# Patient Record
Sex: FEMALE | Race: WHITE | NOT HISPANIC OR LATINO | Employment: OTHER | URBAN - METROPOLITAN AREA
[De-identification: names, ages, dates, MRNs, and addresses within clinical notes are randomized per-mention and may not be internally consistent; named-entity substitution may affect disease eponyms.]

---

## 2017-02-17 ENCOUNTER — OFFICE VISIT - HEALTHEAST (OUTPATIENT)
Dept: INTERNAL MEDICINE | Facility: CLINIC | Age: 77
End: 2017-02-17

## 2017-02-17 DIAGNOSIS — Z09 HOSPITAL DISCHARGE FOLLOW-UP: ICD-10-CM
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
## 2017-02-17 DIAGNOSIS — R55 VASOVAGAL SYNCOPES: ICD-10-CM

## 2017-02-17 ASSESSMENT — MIFFLIN-ST. JEOR: SCORE: 1033.81

## 2017-04-26 ENCOUNTER — COMMUNICATION - HEALTHEAST (OUTPATIENT)
Dept: INTERNAL MEDICINE | Facility: CLINIC | Age: 77
End: 2017-04-26

## 2017-05-03 ENCOUNTER — OFFICE VISIT - HEALTHEAST (OUTPATIENT)
Dept: INTERNAL MEDICINE | Facility: CLINIC | Age: 77
End: 2017-05-03

## 2017-05-03 DIAGNOSIS — E78.5 HLD (HYPERLIPIDEMIA): ICD-10-CM

## 2017-05-03 DIAGNOSIS — R35.0 URINARY FREQUENCY: ICD-10-CM

## 2017-05-03 DIAGNOSIS — D64.9 ANEMIA: ICD-10-CM

## 2017-05-03 DIAGNOSIS — Z86.73 HISTORY OF TIA (TRANSIENT ISCHEMIC ATTACK): ICD-10-CM

## 2017-05-04 ENCOUNTER — COMMUNICATION - HEALTHEAST (OUTPATIENT)
Dept: INTERNAL MEDICINE | Facility: CLINIC | Age: 77
End: 2017-05-04

## 2017-08-03 ENCOUNTER — AMBULATORY - HEALTHEAST (OUTPATIENT)
Dept: LAB | Facility: CLINIC | Age: 77
End: 2017-08-03

## 2017-08-03 DIAGNOSIS — E78.5 HLD (HYPERLIPIDEMIA): ICD-10-CM

## 2017-08-03 LAB
CHOLEST SERPL-MCNC: 111 MG/DL
FASTING STATUS PATIENT QL REPORTED: YES
HDLC SERPL-MCNC: 43 MG/DL
LDLC SERPL CALC-MCNC: 49 MG/DL
TRIGL SERPL-MCNC: 96 MG/DL

## 2017-08-07 ENCOUNTER — COMMUNICATION - HEALTHEAST (OUTPATIENT)
Dept: INTERNAL MEDICINE | Facility: CLINIC | Age: 77
End: 2017-08-07

## 2017-08-21 ENCOUNTER — COMMUNICATION - HEALTHEAST (OUTPATIENT)
Dept: INTERNAL MEDICINE | Facility: CLINIC | Age: 77
End: 2017-08-21

## 2017-08-21 ENCOUNTER — OFFICE VISIT - HEALTHEAST (OUTPATIENT)
Dept: INTERNAL MEDICINE | Facility: CLINIC | Age: 77
End: 2017-08-21

## 2017-08-21 ENCOUNTER — AMBULATORY - HEALTHEAST (OUTPATIENT)
Dept: INTERNAL MEDICINE | Facility: CLINIC | Age: 77
End: 2017-08-21

## 2017-08-21 DIAGNOSIS — R53.81 MALAISE: ICD-10-CM

## 2017-08-21 DIAGNOSIS — R00.0 TACHYCARDIA: ICD-10-CM

## 2017-08-21 DIAGNOSIS — R53.83 FATIGUE: ICD-10-CM

## 2017-08-23 ENCOUNTER — COMMUNICATION - HEALTHEAST (OUTPATIENT)
Dept: INTERNAL MEDICINE | Facility: CLINIC | Age: 77
End: 2017-08-23

## 2017-08-24 ENCOUNTER — COMMUNICATION - HEALTHEAST (OUTPATIENT)
Dept: INTERNAL MEDICINE | Facility: CLINIC | Age: 77
End: 2017-08-24

## 2017-08-28 ENCOUNTER — OFFICE VISIT - HEALTHEAST (OUTPATIENT)
Dept: INTERNAL MEDICINE | Facility: CLINIC | Age: 77
End: 2017-08-28

## 2017-08-28 DIAGNOSIS — R55 NEAR SYNCOPE: ICD-10-CM

## 2017-08-28 DIAGNOSIS — E86.0 DEHYDRATION: ICD-10-CM

## 2017-08-28 DIAGNOSIS — N39.0 URINARY TRACT INFECTION WITHOUT HEMATURIA, SITE UNSPECIFIED: ICD-10-CM

## 2017-08-28 DIAGNOSIS — I47.10 PSVT (PAROXYSMAL SUPRAVENTRICULAR TACHYCARDIA) (H): ICD-10-CM

## 2017-09-04 ENCOUNTER — RECORDS - HEALTHEAST (OUTPATIENT)
Dept: GENERAL RADIOLOGY | Age: 77
End: 2017-09-04

## 2017-09-04 ENCOUNTER — OFFICE VISIT - HEALTHEAST (OUTPATIENT)
Dept: FAMILY MEDICINE | Facility: CLINIC | Age: 77
End: 2017-09-04
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
## 2017-09-04 DIAGNOSIS — M25.572 PAIN IN LEFT ANKLE AND JOINTS OF LEFT FOOT: ICD-10-CM

## 2017-09-04 DIAGNOSIS — M25.572 ACUTE LEFT ANKLE PAIN: ICD-10-CM

## 2017-09-04 DIAGNOSIS — S93.402A LEFT ANKLE SPRAIN: ICD-10-CM

## 2017-09-07 ENCOUNTER — OFFICE VISIT - HEALTHEAST (OUTPATIENT)
Dept: CARDIOLOGY | Facility: CLINIC | Age: 77
End: 2017-09-07

## 2017-09-07 DIAGNOSIS — I47.10 PAROXYSMAL SVT (SUPRAVENTRICULAR TACHYCARDIA) (H): ICD-10-CM

## 2017-09-07 DIAGNOSIS — I35.0 MODERATE AORTIC VALVE STENOSIS: ICD-10-CM

## 2017-09-07 DIAGNOSIS — I45.2 BIFASCICULAR BUNDLE BRANCH BLOCK: ICD-10-CM

## 2017-09-07 ASSESSMENT — MIFFLIN-ST. JEOR: SCORE: 1007.26

## 2017-09-11 ENCOUNTER — HOSPITAL ENCOUNTER (OUTPATIENT)
Dept: CARDIOLOGY | Facility: HOSPITAL | Age: 77
Discharge: HOME | End: 2017-09-11
Attending: INTERNAL MEDICINE

## 2017-09-11 DIAGNOSIS — I47.10 PAROXYSMAL SVT (SUPRAVENTRICULAR TACHYCARDIA) (H): ICD-10-CM

## 2018-01-03 ENCOUNTER — AMBULATORY - HEALTHEAST (OUTPATIENT)
Dept: INTERNAL MEDICINE | Facility: CLINIC | Age: 78
End: 2018-01-03

## 2018-01-03 DIAGNOSIS — I47.10 PAROXYSMAL SVT (SUPRAVENTRICULAR TACHYCARDIA) (H): ICD-10-CM

## 2018-07-03 ENCOUNTER — RECORDS - HEALTHEAST (OUTPATIENT)
Dept: ADMINISTRATIVE | Facility: OTHER | Age: 78
End: 2018-07-03

## 2018-07-23 ENCOUNTER — COMMUNICATION - HEALTHEAST (OUTPATIENT)
Dept: INTERNAL MEDICINE | Facility: CLINIC | Age: 78
End: 2018-07-23

## 2018-09-05 ENCOUNTER — AMBULATORY - HEALTHEAST (OUTPATIENT)
Dept: CARDIOLOGY | Facility: CLINIC | Age: 78
End: 2018-09-05

## 2018-09-05 DIAGNOSIS — I35.0 AORTIC STENOSIS: ICD-10-CM

## 2018-09-27 ENCOUNTER — COMMUNICATION - HEALTHEAST (OUTPATIENT)
Dept: SCHEDULING | Facility: CLINIC | Age: 78
End: 2018-09-27

## 2018-10-01 ENCOUNTER — COMMUNICATION - HEALTHEAST (OUTPATIENT)
Dept: CARE COORDINATION | Facility: CLINIC | Age: 78
End: 2018-10-01

## 2018-10-03 ENCOUNTER — OFFICE VISIT - HEALTHEAST (OUTPATIENT)
Dept: FAMILY MEDICINE | Facility: CLINIC | Age: 78
End: 2018-10-03

## 2018-10-03 DIAGNOSIS — R55 SYNCOPE: ICD-10-CM

## 2018-10-03 DIAGNOSIS — I47.10 PAROXYSMAL SVT (SUPRAVENTRICULAR TACHYCARDIA) (H): ICD-10-CM

## 2018-10-03 DIAGNOSIS — N39.0 URINARY TRACT INFECTION WITHOUT HEMATURIA, SITE UNSPECIFIED: ICD-10-CM

## 2018-10-03 ASSESSMENT — MIFFLIN-ST. JEOR: SCORE: 1032.66

## 2018-10-08 ENCOUNTER — COMMUNICATION - HEALTHEAST (OUTPATIENT)
Dept: INTERNAL MEDICINE | Facility: CLINIC | Age: 78
End: 2018-10-08

## 2018-10-08 DIAGNOSIS — I10 ESSENTIAL HYPERTENSION: ICD-10-CM

## 2018-10-09 ENCOUNTER — COMMUNICATION - HEALTHEAST (OUTPATIENT)
Dept: INTERNAL MEDICINE | Facility: CLINIC | Age: 78
End: 2018-10-09

## 2018-10-09 DIAGNOSIS — I10 ESSENTIAL HYPERTENSION: ICD-10-CM

## 2018-10-31 ENCOUNTER — OFFICE VISIT - HEALTHEAST (OUTPATIENT)
Dept: INTERNAL MEDICINE | Facility: CLINIC | Age: 78
End: 2018-10-31

## 2018-10-31 DIAGNOSIS — E78.5 HLD (HYPERLIPIDEMIA): ICD-10-CM

## 2018-10-31 DIAGNOSIS — I35.0 AORTIC VALVE STENOSIS, ETIOLOGY OF CARDIAC VALVE DISEASE UNSPECIFIED: ICD-10-CM

## 2018-10-31 DIAGNOSIS — I47.10 PAROXYSMAL SVT (SUPRAVENTRICULAR TACHYCARDIA) (H): ICD-10-CM

## 2018-10-31 DIAGNOSIS — R55 SYNCOPE: ICD-10-CM

## 2018-11-07 ENCOUNTER — COMMUNICATION - HEALTHEAST (OUTPATIENT)
Dept: INTERNAL MEDICINE | Facility: CLINIC | Age: 78
End: 2018-11-07

## 2018-11-09 ENCOUNTER — HOSPITAL ENCOUNTER (OUTPATIENT)
Dept: CARDIOLOGY | Facility: HOSPITAL | Age: 78
Discharge: HOME | End: 2018-11-09

## 2018-11-09 DIAGNOSIS — I35.0 AORTIC VALVE STENOSIS, ETIOLOGY OF CARDIAC VALVE DISEASE UNSPECIFIED: ICD-10-CM

## 2018-11-09 DIAGNOSIS — I47.10 PAROXYSMAL SVT (SUPRAVENTRICULAR TACHYCARDIA) (H): ICD-10-CM

## 2018-11-09 LAB
AORTIC ROOT: 2.8 CM
AORTIC VALVE MEAN VELOCITY: 176 CM/S
ASCENDING AORTA: 2.8 CM
AV DIMENSIONLESS INDEX VTI: 0.4
AV MEAN GRADIENT: 15 MMHG
AV PEAK GRADIENT: 25.6 MMHG
AV VALVE AREA: 1.1 CM2
BSA FOR ECHO PROCEDURE: 1.64 M2
CV BLOOD PRESSURE: NORMAL MMHG
CV ECHO HEIGHT: 63.5 IN
CV ECHO WEIGHT: 133 LBS
DOP CALC AO PEAK VEL: 253 CM/S
DOP CALC AO VTI: 51.4 CM
DOP CALC LVOT AREA: 3.14 CM2
DOP CALC LVOT DIAMETER: 2 CM
DOP CALC LVOT STROKE VOLUME: 57.1 CM3
DOP CALC MV VTI: 39.2 CM
DOP CALCLVOT PEAK VEL VTI: 18.2 CM
EJECTION FRACTION: 50 % (ref 55–75)
FRACTIONAL SHORTENING: 38.1 % (ref 28–44)
INTERVENTRICULAR SEPTUM IN END DIASTOLE: 1.1 CM (ref 0.6–0.9)
IVS/PW RATIO: 1
LA AREA 1: 21.4 CM2
LA AREA 2: 22.2 CM2
LEFT ATRIUM LENGTH: 6.3 CM
LEFT ATRIUM SIZE: 3.7 CM
LEFT ATRIUM VOLUME INDEX: 39.1 ML/M2
LEFT ATRIUM VOLUME: 64.1 ML
LEFT VENTRICLE CARDIAC INDEX: 2.3 L/MIN/M2
LEFT VENTRICLE CARDIAC OUTPUT: 3.8 L/MIN
LEFT VENTRICLE DIASTOLIC VOLUME INDEX: 32.9 CM3/M2 (ref 34–74)
LEFT VENTRICLE DIASTOLIC VOLUME: 54 CM3 (ref 46–106)
LEFT VENTRICLE HEART RATE: 67 BPM
LEFT VENTRICLE MASS INDEX: 95.8 G/M2
LEFT VENTRICLE SYSTOLIC VOLUME INDEX: 16.5 CM3/M2 (ref 11–31)
LEFT VENTRICLE SYSTOLIC VOLUME: 27 CM3 (ref 14–42)
LEFT VENTRICULAR INTERNAL DIMENSION IN DIASTOLE: 4.2 CM (ref 3.8–5.2)
LEFT VENTRICULAR INTERNAL DIMENSION IN SYSTOLE: 2.6 CM (ref 2.2–3.5)
LEFT VENTRICULAR MASS: 157.1 G
LEFT VENTRICULAR OUTFLOW TRACT MEAN GRADIENT: 2 MMHG
LEFT VENTRICULAR OUTFLOW TRACT MEAN VELOCITY: 61.8 CM/S
LEFT VENTRICULAR POSTERIOR WALL IN END DIASTOLE: 1.1 CM (ref 0.6–0.9)
LV STROKE VOLUME INDEX: 34.8 ML/M2
MITRAL VALVE E/A RATIO: 0.7
MITRAL VALVE MEAN INFLOW VELOCITY: 90.6 CM/S
MITRAL VALVE PEAK VELOCITY: 167 CM/S
MV AREA VTI: 1.46 CM2
MV AVERAGE E/E' RATIO: 22.3 CM/S
MV DECELERATION TIME: 394 MS
MV E'TISSUE VEL-LAT: 5.5 CM/S
MV E'TISSUE VEL-MED: 3.48 CM/S
MV LATERAL E/E' RATIO: 18.2
MV MEAN GRADIENT: 4 MMHG
MV MEDIAL E/E' RATIO: 28.7
MV PEAK A VELOCITY: 152 CM/S
MV PEAK E VELOCITY: 100 CM/S
MV PEAK GRADIENT: 11.2 MMHG
MV VALVE AREA BY CONTINUITY EQUATION: 1.5 CM2
NUC REST DIASTOLIC VOLUME INDEX: 2128 LBS
NUC REST SYSTOLIC VOLUME INDEX: 63.5 IN
TRICUSPID REGURGITATION PEAK PRESSURE GRADIENT: 9.9 MMHG
TRICUSPID VALVE ANULAR PLANE SYSTOLIC EXCURSION: 2.1 CM
TRICUSPID VALVE PEAK REGURGITANT VELOCITY: 157 CM/S

## 2018-11-09 ASSESSMENT — MIFFLIN-ST. JEOR: SCORE: 1055.34

## 2018-11-12 ENCOUNTER — COMMUNICATION - HEALTHEAST (OUTPATIENT)
Dept: INTERNAL MEDICINE | Facility: CLINIC | Age: 78
End: 2018-11-12

## 2018-11-14 ENCOUNTER — COMMUNICATION - HEALTHEAST (OUTPATIENT)
Dept: INTERNAL MEDICINE | Facility: CLINIC | Age: 78
End: 2018-11-14

## 2018-11-16 ENCOUNTER — COMMUNICATION - HEALTHEAST (OUTPATIENT)
Dept: INTERNAL MEDICINE | Facility: CLINIC | Age: 78
End: 2018-11-16

## 2018-11-20 ENCOUNTER — COMMUNICATION - HEALTHEAST (OUTPATIENT)
Dept: INTERNAL MEDICINE | Facility: CLINIC | Age: 78
End: 2018-11-20

## 2018-11-26 ENCOUNTER — OFFICE VISIT - HEALTHEAST (OUTPATIENT)
Dept: INTERNAL MEDICINE | Facility: CLINIC | Age: 78
End: 2018-11-26

## 2018-11-26 DIAGNOSIS — L30.9 DERMATITIS: ICD-10-CM

## 2018-12-03 ENCOUNTER — OFFICE VISIT - HEALTHEAST (OUTPATIENT)
Dept: INTERNAL MEDICINE | Facility: CLINIC | Age: 78
End: 2018-12-03

## 2018-12-03 DIAGNOSIS — I35.0 AORTIC VALVE STENOSIS, ETIOLOGY OF CARDIAC VALVE DISEASE UNSPECIFIED: ICD-10-CM

## 2018-12-03 DIAGNOSIS — I34.2 NON-RHEUMATIC MITRAL VALVE STENOSIS: ICD-10-CM

## 2018-12-03 DIAGNOSIS — I47.10 PAROXYSMAL SVT (SUPRAVENTRICULAR TACHYCARDIA) (H): ICD-10-CM

## 2018-12-03 DIAGNOSIS — I10 ESSENTIAL HYPERTENSION: ICD-10-CM

## 2018-12-06 ENCOUNTER — AMBULATORY - HEALTHEAST (OUTPATIENT)
Dept: NURSING | Facility: CLINIC | Age: 78
End: 2018-12-06

## 2018-12-07 ENCOUNTER — AMBULATORY - HEALTHEAST (OUTPATIENT)
Dept: INTERNAL MEDICINE | Facility: CLINIC | Age: 78
End: 2018-12-07

## 2018-12-20 ENCOUNTER — COMMUNICATION - HEALTHEAST (OUTPATIENT)
Dept: INTERNAL MEDICINE | Facility: CLINIC | Age: 78
End: 2018-12-20

## 2018-12-20 ENCOUNTER — AMBULATORY - HEALTHEAST (OUTPATIENT)
Dept: NURSING | Facility: CLINIC | Age: 78
End: 2018-12-20

## 2018-12-20 DIAGNOSIS — I10 ESSENTIAL HYPERTENSION: ICD-10-CM

## 2018-12-27 ENCOUNTER — COMMUNICATION - HEALTHEAST (OUTPATIENT)
Dept: INTERNAL MEDICINE | Facility: CLINIC | Age: 78
End: 2018-12-27

## 2019-01-28 ENCOUNTER — OFFICE VISIT - HEALTHEAST (OUTPATIENT)
Dept: INTERNAL MEDICINE | Facility: CLINIC | Age: 79
End: 2019-01-28

## 2019-01-28 DIAGNOSIS — N30.00 ACUTE CYSTITIS WITHOUT HEMATURIA: ICD-10-CM

## 2019-01-28 DIAGNOSIS — N17.9 AKI (ACUTE KIDNEY INJURY) (H): ICD-10-CM

## 2019-01-28 DIAGNOSIS — E86.0 DEHYDRATION: ICD-10-CM

## 2019-01-28 ASSESSMENT — MIFFLIN-ST. JEOR: SCORE: 1018.15

## 2019-02-06 ENCOUNTER — AMBULATORY - HEALTHEAST (OUTPATIENT)
Dept: LAB | Facility: CLINIC | Age: 79
End: 2019-02-06

## 2019-02-06 ENCOUNTER — AMBULATORY - HEALTHEAST (OUTPATIENT)
Dept: INTERNAL MEDICINE | Facility: CLINIC | Age: 79
End: 2019-02-06

## 2019-02-06 DIAGNOSIS — N17.9 AKI (ACUTE KIDNEY INJURY) (H): ICD-10-CM

## 2019-02-06 LAB
ALBUMIN UR-MCNC: NEGATIVE MG/DL
ANION GAP SERPL CALCULATED.3IONS-SCNC: 10 MMOL/L (ref 5–18)
APPEARANCE UR: ABNORMAL
BACTERIA #/AREA URNS HPF: ABNORMAL HPF
BILIRUB UR QL STRIP: NEGATIVE
BUN SERPL-MCNC: 26 MG/DL (ref 8–28)
CALCIUM SERPL-MCNC: 9 MG/DL (ref 8.5–10.5)
CHLORIDE BLD-SCNC: 106 MMOL/L (ref 98–107)
CO2 SERPL-SCNC: 24 MMOL/L (ref 22–31)
COLOR UR AUTO: YELLOW
CREAT SERPL-MCNC: 1.09 MG/DL (ref 0.6–1.1)
GFR SERPL CREATININE-BSD FRML MDRD: 49 ML/MIN/1.73M2
GLUCOSE BLD-MCNC: 107 MG/DL (ref 70–125)
GLUCOSE UR STRIP-MCNC: NEGATIVE MG/DL
HGB UR QL STRIP: ABNORMAL
KETONES UR STRIP-MCNC: NEGATIVE MG/DL
LEUKOCYTE ESTERASE UR QL STRIP: ABNORMAL
MUCOUS THREADS #/AREA URNS LPF: ABNORMAL LPF
NITRATE UR QL: POSITIVE
PH UR STRIP: 6 [PH] (ref 5–8)
POTASSIUM BLD-SCNC: 4.7 MMOL/L (ref 3.5–5)
RBC #/AREA URNS AUTO: ABNORMAL HPF
SODIUM SERPL-SCNC: 140 MMOL/L (ref 136–145)
SP GR UR STRIP: 1.02 (ref 1–1.03)
SQUAMOUS #/AREA URNS AUTO: ABNORMAL LPF
UROBILINOGEN UR STRIP-ACNC: ABNORMAL
WBC #/AREA URNS AUTO: ABNORMAL HPF
WBC CLUMPS #/AREA URNS HPF: PRESENT /[HPF]

## 2019-02-08 LAB — BACTERIA SPEC CULT: ABNORMAL

## 2019-02-12 ENCOUNTER — COMMUNICATION - HEALTHEAST (OUTPATIENT)
Dept: INTERNAL MEDICINE | Facility: CLINIC | Age: 79
End: 2019-02-12

## 2019-10-09 ENCOUNTER — COMMUNICATION - HEALTHEAST (OUTPATIENT)
Dept: INTERNAL MEDICINE | Facility: CLINIC | Age: 79
End: 2019-10-09

## 2019-10-09 ENCOUNTER — AMBULATORY - HEALTHEAST (OUTPATIENT)
Dept: INTERNAL MEDICINE | Facility: CLINIC | Age: 79
End: 2019-10-09

## 2019-11-18 ENCOUNTER — OFFICE VISIT - HEALTHEAST (OUTPATIENT)
Dept: INTERNAL MEDICINE | Facility: CLINIC | Age: 79
End: 2019-11-18

## 2019-11-18 DIAGNOSIS — L20.9 ATOPIC DERMATITIS, UNSPECIFIED TYPE: ICD-10-CM

## 2019-12-05 ENCOUNTER — COMMUNICATION - HEALTHEAST (OUTPATIENT)
Dept: INTERNAL MEDICINE | Facility: CLINIC | Age: 79
End: 2019-12-05

## 2019-12-05 DIAGNOSIS — E78.2 MIXED HYPERLIPIDEMIA: ICD-10-CM

## 2019-12-31 ENCOUNTER — OFFICE VISIT - HEALTHEAST (OUTPATIENT)
Dept: FAMILY MEDICINE | Facility: CLINIC | Age: 79
End: 2019-12-31

## 2019-12-31 DIAGNOSIS — S09.90XA INJURY OF HEAD, INITIAL ENCOUNTER: ICD-10-CM

## 2020-02-12 ENCOUNTER — COMMUNICATION - HEALTHEAST (OUTPATIENT)
Dept: INTERNAL MEDICINE | Facility: CLINIC | Age: 80
End: 2020-02-12

## 2020-02-12 DIAGNOSIS — E78.2 MIXED HYPERLIPIDEMIA: ICD-10-CM

## 2020-02-24 ENCOUNTER — AMBULATORY - HEALTHEAST (OUTPATIENT)
Dept: INTERNAL MEDICINE | Facility: CLINIC | Age: 80
End: 2020-02-24

## 2020-02-24 DIAGNOSIS — E78.2 MIXED HYPERLIPIDEMIA: ICD-10-CM

## 2020-02-27 ENCOUNTER — OFFICE VISIT - HEALTHEAST (OUTPATIENT)
Dept: INTERNAL MEDICINE | Facility: CLINIC | Age: 80
End: 2020-02-27

## 2020-02-27 DIAGNOSIS — R55 SYNCOPE, UNSPECIFIED SYNCOPE TYPE: ICD-10-CM

## 2020-02-27 DIAGNOSIS — E78.2 MIXED HYPERLIPIDEMIA: ICD-10-CM

## 2020-02-27 DIAGNOSIS — I51.89 DIASTOLIC DYSFUNCTION: ICD-10-CM

## 2020-02-27 DIAGNOSIS — I47.10 PAROXYSMAL SVT (SUPRAVENTRICULAR TACHYCARDIA) (H): ICD-10-CM

## 2020-02-27 DIAGNOSIS — L20.9 ATOPIC DERMATITIS, UNSPECIFIED TYPE: ICD-10-CM

## 2020-02-27 LAB
ALT SERPL W P-5'-P-CCNC: 29 U/L (ref 0–45)
ANION GAP SERPL CALCULATED.3IONS-SCNC: 11 MMOL/L (ref 5–18)
AST SERPL W P-5'-P-CCNC: 29 U/L (ref 0–40)
BUN SERPL-MCNC: 33 MG/DL (ref 8–28)
CALCIUM SERPL-MCNC: 9 MG/DL (ref 8.5–10.5)
CHLORIDE BLD-SCNC: 109 MMOL/L (ref 98–107)
CHOLEST SERPL-MCNC: 153 MG/DL
CO2 SERPL-SCNC: 22 MMOL/L (ref 22–31)
CREAT SERPL-MCNC: 1.18 MG/DL (ref 0.6–1.1)
FASTING STATUS PATIENT QL REPORTED: NO
GFR SERPL CREATININE-BSD FRML MDRD: 44 ML/MIN/1.73M2
GLUCOSE BLD-MCNC: 109 MG/DL (ref 70–125)
HDLC SERPL-MCNC: 55 MG/DL
LDLC SERPL CALC-MCNC: 76 MG/DL
POTASSIUM BLD-SCNC: 4.2 MMOL/L (ref 3.5–5)
SODIUM SERPL-SCNC: 142 MMOL/L (ref 136–145)
TRIGL SERPL-MCNC: 112 MG/DL

## 2020-02-27 ASSESSMENT — MIFFLIN-ST. JEOR: SCORE: 996.38

## 2020-03-02 ENCOUNTER — COMMUNICATION - HEALTHEAST (OUTPATIENT)
Dept: INTERNAL MEDICINE | Facility: CLINIC | Age: 80
End: 2020-03-02

## 2020-03-02 DIAGNOSIS — N18.30 CHRONIC KIDNEY DISEASE, STAGE III (MODERATE) (H): ICD-10-CM

## 2020-09-15 ENCOUNTER — SURGERY - HEALTHEAST (OUTPATIENT)
Dept: CARDIOLOGY | Facility: CLINIC | Age: 80
End: 2020-09-15

## 2020-09-15 ASSESSMENT — MIFFLIN-ST. JEOR
SCORE: 963.95
SCORE: 990.15
SCORE: 982.55

## 2020-09-16 ENCOUNTER — COMMUNICATION - HEALTHEAST (OUTPATIENT)
Dept: INTERNAL MEDICINE | Facility: CLINIC | Age: 80
End: 2020-09-16

## 2020-09-17 ENCOUNTER — COMMUNICATION - HEALTHEAST (OUTPATIENT)
Dept: NURSING | Facility: CLINIC | Age: 80
End: 2020-09-17

## 2020-09-17 ENCOUNTER — AMBULATORY - HEALTHEAST (OUTPATIENT)
Dept: CARE COORDINATION | Facility: CLINIC | Age: 80
End: 2020-09-17

## 2020-09-17 DIAGNOSIS — I35.0 AORTIC VALVE STENOSIS, ETIOLOGY OF CARDIAC VALVE DISEASE UNSPECIFIED: ICD-10-CM

## 2020-09-17 DIAGNOSIS — R29.6 FREQUENT FALLS: ICD-10-CM

## 2020-09-17 DIAGNOSIS — I47.10 SVT (SUPRAVENTRICULAR TACHYCARDIA) (H): ICD-10-CM

## 2020-09-18 ENCOUNTER — COMMUNICATION - HEALTHEAST (OUTPATIENT)
Dept: CARE COORDINATION | Facility: CLINIC | Age: 80
End: 2020-09-18

## 2020-09-24 ENCOUNTER — COMMUNICATION - HEALTHEAST (OUTPATIENT)
Dept: CARE COORDINATION | Facility: CLINIC | Age: 80
End: 2020-09-24

## 2020-09-26 ENCOUNTER — COMMUNICATION - HEALTHEAST (OUTPATIENT)
Dept: INTERNAL MEDICINE | Facility: CLINIC | Age: 80
End: 2020-09-26

## 2020-10-02 ASSESSMENT — ACTIVITIES OF DAILY LIVING (ADL): DEPENDENT_IADLS:: CLEANING;COOKING;LAUNDRY;SHOPPING;TRANSPORTATION

## 2020-10-05 ENCOUNTER — COMMUNICATION - HEALTHEAST (OUTPATIENT)
Dept: NURSING | Facility: CLINIC | Age: 80
End: 2020-10-05

## 2020-10-06 ENCOUNTER — OFFICE VISIT - HEALTHEAST (OUTPATIENT)
Dept: INTERNAL MEDICINE | Facility: CLINIC | Age: 80
End: 2020-10-06

## 2020-10-06 DIAGNOSIS — R55 NEAR SYNCOPE: ICD-10-CM

## 2020-10-06 DIAGNOSIS — Z09 HOSPITAL DISCHARGE FOLLOW-UP: ICD-10-CM

## 2020-10-06 DIAGNOSIS — I47.10 SVT (SUPRAVENTRICULAR TACHYCARDIA) (H): ICD-10-CM

## 2020-12-17 ENCOUNTER — COMMUNICATION - HEALTHEAST (OUTPATIENT)
Dept: SCHEDULING | Facility: CLINIC | Age: 80
End: 2020-12-17

## 2020-12-21 ENCOUNTER — RECORDS - HEALTHEAST (OUTPATIENT)
Dept: LAB | Facility: CLINIC | Age: 80
End: 2020-12-21

## 2020-12-22 ENCOUNTER — OFFICE VISIT - HEALTHEAST (OUTPATIENT)
Dept: GERIATRICS | Facility: CLINIC | Age: 80
End: 2020-12-22

## 2020-12-22 DIAGNOSIS — I35.0 AORTIC VALVE STENOSIS, ETIOLOGY OF CARDIAC VALVE DISEASE UNSPECIFIED: ICD-10-CM

## 2020-12-22 DIAGNOSIS — I50.30 DIASTOLIC CONGESTIVE HEART FAILURE, UNSPECIFIED HF CHRONICITY (H): ICD-10-CM

## 2020-12-22 DIAGNOSIS — I47.10 SVT (SUPRAVENTRICULAR TACHYCARDIA) (H): ICD-10-CM

## 2020-12-23 ENCOUNTER — RECORDS - HEALTHEAST (OUTPATIENT)
Dept: LAB | Facility: CLINIC | Age: 80
End: 2020-12-23

## 2020-12-23 ENCOUNTER — OFFICE VISIT - HEALTHEAST (OUTPATIENT)
Dept: GERIATRICS | Facility: CLINIC | Age: 80
End: 2020-12-23

## 2020-12-23 DIAGNOSIS — I35.0 AORTIC VALVE STENOSIS, ETIOLOGY OF CARDIAC VALVE DISEASE UNSPECIFIED: ICD-10-CM

## 2020-12-23 DIAGNOSIS — R46.89 COGNITIVE AND BEHAVIORAL CHANGES: ICD-10-CM

## 2020-12-23 DIAGNOSIS — N18.31 STAGE 3A CHRONIC KIDNEY DISEASE (H): ICD-10-CM

## 2020-12-23 DIAGNOSIS — R41.89 COGNITIVE AND BEHAVIORAL CHANGES: ICD-10-CM

## 2020-12-23 DIAGNOSIS — I50.31 ACUTE DIASTOLIC CONGESTIVE HEART FAILURE (H): ICD-10-CM

## 2020-12-23 ASSESSMENT — MIFFLIN-ST. JEOR: SCORE: 970.76

## 2020-12-24 ENCOUNTER — OFFICE VISIT - HEALTHEAST (OUTPATIENT)
Dept: GERIATRICS | Facility: CLINIC | Age: 80
End: 2020-12-24

## 2020-12-24 DIAGNOSIS — I47.10 SVT (SUPRAVENTRICULAR TACHYCARDIA) (H): ICD-10-CM

## 2020-12-24 DIAGNOSIS — I35.0 AORTIC VALVE STENOSIS, ETIOLOGY OF CARDIAC VALVE DISEASE UNSPECIFIED: ICD-10-CM

## 2020-12-24 DIAGNOSIS — G31.84 MCI (MILD COGNITIVE IMPAIRMENT) WITH MEMORY LOSS: ICD-10-CM

## 2020-12-24 LAB
ANION GAP SERPL CALCULATED.3IONS-SCNC: 6 MMOL/L (ref 5–18)
BUN SERPL-MCNC: 28 MG/DL (ref 8–28)
CALCIUM SERPL-MCNC: 8.4 MG/DL (ref 8.5–10.5)
CHLORIDE BLD-SCNC: 105 MMOL/L (ref 98–107)
CO2 SERPL-SCNC: 28 MMOL/L (ref 22–31)
CREAT SERPL-MCNC: 1.03 MG/DL (ref 0.6–1.1)
GAMMA INTERFERON BACKGROUND BLD IA-ACNC: 0.07 IU/ML
GFR SERPL CREATININE-BSD FRML MDRD: 52 ML/MIN/1.73M2
GLUCOSE BLD-MCNC: 87 MG/DL (ref 70–125)
M TB IFN-G BLD-IMP: NEGATIVE
MITOGEN IGNF BCKGRD COR BLD-ACNC: 0 IU/ML
MITOGEN IGNF BCKGRD COR BLD-ACNC: 0.02 IU/ML
POTASSIUM BLD-SCNC: 4.6 MMOL/L (ref 3.5–5)
QTF INTERPRETATION: NORMAL
QTF MITOGEN - NIL: >10 IU/ML
SODIUM SERPL-SCNC: 139 MMOL/L (ref 136–145)

## 2020-12-28 ENCOUNTER — OFFICE VISIT - HEALTHEAST (OUTPATIENT)
Dept: GERIATRICS | Facility: CLINIC | Age: 80
End: 2020-12-28

## 2020-12-28 DIAGNOSIS — R46.89 COGNITIVE AND BEHAVIORAL CHANGES: ICD-10-CM

## 2020-12-28 DIAGNOSIS — R41.89 COGNITIVE AND BEHAVIORAL CHANGES: ICD-10-CM

## 2020-12-28 DIAGNOSIS — N18.31 STAGE 3A CHRONIC KIDNEY DISEASE (H): ICD-10-CM

## 2020-12-28 DIAGNOSIS — I50.31 ACUTE DIASTOLIC CONGESTIVE HEART FAILURE (H): ICD-10-CM

## 2020-12-28 ASSESSMENT — MIFFLIN-ST. JEOR: SCORE: 975.29

## 2020-12-29 ENCOUNTER — OFFICE VISIT - HEALTHEAST (OUTPATIENT)
Dept: GERIATRICS | Facility: CLINIC | Age: 80
End: 2020-12-29

## 2020-12-29 DIAGNOSIS — I50.30 DIASTOLIC CONGESTIVE HEART FAILURE, UNSPECIFIED HF CHRONICITY (H): ICD-10-CM

## 2020-12-29 DIAGNOSIS — I35.0 AORTIC VALVE STENOSIS, ETIOLOGY OF CARDIAC VALVE DISEASE UNSPECIFIED: ICD-10-CM

## 2020-12-29 DIAGNOSIS — I47.10 SVT (SUPRAVENTRICULAR TACHYCARDIA) (H): ICD-10-CM

## 2020-12-30 ENCOUNTER — OFFICE VISIT - HEALTHEAST (OUTPATIENT)
Dept: GERIATRICS | Facility: CLINIC | Age: 80
End: 2020-12-30

## 2020-12-30 DIAGNOSIS — R46.89 COGNITIVE AND BEHAVIORAL CHANGES: ICD-10-CM

## 2020-12-30 DIAGNOSIS — I50.31 ACUTE DIASTOLIC CONGESTIVE HEART FAILURE (H): ICD-10-CM

## 2020-12-30 DIAGNOSIS — I47.10 SVT (SUPRAVENTRICULAR TACHYCARDIA) (H): ICD-10-CM

## 2020-12-30 DIAGNOSIS — R41.89 COGNITIVE AND BEHAVIORAL CHANGES: ICD-10-CM

## 2020-12-30 ASSESSMENT — MIFFLIN-ST. JEOR: SCORE: 1028.36

## 2020-12-31 ENCOUNTER — OFFICE VISIT - HEALTHEAST (OUTPATIENT)
Dept: GERIATRICS | Facility: CLINIC | Age: 80
End: 2020-12-31

## 2020-12-31 ENCOUNTER — HOME CARE/HOSPICE - HEALTHEAST (OUTPATIENT)
Dept: HOME HEALTH SERVICES | Facility: HOME HEALTH | Age: 80
End: 2020-12-31

## 2020-12-31 DIAGNOSIS — I35.0 AORTIC VALVE STENOSIS, ETIOLOGY OF CARDIAC VALVE DISEASE UNSPECIFIED: ICD-10-CM

## 2020-12-31 DIAGNOSIS — I47.10 SVT (SUPRAVENTRICULAR TACHYCARDIA) (H): ICD-10-CM

## 2020-12-31 DIAGNOSIS — I50.30 DIASTOLIC CONGESTIVE HEART FAILURE, UNSPECIFIED HF CHRONICITY (H): ICD-10-CM

## 2021-01-02 ENCOUNTER — COMMUNICATION - HEALTHEAST (OUTPATIENT)
Dept: HOME HEALTH SERVICES | Facility: HOME HEALTH | Age: 81
End: 2021-01-02

## 2021-01-04 ENCOUNTER — COMMUNICATION - HEALTHEAST (OUTPATIENT)
Dept: GERIATRICS | Facility: CLINIC | Age: 81
End: 2021-01-04

## 2021-01-04 ENCOUNTER — AMBULATORY - HEALTHEAST (OUTPATIENT)
Dept: GERIATRICS | Facility: CLINIC | Age: 81
End: 2021-01-04

## 2021-01-05 ENCOUNTER — OFFICE VISIT - HEALTHEAST (OUTPATIENT)
Dept: INTERNAL MEDICINE | Facility: CLINIC | Age: 81
End: 2021-01-05

## 2021-01-05 DIAGNOSIS — I35.0 AORTIC VALVE STENOSIS, ETIOLOGY OF CARDIAC VALVE DISEASE UNSPECIFIED: ICD-10-CM

## 2021-01-05 DIAGNOSIS — Z09 HOSPITAL DISCHARGE FOLLOW-UP: ICD-10-CM

## 2021-01-05 DIAGNOSIS — N18.31 STAGE 3A CHRONIC KIDNEY DISEASE (H): ICD-10-CM

## 2021-01-05 DIAGNOSIS — I47.10 SVT (SUPRAVENTRICULAR TACHYCARDIA) (H): ICD-10-CM

## 2021-01-05 DIAGNOSIS — I50.31 ACUTE DIASTOLIC CONGESTIVE HEART FAILURE (H): ICD-10-CM

## 2021-01-14 ENCOUNTER — AMBULATORY - HEALTHEAST (OUTPATIENT)
Dept: CARE COORDINATION | Facility: CLINIC | Age: 81
End: 2021-01-14

## 2021-01-14 DIAGNOSIS — I50.31 ACUTE DIASTOLIC CONGESTIVE HEART FAILURE (H): ICD-10-CM

## 2021-01-14 DIAGNOSIS — I47.10 SVT (SUPRAVENTRICULAR TACHYCARDIA) (H): ICD-10-CM

## 2021-01-15 ENCOUNTER — COMMUNICATION - HEALTHEAST (OUTPATIENT)
Dept: NURSING | Facility: CLINIC | Age: 81
End: 2021-01-15

## 2021-02-02 ENCOUNTER — COMMUNICATION - HEALTHEAST (OUTPATIENT)
Dept: CARDIOLOGY | Facility: CLINIC | Age: 81
End: 2021-02-02

## 2021-02-03 ENCOUNTER — OFFICE VISIT - HEALTHEAST (OUTPATIENT)
Dept: CARDIOLOGY | Facility: CLINIC | Age: 81
End: 2021-02-03

## 2021-02-03 DIAGNOSIS — I25.10 NONOBSTRUCTIVE ATHEROSCLEROSIS OF CORONARY ARTERY: ICD-10-CM

## 2021-02-03 DIAGNOSIS — I10 ESSENTIAL HYPERTENSION: ICD-10-CM

## 2021-02-03 DIAGNOSIS — I47.10 SVT (SUPRAVENTRICULAR TACHYCARDIA) (H): ICD-10-CM

## 2021-02-03 DIAGNOSIS — I35.0 MODERATE AORTIC VALVE STENOSIS: ICD-10-CM

## 2021-02-03 DIAGNOSIS — E78.5 DYSLIPIDEMIA, GOAL LDL BELOW 70: ICD-10-CM

## 2021-02-03 ASSESSMENT — MIFFLIN-ST. JEOR: SCORE: 979.37

## 2021-02-04 ENCOUNTER — COMMUNICATION - HEALTHEAST (OUTPATIENT)
Dept: CARDIOLOGY | Facility: CLINIC | Age: 81
End: 2021-02-04

## 2021-02-04 ENCOUNTER — AMBULATORY - HEALTHEAST (OUTPATIENT)
Dept: CARDIOLOGY | Facility: CLINIC | Age: 81
End: 2021-02-04

## 2021-02-04 ENCOUNTER — COMMUNICATION - HEALTHEAST (OUTPATIENT)
Dept: INTERNAL MEDICINE | Facility: CLINIC | Age: 81
End: 2021-02-04

## 2021-02-08 ENCOUNTER — HOSPITAL ENCOUNTER (OUTPATIENT)
Dept: CARDIOLOGY | Facility: HOSPITAL | Age: 81
Discharge: HOME | End: 2021-02-08
Attending: INTERNAL MEDICINE

## 2021-02-08 ENCOUNTER — COMMUNICATION - HEALTHEAST (OUTPATIENT)
Dept: ADMINISTRATIVE | Facility: CLINIC | Age: 81
End: 2021-02-08

## 2021-02-10 ENCOUNTER — OFFICE VISIT - HEALTHEAST (OUTPATIENT)
Dept: INTERNAL MEDICINE | Facility: CLINIC | Age: 81
End: 2021-02-10

## 2021-02-10 ENCOUNTER — COMMUNICATION - HEALTHEAST (OUTPATIENT)
Dept: NURSING | Facility: CLINIC | Age: 81
End: 2021-02-10

## 2021-02-10 DIAGNOSIS — E78.5 DYSLIPIDEMIA, GOAL LDL BELOW 70: ICD-10-CM

## 2021-02-10 DIAGNOSIS — L30.9 DERMATITIS: ICD-10-CM

## 2021-02-24 ENCOUNTER — COMMUNICATION - HEALTHEAST (OUTPATIENT)
Dept: NURSING | Facility: CLINIC | Age: 81
End: 2021-02-24

## 2021-03-08 ENCOUNTER — COMMUNICATION - HEALTHEAST (OUTPATIENT)
Dept: CARDIOLOGY | Facility: CLINIC | Age: 81
End: 2021-03-08

## 2021-03-09 ENCOUNTER — OFFICE VISIT - HEALTHEAST (OUTPATIENT)
Dept: CARDIOLOGY | Facility: CLINIC | Age: 81
End: 2021-03-09

## 2021-03-09 DIAGNOSIS — I47.10 SVT (SUPRAVENTRICULAR TACHYCARDIA) (H): ICD-10-CM

## 2021-03-09 DIAGNOSIS — I35.0 MODERATE AORTIC VALVE STENOSIS: ICD-10-CM

## 2021-03-09 DIAGNOSIS — I45.2 BIFASCICULAR BUNDLE BRANCH BLOCK: ICD-10-CM

## 2021-03-09 ASSESSMENT — MIFFLIN-ST. JEOR: SCORE: 983.91

## 2021-03-14 ENCOUNTER — HOSPITAL ENCOUNTER (OUTPATIENT)
Facility: CLINIC | Age: 81
Setting detail: OBSERVATION
Discharge: HOME | End: 2021-03-15
Attending: INTERNAL MEDICINE | Admitting: INTERNAL MEDICINE
Payer: COMMERCIAL

## 2021-03-14 ENCOUNTER — TELEPHONE (OUTPATIENT)
Facility: CLINIC | Age: 81
End: 2021-03-14

## 2021-03-14 ENCOUNTER — RECORDS - HEALTHEAST (OUTPATIENT)
Dept: ADMINISTRATIVE | Facility: OTHER | Age: 81
End: 2021-03-14

## 2021-03-14 DIAGNOSIS — I47.10 SVT (SUPRAVENTRICULAR TACHYCARDIA) (H): Primary | ICD-10-CM

## 2021-03-14 PROCEDURE — 96365 THER/PROPH/DIAG IV INF INIT: CPT

## 2021-03-14 ASSESSMENT — ACTIVITIES OF DAILY LIVING (ADL)
WEAR_GLASSES_OR_BLIND: YES
DOING_ERRANDS_INDEPENDENTLY_DIFFICULTY: YES
WALKING_OR_CLIMBING_STAIRS: AMBULATION DIFFICULTY, REQUIRES EQUIPMENT;STAIR CLIMBING DIFFICULTY, REQUIRES EQUIPMENT;TRANSFERRING DIFFICULTY, REQUIRES EQUIPMENT
CONCENTRATING,_REMEMBERING_OR_MAKING_DECISIONS_DIFFICULTY: YES
DIFFICULTY_COMMUNICATING: YES
EQUIPMENT_CURRENTLY_USED_AT_HOME: CANE, STRAIGHT
COMMUNICATION: DIFFICULTY UNDERSTANDING
HEARING_DIFFICULTY_OR_DEAF: NO
DIFFICULTY_EATING/SWALLOWING: NO
TOILETING_ISSUES: NO
FALL_HISTORY_WITHIN_LAST_SIX_MONTHS: YES
DRESSING/BATHING_DIFFICULTY: YES
WALKING_OR_CLIMBING_STAIRS_DIFFICULTY: YES
NUMBER_OF_TIMES_PATIENT_HAS_FALLEN_WITHIN_LAST_SIX_MONTHS: 1

## 2021-03-14 ASSESSMENT — MIFFLIN-ST. JEOR: SCORE: 984.82

## 2021-03-15 VITALS
OXYGEN SATURATION: 97 % | DIASTOLIC BLOOD PRESSURE: 62 MMHG | RESPIRATION RATE: 16 BRPM | SYSTOLIC BLOOD PRESSURE: 143 MMHG | HEART RATE: 54 BPM | TEMPERATURE: 97.8 F | HEIGHT: 63 IN | WEIGHT: 120.3 LBS | BODY MASS INDEX: 21.32 KG/M2

## 2021-03-15 PROBLEM — I47.10 SVT (SUPRAVENTRICULAR TACHYCARDIA) (H): Status: ACTIVE | Noted: 2021-03-15

## 2021-03-15 LAB
ANION GAP SERPL CALCULATED.3IONS-SCNC: 7 MMOL/L (ref 3–14)
BUN SERPL-MCNC: 29 MG/DL (ref 7–30)
CALCIUM SERPL-MCNC: 8.4 MG/DL (ref 8.5–10.1)
CHLORIDE SERPL-SCNC: 112 MMOL/L (ref 94–109)
CO2 SERPL-SCNC: 24 MMOL/L (ref 20–32)
CREAT SERPL-MCNC: 1.25 MG/DL (ref 0.52–1.04)
ERYTHROCYTE [DISTWIDTH] IN BLOOD BY AUTOMATED COUNT: 13.8 % (ref 10–15)
GFR SERPL CREATININE-BSD FRML MDRD: 41 ML/MIN/{1.73_M2}
GLUCOSE BLDC GLUCOMTR-MCNC: 133 MG/DL (ref 70–99)
GLUCOSE SERPL-MCNC: 154 MG/DL (ref 70–99)
HCT VFR BLD AUTO: 38.4 % (ref 35–47)
HGB BLD-MCNC: 12.5 G/DL (ref 11.7–15.7)
LACTATE BLD-SCNC: 1.1 MMOL/L (ref 0.7–2)
MAGNESIUM SERPL-MCNC: 2.1 MG/DL (ref 1.6–2.3)
MCH RBC QN AUTO: 33.5 PG (ref 26.5–33)
MCHC RBC AUTO-ENTMCNC: 32.6 G/DL (ref 31.5–36.5)
MCV RBC AUTO: 103 FL (ref 78–100)
PLATELET # BLD AUTO: 157 10E9/L (ref 150–450)
POTASSIUM SERPL-SCNC: 4.9 MMOL/L (ref 3.4–5.3)
RBC # BLD AUTO: 3.73 10E12/L (ref 3.8–5.2)
SODIUM SERPL-SCNC: 143 MMOL/L (ref 133–144)
WBC # BLD AUTO: 6 10E9/L (ref 4–11)

## 2021-03-15 PROCEDURE — 83605 ASSAY OF LACTIC ACID: CPT | Performed by: INTERNAL MEDICINE

## 2021-03-15 PROCEDURE — 36415 COLL VENOUS BLD VENIPUNCTURE: CPT | Performed by: INTERNAL MEDICINE

## 2021-03-15 PROCEDURE — 99207 PR APP CREDIT; MD BILLING SHARED VISIT: CPT | Performed by: INTERNAL MEDICINE

## 2021-03-15 PROCEDURE — 99207 PR CDG-CHARGE REQUIRED MANUAL ENTRY: CPT | Performed by: INTERNAL MEDICINE

## 2021-03-15 PROCEDURE — 999N001017 HC STATISTIC GLUCOSE BY METER IP

## 2021-03-15 PROCEDURE — G0378 HOSPITAL OBSERVATION PER HR: HCPCS

## 2021-03-15 PROCEDURE — 83735 ASSAY OF MAGNESIUM: CPT | Performed by: INTERNAL MEDICINE

## 2021-03-15 PROCEDURE — 93005 ELECTROCARDIOGRAM TRACING: CPT

## 2021-03-15 PROCEDURE — 99204 OFFICE O/P NEW MOD 45 MIN: CPT | Performed by: INTERNAL MEDICINE

## 2021-03-15 PROCEDURE — 80048 BASIC METABOLIC PNL TOTAL CA: CPT | Performed by: INTERNAL MEDICINE

## 2021-03-15 PROCEDURE — 85027 COMPLETE CBC AUTOMATED: CPT | Performed by: INTERNAL MEDICINE

## 2021-03-15 PROCEDURE — 999N000157 HC STATISTIC RCP TIME EA 10 MIN

## 2021-03-15 PROCEDURE — 93010 ELECTROCARDIOGRAM REPORT: CPT | Performed by: INTERNAL MEDICINE

## 2021-03-15 PROCEDURE — 99236 HOSP IP/OBS SAME DATE HI 85: CPT | Performed by: INTERNAL MEDICINE

## 2021-03-15 PROCEDURE — 250N000013 HC RX MED GY IP 250 OP 250 PS 637: Performed by: INTERNAL MEDICINE

## 2021-03-15 PROCEDURE — G0379 DIRECT REFER HOSPITAL OBSERV: HCPCS

## 2021-03-15 RX ORDER — LIDOCAINE 40 MG/G
CREAM TOPICAL
Status: DISCONTINUED | OUTPATIENT
Start: 2021-03-15 | End: 2021-03-15 | Stop reason: HOSPADM

## 2021-03-15 RX ORDER — METOPROLOL SUCCINATE 50 MG/1
25 TABLET, EXTENDED RELEASE ORAL EVERY MORNING
Status: ON HOLD | COMMUNITY
End: 2021-03-15

## 2021-03-15 RX ORDER — NITROGLYCERIN 0.4 MG/1
0.4 TABLET SUBLINGUAL EVERY 5 MIN PRN
Status: DISCONTINUED | OUTPATIENT
Start: 2021-03-15 | End: 2021-03-15 | Stop reason: HOSPADM

## 2021-03-15 RX ORDER — FUROSEMIDE 20 MG
10 TABLET ORAL EVERY MORNING
COMMUNITY
End: 2021-09-23

## 2021-03-15 RX ORDER — ATORVASTATIN CALCIUM 10 MG/1
10 TABLET, FILM COATED ORAL EVERY MORNING
COMMUNITY
End: 2021-09-23

## 2021-03-15 RX ORDER — DILTIAZEM HCL IN NACL,ISO-OSM 125 MG/125
5-15 PLASTIC BAG, INJECTION (ML) INTRAVENOUS CONTINUOUS
Status: DISCONTINUED | OUTPATIENT
Start: 2021-03-15 | End: 2021-03-15

## 2021-03-15 RX ORDER — METOPROLOL TARTRATE 25 MG/1
12.5 TABLET, FILM COATED ORAL 2 TIMES DAILY
Qty: 60 TABLET | Refills: 0 | Status: SHIPPED | OUTPATIENT
Start: 2021-03-15 | End: 2021-07-21

## 2021-03-15 RX ORDER — ACETAMINOPHEN 325 MG/1
650 TABLET ORAL EVERY 4 HOURS PRN
Status: DISCONTINUED | OUTPATIENT
Start: 2021-03-15 | End: 2021-03-15 | Stop reason: HOSPADM

## 2021-03-15 RX ADMIN — METOPROLOL TARTRATE 12.5 MG: 25 TABLET ORAL at 00:41

## 2021-03-15 RX ADMIN — Medication 1 MG: at 00:41

## 2021-03-15 ASSESSMENT — MIFFLIN-ST. JEOR: SCORE: 989.81

## 2021-03-15 NOTE — PLAN OF CARE
Patient's After Visit Summary was reviewed with patient. Copy of discharge instructions given to pt identified friend whom assists pt at home  Patient verbalized understanding of After Visit Summary, recommended follow up and was given an opportunity to ask questions.   Discharge medications sent home with patient/family: No - adjusted prescription sent to preferred pharmacy  Discharged with other: [employer] transport      RN - HR bradycardic for majority of day - AM metoprolol held. Tele SB/Junctional. Pt A&Ox2 - has long standing cognitive delay. Pt up with SBA to bathroom. No subsequent issues of SVT. Tolerating PO intake. Receiving discharge orders. Discharge ride expected at 1530,

## 2021-03-15 NOTE — CONSULTS
Care Management Initial Consult    General Information  Assessment completed with: Patient, Care Team Member, Family, Pat, brother Adam, friend Gabriella  Type of CM/SW Visit: Initial Assessment    Primary Care Provider verified and updated as needed: No   Readmission within the last 30 days: no previous admission in last 30 days      Reason for Consult: care coordination/care conference, discharge planning  Advance Care Planning:            Communication Assessment  Patient's communication style: spoken language (English or Bilingual)    Hearing Difficulty or Deaf: no   Wear Glasses or Blind: yes    Cognitive  Cognitive/Neuro/Behavioral: .WDL except  Level of Consciousness: confused, alert  Arousal Level: opens eyes spontaneously  Orientation: disoriented to, place, time  Mood/Behavior: calm, cooperative  Best Language: 0 - No aphasia  Speech: word-finding difficulty    Living Environment:   People in home: sibling(s)     Current living Arrangements: apartment      Able to return to prior arrangements: yes       Family/Social Support:  Care provided by: self, other (see comments), friend  Provides care for:    Marital Status: Single  Sibling(s), Other (specify)(friends)          Description of Support System: Supportive, Involved    Support Assessment: Adequate social supports    Current Resources:   Patient receiving home care services:  No     Equipment currently used at home: cane, straight      Care Management Discharge Note    Discharge Date: 03/15/21       Discharge Disposition: Home    Discharge Services: Transportation Services    Discharge DME: None    Discharge Transportation: agency, [facility] wheelchair    Private pay costs discussed: transportation costs discussed with patient and friend Gabriella      Persons Notified of Discharge Plans: pt's brother Adam, friend Gabriella Givens  Patient/Family in Agreement with the Plan:  yes    Handoff Referral Completed: Yes    Additional Information:  SW consulted for  discharge planning. Pt was admitted from [facility] yesterday with afib RVR. She now is controlled and has orders to discharge home today with home care RN. Met with pt at bedside to discuss plan of care and discharge recommendations. Pt is pleasant and alert and oriented with forgetfulness. She verifies that she lives at home in an apartment with her brother, Jeff. She states he is helpful to her and they have friends and cousins that also help look after them. She denies need for HC RN, states her brother and Mrs Givens help her set up her meds. She does not want HC services. She would like RN CC to call her brother and her friend Mrs Givens about the discharge plan. Pt also states she will need a ride arranged for transport her home. We discussed private pay costs with transportation via [employer] wheelchair.     Call placed to pt's brother Adam to inform him of pt discharge home. He was asking a time and would like RN CC to call Mrs Givens.     After chart review, call placed to Gabriella Givens [phone] and voicemail left for her to call back. Bedside RN has concerns with pt discharging home with her brother with her cognitive delay. Per chart review, pt's visit with her PCP Rose Mcelroy on 1/05 recognizes pt's cognitive delay and states she is helped by Gabriella Givens with her medical care, medications and follow up. She also has a couple of cousins that live in the area that provide meals and has assistance from her bank to help her with her bills.     Call received back from Gabriella regarding pt's discharge. We discussed pt's discharge orders, new and changed medications and recommendation for home care RN. Gabriella verifies that her and her  help patient with needs at home. Gabriella sets up pt's meds and her brother makes sure she takes them. She states pt has not taken them exactly as prescribed lately because she sleeps a lot and her brother does not want to wake her. We discussed having her  take her meds at meal times when she is awake. We also discussed HC RN recommendation. Gabriella is also not sure she needs an RN at home. She states pt has cousins in the area that bring them meals a couple of times a week, Gabriella and her  do meds, follow up appts and provide 1 meal/wk. Pt's brother also does some of the cooking. She verifies that pt's brother does also have a cognitive delay but does a good job caring for the patient but that it is getting harder. She states the patient thinks she is taking care of her brother but it is actually him taking care of her. Gabriella says she schedules all follow up appts and takes her to them. We discussed RN CC handoff to the clinic notifying pt's PCP of recommendation for HC and possible benefit from HC SW. She is agreeable to this. She will call and schedule follow up within a week and also mention these concerns at the follow up appt. Gabriella is unable to transport and would like transportation arranged for patient. Private pay costs discussed.     [facility] transport was arranged for 1530 today. Call placed to Gabriella to update her on transport time and that pt's new prescription was sent to the [facility] in [place]. Gabriella does not have concerns with pt's discharge and will  this prescription for pt. Call placed and update on discharge time was given to pt's brother Jeff.     Handoff will be sent to PCP on discharge.      Valerie Hoskins RN BSN CM  Inpatient Care Coordination  [facility]  [phone]          Valerie Hoskins RN

## 2021-03-15 NOTE — PLAN OF CARE
PRIMARY DIAGNOSIS: SVT  OUTPATIENT/OBSERVATION GOALS TO BE MET BEFORE DISCHARGE:  1. ADLs back to baseline: No    2. Activity and level of assistance: Up with standby assistance.    3. Pain status: Pain free.    4. Return to near baseline physical activity: Yes     Discharge Planner Nurse   Safe discharge environment identified: Yes  Barriers to discharge: Yes       Entered by: Joyce Honeycutt 03/15/2021 4:43 AM     Please review provider order for any additional goals.   Nurse to notify provider when observation goals have been met and patient is ready for discharge.    Pt converted to SR/SB during the night, see previous notes. Pt had another BM, gillian replaced. Lotion to rash for comfort. Cardiology consult in am. Found pt's brother's number from previous files. Will defer to day RN for family updates.

## 2021-03-15 NOTE — PLAN OF CARE
"PRIMARY DIAGNOSIS: SVT  OUTPATIENT/OBSERVATION GOALS TO BE MET BEFORE DISCHARGE:  1. ADLs back to baseline: No    2. Activity and level of assistance: Up with standby assistance.    3. Pain status: Pain free.    4. Return to near baseline physical activity: Yes     Discharge Planner Nurse   Safe discharge environment identified: Yes  Barriers to discharge: Yes       Entered by: Joyce Honeycutt 03/15/2021 1:24 AM     Please review provider order for any additional goals.   Nurse to notify provider when observation goals have been met and patient is ready for discharge.    Pt direct admit from [facility] via EMS w/diltiazem gtt running at 15ml/hr. Pt forgetful, bed alarm on. HR 160s, BP stable, tolerating RA. Pt denies pain, SOB, or dizziness. BM in [facility] ED PTA. Pt attempted to have another BM on BSC, unable, but did void, purewick placed. CMS intact. Rash to LUE for last month, MD aware. Plan for Cardiology consult in am. Will continue to monitor.     Pt states she lives with brother who cares for her. Pt also states she has another person who is her emergency contact, but \"can't remember her name\". Pt unsure if brother knows she is at [facility], will attempt to find brother's number.   "

## 2021-03-15 NOTE — PHARMACY-ADMISSION MEDICATION HISTORY
Admission medication history interview status for this patient is complete. See [facility] admission navigator for allergy information, prior to admission medications and immunization status.     Medication history interview done, indicate source(s): Patient  Medication history resources (including written lists, pill bottles, clinic record): Fill history and care everywhere assessed for recent fills. Called pharmacy (below).  Pharmacy: [facility], on [address]    Changes made to PTA medication list:  Added: Atorvastatin 10mg every day, furosemide 10mg every day, metoprolol succinate 25mg every day, Excedrin 867-288-10nd PRN (pt reports taking this every morning).   Deleted: None  Changed: None    Actions taken by pharmacist (provider contacted, etc):None     Additional medication history information: Patient is a vague historian. States she stopped taking all her meds on Wed (3/10). [facility] pharmacy verified strengths and directions she supplied to me. Last fills in Jan and Feb 2021.     Medication reconciliation/reorder completed by provider prior to medication history?  N     Prior to Admission medications    Medication Sig Last Dose Taking? Auth Provider   aspirin-acetaminophen-caffeine (EXCEDRIN MIGRAINE) 250-250-65 MG tablet Take 1 tablet by mouth daily 3/10/2021 at AM Yes Unknown, Entered By History   atorvastatin (LIPITOR) 10 MG tablet Take 10 mg by mouth every morning 3/10/2021 at AM Yes Unknown, Entered By History   furosemide (LASIX) 20 MG tablet Take 10 mg by mouth every morning 3/10/2021 at AM Yes Unknown, Entered By History   metoprolol succinate ER (TOPROL-XL) 50 MG 24 hr tablet Take 25 mg by mouth every morning 3/10/2021 at AM Yes Unknown, Entered By History   aspirin-acetaminophen-caffeine (EXCEDRIN MIGRAINE) 250-250-65 MG tablet Take 1 tablet by mouth as needed for headaches  Unknown at Unknown  Unknown, Entered By History

## 2021-03-15 NOTE — PROGRESS NOTES
Admission note dictated.  80-year-old female presented to the hospital as a direct admission from outside ER with concerns for SVT.    Patient has a previous history of COVID-19 infection in September 2020.  Also history of mild to moderate aortic stenosis.  Patient has a previous history of SVT.  Notes describe episode of SVT in September 2020 during a coronary angiogram procedure which showed mild disease, did not require any intervention.  She developed SVT during the procedure presumed due to irritation from a catheter.  This aborted with cough.  She then had an episode of SVT in December 2020 which aborted with adenosine.  She then had a 14-day cardiac monitor last month which showed mild bradycardia, but not felt to require pacemaker. 3 sec episode of atrial tachycardia. (Results in care everywhere), saw her cardiologist about a week ago.  Supposed to be on metoprolol 12.5 mg daily.  Per report had not been taking for the last few days.    Yesterday she had onset of symptoms around 3 PM.  With lightheadedness and palpitations.  Looking at notes, she was given a single dose of adenosine 12 mg in the outside ER which was not successful. (I do not see a 6 mg dose in the outside records). She was then give IV diltiazem push, then placed on diltiazem drip and directly admitted to [facility].  For now she is completely asymptomatic although still in SVT around 160.  Wants to eat food and hungry. Will continue diltiazem drip, start metoprolol 12.5 mg BID, cardiology consult.

## 2021-03-15 NOTE — H&P
Admitted:     03/14/2021      PRIMARY CARE PHYSICIAN:  Rose Mcelroy NP in the [facility].      CHIEF COMPLAINT:  Palpitations.      HISTORY OF PRESENT ILLNESS:  This is a pleasant 80-year-old female who presents to the hospital with concerns for dizziness and palpitations.  She was recently seen in the ER at [facility] and being directly admitted to this facility, given lack of Cardiology bed at the outside hospital.  A sign-out was received from my colleague physician assistant, who did speak with the ER provider at outside hospital.      It appears the patient had been in the usual state of health when this afternoon around 3:00 p.m., she had onset of palpitations and feeling lightheaded.  This is consistent with previous episodes of SVT that she has had in the past.  The patient does not currently have symptoms, they are resolved and she is no longer feeling lightheaded or dizzy or having any palpitations.  Denies any chest pain, shortness of breath, fevers, chills, nausea, vomiting, any fevers or cough.  Denies any recent illnesses.  She feels pretty normal now and feeling hungry and wants to eat.      In the outside ER, the patient was found to be in SVT.  She was given 1 dose of adenosine 12 mg and her heart rate decreased to 40 and then she went back in SVT.  She was then given a dose of IV diltiazem and placed on diltiazem drip and transferred over to our facility.  There was report in the outside ED the patient had not taken her medications including her oral metoprolol for the last several days, although the patient tells me that she has been taking her medications.  The patient says that she lives at home with her brother, who helps her with her medications.      PAST MEDICAL HISTORY:   1.  Previous history of COVID infection in September 2020.   2.  Lacunar strokes.   3.  Mild-to-moderate aortic valve stenosis.   4.  Nonobstructive coronary artery disease.   5.  Paroxysmal SVT.   6.   Previous UTI.      PAST SURGICAL HISTORY:   1.  Cataract surgery.   2.  Tonsillectomy.   3.  Partial gastrectomy for ulcers in the 1960s.   4.  Hysterectomy.      SOCIAL HISTORY:  The patient is a nonsmoker.  No history of heavy alcohol use.  Lives at home with her brother.      REVIEW OF SYSTEMS:  A comprehensive review of systems was obtained, pertinent in the HPI is negative.      CURRENT MEDICATIONS:  The patient is on.   1.  Tylenol.   2.  Metoprolol XL 12.5 mg daily.   3.  Lasix 10 mg daily.   4.  Lipitor 10 mg daily.   5.  Excedrin p.r.n.      ALLERGIES:  SULFA DRUGS.      LABORATORY  DATA AND IMAGING RESULTS:  Lab and images from the outside health system have been reviewed.  Her BMP showed creatinine 1.33, negative troponin.  Magnesium 1.9.  INR was 0.93.  CBC was unremarkable with normal hemoglobin of 14.3.  I am unable to review the EKG personally at this time, although according to the outside ER report, it showed SVT with a right bundle branch block, left anterior fascicular block, LVH.      FAMILY HISTORY:  Reviewed and noncontributory to the admission today.     EXAM:  Temp 96.6, , /80, RR 18, O2 97% on room air, 54.1 kg  GENERAL:  Awake and alert, No acute distress.  PSYCH: Pleasant, Cooperative, no hallucinations   EYES: EOMI, conjunctiva clear  HEENT:  Head is normocephalic, atraumatic, Neck is Supple, trachea is midline   CARDIOVASCULAR: Tachycardic, Regular rate and rhythm, Normal S1, S2, possible systolic murmur although difficult to appreciate with the tachycardia  PULMONARY:  Clear to auscultation bilaterally with good entry on both sides  CHEST: Good inspiratory effort bilaterally   GI: Abdomen is soft, non tender, non-distended, no masses palpated, normal bowel sounds. No rebound or guarding   SKIN:  Dry, area of itchy skin with dermatitis noted on left arm   EXTREMITIES:  Good capillary refill with signs of adequate peripheral perfusion. No peripheral edema   Neuro: Awake and  oriented to general situation, some cognitive impairment, No focal weakness or numbness is noted  MSK: Good range of motion in all major joints of upper and lower extremity         ASSESSMENT AND PLAN:  This is a very pleasant 80-year-old female with a past medical history significant for mild to moderate aortic stenosis, hypertension, cognitive impairment, supraventricular tachycardia, nonobstructive coronary artery disease, who presents to the hospital with episode of supraventricular tachycardia.      On review of outside records, it appears that the patient saw her cardiologist just last week on 03/09/2021.  It looks like she had a previous episode of supraventricular tachycardia in Sep 2020 during coronary angiogram which resolved with cough and then in December 2020 which terminated with adenosine.  She then had a cardiac monitor recently, which showed some episodes of bradycardia, although it appears that it was mild without any long pauses and her cardiologist did not feel like she needed a pacemaker.  There is some question if she has been missing dose of metoprolol, which might have been a trigger.  There was no evidence of any acute infections.  She is currently hemodynamically stable.     At this point, I will continue the patient on the IV diltiazem drip.  She still appears to be in supraventricular tachycardia with a heart rate of 160.  I will also give her a dose of oral metoprolol as there is a question if she has been missing her doses at home.  We will request a Cardiology consultation to further assist in her cares.  We did try bedside Valsalva maneuvers and carotid massage, which was not successful. No acute indication for cardioversion at this time.       Known history of mild to moderate aortic stenosis.  I will hold off on the patient's Lasix at this time.      Hyperlipidemia.  The patient is on atorvastatin at baseline.      History of nonobstructive coronary artery disease   It appears that  the patient had a coronary angiogram in 2020 which showed mild coronary artery disease in general and a moderate proximal circumflex lesion.  No intervention was required.  She did develop supraventricular tachycardia during the procedure as well with catheter in the ventricle.  This broke with a cough at that point.     Suspect some degree of cognitive impairment.      CODE STATUS:  Full code at this time.  Will need to discuss this further in the morning, when the family is available.  Currently, do not have any emergency contact numbers in the chart.      Plan of care was discussed with the patient in great detail.  All of her questions were answered.  I also reviewed with the bedside nurse.         LOURDES MONTILLA MD             D: 03/15/2021   T: 03/15/2021   MT: ANDREA      Name:     NEPTALI PRAKASH   MRN:      [id number]        Account:      [id number]   :      1940        Admitted:     2021                   Document: [id number]       cc: Rose Mcelroy NP

## 2021-03-15 NOTE — TELEPHONE ENCOUNTER
3-Hospitalist Huddle Documentation    Acuity/Preferred Bed Type: telemetry  Infection Concerns: none  Additional Specialist Needed Or Specialist Already Contacted:   None  Timely Treatments Needed: No  Important things to know/address during hospitalization: sitter not needed and none     I received a call from the [facility] ED to directly admit Laney Hahn for SVT.    Patient presented to the ED with lightheadedness and found to be in SVT with .  She had not taken her Metoprolol for the previous 6 days.  She was given Adenosine without success and remained in SVT.  She was started on a Diltiazem gtt and transfer requested.  /78.  Electrolytes and troponin were reported as normal.    Patient accepted to a cardiac telemetry bed.    Peyton Christianson MS, PA-C  Hospitalist Service  Pager [phone]

## 2021-03-15 NOTE — DISCHARGE SUMMARY
[facility]  Discharge Summary  Name: Laney Hahn    MRN: [id number]  YOB: 1940    Age: 80 year old  Date of Discharge:  3/15/2021  Date of Admission: 3/14/2021  Primary Care Provider: No primary care provider on file.  Discharge Physician:  Larisa Tolentino MD  Discharging Service:  Hospitalist      Discharge Diagnoses:  1.  SVT  2.  Mild to moderate aortic valve stenosis  3.  Nonobstructive coronary artery disease  4.  Prior lacunar strokes  5.  Recovered COVID-19 (infection in 9/2020)     Follow-ups Needed After Discharge   Follow-up with primary care within 1 week  Follow-up with primary cardiologist within the next month    Unresulted Labs Ordered in the Past 30 Days of this Admission     No orders found from 10/16/2018 to 12/16/2018.        Hospital Course:  Laney Hahn is a 80-year-old female with past medical history of paroxysmal SVT, nonobstructive coronary artery disease, mild to moderate aortic valve stenosis, prior Covid infection and mild cognitive impairment who was directly admitted from [facility] ER given lack of bed availability on 3/14/2021 with palpitations and was found to have SVT.    The patient was given adenosine at the outside ER which resulted in bradycardia for short period of time and then redeveloped SVT.  She was started on a diltiazem drip.  Prior to admission she was on metoprolol 25 mg daily.  This was changed to short acting formulation 12.5 mg twice daily.  She did spontaneously convert to normal sinus rhythm with bradycardia.  Cardiology was consulted who recommended continuation of twice daily metoprolol instead of once daily and follow-up with her outpatient cardiologist to consider Zio patch.    Patient lives with her brother who also has some cognitive impairment.  The patient has a neighbor who is very involved in her care and sets up her medications, brings her to appointments and bring several meals per week.  They also have  "a cousin in the area who helps with meals.  See detailed discussion from  regarding discharge planning.  I had ordered a home RN but patient declines this.  Recommend that she follow-up with primary care to consider home care in the future.     Discharge Disposition:  Discharged to home     Allergies:  Allergies   Allergen Reactions     Sulfa Drugs Anxiety        Condition on Discharge:  Discharge condition: Stable   Discharge vitals: Blood pressure (!) 143/62, pulse 54, temperature 97.8  F (36.6  C), temperature source Oral, resp. rate 16, height 1.6 m (5' 3\"), weight 54.6 kg (120 lb 4.8 oz), SpO2 97 %.   Code status on discharge: Full Code     History of Illness:  See detailed admission note for full details.    Physical Exam:  Blood pressure (!) 143/62, pulse 54, temperature 97.8  F (36.6  C), temperature source Oral, resp. rate 16, height 1.6 m (5' 3\"), weight 54.6 kg (120 lb 4.8 oz), SpO2 97 %.  Wt Readings from Last 1 Encounters:   03/15/21 54.6 kg (120 lb 4.8 oz)     General: Alert, awake, no acute distress.  HEENT: Normocephalic, atraumatic, eyes anicteric and without scleral injection, EOMI, MMM.  Cardiac: Bradycardic, regular rhythm, normal S1, S2.  No m/g/r. No LE edema.  Pulmonary: Normal chest rise, normal work of breathing.  Lungs CTAB without crackles or wheezing  Abdomen: soft, non-tender, non-distended.  Normoactive BS.  No guarding or rebound tenderness.  Extremities: no deformities.  Warm, well perfused.  Skin: no rashes or lesions noted.  Warm and Dry.  Neuro: No focal deficits noted.  Speech clear.  Coordination and strength grossly normal.  Psych: Appropriate affect. Alert to self, place, cannot tell me her medications    Procedures other than Imaging:  Telemetry  EKG  Phlebotomy     Imaging:  No results found for this or any previous visit.     Consultations:  Consultations This Hospital Stay   CARDIOLOGY IP CONSULT  SOCIAL WORK IP CONSULT     Recent Lab Results:  Recent Labs   Lab " 03/15/21  0729   WBC 6.0   HGB 12.5   HCT 38.4   *        Recent Labs   Lab 03/15/21  0729      POTASSIUM 4.9   CHLORIDE 112*   CO2 24   ANIONGAP 7   *   BUN 29   CR 1.25*   GFRESTIMATED 41*   GFRESTBLACK 47*   CURT 8.4*          Pending Results:    Unresulted Labs Ordered in the Past 30 Days of this Admission     No orders found for last 31 day(s).           Discharge Instructions and Follow-Up:   Discharge Orders      Home care nursing referral      Reason for your hospital stay    You were hospitalized for supraventricular tachycardia (SVT) causing a rapid heart rate.  This has now resolved.  We have changed your Metoprolol to a smaller dose and twice daily instead of once daily to help with controlling your heart rate.     Follow-up and recommended labs and tests     Follow up with primary care provider, No primary care provider on file., within 7 days to evaluate medication change and for hospital follow- up.  No follow up labs or test are needed.    Please follow up with your Cardiologist to discuss possible heart monitor (Zio Patch).     Activity    Your activity upon discharge: activity as tolerated     MD face to face encounter    Documentation of Face to Face and Certification for Home Health Services    I certify that patient: Laney Hahn is under my care and that I, or a nurse practitioner or physician's assistant working with me, had a face-to-face encounter that meets the physician face-to-face encounter requirements with this patient on: 3/15/2021.    This encounter with the patient was in whole, or in part, for the following medical condition, which is the primary reason for home health care: SVT, medication management.    I certify that, based on my findings, the following services are medically necessary home health services: Nursing.    My clinical findings support the need for the above services because: Nurse is needed: To provide assessment and oversight  required in the home to assure adherence to the medical plan due to: ensuring home safety, medication management..    Further, I certify that my clinical findings support that this patient is homebound (i.e. absences from home require considerable and taxing effort and are for medical reasons or Muslim services or infrequently or of short duration when for other reasons) because: Leaving home is medically contraindicated for the following reason(s): Infection risk / immunocompromised state where it is safer for them to receive services in the home...    Based on the above findings. I certify that this patient is confined to the home and needs intermittent skilled nursing care, physical therapy and/or speech therapy.  The patient is under my care, and I have initiated the establishment of the plan of care.  This patient will be followed by a physician who will periodically review the plan of care.  Physician/Provider to provide follow up care: No primary care provider on file.    Attending hospital physician (the Medicare certified PEC provider): Larisa Tolentino MD  Physician Signature: See electronic signature associated with these discharge orders.  Date: 3/15/2021     Full Code     Diet    Follow this diet upon discharge: Orders Placed This Encounter      Regular Diet Adult     Discharge Medications   Current Discharge Medication List      START taking these medications    Details   metoprolol tartrate (LOPRESSOR) 25 MG tablet Take 0.5 tablets (12.5 mg) by mouth 2 times daily  Qty: 60 tablet, Refills: 0    Associated Diagnoses: SVT (supraventricular tachycardia) (H)         CONTINUE these medications which have NOT CHANGED    Details   atorvastatin (LIPITOR) 10 MG tablet Take 10 mg by mouth every morning      furosemide (LASIX) 20 MG tablet Take 10 mg by mouth every morning      aspirin-acetaminophen-caffeine (EXCEDRIN MIGRAINE) 250-250-65 MG tablet Take 1 tablet by mouth as needed for headaches           STOP taking these medications       metoprolol succinate ER (TOPROL-XL) 50 MG 24 hr tablet Comments:   Reason for Stopping:               Time Spent on this Encounter   I, Larisa Tolentino MD, personally saw the patient today and spent greater than 30 minutes discharging this patient.    Larisa Tolentino MD

## 2021-03-15 NOTE — CONSULTS
"Cardiology Consultation      Laney Hahn MRN# [id number]   YOB: 1940 Age: 80 year old   Date of Admission: 3/14/2021     Reason for consult:  Paroxysmal SVT           Assessment and Plan:     1. Paroxysmal SVT, currently in sinus bradycardia and asymptomatic    Continue home medications.  Consider as outpatient changing her Toprol to twice daily so that even if she misses a dose she would be covered with some AV block.  Also could consider outpatient ZIO monitor.  We will leave this for her primary cardiologist.    Patient feels well, would like to go home today.  Okay to discharge from cardiology standpoint.      2. Moderate aortic stenosis    Followed by her primary cardiologist.             Chief Complaint:   No chief complaint on file.           History of Present Illness:   This patient is a 80 year old female usually seen in the [facility] system, direct admitted here due to bed availability for SVT.  Did not break with adenosine at this time.  I reviewed the tracings and there was rapid SVT.  There were PVCs and pauses with clean baseline that did not suggest atrial flutter, recurrence of SVT afterwards.    This morning, while sleeping she converted back to sinus rhythm and this was durable.  There was some discussion that maybe she was not taking her medications at home as directed.    She has moderate aortic stenosis which is relatively asymptomatic.         Physical Exam:   Vitals were reviewed  Blood pressure (!) 143/62, pulse 54, temperature 97.8  F (36.6  C), temperature source Oral, resp. rate 16, height 1.6 m (5' 3\"), weight 54.6 kg (120 lb 4.8 oz), SpO2 97 %.  Temperatures:  Current - Temp: 97.8  F (36.6  C); Max - Temp  Av.3  F (36.3  C)  Min: 96.6  F (35.9  C)  Max: 97.8  F (36.6  C)  Respiration range: Resp  Av.1  Min: 16  Max: 18  Pulse range: Pulse  Av.7  Min: 46  Max: 167  Blood pressure range: Systolic (24hrs), Av , Min:101 , Max:143   ; Diastolic " (24hrs), Av, Min:52, Max:80    Pulse oximetry range: SpO2  Av.5 %  Min: 95 %  Max: 100 %    Intake/Output Summary (Last 24 hours) at 3/15/2021 1051  Last data filed at 3/15/2021 0927  Gross per 24 hour   Intake 582.5 ml   Output 0 ml   Net 582.5 ml     Constitutional:   awake, alert, cooperative, no apparent distress, and appears stated age     Eyes:   Lids and lashes normal, pupils equal, round and reactive to light, extra ocular muscles intact, sclera clear, conjunctiva normal     Neck:   supple, symmetrical, trachea midline, no JVD     Back:   symmetric     Lungs:   Symmetric     Cardiovascular:   Regular, bradycardic.  2 out of 6 mid peaking systolic murmur     Abdomen:   non-tender     Musculoskeletal:   motor strength is 5 out of 5 all extremities bilaterally     Neurologic:   Grossly nonfocal     Skin:   normal skin color, texture, turgor     Additional findings:                  Past Medical History:   I have reviewed this patient's past medical history  Moderate aortic stenosis  SVT    PAST MEDICAL HISTORY:   1.  Previous history of COVID infection in 2020.   2.  Lacunar strokes.   3.  Mild-to-moderate aortic valve stenosis.   4.  Nonobstructive coronary artery disease.   5.  Paroxysmal SVT.   6.  Previous UTI.                Past Surgical History:   I have reviewed this patient's past surgical history  Noncontributory to current hospitalization    PAST SURGICAL HISTORY:   1.  Cataract surgery.   2.  Tonsillectomy.   3.  Partial gastrectomy for ulcers in the 1960s.   4.  Hysterectomy.             Social History:   I have reviewed this patient's social history  SOCIAL HISTORY:  The patient is a nonsmoker.  No history of heavy alcohol use.  Lives at home with her brother          Family History:   I have reviewed this patient's family history  Noncontributory to current hospitalization.          Allergies:     Allergies   Allergen Reactions     Sulfa Drugs Anxiety             Medications:    I have reviewed this patient's current medications  Medications Prior to Admission   Medication Sig Dispense Refill Last Dose     aspirin-acetaminophen-caffeine (EXCEDRIN MIGRAINE) 250-250-65 MG tablet Take 1 tablet by mouth daily   3/10/2021 at AM     atorvastatin (LIPITOR) 10 MG tablet Take 10 mg by mouth every morning   3/10/2021 at AM     furosemide (LASIX) 20 MG tablet Take 10 mg by mouth every morning   3/10/2021 at AM     metoprolol succinate ER (TOPROL-XL) 50 MG 24 hr tablet Take 25 mg by mouth every morning   3/10/2021 at AM     aspirin-acetaminophen-caffeine (EXCEDRIN MIGRAINE) 250-250-65 MG tablet Take 1 tablet by mouth as needed for headaches    Unknown at Unknown     Current Facility-Administered Medications Ordered in Epic   Medication Dose Route Frequency Last Rate Last Admin     acetaminophen (TYLENOL) tablet 650 mg  650 mg Oral Q4H PRN         lidocaine (LMX4) cream   Topical Q1H PRN         lidocaine (LMX4) cream   Topical Q1H PRN         lidocaine 1 % 0.1-1 mL  0.1-1 mL Other Q1H PRN         lidocaine 1 % 0.1-1 mL  0.1-1 mL Other Q1H PRN         melatonin tablet 1 mg  1 mg Oral At Bedtime PRN   1 mg at 03/15/21 0041     metoprolol tartrate (LOPRESSOR) half-tab 12.5 mg  12.5 mg Oral BID   12.5 mg at 03/15/21 0041     nitroGLYcerin (NITROSTAT) sublingual tablet 0.4 mg  0.4 mg Sublingual Q5 Min PRN         sodium chloride (PF) 0.9% PF flush 3 mL  3 mL Intracatheter Q8H PRN         sodium chloride (PF) 0.9% PF flush 3 mL  3 mL Intracatheter q1 min prn         sodium chloride (PF) 0.9% PF flush 3 mL  3 mL Intracatheter q1 min prn         sodium chloride (PF) 0.9% PF flush 3 mL  3 mL Intracatheter Q8H   3 mL at 03/15/21 0804     No current [facility]-ordered outpatient medications on file.             Review of Systems:   The 10 point Review of Systems is negative other than noted in the HPI            Data:   All laboratory data reviewed  Results for orders placed or performed during the hospital encounter  of 03/14/21 (from the past 24 hour(s))   Lactic acid level STAT   Result Value Ref Range    Lactate for Sepsis Protocol 1.1 0.7 - 2.0 mmol/L   Glucose by meter   Result Value Ref Range    Glucose 133 (H) 70 - 99 mg/dL   EKG 12-lead, tracing only   Result Value Ref Range    Interpretation ECG Click View Image link to view waveform and result    Basic metabolic panel   Result Value Ref Range    Sodium 143 133 - 144 mmol/L    Potassium 4.9 3.4 - 5.3 mmol/L    Chloride 112 (H) 94 - 109 mmol/L    Carbon Dioxide 24 20 - 32 mmol/L    Anion Gap 7 3 - 14 mmol/L    Glucose 154 (H) 70 - 99 mg/dL    Urea Nitrogen 29 7 - 30 mg/dL    Creatinine 1.25 (H) 0.52 - 1.04 mg/dL    GFR Estimate 41 (L) >60 mL/min/[1.73_m2]    GFR Estimate If Black 47 (L) >60 mL/min/[1.73_m2]    Calcium 8.4 (L) 8.5 - 10.1 mg/dL   CBC with platelets   Result Value Ref Range    WBC 6.0 4.0 - 11.0 10e9/L    RBC Count 3.73 (L) 3.8 - 5.2 10e12/L    Hemoglobin 12.5 11.7 - 15.7 g/dL    Hematocrit 38.4 35.0 - 47.0 %     (H) 78 - 100 fl    MCH 33.5 (H) 26.5 - 33.0 pg    MCHC 32.6 31.5 - 36.5 g/dL    RDW 13.8 10.0 - 15.0 %    Platelet Count 157 150 - 450 10e9/L   Magnesium   Result Value Ref Range    Magnesium 2.1 1.6 - 2.3 mg/dL       No results found for: CHOL  No results found for: HDL  No results found for: LDL  No results found for: TRIG  No results found for: CHOLHDLRATIO  No results found for: TSH      EKG results:    Echocardiology:    Cardiac stress testing:    Cardiac angiography:

## 2021-03-15 NOTE — PROVIDER NOTIFICATION
0100: MD paged concerning pt HR and rhythm change from  to junctional/SB 50s. BP stable. EKG ordered & obtained. Dilt gtt stopped. EKG showed SR/SB. MD aware. MD called back and told this RN to stop dilt gtt and not hold off on PO metoprolol. This RN told MD it was already given. MD ok with PO metoprolol, just keep dilt off. Will continue to monitor.     0135: Pt noted to be back in SVT 170s. This RN found pt on BSC after having a BM. Pt felt dizzy while on commode. Pt back into bed. BP stable.     0150: Dr. Alford called back. Order another EKG & page with results.     0230: Dr. Alford called back. Pt now in SB. Dr. Alford says to monitor pt's HR. No need for any further interventions.

## 2021-03-17 LAB — INTERPRETATION ECG - MUSE: NORMAL

## 2021-03-18 ENCOUNTER — COMMUNICATION - HEALTHEAST (OUTPATIENT)
Dept: INTERNAL MEDICINE | Facility: CLINIC | Age: 81
End: 2021-03-18

## 2021-03-24 ENCOUNTER — OFFICE VISIT - HEALTHEAST (OUTPATIENT)
Dept: INTERNAL MEDICINE | Facility: CLINIC | Age: 81
End: 2021-03-24

## 2021-03-24 DIAGNOSIS — I47.10 SVT (SUPRAVENTRICULAR TACHYCARDIA) (H): ICD-10-CM

## 2021-03-24 DIAGNOSIS — Z09 HOSPITAL DISCHARGE FOLLOW-UP: ICD-10-CM

## 2021-04-10 ASSESSMENT — MIFFLIN-ST. JEOR
SCORE: 989.35
SCORE: 1005.45

## 2021-04-11 ENCOUNTER — COMMUNICATION - HEALTHEAST (OUTPATIENT)
Dept: SCHEDULING | Facility: CLINIC | Age: 81
End: 2021-04-11

## 2021-04-11 ASSESSMENT — MIFFLIN-ST. JEOR: SCORE: 994.97

## 2021-04-12 ASSESSMENT — MIFFLIN-ST. JEOR: SCORE: 996.61

## 2021-04-13 ENCOUNTER — SURGERY - HEALTHEAST (OUTPATIENT)
Dept: CARDIOLOGY | Facility: HOSPITAL | Age: 81
End: 2021-04-13

## 2021-04-13 ASSESSMENT — MIFFLIN-ST. JEOR: SCORE: 985.27

## 2021-04-14 ASSESSMENT — MIFFLIN-ST. JEOR: SCORE: 978.01

## 2021-04-15 ENCOUNTER — COMMUNICATION - HEALTHEAST (OUTPATIENT)
Dept: NURSING | Facility: CLINIC | Age: 81
End: 2021-04-15

## 2021-04-15 ENCOUNTER — AMBULATORY - HEALTHEAST (OUTPATIENT)
Dept: CARE COORDINATION | Facility: CLINIC | Age: 81
End: 2021-04-15

## 2021-04-15 DIAGNOSIS — R00.1 JUNCTIONAL BRADYCARDIA: ICD-10-CM

## 2021-04-21 ENCOUNTER — OFFICE VISIT - HEALTHEAST (OUTPATIENT)
Dept: INTERNAL MEDICINE | Facility: CLINIC | Age: 81
End: 2021-04-21

## 2021-04-21 DIAGNOSIS — Z95.0 PRESENCE OF PERMANENT CARDIAC PACEMAKER: ICD-10-CM

## 2021-04-21 DIAGNOSIS — N18.30 STAGE 3 CHRONIC KIDNEY DISEASE, UNSPECIFIED WHETHER STAGE 3A OR 3B CKD (H): ICD-10-CM

## 2021-04-21 DIAGNOSIS — E78.5 DYSLIPIDEMIA, GOAL LDL BELOW 70: ICD-10-CM

## 2021-04-21 DIAGNOSIS — K21.9 GASTROESOPHAGEAL REFLUX DISEASE WITHOUT ESOPHAGITIS: ICD-10-CM

## 2021-04-21 DIAGNOSIS — Z09 HOSPITAL DISCHARGE FOLLOW-UP: ICD-10-CM

## 2021-04-21 DIAGNOSIS — I50.32 CHRONIC HEART FAILURE WITH PRESERVED EJECTION FRACTION (H): ICD-10-CM

## 2021-04-21 DIAGNOSIS — I47.10 SVT (SUPRAVENTRICULAR TACHYCARDIA) (H): ICD-10-CM

## 2021-04-21 LAB
ANION GAP SERPL CALCULATED.3IONS-SCNC: 8 MMOL/L (ref 5–18)
BUN SERPL-MCNC: 19 MG/DL (ref 8–28)
CALCIUM SERPL-MCNC: 8.4 MG/DL (ref 8.5–10.5)
CHLORIDE BLD-SCNC: 111 MMOL/L (ref 98–107)
CO2 SERPL-SCNC: 25 MMOL/L (ref 22–31)
CREAT SERPL-MCNC: 1.04 MG/DL (ref 0.6–1.1)
ERYTHROCYTE [DISTWIDTH] IN BLOOD BY AUTOMATED COUNT: 13 % (ref 11–14.5)
GFR SERPL CREATININE-BSD FRML MDRD: 51 ML/MIN/1.73M2
GLUCOSE BLD-MCNC: 97 MG/DL (ref 70–125)
HCT VFR BLD AUTO: 34.5 % (ref 35–47)
HGB BLD-MCNC: 11.7 G/DL (ref 12–16)
LDLC SERPL CALC-MCNC: 58 MG/DL
MCH RBC QN AUTO: 33.1 PG (ref 27–34)
MCHC RBC AUTO-ENTMCNC: 33.9 G/DL (ref 32–36)
MCV RBC AUTO: 98 FL (ref 80–100)
PLATELET # BLD AUTO: 184 THOU/UL (ref 140–440)
PMV BLD AUTO: 10.2 FL (ref 7–10)
POTASSIUM BLD-SCNC: 4.3 MMOL/L (ref 3.5–5)
RBC # BLD AUTO: 3.54 MILL/UL (ref 3.8–5.4)
SODIUM SERPL-SCNC: 144 MMOL/L (ref 136–145)
WBC: 4.4 THOU/UL (ref 4–11)

## 2021-04-22 ENCOUNTER — COMMUNICATION - HEALTHEAST (OUTPATIENT)
Dept: NURSING | Facility: CLINIC | Age: 81
End: 2021-04-22

## 2021-04-26 ENCOUNTER — COMMUNICATION - HEALTHEAST (OUTPATIENT)
Dept: INTERNAL MEDICINE | Facility: CLINIC | Age: 81
End: 2021-04-26

## 2021-04-27 ENCOUNTER — AMBULATORY - HEALTHEAST (OUTPATIENT)
Dept: CARDIOLOGY | Facility: CLINIC | Age: 81
End: 2021-04-27

## 2021-04-27 ENCOUNTER — COMMUNICATION - HEALTHEAST (OUTPATIENT)
Dept: NURSING | Facility: CLINIC | Age: 81
End: 2021-04-27

## 2021-04-27 DIAGNOSIS — Z95.0 CARDIAC PACEMAKER IN SITU: ICD-10-CM

## 2021-04-27 DIAGNOSIS — R00.1 JUNCTIONAL BRADYCARDIA: ICD-10-CM

## 2021-04-27 DIAGNOSIS — I47.10 SVT (SUPRAVENTRICULAR TACHYCARDIA) (H): ICD-10-CM

## 2021-04-27 DIAGNOSIS — I45.89 AV NODE DYSFUNCTION: ICD-10-CM

## 2021-04-27 ASSESSMENT — MIFFLIN-ST. JEOR: SCORE: 997.52

## 2021-04-28 LAB
HCC DEVICE COMMENTS: NORMAL
HCC DEVICE IMPLANTING PROVIDER: NORMAL
HCC DEVICE MANUFACTURE: NORMAL
HCC DEVICE MODEL: NORMAL
HCC DEVICE SERIAL NUMBER: NORMAL
HCC DEVICE TYPE: NORMAL

## 2021-05-14 ENCOUNTER — AMBULATORY - HEALTHEAST (OUTPATIENT)
Dept: CARDIOLOGY | Facility: CLINIC | Age: 81
End: 2021-05-14

## 2021-05-14 DIAGNOSIS — I47.10 SVT (SUPRAVENTRICULAR TACHYCARDIA) (H): ICD-10-CM

## 2021-05-14 DIAGNOSIS — Z95.0 CARDIAC PACEMAKER IN SITU: ICD-10-CM

## 2021-05-27 ENCOUNTER — COMMUNICATION - HEALTHEAST (OUTPATIENT)
Dept: NURSING | Facility: CLINIC | Age: 81
End: 2021-05-27

## 2021-05-30 VITALS — WEIGHT: 126 LBS | BODY MASS INDEX: 22.32 KG/M2

## 2021-05-30 VITALS — WEIGHT: 130 LBS | HEIGHT: 63 IN | BODY MASS INDEX: 23.04 KG/M2

## 2021-05-31 VITALS — WEIGHT: 122.4 LBS | HEIGHT: 64 IN | BODY MASS INDEX: 20.9 KG/M2

## 2021-05-31 VITALS — WEIGHT: 122.13 LBS | BODY MASS INDEX: 21.29 KG/M2

## 2021-05-31 VITALS — BODY MASS INDEX: 21.86 KG/M2 | WEIGHT: 123.4 LBS

## 2021-05-31 VITALS — WEIGHT: 125 LBS | BODY MASS INDEX: 21.8 KG/M2

## 2021-06-01 ENCOUNTER — RECORDS - HEALTHEAST (OUTPATIENT)
Dept: ADMINISTRATIVE | Facility: CLINIC | Age: 81
End: 2021-06-01

## 2021-06-02 VITALS — WEIGHT: 128 LBS | BODY MASS INDEX: 22.32 KG/M2

## 2021-06-02 VITALS — WEIGHT: 124.8 LBS | BODY MASS INDEX: 21.31 KG/M2 | HEIGHT: 64 IN

## 2021-06-02 VITALS — WEIGHT: 133 LBS | BODY MASS INDEX: 23.19 KG/M2

## 2021-06-02 VITALS — BODY MASS INDEX: 21.85 KG/M2 | WEIGHT: 128 LBS | HEIGHT: 64 IN

## 2021-06-02 VITALS — WEIGHT: 131 LBS | BODY MASS INDEX: 22.84 KG/M2

## 2021-06-02 VITALS — WEIGHT: 133 LBS | BODY MASS INDEX: 22.71 KG/M2 | HEIGHT: 64 IN

## 2021-06-02 NOTE — TELEPHONE ENCOUNTER
----- Message from YOLANDA Canada sent at 10/9/2019 12:38 PM CDT -----  Call patient's friend, Gabriella, who frequently helps her with medications and appointments ([phone])-- consent was signed within the past 1 year on 12/2018. Let Gabriella know we have been unable to reach Laney. Laney was treated for a UTI in the ER but her urine culture shows that the bacteria could be resistant to the antibiotic she was started on. If she is still having UTI symptoms (and for ER follow up) it would really be best to see her back in the clinic to repeat a urine test and kidney function to see which antibiotic will work best for this. A letter was also sent to Laney to try to reach her.

## 2021-06-02 NOTE — TELEPHONE ENCOUNTER
Left generic message for Gabriella to call us back regarding Laney. Please relay Tatum information below when she calls back.

## 2021-06-03 NOTE — PROGRESS NOTES
ASSESSMENT and PLAN:    1) rash on left leg, back, and left arm which has the appearance of atopic dermatitis even though there is no trigger identified.  She has been using triamcinolone cream on it which has been helping although she did run out of this.  I am going to refill this but advised that if the rash were to spread or if she were to get any systemic symptoms such as fevers she should be evaluated ASAP which she understands.  Otherwise follow-up as needed.    Problem List Items Addressed This Visit     None      Visit Diagnoses     Atopic dermatitis, unspecified type    -  Primary    Relevant Medications    triamcinolone (KENALOG) 0.1 % cream          There are no Patient Instructions on file for this visit.    Medications Discontinued During This Encounter   Medication Reason     triamcinolone (KENALOG) 0.1 % cream        No follow-ups on file.    CHIEF COMPLAINT:  Chief Complaint   Patient presents with     Rash     itching, body       HISTORY OF PRESENT ILLNESS:  Laney Hahn is a 79 y.o. female  presenting to the clinic today for evaluation of a rash.  She states that this is been present for about the last 2 weeks.  It is quite pruritic but it is not painful.  She localizes it to the left leg, left forearm, and the bilateral aspects of the low back.  She denies any fevers and is otherwise feeling well.  No new laundry soaps or detergents.  No new medications or other over-the-counter supplements.    REVIEW OF SYSTEMS:   Pertinent positives noted in HPI, remainder of ROS is negative.    MEDICATIONS:  Current Outpatient Medications   Medication Sig Dispense Refill     aspirin 81 mg chewable tablet Chew 243 mg every evening.       atorvastatin (LIPITOR) 40 MG tablet Take 1 tablet by mouth every day 90 tablet 3     calcium carbonate (OS-CURT) 500 mg calcium (1,250 mg) chewable tablet Chew 1 tablet every evening.       triamcinolone (KENALOG) 0.1 % cream Use twice per day on rash until resolution.  45 g 2     No current facility-administered medications for this visit.        TOBACCO USE:  Social History     Tobacco Use   Smoking Status Never Smoker   Smokeless Tobacco Never Used       VITALS:  Vitals:    11/18/19 0925   BP: 130/68   Pulse: 88   Weight: 118 lb 12.8 oz (53.9 kg)     Wt Readings from Last 3 Encounters:   11/18/19 118 lb 12.8 oz (53.9 kg)   10/06/19 120 lb (54.4 kg)   01/28/19 124 lb 12.8 oz (56.6 kg)         PHYSICAL EXAM:  Constitutional:  Reveals an alert, pleasant female.   HEET: Normocephalic, without obvious abnormality, atraumatic.   Neurologic: Normal gait and station  Psychologic: Normal affect  Skin: There is a flat, macular, erythematous rash left leg, left arm, and bilateral low back which is consistent with atopic dermatitis.

## 2021-06-04 VITALS
TEMPERATURE: 97.4 F | WEIGHT: 118 LBS | SYSTOLIC BLOOD PRESSURE: 134 MMHG | RESPIRATION RATE: 16 BRPM | HEART RATE: 85 BPM | OXYGEN SATURATION: 95 % | DIASTOLIC BLOOD PRESSURE: 77 MMHG | BODY MASS INDEX: 20.57 KG/M2

## 2021-06-04 VITALS
WEIGHT: 118.8 LBS | SYSTOLIC BLOOD PRESSURE: 130 MMHG | DIASTOLIC BLOOD PRESSURE: 68 MMHG | HEART RATE: 88 BPM | BODY MASS INDEX: 20.71 KG/M2

## 2021-06-04 VITALS
HEIGHT: 64 IN | DIASTOLIC BLOOD PRESSURE: 64 MMHG | BODY MASS INDEX: 20.49 KG/M2 | WEIGHT: 120 LBS | SYSTOLIC BLOOD PRESSURE: 122 MMHG
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
## 2021-06-05 VITALS
HEART RATE: 84 BPM | BODY MASS INDEX: 21.43 KG/M2 | WEIGHT: 121 LBS | OXYGEN SATURATION: 98 % | SYSTOLIC BLOOD PRESSURE: 126 MMHG | DIASTOLIC BLOOD PRESSURE: 66 MMHG

## 2021-06-05 VITALS
WEIGHT: 118 LBS | OXYGEN SATURATION: 98 % | DIASTOLIC BLOOD PRESSURE: 60 MMHG | BODY MASS INDEX: 20.91 KG/M2 | RESPIRATION RATE: 16 BRPM | SYSTOLIC BLOOD PRESSURE: 120 MMHG | HEIGHT: 63 IN | HEART RATE: 74 BPM

## 2021-06-05 VITALS — WEIGHT: 118.7 LBS | BODY MASS INDEX: 21.03 KG/M2 | HEIGHT: 63 IN

## 2021-06-05 VITALS
WEIGHT: 117.1 LBS | TEMPERATURE: 97.1 F | HEIGHT: 63 IN | BODY MASS INDEX: 20.75 KG/M2 | HEART RATE: 61 BPM | DIASTOLIC BLOOD PRESSURE: 74 MMHG | SYSTOLIC BLOOD PRESSURE: 131 MMHG | OXYGEN SATURATION: 98 % | RESPIRATION RATE: 18 BRPM

## 2021-06-05 VITALS — BODY MASS INDEX: 20.86 KG/M2 | WEIGHT: 117.7 LBS | HEIGHT: 63 IN

## 2021-06-05 VITALS
BODY MASS INDEX: 20.57 KG/M2 | HEART RATE: 89 BPM | OXYGEN SATURATION: 98 % | TEMPERATURE: 98.9 F | DIASTOLIC BLOOD PRESSURE: 70 MMHG | RESPIRATION RATE: 18 BRPM | WEIGHT: 116.1 LBS | HEIGHT: 63 IN | SYSTOLIC BLOOD PRESSURE: 130 MMHG

## 2021-06-05 VITALS
SYSTOLIC BLOOD PRESSURE: 126 MMHG | BODY MASS INDEX: 21.62 KG/M2 | RESPIRATION RATE: 16 BRPM | DIASTOLIC BLOOD PRESSURE: 62 MMHG | HEIGHT: 63 IN | TEMPERATURE: 97.9 F | WEIGHT: 122 LBS | HEART RATE: 68 BPM

## 2021-06-05 VITALS — SYSTOLIC BLOOD PRESSURE: 104 MMHG | BODY MASS INDEX: 20.73 KG/M2 | DIASTOLIC BLOOD PRESSURE: 70 MMHG | WEIGHT: 117 LBS

## 2021-06-05 VITALS
WEIGHT: 128.8 LBS | HEART RATE: 72 BPM | BODY MASS INDEX: 22.82 KG/M2 | HEIGHT: 63 IN | RESPIRATION RATE: 18 BRPM | TEMPERATURE: 97.9 F | DIASTOLIC BLOOD PRESSURE: 62 MMHG | SYSTOLIC BLOOD PRESSURE: 123 MMHG | OXYGEN SATURATION: 100 %

## 2021-06-05 VITALS
DIASTOLIC BLOOD PRESSURE: 72 MMHG | WEIGHT: 119 LBS | BODY MASS INDEX: 21.09 KG/M2 | HEART RATE: 79 BPM | RESPIRATION RATE: 16 BRPM | SYSTOLIC BLOOD PRESSURE: 128 MMHG | HEIGHT: 63 IN

## 2021-06-05 VITALS
BODY MASS INDEX: 21.26 KG/M2 | HEART RATE: 55 BPM | DIASTOLIC BLOOD PRESSURE: 58 MMHG | SYSTOLIC BLOOD PRESSURE: 124 MMHG | WEIGHT: 120 LBS | OXYGEN SATURATION: 99 %

## 2021-06-05 VITALS
SYSTOLIC BLOOD PRESSURE: 132 MMHG | DIASTOLIC BLOOD PRESSURE: 78 MMHG | HEART RATE: 80 BPM | WEIGHT: 120 LBS | BODY MASS INDEX: 21.26 KG/M2

## 2021-06-05 VITALS
TEMPERATURE: 97.6 F | WEIGHT: 121.5 LBS | BODY MASS INDEX: 21.52 KG/M2 | SYSTOLIC BLOOD PRESSURE: 138 MMHG | HEART RATE: 72 BPM | DIASTOLIC BLOOD PRESSURE: 70 MMHG

## 2021-06-06 NOTE — PROGRESS NOTES
Internal Medicine Office Visit  [facility]   Patient Name: Laney Hahn  Patient Age: 79 y.o.  YOB: 1940  MRN: [id number]    Date of Visit: 2020  Reason for Office Visit:   Chief Complaint   Patient presents with     Hyperlipidemia           Assessment / Plan / Medical Decision Makin. Mixed hyperlipidemia  - continue statin   - Lipid Profile  - Basic Metabolic Panel  - AST (SGOT)  - ALT (SGPT)  - calcium carbonate (OS-CURT) 500 mg calcium (1,250 mg) chewable tablet; Chew 1 tablet (1,250 mg total) every evening.  Dispense: 90 tablet; Refill: 3  - atorvastatin (LIPITOR) 40 MG tablet; Take 1 tablet (40 mg total) by mouth daily.  Dispense: 90 tablet; Refill: 3    2. Diastolic dysfunction, ECHO 2018  3. Paroxysmal SVT (supraventricular tachycardia) (H)  4. Syncope, unspecified syncope type  - She has a prior history of SVT and several syncopal episodes. A prior holter did not reveal arrhythmias. I asked that she follow up with cardiology for further evaluation    5. Atopic dermatitis, unspecified type  - triamcinolone (KENALOG) 0.1 % cream; Use twice per day on rash until resolution.  Dispense: 45 g; Refill: 0        Health Maintenance Review  Health Maintenance   Topic Date Due     TD 18+ HE  1959     ADVANCE CARE PLANNING  1959     ZOSTER VACCINES (1 of 2) 1991     DXA SCAN  2006     MEDICARE ANNUAL WELLNESS VISIT  2017     PNEUMOCOCCAL IMMUNIZATION 65+ LOW/MEDIUM RISK (2 of 2 - PPSV23) 2017     FALL RISK ASSESSMENT  2020     INFLUENZA VACCINE RULE BASED (1) 2021 (Originally 2019)     LIPID  2022         I have changed Laney Hahn's calcium carbonate and atorvastatin. I am also having her maintain her aspirin and triamcinolone.      HPI:  Laney Hahn is a 79 y.o. year old who presents to the office today with her brother and a friend from her Religious for follow up and medication  refills. She hates to come in for appointments and is only here because she has to. She has no concerns.     We reviewed an ER visit for syncope. She states that she was in line for a [employer] when she could tell she was faint. She lowered herself to the ground and when she came to recalls that she asked to be assisted to her feet. She does not recall palpitations or shortness of breath prior to this episode. She has had similar episodes before and is always reluctant to pursue evaluation. She was taken off of hypertensive medications due to hypotension in the past because of these episodes.     She continues atorvastatin for HLD.     There are spots on her abdomen that are occasionally red and itchy. The topical cream she was prescribed in November helps.     Review of Systems- pertinent positive in bold:  Negative for chest pain, dyspnea       Current Scheduled Meds:  Outpatient Encounter Medications as of 2/27/2020   Medication Sig Dispense Refill     aspirin 81 mg chewable tablet Chew 243 mg every evening.       atorvastatin (LIPITOR) 40 MG tablet Take 1 tablet (40 mg total) by mouth daily. 90 tablet 3     calcium carbonate (OS-CURT) 500 mg calcium (1,250 mg) chewable tablet Chew 1 tablet (1,250 mg total) every evening. 90 tablet 3     triamcinolone (KENALOG) 0.1 % cream Use twice per day on rash until resolution. 45 g 0     [DISCONTINUED] atorvastatin (LIPITOR) 40 MG tablet Take 1 tablet (40 mg total) by mouth daily. Due for follow up, keep scheduled appointment 30 tablet 0     [DISCONTINUED] calcium carbonate (OS-CURT) 500 mg calcium (1,250 mg) chewable tablet Chew 1 tablet every evening.       [DISCONTINUED] triamcinolone (KENALOG) 0.1 % cream Use twice per day on rash until resolution. 45 g 2     No facility-administered encounter medications on file as of 2/27/2020.        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 Nini Rasmussen     Moderate aortic valve stenosis 8/22/2017     Paroxysmal SVT (supraventricular tachycardia) (H) 9/7/2017    said to have short episodes while hospitalized for UTI per Dr. Rhys Mensah     UTI (urinary tract infection) 08/21/2017    hospitalized with weakness     Past Surgical History:   Procedure Laterality Date     CATARACT EXTRACTION, BILATERAL       PARTIAL GASTRECTOMY  1969    for ulcers     TONSILLECTOMY       Social History     Tobacco Use     Smoking status: Never Smoker     Smokeless tobacco: Never Used   Substance Use Topics     Alcohol use: No     Drug use: No       Objective / Physical Examination:  Vitals:    02/27/20 1408   BP: 122/64   Weight: 120 lb (54.4 kg)   Height: 5' 3.5\" (1.613 m)     Wt Readings from Last 3 Encounters:   02/27/20 120 lb (54.4 kg)   12/31/19 118 lb (53.5 kg)   11/18/19 118 lb 12.8 oz (53.9 kg)     Body mass index is 20.92 kg/m .     Constitutional: In no apparent distress  Respiratory: Clear to auscultation bilaterally. Normal inspiratory and expiratory effort  Cardiovascular: Regular rate and rhythm. No murmurs, rubs, or gallops. No edema. No carotid bruits.   Gastrointestinal: Bowel sounds active all four quadrants. Soft, non-tender.   Psych: Alert and oriented x3.     Orders Placed This Encounter   Procedures     Lipid Profile     Basic Metabolic Panel     AST (SGOT)     ALT (SGPT)     Ambulatory referral to Cardiology   Followup: Return in about 6 months (around 8/27/2020) for Next scheduled follow up. earlier if needed.          Rose Mcelroy CNP    "

## 2021-06-06 NOTE — TELEPHONE ENCOUNTER
Call pt to let her know 1 refill of the medication was sent to her pharmacy to get her through until scheduled appointment   
Call pt- her medication can not be refilled as she is overdue for an office visit with lab work. Can bridge to an appointment if scheduled within the next 1 month.   
Called to pt and pt hung up on caller.  No answer once called back.  Called and LVM for Gabriella - please schedule appt as pt is over due to be seen.      If pt or Gabriella calls back please assist in scheduling appt.     Mailed letter as well    
Order sent   
Pt has appt on 2/27  
RN cannot approve Refill Request    RN can NOT refill this medication Protocol failed and NO refill given. Last office visit: 1/28/2019 Rose Mcelroy FNP Last Physical: Visit date not found Last MTM visit: Visit date not found Last visit same specialty: 11/18/2019 Bang Romero MD.  Next visit within 3 mo: Visit date not found  Next physical within 3 mo: Visit date not found      Alexa Antonio, [facility] Triage/Med Refill 2/15/2020    Requested Prescriptions   Pending Prescriptions Disp Refills     atorvastatin (LIPITOR) 40 MG tablet 60 tablet 0     Sig: Take 1 tablet (40 mg total) by mouth daily. Due for follow up within 60 days.       Statins Refill Protocol (Hmg CoA Reductase Inhibitors) Failed - 2/12/2020  3:12 PM        Failed - PCP or prescribing provider visit in past 12 months      Last office visit with prescriber/PCP: 1/28/2019 Rose Mcelroy FNP OR same dept: Visit date not found OR same specialty: 11/18/2019 Bang Romero MD  Last physical: Visit date not found Last MTM visit: Visit date not found   Next visit within 3 mo: Visit date not found  Next physical within 3 mo: Visit date not found  Prescriber OR PCP: YOLANDA Rainey  Last diagnosis associated with med order: 1. Mixed hyperlipidemia  - atorvastatin (LIPITOR) 40 MG tablet; Take 1 tablet (40 mg total) by mouth daily. Due for follow up within 60 days.  Dispense: 60 tablet; Refill: 0    If protocol passes may refill for 12 months if within 3 months of last provider visit (or a total of 15 months).                
Refill Request  Did you contact pharmacy: No  Medication name:   Requested Prescriptions     Refused Prescriptions Disp Refills     atorvastatin (LIPITOR) 40 MG tablet 60 tablet 0     Sig: Take 1 tablet (40 mg total) by mouth daily. Due for follow up within 60 days.     Refused By: ROSE MCELROY     Reason for Refusal: Patient needs an appointment     Who prescribed the medication: Rose Mcelroy FNP   [facility]ed [facility]acy[facility]  Is patient out of medication: Yes  Patient notified refills processed in 3 business days:  yes  Okay to leave a detailed message: yes Gabriella, the patient's friend is asking if a 7 day supply can be sent to the pharmacy.Gabriella states she has scheduled the patient an appointment on 2/27/20 with Rose Mcelroy FNP. Please bridge the medication until the patient's appointment.   Please call the patient when the prescription has been sent.      
76

## 2021-06-06 NOTE — PATIENT INSTRUCTIONS - HE
- Today we talked about the several episodes that you have passed out. I would like to have you see a cardiologist because I am concerned about your heart rhythm causing you to pass out. I placed a referral to the cardiologist and you will get a call to set up this appointment

## 2021-06-08 ENCOUNTER — HOSPITAL ENCOUNTER (EMERGENCY)
Dept: EMERGENCY MEDICINE | Facility: HOSPITAL | Age: 81
Discharge: HOME | End: 2021-06-09
Attending: STUDENT IN AN ORGANIZED HEALTH CARE EDUCATION/TRAINING PROGRAM
Payer: COMMERCIAL

## 2021-06-08 DIAGNOSIS — N17.9 ACUTE KIDNEY INJURY (H): ICD-10-CM

## 2021-06-08 DIAGNOSIS — N30.00 ACUTE CYSTITIS WITHOUT HEMATURIA: ICD-10-CM

## 2021-06-08 DIAGNOSIS — M62.81 GENERALIZED MUSCLE WEAKNESS: ICD-10-CM

## 2021-06-08 DIAGNOSIS — E87.5 HYPERKALEMIA: ICD-10-CM

## 2021-06-08 LAB
ALBUMIN UR-MCNC: ABNORMAL G/DL
ANION GAP SERPL CALCULATED.3IONS-SCNC: 10 MMOL/L (ref 5–18)
ANION GAP SERPL CALCULATED.3IONS-SCNC: 12 MMOL/L (ref 5–18)
APPEARANCE UR: CLEAR
BACTERIA #/AREA URNS HPF: ABNORMAL /[HPF]
BILIRUB UR QL STRIP: NEGATIVE
BUN SERPL-MCNC: 40 MG/DL (ref 8–28)
BUN SERPL-MCNC: 40 MG/DL (ref 8–28)
CALCIUM SERPL-MCNC: 8.5 MG/DL (ref 8.5–10.5)
CALCIUM SERPL-MCNC: 9.1 MG/DL (ref 8.5–10.5)
CHLORIDE BLD-SCNC: 104 MMOL/L (ref 98–107)
CHLORIDE BLD-SCNC: 107 MMOL/L (ref 98–107)
CO2 SERPL-SCNC: 23 MMOL/L (ref 22–31)
CO2 SERPL-SCNC: 23 MMOL/L (ref 22–31)
COLOR UR AUTO: YELLOW
CREAT SERPL-MCNC: 1.51 MG/DL (ref 0.6–1.1)
CREAT SERPL-MCNC: 1.7 MG/DL (ref 0.6–1.1)
ERYTHROCYTE [DISTWIDTH] IN BLOOD BY AUTOMATED COUNT: 13.5 % (ref 11–14.5)
GFR SERPL CREATININE-BSD FRML MDRD: 29 ML/MIN/1.73M2
GFR SERPL CREATININE-BSD FRML MDRD: 33 ML/MIN/1.73M2
GLUCOSE BLD-MCNC: 171 MG/DL (ref 70–125)
GLUCOSE BLD-MCNC: 192 MG/DL (ref 70–125)
GLUCOSE UR STRIP-MCNC: NEGATIVE MG/DL
HCT VFR BLD AUTO: 40.9 % (ref 35–47)
HGB BLD-MCNC: 13.8 G/DL (ref 12–16)
HGB UR QL STRIP: ABNORMAL
HYALINE CASTS #/AREA URNS LPF: ABNORMAL LPF
KETONES UR STRIP-MCNC: NEGATIVE MG/DL
LACTATE SERPL-SCNC: 2.2 MMOL/L (ref 0.7–2)
LEUKOCYTE ESTERASE UR QL STRIP: ABNORMAL
MCH RBC QN AUTO: 32.9 PG (ref 27–34)
MCHC RBC AUTO-ENTMCNC: 33.7 G/DL (ref 32–36)
MCV RBC AUTO: 98 FL (ref 80–100)
MUCOUS THREADS #/AREA URNS LPF: PRESENT LPF
NITRATE UR QL: NEGATIVE
PH UR STRIP: 6 [PH] (ref 5–8)
PLATELET # BLD AUTO: 174 THOU/UL (ref 140–440)
PMV BLD AUTO: 11.8 FL (ref 8.5–12.5)
POTASSIUM BLD-SCNC: 5 MMOL/L (ref 3.5–5)
POTASSIUM BLD-SCNC: 5.7 MMOL/L (ref 3.5–5)
RBC # BLD AUTO: 4.19 MILL/UL (ref 3.8–5.4)
RBC URINE: 0 HPF
SARS-COV-2 PCR RESULT-HE - HISTORICAL: NEGATIVE
SODIUM SERPL-SCNC: 139 MMOL/L (ref 136–145)
SODIUM SERPL-SCNC: 140 MMOL/L (ref 136–145)
SP GR UR STRIP: 1.02 (ref 1–1.03)
SQUAMOUS EPITHELIAL: 3 /HPF
TROPONIN I SERPL-MCNC: 0.03 NG/ML (ref 0–0.29)
UROBILINOGEN UR STRIP-ACNC: ABNORMAL
WBC URINE: 26 HPF
WBC: 9.7 THOU/UL (ref 4–11)

## 2021-06-08 ASSESSMENT — MIFFLIN-ST. JEOR: SCORE: 1037.88

## 2021-06-09 ENCOUNTER — COMMUNICATION - HEALTHEAST (OUTPATIENT)
Dept: SCHEDULING | Facility: CLINIC | Age: 81
End: 2021-06-09

## 2021-06-09 ENCOUNTER — COMMUNICATION - HEALTHEAST (OUTPATIENT)
Dept: CARE COORDINATION | Facility: CLINIC | Age: 81
End: 2021-06-09

## 2021-06-09 LAB
ATRIAL RATE - MUSE: 81 BPM
DIASTOLIC BLOOD PRESSURE - MUSE: NORMAL
INTERPRETATION ECG - MUSE: NORMAL
P AXIS - MUSE: 68 DEGREES
PR INTERVAL - MUSE: 182 MS
QRS DURATION - MUSE: 128 MS
QT - MUSE: 424 MS
QTC - MUSE: 492 MS
R AXIS - MUSE: -63 DEGREES
SYSTOLIC BLOOD PRESSURE - MUSE: NORMAL
T AXIS - MUSE: 43 DEGREES
VENTRICULAR RATE- MUSE: 81 BPM

## 2021-06-10 ENCOUNTER — OFFICE VISIT - HEALTHEAST (OUTPATIENT)
Dept: FAMILY MEDICINE | Facility: CLINIC | Age: 81
End: 2021-06-10

## 2021-06-10 ENCOUNTER — AMBULATORY - HEALTHEAST (OUTPATIENT)
Dept: CARDIOLOGY | Facility: CLINIC | Age: 81
End: 2021-06-10

## 2021-06-10 ENCOUNTER — COMMUNICATION - HEALTHEAST (OUTPATIENT)
Dept: CARDIOLOGY | Facility: CLINIC | Age: 81
End: 2021-06-10

## 2021-06-10 DIAGNOSIS — I47.10 SVT (SUPRAVENTRICULAR TACHYCARDIA) (H): ICD-10-CM

## 2021-06-10 DIAGNOSIS — Z95.0 CARDIAC PACEMAKER IN SITU: ICD-10-CM

## 2021-06-10 DIAGNOSIS — I95.9 HYPOTENSION, UNSPECIFIED HYPOTENSION TYPE: ICD-10-CM

## 2021-06-10 DIAGNOSIS — E87.5 HYPERKALEMIA: ICD-10-CM

## 2021-06-10 DIAGNOSIS — R41.0 CONFUSION: ICD-10-CM

## 2021-06-10 DIAGNOSIS — N39.0 URINARY TRACT INFECTION WITHOUT HEMATURIA, SITE UNSPECIFIED: ICD-10-CM

## 2021-06-10 DIAGNOSIS — I48.91 ATRIAL FIBRILLATION WITH RVR (H): ICD-10-CM

## 2021-06-10 LAB
BACTERIA SPEC CULT: NO GROWTH
HCC DEVICE COMMENTS: NORMAL
HCC DEVICE IMPLANTING PROVIDER: NORMAL
HCC DEVICE MANUFACTURE: NORMAL
HCC DEVICE MODEL: NORMAL
HCC DEVICE SERIAL NUMBER: NORMAL
HCC DEVICE TYPE: NORMAL

## 2021-06-10 NOTE — PROGRESS NOTES
Internal Medicine Office Visit  Patient Name: Laney Hahn  Patient Age: 76 y.o.  YOB: 1940  MRN: [id number]  ?  Date of Visit: 5/3/2017  Reason for Office Visit:   Chief Complaint   Patient presents with     Hospital Visit Follow Up     bacteremia pt feels better now       Assessment / Plan / Medical Decision Makin. History of TIA (transient ischemic attack)  2. Urinary frequency  3. Anemia  4. HLD (hyperlipidemia)  - Check labs today  - Start atorvastatin 40 mg daily. Recheck lipids in 3 months with fasting lab appointment  - Encouraged patient to increase fluid intake to prevent UTI in the future        Health Maintenance Review  Health Maintenance   Topic Date Due     TD 18+ HE  1959     COLONOSCOPY  1959     ADVANCE DIRECTIVES DISCUSSED WITH PATIENT  1959     ZOSTER VACCINE  2001     DXA SCAN  2006     PNEUMOCOCCAL POLYSACCHARIDE VACCINE AGE 65 AND OVER  2006     FALL RISK ASSESSMENT  2017     INFLUENZA VACCINE RULE BASED  Completed     PNEUMOCOCCAL CONJUGATE VACCINE FOR ADULTS (PCV13 OR PREVNAR)  Completed         I am having Ms. Hahn start on atorvastatin. I am also having her maintain her ASPIRIN/ACETAMINOPHEN/CAFFEINE (EXCEDRIN EXTRA STRENGTH ORAL), acetaminophen, aspirin, and cephalexin.     HPI:   Encounter Diagnoses   Name Primary?     Urinary frequency Yes     History of TIA (transient ischemic attack)      Anemia      HLD (hyperlipidemia)       Laney Hahn is a 76-year-old female who presents to the office today for follow-up of a recent hospitalization for bacteremia secondary to a urinary tract infection.  She was treated with IV antibiotics and then discharged to home with oral cephalexin.  She has been taking this regularly.  She admits that she does not drink a lot of fluids during the day.  She does endorse recent urinary frequency but denies dysuria, hematuria.  Denies flank pain, chills.     Review of Systems:  As noted in HPI    Current Scheduled Meds:  Outpatient Encounter Prescriptions as of 5/3/2017   Medication Sig Dispense Refill     acetaminophen (TYLENOL) 500 MG tablet Take 1 tablet (500 mg total) by mouth every 4 (four) hours as needed. 30 tablet 0     aspirin 81 MG EC tablet Take 1 tablet (81 mg total) by mouth daily with breakfast.  0     ASPIRIN/ACETAMINOPHEN/CAFFEINE (EXCEDRIN EXTRA STRENGTH ORAL) Take 2 tablets by mouth every 6 (six) hours as needed.        cephalexin (KEFLEX) 500 MG capsule Take 1 capsule (500 mg total) by mouth 3 (three) times a day for 12 days. 36 capsule 0     atorvastatin (LIPITOR) 40 MG tablet Take 1 tablet (40 mg total) by mouth daily. 90 tablet 3     No facility-administered encounter medications on file as of 5/3/2017.      Past Medical History:   Diagnosis Date     TIA (transient ischemic attack)     x3     Past Surgical History:   Procedure Laterality Date     CATARACT EXTRACTION, BILATERAL       TONSILLECTOMY       ulcer surgery  1969     Social History   Substance Use Topics     Smoking status: Never Smoker     Smokeless tobacco: None     Alcohol use No       Objective / Physical Examination:  Vitals:    05/03/17 1129   BP: 120/74   Patient Site: Right Arm   Patient Position: Sitting   Cuff Size: Adult Regular   Pulse: 76   Temp: 97.6  F (36.4  C)   TempSrc: Oral   Weight: 126 lb (57.2 kg)     Wt Readings from Last 3 Encounters:   05/03/17 126 lb (57.2 kg)   04/23/17 125 lb 6.4 oz (56.9 kg)   04/22/17 140 lb (63.5 kg)     Body mass index is 22.32 kg/(m^2).     General Appearance: Alert and oriented, cooperative, affect appropriate, speech clear, in no apparent distress  Back:  No CVA tenderness  Lungs: Clear to auscultation bilaterally. Normal inspiratory and expiratory effort  Cardiovascular: Regular rate, normal S1, S2  Abdomen: Bowel sounds active all four quadrants. Soft, non-tender. No suprapubic tenderness       Orders Placed This Encounter   Procedures     Culture, Urine      Urinalysis-UC if Indicated     HM1 (CBC with Diff)     Comprehensive Metabolic Panel     Lipid Profile   Followup: Return in about 6 months (around 11/3/2017) for Next scheduled follow up. earlier if needed.      Rose Mcelroy, CNP  [facility]

## 2021-06-11 ENCOUNTER — COMMUNICATION - HEALTHEAST (OUTPATIENT)
Dept: CARE COORDINATION | Facility: CLINIC | Age: 81
End: 2021-06-11

## 2021-06-11 NOTE — PROGRESS NOTES
Clinic Care Coordination Contact  Due Date: Within 2 weeks from today's date, 9/18/2020  Delegation: No Show for RN appointment. Please follow unsuccessful outreach, no show standard work.  Unable to reach patient via phone today x2.   Appt rescheduled on 9/24/2020

## 2021-06-11 NOTE — PROGRESS NOTES
Clinic Care Coordination Contact  No show with CCC RN x2  Due Date: Within 2 weeks from today's date, 9/24/2020  Delegation: No Show for RN appointment. Please follow unsuccessful outreach, no show standard work.  Unable to reach patient via phone x3 today.   Appt rescheduled on 10/5/2020 - last attempt

## 2021-06-11 NOTE — TELEPHONE ENCOUNTER
LVM for pt to call back for scheduling appt    If unable to schedule please gather times when pt can be scheduled

## 2021-06-11 NOTE — TELEPHONE ENCOUNTER
New Appointment Needed  What is the reason for the visit:    Inpatient/ED Follow Up Appt Request  At what hospital or facility were you seen?: [facility]  What is the reason you were seen?: Syncopy  What date were you admitted?: date: 9/15/2020  What date were you discharged?: date: 9/16/2020  What was the recommended timeframe for your follow up appointment?: 3-5 days  Provider Preference: Pcp or another provider  How soon do you need to be seen?: by 9/21, patient needs telephone visit, as she is Covid positive  Waitlist offered?: No  Okay to leave a detailed message:  Yes, please call patient back

## 2021-06-11 NOTE — PROGRESS NOTES
Clinic Care Coordination Contact  Community Health Worker Initial Outreach    CHW Initial Information Gathering:  Referral Source: Other, specify(CCC RN)  Preferred Hospital: [facility]  [phone]  Preferred Urgent Care: [facility], [phone]  Current living arrangement:: I live in assisted living  Type of residence:: Assisted living  Community Resources: Home Care, Other (see comment), Housekeeping/Chore Agency([employer])  Equipment Currently Used at Home: cane, straight  Informal Support system:: Family  No PCP office visit in Past Year: No  Transportation means:: Family  CHW Additional Questions  If ED/Hospital discharge, follow-up appointment scheduled as recommended?: No  Patient agreeable to assistance with scheduling?: N/A(She will call the schedulers to schedule)  MyChart active?: No  Patient agreeable to assistance with activating MyChart?: No    Patient accepts CC: Yes. Patient scheduled for assessment with CCC RN on Friday 9/18 at 2pm. Patient noted desire to discuss COVID+ results.

## 2021-06-12 ENCOUNTER — AMBULATORY - HEALTHEAST (OUTPATIENT)
Dept: CARDIOLOGY | Facility: CLINIC | Age: 81
End: 2021-06-12

## 2021-06-12 NOTE — PROGRESS NOTES
Internal Medicine Office Visit  Patient Name: Laney Hahn  Patient Age: 77 y.o.  YOB: 1940  MRN: [id number]  ?  Date of Visit: 2017  Reason for Office Visit:   Chief Complaint   Patient presents with     Fatigue     whole body weakness. started last Saturday       Assessment / Plan / Medical Decision Makin. Tachycardia  2. Fatigue  3. Malaise  - Patient appears to be dehydrated and has dry mucus membranes on exam. She is afebrile and appears clinically stable. Will rule out UA in light of confusion and risk factor of poor fluid intake. Additional lab tests today for reported gradual memory changes over the past 6 months according to her friend. She is advised to flavor water so that she is encouraged to drink more fluids that are not soda. We discussed 6-8 glasses in a day. She is a poor historian and has had falls in the past r/t dehydration. I would like to see her back in 1 week to assure she is feeling better  - Consider OT evaluation for memory after next visit. Patient does not drive       Health Maintenance Review  Health Maintenance   Topic Date Due     TD 18+ HE  1958     ADVANCE DIRECTIVES DISCUSSED WITH PATIENT  1958     ZOSTER VACCINE  2001     DXA SCAN  2006     PNEUMOCOCCAL POLYSACCHARIDE VACCINE AGE 65 AND OVER  2006     INFLUENZA VACCINE RULE BASED (1) 2017     FALL RISK ASSESSMENT  2017     PNEUMOCOCCAL CONJUGATE VACCINE FOR ADULTS (PCV13 OR PREVNAR)  Completed       I am having Ms. Hahn maintain her ASPIRIN/ACETAMINOPHEN/CAFFEINE (EXCEDRIN EXTRA STRENGTH ORAL), aspirin, and atorvastatin.     HPI:   Encounter Diagnoses   Name Primary?     Tachycardia Yes     Fatigue      Malaise       Laney Hahn is a 76 y/o female who presents to the office today with her brother and a family friend, Bipin.     Saturday- started to feel tired. She is worried that she is dehydrated, she is drinking more orange juice to keep  "hydrated because she doesn't like water. She drinks 3 glasses of orange juice per day but has a lot of mostly caffeine free soda. No stomach pain, nausea, vomiting. Sunday night her friends from [employer] checked on her because she didn't go to [employer] that day and she seemed \"out of it\".  She seemed more forgetful and \"like she had just woken up\". She has not fallen recently. Patient does not feel dizzy, lightheaded. She denies chest pain or shortness of breath. She has not been more physically active than usual. Bipin notes that in general she seems more forgetful and this seems to be progressing slowly over the past 6 months.       Review of Systems: If positive, the following ROS is bolded:  Constitutional: Fever, chills, night sweats, fainting, unexplained weight change, fatigue   Eyes: Visual changes  Respiratory: Wheeze, shortness of breath, cough, and exercise intolerance   Cardiovascular: Chest pain, dyspnea, tachycardia, palpitations, syncope, dizziness or lightheadedness  Gastrointestinal: Nausea, vomiting, diarrhea  Genitourinary: Dysuria, frequency  Neurological: Changes in balance, mental status, paresthesias, weakness, headache      Current Scheduled Meds:  No facility-administered encounter medications on file as of 8/21/2017.      Outpatient Encounter Prescriptions as of 8/21/2017   Medication Sig Dispense Refill     aspirin 81 MG EC tablet Take 1 tablet (81 mg total) by mouth daily with breakfast.  0     ASPIRIN/ACETAMINOPHEN/CAFFEINE (EXCEDRIN EXTRA STRENGTH ORAL) Take 2 tablets by mouth every 6 (six) hours as needed.        atorvastatin (LIPITOR) 40 MG tablet Take 1 tablet (40 mg total) by mouth daily. 90 tablet 3     Past Medical History:   Diagnosis Date     TIA (transient ischemic attack)     x3     UTI (urinary tract infection) 8/21/2017     Past Surgical History:   Procedure Laterality Date     CATARACT EXTRACTION, BILATERAL       TONSILLECTOMY       ulcer surgery  1969     Social History "   Substance Use Topics     Smoking status: Never Smoker     Smokeless tobacco: Never Used     Alcohol use No       Objective / Physical Examination:  Vitals:    08/21/17 0941   BP: 126/66   Pulse: (!) 110   Temp: 97.4  F (36.3  C)   SpO2: 97%   Weight: 123 lb 6.4 oz (56 kg)     Wt Readings from Last 3 Encounters:   08/23/17 123 lb 6.4 oz (56 kg)   08/21/17 123 lb 6.4 oz (56 kg)   05/03/17 126 lb (57.2 kg)     Body mass index is 21.86 kg/(m^2).     General Appearance: Alert and oriented, cooperative, affect appropriate, speech clear, in no apparent distress  Head: Normocephalic, atraumatic  Throat: Lips and mucosa very dry. Lips cracking.  Neck: Supple, trachea midline. No cervical adenopathy  Back: No CVA tenderness   Lungs: Clear to auscultation bilaterally. Normal inspiratory and expiratory effort  Cardiovascular: Regular rate, normal S1, S2. No murmurs, rubs, or gallops  Abdomen: Bowel sounds active all four quadrants. Soft, non-tender. No hepatomegaly or splenomegaly  Neuro: abnormal clock draw test. 2/3 word recall      Orders Placed This Encounter   Procedures     Culture, Urine     Urinalysis-UC if Indicated     Comprehensive Metabolic Panel     Vitamin B12     Thyroid Stimulating Hormone (TSH)     HM1 (CBC with Diff)   Followup: Return in about 1 week (around 8/28/2017) for Recheck. earlier if needed.      Rose Mcelroy, CNP  [facility]

## 2021-06-12 NOTE — PROGRESS NOTES
Internal Medicine Office Visit  [facility]   Patient Name: Laney Hahn  Patient Age: 80 y.o.  YOB: 1940  MRN: [id number]    Date of Visit: 10/6/2020  Reason for Office Visit:   Chief Complaint   Patient presents with     Hospital Visit Follow Up         Assessment / Plan / Medical Decision Makin. Hospital discharge follow-up  - Patient reports being stable at home. No new medications started at this time.   - Encourage healthy diet and good fluid intake.     2. Near syncope  3. SVT (supraventricular tachycardia) (H)  - Discussed the importance of staying hydrated to decrease episodes of syncope.  - Patient and brother educated on decreasing caffeine intake to 1 cup a day.   - Discussed with patient different techniques to try at home if she experiences her heart racing.  - Talked about stimulating cough, bearing down as you would when having a bowel movement.     4. COVID Restrictions  - Patient informed she is cleared to leave her home while wearing a mask.   - Brother has been asymptomatic. Instructed to quarantine for 2 more days.       Health Maintenance Review  Health Maintenance   Topic Date Due     TD 18+ HE  1958     ADVANCE CARE PLANNING  1958     ZOSTER VACCINES (1 of 2) 1991     DXA SCAN  2006     MEDICARE ANNUAL WELLNESS VISIT  2017     Pneumococcal Vaccine: 65+ Years (2 of 2 - PPSV23) 2017     FALL RISK ASSESSMENT  2020     INFLUENZA VACCINE RULE BASED (1) 2021 (Originally 2020)     LIPID  2025     Pneumococcal Vaccine: Pediatrics (0 to 5 Years) and At-Risk Patients (6 to 64 Years)  Aged Out     HEPATITIS B VACCINES  Aged Out         I am having Laney Hahn maintain her aspirin.      HPI:  Laney Hahn is a 80 y.o. year old who presents to the office today for follow up of recent hospitalization. She was seen in the ED on  for a near syncope event with a history of  several syncope events in the past. She has a history of PSVT in the past and grade II diastolic dysfunction. Due to EKG changes she was transferred to [facility] for angiogram. The angiogram showed mild CAD and she had a run of SVT during the procedure. She was COVID positive on 9/14 and has been asymptomatic since.     Review of Systems- pertinent positive in bold:  Constitutional: Denies fever, chills, night sweats, fainting, weight change, fatigue, or dizziness.   Eyes: Denies any acute change in vision, blurred or double vision, redness/eye pain  Ears, nose, mouth, throat: Denies change in hearing, ear pain, hoarseness, difficulty swallowing, sores in the mouth or throat  Respiratory: Denies shortness of breath, cough, bloody sputum, wheezing  Cardiovascular: Denies chest tightness, chest pain, palpitations or edema.   Gastrointestinal: Denies nausea/vomiting, change in appetite, change in bowel habits, constipation or diarrhea, lost of taste or smell.   Musculoskeletal: Denies weakness, myalgias or arthralgias.  Neurologic/emotional: Denies numbness or tingling, no reported acute stressors or anxiety      Current Scheduled Meds:  Outpatient Encounter Medications as of 10/6/2020   Medication Sig Dispense Refill     aspirin 81 MG EC tablet Take 1 tablet (81 mg total) by mouth daily.  0     No facility-administered encounter medications on file as of 10/6/2020.      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 Nini Rasmussen     Moderate aortic valve stenosis 8/22/2017     Paroxysmal SVT (supraventricular tachycardia) (H) 9/7/2017    said to have short episodes while hospitalized for UTI per Dr. Rhys Mensah     UTI (urinary tract  infection) 08/21/2017    hospitalized with weakness     Past Surgical History:   Procedure Laterality Date     CATARACT EXTRACTION, BILATERAL       CV CORONARY ANGIOGRAM N/A 9/15/2020    Procedure: Coronary Angiogram;  Surgeon: Radhika Santa MD;  Location: [facility];  Service: Cardiology     CV LEFT HEART CATHETERIZATION WO LEFT VETRICULOGRAM Left 9/15/2020    Procedure: Left Heart Catheterization Without Left Ventriculogram;  Surgeon: Radhika Santa MD;  Location: [facility];  Service: Cardiology     HYSTERECTOMY       PARTIAL GASTRECTOMY  1969    for ulcers     TONSILLECTOMY       Social History     Tobacco Use     Smoking status: Never Smoker     Smokeless tobacco: Never Used   Substance Use Topics     Alcohol use: No     Drug use: No       Objective / Physical Examination:  Vitals:    10/06/20 1450   BP: 104/70   Weight: 117 lb (53.1 kg)     Wt Readings from Last 3 Encounters:   10/06/20 117 lb (53.1 kg)   09/15/20 118 lb 11.2 oz (53.8 kg)   09/15/20 119 lb 11.2 oz (54.3 kg)     Body mass index is 20.73 kg/m .     Constitutional: In no apparent distress  Respiratory: Clear to auscultation bilaterally. Normal inspiratory and expiratory effort  Cardiovascular: Regular rate and rhythm. No murmurs, rubs, or gallops. No edema.  Skin: Warm and dry and intatct  Neuro: Normal gait  Psych: Alert and oriented x3.     No orders of the defined types were placed in this encounter.  Followup: Return in about 6 months (around 4/6/2021). earlier if needed.          Rose Mcelroy, CNP

## 2021-06-12 NOTE — PROGRESS NOTES
"JEREMI Hahn is a 77 y.o. female here for left ankle pain. She is here with her brother and her friend Gabriella. Laney tripped in the night and \"I just sunk to the floor.\" She remembers falling and states she did not hit her head. Yesterday, she sat around a lot but was able to put a little weight on the ankle. She has been using Excedrin for pain. Today, she asked Gabriella to bring her to [facility] to make sure the ankle was not broken.    Past Medical History:   Diagnosis Date     TIA (transient ischemic attack)     x3     UTI (urinary tract infection) 8/21/2017   Reviewed.  Current Outpatient Prescriptions on File Prior to Visit   Medication Sig Dispense Refill     aspirin 81 MG EC tablet Take 1 tablet (81 mg total) by mouth daily with breakfast.  0     ASPIRIN/ACETAMINOPHEN/CAFFEINE (EXCEDRIN EXTRA STRENGTH ORAL) Take 2 tablets by mouth every 6 (six) hours as needed.        atorvastatin (LIPITOR) 40 MG tablet Take 1 tablet (40 mg total) by mouth daily. 90 tablet 3     metoprolol tartrate (LOPRESSOR) 50 MG tablet Take 1 tablet (50 mg total) by mouth 2 (two) times a day. 60 tablet 1     MULTIVIT,CALC,MINS/IRON/FOLIC (ONE-A-DAY WOMENS FORMULA ORAL) Take 1 tablet by mouth daily. Has not started taking yet.       No current facility-administered medications on file prior to visit.      Social History: Lives with brother Jeff. No stairs in the home.   ?  ROS:   General: No fevers, chills, weight loss  Head: No headaches.   Eyes: No acute change in vision or other eye disease  CV: No chest pain or palpitations.  Resp: No shortness of breath or cough. No hemoptysis.  GI: No nausea, vomiting, constipation, diarrhea, melena or abdominal pain  MS: No arthralgias or myalgias  Psych: No significant change in mood, anxiety level   Skin: No new rashes or lesions  Neuro: No alterations in thinking, paresthesias, or weakness   ?  O  /68 (Patient Site: Right Arm, Patient Position: Sitting, Cuff Size: Adult Regular) "  Pulse 65  Wt 122 lb 2 oz (55.4 kg)  SpO2 96%  BMI 21.29 kg/m2   Vitals reviewed. Nursing note reviewed.  General Appearance: Pleasant and alert, in no acute distress  HEENT: mucous membranes moist  CV: RRR, no murmur, rubs, gallops  Resp: No respiratory distress. Clear to auscultation bilaterally. No wheezes, rales, rhonchi  Ext: Left ankle is slightly swollen, ecchymosis surrounding ankle. Tenderness with inversion, tender at lateral malleolus. No pain along spine or any other joints.   Skin: No bruising noted except at left ankle.     Imaging:   X-ray left foot and ankle: FINDINGS: No fracture or dislocation at the ankle or foot. No ankle joint effusion. No significant soft tissue swelling.  A/P  Laney was seen today for ankle injury.    Diagnoses and all orders for this visit:    Left ankle sprain: x-rays showed no fracture. Gave cloth ankle brace. Offered crutches but she prefers to use her cane, stating she is able to walk on the foot. Advised icing and elevation, tylenol and ibuprofen for pain. If swelling and pain worsen, would come back in for re-evaluation.     Acute left ankle pain  -     XR Ankle Left 3 or More VWS; Future  -     XR Foot Left 3 or More VWS; Future    Fall, initial encounter: Fall sounds mechanical. She did have a recent hospitalization when she was found to have a UTI, but she reports no dizziness associated with this fall and afterwards. No  Pain or bruising to suggest injury other than left ankle. Her friend Gabriella plans to check in on Pat again later this afternoon. Encouraged that if she develops any new dizziness or pain, she should be re-evaluated right away in the ER.      Options for treatment and follow-up care were reviewed with the patient and/or guardian. Laney HERNANDEZ Jovani and/or guardian engaged in the decision making process and verbalized understanding of the options discussed and agreed with the final plan.    Vivienne Díaz MD

## 2021-06-12 NOTE — PROGRESS NOTES
Internal Medicine Office Visit  Patient Name: Laney Hahn  Patient Age: 77 y.o.  YOB: 1940  MRN: [id number]  ?  Date of Visit: 2017  Reason for Office Visit:   Chief Complaint   Patient presents with     Hospital Visit Follow Up       Assessment / Plan / Medical Decision Makin. PSVT (paroxysmal supraventricular tachycardia)  2. Urinary tract infection without hematuria, site unspecified  3. Near syncope  4. Dehydration  - Continue metoprolol. She has not had any lightheadedness or dizziness associated with the medication. She will schedule a f/u visit with cardiology prior to leaving the clinic today  - Dehydration has been a recurrent issue for her. We discussed monitoring her fluid intake. She has stopped drinking soda now.   - She declines a tetanus booster today  - Complete antibiotic for UTI  - Follow up in 6 months or as needed       Health Maintenance Review  Health Maintenance   Topic Date Due     TD 18+ HE  1958     ADVANCE DIRECTIVES DISCUSSED WITH PATIENT  1958     ZOSTER VACCINE  2001     DXA SCAN  2006     PNEUMOCOCCAL POLYSACCHARIDE VACCINE AGE 65 AND OVER  2006     INFLUENZA VACCINE RULE BASED (1) 2017     FALL RISK ASSESSMENT  2017     PNEUMOCOCCAL CONJUGATE VACCINE FOR ADULTS (PCV13 OR PREVNAR)  Completed         I am having Ms. Hahn maintain her ASPIRIN/ACETAMINOPHEN/CAFFEINE (EXCEDRIN EXTRA STRENGTH ORAL), aspirin, atorvastatin, (MULTIVIT,CALC,MINS/IRON/FOLIC (ONE-A-DAY WOMENS FORMULA ORAL)), and metoprolol tartrate.     HPI:   Encounter Diagnoses   Name Primary?     PSVT (paroxysmal supraventricular tachycardia) Yes     Urinary tract infection without hematuria, site unspecified      Near syncope      Dehydration       Laney Hahn is a 76 y/o female with a PMH of CVA x 3 without residual deficits who presents to the office today with her brother and a friend from her [employer] for follow up of a recent  hospitalization for dizziness, UTI, and acute kidney disease with a baseline of CKD stage 3. During hospitalization she had narrow complex tachycardia thought to be SVT and metoprolol was started. ECHO showed aortic stenosis with a recommendation to recheck in 2 years. She has not yet scheduled a follow up cardiology visit. She adds that in the past two months she has had spells where she feels lightheaded and has had to lower herself to the floor-- she has never mentioned this to anyone in the past. She has not fallen by her report. No episodes of lightheadedness, dizziness, fainting or falling since returning home from the hospital.     She had a UTI in the hospital, is not experiencing any hematuria, dysuria. Denies flank pain. She will complete the rest of her antibiotic prescribed in the hospital. She feels that her energy level is better now although not entirely back to baseline. She has been drinking Gatorade now instead of soda. I requested that she get a low calorie gatorade and dilute it with water so she doesn't get as much sugar.       Review of Systems: Pertinent findings as in HPI. No chills, flank pain, dizziness    Current Scheduled Meds:  Outpatient Encounter Prescriptions as of 2017   Medication Sig Dispense Refill     aspirin 81 MG EC tablet Take 1 tablet (81 mg total) by mouth daily with breakfast.  0     ASPIRIN/ACETAMINOPHEN/CAFFEINE (EXCEDRIN EXTRA STRENGTH ORAL) Take 2 tablets by mouth every 6 (six) hours as needed.        atorvastatin (LIPITOR) 40 MG tablet Take 1 tablet (40 mg total) by mouth daily. 90 tablet 3     [] cephalexin (KEFLEX) 500 MG capsule Take 1 capsule (500 mg total) by mouth 3 (three) times a day for 5 days. 15 capsule 0     metoprolol tartrate (LOPRESSOR) 50 MG tablet Take 1 tablet (50 mg total) by mouth 2 (two) times a day. 60 tablet 1     MULTIVIT,CALC,MINS/IRON/FOLIC (ONE-A-DAY WOMENS FORMULA ORAL) Take 1 tablet by mouth daily. Has not started taking yet.        [DISCONTINUED] metoprolol tartrate (LOPRESSOR) 50 MG tablet Take 1 tablet (50 mg total) by mouth 2 (two) times a day. 60 tablet 0     No facility-administered encounter medications on file as of 8/28/2017.      Past Medical History:   Diagnosis Date     TIA (transient ischemic attack)     x3     UTI (urinary tract infection) 8/21/2017     Past Surgical History:   Procedure Laterality Date     CATARACT EXTRACTION, BILATERAL       TONSILLECTOMY       ulcer surgery  1969     Social History   Substance Use Topics     Smoking status: Never Smoker     Smokeless tobacco: Never Used     Alcohol use No       Objective / Physical Examination:  Vitals:    08/28/17 0829   BP: 122/68   Pulse: 74   Temp: 97.4  F (36.3  C)   TempSrc: Oral   Weight: 125 lb (56.7 kg)     Wt Readings from Last 3 Encounters:   08/28/17 125 lb (56.7 kg)   08/23/17 123 lb 6.4 oz (56 kg)   08/21/17 123 lb 6.4 oz (56 kg)     Body mass index is 21.8 kg/(m^2).     General Appearance: Alert, cooperative, in no apparent distress  Throat: Lips and mucosa dry but improved compared to last visit  Neck: Supple, trachea midline. No cervical adenopathy  Back: No flank pain   Lungs: Clear to auscultation bilaterally. Normal inspiratory and expiratory effort  Cardiovascular: Regular rate, normal S1, S2. No murmurs, rubs, or gallops  Abdomen: Bowel sounds active all four quadrants. Soft, non-tender    Orders Placed This Encounter   Procedures     Ambulatory referral to Cardiology   Followup: Return in about 6 months (around 2/28/2018) for Next scheduled follow up. earlier if needed.         Rose Mcelroy, CNP  [facility]

## 2021-06-14 ENCOUNTER — COMMUNICATION - HEALTHEAST (OUTPATIENT)
Dept: CARE COORDINATION | Facility: CLINIC | Age: 81
End: 2021-06-14

## 2021-06-14 NOTE — PROGRESS NOTES
[facility]    Facility:   [facility] [[id number]]   Code Status: POLST AVAILABLE    Admission evaluation to TCU of 80-year-old female.  History is taken from hospital notes, patient is able to provide a modest history.  Known PSVT, moderate aortic stenosis, presented to hospital with weakness, diagnosed with SVT with heart rate in 190s, received adenosine with return to normal sinus rhythm, evaluated by cardiology, initiated on low-dose metoprolol with Lasix 20 mg, orthostasis required decrease in Lasix to 10 mg, transferred to TCU for rehabilitation and observation of clinical status, anticipated outpatient EP study in 1 week.  Slums score 7/30 in hospital, concerns regarding cognition raised in hospital.    Past medical history, current medical problem list, drug allergies, current medication list, social history, CODE STATUS reviewed in epic.  Patient is adopted, family history unknown.    Review of systems: Believes she should return to home setting.  States she feels well.  Recalls conversations from several years ago when her brother was a TCU patient, questions status of patient that he is roomed with.  Denies memory deficit.  No fever sweats or chills.  No chest pain or palpitations.  Energy level has returned to baseline.  Remainder of 12 point review of systems obtained negative.    Exam: Pleasant female lying in bed, oriented x3, in no apparent distress.  Blood pressure 130/70, heart rate 89, temperature 98.9, O2 98%.  No evidence of respiratory distress, no HJR.  Mucous membranes moist.  No hand drift.  Respiratory javed satisfactory.  Periphery without edema.  Exam is visual in view of current viral pandemic.    Impression and plan:   Recurrent PSVT, post adenosine in hospital, metoprolol 12.5 mg daily, heart rate controlled, blood pressure stable.    Congestive heart failure with preserved ejection fraction on Lasix 10 mg daily, no decompensation.    Slums 7/30 in  hospital, patient is oriented on this occasion, recalls conversations from several years ago, continue to monitor cognition.    Moderate aortic stenosis, followed by cardiology.    Anticipated EP study over next 1 week as outpatient.    Hospital records reviewed, review of medications, clinical evaluation of patient and discussion with nursing staff.      Electronically signed by: Melania Alva MD

## 2021-06-14 NOTE — PROGRESS NOTES
Clinic Care Coordination Contact  [facility]/Voicemail       Clinical Data: Care Coordinator Outreach  Outreach attempted x 2.  Left message on patient's voicemail with call back information and requested return call.  Plan:  Care Coordinator will try to reach patient again in 3-5 business days.

## 2021-06-14 NOTE — PROGRESS NOTES
Clinic Care Coordination Contact  [facility]/Voicemail       Clinical Data: Care Coordinator Outreach  Outreach attempted x 3.  Left message on patient's voicemail with call back information and requested return call.  Plan: Care Coordinator will try to reach patient again in 3-5 business days.

## 2021-06-14 NOTE — PROGRESS NOTES
Internal Medicine Office Visit  [facility]   Patient Name: Laney Hahn  Patient Age: 80 y.o.  YOB: 1940  MRN: [id number]    Date of Visit: 1/5/2021  Reason for Office Visit:   Chief Complaint   Patient presents with     Follow-up           Assessment / Plan / Medical Decision Making:      Hospital discharge follow-up  SVT (supraventricular tachycardia) (H), likely AVNRT  Acute diastolic congestive heart failure (H)  Aortic valve stenosis, etiology of cardiac valve disease unspecified  Stage 3a chronic kidney disease  - Reviewed inpatient records  - history obtained from both patient and friend that assists with her medical care given cognitive changes   - she is in agreement to see EP for follow up, Antonella will help with scheduling  - Continue current medications  - Follow up in 6 months     Time spent on encounter: 34 minutes including pre-charting, face-to-face discussion/counseling, lab and inpatient notes review. Use of an independent historian due to cognitive deficits also contributed to complexity of this hospital discharge follow up      Health Maintenance Review  Health Maintenance   Topic Date Due     HEPATITIS C SCREENING  1940     HEART FAILURE ACTION PLAN  1940     DEXA SCAN  1940     TD 18+ HE  07/01/1959     ADVANCE CARE PLANNING  07/01/1959     ZOSTER VACCINES (1 of 2) 07/01/1991     MEDICARE ANNUAL WELLNESS VISIT  11/16/2017     Pneumococcal Vaccine: 65+ Years (2 of 2 - PPSV23) 11/16/2017     FALL RISK ASSESSMENT  01/28/2020     INFLUENZA VACCINE RULE BASED (1) 06/30/2021 (Originally 8/1/2020)     ALT  02/27/2021     LIPID  02/27/2021     BMP  06/24/2021     CBC  12/16/2021     TSH  Completed     Pneumococcal Vaccine: Pediatrics (0 to 5 Years) and At-Risk Patients (6 to 64 Years)  Aged Out     HEPATITIS B VACCINES  Aged Out         I am having Laney Hahn maintain her acetaminophen, metoprolol succinate, and furosemide.         Orders Placed This Encounter   Procedures     Ambulatory referral to Cardiology   Followup: Return in about 6 months (around 7/5/2021) for Next scheduled follow up. earlier if needed.      Rose Mcelroy CNP        HPI:  Laney Hahn is a 80 y.o. year old who presents to the office today her friend, Gabriella Givens who helps with her medical care, for follow up of a recent hospitalization and TCU stay.  She was seen in the emergency room with weakness and was in SVT.  She was discharged from the hospital on metoprolol and low-dose Lasix.  Was then in the TCU until her discharge on 1/4/2021 when she was discharged with home health for PT and safety evaluation. Outpatient EP consultation was recommended by cardiology for ablation.  She states that she has been consistent in taking her medication with help from her brother, Adam.  He arranges her medications for her and then reminds her to take these each day.  Gabriella confirms that this is true.      She has cognitive deficits and this was evaluated in the hospital. She has financial assistance from her bank that helps with her bills, 2 cousins that live in the area provide meals twice a week, the remainder of the notes are provided by friends from their [employer].  She and her  manage their own household and self-care.       Current Scheduled Meds:  Outpatient Encounter Medications as of 1/5/2021   Medication Sig Dispense Refill     acetaminophen (TYLENOL) 325 MG tablet Take 2 tablets (650 mg total) by mouth every 4 (four) hours as needed for pain. 10 tablet 0     furosemide (LASIX) 20 MG tablet Take 0.5 tablets (10 mg total) by mouth daily. Aortic stenosis 45 tablet 1     metoprolol succinate (TOPROL-XL) 25 MG Take 0.5 tablets (12.5 mg total) by mouth daily. For svt prevent 45 tablet 1     [DISCONTINUED] furosemide (LASIX) 20 MG tablet Take 0.5 tablets (10 mg total) by mouth daily. Aortic stenosis 30 tablet 0     [DISCONTINUED] metoprolol succinate  (TOPROL-XL) 25 MG Take 0.5 tablets (12.5 mg total) by mouth daily. For svt prevent 30 tablet 0     No facility-administered encounter medications on file as of 1/5/2021.      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 Nini Rasmussen     Moderate aortic valve stenosis 8/22/2017     Paroxysmal SVT (supraventricular tachycardia) (H) 9/7/2017    said to have short episodes while hospitalized for UTI per Dr. Rhys Mensah     UTI (urinary tract infection) 08/21/2017    hospitalized with weakness     Past Surgical History:   Procedure Laterality Date     CATARACT EXTRACTION, BILATERAL       CV CORONARY ANGIOGRAM N/A 9/15/2020    Procedure: Coronary Angiogram;  Surgeon: Radhika Santa MD;  Location: [facility] Cath [facility];  Service: Cardiology     CV LEFT HEART CATHETERIZATION WO LEFT VETRICULOGRAM Left 9/15/2020    Procedure: Left Heart Catheterization Without Left Ventriculogram;  Surgeon: Radhika Santa MD;  Location: [facility] Cath Lab;  Service: Cardiology     HYSTERECTOMY       PARTIAL GASTRECTOMY  1969    for ulcers     TONSILLECTOMY       Social History     Tobacco Use     Smoking status: Never Smoker     Smokeless tobacco: Never Used   Substance Use Topics     Alcohol use: No     Drug use: No       Objective / Physical Examination:    ECHO 9/14/20    Left ventricle ejection fraction is mildly decreased. The calculated left ventricular ejection fraction is 51% with moderate hypokinesis of the basal inferior wall.    Left ventricular diastolic dysfunction    Normal right ventricular size and systolic function.    Mild to moderate aortic valve stenosis    Mild mitral and tricuspid regurgitation. No evidence of pulmonary  hypertension.    When compared to the previous study dated 11/9/2018, degree of aortic valve stenosis has increased mildly.      Vitals:    01/05/21 1439   BP: 132/78   Pulse: 80   Weight: 120 lb (54.4 kg)     Wt Readings from Last 3 Encounters:   01/05/21 120 lb (54.4 kg)   12/30/20 128 lb 12.8 oz (58.4 kg)   12/28/20 117 lb 1.6 oz (53.1 kg)     Body mass index is 21.26 kg/m .     Constitutional: In no apparent distress  Eyes: PERRL, Conjunctivae clear. Non-icteric.   Respiratory: Clear to auscultation bilaterally. Normal inspiratory and expiratory effort  Cardiovascular: Regular rate and rhythm. +murmur, heard best LSB. No edema

## 2021-06-14 NOTE — PROGRESS NOTES
[facility]    Facility:   [facility] [[id number]]   Code Status: DNR/DNI  PCP: Rose Mcelroy FNP   Phone: [phone]   Fax: [phone]      CHIEF COMPLAINT/REASON FOR VISIT:  Chief Complaint   Patient presents with     Discharge Summary       HISTORY COURSE:  Laney is a 80 y.o. female with history of CVA, aortic stenosis, who arrived in the ER with concerns for weakness and happened to be in SVT.  In the ER her heart rate is in the 190s and she received adenosine which brought her back into normal sinus rhythm.  She is now on metoprolol 12.5 daily and Lasix low-dose.  So far in the TCU her blood pressures and heart rate has been satisfactory and weights have been stable on the Lasix.  She is a BMP due for tomorrow.  In the hospital she was having orthostatic pressures and her Lasix was reduced to 10 mg.   She had an echo complete in September and ejection fraction around 50%, with mild left ventricle enlargement.  Her BNP this admission was 1600.  She should be seen by cardiology outpatient in the next few weeks.     She had a mild JESUS that has improved, BMP due tomorrow.     She reports that she lives with her brother and that he does all the cooking and cleaning for her.  Hospital was concerned for cognitive impairment and slums was 7 out of 30.  We will follow-up here in the facility see if there is any improvement after hospitalization.  She is a DNR/DNI.      TCU coarse: Remained on low dose metoprolol for SVT and Lasix 10mg for acute CHF; in the TCU her weights have remained stable at 117 pounds.  Blood pressures and pulse all satisfactory without concerns. BPs 130s/60s. She is denying any lightheadedness or dizziness.  Denies chest pain or palpitations.  She is pleasant and alert with obvious cognitive impairment.      She is alert and able to recall the month, date, year, president and situation.   Denying any pain today, lungs are clear with no cough,  shortness of breath or chest pain, abdomen soft and nontender.  Lower extremities without edema.  She does have a loud systolic murmur.    We discussed her new medications: she will be discharged on metoprolol 12.5 and lasix 10 both of which she responded to well. She also has prn tylenol. .     PHYSICAL EXAM:   Constitutional:       Appearance: Normal appearance.   HENT:      Head: Atraumatic.   Eyes:      General: No scleral icterus.  Cardiovascular:      Rate and Rhythm: Normal rate. Rhythm irregular.      Heart sounds: Murmur present.   Pulmonary:      Effort: Pulmonary effort is normal.      Breath sounds: Normal breath sounds. No wheezing.   Abdominal:      General: Bowel sounds are normal. There is no distension.      Palpations: Abdomen is soft.   Musculoskeletal: Normal range of motion.      Right lower leg: No edema.      Left lower leg: No edema.   Skin:     General: Skin is warm and dry.      Findings: No rash.   Neurological:      General: No focal deficit present.      Mental Status: She is alert and oriented to person, place, and time.   Psychiatric:         Mood and Affect: Mood normal.    MEDICATION LIST:  Current Outpatient Medications   Medication Sig     acetaminophen (TYLENOL) 325 MG tablet Take 2 tablets (650 mg total) by mouth every 4 (four) hours as needed for pain.     furosemide (LASIX) 20 MG tablet Take 0.5 tablets (10 mg total) by mouth daily. Aortic stenosis     metoprolol succinate (TOPROL-XL) 25 MG Take 0.5 tablets (12.5 mg total) by mouth daily. For svt prevent       DISCHARGE DIAGNOSIS:    ICD-10-CM    1. Cognitive and behavioral changes  R41.89     R46.89    2. Acute diastolic congestive heart failure (H)  I50.31    3. SVT (supraventricular tachycardia) (H), likely AVNRT  I47.1        MEDICAL EQUIPMENT NEEDS:  None.     DISCHARGE PLAN/FACE TO FACE:  I certify that services are/were furnished while this patient was under the care of a physician and that a physician or an allowed  non-physician practitioner (NPP), had a face-to-face encounter that meets the physician face-to-face encounter requirements. The encounter was in whole, or in part, related to the primary reason for home health. The patient is confined to his/her home and needs intermittent skilled nursing, physical therapy, speech-language pathology, or the continued need for occupational therapy. A plan of care has been established by a physician and is periodically reviewed by a physician.  Date of Face-to-Face Encounter: 12-    I certify that, based on my findings, the following services are medically necessary home health services: *  RN, PT    My clinical findings support the need for the above skilled services because: RN for medication teaching and management.  PT for ongoing endurance and strengthening with home safety evaluation.    This patient is homebound because:Homebound Status: SOB on ambulation and weakness, cognitive impairment.     The patient is, or has been, under my care and I have initiated the establishment of the plan of care. This patient will be followed by a physician who will periodically review the plan of care.    Schedule follow up visit with primary care provider within 5-7 days to reestablish care.    Discussed new medications including metoprolol. Lasix. Recommend check weight at home twice per week and update primary with changes >3lbs.    Weight in facility remained stable 117lbs from admit to discharge,     Electronically signed by: Tyrell Hunt CNP

## 2021-06-14 NOTE — PROGRESS NOTES
[facility] Patient Outreach:     Discharge Date::  12/31/20      Reason for TCU stay (discharge diagnosis)::  SVT, CHF, aortic stenosis      Are you feeling better, the same or worse since your discharge?:  Patient is feeling better          As part of your discharge plan, did they discuss home care with you?: No            Did you receive any new medications, or was there a change to your medications?: No (same meds as TCU.  )            Do you have any follow up visits scheduled with your PCP or Specialist?:  Yes, with PCP      (RN) Is it scheduled soon enough (3-5 days)?: Yes

## 2021-06-14 NOTE — TELEPHONE ENCOUNTER

## 2021-06-14 NOTE — PROGRESS NOTES
[facility]    Facility:   [facility] [[id number]]   Code Status: POLST AVAILABLE    Patient has been discharged to home on this date.  Patient is not seen for evaluation however discharge medications reviewed and vital signs reviewed.  History of decreased slums score 7/30, diastolic congestive heart failure compensated, recent hospitalization with fatigue and SVT converted with adenosine, now on metoprolol 12.5 mg daily.  Nursing report  patient stable at discharge, blood pressure 132/54, heart rate 50, temperature 97.0, O2 92%.  Patient will require home services, will be homebound on discharge, follow-up will be with cardiology.        Electronically signed by: Melania Alva MD

## 2021-06-14 NOTE — TELEPHONE ENCOUNTER
The patient refused service today. I informed her to contact your office if she changes her mind.     Isacc at [facility]

## 2021-06-14 NOTE — PROGRESS NOTES
[facility]    Facility:   [facility] [[id number]]   Code Status: DNR/DNI      CHIEF COMPLAINT/REASON FOR VISIT:  Chief Complaint   Patient presents with     Follow-up       HISTORY:      HPI: Laney is a 80 y.o. female with history of CVA, aortic stenosis, who arrived in the ER with concerns for weakness and happened to be in SVT.  In the ER her heart rate is in the 190s and she received adenosine which brought her back into normal sinus rhythm.  She is now on metoprolol 12.5 daily and Lasix low-dose.  So far in the TCU her blood pressures and heart rate has been satisfactory and weights have been stable on the Lasix.  She is a BMP due for tomorrow.  In the hospital she was having orthostatic pressures and her Lasix was reduced to 10 mg.   She had an echo complete in September and ejection fraction around 50%, with mild left ventricle enlargement.  Her BNP this admission was 1600.  She should be seen by cardiology outpatient in the next few weeks.    She had a mild JESUS that has improved, BMP due tomorrow.    She reports that she lives with her brother and that he does all the cooking and cleaning for her.  Hospital was concerned for cognitive impairment and slums was 7 out of 30.  We will follow-up here in the facility see if there is any improvement after hospitalization.  She is a DNR/DNI.   She is alert and able to recall the month, date, year, president and situation.  She is pleasant and appropriate.  Denying any pain today, lungs are clear with no cough, shortness of breath or chest pain, abdomen soft and nontender.  Lower extremities without edema.  She does have a loud systolic murmur.    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 Nini Rasmussen     Moderate aortic valve stenosis 8/22/2017     Paroxysmal SVT  (supraventricular tachycardia) (H) 9/7/2017    said to have short episodes while hospitalized for UTI per Dr. Rhys Mensah     UTI (urinary tract infection) 08/21/2017    hospitalized with weakness             Family History   Adopted: Yes   Family history unknown: Yes     Social History     Socioeconomic History     Marital status: Single     Spouse name: Not on file     Number of children: 0     Years of education: Not on file     Highest education level: Not on file   Occupational History     Occupation:  at [employer]     Employer: RETIRED   Social Needs     Financial resource strain: Not on file     Food insecurity     Worry: Not on file     Inability: Not on file     Transportation needs     Medical: Not on file     Non-medical: Not on file   Tobacco Use     Smoking status: Never Smoker     Smokeless tobacco: Never Used   Substance and Sexual Activity     Alcohol use: No     Drug use: No     Sexual activity: Not on file   Lifestyle     Physical activity     Days per week: Not on file     Minutes per session: Not on file     Stress: Not on file   Relationships     Social connections     Talks on phone: Not on file     Gets together: Not on file     Attends Hinduism service: Not on file     Active member of club or organization: Not on file     Attends meetings of clubs or organizations: Not on file     Relationship status: Not on file     Intimate partner violence     Fear of current or ex partner: Not on file     Emotionally abused: Not on file     Physically abused: Not on file     Forced sexual activity: Not on file   Other Topics Concern     Not on file   Social History Narrative     Not on file         Review of Systems   Constitutional: Negative.    HENT: Negative.    Respiratory: Negative.    Cardiovascular: Negative.    Gastrointestinal: Negative.    Genitourinary: Negative.    Musculoskeletal: Negative.    Skin: Negative.    Neurological: Positive for weakness.  "  Psychiatric/Behavioral: Negative.    All other systems reviewed and are negative.      Vitals:    12/23/20 1103   BP: 130/70   Pulse: 89   Resp: 18   Temp: 98.9  F (37.2  C)   SpO2: 98%   Weight: 116 lb 1.6 oz (52.7 kg)   Height: 5' 3\" (1.6 m)       Physical Exam  Constitutional:       Appearance: Normal appearance.   HENT:      Head: Atraumatic.   Eyes:      General: No scleral icterus.  Cardiovascular:      Rate and Rhythm: Normal rate. Rhythm irregular.      Heart sounds: Murmur present.   Pulmonary:      Effort: Pulmonary effort is normal.      Breath sounds: Normal breath sounds. No wheezing.   Abdominal:      General: Bowel sounds are normal. There is no distension.      Palpations: Abdomen is soft.   Musculoskeletal: Normal range of motion.      Right lower leg: No edema.      Left lower leg: No edema.   Skin:     General: Skin is warm and dry.      Findings: No rash.   Neurological:      General: No focal deficit present.      Mental Status: She is alert and oriented to person, place, and time.   Psychiatric:         Mood and Affect: Mood normal.           LABS:   BMP tomorrow. Hgb 12 at discharge.     ASSESSMENT:        ICD-10-CM    1. Cognitive and behavioral changes  R41.89     R46.89    2. Stage 3a chronic kidney disease  N18.31    3. Acute diastolic congestive heart failure (H)  I50.31    4. Aortic valve stenosis, etiology of cardiac valve disease unspecified  I35.0        PLAN:      Aortic stenosis  SVT  CHF with acute exacerbation  -Metoprolol 12.5 daily.  Monitor heart rates and blood pressures.  -Lasix 10 mg daily, monitor weights frequently.  -2 g sodium diet.    CKD stage III: Had acute JESUS in hospital, improving.  -BMP tomorrow.    Cognitive and behavioral changes, slums 7 out of 30: Discharge planning to involve family, social work, therapies.  She would like to return back to her brother's home however will need to assess if this is safe for discharge.    Patient is DNR/DNI.    Electronically " signed by: Tyrell Hunt, CNP

## 2021-06-14 NOTE — PROGRESS NOTES
[facility]    Facility:   [facility] [[id number]]   Code Status: DNR/DNI      CHIEF COMPLAINT/REASON FOR VISIT:  Chief Complaint   Patient presents with     Problem Visit     CHF, SVT, cognitive impairment       HISTORY:      HPI: Laney is a 80 y.o. female with history of CVA, aortic stenosis, who arrived in the ER with concerns for weakness and happened to be in SVT.  In the ER her heart rate is in the 190s and she received adenosine which brought her back into normal sinus rhythm.  She is now on metoprolol 12.5 daily and Lasix low-dose.  So far in the TCU her blood pressures and heart rate has been satisfactory and weights have been stable on the Lasix.  She is a BMP due for tomorrow.  In the hospital she was having orthostatic pressures and her Lasix was reduced to 10 mg.   She had an echo complete in September and ejection fraction around 50%, with mild left ventricle enlargement.  Her BNP this admission was 1600.  She should be seen by cardiology outpatient in the next few weeks.    She had a mild JESUS that has improved, BMP due tomorrow.    She reports that she lives with her brother and that he does all the cooking and cleaning for her.  Hospital was concerned for cognitive impairment and slums was 7 out of 30.  We will follow-up here in the facility see if there is any improvement after hospitalization.  She is a DNR/DNI.     Today: Seen for review of multiple conditions.  Recently started on metoprolol for SVT and Lasix for acute CHF; in the TCU her weights have remained stable at 117 pounds.  Blood pressures and pulse all satisfactory without concerns.  She is denying any lightheadedness or dizziness.  Denies chest pain or palpitations.  She is pleasant and alert and is asking me about her discharge.  She should be having a care conference coming up this week to discuss discharge planning as she previously lived with her brother who is caring for her.      She is  alert and able to recall the month, date, year, president and situation.    She is pleasant and appropriate.  Denying any pain today, lungs are clear with no cough, shortness of breath or chest pain, abdomen soft and nontender.  Lower extremities without edema.  She does have a loud systolic murmur.    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 Nini Rasmussen     Moderate aortic valve stenosis 8/22/2017     Paroxysmal SVT (supraventricular tachycardia) (H) 9/7/2017    said to have short episodes while hospitalized for UTI per Dr. Rhys Mensah     UTI (urinary tract infection) 08/21/2017    hospitalized with weakness             Family History   Adopted: Yes   Family history unknown: Yes     Social History     Socioeconomic History     Marital status: Single     Spouse name: Not on file     Number of children: 0     Years of education: Not on file     Highest education level: Not on file   Occupational History     Occupation:  at [employer]     Employer: RETIRED   Social Needs     Financial resource strain: Not on file     Food insecurity     Worry: Not on file     Inability: Not on file     Transportation needs     Medical: Not on file     Non-medical: Not on file   Tobacco Use     Smoking status: Never Smoker     Smokeless tobacco: Never Used   Substance and Sexual Activity     Alcohol use: No     Drug use: No     Sexual activity: Not on file   Lifestyle     Physical activity     Days per week: Not on file     Minutes per session: Not on file     Stress: Not on file   Relationships     Social connections     Talks on phone: Not on file     Gets together: Not on file     Attends [employer] service: Not on file     Active member of club or organization: Not on file     Attends meetings of clubs or organizations: Not on file      "Relationship status: Not on file     Intimate partner violence     Fear of current or ex partner: Not on file     Emotionally abused: Not on file     Physically abused: Not on file     Forced sexual activity: Not on file   Other Topics Concern     Not on file   Social History Narrative     Not on file         Review of Systems   Constitutional: Negative.    HENT: Negative.    Respiratory: Negative.    Cardiovascular: Negative.    Gastrointestinal: Negative.    Genitourinary: Negative.    Musculoskeletal: Negative.    Skin: Negative.    Neurological: Positive for weakness.   Psychiatric/Behavioral: Negative.    All other systems reviewed and are negative.      Vitals:    12/28/20 1406   BP: 131/74   Pulse: 61   Resp: 18   Temp: 97.1  F (36.2  C)   SpO2: 98%   Weight: 117 lb 1.6 oz (53.1 kg)   Height: 5' 3\" (1.6 m)       Physical Exam  Constitutional:       Appearance: Normal appearance.   HENT:      Head: Atraumatic.   Eyes:      General: No scleral icterus.  Cardiovascular:      Rate and Rhythm: Normal rate. Rhythm irregular.      Heart sounds: Murmur present.   Pulmonary:      Effort: Pulmonary effort is normal.      Breath sounds: Normal breath sounds. No wheezing.   Abdominal:      General: Bowel sounds are normal. There is no distension.      Palpations: Abdomen is soft.   Musculoskeletal: Normal range of motion.      Right lower leg: No edema.      Left lower leg: No edema.   Skin:     General: Skin is warm and dry.      Findings: No rash.   Neurological:      General: No focal deficit present.      Mental Status: She is alert and oriented to person, place, and time.   Psychiatric:         Mood and Affect: Mood normal.           LABS:   BMP tomorrow. Hgb 12 at discharge.     ASSESSMENT:        ICD-10-CM    1. Cognitive and behavioral changes  R41.89     R46.89    2. Stage 3a chronic kidney disease  N18.31    3. Acute diastolic congestive heart failure (H)  I50.31        PLAN:      Aortic stenosis  SVT  CHF with " acute exacerbation: Weight stable at 117 pounds since admission.  Blood pressures and pulse both satisfactory with no hypotension, complaints of dizziness or lightheadedness.  -Metoprolol 12.5 daily.  Monitor heart rates and blood pressures.  -Lasix 10 mg daily, monitor weights frequently.  -2 g sodium diet.    CKD stage III: Had acute JESUS in hospital, improving.  Recent BMP within normal limits.    Cognitive and behavioral changes, slums 7 out of 30: Discharge planning to involve family, social work, therapies.  She would like to return back to her brother's home however will need to assess if this is safe for discharge.    Patient is DNR/DNI.    Electronically signed by: Tyrell Hunt CNP

## 2021-06-14 NOTE — PROGRESS NOTES
Clinic Care Coordination Contact  [facility]/Voicemail       Clinical Data: Care Coordinator Outreach  Outreach attempted x 4.  Left message on patient's voicemail with call back information and requested return call.  Plan: Care Coordinator will try to reach patient again in 3-5 business days.

## 2021-06-14 NOTE — PROGRESS NOTES
Clinic Care Coordination Contact  [facility]/Voicemail       Clinical Data: Care Coordinator Outreach  Outreach attempted x 1.  Left message on patient's voicemail with call back information and requested return call.  Plan: Care Coordinator will try to reach patient again in 3-5 business days.

## 2021-06-14 NOTE — PROGRESS NOTES
[facility]    Facility:   [facility] [[id number]]   Code Status: POLST AVAILABLE    Reassessment of 80-year-old female with PSVT, hospitalized 12/16-12/21 with SVT requiring adenosine with conversion to normal sinus rhythm, followed by cardiology, initiated on low-dose metoprolol and Lasix, transferred to TCU for rehabilitation and management of medical problems which include deconditioning, decreased slums score in hospital 7/30.    Review of systems: Denies chest pain or palpitations.  Energy level at baseline.  Desires to return to home setting.  Denies memory deficit.  Remainder of 12 point review of systems obtained negative.    Exam: Remains hemodynamically stable, pulse well controlled, oxygenation greater than 92%.  Pleasant pleasant female lying supine in bed in no apparent distress, conversant.  Mucous membranes moist.  No HJR.  S1 and S2 regular with 2/6 systolic murmur.  Pulmonary exam without rales rhonchi or wheezes.  Abdomen without masses tenderness organomegaly.  Periphery without edema.  Muscle tone and strength diminished and symmetrical.    Impression and plan:   PSVT, recent SVT reversed with adenosine, rate control adequate on metoprolol, continue to observe.    Congestive heart failure with preserved ejection fraction on Lasix 10 mg daily, no decompensation.    Aortic stenosis followed by cardiology, noncontributory to current clinical status.    Significant decrease slums in hospital 7/30, oriented and conversant, repeat cognitive testing while in TCU.    Deconditioning with need for rehabilitation.      Electronically signed by: Melania Alva MD

## 2021-06-14 NOTE — PROGRESS NOTES
[facility]    Facility:   [facility] [[id number]]   Code Status: POLST AVAILABLE    Reassessment of 80-year-old female with chronic renal insufficiency stage III, aortic stenosis, presented to hospital with weakness, found to be in SVT with heart rate 190, received adenosine with conversion to normal sinus rhythm, stabilized and transferred to TCU for rehabilitation and management of medical problems.  Continues Lasix 10 mg daily for diastolic congestive heart failure, poorly tolerated 20 mg in hospital, metoprolol 12.5 mg daily for heart rate control, decreased slums score 7/30 in hospital.    Review of systems: Feels well.  Denies chest pain palpitations orthopnea or PND.  No peripheral edema.  Desires to go home.  Denies weakness.  Denies memory impairment.  Remainder of 12 point review of systems obtained negative.    Exam: Pleasant female sitting on edge of bed, coloring with colored pencils, states she does not enjoy being tasked with this activity.  Blood pressure 123/62, heart rate 72 and regular, temperature 97.9, O2 sat 100%.  Pleasant, minimally irritable, no facial asymmetry, no pharyngeal erythema.  No HJR.  S1 and S2 regular with 2/6 systolic murmur.  Pulmonary exam without rales rhonchi or wheezes.  Periphery without edema.  No hand drift.    Impression and plan:   Paroxysmal SVT, recent SVT resolved post adenosine, on metoprolol 12.5 mg for rate control, heart rate remains acceptable, no orthostatic symptoms.    Diastolic congestive heart failure with preserved ejection fraction, compensated on Lasix 10 mg daily.    Aortic stenosis, no syncopal episodes, followed by cardiology.    Remote history of bilateral lacunar CVAs, no focal neurologic deficits.    Chronic kidney disease stage III, recent creatinine 1.03 with GFR 52 stable.    Decreased slums score of 7/30 in hospital, no behavioral abnormalities, pleasant and oriented on exam.  Continue rehabilitation,  medications reviewed.      Electronically signed by: Melania Alva MD

## 2021-06-14 NOTE — PATIENT INSTRUCTIONS - HE
Laney,    It was a pleasure to see you today at [facility].    Please take 81 mg of aspirin by mouth daily.    Please start 10 mg of atorvastatin by mouth daily.    My nurse or I will call you with the patient monitor results.    We will schedule a consult with our electrophysiologist regarding your rapid heart beats.    You will see Dr. Carroll in 6 months.     Please call us if you have any questions or concerns about your heart.      Tavares Carroll M.D.  [facility]

## 2021-06-15 ENCOUNTER — COMMUNICATION - HEALTHEAST (OUTPATIENT)
Dept: INTERNAL MEDICINE | Facility: CLINIC | Age: 81
End: 2021-06-15

## 2021-06-15 NOTE — TELEPHONE ENCOUNTER
Gabriella is calling with patient on the phone seeking an RX for clotrimazole 1% cream due to redness and itchness on legs and arms    Pt has rcvd this in the past and would like to get again.     [address] in [place]

## 2021-06-15 NOTE — TELEPHONE ENCOUNTER
----- Message from Aimee Sánchez sent at 2/4/2021  2:15 PM CST -----  Regarding: Glen- Rx questions  Gabriella calling in regards to patient. Would like to talk to someone in regards to why a cholesterol medication was prescribed at yesterdays visit.  [phone]- Can leave detailed message

## 2021-06-15 NOTE — PATIENT INSTRUCTIONS - HE
Laney Hahn    Thank you for coming to the [facility] today for a cardiac evaluation  It was my pleasure to see you today  A good contact for any questions would be Sonya Bardales  RN @ [phone]    Multi day monitor did show some slowing of the heart rate but not enough to warrant a pacemaker at this time  He did have an episode where the heart was racing but seems well controlled while taking low-dose metoprolol, 12.5 mg daily  You do have a heart murmur in the aortic valve does have some narrowing-aortic stenosis  Obtain echocardiogram in 6 months  Follow-up with Dr. Carroll in 6 months  Watch for signs of fainting or excessive lightheadedness; if you have these events, then You might need a pacemaker

## 2021-06-15 NOTE — TELEPHONE ENCOUNTER
Gabriella calling for update on the ointment RX.    Oferred appt - scheduled F2F with PCP tomorrow - 2/10/2021

## 2021-06-15 NOTE — PROGRESS NOTES
Internal Medicine Office Visit  [facility]   Patient Name: Laney Hahn  Patient Age: 80 y.o.  YOB: 1940  MRN: [id number]    Date of Visit: 2/10/2021  Reason for Office Visit:   Chief Complaint   Patient presents with     Rash           Assessment / Plan / Medical Decision Making:      Dermatitis  - triamcinolone (KENALOG) 0.1 % cream  Dispense: 30 g; Refill: 1  - Use of a daily emollient recommended     Dyslipidemia, goal LDL below 70  - labs scheduled, her friend Gabriella will assist her in getting to this appointment   - Hepatic Profile  - Lipid [place] FASTING       She is not interested in getting a covid vaccine at this time. The scheduling information was provided to her if she changes her mind    I am having Laney Hahn start on triamcinolone. I am also having her maintain her acetaminophen, metoprolol succinate, furosemide, atorvastatin, and aspirin-acetaminophen-caffeine.          Orders Placed This Encounter   Procedures     Hepatic Profile     Lipid [place] FASTING   Followup: Return in about 6 months (around 8/10/2021) for Next scheduled follow up. earlier if needed.        Rose Mcelroy CNP        HPI:  Laney Hahn is a 80 y.o. year old who presents to the office today with her brother and a friend from her [employer], Gabriella, that assists with her medical care. She has a c/o itchy skin on the chest and sometimes the left upper arm. She has tried Calmoseptine and OTC hydrocortisone without relief. She had a prescription for a stronger topical steroid which worked well for her in the past. No visible rash. No new exposures.     She recently started on a statin for CAD and is tolerating the medication well. Her brother assists her in remembering to take this daily. She denies chest pain, heart palpitations, or lightheadedness or dizziness.          Health Maintenance Review  Health Maintenance   Topic Date Due     HEPATITIS C SCREENING   1940     HEART FAILURE ACTION PLAN  1940     DEXA SCAN  1940     COVID-19 Vaccine (1 of 2) 07/01/1957     TD 18+ HE  07/01/1959     ADVANCE CARE PLANNING  07/01/1959     ZOSTER VACCINES (1 of 2) 07/01/1991     MEDICARE ANNUAL WELLNESS VISIT  11/16/2017     Pneumococcal Vaccine: 65+ Years (2 of 2 - PPSV23) 11/16/2017     FALL RISK ASSESSMENT  01/28/2020     ALT  02/27/2021     LIPID  02/27/2021     INFLUENZA VACCINE RULE BASED (1) 06/30/2021 (Originally 8/1/2020)     BMP  06/24/2021     CBC  12/16/2021     TSH  Completed     Pneumococcal Vaccine: Pediatrics (0 to 5 Years) and At-Risk Patients (6 to 64 Years)  Aged Out     HEPATITIS B VACCINES  Aged Out       Current Scheduled Meds:  Outpatient Encounter Medications as of 2/10/2021   Medication Sig Dispense Refill     acetaminophen (TYLENOL) 325 MG tablet Take 2 tablets (650 mg total) by mouth every 4 (four) hours as needed for pain. 10 tablet 0     aspirin-acetaminophen-caffeine (EXCEDRIN MIGRAINE) 250-250-65 mg per tablet Take 1 tablet by mouth daily.       atorvastatin (LIPITOR) 10 MG tablet Take 1 tablet (10 mg total) by mouth daily. 90 tablet 3     furosemide (LASIX) 20 MG tablet Take 0.5 tablets (10 mg total) by mouth daily. Aortic stenosis 45 tablet 1     metoprolol succinate (TOPROL-XL) 25 MG Take 0.5 tablets (12.5 mg total) by mouth daily. For svt prevent 45 tablet 1     triamcinolone (KENALOG) 0.1 % cream Apply topically 2 (two) times a day. 30 g 1     No facility-administered encounter medications on file as of 2/10/2021.            Objective / Physical Examination:  Vitals:    02/10/21 1527 02/10/21 1549   BP: 140/70 138/70   Pulse: 72    Temp: 97.6  F (36.4  C)    TempSrc: Oral    Weight: 121 lb 8 oz (55.1 kg)      Wt Readings from Last 3 Encounters:   02/10/21 121 lb 8 oz (55.1 kg)   02/03/21 118 lb (53.5 kg)   01/05/21 120 lb (54.4 kg)     Body mass index is 21.52 kg/m .     Constitutional: In no apparent distress  Respiratory: Clear  to auscultation bilaterally. Normal inspiratory and expiratory effort  Cardiovascular: Regular rate and rhythm. No murmurs, rubs, or gallops. No edema.   Skin: Warm and dry. No rash on the chest or left upper arm.

## 2021-06-15 NOTE — PROGRESS NOTES
[facility] Heart Care Note    Assessment:  Episode SVT, 190 BPM;  terminated with adenosine December 2020-ECG not available   Covid infection 9-  Moderate aortic stenosis; echocardiogram September 14, 2020 peak gradient 30 mm, mean gradient 18 mm  Bifascicular block; CA prolongation with RBBB LAFB  Sinus bradycardia  Coronary angiography September 15, 2020 showed minimal coronary artery disease   OBS    Plan:    Multi day monitor did show some slowing of the heart rate but not enough to warrant a pacemaker at this time  He did have an episode where the heart was racing but seems well controlled while taking low-dose metoprolol, 12.5 mg daily  You do have a heart murmur in the aortic valve does have some narrowing-aortic stenosis  Obtain echocardiogram in 6 months  Follow-up with Dr. Carroll in 6 months  Watch for signs of fainting or excessive lightheadedness; if you have these events, then You might need a pacemaker      Subjective:    I had the opportunity to see.Laney Hahn , who is a 80 y.o. female with a known history of SVT  Patient is a poor historian and does not remember any of the events  Apparently during core angiography with catheter manipulation she had an episode of SVT  She also had a documented episode SVT December 2000  bpm was terminated with IV adenosine  She had Covid infection in September-currently was very sick with infection  Her brother helps with many of her affairs and distributing the medications  From I can tell she takes aspirin daily, atorvastatin 10 mg daily, furosemide 10 mg daily  And metoprolol 12.5 mg daily    She is unsteady uses a cane but not a walker  Denies any syncopal or near syncopal-like spells or sensation of asystole or heart stopping  Does not recall ever having a tachycardia  No chest pain or anginal-like symptoms  Denies excessive breathlessness no edema    During hospitalization was noted to have some nocturnal bradycardia  Symptom triggered  monitor was done February 2021  Abnormal conduction pattern with MS prolongation bundle branch block  Overall there was moderate sinus bradycardia no long pauses no AV block  Longest episode of atrial arrhythmia was 3 seconds; no SVT        Problem List:  Patient Active Problem List   Diagnosis     Frequent falls     Dyslipidemia, goal LDL below 70     SVT (supraventricular tachycardia) (H), likely AVNRT     Bifascicular bundle branch block     Essential hypertension     Chronic kidney disease, stage III (moderate)     Cognitive and behavioral changes     Adjustment disorder with anxious mood     DNAR (do not attempt resuscitation)     Nonobstructive atherosclerosis of coronary artery     Mild to moderate aortic valve stenosis     Medical History:  Past Medical History:   Diagnosis Date     COVID-19 09/14/2020    positive test at [facility]     DNAR (do not attempt resuscitation)      Dyslipidemia, goal LDL below 70 09/15/2020     Lacunar strokes, 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 Nini Rasmussen     Metabolic encephalopathy      Mild to moderate aortic valve stenosis 08/22/2017    stable on 2020 echo     Nonobstructive atherosclerosis of coronary artery 09/15/2020    with normal LVEDP     Paroxysmal SVT (supraventricular tachycardia) (H) 09/07/2017    said to have short episodes while hospitalized for UTI per Dr. Rhys Mensah     Syncope 08/22/2017     UTI (urinary tract infection) 08/21/2017    hospitalized with weakness     Surgical History:  Past Surgical History:   Procedure Laterality Date     CATARACT EXTRACTION, BILATERAL       CV CORONARY ANGIOGRAM N/A 9/15/2020    Procedure: Coronary Angiogram;  Surgeon: Radhika Santa MD;  Location: [facility];  Service: Cardiology     CV LEFT HEART CATHETERIZATION WO LEFT VETRICULOGRAM Left 9/15/2020    Procedure: Left Heart Catheterization  Without Left Ventriculogram;  Surgeon: Radhika Santa MD;  Location: [facility];  Service: Cardiology     HYSTERECTOMY       PARTIAL GASTRECTOMY  1969    for ulcers     TONSILLECTOMY       Social History:  Social History     Socioeconomic History     Marital status: Single     Spouse name: Not on file     Number of children: 0     Years of education: Not on file     Highest education level: Not on file   Occupational History     Occupation:  at [employer]     Employer: RETIRED   Social Needs     Financial resource strain: Not on file     Food insecurity     Worry: Not on file     Inability: Not on file     Transportation needs     Medical: Not on file     Non-medical: Not on file   Tobacco Use     Smoking status: Never Smoker     Smokeless tobacco: Never Used   Substance and Sexual Activity     Alcohol use: No     Drug use: No     Sexual activity: Not on file   Lifestyle     Physical activity     Days per week: Not on file     Minutes per session: Not on file     Stress: Not on file   Relationships     Social connections     Talks on phone: Not on file     Gets together: Not on file     Attends Christian service: Not on file     Active member of club or organization: Not on file     Attends meetings of clubs or organizations: Not on file     Relationship status: Not on file     Intimate partner violence     Fear of current or ex partner: Not on file     Emotionally abused: Not on file     Physically abused: Not on file     Forced sexual activity: Not on file   Other Topics Concern     Not on file   Social History Narrative    Her adopted brother, Jeff, lives with her. He is five years younger than her.       Review of Systems:      General: WNL  Eyes: WNL  Ears/Nose/Throat: WNL  Lungs: WNL  Heart: WNL  Stomach: WNL  Bladder: WNL  Muscle/Joints: WNL  Skin: WNL  Nervous System: WNL  Mental Health: WNL     Blood: WNL        Family History:  Family History   Adopted: Yes   Family  "history unknown: Yes         Allergies:  Allergies   Allergen Reactions     Sulfa (Sulfonamide Antibiotics) Unknown       Medications:  Current Outpatient Medications   Medication Sig Dispense Refill     acetaminophen (TYLENOL) 325 MG tablet Take 2 tablets (650 mg total) by mouth every 4 (four) hours as needed for pain. 10 tablet 0     aspirin-acetaminophen-caffeine (EXCEDRIN MIGRAINE) 250-250-65 mg per tablet Take 1 tablet by mouth daily.       atorvastatin (LIPITOR) 10 MG tablet Take 1 tablet (10 mg total) by mouth daily. 90 tablet 3     furosemide (LASIX) 20 MG tablet Take 0.5 tablets (10 mg total) by mouth daily. Aortic stenosis 45 tablet 1     metoprolol succinate (TOPROL-XL) 25 MG Take 0.5 tablets (12.5 mg total) by mouth daily. For svt prevent 45 tablet 1     No current facility-administered medications for this visit.        Objective:   Vital signs:  /72 (Patient Site: Left Arm, Patient Position: Sitting, Cuff Size: Adult Regular)   Pulse 79   Resp 16   Ht 5' 3\" (1.6 m)   Wt 119 lb (54 kg)   BMI 21.08 kg/m        Physical Exam:        GENERAL APPEARANCE: Alert, cooperative and in no acute distress.  HEENT: No scleral icterus. No Xanthelasma. Oral mucous membranes pink and moist.  NECK: JVP normal cm. No Hepatojugular reflux. Thyroid not  Palpable  CHEST: clear to auscultation and percussion  CARDIOVASCULAR: S1, S2 ; there is a prominent systolic ejection murmur at the sternum consistent with aortic stenosis; no diastolic murmur without murmur    Brachial, radial  pulses are intact and symmetric.   No carotid bruits noted.  ABDOMEN: Non tender. BS+. No bruits.  EXTREMITIES: No cyanosis, clubbing or edema.    Lab Results:  LIPIDS:  Lab Results   Component Value Date    CHOL 153 02/27/2020    CHOL 111 08/03/2017    CHOL 188 12/06/2016     Lab Results   Component Value Date    HDL 55 02/27/2020    HDL 43 (L) 08/03/2017    HDL 49 (L) 12/06/2016     Lab Results   Component Value Date    LDLCALC 76 " 02/27/2020    LDLCALC 49 08/03/2017    LDLCALC 103 12/06/2016     Lab Results   Component Value Date    TRIG 112 02/27/2020    TRIG 96 08/03/2017    TRIG 180 (H) 12/06/2016     No components found for: CHOLHDL    BMP:  Lab Results   Component Value Date    CREATININE 1.03 12/24/2020    BUN 28 12/24/2020     12/24/2020    K 4.6 12/24/2020     12/24/2020    CO2 28 12/24/2020         This note has been dictated using voice recognition software. Any grammatical or context distortions are unintentional and inherent to the software.  Frederic Burgos MD  [facility]  [phone]

## 2021-06-15 NOTE — TELEPHONE ENCOUNTER
"Per Dr. Carroll's 2-3-21 visit note: \"Start aspirin 81 mg p.o. daily for her atherosclerosis. Start atorvastatin 10 mg p.o. daily to treat her atherosclerosis.  She should have a repeat lipid profile in 3 to 4 months with her primary care team.\"    Return call to patient's friend Gabriella who explained that she was not at appt yesterday and helps manage patient's care - Gabriella stated patient is very resistant to taking medications and wanted to better explain to her reason for recommendations.    Reviewed Dr. Carroll's visit note and provided explanation of recommendations - encouraged her to also consult patient's PCP - Gabriella verbalized understanding and reported that patient already takes Excedrin daily which has ASA in it and questioned if patient should still take additional 81mg - reassured Gabriella that question would be forwarded to Dr. Carroll to address.    Please address question of whether patient should take ASA 81mg daily in addition to daily Excedrin.  mg   "

## 2021-06-15 NOTE — TELEPHONE ENCOUNTER
Left detailed msg for Gabriella informing her of Dr. Carroll's response/recommendations - requested call back with any further questions/concerns.  mg

## 2021-06-15 NOTE — TELEPHONE ENCOUNTER
Pt was put on this medication by the heart doctor. Last lipid panel was done on 2/27/20 and was WNL. After speaking with Gabriella I said it would probably be best to speak with the heart clinic on why they started her on this medication.

## 2021-06-15 NOTE — TELEPHONE ENCOUNTER
Who is calling:  Patient's friend Gabriella Givens  Reason for Call:  She cares for Laney and has questions about the patient being on cholesterol medication.  Gabriella is on the patient's consent to communicate form.  Date of last appointment with primary care: 01/05/21  Okay to leave a detailed message: Yes

## 2021-06-15 NOTE — PROGRESS NOTES
Clinic Care Coordination Contact  [facility]/Voicemail       Clinical Data: Care Coordinator Outreach  Outreach attempted x 8.  Left message on patient's voicemail with call back information and requested return call.  Plan:. Care Coordinator will try to reach patient again in 10 business days.

## 2021-06-15 NOTE — PROGRESS NOTES
Clinic Care Coordination Contact  [facility]/Voicemail       Clinical Data: Care Coordinator Outreach  Outreach attempted x 7.  Left message on patient's voicemail with call back information and requested return call.  Plan: Care Coordinator will try to reach patient again in 10 business days.

## 2021-06-15 NOTE — PROGRESS NOTES
Clinic Care Coordination Contact  [facility]/Voicemail       Clinical Data: Care Coordinator Outreach  Outreach attempted x 6.  Left message on patient's voicemail with call back information and requested return call.  Plan:Care Coordinator will try to reach patient again in 10 business days.

## 2021-06-16 ENCOUNTER — HOSPITAL ENCOUNTER (EMERGENCY)
Dept: EMERGENCY MEDICINE | Facility: HOSPITAL | Age: 81
Discharge: ANOTHER HOSPITAL | End: 2021-06-17
Attending: EMERGENCY MEDICINE
Payer: COMMERCIAL

## 2021-06-16 ENCOUNTER — OFFICE VISIT - HEALTHEAST (OUTPATIENT)
Dept: INTERNAL MEDICINE | Facility: CLINIC | Age: 81
End: 2021-06-16

## 2021-06-16 ENCOUNTER — RECORDS - HEALTHEAST (OUTPATIENT)
Dept: ADMINISTRATIVE | Facility: OTHER | Age: 81
End: 2021-06-16

## 2021-06-16 DIAGNOSIS — E78.5 DYSLIPIDEMIA, GOAL LDL BELOW 70: ICD-10-CM

## 2021-06-16 DIAGNOSIS — R55 SYNCOPE, UNSPECIFIED SYNCOPE TYPE: ICD-10-CM

## 2021-06-16 DIAGNOSIS — I50.20 HEART FAILURE WITH REDUCED EJECTION FRACTION, NYHA CLASS I (H): ICD-10-CM

## 2021-06-16 DIAGNOSIS — I47.10 PAROXYSMAL SUPRAVENTRICULAR TACHYCARDIA (H): ICD-10-CM

## 2021-06-16 DIAGNOSIS — I48.0 PAROXYSMAL ATRIAL FIBRILLATION (H): ICD-10-CM

## 2021-06-16 DIAGNOSIS — I48.91 ATRIAL FIBRILLATION WITH RAPID VENTRICULAR RESPONSE (H): ICD-10-CM

## 2021-06-16 DIAGNOSIS — Z09 HOSPITAL DISCHARGE FOLLOW-UP: ICD-10-CM

## 2021-06-16 LAB
ANION GAP SERPL CALCULATED.3IONS-SCNC: 9 MMOL/L (ref 5–18)
BASOPHILS # BLD AUTO: 0 THOU/UL (ref 0–0.2)
BASOPHILS NFR BLD AUTO: 0 % (ref 0–2)
BUN SERPL-MCNC: 23 MG/DL (ref 8–28)
CALCIUM SERPL-MCNC: 8.2 MG/DL (ref 8.5–10.5)
CHLORIDE BLD-SCNC: 110 MMOL/L (ref 98–107)
CO2 SERPL-SCNC: 23 MMOL/L (ref 22–31)
CREAT SERPL-MCNC: 1.28 MG/DL (ref 0.6–1.1)
EOSINOPHIL # BLD AUTO: 0.2 THOU/UL (ref 0–0.4)
EOSINOPHIL NFR BLD AUTO: 3 % (ref 0–6)
ERYTHROCYTE [DISTWIDTH] IN BLOOD BY AUTOMATED COUNT: 13.3 % (ref 11–14.5)
GFR SERPL CREATININE-BSD FRML MDRD: 40 ML/MIN/1.73M2
GLUCOSE BLD-MCNC: 187 MG/DL (ref 70–125)
HCT VFR BLD AUTO: 32.2 % (ref 35–47)
HGB BLD-MCNC: 10.3 G/DL (ref 12–16)
IMM GRANULOCYTES # BLD: 0 THOU/UL
IMM GRANULOCYTES NFR BLD: 0 %
LYMPHOCYTES # BLD AUTO: 0.9 THOU/UL (ref 0.8–4.4)
LYMPHOCYTES NFR BLD AUTO: 19 % (ref 20–40)
MAGNESIUM SERPL-MCNC: 2 MG/DL (ref 1.8–2.6)
MCH RBC QN AUTO: 32.2 PG (ref 27–34)
MCHC RBC AUTO-ENTMCNC: 32 G/DL (ref 32–36)
MCV RBC AUTO: 101 FL (ref 80–100)
MONOCYTES # BLD AUTO: 0.4 THOU/UL (ref 0–0.9)
MONOCYTES NFR BLD AUTO: 8 % (ref 2–10)
NEUTROPHILS # BLD AUTO: 3.3 THOU/UL (ref 2–7.7)
NEUTROPHILS NFR BLD AUTO: 70 % (ref 50–70)
PLATELET # BLD AUTO: 245 THOU/UL (ref 140–440)
PMV BLD AUTO: 10.6 FL (ref 8.5–12.5)
POTASSIUM BLD-SCNC: 4.2 MMOL/L (ref 3.5–5)
RBC # BLD AUTO: 3.2 MILL/UL (ref 3.8–5.4)
SARS-COV-2 PCR RESULT-HE - HISTORICAL: NEGATIVE
SODIUM SERPL-SCNC: 142 MMOL/L (ref 136–145)
TROPONIN I SERPL-MCNC: 0.02 NG/ML (ref 0–0.29)
TROPONIN I SERPL-MCNC: 0.02 NG/ML (ref 0–0.29)
WBC: 4.7 THOU/UL (ref 4–11)

## 2021-06-16 ASSESSMENT — MIFFLIN-ST. JEOR: SCORE: 978.91

## 2021-06-16 NOTE — PROGRESS NOTES
Clinic Care Coordination Contact  [facility]/Voicemail       Clinical Data: Care Coordinator Outreach  Outreach attempted x 2.  Left message on patient's voicemail with call back information and requested return call.  Care Coordinator will do no further outreaches at this time.

## 2021-06-16 NOTE — PROGRESS NOTES
Internal Medicine Office Visit  [facility]   Patient Name: Laney Hahn  Patient Age: 80 y.o.  YOB: 1940  MRN: [id number]    Date of Visit: 4/21/2021  Reason for Office Visit:   Chief Complaint   Patient presents with     Hospital Visit Follow Up           Assessment / Plan / Medical Decision Making:    Problem List Items Addressed This Visit     (HFpEF) heart failure with preserved ejection fraction (H)    Relevant Medications    metoprolol succinate (TOPROL-XL) 50 MG 24 hr tablet    furosemide (LASIX) 20 MG tablet    atorvastatin (LIPITOR) 10 MG tablet    Chronic kidney disease, stage III (moderate) (Chronic)    Relevant Medications    furosemide (LASIX) 20 MG tablet    Other Relevant Orders    Basic Metabolic Panel (Completed)    HM2(CBC w/o Differential) (Completed)    Ambulatory referral to Care Management (Primary Care)    Dyslipidemia, goal LDL below 70 (Chronic)    Relevant Medications    atorvastatin (LIPITOR) 10 MG tablet    Other Relevant Orders    LDL Cholesterol, Direct (Completed)    Ambulatory referral to Care Management (Primary Care)    Presence of permanent cardiac pacemaker    Relevant Orders    Ambulatory referral to Care Management (Primary Care)    SVT (supraventricular tachycardia) (H), likely AVNRT (Chronic)    Relevant Medications    metoprolol succinate (TOPROL-XL) 50 MG 24 hr tablet    furosemide (LASIX) 20 MG tablet    atorvastatin (LIPITOR) 10 MG tablet    Other Relevant Orders    Ambulatory referral to Care Management (Primary Care)      Other Visit Diagnoses     Hospital discharge follow-up    -  Primary    Relevant Orders    Ambulatory referral to Care Management (Primary Care)    Gastroesophageal reflux disease without esophagitis        Relevant Medications    omeprazole (PRILOSEC) 20 MG capsule    Other Relevant Orders    Ambulatory referral to Care Management (Primary Care)         - Inpatient records were reviewed as related to this  visit  - Her brother will continue to administer her medications and Gabriella Givens will arrange her pill box weekly  -We had a discussion today regarding long-term arrangements for Laney.  She requires 24/7 supervision which her brother provides at this time.  They would benefit from a living arrangement which has progressive care if this was needed in the future.  At the present time her needs are being met by family and community support.  I will place a referral to care management to perhaps put her in touch with a .  Laney and her brother are considering moving down the road, however they are looking at a place that does not provide any medical services and home which has multiple levels.      I have changed Janette Hahn's furosemide and atorvastatin. I am also having her maintain her acetaminophen, aspirin-acetaminophen-caffeine, omeprazole, and metoprolol succinate.          Orders Placed This Encounter   Procedures     LDL Cholesterol, Direct     Basic Metabolic Panel     HM2(CBC w/o Differential)     Ambulatory referral to Care Management (Primary Care)   Followup: Return in about 4 months (around 8/21/2021) for Next scheduled follow up. earlier if needed.      oRse Mcelroy, CNP        HPI:  Laney Hahn is a 80 y.o. year old female with a PMH of nonobstructive CAD, SVT, aortic stenosis, HFpEF, cognitive impairment, who presents to the office today for follow up of a recent hospitalization. She is accompanied today by her brother Jeff and a friend from her [employer]. She was seen in the ED on 4/10/21 with a c/o dizziness, weakness, lightheadedness. She had an episode of vomiting, no diarrhea. She was bradycardic with an underlying bifascicular block and PPM was recommended in addition to rate control medication for her history of symptomatic supraventricular tachycardia. This was placed on 4/13. She has been walking, no dizziness and no heart palpitations.    She was found to  have new-onset anemia, hgb 10.6 due to hemodilution as no source of bleeding was noted and she received a lot of fluids during the hospitalization.     She reported intermittent vomiting mostly in the morning. Esophageal dysmotility was noted on esophagram.  Started PPI therapy in the hospital.  Patient will need outpatient follow up     HFpEF: No evidence of exacerbation. Back on beta-blocker and diuretic therapy.    Health Maintenance Review  Health Maintenance   Topic Date Due     HEPATITIS C SCREENING  Never done     HEART FAILURE ACTION PLAN  Never done     DEXA SCAN  Never done     TD 18+ HE  Never done     ADVANCE CARE PLANNING  Never done     ZOSTER VACCINES (1 of 2) Never done     MEDICARE ANNUAL WELLNESS VISIT  11/16/2017     Pneumococcal Vaccine: 65+ Years (2 of 2 - PPSV23) 11/16/2017     FALL RISK ASSESSMENT  01/28/2020     ALT  02/27/2021     LIPID  02/27/2021     INFLUENZA VACCINE RULE BASED (1) 06/30/2021 (Originally 8/1/2020)     BMP  10/21/2021     CBC  04/21/2022     TSH  Completed     COVID-19 Vaccine  Completed     Pneumococcal Vaccine: Pediatrics (0 to 5 Years) and At-Risk Patients (6 to 64 Years)  Aged Out     HEPATITIS B VACCINES  Aged Out       Current Scheduled Meds:  Outpatient Encounter Medications as of 4/21/2021   Medication Sig Dispense Refill     acetaminophen (TYLENOL) 325 MG tablet Take 2 tablets (650 mg total) by mouth every 4 (four) hours as needed for pain. 10 tablet 0     aspirin-acetaminophen-caffeine (EXCEDRIN MIGRAINE) 250-250-65 mg per tablet Take 1 tablet by mouth daily.       atorvastatin (LIPITOR) 10 MG tablet Take 1 tablet (10 mg total) by mouth at bedtime. 90 tablet 3     furosemide (LASIX) 20 MG tablet Take 0.5 tablets (10 mg total) by mouth every evening. 45 tablet 1     metoprolol succinate (TOPROL-XL) 50 MG 24 hr tablet Take 1 tablet (50 mg total) by mouth daily. 90 tablet 1     omeprazole (PRILOSEC) 20 MG capsule Take 1 capsule (20 mg total) by mouth daily before  breakfast. 90 capsule 1     [DISCONTINUED] atorvastatin (LIPITOR) 10 MG tablet Take 10 mg by mouth at bedtime.       [DISCONTINUED] furosemide (LASIX) 20 MG tablet Take 10 mg by mouth every evening.       [DISCONTINUED] metoprolol succinate (TOPROL-XL) 50 MG 24 hr tablet Take 1 tablet (50 mg total) by mouth daily. 30 tablet 0     [DISCONTINUED] omeprazole (PRILOSEC) 20 MG capsule Take 1 capsule (20 mg total) by mouth daily before breakfast. 30 capsule 0     No facility-administered encounter medications on file as of 4/21/2021.      Post Discharge Medication Reconciliation Status: discharge medications reconciled, continue medications without change      Objective / Physical Examination:  Vitals:    04/21/21 1018   BP: 126/66   Pulse: 84   SpO2: 98%   Weight: 121 lb (54.9 kg)     Wt Readings from Last 3 Encounters:   04/21/21 121 lb (54.9 kg)   04/14/21 117 lb 11.2 oz (53.4 kg)   03/24/21 120 lb (54.4 kg)     Body mass index is 21.43 kg/m .     Constitutional: In no apparent distress  Respiratory: Clear to auscultation bilaterally. Normal inspiratory and expiratory effort  Cardiovascular: Regular rate and rhythm. No murmurs, rubs, or gallops. No edema.  Psych: Alert and oriented x3.

## 2021-06-16 NOTE — PROGRESS NOTES
Clinic Care Coordination Contact  Community Health Worker Initial Outreach    CHW Initial Information Gathering:  Referral Source: PCP  Preferred Hospital: [facility]  [phone]  Preferred Urgent Care: [facility], [phone]  Current living arrangement:: I live in a private home  Type of residence:: Apartment - handicap accessible  Community Resources: None  Informal Support system:: Family, Friends  Transportation means:: None  CHW Additional Questions  If ED/Hospital discharge, follow-up appointment scheduled as recommended?: N/A  Patient agreeable to assistance with scheduling?: N/A  MyChart active?: No  Patient agreeable to assistance with activating MyChart?: No    Patient accepts CC: Yes. Patient scheduled for assessment with CC SW on 4/27 at 11am. Patient noted desire to discuss Community resources as brother Adam is looking forf more assistance for his sister.                       Clinic Care Coordination Contact  [facility]/Voicemail       Clinical Data: Care Coordinator Outreach  Outreach attempted x 1.  Left message on pts brother Adam voicemail with call back information and requested return call.  Plan: Care Coordinator discuss referral. Care Coordinator will try to reach patient again in 1-2 business days.  4/22/2021

## 2021-06-16 NOTE — PROGRESS NOTES
Clinic Care Coordination Contact  [facility]/Voicemail       Clinical Data: Care Coordinator Outreach  Outreach attempted x 1.  Left message on patient's voicemail with call back information and requested return call.  Plan: Care Coordinator patient was recenlty on BPCAIprogream   Care Coordinator will reach out in 1-2 days  4/19

## 2021-06-16 NOTE — PATIENT INSTRUCTIONS - HE
1. We are going to try a plan of keeping the medication in Adam's room and put up a sign with a reminder of the times to take the medication so he can remind you. You agreed that he could wake you up to take the medicine. There are also pill dispenser that can beep or give you reminder  2. If you forget to take the medication still, we could have a nurse visit you to help with a system to remember your medicine.   3. Another thing to consider is moving to a place that has a nurse that can give you the medicine every day  4. One option to consider is a heart monitor that will track your heart rate when you are at home. They have a small patch option which is called Zio which is worn on the skin or one that is just under the skin.     Remember, without this medicine your heart could go into a dangerous rhythm that could cause you to pass out so it's really important to find a way to help you take this medicine every single day.

## 2021-06-16 NOTE — PROGRESS NOTES
Clinic Care Coordination Contact  [facility]/Voicemail       Clinical Data: Care Coordinator Outreach  Outreach attempted x 9.  Left message on patient's voicemail with call back information and requested return call.  Plan:Care Coordinator will do no further outreaches at this time.  Patient has completed the 90 days with the [facility] program

## 2021-06-16 NOTE — PATIENT INSTRUCTIONS - HE
- We talked today about moving to a place that is a single level and that has a nursing home available. If Jeff was ever sick and in the hospital, Janette cannot live at home safely by herself. We talked about also moving to a senior living place that has nursing care available just in case. I am going to put in a referral to a  to help you get some resources for other places to consider moving in the next 1 year that have these things.

## 2021-06-16 NOTE — PROGRESS NOTES
Internal Medicine Office Visit  [facility]   Patient Name: Laney Hahn  Patient Age: 80 y.o.  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 Reviewed strategies to help with this. Her brother is going to administer the medication and will wake her to do so  - Alternative options include hiring outside help (would switch to XL metoprolol if this was the case), an audible pill dispenser that would remind her  - We discussed placement in an assisted living, she mentions that this is something they are looking into but offers no additional information regarding this. Her friend, Gabriella, does not have information on this either   - She outright declines a loop recorder and is not willing to wear a heart monitor patch         Relevant Medications    metoprolol tartrate (LOPRESSOR) 25 MG tablet      Other Visit Diagnoses     Hospital discharge follow-up    -  Primary             Pt instructions:  1. We are going to try a plan of keeping the medication in Adam's room and put up a sign with a reminder of the times to take the medication so he can remind you. You agreed that he could wake you up to take the medicine. There are also pill dispenser that can beep or give you reminder  2. If you forget to take the medication still, we could have a nurse visit you to help with a system to remember your medicine.   3. Another thing to consider is moving to a place that has a nurse that can give you the medicine every day  4. One option to consider is a heart monitor that will track your heart rate when you are at home. They have a small patch option  which is called Zio which is worn on the skin or one that is just under the skin.     Remember, without this medicine your heart could go into a dangerous rhythm that could cause you to pass out so it's really important to find a way to help you take this medicine every single day.     I have discontinued Laney Hahn's metoprolol succinate. I am also having her start on metoprolol tartrate. Additionally, I am having her maintain her acetaminophen, furosemide, atorvastatin, and aspirin-acetaminophen-caffeine.                No orders of the defined types were placed in this encounter.  Followup: Return in about 3 months (around 6/24/2021) for Next scheduled follow up. earlier if needed.        Rose Mcelroy, ALIDA        HPI:  Laney Hahn is a 80 y.o. year old who presents to the office today with a friend from her Jewish that helps with her medical care, Gabriella,  for follow-up of her recent hospitalization.  She was seen in the emergency department on 3/14/2021 with palpitations and found to be in SVT.  She was transferred to [facility] due to lack of cardiac bed availability.  She was given adenosine and then started on a diltiazem drip after she redeveloped SVT.  She was previously on metoprolol 25 mg daily which was changed to a short acting formulation of 12.5 mg twice daily with the thought that if she missed one dose she would be covered with some AV block.  She spontaneously converted to normal sinus rhythm with bradycardia.    She has a history of a cognitive delay and lives with her brother, Jeff, who also has a cognitive delay.  He helps her to take her medications.  They also receives help from their Jewish, in particular her friend named Gabriella that helps the patient with needs at home.  Jeff is able to cook and helps with cleaning but they also have some cousins in the area that help deliver meals a couple of times per week, Gabriella and her  provide  1 meal per week and helps with medications.    Health Maintenance Review  Health Maintenance   Topic Date Due     HEPATITIS C SCREENING  Never done     HEART FAILURE ACTION PLAN  Never done     DEXA SCAN  Never done     COVID-19 Vaccine (1) Never done     TD 18+ HE  Never done     ADVANCE CARE PLANNING  Never done     ZOSTER VACCINES (1 of 2) Never done     MEDICARE ANNUAL WELLNESS VISIT  11/16/2017     Pneumococcal Vaccine: 65+ Years (2 of 2 - PPSV23) 11/16/2017     FALL RISK ASSESSMENT  01/28/2020     ALT  02/27/2021     LIPID  02/27/2021     INFLUENZA VACCINE RULE BASED (1) 06/30/2021 (Originally 8/1/2020)     BMP  09/14/2021     CBC  03/14/2022     TSH  Completed     Pneumococcal Vaccine: Pediatrics (0 to 5 Years) and At-Risk Patients (6 to 64 Years)  Aged Out     HEPATITIS B VACCINES  Aged Out       Current Scheduled Meds:  Outpatient Encounter Medications as of 3/24/2021   Medication Sig Dispense Refill     acetaminophen (TYLENOL) 325 MG tablet Take 2 tablets (650 mg total) by mouth every 4 (four) hours as needed for pain. 10 tablet 0     aspirin-acetaminophen-caffeine (EXCEDRIN MIGRAINE) 250-250-65 mg per tablet Take 1 tablet by mouth daily.       atorvastatin (LIPITOR) 10 MG tablet Take 1 tablet (10 mg total) by mouth daily. 90 tablet 3     furosemide (LASIX) 20 MG tablet Take 0.5 tablets (10 mg total) by mouth daily. Aortic stenosis 45 tablet 1     [DISCONTINUED] metoprolol succinate (TOPROL-XL) 25 MG Take 0.5 tablets (12.5 mg total) by mouth daily. For svt prevent 45 tablet 1     metoprolol tartrate (LOPRESSOR) 25 MG tablet Take 0.5 tablets (12.5 mg total) by mouth 2 (two) times a day. 30 tablet 5     No facility-administered encounter medications on file as of 3/24/2021.      Post Discharge Medication Reconciliation Status: discharge medications reconciled and changed, per note/orders      Objective / Physical Examination:  Vitals:    03/24/21 1431   BP: 124/58   Pulse: (!) 55   SpO2: 99%   Weight: 120 lb  (54.4 kg)     Wt Readings from Last 3 Encounters:   03/24/21 120 lb (54.4 kg)   03/14/21 119 lb (54 kg)   03/09/21 119 lb (54 kg)     Body mass index is 21.26 kg/m .     Constitutional: In no apparent distress  Eyes: Non-icteric.   Respiratory: Clear to auscultation bilaterally. Normal inspiratory and expiratory effort  Cardiovascular: Regular rate and rhythm. No murmurs, rubs, or gallops. No edema.

## 2021-06-17 ENCOUNTER — COMMUNICATION - HEALTHEAST (OUTPATIENT)
Dept: SCHEDULING | Facility: CLINIC | Age: 81
End: 2021-06-17

## 2021-06-17 LAB
ATRIAL RATE - MUSE: 79 BPM
DIASTOLIC BLOOD PRESSURE - MUSE: 63 MMHG
INTERPRETATION ECG - MUSE: NORMAL
P AXIS - MUSE: 70 DEGREES
PR INTERVAL - MUSE: 200 MS
QRS DURATION - MUSE: 126 MS
QT - MUSE: 422 MS
QTC - MUSE: 483 MS
R AXIS - MUSE: -63 DEGREES
SYSTOLIC BLOOD PRESSURE - MUSE: 133 MMHG
T AXIS - MUSE: 56 DEGREES
VENTRICULAR RATE- MUSE: 79 BPM

## 2021-06-17 NOTE — TELEPHONE ENCOUNTER
Telephone Encounter by Ya Luna RN at 2/4/2021  3:24 PM     Author: aY Luna RN Service: -- Author Type: Registered Nurse    Filed: 2/4/2021  3:28 PM Encounter Date: 2/4/2021 Status: Signed    : Ya Luna RN (Registered Nurse)       Return msg rec'd 2-4-21 @1519:  Flavio Carroll MD Gorshe, Maureen, RN          If she takes Excedrin every day, no need to take 81 mg aspirin. In that case, remove the aspirin and add Excedrin to her medication list.

## 2021-06-17 NOTE — PATIENT INSTRUCTIONS - HE
Pacemaker Post-operative Checklist      The Device Registered Nurse (RN) reviewed the pacemaker function.      The Device RN did a wound assessment and wound care teaching.    Please call the Device Nurses with any signs of infection or questions regarding wound healing. Device Nurse Line: [phone], Option #3      The Device RN demonstrated and displayed the specific remote monitoring system for your pacemaker.      The Device RN reviewed the Partnership Agreement Form.    Patient Instructions    Do not lift your left arm above the shoulder height, perform any vigorous arm movements such as swimming, golfing, washing windows, shoveling snow, vacuuming or lifting greater than 10-15lbs with the affected arm for 4 weeks from the date of implant, until 5/11/21.       To reduce the risk of infection, try to avoid any dental procedures for the first 6 weeks after your pacemaker implant. If you have an emergent or urgent dental need during this time, contact the device clinic for a prescription for an antibiotic.      You will receive a device identification (ID) card in the mail from the device  within 6 weeks to replace the temporary ID card you were given in the hospital.      You may travel by any mode of transportation; just show your pacemaker ID card. You may be subject to a hand search or use of a handheld wand, but official should not keep the wand over the implant site for greater than 5-10 seconds.      For any surgery, let your doctor know you have a pacemaker.       Your pacemaker is MRI safe after 5/25/21.       Most household appliances, including a microwave, will not interfere with your pacemaker function. If you suspect interference, simply move away from the source. Cell phones do not cause a problem. Please refer to the device booklet from the  or their website under the section on electromagnetic interference (STEPHANIE) for further guidelines on things that may interfere with  your pacemaker.       Device Clinic Contact Information  Device Nurses: [phone], Option #3    Device Remote Specialists: [phone], Option #2. For questions about your Remote Transmission or Transmission Schedule  Device Schedulers: [phone], Option #1

## 2021-06-17 NOTE — PROGRESS NOTES
DEVICE CLINIC RN POST-OP NOTE    Reason for visit: Post op pacemaker check    Device: Medtronic [id number] [place] XT DR MRI  Procedure date: 4/13/21  Implant Diagnosis: Sinus node dysfunction  Implanting Physician: Dr. Frederic Burgos      Assessment  Subjective: Patient states she is feeling well since implant. She has noticed an improvement in energy levels.     Vitals:   Vitals:    04/27/21 0827   BP: 126/62   Pulse: 68   Resp: 16   Temp: 97.9  F (36.6  C)     Heart Sounds: normal, systolic murmur  Lung Sounds: clear to auscultation bilaterally  Incision/device pocket: Steri-strips and adhesive residue removed, site cleansed. Incision is well-approximated, clean, dry and intact without signs of infection or hematoma.      Device Findings  Interrogation of device reveals normal sensing and capture thresholds  See the Paceart Report for a full summary of the device information.          Patient Education  Laney Hahn was accompanied today by a family member.      [facility]'s Partnership Agreement for Device Patients and Post-operative Checklist were presented and reviewed at today's appointment.      Signs and symptoms of infection, poor wound healing, and device function were reviewed. Range of motion and weight restrictions for the left side are for 4 weeks. She was issued a [employer] remote monitor and instructed on its set-up and use for remote monitoring by the [employer] representative prior to hospital discharge. These instructions were reviewed with the patient at today s visit.       Plan  Remote check 5/14/21  3 month post op 7/21/21    GEENA Can, RN, CV-BC  Device Nurse  [facility]

## 2021-06-18 NOTE — PATIENT INSTRUCTIONS - HE
Patient Instructions by Rose Mcelroy FNP at 10/6/2020  2:55 PM     Author: Rose Mcelroy FNP Service: -- Author Type: Nurse Practitioner    Filed: 10/6/2020  3:22 PM Encounter Date: 10/6/2020 Status: Addendum    : Rose Mcelroy FNP (Nurse Practitioner)    Related Notes: Original Note by Rose Mcelroy FNP (Nurse Practitioner) filed at 10/6/2020  2:50 PM       Patient Education     - Cut back on pop. No more than 1 cup per day   Treatment for Supraventricular Tachycardia  Supraventricular tachycardia (SVT) is a class of abnormally fast heart rhythms that originate from the top chambers of the heart called the atria. The normal heart rhythm is generated by your sinus node and the electricity is conducted through the heart over specialized nerves.  When the electricity meets the muscle cells this results in regular and coordinated heart beats. During SVT, the heart rhythm may be generated by an abnormal area of excited heart muscle in the atria or by an abnormal electric circuit that has formed in the heart leading to rapid electrical activity. This can results in symptoms of palpitations, shortness of breath, chest pain, dizziness, or fainting.  Types of treatment  You may not need treatment for SVT if you have only rare episodes. If you do need treatment, there are several kinds. They include:    Valsalva maneuver. This is a way to increase pressure in the abdomen and chest. It can correct your heart rhythm right away. To do it, you bear down with your stomach muscles, as though you are trying to have a bowel movement.    Carotid massage. Your healthcare provider may rub the carotid artery in your neck. This produces a slowing heart rate reflex in the heart and can sometimes stop the arrhythmia.    Your healthcare provider might suggest other ways to help prevent SVT, such as the following:    Have less alcohol and caffeine    Don't smoke    Lower your stress    Eat foods that are  healthy for your heart    Don't take recreational drugs, especially stimulants that can over-excite the heart muscle. Some herbs and supplements can have this same effect. Always check with your healthcare team before you take any non-prescribed medicines.    Stay well hydrated and get enough sleep  Call 911  Call 911 right away if you have:    Sudden shortness of breath  When to call your healthcare provider  Call your healthcare provider if you have any of the following:    Severe palpitations    Severe dizziness or fainting    Severe chest pain    Symptoms that are happening more often  Date Last Reviewed: 4/1/2018 2000-2019 [employer]. [address] [place] [address]. All rights reserved. This information is not intended as a substitute for professional medical care. Always follow your healthcare professional's instructions.

## 2021-06-20 NOTE — PROGRESS NOTES
TCM DISCHARGE FOLLOW UP CALL    Discharge Date:  9/27/2018  Reason for hospital stay (discharge diagnosis)::  Syncope  Are you feeling better, the same or worse since your discharge?:  Patient is feeling better (spoke with care taker)  Do you feel like you have a plan in the event of a health emergency?: Yes (has home phone to call 911)    As part of your discharge plan, were  home care services ordered for you?: No    Did you receive any new medications, or was there a change to your medications?: Yes    Are you taking those medications, or do you have any established regiment?:  Went over d/c meds with care taker Gabriella and states Pt is taking medications per order and Pt is doing well.  Do you have any follow up visits scheduled with your PCP or Specialist?:  Yes, with PCP (10/3 10:15)  (RN) Is PCP appt scheduled soon enough (within 14 days of discharge date)?: Yes

## 2021-06-20 NOTE — LETTER
Letter by Rose Mcelroy FNP at      Author: Rose Mcelroy FNP Service: -- Author Type: --    Filed:  Encounter Date: 3/2/2020 Status: (Other)         Laney Hahn  [address]  [facility] [address]             March 2, 2020         Dear Ms. Hahn,    Below are the results from your recent visit:    Resulted Orders   Lipid Profile   Result Value Ref Range    Triglycerides 112 <=149 mg/dL    Cholesterol 153 <=199 mg/dL    LDL Calculated 76 <=129 mg/dL    HDL Cholesterol 55 >=50 mg/dL    Patient Fasting > 8hrs? No    Basic Metabolic Panel   Result Value Ref Range    Sodium 142 136 - 145 mmol/L    Potassium 4.2 3.5 - 5.0 mmol/L    Chloride 109 (H) 98 - 107 mmol/L    CO2 22 22 - 31 mmol/L    Anion Gap, Calculation 11 5 - 18 mmol/L    Glucose 109 70 - 125 mg/dL    Calcium 9.0 8.5 - 10.5 mg/dL    BUN 33 (H) 8 - 28 mg/dL    Creatinine 1.18 (H) 0.60 - 1.10 mg/dL    GFR MDRD Af Amer 54 (L) >60 mL/min/1.73m2    GFR MDRD Non Af Amer 44 (L) >60 mL/min/1.73m2    Narrative    Fasting Glucose reference range is 70-99 mg/dL per  American Diabetes Association (ADA) guidelines.   AST (SGOT)   Result Value Ref Range    AST 29 0 - 40 U/L   ALT (SGPT)   Result Value Ref Range    ALT 29 0 - 45 U/L     Liver function labs are normal.     Your kidney function is low. You have chronic kidney disease which is likely due to aging and a history of high blood pressure. We will monitor your kidney function labs at least once per year but possibly twice per year.     Cholesterol levels look good overall and tells me that the cholesterol medication you take is working well.     Please call with questions or contact us using [employer].    Sincerely,        Electronically signed by YOLANDA Rainey

## 2021-06-20 NOTE — LETTER
Letter by Rose Mcelroy FNP at      Author: Rose Mcelroy FNP Service: -- Author Type: --    Filed:  Encounter Date: 2/12/2020 Status: (Other)       Laney Hahn  [address]  [facility] [address]      02/18/20      Dear Laney,      In reviewing your records, we have determined a medication check is needed before your next refill, please call the [facility] to schedule an appointment.      Medication check/review as soon as possible      We have made attempts to call you for an appointment, please verify your contact information is correct when calling back for an appointment, or if you have transferred your care to another clinic, please contact us so we can update our records.     Please call [phone] to schedule an appointment.    We believe that a strong preventative care program, including regular physicals and follow-up care is an important part of a healthy lifestyle and we are committed to helping you maintain your health.    Thank you for choosing us as your health care provider.    Sincerely,    Jazmin Hunt CMA - STEVE/CA  [facility]  [address] [address] [phone]

## 2021-06-20 NOTE — PROGRESS NOTES
Family Medicine Office Visit  [facility]  Patient Name: Laney Hahn  Patient Age: 78 y.o.  YOB: 1940  MRN: [id number]    Date of Visit: 10/3/2018  Reason for Office Visit:   Chief Complaint   Patient presents with     Hospital Visit Follow Up     INF, JNS, Syncope/hypertension            Assessment / Plan / Medical Decision Makin. Paroxysmal SVT (supraventricular tachycardia) (H)  Continue to monitor.  If any further cp or syncope episodes immediately return to the ER    2. Syncope  Pt declined further workup today    3. Urinary tract infection without hematuria, site unspecified  Finish out the antibiotics      Health Maintenance Review  Health Maintenance   Topic Date Due     TD 18+ HE  1958     ADVANCE DIRECTIVES DISCUSSED WITH PATIENT  1959     ZOSTER VACCINE  2001     DXA SCAN  2006     PNEUMOCOCCAL POLYSACCHARIDE VACCINE AGE 65 AND OVER  2006     FALL RISK ASSESSMENT  2017     INFLUENZA VACCINE RULE BASED (1) 2018     PNEUMOCOCCAL CONJUGATE VACCINE FOR ADULTS (PCV13 OR PREVNAR)  Completed         I am having Ms. Hahn maintain her aspirin, calcium carbonate, atorvastatin, and lisinopril.      HPI:  Laney Hahn is a 78 y.o. year old who presents to the office today for hospital follow up from [facility] from -.  States was at home and watching brother on the computer, leaning against a wall when started to feel funny and felt like she was going to pass out.  Told her brother to catch her and slid down the wall to her bottom.  Woke up 1 min later.  Then when tried to stand and start moving again another episode of blacking out for a few seconds.  States she had no cp or shortness of breath.  Felt totally fine and aware once woke up again.  monitored over night at the hospital and was encouraged to get Echo but pt declined.  Started on antibiotic for UTI and feeling much improved.  No  further issues.  Feels like bp been good on the lisinopril that was started once the metoprolol was stopped.  Does not wish further testing today        Review of Systems- pertinent positive in bold:  Constitutional: Fever, chills, night sweats, fainting, weight change, fatigue, seizures, dizziness, sleeping difficulties, loud snoring/pauses in breathing  Eyes: change in vision, blurred or double vision, redness/eye pain  Ears, nose, mouth, throat: change in hearing, ear pain, hoarseness, difficulty swallowing, sores in the mouth or throat  Respiratory: shortness of breath, cough, bloody sputum, wheezing  Cardiovascular: chest pain, palpitations   Gastrointestinal: abdominal pain, heartburn/indigestion, nausea/vomiting, change in appetite, change in bowel habits, constipation or diarrhea, rectal bleeding/dark stools, difficulty swallowing  Urinary: painful urination, frequent urination, urinary urgency/incontinence, blood in urine/dark urine, nocturia  Musculoskeletal: backache/back pain (new or increasing), weakness, joint pain/stiffness (new or increasing), muscle cramps, swelling of hands, feet, ankles, leg pain/redness  Skin: change in moles/freckles, rash, nodules  Hematologic/lymphatic: swollen lymph glands, abnormal bruising/bleeding  Endocrine: excessive thirst/urination, cold or heat intolerance  Neurologic/emotional: worrisome memory change, numbness/tingling, anxiety, mood swings      Current Scheduled Meds:  Outpatient Encounter Prescriptions as of 10/3/2018   Medication Sig Dispense Refill     aspirin 81 mg chewable tablet Chew 243 mg every evening.       atorvastatin (LIPITOR) 40 MG tablet Take 40 mg by mouth at bedtime.       calcium carbonate (OS-CURT) 500 mg calcium (1,250 mg) chewable tablet Chew 1 tablet every evening.       lisinopril (PRINIVIL) 10 MG tablet Take 1 tablet (10 mg total) by mouth daily. 30 tablet 0     [] amoxicillin-clavulanate (AUGMENTIN) 500-125 mg per tablet Take 1 tablet  "(500 mg total) by mouth 2 (two) times a day for 5 days. 10 tablet 0     No facility-administered encounter medications on file as of 10/3/2018.      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 Nini Rasmussen     Moderate aortic valve stenosis 8/22/2017     Paroxysmal SVT (supraventricular tachycardia) (H) 9/7/2017    said to have short episodes while hospitalized for UTI per Dr. Rhys Mensah     UTI (urinary tract infection) 08/21/2017    hospitalized with weakness     Past Surgical History:   Procedure Laterality Date     CATARACT EXTRACTION, BILATERAL       PARTIAL GASTRECTOMY  1969    for ulcers     TONSILLECTOMY       Social History   Substance Use Topics     Smoking status: Never Smoker     Smokeless tobacco: Never Used     Alcohol use No       Objective / Physical Examination:  Vitals:    10/03/18 1010   BP: 124/64   Pulse: 86   SpO2: 97%   Weight: 128 lb (58.1 kg)   Height: 5' 3.5\" (1.613 m)     Wt Readings from Last 3 Encounters:   10/03/18 128 lb (58.1 kg)   09/27/18 127 lb (57.6 kg)   09/07/17 122 lb 6.4 oz (55.5 kg)     BP Readings from Last 3 Encounters:   10/03/18 124/64   09/27/18 110/77   09/07/17 92/46     Body mass index is 22.32 kg/(m^2).     General Appearance: Alert and oriented, cooperative, affect appropriate, speech clear, in no apparent distress  Head: Normocephalic, atraumatic  Ears: Tympanic membrane clear with landmarks well visualized bilaterally  Eyes: PERRL, fundi appear clear bilaterally. EOMI. Conjunctivae clear and sclerae non-icteric  Nose: Septum midline, nares patent, no visible polyps, mucosa moist and without drainage  Throat: Lips and mucosa moist. Teeth in good repair, pharynx without erythema or exudate  Neck: Supple, trachea midline. No cervical adenopathy  Back: Symmetrical and nontender  Lungs: Clear to " auscultation bilaterally. Normal inspiratory and expiratory effort  Cardiovascular: Regular rate, normal S1, S2. No murmurs, rubs, or gallops  Abdomen: Bowel sounds active all four quadrants. Soft, non-tender. No hepatomegaly or splenomegaly. No bruits detected.   Extremities: Pulses 2+ and equal throughout. No edema. Strength equal throughout.  Integumentary: Warm and dry. Without suspicious looking lesions  Neuro: Alert and oriented, follows commands appropriately.     No orders of the defined types were placed in this encounter.  Followup: No Follow-up on file. earlier if needed.        Sonya Weston MD

## 2021-06-20 NOTE — LETTER
Letter by Rose Mcelroy FNP at      Author: Rose Mcelroy FNP Service: -- Author Type: --    Filed:  Encounter Date: 10/6/2020 Status: (Other)         October 6, 2020     Patient: Laney Hahn   YOB: 1940   Date of Visit: 10/6/2020       To Whom it May Concern:    Laney Hahn was seen in my clinic on 10/6/2020. She no longer needs to be in quarantine at this time. Adam Aguirre is advised to quarantine until 10/8/20.     If you have any questions or concerns, please don't hesitate to call.    Sincerely,         Electronically signed by YOLANDA Rainey

## 2021-06-21 NOTE — LETTER
Letter by Melania Alva MD at      Author: Melania Alva MD Service: -- Author Type: --    Filed:  Encounter Date: 12/22/2020 Status: (Other)         Patient: Laney Hahn   MR Number: [id number]   YOB: 1940   Date of Visit: 12/22/2020     [facility]    Facility:   [facility] [[id number]]   Code Status: POLST AVAILABLE    Admission evaluation to TCU of 80-year-old female.  History is taken from hospital notes, patient is able to provide a modest history.  Known PSVT, moderate aortic stenosis, presented to hospital with weakness, diagnosed with SVT with heart rate in 190s, received adenosine with return to normal sinus rhythm, evaluated by cardiology, initiated on low-dose metoprolol with Lasix 20 mg, orthostasis required decrease in Lasix to 10 mg, transferred to TCU for rehabilitation and observation of clinical status, anticipated outpatient EP study in 1 week.  Slums score 7/30 in hospital, concerns regarding cognition raised in hospital.    Past medical history, current medical problem list, drug allergies, current medication list, social history, CODE STATUS reviewed in epic.  Patient is adopted, family history unknown.    Review of systems: Believes she should return to home setting.  States she feels well.  Recalls conversations from several years ago when her brother was a TCU patient, questions status of patient that he is roomed with.  Denies memory deficit.  No fever sweats or chills.  No chest pain or palpitations.  Energy level has returned to baseline.  Remainder of 12 point review of systems obtained negative.    Exam: Pleasant female lying in bed, oriented x3, in no apparent distress.  Blood pressure 130/70, heart rate 89, temperature 98.9, O2 98%.  No evidence of respiratory distress, no HJR.  Mucous membranes moist.  No hand drift.  Respiratory javed satisfactory.  Periphery without edema.  Exam is visual in view of current viral  pandemic.    Impression and plan:   Recurrent PSVT, post adenosine in hospital, metoprolol 12.5 mg daily, heart rate controlled, blood pressure stable.    Congestive heart failure with preserved ejection fraction on Lasix 10 mg daily, no decompensation.    Slums 7/30 in hospital, patient is oriented on this occasion, recalls conversations from several years ago, continue to monitor cognition.    Moderate aortic stenosis, followed by cardiology.    Anticipated EP study over next 1 week as outpatient.    Hospital records reviewed, review of medications, clinical evaluation of patient and discussion with nursing staff.      Electronically signed by: Melania Alva MD

## 2021-06-21 NOTE — PROGRESS NOTES
"[facility]  Internal Medicine - Office Visit    Patient: Laney Hahn   Date of Service: 11/26/18   Patient Care Team: Patient Care Team:  YOLANDA Canada as PCP - General (Nurse Practitioner)    ASSESSMENT/PLAN     1. Dermatitis, eczematous, of upper chest and bilateral forearms  - Advised pt to moisturize twice daily   - triamcinolone (KENALOG) 0.1 % cream; Apply twice a day for 7 days, then as needed.  Dispense: 30 g; Refill: 0  - RTC if no improvement    2. Bradycardia  Likely due to beta blockade as she is on metoprolol. She is asymptomatic today, however given hx of syncopal episode and her BP being just 108/70 today, did discuss with her today regarding my concerns of recurrent syncope/falls with this degree of bradycardia and BP. Since she just recently stopped lisinopril in the last month, has hx of paroxysmal SVT, and is due to see her PCP Rose Mcelroy next week to discuss BP/HR and other imaging workup done previously, will defer that conversation to next week.   - Discuss BB dosage and f/u BP and HR with PCP in 1 week  - If any symptoms of lightheadedness/orthostatic in the interim, will need to address this sooner    Lisbeth Walker MD  Internal Medicine and Pediatrics  [facility]  Pager [phone]    SUBJECTIVE     Laney Hahn is a 79 y/o woman with hx of aortic valve stenosis, paroxysmal SVT, HTN, HLD, and hx of syncope with fall who presents with following concerns:    1. Rash: Patient developed a rash a few days ago on her upper chest, similar to a rash she'd had back in December 2017. The rash is also on her forearms, but more mild there. It is itchy, but not painful. She has been using an OTC \"eczema lotion\" which she can't recall the name of and also has been using an OTC hydrocortisone a few times per day without any significant improvement. Brother is here with her today and denies any new detergents or soaps. No other new " exposures they can think of.     2. Bradycardia: Patient did not bring this up, however this provider noticed today that her heart rate in clinic today is 47 today, whereas she typically runs in the mid 60's on prior visits. She is taking metoprolol 25 mg twice a day (which per patient was a dose decrease after she'd had a syncopal episode). With HR in 40's now, she is not having any lightheadedness or dizziness at home, and no symptoms with positional changes. She is off her lisinopril now.        Review of Systems  Pertinent items are noted in HPI     The following portions of the patient's history were reviewed and updated as appropriate: past family history, past medical history, past surgical history and problem list.     Allergies   Allergen Reactions     Sulfa (Sulfonamide Antibiotics) Unknown     Family History   Adopted: Yes   Family history unknown: Yes      Social History     Socioeconomic History     Marital status: Single     Spouse name: Not on file     Number of children: 0     Years of education: Not on file     Highest education level: Not on file   Social Needs     Financial resource strain: Not on file     Food insecurity - worry: Not on file     Food insecurity - inability: Not on file     Transportation needs - medical: Not on file     Transportation needs - non-medical: Not on file   Occupational History     Occupation:  at [employer]     Employer: RETIRED   Tobacco Use     Smoking status: Never Smoker     Smokeless tobacco: Never Used   Substance and Sexual Activity     Alcohol use: No     Drug use: No     Sexual activity: Not on file   Other Topics Concern     Not on file   Social History Narrative     Not on file       Current Outpatient Medications   Medication Sig Dispense Refill     aspirin 81 mg chewable tablet Chew 243 mg every evening.       atorvastatin (LIPITOR) 40 MG tablet Take 1 tablet by mouth every day 90 tablet 3     calcium carbonate (OS-CURT) 500 mg calcium  (1,250 mg) chewable tablet Chew 1 tablet every evening.       metoprolol tartrate (LOPRESSOR) 50 MG tablet Take 0.5 tablets (25 mg total) by mouth 2 (two) times a day. 90 tablet 0     atorvastatin (LIPITOR) 40 MG tablet Take 40 mg by mouth at bedtime.       No current facility-administered medications for this visit.         OBJECTIVE      /70 (Patient Site: Right Arm, Patient Position: Sitting, Cuff Size: Adult Regular)   Pulse (!) 46   Wt 128 lb (58.1 kg)   SpO2 97%   BMI 22.32 kg/m    General:   alert, appears stated age and cooperative   Heart:   bradycardic, regular rhythm, S1, S2 normal, no murmur, click, rub or gallop   Skin:  light pink, pinpoint papules on upper chest and distal forearms with no secondary excoriations   Neurological:   Alert and oriented x3. CN II-XII grossly intact   Psychiatric:   normal mood, behavior, speech, dress, and thought processes

## 2021-06-21 NOTE — LETTER
Letter by Rose Mcelroy FNP at      Author: Rose Mcelroy FNP Service: -- Author Type: --    Filed:  Encounter Date: 4/26/2021 Status: (Other)         Laney Hahn  [address]  [facility] [address]             April 26, 2021         Dear Ms. Hahn,    Below are the results from your recent visit:    Resulted Orders   LDL Cholesterol, Direct   Result Value Ref Range    Direct LDL 58 <=129 mg/dl   Basic Metabolic Panel   Result Value Ref Range    Sodium 144 136 - 145 mmol/L    Potassium 4.3 3.5 - 5.0 mmol/L    Chloride 111 (H) 98 - 107 mmol/L    CO2 25 22 - 31 mmol/L    Anion Gap, Calculation 8 5 - 18 mmol/L    Glucose 97 70 - 125 mg/dL    Calcium 8.4 (L) 8.5 - 10.5 mg/dL    BUN 19 8 - 28 mg/dL    Creatinine 1.04 0.60 - 1.10 mg/dL    GFR MDRD Af Amer >60 >60 mL/min/1.73m2    GFR MDRD Non Af Amer 51 (L) >60 mL/min/1.73m2    Narrative    Fasting Glucose reference range is 70-99 mg/dL per  American Diabetes Association (ADA) guidelines.   HM2(CBC w/o Differential)   Result Value Ref Range    WBC 4.4 4.0 - 11.0 thou/uL    RBC 3.54 (L) 3.80 - 5.40 mill/uL    Hemoglobin 11.7 (L) 12.0 - 16.0 g/dL    Hematocrit 34.5 (L) 35.0 - 47.0 %    MCV 98 80 - 100 fL    MCH 33.1 27.0 - 34.0 pg    MCHC 33.9 32.0 - 36.0 g/dL    RDW 13.0 11.0 - 14.5 %    Platelets 184 140 - 440 thou/uL    MPV 10.2 (H) 7.0 - 10.0 fL     Your blood counts are improving. You are still mildly anemic.     The LDL cholesterol level is now at a goal of less than 70. Keep taking the cholesterol medication.     Please call with questions or contact us using [employer].    Sincerely,        Electronically signed by YOLANDA Rainey

## 2021-06-21 NOTE — LETTER
Letter by Tyrell Hunt CNP at      Author: Tyrell Hunt CNP Service: -- Author Type: --    Filed:  Encounter Date: 12/28/2020 Status: (Other)         [facility]  [address] [facility] [address]                                  December 30, 2020    Patient: Laney Hahn   MR Number: [id number]   YOB: 1940   Date of Visit: 12/28/2020     Dear Dr. Cain:    Thank you for referring Laney Hahn to me for evaluation. Below are the relevant portions of my assessment and plan of care.    If you have questions, please do not hesitate to call me. I look forward to following Laney along with you.    Sincerely,        Tyrell Hunt CNP          CC  No Recipients  Tyrell Hunt CNP  12/30/2020 12:06 AM  Sign when Signing Visit  [facility]    Facility:   [facility] [[id number]]   Code Status: DNR/DNI      CHIEF COMPLAINT/REASON FOR VISIT:  Chief Complaint   Patient presents with   ? Problem Visit     CHF, SVT, cognitive impairment       HISTORY:      HPI: Laney is a 80 y.o. female with history of CVA, aortic stenosis, who arrived in the ER with concerns for weakness and happened to be in SVT.  In the ER her heart rate is in the 190s and she received adenosine which brought her back into normal sinus rhythm.  She is now on metoprolol 12.5 daily and Lasix low-dose.  So far in the TCU her blood pressures and heart rate has been satisfactory and weights have been stable on the Lasix.  She is a BMP due for tomorrow.  In the hospital she was having orthostatic pressures and her Lasix was reduced to 10 mg.   She had an echo complete in September and ejection fraction around 50%, with mild left ventricle enlargement.  Her BNP this admission was 1600.  She should be seen by cardiology outpatient in the next few weeks.    She had a mild JESUS that has improved, BMP due tomorrow.    She reports that she lives with  her brother and that he does all the cooking and cleaning for her.  Hospital was concerned for cognitive impairment and slums was 7 out of 30.  We will follow-up here in the facility see if there is any improvement after hospitalization.  She is a DNR/DNI.     Today: Seen for review of multiple conditions.  Recently started on metoprolol for SVT and Lasix for acute CHF; in the TCU her weights have remained stable at 117 pounds.  Blood pressures and pulse all satisfactory without concerns.  She is denying any lightheadedness or dizziness.  Denies chest pain or palpitations.  She is pleasant and alert and is asking me about her discharge.  She should be having a care conference coming up this week to discuss discharge planning as she previously lived with her brother who is caring for her.      She is alert and able to recall the month, date, year, president and situation.    She is pleasant and appropriate.  Denying any pain today, lungs are clear with no cough, shortness of breath or chest pain, abdomen soft and nontender.  Lower extremities without edema.  She does have a loud systolic murmur.    Past Medical History:   Diagnosis Date   ? Lacunar strokes, 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 Nini Rasmussen   ? Moderate aortic valve stenosis 8/22/2017   ? Paroxysmal SVT (supraventricular tachycardia) (H) 9/7/2017    said to have short episodes while hospitalized for UTI per Dr. Rhys Mensah   ? UTI (urinary tract infection) 08/21/2017    hospitalized with weakness             Family History   Adopted: Yes   Family history unknown: Yes     Social History     Socioeconomic History   ? Marital status: Single     Spouse name: Not on file   ? Number of children: 0   ? Years of education: Not on file   ? Highest education level: Not on file   Occupational History   ? Occupation:  at [employer]  "    Employer: RETIRED   Social Needs   ? Financial resource strain: Not on file   ? Food insecurity     Worry: Not on file     Inability: Not on file   ? Transportation needs     Medical: Not on file     Non-medical: Not on file   Tobacco Use   ? Smoking status: Never Smoker   ? Smokeless tobacco: Never Used   Substance and Sexual Activity   ? Alcohol use: No   ? Drug use: No   ? Sexual activity: Not on file   Lifestyle   ? Physical activity     Days per week: Not on file     Minutes per session: Not on file   ? Stress: Not on file   Relationships   ? Social connections     Talks on phone: Not on file     Gets together: Not on file     Attends [employer] service: Not on file     Active member of club or organization: Not on file     Attends meetings of clubs or organizations: Not on file     Relationship status: Not on file   ? Intimate partner violence     Fear of current or ex partner: Not on file     Emotionally abused: Not on file     Physically abused: Not on file     Forced sexual activity: Not on file   Other Topics Concern   ? Not on file   Social History Narrative   ? Not on file         Review of Systems   Constitutional: Negative.    HENT: Negative.    Respiratory: Negative.    Cardiovascular: Negative.    Gastrointestinal: Negative.    Genitourinary: Negative.    Musculoskeletal: Negative.    Skin: Negative.    Neurological: Positive for weakness.   Psychiatric/Behavioral: Negative.    All other systems reviewed and are negative.      Vitals:    12/28/20 1406   BP: 131/74   Pulse: 61   Resp: 18   Temp: 97.1  F (36.2  C)   SpO2: 98%   Weight: 117 lb 1.6 oz (53.1 kg)   Height: 5' 3\" (1.6 m)       Physical Exam  Constitutional:       Appearance: Normal appearance.   HENT:      Head: Atraumatic.   Eyes:      General: No scleral icterus.  Cardiovascular:      Rate and Rhythm: Normal rate. Rhythm irregular.      Heart sounds: Murmur present.   Pulmonary:      Effort: Pulmonary effort is normal.      Breath " sounds: Normal breath sounds. No wheezing.   Abdominal:      General: Bowel sounds are normal. There is no distension.      Palpations: Abdomen is soft.   Musculoskeletal: Normal range of motion.      Right lower leg: No edema.      Left lower leg: No edema.   Skin:     General: Skin is warm and dry.      Findings: No rash.   Neurological:      General: No focal deficit present.      Mental Status: She is alert and oriented to person, place, and time.   Psychiatric:         Mood and Affect: Mood normal.           LABS:   BMP tomorrow. Hgb 12 at discharge.     ASSESSMENT:        ICD-10-CM    1. Cognitive and behavioral changes  R41.89     R46.89    2. Stage 3a chronic kidney disease  N18.31    3. Acute diastolic congestive heart failure (H)  I50.31        PLAN:      Aortic stenosis  SVT  CHF with acute exacerbation: Weight stable at 117 pounds since admission.  Blood pressures and pulse both satisfactory with no hypotension, complaints of dizziness or lightheadedness.  -Metoprolol 12.5 daily.  Monitor heart rates and blood pressures.  -Lasix 10 mg daily, monitor weights frequently.  -2 g sodium diet.    CKD stage III: Had acute JESUS in hospital, improving.  Recent BMP within normal limits.    Cognitive and behavioral changes, slums 7 out of 30: Discharge planning to involve family, social work, therapies.  She would like to return back to her brother's home however will need to assess if this is safe for discharge.    Patient is DNR/DNI.    Electronically signed by: Tyrell Hunt CNP

## 2021-06-21 NOTE — LETTER
Letter by Melania Alva MD at      Author: Melania Alva MD Service: -- Author Type: --    Filed:  Encounter Date: 12/24/2020 Status: (Other)         Patient: Laney Hahn   MR Number: [id number]   YOB: 1940   Date of Visit: 12/24/2020     [facility]    Facility:   [facility] [[id number]]   Code Status: POLST AVAILABLE    Reassessment of 80-year-old female with PSVT, hospitalized 12/16-12/21 with SVT requiring adenosine with conversion to normal sinus rhythm, followed by cardiology, initiated on low-dose metoprolol and Lasix, transferred to TCU for rehabilitation and management of medical problems which include deconditioning, decreased slums score in hospital 7/30.    Review of systems: Denies chest pain or palpitations.  Energy level at baseline.  Desires to return to home setting.  Denies memory deficit.  Remainder of 12 point review of systems obtained negative.    Exam: Remains hemodynamically stable, pulse well controlled, oxygenation greater than 92%.  Pleasant pleasant female lying supine in bed in no apparent distress, conversant.  Mucous membranes moist.  No HJR.  S1 and S2 regular with 2/6 systolic murmur.  Pulmonary exam without rales rhonchi or wheezes.  Abdomen without masses tenderness organomegaly.  Periphery without edema.  Muscle tone and strength diminished and symmetrical.    Impression and plan:   PSVT, recent SVT reversed with adenosine, rate control adequate on metoprolol, continue to observe.    Congestive heart failure with preserved ejection fraction on Lasix 10 mg daily, no decompensation.    Aortic stenosis followed by cardiology, noncontributory to current clinical status.    Significant decrease slums in hospital 7/30, oriented and conversant, repeat cognitive testing while in TCU.    Deconditioning with need for rehabilitation.      Electronically signed by: Melania Alva MD

## 2021-06-21 NOTE — LETTER
Letter by Alexa Lance CHW at      Author: Alexa Lance CHW Service: -- Author Type: --    Filed:  Encounter Date: 4/15/2021 Status: (Other)       CARE COORDINATION  [facility]  [address] [place] [address]    April 19, 2021    Laney Hahn  [address]  [facility] [address]      Dear Laney,    I am a clinic community health worker who works with YOLANDA Rainey at [facility]. I have been trying to reach you recently to introduce Clinic Care Coordination and to see if there was anything I could assist you with.  Below is a description of clinic care coordination and how I can further assist you.      The clinic care coordination team is made up of a registered nurse,  and community health worker who understand the health care system. The goal of clinic care coordination is to help you manage your health and improve access to the health care system in the most efficient manner. The team can assist you in meeting your health care goals by providing education, coordinating services, strengthening the communication among your providers and supporting you with any resource needs.    Please feel free to contact me at [phone] with any questions or concerns. We are focused on providing you with the highest-quality healthcare experience possible and that all starts with you.     Sincerely,     Dayanara PRIDE  Community Health Worker  [phone]

## 2021-06-21 NOTE — LETTER
Letter by Milagro Spring SW at      Author: Milagro Spring SW Service: -- Author Type: --    Filed:  Encounter Date: 4/27/2021 Status: (Other)       Care Plan  About Me:    Patient Name:  Laney Hahn    YOB: 1940  Age:         80 y.o.   [facility] MRN:    [id number] Telephone Information:  Home Phone [phone]   Mobile Not on file.       Address:  [address] [place], [place] [address] [employer] [place] [id number] Email address:  No e-mail address on record      Emergency Contact(s)  Extended Emergency Contact Information      Name: Adam Aguirre    Home Phone Number: [phone]  Relation: Sibling      Name: Gabriella Givens    Relation: Friend      Name: Ashley Rodriguez    Home Phone Number: [phone]  Relation: Friend      Name: Bipin Givens    Home Phone Number: [phone]  Relation: Friend      Name: ELIZABETH VALVERDE    Home Phone Number: [phone]  Relation: Friend          Primary language:  English     needed? [facility] Language Services:  [phone] op. 1  Other communication barriers: Physical impairment, Lack of coping, Caregiver, Cognitive impairment  Preferred Method of Communication:     Current living arrangement: I live in a private home with family  Mobility Status/ Medical Equipment: Independent w/Device    Health Maintenance  Health Maintenance Reviewed: due    Health Maintenance   Topic Date Due   ? HEPATITIS C SCREENING  Never done   ? HEART FAILURE ACTION PLAN  Never done   ? DEXA SCAN  Never done   ? TD 18+ HE  Never done   ? ADVANCE CARE PLANNING  Never done   ? ZOSTER VACCINES (1 of 2) Never done   ? Pneumococcal Vaccine: Pediatrics (0 to 5 Years) and At-Risk Patients (6 to 64 Years) (1 of 2 - PPSV23) 01/11/2017   ? Pneumococcal Vaccine: 65+ Years (1 of 2 - PPSV23) 01/11/2017   ? MEDICARE ANNUAL WELLNESS VISIT  11/16/2017   ? FALL RISK ASSESSMENT  01/28/2020   ? ALT  02/27/2021   ? LIPID  02/27/2021   ? INFLUENZA VACCINE RULE BASED (1) 06/30/2021  (Originally 8/1/2020)   ? BMP  10/21/2021   ? CBC  04/21/2022   ? TSH  Completed   ? COVID-19 Vaccine  Completed   ? HEPATITIS B VACCINES  Aged Out         My Access Plan  Medical Emergency 911   Primary Clinic Line YOLANDA Rainey - [phone]   24 Hour Appointment Line [phone] or  [phone]) (toll-free)   24 Hour Nurse Line [phone] (toll-free)   Preferred Urgent Care [facility], [phone]   [facility]  [phone]   Preferred Pharmacy [facility] [address] [place] [address] [[address]     Behavioral Health Crisis Line The National Suicide Prevention Lifeline at 1-701.836.1705 or 911             My Care Team Members  Patient Care Team       Relationship Specialty Notifications Start End    Rose Mcelroy FNP PCP - General Nurse Practitioner  11/16/16     Phone: [phone] Fax: [phone]         [address] [place] [address]    Rose Mcelroy FNP Assigned PCP   3/2/20     Phone: [phone] Fax: [phone]         [address] [place] [address]    Frederic Burgos MD Assigned Heart and Vascular Provider   3/14/21     Phone: [phone] Fax: [phone]         [address] [place] [address]    Milagro Spring SW Lead Care Coordinator Primary Care - CC  4/27/21      Alexa Jang CHW Community Health Worker Primary Care - CC  4/27/21     Phone: [phone] Fax: [phone]                My Care Plans  Self Management and Treatment Plan  Goals and (Comments)  Goals        General    Psychosocial (pt-stated)     Notes - Note edited  4/27/2021  4:13 PM by Milagro Spring SW    Goal Statement: I will have consent to communicate form completed so my brother can get information and speak on my behalf within 3 months.  Date Goal set: 4-  Barriers: difficult to get things done.   Strengths: Has close friends from [employer], brother  assists.  Date to Achieve By: 7-  Patient expressed understanding of goal: brother set up goal.   Action steps to achieve this goal:  1. I will complete consent to communicate naming my brother.   2. I will bring it to the clinic when completed.  3. I will report progress towards this goal at scheduled outreach telephone calls from the CCC team.                    Advance Care Plans/Directives Type:   Type Advanced Care Plans/Directives: DNR/DNI    My Medical and Care Information  Problem List   Patient Active Problem List   Diagnosis   ? Frequent falls   ? Dyslipidemia, goal LDL below 70   ? SVT (supraventricular tachycardia) (H), likely AVNRT   ? Bifascicular bundle branch block   ? Essential hypertension   ? Chronic kidney disease, stage III (moderate)   ? Cognitive and behavioral changes   ? Adjustment disorder with anxious mood   ? DNAR (do not attempt resuscitation)   ? Nonobstructive atherosclerosis of coronary artery   ? Nonrheumatic aortic valve stenosis   ? Junctional bradycardia   ? Acute kidney injury (H)   ? Anemia   ? Bradycardia   ? AV node dysfunction   ? Cardiac pacemaker in situ   ? (HFpEF) heart failure with preserved ejection fraction (H)      Current Medications and Allergies:    Current Outpatient Medications:   ?  acetaminophen (TYLENOL) 325 MG tablet, Take 2 tablets (650 mg total) by mouth every 4 (four) hours as needed for pain., Disp: 10 tablet, Rfl: 0  ?  atorvastatin (LIPITOR) 10 MG tablet, Take 1 tablet (10 mg total) by mouth at bedtime., Disp: 90 tablet, Rfl: 3  ?  metoprolol succinate (TOPROL-XL) 50 MG 24 hr tablet, Take 1 tablet (50 mg total) by mouth daily., Disp: 90 tablet, Rfl: 1    Care Coordination Start Date: 4/27/2021   Frequency of Care Coordination: monthly   Form Last Updated: 04/27/2021

## 2021-06-21 NOTE — LETTER
Letter by Tyrell Hunt CNP at      Author: Tyrell Hunt CNP Service: -- Author Type: --    Filed:  Encounter Date: 12/23/2020 Status: (Other)         [facility]CU  [address] [facility] [address]                                  December 29, 2020    Patient: Laney Hahn   MR Number: [id number]   YOB: 1940   Date of Visit: 12/23/2020     Dear Dr. Cain:    Thank you for referring Laney Hahn to me for evaluation. Below are the relevant portions of my assessment and plan of care.    If you have questions, please do not hesitate to call me. I look forward to following Laney along with you.    Sincerely,        Tyerll Hunt CNP          CC  No Recipients  Tyrell Hunt CNP  12/29/2020 11:21 PM  Sign when Signing Visit  [facility]    Facility:   [facility] [[id number]]   Code Status: DNR/DNI      CHIEF COMPLAINT/REASON FOR VISIT:  Chief Complaint   Patient presents with   ? Follow-up       HISTORY:      HPI: Laney is a 80 y.o. female with history of CVA, aortic stenosis, who arrived in the ER with concerns for weakness and happened to be in SVT.  In the ER her heart rate is in the 190s and she received adenosine which brought her back into normal sinus rhythm.  She is now on metoprolol 12.5 daily and Lasix low-dose.  So far in the TCU her blood pressures and heart rate has been satisfactory and weights have been stable on the Lasix.  She is a BMP due for tomorrow.  In the hospital she was having orthostatic pressures and her Lasix was reduced to 10 mg.   She had an echo complete in September and ejection fraction around 50%, with mild left ventricle enlargement.  Her BNP this admission was 1600.  She should be seen by cardiology outpatient in the next few weeks.    She had a mild JESUS that has improved, BMP due tomorrow.    She reports that she lives with her brother and that he does all the  cooking and cleaning for her.  Hospital was concerned for cognitive impairment and slums was 7 out of 30.  We will follow-up here in the facility see if there is any improvement after hospitalization.  She is a DNR/DNI.   She is alert and able to recall the month, date, year, president and situation.  She is pleasant and appropriate.  Denying any pain today, lungs are clear with no cough, shortness of breath or chest pain, abdomen soft and nontender.  Lower extremities without edema.  She does have a loud systolic murmur.    Past Medical History:   Diagnosis Date   ? Lacunar strokes, 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 Nini Rasmussen   ? Moderate aortic valve stenosis 8/22/2017   ? Paroxysmal SVT (supraventricular tachycardia) (H) 9/7/2017    said to have short episodes while hospitalized for UTI per Dr. Rhys Mensah   ? UTI (urinary tract infection) 08/21/2017    hospitalized with weakness             Family History   Adopted: Yes   Family history unknown: Yes     Social History     Socioeconomic History   ? Marital status: Single     Spouse name: Not on file   ? Number of children: 0   ? Years of education: Not on file   ? Highest education level: Not on file   Occupational History   ? Occupation:  at [employer]     Employer: RETIRED   Social Needs   ? Financial resource strain: Not on file   ? Food insecurity     Worry: Not on file     Inability: Not on file   ? Transportation needs     Medical: Not on file     Non-medical: Not on file   Tobacco Use   ? Smoking status: Never Smoker   ? Smokeless tobacco: Never Used   Substance and Sexual Activity   ? Alcohol use: No   ? Drug use: No   ? Sexual activity: Not on file   Lifestyle   ? Physical activity     Days per week: Not on file     Minutes per session: Not on file   ? Stress: Not on file   Relationships   ? Social connections      "Talks on phone: Not on file     Gets together: Not on file     Attends Mu-ism service: Not on file     Active member of club or organization: Not on file     Attends meetings of clubs or organizations: Not on file     Relationship status: Not on file   ? Intimate partner violence     Fear of current or ex partner: Not on file     Emotionally abused: Not on file     Physically abused: Not on file     Forced sexual activity: Not on file   Other Topics Concern   ? Not on file   Social History Narrative   ? Not on file         Review of Systems   Constitutional: Negative.    HENT: Negative.    Respiratory: Negative.    Cardiovascular: Negative.    Gastrointestinal: Negative.    Genitourinary: Negative.    Musculoskeletal: Negative.    Skin: Negative.    Neurological: Positive for weakness.   Psychiatric/Behavioral: Negative.    All other systems reviewed and are negative.      Vitals:    12/23/20 1103   BP: 130/70   Pulse: 89   Resp: 18   Temp: 98.9  F (37.2  C)   SpO2: 98%   Weight: 116 lb 1.6 oz (52.7 kg)   Height: 5' 3\" (1.6 m)       Physical Exam  Constitutional:       Appearance: Normal appearance.   HENT:      Head: Atraumatic.   Eyes:      General: No scleral icterus.  Cardiovascular:      Rate and Rhythm: Normal rate. Rhythm irregular.      Heart sounds: Murmur present.   Pulmonary:      Effort: Pulmonary effort is normal.      Breath sounds: Normal breath sounds. No wheezing.   Abdominal:      General: Bowel sounds are normal. There is no distension.      Palpations: Abdomen is soft.   Musculoskeletal: Normal range of motion.      Right lower leg: No edema.      Left lower leg: No edema.   Skin:     General: Skin is warm and dry.      Findings: No rash.   Neurological:      General: No focal deficit present.      Mental Status: She is alert and oriented to person, place, and time.   Psychiatric:         Mood and Affect: Mood normal.           LABS:   BMP tomorrow. Hgb 12 at discharge.     ASSESSMENT:        " ICD-10-CM    1. Cognitive and behavioral changes  R41.89     R46.89    2. Stage 3a chronic kidney disease  N18.31    3. Acute diastolic congestive heart failure (H)  I50.31    4. Aortic valve stenosis, etiology of cardiac valve disease unspecified  I35.0        PLAN:      Aortic stenosis  SVT  CHF with acute exacerbation  -Metoprolol 12.5 daily.  Monitor heart rates and blood pressures.  -Lasix 10 mg daily, monitor weights frequently.  -2 g sodium diet.    CKD stage III: Had acute JESUS in hospital, improving.  -BMP tomorrow.    Cognitive and behavioral changes, slums 7 out of 30: Discharge planning to involve family, social work, therapies.  She would like to return back to her brother's home however will need to assess if this is safe for discharge.    Patient is DNR/DNI.    Electronically signed by: Tyrell Hunt CNP

## 2021-06-21 NOTE — LETTER
Letter by Peyton Rodriguez at      Author: Peyton Rodriguez Service: -- Author Type: --    Filed:  Encounter Date: 5/14/2021 Status: (Other)         Laney Hhan  [address]  [facility] [address]      May 14, 2021      Dear Ms. Jovani,    RE: Remote Results    We are writing to you regarding your recent Remote Pacemaker check from home. Your transmission was received successfully. Battery status is satisfactory at this time.     Your results are showing no significant changes.    Your next device appointment will be a clinic visit on July 21, 2021 at 9:20am at our  [place] Location, [address] [facility].    To schedule or reschedule, please call [phone] and press 1.    NOTE: If you would like to do an extra transmission, please call [phone] and press 3 to speak to a nurse BEFORE transmitting. This ensures that the Device Clinic staff is aware of the reason you are sending a transmission, and can follow-up with you after it has been reviewed.    We will be checking your implanted device from home (remotely) every three months unless otherwise instructed. We will need to see you in the clinic at least once a year. You may need to be seen in the clinic sooner depending on the results of your check.    Please be aware:    The follow-up schedule is like a Physician prescription.    Your remote monitor is paired to your specific implanted device.      Sincerely,    [facility]

## 2021-06-21 NOTE — LETTER
Letter by Melania Alva MD at      Author: Melania Alva MD Service: -- Author Type: --    Filed:  Encounter Date: 12/29/2020 Status: (Other)         Patient: Laney Hahn   MR Number: [id number]   YOB: 1940   Date of Visit: 12/29/2020     [facility]    Facility:   [facility] [[id number]]   Code Status: POLST AVAILABLE    Reassessment of 80-year-old female with chronic renal insufficiency stage III, aortic stenosis, presented to hospital with weakness, found to be in SVT with heart rate 190, received adenosine with conversion to normal sinus rhythm, stabilized and transferred to TCU for rehabilitation and management of medical problems.  Continues Lasix 10 mg daily for diastolic congestive heart failure, poorly tolerated 20 mg in hospital, metoprolol 12.5 mg daily for heart rate control, decreased slums score 7/30 in hospital.    Review of systems: Feels well.  Denies chest pain palpitations orthopnea or PND.  No peripheral edema.  Desires to go home.  Denies weakness.  Denies memory impairment.  Remainder of 12 point review of systems obtained negative.    Exam: Pleasant female sitting on edge of bed, coloring with colored pencils, states she does not enjoy being tasked with this activity.  Blood pressure 123/62, heart rate 72 and regular, temperature 97.9, O2 sat 100%.  Pleasant, minimally irritable, no facial asymmetry, no pharyngeal erythema.  No HJR.  S1 and S2 regular with 2/6 systolic murmur.  Pulmonary exam without rales rhonchi or wheezes.  Periphery without edema.  No hand drift.    Impression and plan:   Paroxysmal SVT, recent SVT resolved post adenosine, on metoprolol 12.5 mg for rate control, heart rate remains acceptable, no orthostatic symptoms.    Diastolic congestive heart failure with preserved ejection fraction, compensated on Lasix 10 mg daily.    Aortic stenosis, no syncopal episodes, followed by cardiology.    Remote history of  bilateral lacunar CVAs, no focal neurologic deficits.    Chronic kidney disease stage III, recent creatinine 1.03 with GFR 52 stable.    Decreased slums score of 7/30 in hospital, no behavioral abnormalities, pleasant and oriented on exam.  Continue rehabilitation, medications reviewed.      Electronically signed by: Melania Alva MD

## 2021-06-21 NOTE — LETTER
Letter by Tyrell Hunt CNP at      Author: Tyrell Hunt CNP Service: -- Author Type: --    Filed:  Encounter Date: 12/30/2020 Status: (Other)         [facility]  [address] [facility] [address]                                  January 5, 2021    Patient: Laney Hahn   MR Number: [id number]   YOB: 1940   Date of Visit: 12/30/2020     Dear Dr. Cain:    Thank you for referring Laney Hahn to me for evaluation. Below are the relevant portions of my assessment and plan of care.    If you have questions, please do not hesitate to call me. I look forward to following Laney along with you.    Sincerely,        Tyrell Hunt CNP          CC  No Recipients  Tyrell Hunt CNP  1/5/2021  6:26 AM  Sign when Signing Visit  [facility]    Facility:   [facility] [[id number]]   Code Status: DNR/DNI  PCP: Rose Mcelroy FNP   Phone: [phone]   Fax: [phone]      CHIEF COMPLAINT/REASON FOR VISIT:  Chief Complaint   Patient presents with   ? Discharge Summary       HISTORY COURSE:  Laney is a 80 y.o. female with history of CVA, aortic stenosis, who arrived in the ER with concerns for weakness and happened to be in SVT.  In the ER her heart rate is in the 190s and she received adenosine which brought her back into normal sinus rhythm.  She is now on metoprolol 12.5 daily and Lasix low-dose.  So far in the TCU her blood pressures and heart rate has been satisfactory and weights have been stable on the Lasix.  She is a BMP due for tomorrow.  In the hospital she was having orthostatic pressures and her Lasix was reduced to 10 mg.   She had an echo complete in September and ejection fraction around 50%, with mild left ventricle enlargement.  Her BNP this admission was 1600.  She should be seen by cardiology outpatient in the next few weeks.     She had a mild JESUS that has improved, BMP due  tomorrow.     She reports that she lives with her brother and that he does all the cooking and cleaning for her.  Hospital was concerned for cognitive impairment and slums was 7 out of 30.  We will follow-up here in the facility see if there is any improvement after hospitalization.  She is a DNR/DNI.      TCU coarse: Remained on low dose metoprolol for SVT and Lasix 10mg for acute CHF; in the TCU her weights have remained stable at 117 pounds.  Blood pressures and pulse all satisfactory without concerns. BPs 130s/60s. She is denying any lightheadedness or dizziness.  Denies chest pain or palpitations.  She is pleasant and alert with obvious cognitive impairment.      She is alert and able to recall the month, date, year, president and situation.   Denying any pain today, lungs are clear with no cough, shortness of breath or chest pain, abdomen soft and nontender.  Lower extremities without edema.  She does have a loud systolic murmur.    We discussed her new medications: she will be discharged on metoprolol 12.5 and lasix 10 both of which she responded to well. She also has prn tylenol. .     PHYSICAL EXAM:   Constitutional:       Appearance: Normal appearance.   HENT:      Head: Atraumatic.   Eyes:      General: No scleral icterus.  Cardiovascular:      Rate and Rhythm: Normal rate. Rhythm irregular.      Heart sounds: Murmur present.   Pulmonary:      Effort: Pulmonary effort is normal.      Breath sounds: Normal breath sounds. No wheezing.   Abdominal:      General: Bowel sounds are normal. There is no distension.      Palpations: Abdomen is soft.   Musculoskeletal: Normal range of motion.      Right lower leg: No edema.      Left lower leg: No edema.   Skin:     General: Skin is warm and dry.      Findings: No rash.   Neurological:      General: No focal deficit present.      Mental Status: She is alert and oriented to person, place, and time.   Psychiatric:         Mood and Affect: Mood normal.    MEDICATION  LIST:  Current Outpatient Medications   Medication Sig   ? acetaminophen (TYLENOL) 325 MG tablet Take 2 tablets (650 mg total) by mouth every 4 (four) hours as needed for pain.   ? furosemide (LASIX) 20 MG tablet Take 0.5 tablets (10 mg total) by mouth daily. Aortic stenosis   ? metoprolol succinate (TOPROL-XL) 25 MG Take 0.5 tablets (12.5 mg total) by mouth daily. For svt prevent       DISCHARGE DIAGNOSIS:    ICD-10-CM    1. Cognitive and behavioral changes  R41.89     R46.89    2. Acute diastolic congestive heart failure (H)  I50.31    3. SVT (supraventricular tachycardia) (H), likely AVNRT  I47.1        MEDICAL EQUIPMENT NEEDS:  None.     DISCHARGE PLAN/FACE TO FACE:  I certify that services are/were furnished while this patient was under the care of a physician and that a physician or an allowed non-physician practitioner (NPP), had a face-to-face encounter that meets the physician face-to-face encounter requirements. The encounter was in whole, or in part, related to the primary reason for home health. The patient is confined to his/her home and needs intermittent skilled nursing, physical therapy, speech-language pathology, or the continued need for occupational therapy. A plan of care has been established by a physician and is periodically reviewed by a physician.  Date of Face-to-Face Encounter: 12-    I certify that, based on my findings, the following services are medically necessary home health services: *  RN, PT    My clinical findings support the need for the above skilled services because: RN for medication teaching and management.  PT for ongoing endurance and strengthening with home safety evaluation.    This patient is homebound because:Homebound Status: SOB on ambulation and weakness, cognitive impairment.     The patient is, or has been, under my care and I have initiated the establishment of the plan of care. This patient will be followed by a physician who will periodically review the  plan of care.    Schedule follow up visit with primary care provider within 5-7 days to reestablish care.    Discussed new medications including metoprolol. Lasix. Recommend check weight at home twice per week and update primary with changes >3lbs.    Weight in facility remained stable 117lbs from admit to discharge,     Electronically signed by: Tyrell Hunt CNP

## 2021-06-21 NOTE — PROGRESS NOTES
Internal Medicine Office Visit  [facility]  Patient Name: Laney Hahn  Patient Age: 78 y.o.  YOB: 1940  MRN: [id number]    Date of Visit: 10/31/2018  Reason for Office Visit:   Chief Complaint   Patient presents with     Follow-up           Assessment / Plan / Medical Decision Makin. Aortic valve stenosis, etiology of cardiac valve disease unspecified  2. Paroxysmal SVT (supraventricular tachycardia) (H)  3. HLD (hyperlipidemia)  4. Syncope  -Discontinue lisinopril at this time and continue metoprolol 25 mg twice daily.  She does have a history of SVT in the past.   - Echo Complete; Future  - US Carotid Bilateral; Future  - Follow up in the clinic in 1 month for BP/HR/review of carotid u/s and ECHO        Health Maintenance Review  Health Maintenance   Topic Date Due     TD 18+ HE  1959     ADVANCE DIRECTIVES DISCUSSED WITH PATIENT  1959     ZOSTER VACCINE  2001     DXA SCAN  2006     PNEUMOCOCCAL POLYSACCHARIDE VACCINE AGE 65 AND OVER  2006     FALL RISK ASSESSMENT  2017     INFLUENZA VACCINE RULE BASED (1) 2018     PNEUMOCOCCAL CONJUGATE VACCINE FOR ADULTS (PCV13 OR PREVNAR)  Completed         I have discontinued Ms. Hahn's lisinopril. I am also having her maintain her aspirin, calcium carbonate, atorvastatin, and metoprolol tartrate.      HPI:  Laney Hahn is a 78 y.o. year old who presents to the office today with her brother and a friend from her [employer], Gabriella, who helps with her medical care for follow-up of her recent hospitalization.  She was seen after a syncopal episode at her home.  She had overnight telemetry with no events.  I recommended echocardiogram and carotid ultrasound was refused as she wished to return home.  She does have a history of moderate aortic stenosis.  Due to the orthostasis her beta-blocker was discontinued and she was started on a low-dose of lisinopril.   "Unfortunately, she misunderstood these instructions and took both the metoprolol and lisinopril.  She states that 1-2 days after her hospitalization she had a fall where she \"slid into a display at [employer]\" due to feeling lightheaded.  She did not lose consciousness.  She is suspected that her blood pressure was too low and stopped taking the lisinopril but is still taking metoprolol.     Review of Systems- pertinent positive in bold:  Negative for chest pain, shortness of breath.  Denies heart palpitations, dizziness or lightheadedness except for the event described above.      Current Scheduled Meds:  Outpatient Encounter Prescriptions as of 10/31/2018   Medication Sig Dispense Refill     aspirin 81 mg chewable tablet Chew 243 mg every evening.       atorvastatin (LIPITOR) 40 MG tablet Take 40 mg by mouth at bedtime.       calcium carbonate (OS-CURT) 500 mg calcium (1,250 mg) chewable tablet Chew 1 tablet every evening.       metoprolol tartrate (LOPRESSOR) 50 MG tablet 25 mg 2 (two) times a day.  2     [DISCONTINUED] lisinopril (PRINIVIL) 10 MG tablet Take 1 tablet (10 mg total) by mouth daily. Due for office follow up 90 tablet 0     No facility-administered encounter medications on file as of 10/31/2018.      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 Nini Rasmussen     Moderate aortic valve stenosis 8/22/2017     Paroxysmal SVT (supraventricular tachycardia) (H) 9/7/2017    said to have short episodes while hospitalized for UTI per Dr. Rhys Mensah     UTI (urinary tract infection) 08/21/2017    hospitalized with weakness     Past Surgical History:   Procedure Laterality Date     CATARACT EXTRACTION, BILATERAL       PARTIAL GASTRECTOMY  1969    for ulcers     TONSILLECTOMY       Social History   Substance Use Topics     Smoking status: Never " Smoker     Smokeless tobacco: Never Used     Alcohol use No       Objective / Physical Examination:  Vitals:    10/31/18 0714   BP: 134/72   Pulse: 62   Weight: 133 lb (60.3 kg)     Wt Readings from Last 3 Encounters:   10/31/18 133 lb (60.3 kg)   10/03/18 128 lb (58.1 kg)   09/27/18 127 lb (57.6 kg)     Body mass index is 23.19 kg/(m^2).     General Appearance: Alert and oriented, cooperative, affect appropriate, speech clear, in no apparent distress  Neck: Supple, trachea midline. No carotid bruits   Lungs: Clear to auscultation bilaterally. Normal inspiratory and expiratory effort  Cardiovascular: Regular rate, normal S1, S2. Murmur heard best RSB. No rubs or gallops.   Extremities: No edema    Orders Placed This Encounter   Procedures     US Carotid Bilateral     Echo Complete   Followup: Return in about 4 weeks (around 11/28/2018) for Recheck. earlier if needed.      Rose Mcelroy, CNP

## 2021-06-21 NOTE — LETTER
Letter by Milagro Spring SW at      Author: Milagro Spring SW Service: -- Author Type: --    Filed:  Encounter Date: 4/27/2021 Status: (Other)       CARE COORDINATION  [place]   April 27, 2021    Laney Hahn  [address]  [facility] [address]      Dear Laney,    I am a clinic care coordinator who works with YOLANDA Rainey at [facility]. I wanted to introduce myself and provide you with my contact information for you to be able to call me with any questions or concerns. Below is a description of clinic care coordination and how I can further assist you.      The clinic care coordination team is made up of a registered nurse,  and community health worker who understand the health care system. The goal of clinic care coordination is to help you manage your health and improve access to the health care system in the most efficient manner. The team can assist you in meeting your health care goals by providing education, coordinating services, strengthening the communication among your providers and supporting you with any resource needs.    Please feel free to contact me at [phone] with any questions or concerns. We are focused on providing you with the highest-quality healthcare experience possible and that all starts with you.     Sincerely,     Milagro Spring [facility]  Clinic Care Coordinator  [phone]      Enclosed: I have enclosed a copy of the Care Plan. This has helpful information and goals that we have talked about. Please keep this in an easy to access place to use as needed.

## 2021-06-21 NOTE — LETTER
Letter by Melania Alva MD at      Author: Melania Alva MD Service: -- Author Type: --    Filed:  Encounter Date: 12/31/2020 Status: (Other)         Patient: Laney Hahn   MR Number: [id number]   YOB: 1940   Date of Visit: 12/31/2020     [facility]    Facility:   [facility] [[id number]]   Code Status: POLST AVAILABLE    Patient has been discharged to home on this date.  Patient is not seen for evaluation however discharge medications reviewed and vital signs reviewed.  History of decreased slums score 7/30, diastolic congestive heart failure compensated, recent hospitalization with fatigue and SVT converted with adenosine, now on metoprolol 12.5 mg daily.  Nursing report  patient stable at discharge, blood pressure 132/54, heart rate 50, temperature 97.0, O2 92%.  Patient will require home services, will be homebound on discharge, follow-up will be with cardiology.        Electronically signed by: Melania Alva MD

## 2021-06-22 NOTE — PROGRESS NOTES
Patient came in for a BP/HR check with her brother on 12/6/18. HR and BP remain low so patient is instructed to discontinue metoprolol and return in 2 weeks for repeat BP/HR check. She admits to some lightheadedness this morning with positional changes .

## 2021-06-22 NOTE — PROGRESS NOTES
Internal Medicine Office Visit  [facility]  Patient Name: Laney Hahn  Patient Age: 78 y.o.  YOB: 1940  MRN: [id number]    Date of Visit: 12/3/2018  Reason for Office Visit:   Chief Complaint   Patient presents with     Follow-up           Assessment / Plan / Medical Decision Makin. Essential hypertension  2. Non-rheumatic mitral valve stenosis  3. Aortic valve stenosis, etiology of cardiac valve disease unspecified  4. Paroxysmal SVT (supraventricular tachycardia) (H)  - Reduce dose of metoprolol to 12.5 mg two times a day. Return in 4 days when she is here for her brother's appointment for BP and HR check. Consider discontinuation of metoprolol        Health Maintenance Review  Health Maintenance   Topic Date Due     TD 18+ HE  1958     ADVANCE DIRECTIVES DISCUSSED WITH PATIENT  1959     ZOSTER VACCINES (1 of 2) 1991     DXA SCAN  2006     PNEUMOCOCCAL POLYSACCHARIDE VACCINE AGE 65 AND OVER  2006     FALL RISK ASSESSMENT  2017     INFLUENZA VACCINE RULE BASED (1) 2018     PNEUMOCOCCAL CONJUGATE VACCINE FOR ADULTS (PCV13 OR PREVNAR)  Completed         I have discontinued Laney Hahn's metoprolol tartrate. I am also having her start on metoprolol tartrate. Additionally, I am having her maintain her aspirin, calcium carbonate, atorvastatin, and triamcinolone.      HPI:  Laney Hahn is a 78 y.o. year old who presents to the office today with her brother and a friend from their [employer] who assists in medical care for follow.     She was recently seen for a rash. This has now resolved.     Her recent ECHO and carotid u/s tests were reviewed today. BP and heart rate are still both low today. She denies any recent lightheadedness/dizziness.     Review of Systems- pertinent positive in bold:  Denies chest pain, dyspnea         Current Scheduled Meds:  Outpatient Encounter Medications as of  12/3/2018   Medication Sig Dispense Refill     aspirin 81 mg chewable tablet Chew 243 mg every evening.       atorvastatin (LIPITOR) 40 MG tablet Take 1 tablet by mouth every day 90 tablet 3     calcium carbonate (OS-CURT) 500 mg calcium (1,250 mg) chewable tablet Chew 1 tablet every evening.       triamcinolone (KENALOG) 0.1 % cream Apply twice a day for 7 days, then as needed.. 30 g 0     [DISCONTINUED] metoprolol tartrate (LOPRESSOR) 50 MG tablet Take 0.5 tablets (25 mg total) by mouth 2 (two) times a day. 90 tablet 0     metoprolol tartrate (LOPRESSOR) 25 MG tablet Take 0.5 tablets (12.5 mg total) by mouth 2 (two) times a day. 90 tablet 1     No facility-administered encounter medications on file as of 12/3/2018.      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 Nini Rasmussen     Moderate aortic valve stenosis 8/22/2017     Paroxysmal SVT (supraventricular tachycardia) (H) 9/7/2017    said to have short episodes while hospitalized for UTI per Dr. Rhys Mensah     UTI (urinary tract infection) 08/21/2017    hospitalized with weakness     Past Surgical History:   Procedure Laterality Date     CATARACT EXTRACTION, BILATERAL       PARTIAL GASTRECTOMY  1969    for ulcers     TONSILLECTOMY       Social History     Tobacco Use     Smoking status: Never Smoker     Smokeless tobacco: Never Used   Substance Use Topics     Alcohol use: No     Drug use: No       Objective / Physical Examination:  Vitals:    12/03/18 0841 12/03/18 0900   BP: 126/70    Pulse: 76 60   Weight: 131 lb (59.4 kg)      Wt Readings from Last 3 Encounters:   12/03/18 131 lb (59.4 kg)   11/26/18 128 lb (58.1 kg)   11/09/18 133 lb (60.3 kg)     Body mass index is 22.84 kg/m .     General Appearance: Alert, cooperative, affect appropriate, speech clear, in no apparent distress  Lungs: Clear  to auscultation bilaterally. Normal inspiratory and expiratory effort  Cardiovascular: Regularly irregular rate, normal S1, S2  Extremities:  No edema    No orders of the defined types were placed in this encounter.  Followup: Return in about 4 days (around 12/7/2018) for Recheck. earlier if needed.      Rose Mcelroy, CNP

## 2021-06-22 NOTE — PROGRESS NOTES
I met with Laney Hahn at the request of Rose Mcelroy NP to recheck her blood pressure.  Blood pressure medications on the MAR were reviewed with patient.    Patient has taken all medications as per usual regimen: Yes  Patient reports tolerating them without any issues or concerns: Yes    Vitals:    12/20/18 1014   BP: 118/70   Patient Site: Right Arm   Patient Position: Sitting   Cuff Size: Adult Regular   Pulse: 75       Blood pressure was taken, previous encounter was reviewed, recorded blood pressure below 140/90.  Patient was discharged and the note will be sent to the provider for final review.

## 2021-06-22 NOTE — PROGRESS NOTES
Call pt- her heart rate and blood pressure are much better. Not as low as they were before which is good.    She asked about taking aspirin and Excedrin. Because there is aspirin in Excedrin I do not recommend that she take this. If she needs something for a headache or pain I recommend Tylenol

## 2021-06-23 NOTE — PROGRESS NOTES
Internal Medicine Office Visit  [facility]  Patient Name: Laney Hahn  Patient Age: 78 y.o.  YOB: 1940  MRN: [id number]    Date of Visit: 2019  Reason for Office Visit:   Chief Complaint   Patient presents with     Follow-up     UTI- [facility]           Assessment / Plan / Medical Decision Makin. Acute cystitis without hematuria  2. JESUS (acute kidney injury) (H)  3. Dehydration  - Basic Metabolic Panel; Future, repeat in 2 weeks  -She is strongly encouraged to increase fluid intake as both a preventative measure for urinary tract infections as well as to help prevent kidney injury and dehydration  - Follow up in 6 months, sooner if needed.       Health Maintenance Review  Health Maintenance   Topic Date Due     TD 18+ HE  1959     ADVANCE DIRECTIVES DISCUSSED WITH PATIENT  1959     ZOSTER VACCINES (1 of 2) 1991     DXA SCAN  2006     PNEUMOCOCCAL POLYSACCHARIDE VACCINE AGE 65 AND OVER  2006     INFLUENZA VACCINE RULE BASED (1) 2018     FALL RISK ASSESSMENT  2020     PNEUMOCOCCAL CONJUGATE VACCINE FOR ADULTS (PCV13 OR PREVNAR)  Completed         I have discontinued Laney Hahn's metoprolol tartrate. I am also having her maintain her aspirin, calcium carbonate, atorvastatin, triamcinolone, and cephalexin.      HPI:  Laney Hahn is a 78 y.o. year old who presents to the office today with her brother and the Protestant nurse/family friend for follow up of a recent ED visit on 19.  Reported a fever of 102 at home and in the emergency department was found to have a temperature of 100 degrees.  She was also slightly tachycardic. UA indicated signs of UTI, she was given Rocephin and discharged on keflex. Her urine culture was positive for e. Coli. She also appeared to be dehydrated with JESUS, creatinine 1.54 and was given a fluid bolus.  Today she states that she had one episode of diarrhea  yesterday but normal bowel movement today.  She was previously having urinary urgency and this has improved.  She denies any flank pain, fever, chills, hematuria.    She has had recent hypotension and bradycardia, her dose of metoprolol was reduced to 12.5 mg two times a day and then discontinued.  Her blood pressure and heart rate remained in a normal range with today's visit.  She did have some tachycardia with her recent ER visit, however this may have been due to fever and dehydration.    Review of Systems- pertinent positive in bold:  A 10-point ROS is negative except as stated in HPI         Current Scheduled Meds:  Outpatient Encounter Medications as of 1/28/2019   Medication Sig Dispense Refill     aspirin 81 mg chewable tablet Chew 243 mg every evening.       atorvastatin (LIPITOR) 40 MG tablet Take 1 tablet by mouth every day 90 tablet 3     calcium carbonate (OS-CURT) 500 mg calcium (1,250 mg) chewable tablet Chew 1 tablet every evening.       cephalexin (KEFLEX) 250 MG capsule Take 1 capsule (250 mg total) by mouth 4 (four) times a day for 7 days. 28 capsule 0     triamcinolone (KENALOG) 0.1 % cream Apply twice a day for 7 days, then as needed.. 30 g 0     [DISCONTINUED] metoprolol tartrate (LOPRESSOR) 25 MG tablet Take 0.5 tablets (12.5 mg total) by mouth 2 (two) times a day. 90 tablet 1     No facility-administered encounter medications on file as of 1/28/2019.      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 Nini Rasmussen     Moderate aortic valve stenosis 8/22/2017     Paroxysmal SVT (supraventricular tachycardia) (H) 9/7/2017    said to have short episodes while hospitalized for UTI per Dr. Rhys Mensah     UTI (urinary tract infection) 08/21/2017    hospitalized with weakness     Past Surgical History:   Procedure Laterality Date      "CATARACT EXTRACTION, BILATERAL       PARTIAL GASTRECTOMY  1969    for ulcers     TONSILLECTOMY       Social History     Tobacco Use     Smoking status: Never Smoker     Smokeless tobacco: Never Used   Substance Use Topics     Alcohol use: No     Drug use: No       Objective / Physical Examination:  Vitals:    01/28/19 1118   BP: 110/60   Patient Site: Right Arm   Patient Position: Sitting   Pulse: 84   Temp: 97.9  F (36.6  C)   Weight: 124 lb 12.8 oz (56.6 kg)   Height: 5' 3.5\" (1.613 m)     Wt Readings from Last 3 Encounters:   01/28/19 124 lb 12.8 oz (56.6 kg)   01/23/19 123 lb (55.8 kg)   12/03/18 131 lb (59.4 kg)     Body mass index is 21.76 kg/m .     General Appearance: Alert and oriented, cooperative, affect appropriate, speech clear, in no apparent distress  ENT: mucus membranes moist   Back: No CVA tenderness   Lungs: Clear to auscultation bilaterally. Normal inspiratory and expiratory effort  Cardiovascular: Regular rate, normal S1, S2. No murmurs, rubs, or gallops  Abdomen: Bowel sounds active all four quadrants. Soft, non-tender. No rebound tenderness or guarding    Extremities:  No edema    Orders Placed This Encounter   Procedures     Basic Metabolic Panel   Followup: Return in about 6 months (around 7/28/2019) for Next scheduled follow up. earlier if needed.        Rose Mcelroy, CNP    "

## 2021-06-24 NOTE — TELEPHONE ENCOUNTER
Call patient: if she does not have any more urinary symptoms (going to the bathroom often, burning when she urinates) then she does not need to repeat the urine test.

## 2021-06-24 NOTE — TELEPHONE ENCOUNTER
Question following Office Visit  When did you see your provider: patient had a f/u lab on 2/6/19  What is your question: Wondering if the patient needs to come back in for a f/u on her urinary track infection? Patient just finished her 5 days of antibiotics and they are not sure if she needs to come back or not. Please advise.   Okay to leave a detailed message: Yes, caller asked that a detailed message please be left for her.

## 2021-06-25 NOTE — PROGRESS NOTES
Clinic Care Coordination Contact  [facility]/Voicemail       Clinical Data: Care Coordinator Outreach  Outreach attempted x 1.  CHW called patient and was hung on today- CHW also tried to call daughter Gabriella who's voicemail was full unable to leave a vm  .  Plan: Care Coordinator update goal progression with patient and/or dtr Gabriella Care Coordinator will try to reach patient again in 10 business days.  6/9/2021

## 2021-06-25 NOTE — ED NOTES
Patient has been having repeated bouts of episodic SVT; baseline NSR with occasional PAC's. Provider is aware. Patient has remained symptom-free; denies pain, shortness of breath, or dizziness.

## 2021-06-25 NOTE — TELEPHONE ENCOUNTER
Scheduling,    Would someone please call patient to set up an appointment in AF clinic by Tuesday per Dr. Carroll? Please let me know if you need anything more from the device team. Pt will need a device check prior to visit.     Thank you,    Adore Mcgarry, RN  Device Nurse

## 2021-06-25 NOTE — PROGRESS NOTES
Progress Notes by Gabriel Orr at 2/17/2017  8:00 AM     Author: Gabriel Orr Service: -- Author Type: Nurse Practitioner    Filed: 3/7/2017 10:49 AM Encounter Date: 2/17/2017 Status: Signed    : Gabriel Orr [facility]    Date of Service: 2/17/2017  Primary Provider: YOLANDA Rainey    Patient Care Team:  YOLANDA Canada as PCP - General (Nurse Practitioner)     ______________________________________________________________________     Patient's Pharmacy:    [facility] [id number] [facility] [[facility]][address] [address]  Phone: [phone] Fax: [phone]     Patient's Insurance:    Payor: MEDICARE / Plan: MEDICARE A AND B / Product Type: Medicare /      ______________________________________________________________________     Laney Hahn is 76 y.o. female who comes in today for:    Chief Complaint   Patient presents with   ? Hospital Visit Follow Up     Fall and hit head, 2/14/17, Dizziness. Had fallen and hit the head on the left side. Does not remember falling, and was dizzy before hand.        Patient Active Problem List   Diagnosis   ? Frequent falls   ? Toe deformity, right   ? Angular cheilitis     Current Outpatient Prescriptions   Medication Sig   ? aspirin 81 MG EC tablet Take 81 mg by mouth daily. 3 tablets at night   ? ASPIRIN/ACETAMINOPHEN/CAFFEINE (EXCEDRIN EXTRA STRENGTH ORAL) Take by mouth.   ? clotrimazole (LOTRIMIN) 1 % cream Apply to affected corners of the mouth twice daily for 2-3 weeks until redness and irritation resolves   ? terbinafine HCl (LAMISIL) 250 mg tablet Take 1 tablet (250 mg total) by mouth daily.   ? triamcinolone (KENALOG) 0.1 % cream Apply a small amount to affected area twice daily     Social History     Social History Narrative     ______________________________________________________________________     History of present  "illness:  Laney Hahn is a pleasant 76 year-old female who presents in clinic today for follow-up hospital visit at [facility] emergency room on 2/14/17 where she was seen and evaluated for a fall.  Her brother, Adam, and her good friend Gabriella are with her today in clinic and providing helpful information for the events leading up to her recent fall.  She states that she was walking home from her neighbors house when she suddenly got dizzy, sat down in a chair until her symptoms cleared, then decided to go home.  She walked with her brother on their way home when her brother noticed that she had become \"wobbly\" and just fell to her left side hitting her head and glasses into her left side of her face.  She does not recall these events after they began walking home, nor does she recall falling and hitting her face on the ground.  From a fall, she sustained a large bruise over her left eye and also bruises on her left upper extremity and thigh.  She currently has no complaints of pain or visual changes.  She uses a cane as needed if she feels she needs it for stability with ambulating and also has a walker available.  Is also noted that she has a history of TIAs ×3 in November 2015.      On review of systems, the patient denies any headache, dizziness, visual changes, nasal or sinus congestion/drainage, epistaxis, cough, chest pain or shortness of breath, abdominal pain, urinary difficulties, change in bowel movements, edema, or numbness and tingling in her lower extremities.  Positive for mild left sided chest wall pain after fall and symptoms as noted in HPI.    ______________________________________________________________________    Wt Readings from Last 3 Encounters:   03/01/17 128 lb (58.1 kg)   02/17/17 130 lb (59 kg)   12/29/16 130 lb 6.4 oz (59.1 kg)     BP Readings from Last 3 Encounters:   03/01/17 122/57   02/17/17 120/64   02/14/17 164/77     Visit Vitals   ? /64   ? Pulse 78   ? " "Ht 5' 3\" (1.6 m)   ? Wt 130 lb (59 kg)   ? BMI 23.03 kg/m2        Physical Exam:  General Appearance: Alert, cooperative, no distress, appears stated age  Head: Normocephalic, moderate bruising around left eye/cheek  Eyes: PERRL, conjunctiva/corneas clear, EOM's intact; wearing glasses  Ears: Normal TM's and external ear canals, both ears  Nose: Nares normal, septum midline,mucosa normal, no drainage  Throat: Lips, mucosa, and tongue normal; teeth and gums normal  Neck: Supple, symmetrical, trachea midline, no adenopathy;  thyroid: not enlarged, symmetric, no tenderness/mass/nodules  Back: Symmetric, no curvature, ROM normal, no CVA tenderness  Lungs: Clear to auscultation bilaterally, respirations unlabored  Heart: Regular rate and rhythm, S1 and S2 normal, ? faint systolic murmur, no rub, or gallop  Abdomen: Soft, non-tender, bowel sounds active all four quadrants,  no masses, no organomegaly  Musculoskeletal: Normal range of motion. No joint swelling or deformity.   Extremities: Extremities normal, LUE bruises and LLE thigh bruise, no cyanosis or edema  Skin: Skin color, texture, turgor normal, no rashes or lesions  Lymph nodes: Cervical, supraclavicular nodes normal  Neurologic: She is alert. Gait is deliberate, but within normal limits.  Psychiatric: She has a normal mood and affect.   ______________________________________________________________________    Assessment:    1. Hospital discharge follow-up    2. Fall    3. Vasovagal syncopes       ______________________________________________________________________      PHQ-2 Total Score: 0 (11/16/2016  7:00 AM)  No Data Recorded     Plan:  Patient Instructions   1. Discussed options for evaluating the etiology of syncopal episodes -     Cardiology    Neurology    2.   Will check Echocardiogram to evaluate cardiac status.    Scheduled 3/15/2017 at [facility]    3.   Follow-up after your test to review the results and any further " recommendations.    Gabriel Orr, [facility]  Internal Medicine  [facility]     Return if symptoms worsen or fail to improve.

## 2021-06-25 NOTE — ED NOTES
Spoke with Gabriella Givens per patient request regarding transfer to [facility]. Provided patient's room number and unit phone number.

## 2021-06-25 NOTE — TELEPHONE ENCOUNTER
"Alert received for AT/AF >6 hours. Call made to patient to assess symptoms. Patient denies symptoms, she states \"I don't feel any different then I normally do\". Patient informed of device findings and informed we would be forwarding results to Dr. Carroll for further evaluation. Patient verbalized understanding and agrees with plan.     Dr. Carroll,     We received an alert for AT/AF >6 hours starting on 6/9/21. V-rates >/=120: 90-95%. Patient denies symptoms during episode. She is currently taking Metoprolol 50mg daily. There is not an anticoagulation medication prescribed. Please review remote and give any further recommendations.     Thank you,   Adore Mcgarry, RN  Device Nurse    Type: alert pacemaker remote transmission for AT/AF >6hrs.   Presenting rhythm: atrial fibrillation with ventricular sensing 135 bpm.  Battery/lead status: stable.  Arrhythmias: since 6/9/21; 20 AF episodes, per trend current episode in progress since 6/9/21 ~9hrs, v-rates >/=120bpm ~90-95%, AF burden 96.6%. 23 fast A&V episodes, EGMs appear to be RVR during AF.   Anticoagulant: None.  Comments: normal pacemaker function. Routed to device RN for review. JACQUI ADD: Reviewed remote, agree with tech findings. Call made to patient to assess symptoms. Routed to Dr. Carroll for further review. See epic note. AJM  "

## 2021-06-25 NOTE — TELEPHONE ENCOUNTER
----- Message -----  From: Flavio Carroll MD  Sent: 6/10/2021  12:45 PM CDT  To: Adore Mcgarry RN    I recommend that she come to the Atrial Fibrillation Clinic or the rapid access clinic no later than next Tuesday.  I am out of the office for the next 2 weeks.  My notes say that she has cognitive problems so I would not recommend prescribing anticoagulation over the phone.    TEJAS

## 2021-06-25 NOTE — PROGRESS NOTES
Progress Notes by Flavio Carroll MD at 9/7/2017  9:10 AM     Author: Flavio Carroll MD Service: -- Author Type: Physician    Filed: 9/7/2017 10:05 AM Encounter Date: 9/7/2017 Status: Signed    : Flavio Carroll MD (Physician)           Click to link to [facility] Note      Assessment:    1. Moderate aortic valve stenosis  Echo Complete   2. Paroxysmal SVT (supraventricular tachycardia)  metoprolol tartrate (LOPRESSOR) 50 MG tablet    Holter Monitor   3. Bifascicular bundle branch block         Plan:    1.  Reduce metoprolol tartrate to 25 mg orally twice a day.  2.  Holter monitor; we will call Laney with the results.  3.  Return to see Dr. Carroll in 1 year after new echocardiogram to monitor her aortic valve stenosis.    An After Visit Summary was printed and given to the patient.    Subjective:    Laney Hahn came for a follow up visit. Her friend, Jeff, and her [employer] friend, Azalea, accompanied her to the visit.  She saw Dr. Rhys Mensah in consultation at [facility] but wished to transfer her care to our [place] office.    This is Laney's first visit with Dr. Carroll.  More than 50% of a 40 minute visit was spent in counseling and care coordination.    Laney states she would not like to take any medications.  She noted that she does not take a multivitamin because it upsets her stomach.  She does not take the 81 mg dose of aspirin regularly, but does take to extra strength Excedrin every day which contain aspirin.    She walks with a cane and states she is prone prone to falling.  These episodes are not associated with lightheadedness or loss of consciousness.    She was recently hospitalized with profound weakness during an episode of urinary tract infection.  She had persistent sinus tachycardia but Dr. Rhys Mensah also felt there were very brief episodes of supraventricular tachycardia that was self limited.  A  heart rate and duration of the episodes was not described in the notes that I was able to review this morning.  Metoprolol was prescribed.    Laney is adopted and has no knowledge of her blood relatives medical history.  She lives with a friend named Jeff.    Past Medical History:    Patient Active Problem List   Diagnosis   ? Frequent falls   ? HLD (hyperlipidemia), 10-year ASCVD risk 14.2%   ? Moderate aortic valve stenosis   ? Bifascicular bundle branch block   ? Paroxysmal SVT (supraventricular tachycardia)       Past Medical History:   Diagnosis Date   ? Lacunar strokes, 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 Nini Rasmussen   ? Moderate aortic valve stenosis 8/22/2017   ? Paroxysmal SVT (supraventricular tachycardia) 9/7/2017    said to have short episodes while hospitalized for UTI per Dr. Rhys Mensah   ? UTI (urinary tract infection) 08/21/2017    hospitalized with weakness       Past Surgical History:   Procedure Laterality Date   ? CATARACT EXTRACTION, BILATERAL     ? PARTIAL GASTRECTOMY  1969    for ulcers   ? TONSILLECTOMY          reports that she has never smoked. She has never used smokeless tobacco. She reports that she does not drink alcohol or use illicit drugs.    Family History   Problem Relation Age of Onset   ? Adopted: Yes   ? Family history unknown: Yes       Outpatient Encounter Prescriptions as of 9/7/2017   Medication Sig Dispense Refill   ? ASPIRIN/ACETAMINOPHEN/CAFFEINE (EXCEDRIN EXTRA STRENGTH ORAL) Take 2 tablets by mouth daily.      ? atorvastatin (LIPITOR) 40 MG tablet Take 1 tablet (40 mg total) by mouth daily. 90 tablet 3   ? [DISCONTINUED] aspirin 81 MG EC tablet Take 1 tablet (81 mg total) by mouth daily with breakfast.  0   ? metoprolol tartrate (LOPRESSOR) 50 MG tablet Take 0.5 tablets (25 mg total) by mouth 2 (two) times a day. 60 tablet 2   ? [DISCONTINUED]  "metoprolol tartrate (LOPRESSOR) 50 MG tablet Take 1 tablet (50 mg total) by mouth 2 (two) times a day. 60 tablet 1   ? [DISCONTINUED] MULTIVIT,CALC,MINS/IRON/FOLIC (ONE-A-DAY WOMENS FORMULA ORAL) Take 1 tablet by mouth daily. Has not started taking yet.       No facility-administered encounter medications on file as of 9/7/2017.        Review of Systems:     General: Weight Loss  Eyes: WNL  Ears/Nose/Throat: WNL  Lungs: WNL  Heart: Fainting (palpitations)  Stomach: WNL  Bladder: WNL  Muscle/Joints: WNL  Skin: WNL  Nervous System: Falls, Daytime Sleepiness, Loss of Balance  Mental Health: WNL     Blood: Easy Bruising    Objective:     BP 92/46 (Patient Site: Left Arm, Patient Position: Sitting, Cuff Size: Adult Regular)  Pulse (!) 52  Resp 12  Ht 5' 3.5\" (1.613 m)  Wt 122 lb 6.4 oz (55.5 kg)  BMI 21.34 kg/m2  Wt Readings from Last 5 Encounters:   09/07/17 122 lb 6.4 oz (55.5 kg)   09/04/17 122 lb 2 oz (55.4 kg)   08/28/17 125 lb (56.7 kg)   08/23/17 123 lb 6.4 oz (56 kg)   08/21/17 123 lb 6.4 oz (56 kg)        Physical Exam:    GENERAL APPEARANCE: alert, no apparent distress  EYES: no scleral icterus  NECK: no carotid bruits or adenopathy, jugular venous pressure is less than 5 cm  CHEST: symmetric, the lungs are clear to auscultation  CARDIOVASCULAR: regular rhythm with a grade 1/6 systolic ejection murmur  EXTREMITIES: no cyanosis, clubbing or edema  SKIN: no xanthelasma  NEUROLOGIC: normal coordination; she uses a cane for ambulation  PSYCHIATRIC: mood and affect are normal    Cardiac Testing:    EKG: Sinus rhythm with bifascicular block per my personal review.    Echocardiogram:      Left ventricle ejection fraction is mildly decreased. The calculated left ventricular ejection fraction is 53%.    Aortic Valve: There is moderate global calcification with reduced excursion of the aortic valve present. Mild to moderate stenosis.    IVC: Normal central venous pressure. Estimated central venous pressure equal to " 3 mmHg.       Imaging:    Xr Chest Pa And Lateral    Result Date: 8/21/2017  XR CHEST PA AND LATERAL 8/21/2017 2:01 PM INDICATION: Weakness. COMPARISON: 4/22/2017. FINDINGS: Lungs are clear. No pleural effusion. Heart size normal. Surgical clips near the GE junction. No significant change.      Lab Results:    Lab Results   Component Value Date    CREATININE 1.04 08/23/2017    BUN 28 08/21/2017     08/21/2017    K 4.4 08/23/2017     08/21/2017    CO2 25 08/21/2017     Lab Results   Component Value Date    CHOL 111 08/03/2017    TRIG 96 08/03/2017    HDL 43 (L) 08/03/2017     Creatinine (mg/dL)   Date Value   08/23/2017 1.04   08/22/2017 1.35 (H)   08/21/2017 1.20 (H)   08/21/2017 1.36 (H)     Magnesium (mg/dL)   Date Value   08/23/2017 1.9   08/22/2017 2.1   08/21/2017 1.9     LDL Calculated (mg/dL)   Date Value   08/03/2017 49   12/06/2016 103   12/15/2015 102     Lab Results   Component Value Date    WBC 7.4 08/22/2017    HGB 13.7 08/21/2017    HCT 40.6 08/21/2017    MCV 97 08/21/2017     08/23/2017           Much or all of the text in this note was generated through the use of the Dragon Dictate voice-to-text software. Errors in spelling or words which seem out of context are unintentional. Sound alike errors, in particular, may have escaped editing.

## 2021-06-25 NOTE — ED TRIAGE NOTES
Brought in by [facility] for a near syncope event at home. When EMS arrived patient was diaphoretic, lightheaded, hypotensive and tachycardic. EMS gave Adenosine 12 mg's and patient converted to NSR in the 80's. She had a similar episode last week.     Patient had a friend that witnessed the incident. Per her friend Gabriella pimentel did sit down on the couch before she lost consciousness. She was not responding for 5 minutes.

## 2021-06-25 NOTE — TELEPHONE ENCOUNTER
----- Message from Peyton Rodriguez sent at 6/10/2021  8:13 AM CDT -----  Regarding: device RN review  Type: alert pacemaker remote transmission for AT/AF >6hrs.   Presenting rhythm: atrial fibrillation with ventricular sensing 135 bpm.  Battery/lead status: stable.  Arrhythmias: since 6/9/21; 20 AF episodes, per trend current episode in progress since 6/9/21 ~9hrs, v-rates >/=120bpm ~90-95%, AF burden 96.6%. 23 fast A&V episodes, EGMs appear to be RVR during AF.   Anticoagulant: None.  Comments: normal pacemaker function. Routed to device RN for review. JACQUI

## 2021-06-25 NOTE — ED NOTES
Pt having more runs of SVT treated with adenosine.  Pt converts to NSR for brief period of time then bigeminal beats and then back into SVT.

## 2021-06-25 NOTE — TELEPHONE ENCOUNTER
Received MTM referral from Hospital Discharge Transitions of Care     Patient was not reachable after several attempts, will route to MTM Pharmacist/Provider as an FYI. Left MTM scheduling information on patients voicemail.    Thank you for the referral,    Rick Baca, MTM coordinator

## 2021-06-26 NOTE — H&P
ADMISSION HISTORY & PHYSICAL      Rose Mcelroy, [facility], [phone]  ASSESSMENT AND PLAN:  80 y.o. female with history of nonobstructive coronary artery disease, SVT, atrial fibrillation, tacky-bradycardia syndrome status post pacemaker 2021, moderate aortic stenosis, heart failure with preserved ejection fraction, Covid 19 infection in 2020 and cognitive impairment who was just discharged from [facility]  with atrial fibrillation with RVR.  She presented today with a syncopal episode and found to be in SVT.  She had an appointment at her primary care clinic this mornin.  SVT/atrial fibrillation with RVR: Initial rhythm was SVT converted with Adenocard then in the ED she went in and out of atrial fibrillation/SVT.  IV beta-blocker x2 given with continued recurrence of SVT.  Cardiology consulted-now starting diltiazem drip.  Is on flecainide, metoprolol at home, will hold for now.  Continue home Eliquis.  TSH was normal 6 days ago.    2.  Heart failure with reduced ejection fraction: No evidence of current exacerbation.  Most recent echo shows EF of 48%.  Chest x-ray appears unchanged.  Blood pressures currently soft, will hold home Lasix for tonight, no clinical evidence of CHF exacerbation.    3.  Tachybradycardia syndrome: Status post pacemaker 2021    4.  Small to medium sized pericardial effusion seen on echocardiogram 2021.  We will repeat limited echo tomorrow.    5.  Chronic kidney disease stage III: Creatinine today is 1.28 , slightly up from discharge but improved from recent baseline during last hospitalization.  Continue home diuretics, trend    6.  Dementia: Last slums test 13 out of 30 and she refused home care and went home where she lives with her brother.    7.  Nonobstructive coronary artery disease: We will trend troponins    8.  Hyperglycemia: Blood glucose in the 180s.  Will initiate diabetic order set and check A1c-last A1c was 5.2.  Likely  "stress related.    Barriers to discharge: Persistent recurrent RVR      CHIEF COMPLAINT:  Syncope    HISTORY OF PRESENTING ILLNESS:  Laney Hahn is a 80 y.o. female who had a syncopal episode at home and her friend called EMS who saw that she was in SVT and this was converted with Adenocard.  She was in normal sinus rhythm when she arrived here but since that time she has gone into SVT and atrial fibrillation with only transient resolution with Identicard and beta-blocker.  She is now back in SVT, cardiology consulted.  She actually states that she \"feels pretty good \".  She denies any lightheadedness or dizziness or weakness or perception that her heart is racing.  No chest pain or shortness of breath or nausea or vomiting.    PMH/PSH:  Patient Active Problem List   Diagnosis     Frequent falls     Dyslipidemia, goal LDL below 70     SVT (supraventricular tachycardia) (H), likely AVNRT     Bifascicular bundle branch block     Essential hypertension     Hyperkalemia     Chronic kidney disease, stage III (moderate)     Cognitive and behavioral changes     Adjustment disorder with anxious mood     DNAR (do not attempt resuscitation)     Nonobstructive atherosclerosis of coronary artery     Nonrheumatic aortic valve stenosis     Junctional bradycardia     Acute kidney injury (H)     Anemia     Bradycardia     AV node dysfunction     Cardiac pacemaker in situ     Heart failure with reduced ejection fraction, NYHA class I (H)     Paroxysmal atrial fibrillation (H)     Vascular dementia without behavioral disturbance (H)     Syncope and collapse       ALLERGIES:  Allergies   Allergen Reactions     Sulfa (Sulfonamide Antibiotics) Unknown       MEDICATIONS:  Reviewed.  No current facility-administered medications on file prior to encounter.      Current Outpatient Medications on File Prior to Encounter   Medication Sig Dispense Refill     acetaminophen-caffeine (EXCEDRIN) 500-65 mg Tab per tablet Take 1 tablet by " mouth daily before breakfast.       apixaban ANTICOAGULANT (ELIQUIS) 2.5 mg Tab tablet Take 1 tablet (2.5 mg total) by mouth 2 (two) times a day. 180 tablet 3     atorvastatin (LIPITOR) 10 MG tablet Take 1 tablet (10 mg total) by mouth daily. 90 tablet 3     flecainide (TAMBOCOR) 50 MG tablet Take 1 tablet (50 mg total) by mouth every 12 (twelve) hours. 180 tablet 1     furosemide (LASIX) 20 MG tablet Take 20 mg by mouth daily after supper.       metoprolol succinate (TOPROL-XL) 100 MG 24 hr tablet Take 1 tablet (100 mg total) by mouth daily. 90 tablet 1     [DISCONTINUED] furosemide (LASIX) 20 MG tablet Take 1 tablet (20 mg total) by mouth daily. At 7pm (Patient taking differently: Take 20 mg by mouth daily after supper. At 7pm ) 90 tablet 1     [DISCONTINUED] apixaban ANTICOAGULANT (ELIQUIS) 5 mg Tab tablet Take 1 tablet (5 mg total) by mouth 2 (two) times a day. 60 tablet 1     [DISCONTINUED] atorvastatin (LIPITOR) 10 MG tablet Take 10 mg by mouth daily.       [DISCONTINUED] flecainide (TAMBOCOR) 50 MG tablet Take 1 tablet (50 mg total) by mouth every 12 (twelve) hours. 60 tablet 1     [DISCONTINUED] furosemide (LASIX) 20 MG tablet Take 1 tablet (20 mg total) by mouth daily. At 7pm 30 tablet 0     [DISCONTINUED] metoprolol succinate (TOPROL-XL) 100 MG 24 hr tablet Take 1 tablet (100 mg total) by mouth daily. 30 tablet 0       SOCIAL HISTORY:  Social History     Socioeconomic History     Marital status: Single     Spouse name: Not on file     Number of children: 0     Years of education: Not on file     Highest education level: Not on file   Occupational History     Occupation:  at [employer]     Employer: RETIRED   Social Needs     Financial resource strain: Not on file     Food insecurity     Worry: Not on file     Inability: Not on file     Transportation needs     Medical: Not on file     Non-medical: Not on file   Tobacco Use     Smoking status: Never Smoker     Smokeless tobacco: Never  "Used   Substance and Sexual Activity     Alcohol use: No     Drug use: No     Sexual activity: Not on file   Lifestyle     Physical activity     Days per week: Not on file     Minutes per session: Not on file     Stress: Not on file   Relationships     Social connections     Talks on phone: Not on file     Gets together: Not on file     Attends Christian service: Not on file     Active member of club or organization: Not on file     Attends meetings of clubs or organizations: Not on file     Relationship status: Not on file     Intimate partner violence     Fear of current or ex partner: Not on file     Emotionally abused: Not on file     Physically abused: Not on file     Forced sexual activity: Not on file   Other Topics Concern     Not on file   Social History Narrative    Her adopted brother, Jeff, lives with her. He is five years younger than her.       FAMILY HISTORY:  Family History   Adopted: Yes   Family history unknown: Yes       ROS:  12 point ROS was performed and found to be negative except for the pertinent positives mentioned in the HPI.      PHYSICAL EXAM:  /67   Pulse 60   Temp 98.3  F (36.8  C) (Oral)   Resp 16   Ht 5' 3\" (1.6 m)   Wt 119 lb (54 kg)   SpO2 96%   BMI 21.08 kg/m    No intake/output data recorded.  No intake/output data recorded.  CONSTITUTIONAL: No apparent distress, nontoxic appearing  HEENT:       Sclera- anicteric      Mucous membrane- moist and pink  LUNGS: Clear to auscultation bilaterally  CARDIOVASCULAR: S1S2 regular, rate in the 160s, no murmurs, rubs or gallops,no pedal edema  GI: Soft. Non-tender, non-distended. No organomegaly. No guarding or rigidity. Bowel sounds- active  NEURO: Cranial nerves II-XII grossly intact. No focal neurological deficit. No involuntary movements  INTGM: No skin rash, no cyanosis or clubbing  LYMPH: no adenopathy  MSK: no joint swelling or tenderness  PSYCH: alert and oriented , no agitation      DIAGNOSTIC DATA:  Recent Results " (from the past 24 hour(s))   Basic Metabolic Panel    Collection Time: 06/16/21  5:49 PM   Result Value Ref Range    Sodium 142 136 - 145 mmol/L    Potassium 4.2 3.5 - 5.0 mmol/L    Chloride 110 (H) 98 - 107 mmol/L    CO2 23 22 - 31 mmol/L    Anion Gap, Calculation 9 5 - 18 mmol/L    Glucose 187 (H) 70 - 125 mg/dL    Calcium 8.2 (L) 8.5 - 10.5 mg/dL    BUN 23 8 - 28 mg/dL    Creatinine 1.28 (H) 0.60 - 1.10 mg/dL    GFR MDRD Af Amer 49 (L) >60 mL/min/1.73m2    GFR MDRD Non Af Amer 40 (L) >60 mL/min/1.73m2   Magnesium    Collection Time: 06/16/21  5:49 PM   Result Value Ref Range    Magnesium 2.0 1.8 - 2.6 mg/dL   HM1 (CBC with Diff)    Collection Time: 06/16/21  5:49 PM   Result Value Ref Range    WBC 4.7 4.0 - 11.0 thou/uL    RBC 3.20 (L) 3.80 - 5.40 mill/uL    Hemoglobin 10.3 (L) 12.0 - 16.0 g/dL    Hematocrit 32.2 (L) 35.0 - 47.0 %     (H) 80 - 100 fL    MCH 32.2 27.0 - 34.0 pg    MCHC 32.0 32.0 - 36.0 g/dL    RDW 13.3 11.0 - 14.5 %    Platelets 245 140 - 440 thou/uL    MPV 10.6 8.5 - 12.5 fL    Neutrophils % 70 50 - 70 %    Lymphocytes % 19 (L) 20 - 40 %    Monocytes % 8 2 - 10 %    Eosinophils % 3 0 - 6 %    Basophils % 0 0 - 2 %    Immature Granulocyte % 0 <=0 %    Neutrophils Absolute 3.3 2.0 - 7.7 thou/uL    Lymphocytes Absolute 0.9 0.8 - 4.4 thou/uL    Monocytes Absolute 0.4 0.0 - 0.9 thou/uL    Eosinophils Absolute 0.2 0.0 - 0.4 thou/uL    Basophils Absolute 0.0 0.0 - 0.2 thou/uL    Immature Granulocyte Absolute 0.0 <=0.0 thou/uL   Troponin I    Collection Time: 06/16/21  5:49 PM   Result Value Ref Range    Troponin I 0.02 0.00 - 0.29 ng/mL     All lab studies reviewed personally    Echo Complete    Result Date: 6/11/2021    Severe left atrial and mild right atrial enlargement   Left ventricle ejection fraction is mildly decreased. The calculated left ventricular ejection fraction is 48% with global hypokinesis and dense hypokinesis of the inferobasilar wall.   Moderate aortic valve stenosis with  mean gradient of 22 mmHg.   Mild mitral and tricuspid regurgitation. No evidence of pulmonary hypertension.   Small to medium sized circumferential pericardial effusion without hemodynamic compromise.   When compared to the previous study dated 9/14/2020, pericardial effusion is new. Mean gradient across aortic valve has increased.      Xr Chest 1 View Portable    Result Date: 6/16/2021  EXAM: XR CHEST 1 VIEW PORTABLE LOCATION: [facility] DATE/TIME: 6/16/2021 7:34 PM INDICATION: syncope COMPARISON: 06/10/2021     Pacemaker with leads over the RA and RV. Stable mild cardiac enlargement. Subtle interstitial prominence remains stable and likely represents the patient's baseline. No acute new findings.    Xr Chest 2 Views    Result Date: 6/10/2021  EXAM: XR CHEST 2 VIEWS LOCATION: [facility] DATE/TIME: 6/10/2021 7:42 PM INDICATION: fatigue COMPARISON: 04/14/2021     Minimal bibasilar atelectasis/scar. No pleural effusion. Stable mildly enlarged cardiac silhouette. Cardiac pacemaker. The pulmonary vasculature is normal.    Remote Device Check    Result Date: 6/10/2021  Type: alert pacemaker remote transmission for AT/AF >6hrs. Presenting rhythm: atrial fibrillation with ventricular sensing 135 bpm. Battery/lead status: stable. Arrhythmias: since 6/9/21; 20 AF episodes, per trend current episode in progress since 6/9/21 ~9hrs, v-rates >/=120bpm ~90-95%, AF burden 96.6%. 23 fast A&V episodes, EGMs appear to be RVR during AF. Anticoagulant: None. Comments: normal pacemaker function. Routed to device RN for review. JACQUI ADD: Reviewed remote, agree with tech findings. Call made to patient to assess symptoms. Routed to Dr. Carroll for further review. See epic note. AJ     Radiology report reviewed.        Juventino Zavala MD  [facility] Medicine Service

## 2021-06-26 NOTE — PROGRESS NOTES
Clinic Care Coordination Contact  Ambulatory Care Coordination to Inpatient Care Management   Hand-In Communication    Date:  June 11, 2021  Name: Laney Hahn is enrolled in Ambulatory Care Coordination program and I am the Lead Care Coordinator.  CC Contact Information: Epic InWallStripsket + phone  Payor Source: Payor: [facility] SELECT CARE/LABORCARE / Plan: [facility] MEDICARE ADVANTAGE / Product Type: MEDICARE ADVANTAGE /   Current services in place:     Please see the CC Snaphot and Care Management Flowsheets for specific  details of this Laney Hahn care plan.   Additional details/specific concerns r/t this admission:    Psychosocial helping with MA application, more support at home    I will follow this admission in Epic. Please feel free to contact me with questions or for further collaboration in discharge planning.

## 2021-06-26 NOTE — PROGRESS NOTES
Pharmacy Note - Admission Medication History    Pertinent Provider Information: per family  - pt has fluid around the heart so she was supposed to wait to start the Eliquis until Thurs next week 6/24/21     ______________________________________________________________________    Prior To Admission (PTA) med list completed and updated in EMR.       PTA Med List   Medication Sig Last Dose     acetaminophen-caffeine (EXCEDRIN) 500-65 mg Tab per tablet Take 1 tablet by mouth daily before breakfast. 6/16/2021 at Unknown time     apixaban ANTICOAGULANT (ELIQUIS) 2.5 mg Tab tablet Take 1 tablet (2.5 mg total) by mouth 2 (two) times a day. do not start until 6/24/21     atorvastatin (LIPITOR) 10 MG tablet Take 1 tablet (10 mg total) by mouth daily. 6/16/2021 at Unknown time     flecainide (TAMBOCOR) 50 MG tablet Take 1 tablet (50 mg total) by mouth every 12 (twelve) hours. 6/16/2021 at am     furosemide (LASIX) 20 MG tablet Take 20 mg by mouth daily after supper. 6/15/2021 at Unknown time     metoprolol succinate (TOPROL-XL) 100 MG 24 hr tablet Take 1 tablet (100 mg total) by mouth daily. 6/16/2021 at Unknown time     [DISCONTINUED] furosemide (LASIX) 20 MG tablet Take 1 tablet (20 mg total) by mouth daily. At 7pm (Patient taking differently: Take 20 mg by mouth daily after supper. At 7pm ) 6/15/2021 at Unknown time       Information source(s): Patient, Family member and CareEverywhere/[facility]  Method of interview communication: in-person    Summary of Changes to PTA Med List  New: none  Discontinued: omeprazole  Changed: see start date on Eliquis     Patient was asked about OTC/herbal products specifically.  PTA med list reflects this.    In the past week, patient estimated taking medication this percent of the time:  greater than 90%.    Allergies were reviewed, assessed, and updated with the patient.      Patient does not use any multi-dose medications prior to admission.    The information provided in this note is  only as accurate as the sources available at the time of the update(s).    Thank you for the opportunity to participate in the care of this patient.    Shira López, [place]  6/16/2021 7:23 PM

## 2021-06-26 NOTE — ED PROVIDER NOTES
EMERGENCY DEPARTMENT ENCOUNTER      NAME: Laney Hahn  AGE: 80 y.o. female  YOB: 1940  MRN: [id number]  EVALUATION DATE & TIME: 6/8/2021  8:31 PM    PCP: Rose Mcelroy FNP    ED PROVIDER: Rosana Blanchard M.D.      Chief Complaint   Patient presents with     generalized weakness/fatigue         FINAL IMPRESSION:  1. Generalized muscle weakness          ED COURSE & MEDICAL DECISION MAKING:    Pertinent Labs & Imaging studies reviewed. (See chart for details)    80 y.o. female presents to the Emergency Department for evaluation of weakness. Vitals on arrival are reassuring. Reports generalized weakness, no chest pain, shortness of breath or infectious symptoms. Do not suspect sepsis. Dx does include UTI, metabolic abnormality, atypical ACS, arrhythmia, anemia, among others. No focal neurologic defictis do not suspect stroke or acute intracranial pathology.    Her EKG is sinus, do not clearly see pacer spikes, has some nonspecific T wave changes, troponin negative and no chest pain, do not suspect ACS. Lactic acid only slightly elevated at 2.2, workup less consistent with sepsis or infectious source, suspect more that she is volume down. CBC is reassuring. BMP is notable for K of 5.7 and creat now 1.70 (prior 1.04). Will start some fluids at 500 ml  NS (going slow has does have h/o of HFpEF. Patient will be signed out to my ED colleague Dr. Palafox pending pacemaker interrogation and UA. With her lab abnormalities and comorbidities it would be reasonable to admit her observation, however there are no beds here, therefore if rest of workup is reassuring could consider repeat BMP after fluids and discharge with close follow up depending on reassessment.    At the conclusion of the encounter I discussed the results of all of the tests and the disposition. The questions were answered. The patient or family acknowledged understanding and was agreeable with the care plan.          8:53 PM Performed my  initial history and physical exam, discussed ED course and treatment. PPE: Provider wore gloves, surgical mask, eye protection   10:35 PM Signed out to Dr. Palafox.      MEDICATIONS GIVEN IN THE EMERGENCY:  Medications   sodium chloride 0.9% 500 mL (500 mL Intravenous New Bag 6/8/21 8987)       NEW PRESCRIPTIONS STARTED AT TODAY'S ER VISIT  Current Discharge Medication List      CONTINUE these medications which have NOT CHANGED    Details   acetaminophen-caffeine (EXCEDRIN) 500-65 mg Tab per tablet Take 1 tablet by mouth daily before breakfast.      atorvastatin (LIPITOR) 10 MG tablet Take 10 mg by mouth daily.      furosemide (LASIX) 20 MG tablet Take 10 mg by mouth every evening. At 7pm      metoprolol succinate (TOPROL-XL) 50 MG 24 hr tablet Take 1 tablet (50 mg total) by mouth daily.  Qty: 90 tablet, Refills: 1    Associated Diagnoses: SVT (supraventricular tachycardia) (H)             =================================================================    HPI    Patient information was obtained from: Patient and patient's brother    Use of : N/A         Laney Hahn is a 80 y.o. female with a pertinent history of hypertension, dyslipidemia, paroxysmal SVT, aortic stenosis, CKD, and UTI who presents to this ED for evaluation of generalized weakness.     Per chart review, patient was admitted to [facility] on 4/10/2021 after presenting to the ED for weakness and lightheadedness found to have bradycardia with intermittent high degree AV block. Cardiology recommended pacemaker placement and patient underwent successful implantation of Medtronic dual-chamber pacemaker. Metoprolol was resumed for SVT control. Patient reported longstanding history of intermittent vomiting. Esophagram was suggestive of esophageal dysmotility. Patient was started on PPI and was discharged home on 4/14.         Per patient's brother, the patient developed generalized weakness and fatigue around 0930 today after  taking her pills at 0900. She was not outside in the heat today. She denies fever, chest pain, cough, abdominal pain, vomiting, diarrhea, blood in stools, dysuria, or other complaints at this time. Brother states that patient's mentation is at baseline.         REVIEW OF SYSTEMS   Review of Systems   Constitutional: Positive for fatigue. Negative for chills and fever.        + Generalized weakness   Eyes: Negative for visual disturbance.   Respiratory: Negative for cough and shortness of breath.    Cardiovascular: Negative for chest pain and leg swelling.   Gastrointestinal: Negative for abdominal pain, blood in stool, diarrhea and vomiting.   Genitourinary: Negative for difficulty urinating, dysuria and frequency.   Neurological: Negative for numbness.   All other systems reviewed and are negative.       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 Nini Rasmussen     Metabolic encephalopathy      Mild to moderate aortic valve stenosis 08/22/2017    stable on 2020 echo     Nonobstructive atherosclerosis of coronary artery 09/15/2020    with normal LVEDP     Paroxysmal SVT (supraventricular tachycardia) (H) 09/07/2017    said to have short episodes while hospitalized for UTI per Dr. Rhys Mensah     Syncope 08/22/2017     UTI (urinary tract infection) 08/21/2017    hospitalized with weakness       PAST SURGICAL HISTORY:  Past Surgical History:   Procedure Laterality Date     CATARACT EXTRACTION, BILATERAL       CV CORONARY ANGIOGRAM N/A 9/15/2020    Procedure: Coronary Angiogram;  Surgeon: Radhika Santa MD;  Location: [facility] Cath Lab;  Service: Cardiology     CV LEFT HEART  CATHETERIZATION WO LEFT VETRICULOGRAM Left 9/15/2020    Procedure: Left Heart Catheterization Without Left Ventriculogram;  Surgeon: Radhika Santa MD;  Location: [facility] Cath Lab;  Service: Cardiology     EP PACEMAKER INSERT N/A 4/13/2021    Procedure: EP Pacemaker Insertion;  Surgeon: Frederic Burgos MD;  Location: [facility] Cardiac Cath Lab;  Service: Cardiology     HYSTERECTOMY       PARTIAL GASTRECTOMY  1969    for ulcers     TONSILLECTOMY         CURRENT MEDICATIONS:    No current facility-administered medications on file prior to encounter.      Current Outpatient Medications on File Prior to Encounter   Medication Sig     acetaminophen-caffeine (EXCEDRIN) 500-65 mg Tab per tablet Take 1 tablet by mouth daily before breakfast.     atorvastatin (LIPITOR) 10 MG tablet Take 10 mg by mouth daily.     furosemide (LASIX) 20 MG tablet Take 10 mg by mouth every evening. At 7pm     metoprolol succinate (TOPROL-XL) 50 MG 24 hr tablet Take 1 tablet (50 mg total) by mouth daily.     [DISCONTINUED] acetaminophen (TYLENOL) 325 MG tablet Take 2 tablets (650 mg total) by mouth every 4 (four) hours as needed for pain.     [DISCONTINUED] atorvastatin (LIPITOR) 10 MG tablet Take 1 tablet (10 mg total) by mouth at bedtime.       ALLERGIES:  Allergies   Allergen Reactions     Sulfa (Sulfonamide Antibiotics) Unknown       FAMILY HISTORY:  Family History   Adopted: Yes   Family history unknown: Yes       SOCIAL HISTORY:   Social History     Socioeconomic History     Marital status: Single     Spouse name: None     Number of children: 0     Years of education: None     Highest education level: None   Occupational History     Occupation:  at [employer]     Employer: RETIRED   Social Needs     Financial resource strain: None     Food insecurity     Worry: None     Inability: None     Transportation needs     Medical: None     Non-medical: None   Tobacco Use     Smoking status: Never Smoker     Smokeless  "tobacco: Never Used   Substance and Sexual Activity     Alcohol use: No     Drug use: No     Sexual activity: None   Lifestyle     Physical activity     Days per week: None     Minutes per session: None     Stress: None   Relationships     Social connections     Talks on phone: None     Gets together: None     Attends [employer] service: None     Active member of club or organization: None     Attends meetings of clubs or organizations: None     Relationship status: None     Intimate partner violence     Fear of current or ex partner: None     Emotionally abused: None     Physically abused: None     Forced sexual activity: None   Other Topics Concern     None   Social History Narrative    Her adopted brother, Jeff, lives with her. He is five years younger than her.       VITALS:  Patient Vitals for the past 24 hrs:   BP Temp Temp src Pulse Resp SpO2 Height Weight   06/08/21 2130 118/55 -- -- 79 (!) 40 97 % -- --   06/08/21 2100 118/61 -- -- 79 (!) 34 97 % -- --   06/08/21 2045 119/56 -- -- 81 21 97 % -- --   06/08/21 1818 118/52 97.9  F (36.6  C) Temporal 69 18 95 % 5' 3\" (1.6 m) 132 lb (59.9 kg)       PHYSICAL EXAM    Constitutional: Well developed, well nourished. No acute distress  HENT: Normocephalic, atraumatic, mucous membranes moist, nose normal. Neck- gross ROM intact.   Eyes: Pupils mid-range, sclera white, no discharge  Respiratory: Clear to auscultation bilaterally, no respiratory distress, no wheezing, speaks full sentences easily.  Cardiovascular: Normal heart rate, regular rhythm. Pacemaker site without erythema or swelling. No lower extremity edema.   GI: Soft, no tenderness to deep palpation in all quadrants, no masses.  Musculoskeletal: Moving all 4 extremities intentionally and without pain. No obvious deformity.  Skin: Warm, dry, no rash.  Neurologic: Alert & oriented x 3, speech clear, face symmetric, moving all extremities spontaneously. Strength and sensation intact to extremities. "   Psychiatric: Affect normal, cooperative.    LAB:  All pertinent labs reviewed and interpreted.  Results for orders placed or performed during the hospital encounter of 06/08/21   HM2 (CBC W/O DIFF)   Result Value Ref Range    WBC 9.7 4.0 - 11.0 thou/uL    RBC 4.19 3.80 - 5.40 mill/uL    Hemoglobin 13.8 12.0 - 16.0 g/dL    Hematocrit 40.9 35.0 - 47.0 %    MCV 98 80 - 100 fL    MCH 32.9 27.0 - 34.0 pg    MCHC 33.7 32.0 - 36.0 g/dL    RDW 13.5 11.0 - 14.5 %    Platelets 174 140 - 440 thou/uL    MPV 11.8 8.5 - 12.5 fL   Basic Metabolic Panel   Result Value Ref Range    Sodium 139 136 - 145 mmol/L    Potassium 5.7 (H) 3.5 - 5.0 mmol/L    Chloride 104 98 - 107 mmol/L    CO2 23 22 - 31 mmol/L    Anion Gap, Calculation 12 5 - 18 mmol/L    Glucose 192 (H) 70 - 125 mg/dL    Calcium 9.1 8.5 - 10.5 mg/dL    BUN 40 (H) 8 - 28 mg/dL    Creatinine 1.70 (H) 0.60 - 1.10 mg/dL    GFR MDRD Af Amer 35 (L) >60 mL/min/1.73m2    GFR MDRD Non Af Amer 29 (L) >60 mL/min/1.73m2   Troponin I   Result Value Ref Range    Troponin I 0.03 0.00 - 0.29 ng/mL   Lactic Acid   Result Value Ref Range    Lactic Acid 2.2 (H) 0.7 - 2.0 mmol/L   Asymptomatic SARS-CoV-2 (COVID-19)-PCR    Specimen: Respiratory   Result Value Ref Range    SARS-CoV-2 PCR Result Negative Negative, Invalid       RADIOLOGY:  Reviewed all pertinent imaging. Please see official radiology report.  No results found.      Procedures  None    EKG:      Rate: 81  Rhythm: sinus, do not clearly see pacer spikes  Axis: left  Intervals: , , /QTc 492  Ischemic changes: many nonspecific ST-T wave changes  Comparison: 4/10/21    I have independently reviewed and interpreted the EKG(s) documented above.      I, Daniela Arrieta, am serving as a scribe to document services personally performed by Dr. Rosana Blanchard based on my observation and the provider's statements to me. I, Rosana Blanchard MD attest that Daniela Arrieta is acting in a scribe capacity, has observed my performance  of the services and has documented them in accordance with my direction.    Rosana Blanchard M.D.  Emergency Medicine  [facility] EMERGENCY DEPARTMENT  [address] [facility] [address]  Dept: [phone]  Loc: [phone]       Rosana Blanchard MD  06/08/21 6008

## 2021-06-26 NOTE — PROGRESS NOTES
Clinic Care Coordination Contact    Follow Up Progress Note      Assessment: Call from Gabriella. She is completing applications for MA for both patient and her brother with the intention to have them move to the assisted living part of their current housing [facility].  Gabriella thinks they both qualify for MA. They will need help with downsizing and and they are in agreement about the need to move. Discussed that the waiver could provide moving and downsizing assistance.      She is worried about patient. She took her to a meeting today and she did not seem or look well. She was in ED recently for dehydration. Reviewed note in chart from cardiology and need to have follow up. Gave phone number for heart clinic to Gabriella to call.  Gabriella is going to be out of town the next few days and has a scheduled colonoscopy on Tuesday.      Goals addressed this encounter:   Goals Addressed                 This Visit's Progress       Patient Stated      Psychosocial (pt-stated)   30%     Goal Statement: I will see if I qualify for a waiver within 5 months.  Date Goal set: 5-3-2021  Barriers: poor memory  Strengths: has friends who assists  Date to Achieve By: 10-3-2021  Patient expressed understanding of goal: friend Gabriella set up.  Action steps to achieve this goal:  1. Gabriella will call [facility] to request a [place] choices assessment.  2. Gabriella will assist with any paper work and with the assessment.  3. Gabriella will report progress towards this goal at scheduled outreach telephone calls from the [facility] team.   6-10 discussed                   Intervention/Education provided during outreach: Discussed waiver services.    Outreach Frequency: monthly    Plan:   Will work with Gabriella to get applications completed and to help with goal attainment.   Care Coordinator will follow up in 2 weeks and as needed.  BECKY Bergeron  Clinic Care Coordinator  [phone]          Clinic Care Coordination Contact  [facility]/[place]        Clinical Data: Care Coordinator Outreach  Outreach attempted x 1.  Left message on Gabriella's voicemail with call back information and requested return call.  Called patient and the phone was answered, loud TV noise, and then hung up.    Patient was in ED yesterday and has UTI, was prescribed antibiotics and is supposed to follow up with PCP.     Plan:  Care Coordinator will try to reach Gabriella in 1-2 days if no call back.  [facility]  Clinic Care Coordinator  [phone]

## 2021-06-26 NOTE — ED PROVIDER NOTES
EMERGENCY DEPARTMENT ENCOUNTER      NAME: Laney Hahn  AGE: 80 y.o. female  YOB: 1940  MRN: [id number]  EVALUATION DATE & TIME: 2021  5:18 PM    PCP: Rose Mcelroy FNP    ED PROVIDER: Jeff Garces DO.      Chief Complaint   Patient presents with     Syncope         FINAL IMPRESSION:  1. Syncope, unspecified syncope type    2. Paroxysmal supraventricular tachycardia (H)    3. Paroxysmal atrial fibrillation (H)          ED COURSE & MEDICAL DECISION MAKIN-year-old female with a history of SVT, A. fib, hyperlipidemia, and pacemaker placement was brought into the ED by EMS for evaluation of syncope.  EMS noted that the patient was in SVT when they arrived.  The patient was given 12 mg of IV adenosine.  The patient converted back to normal sinus rhythm after receiving the IV adenosine.  Upon arrival to the ED the patient was awake and she had no complaints of any symptoms.  She denied any new medication changes.  Patient was hemodynamically stable upon arrival.  The patient did not appear to be in any obvious distress or discomfort at the time of her initial evaluation.  Other than a systolic murmur the patient's physical exam was unremarkable.  After the patient arrived to the ED her friends arrived to provide additional history.  They stated that there was no trauma associated with the syncopal episode.  They also stated the patient had no complaints of any symptoms other than feeling tired prior to the syncopal episode.  The patient actually went on a walk earlier today without any difficulty.    EKG revealed normal sinus rhythm without any new or concerning ST or T wave changes.    Labs including CBC, BMP, magnesium, and troponin were all reassuring.    While in the ED the patient frequently returned back to SVT with a heart rate in the 150-160 range.  Patient was evaluated during the episodes.  She denied any symptoms with the SVT.  Each episode of VT typically lasted for  a few seconds or a few minutes at the most before resolving back to normal sinus rhythm.  Each time adenosine was going to be given the patient already converted back to normal sinus rhythm.  The patient was given 5 mg of IV metoprolol.  The patient also appeared to experience multiple episodes of atrial fibrillation with rapid ventricular response with a heart rate in the 130-150 range.  These episodes have lasted for 30 seconds to a couple of minutes before resolving spontaneously as well.    The patient's case was discussed with the on-call cardiologist who recommended giving the patient additional 5 mg doses of IV metoprolol x2.  The patient was then given a second dose of IV metoprolol.  The patient was then admitted to the hospitalist for further evaluation treatment of the various arrhythmias.    The patient continued to alternate between normal sinus rhythm, atrial fibrillation, and SVT.  At one point the patient went into SVT with a heart rate in the 150s.  She then remained in this rhythm for more than 10 minutes.  The patient's blood pressure was also started to drop slightly with the persistent SVT.  The patient was then given doses of 12 mg IV adenosine to treat the SVT.  Each time she was given the adenosine her rhythm would return back to normal sinus rhythm for approximately 1 to 2 minutes before returning back to SVT.    The patient's case was again discussed with the on-call cardiologist who then recommended starting the patient on a diltiazem drip.  The patient was given a 5 mg IV diltiazem bolus and then started on the drip.  After being started on the diltiazem drip the patients heart rate dropped down slightly but she remained in SVT.  Over the next hour the patient appeared to fluctuate between SVT and has normal sinus rhythm.        Because there were no cardiac telemetry beds available at this hospital the patient was transferred to [facility] after discussing the case with the  accepting physician Dr. Vidales.  A repeat troponin was obtained prior to transfer.  The repeat troponin was unchanged.  The patient's care was turned over to Dr. Hinton while awaiting transfer.     Pertinent Labs & Imaging studies reviewed. (See chart for details)    At the conclusion of the encounter I discussed the results of all of the tests and the disposition.  Questions were answered. The patient or family acknowledged understanding and was agreeable with the care plan.     5:18 PM I performed my initial history and physical exam as well as discussed ED course and plan.     6:26 PM I Rechecked the patient.  6:34 PM Patient flipped back into SVT.  7:00 PM Patient appears to be in Afib on the monitor.    7:05 PM Patient appears to have spontaneously converted to normal sinus rhythm.    7:21 PM I spoke with Dr. Romero of cardiology in regards to the patient's case, recommends diltiazem drip.  10:08 PM I spoke with Dr. Jimenez the hospitalist who agrees to admit the patient      MEDICATIONS GIVEN IN THE EMERGENCY:  Medications   adenosine (ADENOCARD) 3 mg/mL injection (  Canceled Entry 6/16/21 1840)   diltiazem 1 mg/mL in sodium chloride 0.9% 125 mL (10 mg/hr Intravenous Rate/Dose Change 6/16/21 2300)   dextrose 50 % (D50W) syringe 20-50 mL (has no administration in time range)   glucagon (human recombinant) injection 1 mg (has no administration in time range)   insulin aspart U-100 injection pen (NovoLOG) (has no administration in time range)   sodium chloride 0.9% 500 mL (0 mL Intravenous Stopped 6/17/21 0123)   metoprolol tartrate injection 5 mg (LOPRESSOR) (5 mg Intravenous Given 6/16/21 1828)   metoprolol tartrate injection 5 mg (LOPRESSOR) (5 mg Intravenous Given 6/16/21 1930)   adenosine injection 12 mg (ADENOCARD) (12 mg Intravenous Given 6/16/21 2024)   adenosine injection 12 mg (ADENOCARD) (12 mg Intravenous Given 6/16/21 2019)   diltiazem injection 5 mg (CARDIZEM) (5 mg Intravenous Given 6/16/21 2146)        NEW PRESCRIPTIONS STARTED AT TODAY'S ER VISIT  Current Discharge Medication List      CONTINUE these medications which have NOT CHANGED    Details   acetaminophen-caffeine (EXCEDRIN) 500-65 mg Tab per tablet Take 1 tablet by mouth daily before breakfast.      apixaban ANTICOAGULANT (ELIQUIS) 2.5 mg Tab tablet Take 1 tablet (2.5 mg total) by mouth 2 (two) times a day.  Qty: 180 tablet, Refills: 3    Associated Diagnoses: Paroxysmal atrial fibrillation (H)      atorvastatin (LIPITOR) 10 MG tablet Take 1 tablet (10 mg total) by mouth daily.  Qty: 90 tablet, Refills: 3    Associated Diagnoses: Dyslipidemia, goal LDL below 70      flecainide (TAMBOCOR) 50 MG tablet Take 1 tablet (50 mg total) by mouth every 12 (twelve) hours.  Qty: 180 tablet, Refills: 1    Associated Diagnoses: Atrial fibrillation with rapid ventricular response (H)      furosemide (LASIX) 20 MG tablet Take 20 mg by mouth daily after supper.      metoprolol succinate (TOPROL-XL) 100 MG 24 hr tablet Take 1 tablet (100 mg total) by mouth daily.  Qty: 90 tablet, Refills: 1    Associated Diagnoses: Atrial fibrillation with rapid ventricular response (H)                =================================================================    HPI  Laney Hahn is a 80 y.o. female with a pertinent history of dyslipidemia, SVT, Bifascicular bundle branch block, essential hypertension, stage III chronic kidney disease,hyperkalemia, Nonrheumatic aortic valve stenosis, bradycardia, AV node dysfunction, and cardiac pace maker who presents for evaluation of syncope.    Patient information was obtained from: EMS,Patient's friend, and Patient              Use of Intrepreter: N/A     Per chart Review:  Patient arrived  With atrial fibulation with RVR on 6/10/21.She had been in atria; fibrillation for about 21 hours.  Patient was given IV diltiazem in ER 2 times and within 45 minutes both times converted back to normal  sinus rhythm.    Patient was reportedly  here last week diagnosed with a urinary tract infection and Atrial fibrillation with rapid ventricular response, cardioversion was considered.    Per EMS: Patient was in her house when suddenly started experiencing SVT 30 minutes prior to EMS arrival. EMS states that she was pale and diaphoretic when they arrived. Patient was given a 12 mg dose of adenosine in ambulance when heart rate was 160 bpm which lowered her heart rate to 80bpm. She had a pacemaker placed on 4/13/2021.    Per Patient: Patient reports sudden heart palpitations and states that this has happened before. She comments that there were people present in her house to call an ambulance at the time palpitations. She has since endorsed that she is feeling better and is not taking any new medications. Patient denies loss of consciousness but her friends say that she did.    Per Patient's Friend: Patient reportedly ent for walk this morning and then to a doctors appointment and everything looked fine. Friends then came over to fill out some paperwork when she stated she wasn't feeling good and loss consciousness, slumping down on the couch. At this point 911 was called.       Patient denies chest pain, chest pressure, shortness of breath, dizziness, coughs, fever,diarhhea, abdominal pain or any other symptoms at this time.      REVIEW OF SYSTEMS   Review of Systems   Constitutional: Positive for diaphoresis. Negative for fever.   Respiratory: Negative for cough, chest tightness and shortness of breath.    Cardiovascular: Positive for palpitations. Negative for chest pain.   Gastrointestinal: Negative for abdominal pain and diarrhea.   Skin: Positive for pallor.   Neurological: Positive for syncope. Negative for dizziness.   All other systems reviewed and are negative.     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 Nini Rasmussen     Metabolic encephalopathy      Mild to moderate aortic valve stenosis 08/22/2017    stable on 2020 echo     Nonobstructive atherosclerosis of coronary artery 09/15/2020    with normal LVEDP     Paroxysmal SVT (supraventricular tachycardia) (H) 09/07/2017    said to have short episodes while hospitalized for UTI per Dr. Rhys Mensah     Syncope 08/22/2017     UTI (urinary tract infection) 08/21/2017    hospitalized with weakness       PAST SURGICAL HISTORY:  Past Surgical History:   Procedure Laterality Date     CATARACT EXTRACTION, BILATERAL       CV CORONARY ANGIOGRAM N/A 9/15/2020    Procedure: Coronary Angiogram;  Surgeon: Radhika Santa MD;  Location: [facility] Cath Lab;  Service: Cardiology     CV LEFT HEART CATHETERIZATION WO LEFT VETRICULOGRAM Left 9/15/2020    Procedure: Left Heart Catheterization Without Left Ventriculogram;  Surgeon: Rdahika Santa MD;  Location: [facility] Cath Lab;  Service: Cardiology     EP PACEMAKER INSERT N/A 4/13/2021    Procedure: EP Pacemaker Insertion;  Surgeon: Frederic Burgos MD;  Location: [facility] Cardiac Cath Lab;  Service: Cardiology     HYSTERECTOMY       PARTIAL GASTRECTOMY  1969    for ulcers     TONSILLECTOMY           CURRENT MEDICATIONS:    No current facility-administered medications on file prior to encounter.      Current Outpatient Medications on File Prior to Encounter   Medication Sig     acetaminophen-caffeine (EXCEDRIN) 500-65 mg Tab per tablet Take 1 tablet by mouth daily before breakfast.     apixaban ANTICOAGULANT (ELIQUIS) 2.5 mg Tab tablet Take 1 tablet (2.5 mg total) by mouth 2 (two) times a day.     atorvastatin (LIPITOR) 10 MG tablet Take 1 tablet (10 mg total) by mouth daily.      flecainide (TAMBOCOR) 50 MG tablet Take 1 tablet (50 mg total) by mouth every 12 (twelve) hours.     furosemide (LASIX) 20 MG tablet Take 20 mg by mouth daily after supper.     metoprolol succinate (TOPROL-XL) 100 MG 24 hr tablet Take 1 tablet (100 mg total) by mouth daily.       ALLERGIES:  Allergies   Allergen Reactions     Sulfa (Sulfonamide Antibiotics) Unknown       FAMILY HISTORY:  Family History   Adopted: Yes   Family history unknown: Yes       SOCIAL HISTORY:   Social History     Socioeconomic History     Marital status: Single     Spouse name: Not on file     Number of children: 0     Years of education: Not on file     Highest education level: Not on file   Occupational History     Occupation:  at [employer]     Employer: RETIRED   Social Needs     Financial resource strain: Not on file     Food insecurity     Worry: Not on file     Inability: Not on file     Transportation needs     Medical: Not on file     Non-medical: Not on file   Tobacco Use     Smoking status: Never Smoker     Smokeless tobacco: Never Used   Substance and Sexual Activity     Alcohol use: No     Drug use: No     Sexual activity: Not on file   Lifestyle     Physical activity     Days per week: Not on file     Minutes per session: Not on file     Stress: Not on file   Relationships     Social connections     Talks on phone: Not on file     Gets together: Not on file     Attends Confucianist service: Not on file     Active member of club or organization: Not on file     Attends meetings of clubs or organizations: Not on file     Relationship status: Not on file     Intimate partner violence     Fear of current or ex partner: Not on file     Emotionally abused: Not on file     Physically abused: Not on file     Forced sexual activity: Not on file   Other Topics Concern     Not on file   Social History Narrative    Her adopted brother, Jeff, lives with her. He is five years younger than her.       VITALS:  Patient  "Vitals for the past 24 hrs:   BP Temp Temp src Pulse Resp SpO2 Height Weight   06/17/21 0100 99/73 -- -- (!) 143 -- 96 % -- --   06/17/21 0030 105/60 -- -- 60 19 95 % -- --   06/17/21 0011 124/89 -- -- (!) 131 14 95 % -- --   06/17/21 0000 96/70 -- -- (!) 149 17 97 % -- --   06/16/21 2354 -- -- -- 60 -- -- -- --   06/16/21 2350 112/73 -- -- (!) 125 -- 96 % -- --   06/16/21 2340 103/70 -- -- (!) 129 16 97 % -- --   06/16/21 2330 103/67 -- -- (!) 143 -- 96 % -- --   06/16/21 2320 101/66 -- -- (!) 141 -- 96 % -- --   06/16/21 2300 94/56 -- -- (!) 148 -- 97 % -- --   06/16/21 2251 91/57 -- -- (!) 149 23 97 % -- --   06/16/21 2230 97/71 -- -- (!) 146 18 95 % -- --   06/16/21 2210 91/66 -- -- (!) 146 24 97 % -- --   06/16/21 2200 (!) 89/59 -- -- (!) 146 (!) 30 97 % -- --   06/16/21 2130 105/57 -- -- (!) 162 20 98 % -- --   06/16/21 2120 103/60 -- -- (!) 162 20 98 % -- --   06/16/21 2116 98/65 -- -- (!) 162 21 99 % -- --   06/16/21 2110 102/60 -- -- (!) 162 19 98 % -- --   06/16/21 2107 -- -- -- (!) 162 19 98 % -- --   06/16/21 2105 104/57 -- -- (!) 162 -- 98 % -- --   06/16/21 2104 -- -- -- (!) 162 22 98 % -- --   06/16/21 2100 97/59 -- -- (!) 162 -- 94 % -- --   06/16/21 2055 114/63 -- -- (!) 162 13 97 % -- --   06/16/21 2045 112/63 -- -- (!) 162 18 98 % -- --   06/16/21 2035 98/57 -- -- (!) 162 18 99 % -- --   06/16/21 2033 -- -- -- (!) 162 -- -- -- --   06/16/21 2031 94/57 -- -- (!) 163 27 90 % -- --   06/16/21 2026 144/76 -- -- 91 24 90 % -- --   06/16/21 2021 99/70 -- -- (!) 158 (!) 30 91 % -- --   06/16/21 2015 93/70 -- -- (!) 156 24 99 % -- --   06/16/21 2000 141/67 -- -- 60 16 96 % -- --   06/16/21 1915 124/65 -- -- 74 27 98 % -- --   06/16/21 1900 132/80 -- -- (!) 143 (!) 34 92 % -- --   06/16/21 1845 134/69 -- -- 72 17 98 % -- --   06/16/21 1830 134/61 -- -- 70 25 95 % -- --   06/16/21 1721 133/63 98.3  F (36.8  C) Oral 78 16 93 % 5' 3\" (1.6 m) 119 lb (54 kg)       PHYSICAL EXAM    VITAL SIGNS: BP 99/73   Pulse " "(!) 143   Temp 98.3  F (36.8  C) (Oral)   Resp 19   Ht 5' 3\" (1.6 m)   Wt 119 lb (54 kg)   SpO2 96%   BMI 21.08 kg/m     General presentation: Alert, Vital signs reviewed. No acute distress. Not ill appearing.   HENT: ENT inspection is normal. Oropharynx is moist and clear.   Eye: Pupils are equal and reactive to light. EOMFI  Neck: The neck is supple, with full ROM, with no evidence of meningismus. No lymphadenopathy.  Pulmonary: Currently in no acute respiratory distress. Normal, non labored respirations, the lung sounds are normal with good equal air movement. Clear to auscultation bilaterally.    Circulatory: Regular rate and rhythm. systolic murmur, rubs, or gallops. Peripheral pulses are strong and equal in bilateral upper and lower extremities.   Abdominal: The abdomen is soft. Nontender. No rigidity, guarding, or rebound. Bowel sounds normal.   Neurologic: Alert, oriented to person, place, and time. No motor deficit. No sensory deficit. Cranial nerves II through XII are intact.  Musculoskeletal: No extremity tenderness. Full range of motion in all extremities. No extremity edema.   Skin: Skin color is normal. No rash. Warm. Dry to touch.        LAB:  All pertinent labs reviewed and interpreted.  Results for orders placed or performed during the hospital encounter of 06/16/21   Basic Metabolic Panel   Result Value Ref Range    Sodium 142 136 - 145 mmol/L    Potassium 4.2 3.5 - 5.0 mmol/L    Chloride 110 (H) 98 - 107 mmol/L    CO2 23 22 - 31 mmol/L    Anion Gap, Calculation 9 5 - 18 mmol/L    Glucose 187 (H) 70 - 125 mg/dL    Calcium 8.2 (L) 8.5 - 10.5 mg/dL    BUN 23 8 - 28 mg/dL    Creatinine 1.28 (H) 0.60 - 1.10 mg/dL    GFR MDRD Af Amer 49 (L) >60 mL/min/1.73m2    GFR MDRD Non Af Amer 40 (L) >60 mL/min/1.73m2   Magnesium   Result Value Ref Range    Magnesium 2.0 1.8 - 2.6 mg/dL   HM1 (CBC with Diff)   Result Value Ref Range    WBC 4.7 4.0 - 11.0 thou/uL    RBC 3.20 (L) 3.80 - 5.40 mill/uL    " Hemoglobin 10.3 (L) 12.0 - 16.0 g/dL    Hematocrit 32.2 (L) 35.0 - 47.0 %     (H) 80 - 100 fL    MCH 32.2 27.0 - 34.0 pg    MCHC 32.0 32.0 - 36.0 g/dL    RDW 13.3 11.0 - 14.5 %    Platelets 245 140 - 440 thou/uL    MPV 10.6 8.5 - 12.5 fL    Neutrophils % 70 50 - 70 %    Lymphocytes % 19 (L) 20 - 40 %    Monocytes % 8 2 - 10 %    Eosinophils % 3 0 - 6 %    Basophils % 0 0 - 2 %    Immature Granulocyte % 0 <=0 %    Neutrophils Absolute 3.3 2.0 - 7.7 thou/uL    Lymphocytes Absolute 0.9 0.8 - 4.4 thou/uL    Monocytes Absolute 0.4 0.0 - 0.9 thou/uL    Eosinophils Absolute 0.2 0.0 - 0.4 thou/uL    Basophils Absolute 0.0 0.0 - 0.2 thou/uL    Immature Granulocyte Absolute 0.0 <=0.0 thou/uL   Troponin I   Result Value Ref Range    Troponin I 0.02 0.00 - 0.29 ng/mL   Asymptomatic SARS-CoV-2 (COVID-19)-PCR    Specimen: Respiratory   Result Value Ref Range    SARS-CoV-2 PCR Result Negative Negative, Invalid   Troponin I   Result Value Ref Range    Troponin I 0.02 0.00 - 0.29 ng/mL       RADIOLOGY:  Reviewed all pertinent imaging. Please see official radiology report.  Xr Chest 1 View Portable    Result Date: 6/16/2021  EXAM: XR CHEST 1 VIEW PORTABLE LOCATION: [facility] DATE/TIME: 6/16/2021 7:34 PM INDICATION: syncope COMPARISON: 06/10/2021     Pacemaker with leads over the RA and RV. Stable mild cardiac enlargement. Subtle interstitial prominence remains stable and likely represents the patient's baseline. No acute new findings.      EKG:    Normal sinus rhythm.  Rate of 79.  Right bundle branch block.  Normal QT.  No ST or T wave changes noted.  Compared to the EKG on 6/11/2021 normal sinus rhythm has replaced an AV paced rhythm    I have independently reviewed and interpreted the EKG(s) documented above.      Critical Care  Performed by: Jeff Garces DO  Authorized by: Jeff Garces DO   Total critical care time: 40 minutes  Critical care was necessary to treat or prevent imminent or  life-threatening deterioration of the following conditions: cardiac failure.  Critical care was time spent personally by me on the following activities: discussions with consultants, evaluation of patient's response to treatment, obtaining history from patient or surrogate, ordering and review of laboratory studies, re-evaluation of patient's condition, ordering and review of radiographic studies, ordering and performing treatments and interventions, examination of patient, interpretation of cardiac output measurements and development of treatment plan with patient or surrogate.            I, Sergio Quintana, am serving as a scribe to document services personally performed by Dr. Jeff Garces based on my observation and the provider's statements to me. I, Jeff Garces, DO attest that Sergio Quintana is acting in a scribe capacity, has observed my performance of the services and has documented them in accordance with my direction.    Jeff Garces D.O.  Emergency Medicine  [facility] EMERGENCY DEPARTMENT  [address]  [facility] [address]  Dept: [phone]  Loc: [phone]           Jeff Garces DO  06/17/21 0139

## 2021-06-26 NOTE — PROGRESS NOTES
Internal Medicine Office Visit  [facility]   Patient Name: Laney Hahn  Patient Age: 80 y.o.  YOB: 1940  MRN: [id number]    Date of Visit: 6/16/2021  Reason for Office Visit:   Chief Complaint   Patient presents with     Hospital Visit Follow Up           Assessment / Plan / Medical Decision Making:    Problem List Items Addressed This Visit     Dyslipidemia, goal LDL below 70 (Chronic)    Relevant Medications    atorvastatin (LIPITOR) 10 MG tablet    Heart failure with reduced ejection fraction, NYHA class I (H)     Euvolemic          Relevant Medications    metoprolol succinate (TOPROL-XL) 100 MG 24 hr tablet    flecainide (TAMBOCOR) 50 MG tablet    atorvastatin (LIPITOR) 10 MG tablet    Paroxysmal atrial fibrillation (H)     Continue flecainide, apixaban, metoprolol  Resume apixaban on 6/24/21  Lower the dose of apixaban to 2.5 mg two times a day with next refill due to upcoming birthday turning 81          Relevant Medications    apixaban ANTICOAGULANT (ELIQUIS) 2.5 mg Tab tablet    metoprolol succinate (TOPROL-XL) 100 MG 24 hr tablet    flecainide (TAMBOCOR) 50 MG tablet    atorvastatin (LIPITOR) 10 MG tablet      Other Visit Diagnoses     Hospital discharge follow-up    -  Primary    Atrial fibrillation with rapid ventricular response (H)        Relevant Medications    metoprolol succinate (TOPROL-XL) 100 MG 24 hr tablet    flecainide (TAMBOCOR) 50 MG tablet    atorvastatin (LIPITOR) 10 MG tablet           I have discontinued Janette Hahn's apixaban ANTICOAGULANT. I have also changed her atorvastatin. Additionally, I am having her start on apixaban ANTICOAGULANT. Lastly, I am having her maintain her acetaminophen-caffeine, metoprolol succinate, and flecainide.                No orders of the defined types were placed in this encounter.  Followup: Return in about 3 months (around 9/16/2021) for Next scheduled follow up. earlier if needed.        Rose Mcelroy,  CNP        HPI:  Laney Hahn is a 80 y.o. year old who presents to the office today for follow-up of her recent hospitalization.  She was seen on 6/10/2021 in the emergency department after her friend was concerned that she looked like she could pass out and seemed slightly confused.  She was noted to be in atrial fibrillation with rapid ventricular response.  Her pacemaker device was interrogated and she was found that she had been in atrial fib or flutter for approximately 21 hours.  She was given IV diltiazem and converted back to normal sinus rhythm.  She was started on apixaban 5 mg twice daily.  She was also evaluated for heart failure with preserved ejection fraction with mild to moderate aortic valve stenosis.  An echo was done in the hospital which showed an EF of 48% with severe left atrial and mild right atrial enlargement and moderate aortic valve stenosis.  She is on furosemide 20 mg daily. Her appetite has been good. Her brother notes that she sleeps frequently throughout the day. No chest pain.     She was also found to have a pericardial effusion which was noted on the echo.  Is thought to be iatrogenic from recent pacemaker placement.  No intervention needed as this is expected to resolve on its own.    She was noted to have a drop in her hemoglobin while in the hospital.  This may be due to hemodilution.  She denies any source of bleeding.    She discharged from the hospital to home with her brother on 6/12/21.  They will be moving to an assisted living facility where medications can be administered by the nursing staff.  They expect to move at the end of the summer or beginning of fall.      Health Maintenance Review  Health Maintenance   Topic Date Due     HEPATITIS C SCREENING  Never done     HEART FAILURE ACTION PLAN  Never done     DEXA SCAN  Never done     TD 18+ HE  Never done     ADVANCE CARE PLANNING  Never done     ZOSTER VACCINES (1 of 2) Never done     Pneumococcal Vaccine:  Pediatrics (0 to 5 Years) and At-Risk Patients (6 to 64 Years) (1 of 2 - PPSV23) 01/11/2017     Pneumococcal Vaccine: 65+ Years (1 of 2 - PPSV23) 01/11/2017     MEDICARE ANNUAL WELLNESS VISIT  11/16/2017     FALL RISK ASSESSMENT  01/28/2020     ALT  02/27/2021     LIPID  02/27/2021     INFLUENZA VACCINE RULE BASED (Season Ended) 08/01/2021     BMP  12/16/2021     CBC  06/16/2022     TSH  Completed     COVID-19 Vaccine  Completed     HEPATITIS B VACCINES  Aged Out       Current Scheduled Meds:  Outpatient Encounter Medications as of 6/16/2021   Medication Sig Dispense Refill     acetaminophen-caffeine (EXCEDRIN) 500-65 mg Tab per tablet Take 1 tablet by mouth daily before breakfast.       atorvastatin (LIPITOR) 10 MG tablet Take 1 tablet (10 mg total) by mouth daily. 90 tablet 3     flecainide (TAMBOCOR) 50 MG tablet Take 1 tablet (50 mg total) by mouth every 12 (twelve) hours. 180 tablet 1     metoprolol succinate (TOPROL-XL) 100 MG 24 hr tablet Take 1 tablet (100 mg total) by mouth daily. 90 tablet 1     [DISCONTINUED] apixaban ANTICOAGULANT (ELIQUIS) 5 mg Tab tablet Take 1 tablet (5 mg total) by mouth 2 (two) times a day. 60 tablet 1     [DISCONTINUED] atorvastatin (LIPITOR) 10 MG tablet Take 10 mg by mouth daily.       [DISCONTINUED] flecainide (TAMBOCOR) 50 MG tablet Take 1 tablet (50 mg total) by mouth every 12 (twelve) hours. 60 tablet 1     [DISCONTINUED] furosemide (LASIX) 20 MG tablet Take 1 tablet (20 mg total) by mouth daily. At 7pm 30 tablet 0     [DISCONTINUED] furosemide (LASIX) 20 MG tablet Take 1 tablet (20 mg total) by mouth daily. At 7pm (Patient taking differently: Take 20 mg by mouth daily after supper. At 7pm ) 90 tablet 1     [DISCONTINUED] metoprolol succinate (TOPROL-XL) 100 MG 24 hr tablet Take 1 tablet (100 mg total) by mouth daily. 30 tablet 0     apixaban ANTICOAGULANT (ELIQUIS) 2.5 mg Tab tablet Take 1 tablet (2.5 mg total) by mouth 2 (two) times a day. 180 tablet 3     No  facility-administered encounter medications on file as of 6/16/2021.      Post Discharge Medication Reconciliation Status: discharge medications reconciled and changed, per note/orders      Objective / Physical Examination:  Vitals:    06/16/21 1108   BP: 110/64   Pulse: 82   SpO2: 99%   Weight: 119 lb (54 kg)     Wt Readings from Last 3 Encounters:   06/16/21 119 lb (54 kg)   06/16/21 119 lb (54 kg)   06/12/21 119 lb 1.6 oz (54 kg)     Body mass index is 21.08 kg/m .     Constitutional: In no apparent distress  Respiratory: Clear to auscultation bilaterally. Normal inspiratory and expiratory effort  Cardiovascular: Regular rate and rhythm. Systolic murmur heart best LSB. No edema.

## 2021-06-26 NOTE — ED PROVIDER NOTES
"Transfer of care note ed signout      HPI    Patient seen by Dr. Rosana Blanchard for generalized weakness and fatigue and signed out  care at 10:44 PM.     Pending studies include UA, repeat BMP s/p IVF, pacemaker interrogation.       Brief HPI:  Laney Hahn is a 80 y.o. female who presents to this ED for evaluation of generalized weakness. Per patient's brother, the patient developed generalized weakness and fatigue around 0930 today after taking her pills at 0900. She was not outside in the heat today. The patient denies any other symptoms or complaints at this time.        Per chart review, patient was admitted to [facility] on 4/10/2021 after presenting to the ED for weakness and lightheadedness found to have bradycardia with intermittent high degree AV block. Cardiology recommended pacemaker placement and patient underwent successful implantation of Medtronic dual-chamber pacemaker. Metoprolol was resumed for SVT control. Patient was started on PPI and was discharged home on 4/14.         PHYSICAL EXAM      VITAL SIGNS: /60   Pulse 81   Temp 97.9  F (36.6  C) (Temporal)   Resp 22   Ht 5' 3\" (1.6 m)   Wt 132 lb (59.9 kg)   SpO2 94%   BMI 23.38 kg/m    Repeat exam reveals pt lying comfortably in no distress      LABS  Pertinent lab results reviewed in chart.  Results for orders placed or performed during the hospital encounter of 06/08/21   Urinalysis-UC if Indicated   Result Value Ref Range    Color, UA Yellow Light Yellow, Yellow    Clarity, UA Clear Clear    Glucose, UA Negative Negative    Protein, UA 50 mg/dL (!) Negative    Bilirubin, UA Negative Negative    Urobilinogen, UA 2 mg/dL <2.0 mg/dL    pH, UA 6.0 5.0 - 8.0    Blood, UA 0.03 mg/dL (!) Negative    Ketones, UA Negative Negative    Nitrite, UA Negative Negative    Leukocytes,  Patria/uL (!) Negative    Specific Gravity, UA 1.024 1.001 - 1.030    RBC, UA 0 <=2 hpf    WBC UA 26 (H) <=5 hpf    Bacteria, UA Few (!) None " Seen    Squamous Epithel, UA 3 <=5 /HPF    Mucus, UA Present (!) None Seen lpf    Hyaline Casts, UA 0-5 0-5, None Seen lpf   HM2 (CBC W/O DIFF)   Result Value Ref Range    WBC 9.7 4.0 - 11.0 thou/uL    RBC 4.19 3.80 - 5.40 mill/uL    Hemoglobin 13.8 12.0 - 16.0 g/dL    Hematocrit 40.9 35.0 - 47.0 %    MCV 98 80 - 100 fL    MCH 32.9 27.0 - 34.0 pg    MCHC 33.7 32.0 - 36.0 g/dL    RDW 13.5 11.0 - 14.5 %    Platelets 174 140 - 440 thou/uL    MPV 11.8 8.5 - 12.5 fL   Basic Metabolic Panel   Result Value Ref Range    Sodium 139 136 - 145 mmol/L    Potassium 5.7 (H) 3.5 - 5.0 mmol/L    Chloride 104 98 - 107 mmol/L    CO2 23 22 - 31 mmol/L    Anion Gap, Calculation 12 5 - 18 mmol/L    Glucose 192 (H) 70 - 125 mg/dL    Calcium 9.1 8.5 - 10.5 mg/dL    BUN 40 (H) 8 - 28 mg/dL    Creatinine 1.70 (H) 0.60 - 1.10 mg/dL    GFR MDRD Af Amer 35 (L) >60 mL/min/1.73m2    GFR MDRD Non Af Amer 29 (L) >60 mL/min/1.73m2   Troponin I   Result Value Ref Range    Troponin I 0.03 0.00 - 0.29 ng/mL   Lactic Acid   Result Value Ref Range    Lactic Acid 2.2 (H) 0.7 - 2.0 mmol/L   Asymptomatic SARS-CoV-2 (COVID-19)-PCR    Specimen: Respiratory   Result Value Ref Range    SARS-CoV-2 PCR Result Negative Negative, Invalid   Basic Metabolic Panel   Result Value Ref Range    Sodium 140 136 - 145 mmol/L    Potassium 5.0 3.5 - 5.0 mmol/L    Chloride 107 98 - 107 mmol/L    CO2 23 22 - 31 mmol/L    Anion Gap, Calculation 10 5 - 18 mmol/L    Glucose 171 (H) 70 - 125 mg/dL    Calcium 8.5 8.5 - 10.5 mg/dL    BUN 40 (H) 8 - 28 mg/dL    Creatinine 1.51 (H) 0.60 - 1.10 mg/dL    GFR MDRD Af Amer 40 (L) >60 mL/min/1.73m2    GFR MDRD Non Af Amer 33 (L) >60 mL/min/1.73m2         RADIOLOGY  No results found.      ED COURSE & MEDICAL DECISION MAKING    10:45 PM The patient signed out to me by Dr. Blanchadr.  10:59 PM I talked to [facility] about the patient's report. Interrogation showed nothing concerning.  11:39 PM URINALYSIS DOES APPEAR CONSISTENT WITH  INFECTION.  REPEAT BMP SHOWS DOWNTRENDING CREATININE WITH NORMALIZATION OF POTASSIUM LEVEL.  I rechecked and updated the patient.  I offered her observation stay, but her strong preference to go home.  She lives with her brother and states that it is easy to get in with her primary care doctor for follow-up.  She will call her doctor tomorrow to make a follow-up appointment and understands the need for likely repeat blood work given that elevated potassium levels can result in dangerous heart rhythms.  She understands return precautions and will stay hydrated at home.  Will administer dose of Rocephin here in the ED and she can  oral Rocephin to start tomorrow evening. Suspect moderate elevation of lactate 2/2 dehydration as opposed to sepsis.     At the conclusion of the encounter I discussed  the results of all of the tests and the disposition.   All questions were answered.  The patient acknowledged understanding and was agreeable with the care plan.    PPE: Provider wore gloves, N95 mask, eye protection    FINAL DIAGNOSIS:   1. Generalized muscle weakness    2. Acute cystitis without hematuria    3. Acute kidney injury (H)    4. Hyperkalemia        I, Bharathi Cullen, am serving as a scribe to document services personally performed by Dr.Elise Palafox , based on my observation and the provider's statements to me. I, Dr. Palafox attest that Bharathi Cullen is acting in a scribe capacity, has observed my performance of the services and has documented them in accordance with my direction.         Maria Elena Palafox MD  06/08/21 7640

## 2021-06-26 NOTE — PROGRESS NOTES
Clinic Care Coordination Contact    Follow Up Progress Note      Assessment: Call from Gabriella. She visited patient today and they had already gone out for a walk.  She is doing well since discharge from hospital. Has PCP appt. On 6-16 and cardiology follow ups scheduled.  No other concerns. Gabriella is completing applications for [facility] [employer] and had a question.     Goals addressed this encounter:   Goals Addressed                 This Visit's Progress       Patient Stated      Psychosocial (pt-stated)        Goal Statement: I will see if I qualify for a waiver within 5 months.  Date Goal set: 5-3-2021  Barriers: poor memory  Strengths: has friends who assists  Date to Achieve By: 10-3-2021  Patient expressed understanding of goal: friend Gabriella set up.  Action steps to achieve this goal:  1. Gabriella will call [facility] to request a [place] choices assessment.  2. Gabriella will assist with any paper work and with the assessment.  3. Gabriella will report progress towards this goal at scheduled outreach telephone calls from the [facility] team.   6-10, 6-14 discussed                   Intervention/Education provided during outreach: How to fill out application. Deleted goal to have brother be on her consent to communicate as Gabriella is the primary caregiver who can answer questions about patient.      Outreach Frequency: monthly    Plan:   Delegating to [facility] to call Gabriella in less than 30 days.  Care Coordinator will follow up in 6 weeks and as needed.  Milagro Spring, [facility]  Clinic Care Coordinator  [phone]

## 2021-06-26 NOTE — PATIENT INSTRUCTIONS - HE
- Restart the Eliquis next week on Thursday 6/24/21  - Lower the dose of Eliquis with the next prescription refill to 2.5 mg two times a day   - Okay to take flecainide AM and PM (rather than at noon)

## 2021-06-26 NOTE — PROGRESS NOTES
Cardiogram shows small fixed pericardial effusion  Chest x-ray showed satisfactory lead position not suspicious for perforation  Takes Eliquis 5 mg twice daily  Persistent atrial fibrillation  Not much ventricular pacing  Early converted after IV diltiazem and flecainide to sinus rhythm    Would hold Eliquis for 2 weeks to resolve the pericardial process  I was unable to contact her and unable to leave message

## 2021-06-26 NOTE — PROGRESS NOTES
Chief Complaint   Patient presents with     Altered Mental Status     DIZZY, CONFUSION, WAS IN HOSP A FEW DAYS AGO FOR WEAKNESS.        HPI:  Laney Hahn is a 80 y.o. female who presents today complaining of weakness and confusion per family  Recently dcd from hospital 4/14/21 and seen 6/8/21 yann ER at [facility] with urinary tract infection hyperkalemia with  plan to follow-up with primary care provider the next day.  Patient was unable to follow-up with provider on 6/9 .  Of note on her chart there was also a report of a device alert today  that her heart rate was elevated- patient has a pacemaker Patient was in atrial fibrillation RVR and staff was unable to contact the patient to inform her of this information. On  arrival to the clinic patient was pale, confused and noted to be hypotensive with a blood pressure of 88/62 and a pulse of 170.     History obtained from sibling and friend.    Problem List:  2021-04: (HFpEF) heart failure with preserved ejection fraction (H)  2021-04: Cardiac pacemaker in situ  2021-04: Anemia  2021-04: Bradycardia  2021-04: Junctional bradycardia  2021-04: Acute kidney injury (H)  2020-09: Dyslipidemia, goal LDL below 70  2020-09: Syncope and collapse  2020-09: Nonobstructive atherosclerosis of coronary artery  2020-09: Near syncope  2020-09: Elevated troponin  2020-03: Chronic kidney disease, stage III (moderate)  2017-08: SVT (supraventricular tachycardia) (H), likely AVNRT  2017-08: Aortic valve stenosis, etiology of cardiac valve disease   unspecified  2017-08: Bifascicular bundle branch block  2017-08: Syncope  2017-08: Nonrheumatic aortic valve stenosis  2017-08: Dizziness  2017-08: Dehydration  2017-05: History of TIA (transient ischemic attack)  2017-04: Bacteremia  2016-11: Angular cheilitis  2016-10: Frequent falls  2016-10: Toe deformity, right  Elevated LFTs  Sepsis (H)  UTI (urinary tract infection)  Hypomagnesemia  Essential  hypertension  Hyperkalemia  Acute diastolic congestive heart failure (H)  Metabolic encephalopathy  Cognitive and behavioral changes  Adjustment disorder with anxious mood  DNAR (do not attempt resuscitation)  AV node dysfunction      Past Medical History:   Diagnosis Date     COVID-19 09/14/2020    positive test at [facility]     DNAR (do not attempt resuscitation)      Dyslipidemia, goal LDL below 70 09/15/2020     Lacunar strokes, 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 Nini Rasmussen     Metabolic encephalopathy      Mild to moderate aortic valve stenosis 08/22/2017    stable on 2020 echo     Nonobstructive atherosclerosis of coronary artery 09/15/2020    with normal LVEDP     Paroxysmal SVT (supraventricular tachycardia) (H) 09/07/2017    said to have short episodes while hospitalized for UTI per Dr. Rhys Mensah     Syncope 08/22/2017     UTI (urinary tract infection) 08/21/2017    hospitalized with weakness       Social History     Tobacco Use     Smoking status: Never Smoker     Smokeless tobacco: Never Used   Substance Use Topics     Alcohol use: No       Review of Systems   Pertinent items are noted in HPI.      Vitals:    06/10/21 1732   BP: (!) 88/62   Patient Site: Right Arm   Patient Position: Sitting   Cuff Size: Adult Regular   Pulse: (!) 170   Resp: 16   Temp: 98.7  F (37.1  C)   SpO2: 95%       Physical Exam   BP (!) 88/62 (Patient Site: Right Arm, Patient Position: Sitting, Cuff Size: Adult Regular)   Pulse (!) 170   Temp 98.7  F (37.1  C)   Resp 16   SpO2 95%   General appearance: alert, appears stated age, fatigued, mild distress, pale and slowed mentation  Neurologic: Mental status: alertness: lethargic  Gait: slow deliberate, unsteady     No notes on file    Labs:  None     Radiology:-history noted below from 6/10/21 remote device check-cardiology     Remote Device Check    Result  Date: 6/10/2021  Type: alert pacemaker remote transmission for AT/AF >6hrs. Presenting rhythm: atrial fibrillation with ventricular sensing 135 bpm. Battery/lead status: stable. Arrhythmias: since 6/9/21; 20 AF episodes, per trend current episode in progress since 6/9/21 ~9hrs, v-rates >/=120bpm ~90-95%, AF burden 96.6%. 23 fast A&V episodes, EGMs appear to be RVR during AF. Anticoagulant: None. Comments: normal pacemaker function. Routed to device RN for review. JACQUI ADD: Reviewed remote, agree with tech findings. Call made to patient to assess symptoms. Routed to Dr. Carroll for further review. See epic note. AJM     Remote Device Check    Result Date: 5/14/2021  Type: routine one month PO pacemaker remote transmission. Presenting rhythm: sinus 75 bpm. Battery/lead status: stable. Arrhythmias: since 4/27/21; two ventricular high rate episodes, both appear to be SVT. 5 fast A&V episodes, these all appear to be the same episode on 5/2/21 beginning at 9:09pm 1:1  bpm lasting 3hrs. Comments: normal pacemaker function. JACQUI       This note has been dictated using dragon voice recognition software. Any grammatical or context distortions are unintentional and inherent to the system.     1. Hypotension, unspecified hypotension type     2. Atrial fibrillation with RVR (H)     3. Confusion     4. Urinary tract infection without hematuria, site unspecified  dxd in ER on 6/8/21 and given a dose of rocephin and keflex sent to pharmacy, to start 6/9/21, patient did not start medication    5. Hyperkalemia  dxd in ER on 6/8/21 with plan to follow-up with pcp on 6/9/21 and unable to  complete follow-up             Patient Instructions     To [facility] ER, Dr. Valerie Deleon aware     . Azalea Person MD         Patient transferred via wheelchair to car, her brother and close friend will drive her to [facility]

## 2021-06-26 NOTE — PROGRESS NOTES
Pharmacy Note - Admission Medication History    Pertinent Provider Information: none     ______________________________________________________________________    Prior To Admission (PTA) med list completed and updated in EMR.       PTA Med List   Medication Sig Last Dose     acetaminophen-caffeine (EXCEDRIN) 500-65 mg Tab per tablet Take 1 tablet by mouth daily before breakfast. 6/8/2021 at Unknown time     atorvastatin (LIPITOR) 10 MG tablet Take 10 mg by mouth daily. 6/8/2021 at am     furosemide (LASIX) 20 MG tablet Take 10 mg by mouth every evening. At 7pm 6/7/2021 at Unknown time     metoprolol succinate (TOPROL-XL) 50 MG 24 hr tablet Take 1 tablet (50 mg total) by mouth daily. 6/8/2021 at Unknown time       Information source(s): Family member and CareEverywhere/SureScripts  Method of interview communication: in-person    Summary of Changes to PTA Med List  New: furosemide, Excedrin  Discontinued: Tylenol  Changed: none    Patient was asked about OTC/herbal products specifically.  PTA med list reflects this.    In the past week, patient estimated taking medication this percent of the time:  greater than 90%.    Allergies were reviewed, assessed, and updated with the patient.      Patient does not use any multi-dose medications prior to admission.    The information provided in this note is only as accurate as the sources available at the time of the update(s).    Thank you for the opportunity to participate in the care of this patient.    Saumya Cason, PharmSILVIA  6/8/2021 10:12 PM

## 2021-06-29 NOTE — PROGRESS NOTES
"Progress Notes by Isabell Martinez RN at 10/5/2020  1:00 PM     Author: Isabell Martinez RN Service: -- Author Type: Registered Nurse    Filed: 10/8/2020 10:19 PM Encounter Date: 10/5/2020 Status: Signed    : Isabell Martinez RN (Registered Nurse)       Clinic Care Coordination Contact    Clinic Care Coordination Contact  OUTREACH    Referral Information:  Referral Source: Other, specify(CCC RN)    Primary Diagnosis: Other (include Comment box)(frequent falls)    Chief Complaint   Patient presents with   ? Clinic Care Coordination - Follow-up     Clinic Utilization  Difficulty keeping appointments:: Yes  Compliance Concerns: No  No-Show Concerns: No  No PCP office visit in Past Year: No  Utilization    Last refreshed: 10/8/2020  5:20 PM: Hospital Admissions 2           Last refreshed: 10/8/2020  5:20 PM: ED Visits 1           Last refreshed: 10/8/2020  5:20 PM: No Show Count (past year) 2              Current as of: 10/8/2020  5:20 PM              Clinical Concerns:  RN assessment completed today with patient and brother who lives with her.     - Pertinent medical dx of HLD, Aortic valve stenosis, frequent falls, SVT, syncope, HTN, and CKD stage III.     - Lives in 3 bedroom cottage with younger brother.  No falls since discharged from the hospital  Memory - ' I don't remember everything\". Oriented x3    - denies abuse or neglect.     - no children -never .   - worked at a bank for 30 years.   - declines home care service     - Cardiologist f/u - would like to hold off on this one. States she will talk to her PCP tomorrow if she still needs f/u or not.     - Patient declines community resources and states she has no concerns for herself but she's more concern about her brother who needs assist to cut his long toenails.     - Patient states her brother helped with house chores or anything she needs.   - Patient states she's forgetful that's why she has to rely on her brother.   - States her friends from [employer] comes " almost daily to check on them, bring foods and assist with grocery shopping.   - Patient states she can call her friends from [employer] anytime if she needs anything.     - declines further assist from [facility] for herself but would like assist for her brother. Spoke with patient's brother who hasn't been seen by PCP for > 2 yrs. Writer also did assessment with brother today.       Pain  Pain (GOAL):: No  Health Maintenance Reviewed:    Clinical Pathway: None     Medication Management:  States not taking any meds.   Has ASA on med list. States I think I should take it but I will talk to my doctor tomorrow when I come in.     Functional Status:  Dependent ADLs:: Ambulation-cane  Dependent IADLs:: Cleaning, Cooking, Laundry, Shopping, Transportation  Bed or wheelchair confined:: No  Mobility Status: Independent w/Device  Fallen 2 or more times in the past year?: Yes  Any fall with injury in the past year?: Yes    Living Situation:  Current living arrangement:: I live in assisted living  Type of residence:: Assisted living    Lifestyle & Psychosocial Needs:        Diet:: Regular  Inadequate nutrition (GOAL):: No  Tube Feeding: No  Inadequate activity/exercise (GOAL):: No  Significant changes in sleep pattern (GOAL): No  Transportation means:: Family, Friend     Amish or spiritual beliefs that impact treatment:: No  Mental health DX:: No  Mental health management concern (GOAL):: No  Informal Support system:: Family, Friends(friendds from [employer])   Socioeconomic History   ? Marital status: Single     Spouse name: Not on file   ? Number of children: 0   ? Years of education: Not on file   ? Highest education level: Not on file   Occupational History   ? Occupation:  at [employer]     Employer: RETIRED     Tobacco Use   ? Smoking status: Never Smoker   ? Smokeless tobacco: Never Used   Substance and Sexual Activity   ? Alcohol use: No   ? Drug use: No         Resources and Interventions:  Current Resources:       Community Resources: Home Care, Other (see comment), Housekeeping/Chore Agency([facility])     Equipment Currently Used at Home: cane, straight         Referrals Placed: None     Goals:   n/a         Plan:   1) Not a CCC candidate.

## 2021-06-30 NOTE — PROGRESS NOTES
Progress Notes by Milagro Spring SW at 4/27/2021 11:00 AM     Author: Milagro Spring SW Service: -- Author Type:     Filed: 5/3/2021  1:52 PM Encounter Date: 4/27/2021 Status: Addendum    : Milagro Spring SW ()    Related Notes: Original Note by Milagro Spring SW () filed at 4/28/2021  4:32 PM       Clinic Care Coordination Contact    Follow Up Progress Note      Assessment: Call from Gabriella. She and her  help patient and her brother.  Gabriella sets up her medications and brother reminds her to take it.  Gabriella thinks that moving is not the best thing for them to do. She wants to be closer to shopping, but they are unable to arrange movers and have a lot of things.  The last two times they moved, Gabriella and her  helped and they are not going to do that again.  She is encouraging them to stay where they are.  Patient and her brother rely on each other and would likely not be able to live on their own.  Gabriella asked about getting help with paying for services. She thinks that patient gets about $1600 per month and does not have any savings.  She could benefit from having meals on wheels.  Gabriella is open to taking calls regarding patient as she doesn't think patient or her brother remember well enough to act on what is said on a phone call. Gabriella is a teacher and works until 1 PM and asks for calls after 1.      Goals addressed this encounter:   Goals Addressed                 This Visit's Progress       Patient Stated    ? Psychosocial (pt-stated)        Goal Statement: I will have consent to communicate form completed so my brother can get information and speak on my behalf within 3 months.  Date Goal set: 4-  Barriers: difficult to get things done.   Strengths: Has close friends from [employer], brother assists.  Date to Achieve By: 7-  Patient expressed understanding of goal: brother set up goal.   Action steps to achieve this  goal:  1. I will complete consent to communicate naming my brother.   2. I will bring it to the clinic when completed.  3. I will report progress towards this goal at scheduled outreach telephone calls from the [facility] team.           ? Psychosocial (pt-stated)        Goal Statement: I will see if I qualify for a waiver within 5 months.  Date Goal set: 5-3-2021  Barriers: poor memory  Strengths: has friends who assists  Date to Achieve By: 10-3-2021  Patient expressed understanding of goal: friend Gabriella set up.  Action steps to achieve this goal:  1. Gabriella will call [facility] to request a [place] assessment.  2. Gabriella will assist with any paper work and with the assessment.  3. Gabriella will report progress towards this goal at scheduled outreach telephone calls from the CCC team.                    Intervention/Education provided during outreach: Reviewed [place] and waiver services.       Outreach Frequency: monthly    Plan:   Delegating to CHW to contact [place] after 1 PM within 30 days.   Care Coordinator will follow up in 45 days and as needed.    Clinic Care Coordination Contact  Care Team Conversations   Left a message for either Bipin or Gabriella Givens, on consent to communicate to further assess patient needs.     Plan- talk with Bipin or Gabriella or call again in one week.  Milagro Spring [facility]  Clinic Care Coordinator  [phone]    Clinic Care Coordination Contact    Clinic Care Coordination Contact  OUTREACH    Referral Information:  Referral Source: PCP    Primary Diagnosis: Psychosocial(frequent falls)    Chief Complaint   Patient presents with   ? Clinic Care Coordination - Initial        Universal Utilization:   Clinic Utilization  Difficulty keeping appointments:: Yes  Compliance Concerns: No  No-Show Concerns: No  No PCP office visit in Past Year: No  Utilization    Last refreshed: 4/27/2021  3:20 PM: Hospital Admissions 4           Last refreshed: 4/27/2021  3:20 PM: ED Visits 4            Last refreshed: 4/27/2021  3:20 PM: No Show Count (past year) 2              Current as of: 4/27/2021  3:20 PM              Clinical Concerns:  Current Medical Concerns:  Was sleeping when called.  Brother answered phone and put patient on the phone and she agreed to have MERYL NOVA talk with her brother.  Patient did not participate in assessment.  No consent to communicate on file with brother. She relies on him for assistance.    Had pace maker installed recently after being hospitalized in March with cardiac concerns.  Has some cognitive loss.  Current Behavioral Concerns:   None noted.   Education Provided to patient: Reviewed role of care coordination.    Pain  Pain (GOAL):: No  Health Maintenance Reviewed: Due  Clinical Pathway: None     Medication Management:  Brother assists with oversight.      Functional Status:  Dependent ADLs:: Ambulation-cane  Dependent IADLs:: Medication Management, Meal Preparation, Cleaning, Cooking, Laundry, Shopping  Bed or wheelchair confined:: No  Mobility Status: Independent w/Device  Fallen 2 or more times in the past year?: No  Any fall with injury in the past year?: No    Living Situation:  Current living arrangement:: I live in a private home with family  Type of residence:: Apartment - handicap accessible    He is considering moving to a town home with multiple levels. Patient would live on the mail level and would not need to go up stairs.  Also considering moving to a new apartment in town which would be more convenient.  They pay $1200 for rent.  Has support from [employer] friends. Consent to communicate on file.   Lifestyle & Psychosocial Needs:        Diet:: Regular  Inadequate nutrition (GOAL):: No  Tube Feeding: No  Inadequate activity/exercise (GOAL):: No  Significant changes in sleep pattern (GOAL): No  Transportation means:: Public transportation, Regular car, Friend     Spiritism or spiritual beliefs that impact treatment:: No  Mental health DX:: No  Mental  health management concern (GOAL):: No  Chemical Dependency Status: Not Applicable  Informal Support system:: Family, Friends   Socioeconomic History   ? Marital status: Single     Spouse name: Not on file   ? Number of children: 0   ? Years of education: Not on file   ? Highest education level: Not on file   Occupational History   ? Occupation:  at [employer]     Employer: RETIRED     Tobacco Use   ? Smoking status: Never Smoker   ? Smokeless tobacco: Never Used   Substance and Sexual Activity   ? Alcohol use: No   ? Drug use: No          They are able to be independent.  He assists her with cleaning, shopping, medications, transportation on the bus, and making calls for her.       Resources and Interventions:  Current Resources:      Community Resources: None  Supplies Currently Used at Home: None  Equipment Currently Used at Home: cane, straight  Type of Employment: Retired       Advance Care Plan/Directive  Advanced Care Plans/Directives on file:: Yes  Type Advanced Care Plans/Directives: DNR/DNI  Advanced Care Plan/Directive Status: Declined Further Information    Referrals Placed: None     Goals:   Goals        General    Psychosocial (pt-stated)     Notes - Note edited  4/27/2021  4:13 PM by Milagro Spring SW    Goal Statement: I will have consent to communicate form completed so my brother can get information and speak on my behalf within 3 months.  Date Goal set: 4-  Barriers: difficult to get things done.   Strengths: Has close friends from [employer], brother assists.  Date to Achieve By: 7-  Patient expressed understanding of goal: brother set up goal.   Action steps to achieve this goal:  1. I will complete consent to communicate naming my brother.   2. I will bring it to the clinic when completed.  3. I will report progress towards this goal at scheduled outreach telephone calls from the CCC team.                 Patient/Caregiver understanding: Will send care plan and  consent to communicate for patient to complete.      Outreach Frequency: monthly  Future Appointments              In 2 weeks [facility] REMOTE DEVICE CHECK FROM HOME [facility]    In 2 months [facility] DEVICE NURSE 1 [facility]    In 3 months Rose Mcelroy, YOLANDA [facility]          Plan: Delegating to CHW to contact patient in less than 30 days.   CC available as needed and in 45 days.    Milagro Spring SUKHDEEP  Clinic Care Coordinator  [phone]

## 2021-06-30 NOTE — PROGRESS NOTES
Progress Notes by Flavio Carroll MD at 2/3/2021  9:10 AM     Author: Flavio Carroll MD Service: -- Author Type: Physician    Filed: 2/3/2021 10:27 AM Encounter Date: 2/3/2021 Status: Signed    : Flavio Carroll MD (Physician)           Thank you, Ms. Mcelroy, YOLANDA, for advising Laney Hahn to meet with me in consultation today at the [facility] to evaluate and treat her cardiovascular disorders.     Assessment:    1. Nonobstructive atherosclerosis of coronary artery  aspirin 81 MG EC tablet   2. Dyslipidemia, goal LDL below 70  atorvastatin (LIPITOR) 10 MG tablet   3. SVT (supraventricular tachycardia) (H), likely AVNRT  RAIN Monitor Hook-Up   4. Mild to moderate aortic valve stenosis     5. Essential hypertension         Plan:    1. Start aspirin 81 mg p.o. daily for her atherosclerosis.  2. Start atorvastatin 10 mg p.o. daily to treat her atherosclerosis.  She should have a repeat lipid profile in 3 to 4 months with her primary care team.  3. We will arrange an electrophysiology consultation as was recommended during her recent hospitalizations.  4. I also ordered the patient activated monitor that was recommended by Dr. Bang Burgos of our electrophysiology team.  5. Return to see Dr. Carroll in 6 months.  I asked her to bring her medication list of her medication bottles to all visits.    An After Visit Summary was printed and given to the patient.    Current History:    She was accompanied to the visit today by her friend, Tami Toledo, who drives her to medical appointments.    Laney saw me once before in consultation for her aortic stenosis in September 2017.  She failed to follow-up a year later as I recommended.  Since that time she has been hospitalized several times and seen in consultation by several of my colleagues.  In September of last year she was hospitalized at [facility] in [place] to have a COVID-19 infection and then was transferred to [facility] where she had a coronary angiogram showed nonobstructive coronary atherosclerosis.  During her angiogram she had a catheter induced supraventricular tachycardia.  Her care was discussed with Dr. Bang Burgos by the interventional cardiologist, Dr. Radhika Santa.  In December she was brought to [facility] by the paramedics after 4 days of feeling listless and fatigued.  Paramedics stated she was in a supraventricular tachycardia which they converted with intravenous adenosine.  During that hospitalization it was advised that she have an electrophysiology consultation.  Rather than that consultation, she was sent to our general cardiology clinic today to see me.    Tami is said to have impairment of her cognitive function.  She does not recall the details of any of her recent medical care.  She does not recall her medication list but tells me that she is taking the medications prescribed by Dr. Polina Dubon at her last discharge.    Tami does not recall having chest discomfort, shortness of breath, palpitations, lightheadedness or lower extremity edema.    Patient Active Problem List   Diagnosis   ? Frequent falls   ? Dyslipidemia, goal LDL below 70   ? SVT (supraventricular tachycardia) (H), likely AVNRT   ? Bifascicular bundle branch block   ? Essential hypertension   ? Chronic kidney disease, stage III (moderate)   ? Cognitive and behavioral changes   ? Adjustment disorder with anxious mood   ? DNAR (do not attempt resuscitation)   ? Nonobstructive atherosclerosis of coronary artery   ? Mild to moderate aortic valve stenosis       Past Medical History:  Past Medical History:   Diagnosis Date   ? COVID-19 09/14/2020    positive test at [facility]   ? DNAR (do not attempt resuscitation)    ? Dyslipidemia, goal LDL below 70 09/15/2020   ? Lacunar strokes, bilateral 11/12/2015    seen on CT scan and confirmed at second scan; symptoms included gait disturbance, facial droop and difficulty writing  per Dr. Nini Rasmussen   ? Metabolic encephalopathy    ? Mild to moderate aortic valve stenosis 08/22/2017    stable on 2020 echo   ? Nonobstructive atherosclerosis of coronary artery 09/15/2020    with normal LVEDP   ? Paroxysmal SVT (supraventricular tachycardia) (H) 09/07/2017    said to have short episodes while hospitalized for UTI per Dr. Rhys Mensah   ? Syncope 08/22/2017   ? UTI (urinary tract infection) 08/21/2017    hospitalized with weakness       Past Surgical History:  Past Surgical History:   Procedure Laterality Date   ? CATARACT EXTRACTION, BILATERAL     ? CV CORONARY ANGIOGRAM N/A 9/15/2020    Procedure: Coronary Angiogram;  Surgeon: Radhika Santa MD;  Location: [facility];  Service: Cardiology   ? CV LEFT HEART CATHETERIZATION WO LEFT VETRICULOGRAM Left 9/15/2020    Procedure: Left Heart Catheterization Without Left Ventriculogram;  Surgeon: Radhika Santa MD;  Location: [facility];  Service: Cardiology   ? HYSTERECTOMY     ? PARTIAL GASTRECTOMY  1969    for ulcers   ? TONSILLECTOMY         Family History:  Family History   Adopted: Yes   Family history unknown: Yes       Social History:   reports that she has never smoked. She has never used smokeless tobacco. She reports that she does not drink alcohol or use drugs.  Social History     Socioeconomic History   ? Marital status: Single     Spouse name: Not on file   ? Number of children: 0   ? Years of education: Not on file   ? Highest education level: Not on file   Occupational History   ? Occupation:  at [employer]     Employer: RETIRED   Social Needs   ? Financial resource strain: Not on file   ? Food insecurity     Worry: Not on file     Inability: Not on file   ? Transportation needs     Medical: Not on file     Non-medical: Not on file   Tobacco Use   ? Smoking status: Never Smoker   ? Smokeless tobacco: Never Used   Substance and Sexual Activity   ? Alcohol use: No   ? Drug use: No   ? Sexual  activity: Not on file   Lifestyle   ? Physical activity     Days per week: Not on file     Minutes per session: Not on file   ? Stress: Not on file   Relationships   ? Social connections     Talks on phone: Not on file     Gets together: Not on file     Attends Jainism service: Not on file     Active member of club or organization: Not on file     Attends meetings of clubs or organizations: Not on file     Relationship status: Not on file   ? Intimate partner violence     Fear of current or ex partner: Not on file     Emotionally abused: Not on file     Physically abused: Not on file     Forced sexual activity: Not on file   Other Topics Concern   ? Not on file   Social History Narrative    Her adopted brother, Jeff, lives with her. He is five years younger than her.       Medications:  Outpatient Encounter Medications as of 2/3/2021   Medication Sig Dispense Refill   ? acetaminophen (TYLENOL) 325 MG tablet Take 2 tablets (650 mg total) by mouth every 4 (four) hours as needed for pain. 10 tablet 0   ? furosemide (LASIX) 20 MG tablet Take 0.5 tablets (10 mg total) by mouth daily. Aortic stenosis 45 tablet 1   ? metoprolol succinate (TOPROL-XL) 25 MG Take 0.5 tablets (12.5 mg total) by mouth daily. For svt prevent 45 tablet 1   ? aspirin 81 MG EC tablet Take 1 tablet (81 mg total) by mouth daily. 100 tablet 3   ? atorvastatin (LIPITOR) 10 MG tablet Take 1 tablet (10 mg total) by mouth daily. 90 tablet 3     No facility-administered encounter medications on file as of 2/3/2021.        Allergies:  Sulfa (sulfonamide antibiotics)    Review of Systems:     General: Weight Loss  Eyes: WNL  Ears/Nose/Throat: WNL  Lungs: WNL  Heart: Irregular Heartbeat  Stomach: Diarrhea  Bladder: WNL  Muscle/Joints: WNL  Skin: Rash  Nervous System: Loss of Balance  Mental Health: WNL     Blood: WNL    Objective:     Physical Exam:  Wt Readings from Last 5 Encounters:   02/03/21 118 lb (53.5 kg)   01/05/21 120 lb (54.4 kg)   12/30/20  "128 lb 12.8 oz (58.4 kg)   12/28/20 117 lb 1.6 oz (53.1 kg)   12/23/20 116 lb 1.6 oz (52.7 kg)      5' 3\" (1.6 m)  Body mass index is 20.9 kg/m .  /60 (Patient Site: Right Arm, Patient Position: Sitting, Cuff Size: Adult Regular)   Pulse 74   Resp 16   Ht 5' 3\" (1.6 m)   Wt 118 lb (53.5 kg)   SpO2 98%   BMI 20.90 kg/m      General Appearance: Alert and not in distress   HEENT: No scleral icterus; the tongue was not examined as she is wearing a mask due to Covid restrictions   Neck: No cervical bruits, adenopathy, or thyromegaly; jugular venous pressure is less than 5 cm   Chest: The spine is straight and the chest is symmetric   Lungs: Respirations are unlabored; the lungs are clear to auscultation   Cardiovasular: Auscultation reveals normal first and second heart sounds with a grade 2/6 early peaking systolic ejection murmur; the carotid, radial, femoral, and posterior tibial pulses are intact   Abdomen: No organomegaly, masses, bruits, or tenderness; bowel sounds are present   Extremities: No cyanosis, clubbing or edema   Skin: No xanthelasma   Neurologic: Mood and affect are appropriate; she uses a cane for ambulation but was able to get on and off the examination table without assistance       Cardiac testing:    Echocardiogram:   Results for orders placed during the hospital encounter of 09/14/20   Echo Complete [ECH10] 09/14/2020    Narrative   Left ventricle ejection fraction is mildly decreased. The calculated   left ventricular ejection fraction is 51% with moderate hypokinesis of the   basal inferior wall.    Left ventricular diastolic dysfunction    Normal right ventricular size and systolic function.    Mild to moderate aortic valve stenosis    Mild mitral and tricuspid regurgitation. No evidence of pulmonary   hypertension.    When compared to the previous study dated 11/9/2018, degree of aortic   valve stenosis has increased mildly.          Cardiac Catheterization:   Results for orders " placed during the hospital encounter of 09/15/20   Cardiac Catheterization [CATH01] 09/15/2020    Narrative   Near syncopal episodes in patient with HFpEF, mild-moderate aortic valve   stenosis and renal insufficiency. Troponin 0.4 and echo with stable EF 51%   and inferior wall hypokinesis, which was also previously noted.    Mild coronary disease outside of a moderate proximal circumflex lesion.   FFR of circumflex not flow limiting.    LV EDP normal.    She did develop an SVT triggered by ectopy from the pigtail catheter in   the ventricle. Rate of SVT was in the 170s and broke with a cough. Her BP   did drop with the SVT but she was asymptomatic.    Estimated blood loss was <20 ml.           Results for orders placed in visit on 02/26/09   NM Pharmacologic Stress Test [WBE349] 02/26/2009    Narrative See Historical Hospital Medical Record for documentation       Imaging:    No results found.    Lab Review:    Lab Results   Component Value Date     12/24/2020    K 4.6 12/24/2020     12/24/2020    CO2 28 12/24/2020    BUN 28 12/24/2020    CREATININE 1.03 12/24/2020    CALCIUM 8.4 (L) 12/24/2020     Lab Results   Component Value Date    WBC 6.8 12/16/2020    HGB 12.7 12/16/2020    HCT 38.3 12/16/2020     12/16/2020     12/16/2020     Lab Results   Component Value Date    CHOL 153 02/27/2020    TRIG 112 02/27/2020    HDL 55 02/27/2020     BNP (pg/mL)   Date Value   12/16/2020 1,646 (H)   09/14/2020 356 (H)     Creatinine (mg/dL)   Date Value   12/24/2020 1.03   12/21/2020 1.19 (H)   12/21/2020 1.27 (H)   12/20/2020 1.12 (H)     LDL Calculated (mg/dL)   Date Value   02/27/2020 76   08/03/2017 49   12/06/2016 103           Much or all of the text in this note was generated through the use of the Dragon Dictate voice-to-text software. Errors in spelling or words which seem out of context are unintentional. Sound alike errors, in particular, may have escaped editing.

## 2021-07-03 NOTE — ADDENDUM NOTE
Addendum Note by Rose Mcelroy FNP at 10/9/2018  9:37 AM     Author: Rose Mcelroy FNP Service: -- Author Type: Nurse Practitioner    Filed: 10/9/2018  9:37 AM Encounter Date: 10/9/2018 Status: Signed    : Rose Mcelroy FNP (Nurse Practitioner)    Addended by: ROSE MCERLOY on: 10/9/2018 09:37 AM        Modules accepted: Orders

## 2021-07-04 NOTE — PROGRESS NOTES
Progress Notes by Pamella Romero RN at 6/12/2021  2:56 PM     Author: Pamella Romero RN Service: -- Author Type: Registered Nurse    Filed: 6/14/2021  9:15 AM Encounter Date: 6/12/2021 Status: Addendum    : Pamella Romero RN (Registered Nurse)    Related Notes: Original Note by Pamella Romero RN (Registered Nurse) filed at 6/14/2021  9:15 AM       Phone call to patient's friend, Gabriella (reviewed consent to communicate), patient with significant dementia.    Reviewed recommendations from Dr. Burgos, she states she does help set up the patients medications.  She will stop Eliquis for 2 weeks and then resume, reviewed rationale.      She also notes that Eliquis was very expensive (400$) when they picked it up from the pharmacy, is there any way to get this medication for a lower cost.  Explained the patient will need to call her insurance company to discuss cost or cheaper options, she states understanding.    Discussed need for f/u in device clinic and will ask our  to contact her for apt this week in device clinic.  She states understanding.                Frederic Burgos MD  P [facility] Ep Rn Support Pool             Developed pericardial effusion after pacemaker suspect microperforation   Also some atrial arrhythmias   Tried calling patient but no answer   Would hold Eliquis for 2 weeks to prevent worsening of pericardial effusion which is likely hemopericardium   Should come into the device clinic next week and have full device interrogation

## 2021-07-06 VITALS — BODY MASS INDEX: 23.39 KG/M2 | WEIGHT: 132 LBS | HEIGHT: 63 IN

## 2021-07-06 VITALS
TEMPERATURE: 98.7 F | HEART RATE: 170 BPM | OXYGEN SATURATION: 95 % | DIASTOLIC BLOOD PRESSURE: 62 MMHG | RESPIRATION RATE: 16 BRPM | SYSTOLIC BLOOD PRESSURE: 88 MMHG

## 2021-07-06 VITALS
OXYGEN SATURATION: 99 % | HEART RATE: 82 BPM | BODY MASS INDEX: 21.08 KG/M2 | WEIGHT: 119 LBS | DIASTOLIC BLOOD PRESSURE: 64 MMHG | SYSTOLIC BLOOD PRESSURE: 110 MMHG

## 2021-07-06 VITALS — HEIGHT: 63 IN | WEIGHT: 119 LBS | BODY MASS INDEX: 21.09 KG/M2

## 2021-07-14 PROBLEM — Z86.73 HISTORY OF TIA (TRANSIENT ISCHEMIC ATTACK): Status: RESOLVED | Noted: 2017-05-03 | Resolved: 2017-09-07

## 2021-07-15 ENCOUNTER — PATIENT OUTREACH (OUTPATIENT)
Dept: CARE COORDINATION | Facility: CLINIC | Age: 81
End: 2021-07-15

## 2021-07-15 NOTE — PROGRESS NOTES
Clinic Care Coordination Contact  Care Team Conversations  Call from Tae who is working from patient's home. She talked to [facility] today and they did not receive anything yet. FRW faxed it in on Monday.  W states it may take up to 10 business days to get the paperwork scanned into system so Gabriella needs to wait before calling them to check.    Plan Work with Gabriella to help patient get waiver services.  Check with Gabriella in 2 weeks.    Social Arpan Bergeron  [facility]  [phone]      Clinic Care Coordination Contact    Follow Up Progress Note      Assessment: Call from Gabriella, friend who is helping with MA application. She should have heard from [facility] by now to do interview and doesn't know who to call to inquire.  She is ok if FRW call her back to assist.      Goals addressed this encounter:   Goals Addressed                    This Visit's Progress       Patient Stated       Financial Wellbeing (pt-stated)   60%      Goal Statement: I will see if I qualify for a waiver within 5 months.   Date Goal set: 5-3-2021   Barriers: poor memory   Strengths: has friends who assists   Date to Achieve By: 10-3-2021   Patient expressed understanding of goal: friend Gabriella set up.   Action steps to achieve this goal:   1. Gabriella will call [facility] to [place].   2. Gabriella will assist with any paper work and with the assessment.   3. Gabriella will report progress towards this goal at scheduled outreach telephone calls from the [facility] team.   6-10, 6-14, 7-15 discussed             Intervention/Education provided during outreach: NA     Outreach Frequency: monthly    Plan:   Delegating to CHW to contact in less than 30 days.   Care Coordinator will follow up in 6 weeks and as needed.  Social Arpan Bergeron  [facility]  [phone]

## 2021-07-15 NOTE — PROGRESS NOTES
Patient spoke with [facility] SW on 7/15/2021 and discussed goals      CHW delegations: Delegating to CHW to contact in less than 30 days.   Care Coordinator will follow up in 6 weeks and as needed.     Next outreach due: 8/13/2021

## 2021-07-21 ENCOUNTER — DOCUMENTATION ONLY (OUTPATIENT)
Dept: CARDIOLOGY | Facility: CLINIC | Age: 81
End: 2021-07-21

## 2021-07-21 ENCOUNTER — ANCILLARY PROCEDURE (OUTPATIENT)
Dept: CARDIOLOGY | Facility: CLINIC | Age: 81
End: 2021-07-21
Attending: INTERNAL MEDICINE
Payer: COMMERCIAL

## 2021-07-21 VITALS
HEART RATE: 70 BPM | DIASTOLIC BLOOD PRESSURE: 60 MMHG | BODY MASS INDEX: 20.9 KG/M2 | SYSTOLIC BLOOD PRESSURE: 118 MMHG | WEIGHT: 118 LBS

## 2021-07-21 DIAGNOSIS — Z95.0 CARDIAC PACEMAKER IN SITU: ICD-10-CM

## 2021-07-21 PROCEDURE — 93280 PM DEVICE PROGR EVAL DUAL: CPT | Performed by: INTERNAL MEDICINE

## 2021-07-21 NOTE — PROGRESS NOTES
"Type: In-clinic three month post-op wound and device (IPG) check.   Presenting Rhythm: Ap- at 80 bpm, occasional PVCs bserved.  Lead/Battery status: Stable lead and battery measurements.  Arrhythmias: Since 06/18/21 - 5 ventricular high rate detections on 07/11/21, 2 Fast A&V episodes, EGMs show apparent NSVT. On 07/11/21, Episode #658 (Fast A&V) EGM suggests a dual tach - an atrial tachy arrhythmia and NSVT lasting 10+ seconds, ventricular rates >220pm. Patient's brother states, \"She kept passing out, I remember because we were getting her pills together.\" 185 Mode Switches, EGM confirms atrial fibrillation, longest episode was 14 minutes on 06/24/21. Patient does not recall \"passing out\" and denies palpitations, chest discomfort, shortness of breath, dizziness, lightheadedness.  Anticoagulant: apixaban  Comments: Normal device function, programmed on atrial ATP per CHAD and Device Clinic protocol, VT Detection reprogrammed from 150 to 140 bpm. Device incision is well-healed, no erythema, no edema present. Patient was hospitalized at [facility] and underwent an AVNA on 06/18/21. Routed to Dr. Burgos for review. Kiana Pedraza RN  "

## 2021-07-21 NOTE — PROGRESS NOTES
"lType: In-clinic three month post-op wound and device (IPG) check.   Presenting Rhythm: Ap- at 80 bpm, occasional PVCs bserved.  Lead/Battery status: Stable lead and battery measurements.  Arrhythmias: Since 06/18/21 - 5 ventricular high rate detections on 07/11/21, 2 Fast A&V episodes, EGMs show apparent NSVT. On 07/11/21, Episode #658 (Fast A&V) EGM suggests a dual tach - an atrial tachy arrhythmia and NSVT lasting 10+ seconds, ventricular rates >220pm. Patient's brother states, \"She kept passing out, I remember because we were getting her pills together.\" 185 Mode Switches, EGM confirms atrial fibrillation, longest episode was 14 minutes on 06/24/21. Patient does not recall \"passing out\" and denies palpitations, chest discomfort, shortness of breath, dizziness, lightheadedness.  Anticoagulant: apixaban  Comments: Normal device function, programmed on atrial ATP per CHAD and Device Clinic protocol, VT Detection reprogrammed from 150 to 140 bpm. Device incision is well-healed, no erythema, no edema present. Patient was hospitalized at [facility] and underwent an AVNA on 06/18/21. Routed to Dr. Burgos for review. Kiana Pedraza RN    EGMs are attached to [employer] report  "

## 2021-07-21 NOTE — DISCHARGE INSTRUCTIONS
PLAN:  - Send today's pacemaker check results to Dr. Burgos for review to see if he would like to follow-up with you in the Device Clinic.  We will be in touch with you after hearing back from Dr. Burgos.  (Device Clinic Telephone Number: [phone])  - Your next automatic pacemaker transmission (from home) will take place on 10/22/2021.

## 2021-07-22 ENCOUNTER — RESULT FOLLOW UP (OUTPATIENT)
Facility: CLINIC | Age: 81
End: 2021-07-22

## 2021-07-22 NOTE — CONFIDENTIAL NOTE
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

## 2021-07-23 DIAGNOSIS — I47.10 SVT (SUPRAVENTRICULAR TACHYCARDIA) (H): Primary | ICD-10-CM

## 2021-07-23 LAB
MDC_IDC_EPISODE_DTM: NORMAL
MDC_IDC_EPISODE_DURATION: 0 S
MDC_IDC_EPISODE_DURATION: 0 S
MDC_IDC_EPISODE_DURATION: 1 S
MDC_IDC_EPISODE_DURATION: 10 S
MDC_IDC_EPISODE_DURATION: 100 S
MDC_IDC_EPISODE_DURATION: 113 S
MDC_IDC_EPISODE_DURATION: 121 S
MDC_IDC_EPISODE_DURATION: 132 S
MDC_IDC_EPISODE_DURATION: 15 S
MDC_IDC_EPISODE_DURATION: 15 S
MDC_IDC_EPISODE_DURATION: 16 S
MDC_IDC_EPISODE_DURATION: 17 S
MDC_IDC_EPISODE_DURATION: 171 S
MDC_IDC_EPISODE_DURATION: 175 S
MDC_IDC_EPISODE_DURATION: 182 S
MDC_IDC_EPISODE_DURATION: 2 S
MDC_IDC_EPISODE_DURATION: 20 S
MDC_IDC_EPISODE_DURATION: 206 S
MDC_IDC_EPISODE_DURATION: 22 S
MDC_IDC_EPISODE_DURATION: 22 S
MDC_IDC_EPISODE_DURATION: 230 S
MDC_IDC_EPISODE_DURATION: 232 S
MDC_IDC_EPISODE_DURATION: 26 S
MDC_IDC_EPISODE_DURATION: 26 S
MDC_IDC_EPISODE_DURATION: 261 S
MDC_IDC_EPISODE_DURATION: 28 S
MDC_IDC_EPISODE_DURATION: 297 S
MDC_IDC_EPISODE_DURATION: 31 S
MDC_IDC_EPISODE_DURATION: 313 S
MDC_IDC_EPISODE_DURATION: 329 S
MDC_IDC_EPISODE_DURATION: 34 S
MDC_IDC_EPISODE_DURATION: 368 S
MDC_IDC_EPISODE_DURATION: 387 S
MDC_IDC_EPISODE_DURATION: 388 S
MDC_IDC_EPISODE_DURATION: 39 S
MDC_IDC_EPISODE_DURATION: 399 S
MDC_IDC_EPISODE_DURATION: 4 S
MDC_IDC_EPISODE_DURATION: 434 S
MDC_IDC_EPISODE_DURATION: 45 S
MDC_IDC_EPISODE_DURATION: 46 S
MDC_IDC_EPISODE_DURATION: 50 S
MDC_IDC_EPISODE_DURATION: 517 S
MDC_IDC_EPISODE_DURATION: 54 S
MDC_IDC_EPISODE_DURATION: 569 S
MDC_IDC_EPISODE_DURATION: 60 S
MDC_IDC_EPISODE_DURATION: 607 S
MDC_IDC_EPISODE_DURATION: 68 S
MDC_IDC_EPISODE_DURATION: 68 S
MDC_IDC_EPISODE_DURATION: 717 S
MDC_IDC_EPISODE_DURATION: 779 S
MDC_IDC_EPISODE_DURATION: 82 S
MDC_IDC_EPISODE_DURATION: 839 S
MDC_IDC_EPISODE_DURATION: 84 S
MDC_IDC_EPISODE_DURATION: 87 S
MDC_IDC_EPISODE_DURATION: 88 S
MDC_IDC_EPISODE_DURATION: 9 S
MDC_IDC_EPISODE_DURATION: 93 S
MDC_IDC_EPISODE_ID: 468
MDC_IDC_EPISODE_ID: 604
MDC_IDC_EPISODE_ID: 605
MDC_IDC_EPISODE_ID: 606
MDC_IDC_EPISODE_ID: 607
MDC_IDC_EPISODE_ID: 608
MDC_IDC_EPISODE_ID: 609
MDC_IDC_EPISODE_ID: 610
MDC_IDC_EPISODE_ID: 611
MDC_IDC_EPISODE_ID: 612
MDC_IDC_EPISODE_ID: 613
MDC_IDC_EPISODE_ID: 614
MDC_IDC_EPISODE_ID: 615
MDC_IDC_EPISODE_ID: 616
MDC_IDC_EPISODE_ID: 617
MDC_IDC_EPISODE_ID: 618
MDC_IDC_EPISODE_ID: 619
MDC_IDC_EPISODE_ID: 620
MDC_IDC_EPISODE_ID: 621
MDC_IDC_EPISODE_ID: 622
MDC_IDC_EPISODE_ID: 623
MDC_IDC_EPISODE_ID: 624
MDC_IDC_EPISODE_ID: 625
MDC_IDC_EPISODE_ID: 626
MDC_IDC_EPISODE_ID: 627
MDC_IDC_EPISODE_ID: 628
MDC_IDC_EPISODE_ID: 629
MDC_IDC_EPISODE_ID: 630
MDC_IDC_EPISODE_ID: 631
MDC_IDC_EPISODE_ID: 632
MDC_IDC_EPISODE_ID: 633
MDC_IDC_EPISODE_ID: 634
MDC_IDC_EPISODE_ID: 635
MDC_IDC_EPISODE_ID: 636
MDC_IDC_EPISODE_ID: 637
MDC_IDC_EPISODE_ID: 638
MDC_IDC_EPISODE_ID: 639
MDC_IDC_EPISODE_ID: 640
MDC_IDC_EPISODE_ID: 641
MDC_IDC_EPISODE_ID: 642
MDC_IDC_EPISODE_ID: 643
MDC_IDC_EPISODE_ID: 644
MDC_IDC_EPISODE_ID: 645
MDC_IDC_EPISODE_ID: 646
MDC_IDC_EPISODE_ID: 647
MDC_IDC_EPISODE_ID: 648
MDC_IDC_EPISODE_ID: 649
MDC_IDC_EPISODE_ID: 650
MDC_IDC_EPISODE_ID: 651
MDC_IDC_EPISODE_ID: 652
MDC_IDC_EPISODE_ID: 653
MDC_IDC_EPISODE_ID: 654
MDC_IDC_EPISODE_ID: 655
MDC_IDC_EPISODE_ID: 656
MDC_IDC_EPISODE_ID: 657
MDC_IDC_EPISODE_ID: 658
MDC_IDC_EPISODE_ID: 659
MDC_IDC_EPISODE_TYPE: NORMAL
MDC_IDC_LEAD_IMPLANT_DT: NORMAL
MDC_IDC_LEAD_IMPLANT_DT: NORMAL
MDC_IDC_LEAD_LOCATION: NORMAL
MDC_IDC_LEAD_LOCATION: NORMAL
MDC_IDC_LEAD_LOCATION_DETAIL_1: NORMAL
MDC_IDC_LEAD_LOCATION_DETAIL_1: NORMAL
MDC_IDC_LEAD_MFG: NORMAL
MDC_IDC_LEAD_MFG: NORMAL
MDC_IDC_LEAD_MODEL: NORMAL
MDC_IDC_LEAD_MODEL: NORMAL
MDC_IDC_LEAD_POLARITY_TYPE: NORMAL
MDC_IDC_LEAD_POLARITY_TYPE: NORMAL
MDC_IDC_LEAD_SERIAL: NORMAL
MDC_IDC_LEAD_SERIAL: NORMAL
MDC_IDC_LEAD_SPECIAL_FUNCTION: NORMAL
MDC_IDC_LEAD_SPECIAL_FUNCTION: NORMAL
MDC_IDC_MSMT_BATTERY_DTM: NORMAL
MDC_IDC_MSMT_BATTERY_REMAINING_LONGEVITY: 157 MO
MDC_IDC_MSMT_BATTERY_RRT_TRIGGER: 2.62
MDC_IDC_MSMT_BATTERY_STATUS: NORMAL
MDC_IDC_MSMT_BATTERY_VOLTAGE: 3.2 V
MDC_IDC_MSMT_LEADCHNL_RA_IMPEDANCE_VALUE: 285 OHM
MDC_IDC_MSMT_LEADCHNL_RA_IMPEDANCE_VALUE: 437 OHM
MDC_IDC_MSMT_LEADCHNL_RA_PACING_THRESHOLD_AMPLITUDE: 0.75 V
MDC_IDC_MSMT_LEADCHNL_RA_PACING_THRESHOLD_AMPLITUDE: 0.75 V
MDC_IDC_MSMT_LEADCHNL_RA_PACING_THRESHOLD_PULSEWIDTH: 0.4 MS
MDC_IDC_MSMT_LEADCHNL_RA_PACING_THRESHOLD_PULSEWIDTH: 0.4 MS
MDC_IDC_MSMT_LEADCHNL_RA_SENSING_INTR_AMPL: 1.88 MV
MDC_IDC_MSMT_LEADCHNL_RA_SENSING_INTR_AMPL: 1.9 MV
MDC_IDC_MSMT_LEADCHNL_RV_IMPEDANCE_VALUE: 437 OHM
MDC_IDC_MSMT_LEADCHNL_RV_IMPEDANCE_VALUE: 570 OHM
MDC_IDC_MSMT_LEADCHNL_RV_PACING_THRESHOLD_AMPLITUDE: 0.62 V
MDC_IDC_MSMT_LEADCHNL_RV_PACING_THRESHOLD_AMPLITUDE: 0.75 V
MDC_IDC_MSMT_LEADCHNL_RV_PACING_THRESHOLD_PULSEWIDTH: 0.4 MS
MDC_IDC_MSMT_LEADCHNL_RV_PACING_THRESHOLD_PULSEWIDTH: 0.4 MS
MDC_IDC_PG_IMPLANT_DTM: NORMAL
MDC_IDC_PG_MFG: NORMAL
MDC_IDC_PG_MODEL: NORMAL
MDC_IDC_PG_SERIAL: NORMAL
MDC_IDC_PG_TYPE: NORMAL
MDC_IDC_SESS_CLINIC_NAME: NORMAL
MDC_IDC_SESS_DTM: NORMAL
MDC_IDC_SESS_TYPE: NORMAL
MDC_IDC_SET_BRADY_AT_MODE_SWITCH_RATE: 171 {BEATS}/MIN
MDC_IDC_SET_BRADY_HYSTRATE: NORMAL
MDC_IDC_SET_BRADY_LOWRATE: 80 {BEATS}/MIN
MDC_IDC_SET_BRADY_MAX_SENSOR_RATE: 120 {BEATS}/MIN
MDC_IDC_SET_BRADY_MAX_TRACKING_RATE: 120 {BEATS}/MIN
MDC_IDC_SET_BRADY_MODE: NORMAL
MDC_IDC_SET_BRADY_PAV_DELAY_LOW: 180 MS
MDC_IDC_SET_BRADY_SAV_DELAY_LOW: 150 MS
MDC_IDC_SET_LEADCHNL_RA_PACING_AMPLITUDE: NORMAL
MDC_IDC_SET_LEADCHNL_RA_PACING_ANODE_ELECTRODE_1: NORMAL
MDC_IDC_SET_LEADCHNL_RA_PACING_ANODE_LOCATION_1: NORMAL
MDC_IDC_SET_LEADCHNL_RA_PACING_CAPTURE_MODE: NORMAL
MDC_IDC_SET_LEADCHNL_RA_PACING_CATHODE_ELECTRODE_1: NORMAL
MDC_IDC_SET_LEADCHNL_RA_PACING_CATHODE_LOCATION_1: NORMAL
MDC_IDC_SET_LEADCHNL_RA_PACING_POLARITY: NORMAL
MDC_IDC_SET_LEADCHNL_RA_PACING_PULSEWIDTH: 0.4 MS
MDC_IDC_SET_LEADCHNL_RA_SENSING_ANODE_ELECTRODE_1: NORMAL
MDC_IDC_SET_LEADCHNL_RA_SENSING_ANODE_LOCATION_1: NORMAL
MDC_IDC_SET_LEADCHNL_RA_SENSING_CATHODE_ELECTRODE_1: NORMAL
MDC_IDC_SET_LEADCHNL_RA_SENSING_CATHODE_LOCATION_1: NORMAL
MDC_IDC_SET_LEADCHNL_RA_SENSING_POLARITY: NORMAL
MDC_IDC_SET_LEADCHNL_RA_SENSING_SENSITIVITY: 0.3 MV
MDC_IDC_SET_LEADCHNL_RV_PACING_AMPLITUDE: NORMAL
MDC_IDC_SET_LEADCHNL_RV_PACING_ANODE_ELECTRODE_1: NORMAL
MDC_IDC_SET_LEADCHNL_RV_PACING_ANODE_LOCATION_1: NORMAL
MDC_IDC_SET_LEADCHNL_RV_PACING_CAPTURE_MODE: NORMAL
MDC_IDC_SET_LEADCHNL_RV_PACING_CATHODE_ELECTRODE_1: NORMAL
MDC_IDC_SET_LEADCHNL_RV_PACING_CATHODE_LOCATION_1: NORMAL
MDC_IDC_SET_LEADCHNL_RV_PACING_POLARITY: NORMAL
MDC_IDC_SET_LEADCHNL_RV_PACING_PULSEWIDTH: 0.4 MS
MDC_IDC_SET_LEADCHNL_RV_SENSING_ANODE_ELECTRODE_1: NORMAL
MDC_IDC_SET_LEADCHNL_RV_SENSING_ANODE_LOCATION_1: NORMAL
MDC_IDC_SET_LEADCHNL_RV_SENSING_CATHODE_ELECTRODE_1: NORMAL
MDC_IDC_SET_LEADCHNL_RV_SENSING_CATHODE_LOCATION_1: NORMAL
MDC_IDC_SET_LEADCHNL_RV_SENSING_POLARITY: NORMAL
MDC_IDC_SET_LEADCHNL_RV_SENSING_SENSITIVITY: 0.9 MV
MDC_IDC_SET_ZONE_DETECTION_INTERVAL: 200 MS
MDC_IDC_SET_ZONE_DETECTION_INTERVAL: 350 MS
MDC_IDC_SET_ZONE_DETECTION_INTERVAL: 430 MS
MDC_IDC_SET_ZONE_TYPE: NORMAL
MDC_IDC_STAT_AT_BURDEN_PERCENT: 2.2 %
MDC_IDC_STAT_AT_DTM_END: NORMAL
MDC_IDC_STAT_AT_DTM_START: NORMAL
MDC_IDC_STAT_AT_MODE_SW_COUNT: 185
MDC_IDC_STAT_BRADY_AP_VP_PERCENT: 52.88 %
MDC_IDC_STAT_BRADY_AP_VS_PERCENT: 0.01 %
MDC_IDC_STAT_BRADY_AS_VP_PERCENT: 46.8 %
MDC_IDC_STAT_BRADY_AS_VS_PERCENT: 0.31 %
MDC_IDC_STAT_BRADY_DTM_END: NORMAL
MDC_IDC_STAT_BRADY_DTM_START: NORMAL
MDC_IDC_STAT_BRADY_RA_PERCENT_PACED: 52.9 %
MDC_IDC_STAT_BRADY_RV_PERCENT_PACED: 99.7 %
MDC_IDC_STAT_EPISODE_RECENT_COUNT: 185
MDC_IDC_STAT_EPISODE_RECENT_COUNT: 5
MDC_IDC_STAT_EPISODE_RECENT_COUNT: NORMAL
MDC_IDC_STAT_EPISODE_RECENT_COUNT_DTM_END: NORMAL
MDC_IDC_STAT_EPISODE_RECENT_COUNT_DTM_START: NORMAL
MDC_IDC_STAT_EPISODE_TOTAL_COUNT: 0
MDC_IDC_STAT_EPISODE_TOTAL_COUNT: 13
MDC_IDC_STAT_EPISODE_TOTAL_COUNT: 466
MDC_IDC_STAT_EPISODE_TOTAL_COUNT_DTM_END: NORMAL
MDC_IDC_STAT_EPISODE_TOTAL_COUNT_DTM_START: NORMAL
MDC_IDC_STAT_EPISODE_TYPE: NORMAL

## 2021-07-23 RX ORDER — METOPROLOL SUCCINATE 50 MG/1
50 TABLET, EXTENDED RELEASE ORAL DAILY
Qty: 30 TABLET | Refills: 6 | Status: SHIPPED | OUTPATIENT
Start: 2021-07-23 | End: 2021-09-16

## 2021-07-23 NOTE — PROGRESS NOTES
Noted.  Return call from pateints friend, Gabriella.  Reviewed consent to communicate.  Reviewed remote results and recommendations from Dr. Burgos.  She states understanding, rx sent to pharmacy of choice and will ask our scheduling team to contact her for an apt to see GAG in Device.

## 2021-08-04 ENCOUNTER — PATIENT OUTREACH (OUTPATIENT)
Dept: NURSING | Facility: CLINIC | Age: 81
End: 2021-08-04

## 2021-08-04 ENCOUNTER — PATIENT OUTREACH (OUTPATIENT)
Dept: CARE COORDINATION | Facility: CLINIC | Age: 81
End: 2021-08-04

## 2021-08-04 NOTE — PROGRESS NOTES
Clinic Care Coordination Contact    Follow Up Progress Note      Assessment: Talked to Gabriella and she is getting some documents to [facility] for her application for MA which is in process.  They need to see her pension information.  After her MA is in place, she [place] having [place].  She has bills from previous hospitalizations.  Encouraged Gabriella to contact [facility] billing after MA is active and they can submit the bills to MA if within 3 months.  She is unsure what the balance is, but thinks it is about $2,000.     Goals addressed this encounter:   Goals Addressed                    This Visit's Progress       Patient Stated       Financial Wellbeing (pt-stated)   80%      Goal Statement: I will see if I qualify for a waiver within 5 months.   Date Goal set: 5-3-2021   Barriers: poor memory   Strengths: has friends who assists   Date to Achieve By: 10-3-2021   Patient expressed understanding of goal: friend Gabriella set up.   Action steps to achieve this goal:   1. Gabriella will call [facility] to request [place].   2. Gabriella will assist with any paper work and with the assessment.   3. Gabriella will report progress towards this goal at scheduled outreach telephone calls from the [facility] team.   6-10, 6-14, 7-15, 8-4  discussed             Intervention/Education provided during outreach: That MA can pay for past bills within three months.      Outreach Frequency: monthly    Plan:   Delegating to CHW to contact patient in less than 30 days.    Care Coordinator will follow up in 6 weeks and as needed.  Milagro Spring,   [facility]  [phone]

## 2021-08-19 ENCOUNTER — PATIENT OUTREACH (OUTPATIENT)
Dept: CARE COORDINATION | Facility: CLINIC | Age: 81
End: 2021-08-19

## 2021-08-19 NOTE — PROGRESS NOTES
Patient spoke with [facility] SW on 8/4/2021 and discussed goals      CHW delegations: Outreach Frequency: monthly     Plan:   Delegating to CHW to contact patient in less than 30 days.    Care Coordinator will follow up in 6 weeks and as needed.     Next outreach due: 9/1/2021

## 2021-08-25 ENCOUNTER — PATIENT OUTREACH (OUTPATIENT)
Dept: CARE COORDINATION | Facility: CLINIC | Age: 81
End: 2021-08-25

## 2021-08-25 NOTE — PROGRESS NOTES
Clinic Care Coordination Contact    Follow Up Progress Note      Assessment: Email from Gabriella. [place] and waiver and will start on 9-1. They had assessment for assisted living at the [facility] and will be moving when brother's waiver is approved.  Gabriella and her  are working on downsizing them as they cannot move with all the things in their current home. Completed current goal.   Care Gaps:    Health Maintenance Due   Topic Date Due     DEXA  Never done     HF ACTION PLAN  Never done     ADVANCE CARE PLANNING  Never done     DTAP/TDAP/TD IMMUNIZATION (1 - Tdap) Never done     ZOSTER IMMUNIZATION (1 of 2) Never done     Pneumococcal Vaccine: 65+ Years (1 of 2 - PPSV23) 01/11/2017     MEDICARE ANNUAL WELLNESS VISIT  11/16/2017     PHQ-2  01/01/2021     ALT  02/27/2021     LIPID  02/27/2021     INFLUENZA VACCINE (1) 09/01/2021       Postponed to moving to assisted living     Goals addressed this encounter:   Goals Addressed                    This Visit's Progress       Patient Stated       COMPLETED: Financial Wellbeing (pt-stated)         Goal Statement: I will see if I qualify for a waiver within 5 months.   Date Goal set: 5-3-2021   Barriers: poor memory   Strengths: has friends who assists   Date to Achieve By: 10-3-2021   Patient expressed understanding of goal: friend Gabriella set up.   Action steps to achieve this goal:   1. Gabriella will call [facility] to request [place].   2. Gabriella will assist with any paper work and with the assessment.   3. Gabriella will report progress towards this goal at scheduled outreach telephone calls from the [facility] team.   6-10, 6-14, 7-15, 8-4, 8-25  discussed            Intervention/Education provided during outreach: NA     Outreach Frequency: 2 months    Plan:   Delegating to CHW to contact Gabriella in 2 months for potential graduation.   Care Coordinator will follow up as needed and in 2 months.      Milagro Spring,   [facility]  [phone]

## 2021-09-02 ENCOUNTER — MEDICAL CORRESPONDENCE (OUTPATIENT)
Dept: HEALTH INFORMATION MANAGEMENT | Facility: CLINIC | Age: 81
End: 2021-09-02

## 2021-09-08 ENCOUNTER — PATIENT OUTREACH (OUTPATIENT)
Dept: CARE COORDINATION | Facility: CLINIC | Age: 81
End: 2021-09-08

## 2021-09-08 NOTE — PROGRESS NOTES
Patient spoke with [facility] SW on 8/25/2021 and discussed/completed goals. Patient was transitioned to Maintenance at this outreach.      CHW delegations:   Delegating to CHW to contact Gabriella in 2 months for potential graduation.   Care Coordinator will follow up as needed and in 2 months.         Next outreach due: 10/22/2021

## 2021-09-16 ENCOUNTER — OFFICE VISIT (OUTPATIENT)
Dept: INTERNAL MEDICINE | Facility: CLINIC | Age: 81
End: 2021-09-16
Payer: COMMERCIAL

## 2021-09-16 VITALS
DIASTOLIC BLOOD PRESSURE: 66 MMHG | SYSTOLIC BLOOD PRESSURE: 118 MMHG | BODY MASS INDEX: 21.43 KG/M2 | WEIGHT: 121 LBS | OXYGEN SATURATION: 96 % | HEART RATE: 86 BPM

## 2021-09-16 DIAGNOSIS — I47.10 SVT (SUPRAVENTRICULAR TACHYCARDIA) (H): ICD-10-CM

## 2021-09-16 DIAGNOSIS — N18.32 STAGE 3B CHRONIC KIDNEY DISEASE (H): Primary | ICD-10-CM

## 2021-09-16 PROBLEM — N18.30 CHRONIC KIDNEY DISEASE, STAGE 3 (H): Status: ACTIVE | Noted: 2021-09-16

## 2021-09-16 PROCEDURE — 99214 OFFICE O/P EST MOD 30 MIN: CPT | Performed by: NURSE PRACTITIONER

## 2021-09-16 RX ORDER — FLECAINIDE ACETATE 50 MG/1
TABLET ORAL
COMMUNITY
Start: 2021-09-15 | End: 2021-09-16

## 2021-09-16 RX ORDER — METOPROLOL SUCCINATE 50 MG/1
50 TABLET, EXTENDED RELEASE ORAL DAILY
Qty: 30 TABLET | Refills: 6 | Status: SHIPPED | OUTPATIENT
Start: 2021-09-16 | End: 2021-09-23

## 2021-09-16 NOTE — PATIENT INSTRUCTIONS
I am ordering a follow up ultrasound of the heart called an ECHO. I want to be sure that the small amount of fluid that was around your heart in June has either gone away or is the same amount.

## 2021-09-16 NOTE — PROGRESS NOTES
Internal Medicine Office Visit  [facility]   Patient Name: Laney Hahn  Patient Age: 81 year old  YOB: 1940  MRN: [id number]    Date of Visit: 9/16/2021  Patient presents with:  Follow Up: 4 month      Assessment / Plan / Medical Decision Making:    Problem List Items Addressed This Visit        Circulatory    SVT (supraventricular tachycardia) (H)     - Inpatient records reviewed through Care Everywhere   - Continue metoprolol. Due to history of pericardial effusion and rub heard on exam, will plan to repeat ECHO. Her friend and POA, Gabriella Dudley, is listed as the  for scheduling this test due to patient's underlying dementia and baseline lower intellectual functioning that requires assistance with this task          Relevant Medications    metoprolol succinate ER (TOPROL XL) 50 MG 24 hr tablet    Other Relevant Orders    Echocardiogram Complete       Urinary    Chronic kidney disease, stage 3 - Primary     Will repeat BMP at next visit                 I am having Laney Hahn maintain her atorvastatin, furosemide, aspirin-acetaminophen-caffeine, apixaban ANTICOAGULANT, and metoprolol succinate ER.          Orders Placed This Encounter   Procedures     Echocardiogram Complete   Followup: Return in about 6 months (around 3/16/2022) for Follow up. earlier if needed.      Rose Mcelroy, TANYA, CNP        HPI:  Laney Hahn is a 81 year old year old female with a PMH of HFrEF, paroxysmal afib, hypertension, hyperlipidemia, nonobstructive coronary artery disease, SVT, tachybradycardia syndrome status post permanent pacemaker, vascular dementia who presents to the office today with her brother and a friend from her [employer] for follow up of a recent hospitalization.     She was hospitalized in June for syncope.  When EMS arrived she was noted to be in SVT and treated with adenosine, she arrived in the emergency department in normal sinus rhythm.  She  then converted back to SVT and despite 2 doses of metoprolol and diltiazem drip continued to be in SVT intermittently throughout her hospital stay.  She then underwent AV node ablation on 6/18/2021 for definitive rate control.  Upon discharge, flecainide and metoprolol were discontinued. When she had a pacemaker check and SVT was noted, she was started on metoprolol 50 mg daily again. Today she feels well and denies any concerns or complaints. Her brother notes that sometimes she says that she feels dizzy although she wont admit to this today. They now live in an assisted living facility and her medications are dispensed to her, she does not know the names or frequency of the medications she is taking. Denies any issues with bruising or bleeding.     Health Maintenance Review  Health Maintenance   Topic Date Due     DEXA  Never done     HF ACTION PLAN  Never done     ANNUAL REVIEW OF HM ORDERS  Never done     ADVANCE CARE PLANNING  Never done     DTAP/TDAP/TD IMMUNIZATION (1 - Tdap) Never done     ZOSTER IMMUNIZATION (1 of 2) Never done     Pneumococcal Vaccine: 65+ Years (1 of 2 - PPSV23) 01/11/2017     MEDICARE ANNUAL WELLNESS VISIT  11/16/2017     ALT  02/27/2021     LIPID  02/27/2021     INFLUENZA VACCINE (1) 09/01/2021     BMP  12/16/2021     CBC  06/11/2022     FALL RISK ASSESSMENT  08/25/2022     TSH W/FREE T4 REFLEX  Completed     PHQ-2  Completed     COVID-19 Vaccine  Completed     IPV IMMUNIZATION  Aged Out     MENINGITIS IMMUNIZATION  Aged Out     HEPATITIS B IMMUNIZATION  Aged Out       Current Scheduled Meds:  Outpatient Encounter Medications as of 9/16/2021   Medication Sig Dispense Refill     apixaban ANTICOAGULANT (ELIQUIS) 2.5 MG tablet Take 2.5 mg by mouth 2 times daily       aspirin-acetaminophen-caffeine (EXCEDRIN MIGRAINE) 250-250-65 MG tablet Take 1 tablet by mouth as needed for headaches        atorvastatin (LIPITOR) 10 MG tablet Take 10 mg by mouth every morning       furosemide (LASIX) 20 MG  tablet Take 10 mg by mouth every morning       metoprolol succinate ER (TOPROL XL) 50 MG 24 hr tablet Take 1 tablet (50 mg) by mouth daily 30 tablet 6     [DISCONTINUED] flecainide (TAMBOCOR) 50 MG tablet        [DISCONTINUED] metoprolol succinate ER (TOPROL XL) 50 MG 24 hr tablet Take 1 tablet (50 mg) by mouth daily 30 tablet 6     No facility-administered encounter medications on file as of 9/16/2021.     Post Discharge Medication Reconciliation Status: discharge medications reconciled, continue medications without change.       Objective / Physical Examination:  Vitals:    09/16/21 1029   BP: 118/66   Pulse: 86   SpO2: 96%   Weight: 54.9 kg (121 lb)     Wt Readings from Last 3 Encounters:   09/16/21 54.9 kg (121 lb)   07/21/21 53.5 kg (118 lb)   06/16/21 54 kg (119 lb)     Body mass index is 21.43 kg/m .     Constitutional: In no apparent distress  Respiratory: Clear to auscultation bilaterally. Normal inspiratory and expiratory effort  Cardiovascular: Regular rate and rhythm. Slight rub. No murmurs. No edema. No carotid bruits.   Gastrointestinal: Bowel sounds active all four quadrants. Soft, non-tender.   Neuro: Normal gait  Psych: Alert and oriented to person and place.

## 2021-09-17 NOTE — ASSESSMENT & PLAN NOTE
- Inpatient records reviewed through Care Everywhere   - Continue metoprolol. Due to history of pericardial effusion and rub heard on exam, will plan to repeat ECHO. Her friend and POA, Gabriella Dudley, is listed as the  for scheduling this test due to patient's underlying dementia and baseline lower intellectual functioning that requires assistance with this task

## 2021-09-23 ENCOUNTER — TELEPHONE (OUTPATIENT)
Dept: INTERNAL MEDICINE | Facility: CLINIC | Age: 81
End: 2021-09-23

## 2021-09-23 DIAGNOSIS — I48.0 PAROXYSMAL ATRIAL FIBRILLATION (H): Primary | ICD-10-CM

## 2021-09-23 DIAGNOSIS — E78.5 DYSLIPIDEMIA, GOAL LDL BELOW 70: ICD-10-CM

## 2021-09-23 DIAGNOSIS — I50.31 ACUTE DIASTOLIC CONGESTIVE HEART FAILURE (H): ICD-10-CM

## 2021-09-23 DIAGNOSIS — I47.10 SVT (SUPRAVENTRICULAR TACHYCARDIA) (H): ICD-10-CM

## 2021-09-23 DIAGNOSIS — G44.209 TENSION HEADACHE: ICD-10-CM

## 2021-09-23 RX ORDER — FUROSEMIDE 20 MG
20 TABLET ORAL EVERY MORNING
Qty: 30 TABLET | Refills: 5 | Status: SHIPPED | OUTPATIENT
Start: 2021-09-23 | End: 2022-03-24

## 2021-09-23 RX ORDER — ATORVASTATIN CALCIUM 10 MG/1
10 TABLET, FILM COATED ORAL EVERY MORNING
Qty: 30 TABLET | Refills: 11 | Status: SHIPPED | OUTPATIENT
Start: 2021-09-23 | End: 2022-03-24

## 2021-09-23 RX ORDER — METOPROLOL SUCCINATE 50 MG/1
50 TABLET, EXTENDED RELEASE ORAL DAILY
Qty: 30 TABLET | Refills: 6 | Status: SHIPPED | OUTPATIENT
Start: 2021-09-23 | End: 2022-03-24

## 2021-09-23 NOTE — TELEPHONE ENCOUNTER
metoprolol succinate ER (TOPROL XL) 50 MG 24 hr tablet 30 tablet 6 9/16/2021  No   Sig - Route: Take 1 tablet (50 mg) by mouth daily - Oral   Sent to pharmacy as: Metoprolol Succinate ER 50 MG Oral Tablet Extended Release 24 Hour (Toprol XL)   Class: E-Prescribe   Order: [id number]   E-Prescribing Status: Receipt confirmed by pharmacy (9/16/2021 10:57 AM CDT)     Rx was sent to [facility]. Needs to go to [facility]

## 2021-09-23 NOTE — TELEPHONE ENCOUNTER
Ginny is calling from patient's living facility.    Pt pharmacy is [facility] in [place].    Requesting to verify the RX for Metoptolol has changed to 50 mg    RX needs to be sent to [facility] in [place].

## 2021-10-08 ENCOUNTER — PATIENT OUTREACH (OUTPATIENT)
Dept: CARE COORDINATION | Facility: CLINIC | Age: 81
End: 2021-10-08

## 2021-10-08 NOTE — PROGRESS NOTES
Clinic Care Coordination Contact    Assessment: Care Coordinator contacted caregiver for 2 month follow up.  Patient has continued to follow the plan of care and assessment is negative for any new needs or concerns. She now lives in assisted living     Enrollment status: Graduated.      Plan: No further outreaches at this time.  Patient will continue to follow the plan of care.  If new needs arise a new Care Coordination referral may be placed.  FYI to PCP    [place],   [facility]  [phone]

## 2021-11-15 ENCOUNTER — ANCILLARY PROCEDURE (OUTPATIENT)
Dept: CARDIOLOGY | Facility: CLINIC | Age: 81
End: 2021-11-15
Attending: INTERNAL MEDICINE
Payer: COMMERCIAL

## 2021-11-15 DIAGNOSIS — I49.5 SINUS NODE DYSFUNCTION (H): ICD-10-CM

## 2021-11-15 DIAGNOSIS — Z95.0 PACEMAKER: ICD-10-CM

## 2021-11-15 PROCEDURE — 93296 REM INTERROG EVL PM/IDS: CPT | Performed by: INTERNAL MEDICINE

## 2021-11-15 PROCEDURE — 93294 REM INTERROG EVL PM/LDLS PM: CPT | Performed by: INTERNAL MEDICINE

## 2021-12-08 LAB
MDC_IDC_EPISODE_DTM: NORMAL
MDC_IDC_EPISODE_DURATION: 0 S
MDC_IDC_EPISODE_DURATION: 1 S
MDC_IDC_EPISODE_DURATION: 2 S
MDC_IDC_EPISODE_DURATION: 2 S
MDC_IDC_EPISODE_ID: 660
MDC_IDC_EPISODE_ID: 661
MDC_IDC_EPISODE_ID: 662
MDC_IDC_EPISODE_ID: 663
MDC_IDC_EPISODE_ID: 664
MDC_IDC_EPISODE_ID: 665
MDC_IDC_EPISODE_TYPE: NORMAL
MDC_IDC_LEAD_IMPLANT_DT: NORMAL
MDC_IDC_LEAD_IMPLANT_DT: NORMAL
MDC_IDC_LEAD_LOCATION: NORMAL
MDC_IDC_LEAD_LOCATION: NORMAL
MDC_IDC_LEAD_LOCATION_DETAIL_1: NORMAL
MDC_IDC_LEAD_LOCATION_DETAIL_1: NORMAL
MDC_IDC_LEAD_MFG: NORMAL
MDC_IDC_LEAD_MFG: NORMAL
MDC_IDC_LEAD_MODEL: NORMAL
MDC_IDC_LEAD_MODEL: NORMAL
MDC_IDC_LEAD_POLARITY_TYPE: NORMAL
MDC_IDC_LEAD_POLARITY_TYPE: NORMAL
MDC_IDC_LEAD_SERIAL: NORMAL
MDC_IDC_LEAD_SERIAL: NORMAL
MDC_IDC_LEAD_SPECIAL_FUNCTION: NORMAL
MDC_IDC_LEAD_SPECIAL_FUNCTION: NORMAL
MDC_IDC_MSMT_BATTERY_DTM: NORMAL
MDC_IDC_MSMT_BATTERY_REMAINING_LONGEVITY: 139 MO
MDC_IDC_MSMT_BATTERY_RRT_TRIGGER: 2.62
MDC_IDC_MSMT_BATTERY_STATUS: NORMAL
MDC_IDC_MSMT_BATTERY_VOLTAGE: 3.15 V
MDC_IDC_MSMT_LEADCHNL_RA_IMPEDANCE_VALUE: 323 OHM
MDC_IDC_MSMT_LEADCHNL_RA_IMPEDANCE_VALUE: 437 OHM
MDC_IDC_MSMT_LEADCHNL_RA_PACING_THRESHOLD_AMPLITUDE: 0.75 V
MDC_IDC_MSMT_LEADCHNL_RA_PACING_THRESHOLD_PULSEWIDTH: 0.4 MS
MDC_IDC_MSMT_LEADCHNL_RA_SENSING_INTR_AMPL: 1.62 MV
MDC_IDC_MSMT_LEADCHNL_RA_SENSING_INTR_AMPL: 1.62 MV
MDC_IDC_MSMT_LEADCHNL_RV_IMPEDANCE_VALUE: 437 OHM
MDC_IDC_MSMT_LEADCHNL_RV_IMPEDANCE_VALUE: 570 OHM
MDC_IDC_MSMT_LEADCHNL_RV_PACING_THRESHOLD_AMPLITUDE: 0.75 V
MDC_IDC_MSMT_LEADCHNL_RV_PACING_THRESHOLD_PULSEWIDTH: 0.4 MS
MDC_IDC_MSMT_LEADCHNL_RV_SENSING_INTR_AMPL: 16.25 MV
MDC_IDC_MSMT_LEADCHNL_RV_SENSING_INTR_AMPL: 16.25 MV
MDC_IDC_PG_IMPLANT_DTM: NORMAL
MDC_IDC_PG_MFG: NORMAL
MDC_IDC_PG_MODEL: NORMAL
MDC_IDC_PG_SERIAL: NORMAL
MDC_IDC_PG_TYPE: NORMAL
MDC_IDC_SESS_CLINIC_NAME: NORMAL
MDC_IDC_SESS_DTM: NORMAL
MDC_IDC_SESS_TYPE: NORMAL
MDC_IDC_SET_BRADY_AT_MODE_SWITCH_RATE: 171 {BEATS}/MIN
MDC_IDC_SET_BRADY_HYSTRATE: NORMAL
MDC_IDC_SET_BRADY_LOWRATE: 80 {BEATS}/MIN
MDC_IDC_SET_BRADY_MAX_SENSOR_RATE: 120 {BEATS}/MIN
MDC_IDC_SET_BRADY_MAX_TRACKING_RATE: 120 {BEATS}/MIN
MDC_IDC_SET_BRADY_MODE: NORMAL
MDC_IDC_SET_BRADY_PAV_DELAY_LOW: 180 MS
MDC_IDC_SET_BRADY_SAV_DELAY_LOW: 150 MS
MDC_IDC_SET_LEADCHNL_RA_PACING_AMPLITUDE: 1.5 V
MDC_IDC_SET_LEADCHNL_RA_PACING_ANODE_ELECTRODE_1: NORMAL
MDC_IDC_SET_LEADCHNL_RA_PACING_ANODE_LOCATION_1: NORMAL
MDC_IDC_SET_LEADCHNL_RA_PACING_CAPTURE_MODE: NORMAL
MDC_IDC_SET_LEADCHNL_RA_PACING_CATHODE_ELECTRODE_1: NORMAL
MDC_IDC_SET_LEADCHNL_RA_PACING_CATHODE_LOCATION_1: NORMAL
MDC_IDC_SET_LEADCHNL_RA_PACING_POLARITY: NORMAL
MDC_IDC_SET_LEADCHNL_RA_PACING_PULSEWIDTH: 0.4 MS
MDC_IDC_SET_LEADCHNL_RA_SENSING_ANODE_ELECTRODE_1: NORMAL
MDC_IDC_SET_LEADCHNL_RA_SENSING_ANODE_LOCATION_1: NORMAL
MDC_IDC_SET_LEADCHNL_RA_SENSING_CATHODE_ELECTRODE_1: NORMAL
MDC_IDC_SET_LEADCHNL_RA_SENSING_CATHODE_LOCATION_1: NORMAL
MDC_IDC_SET_LEADCHNL_RA_SENSING_POLARITY: NORMAL
MDC_IDC_SET_LEADCHNL_RA_SENSING_SENSITIVITY: 0.3 MV
MDC_IDC_SET_LEADCHNL_RV_PACING_AMPLITUDE: 2 V
MDC_IDC_SET_LEADCHNL_RV_PACING_ANODE_ELECTRODE_1: NORMAL
MDC_IDC_SET_LEADCHNL_RV_PACING_ANODE_LOCATION_1: NORMAL
MDC_IDC_SET_LEADCHNL_RV_PACING_CAPTURE_MODE: NORMAL
MDC_IDC_SET_LEADCHNL_RV_PACING_CATHODE_ELECTRODE_1: NORMAL
MDC_IDC_SET_LEADCHNL_RV_PACING_CATHODE_LOCATION_1: NORMAL
MDC_IDC_SET_LEADCHNL_RV_PACING_POLARITY: NORMAL
MDC_IDC_SET_LEADCHNL_RV_PACING_PULSEWIDTH: 0.4 MS
MDC_IDC_SET_LEADCHNL_RV_SENSING_ANODE_ELECTRODE_1: NORMAL
MDC_IDC_SET_LEADCHNL_RV_SENSING_ANODE_LOCATION_1: NORMAL
MDC_IDC_SET_LEADCHNL_RV_SENSING_CATHODE_ELECTRODE_1: NORMAL
MDC_IDC_SET_LEADCHNL_RV_SENSING_CATHODE_LOCATION_1: NORMAL
MDC_IDC_SET_LEADCHNL_RV_SENSING_POLARITY: NORMAL
MDC_IDC_SET_LEADCHNL_RV_SENSING_SENSITIVITY: 0.9 MV
MDC_IDC_SET_ZONE_DETECTION_INTERVAL: 200 MS
MDC_IDC_SET_ZONE_DETECTION_INTERVAL: 350 MS
MDC_IDC_SET_ZONE_DETECTION_INTERVAL: 430 MS
MDC_IDC_SET_ZONE_TYPE: NORMAL
MDC_IDC_STAT_AT_BURDEN_PERCENT: 0 %
MDC_IDC_STAT_AT_DTM_END: NORMAL
MDC_IDC_STAT_AT_DTM_START: NORMAL
MDC_IDC_STAT_BRADY_AP_VP_PERCENT: 95.78 %
MDC_IDC_STAT_BRADY_AP_VS_PERCENT: 0.05 %
MDC_IDC_STAT_BRADY_AS_VP_PERCENT: 3.88 %
MDC_IDC_STAT_BRADY_AS_VS_PERCENT: 0.29 %
MDC_IDC_STAT_BRADY_DTM_END: NORMAL
MDC_IDC_STAT_BRADY_DTM_START: NORMAL
MDC_IDC_STAT_BRADY_RA_PERCENT_PACED: 96.07 %
MDC_IDC_STAT_BRADY_RV_PERCENT_PACED: 99.66 %
MDC_IDC_STAT_EPISODE_RECENT_COUNT: 0
MDC_IDC_STAT_EPISODE_RECENT_COUNT: 6
MDC_IDC_STAT_EPISODE_RECENT_COUNT_DTM_END: NORMAL
MDC_IDC_STAT_EPISODE_RECENT_COUNT_DTM_START: NORMAL
MDC_IDC_STAT_EPISODE_TOTAL_COUNT: 0
MDC_IDC_STAT_EPISODE_TOTAL_COUNT: 0
MDC_IDC_STAT_EPISODE_TOTAL_COUNT: 100
MDC_IDC_STAT_EPISODE_TOTAL_COUNT: 19
MDC_IDC_STAT_EPISODE_TOTAL_COUNT: 466
MDC_IDC_STAT_EPISODE_TOTAL_COUNT_DTM_END: NORMAL
MDC_IDC_STAT_EPISODE_TOTAL_COUNT_DTM_START: NORMAL
MDC_IDC_STAT_EPISODE_TYPE: NORMAL

## 2021-12-23 ENCOUNTER — TELEPHONE (OUTPATIENT)
Dept: INTERNAL MEDICINE | Facility: CLINIC | Age: 81
End: 2021-12-23
Payer: COMMERCIAL

## 2021-12-23 NOTE — TELEPHONE ENCOUNTER
Called patient to reschedule with Dr. Mcelroy  on 2/17/22. Left voicemail to call back, please advise. Thank you

## 2022-03-04 ENCOUNTER — ANCILLARY PROCEDURE (OUTPATIENT)
Dept: CARDIOLOGY | Facility: CLINIC | Age: 82
End: 2022-03-04
Attending: INTERNAL MEDICINE
Payer: COMMERCIAL

## 2022-03-04 DIAGNOSIS — I49.5 SINUS NODE DYSFUNCTION (H): ICD-10-CM

## 2022-03-04 DIAGNOSIS — Z95.0 PACEMAKER: ICD-10-CM

## 2022-03-04 PROCEDURE — 93296 REM INTERROG EVL PM/IDS: CPT | Performed by: INTERNAL MEDICINE

## 2022-03-04 PROCEDURE — 93294 REM INTERROG EVL PM/LDLS PM: CPT | Performed by: INTERNAL MEDICINE

## 2022-03-07 ENCOUNTER — TELEPHONE (OUTPATIENT)
Dept: INTERNAL MEDICINE | Facility: CLINIC | Age: 82
End: 2022-03-07
Payer: COMMERCIAL

## 2022-03-09 NOTE — TELEPHONE ENCOUNTER
Unable to reach pt to reschedule.  Spoke with emergency contact Tami who will relay the message to the pt that she needs to reschedule.  Tami states the pt has moved to assisted living and has a new phone number but was unable to find the number.  She will call back or have pt call back with new phone #.  Please update chart.

## 2022-03-18 LAB
MDC_IDC_EPISODE_DTM: NORMAL
MDC_IDC_EPISODE_DTM: NORMAL
MDC_IDC_EPISODE_DURATION: 3 S
MDC_IDC_EPISODE_DURATION: 3 S
MDC_IDC_EPISODE_ID: 666
MDC_IDC_EPISODE_ID: 667
MDC_IDC_EPISODE_TYPE: NORMAL
MDC_IDC_EPISODE_TYPE: NORMAL
MDC_IDC_LEAD_IMPLANT_DT: NORMAL
MDC_IDC_LEAD_IMPLANT_DT: NORMAL
MDC_IDC_LEAD_LOCATION: NORMAL
MDC_IDC_LEAD_LOCATION: NORMAL
MDC_IDC_LEAD_LOCATION_DETAIL_1: NORMAL
MDC_IDC_LEAD_LOCATION_DETAIL_1: NORMAL
MDC_IDC_LEAD_MFG: NORMAL
MDC_IDC_LEAD_MFG: NORMAL
MDC_IDC_LEAD_MODEL: NORMAL
MDC_IDC_LEAD_MODEL: NORMAL
MDC_IDC_LEAD_POLARITY_TYPE: NORMAL
MDC_IDC_LEAD_POLARITY_TYPE: NORMAL
MDC_IDC_LEAD_SERIAL: NORMAL
MDC_IDC_LEAD_SERIAL: NORMAL
MDC_IDC_LEAD_SPECIAL_FUNCTION: NORMAL
MDC_IDC_LEAD_SPECIAL_FUNCTION: NORMAL
MDC_IDC_MSMT_BATTERY_DTM: NORMAL
MDC_IDC_MSMT_BATTERY_REMAINING_LONGEVITY: 130 MO
MDC_IDC_MSMT_BATTERY_RRT_TRIGGER: 2.62
MDC_IDC_MSMT_BATTERY_STATUS: NORMAL
MDC_IDC_MSMT_BATTERY_VOLTAGE: 3.07 V
MDC_IDC_MSMT_LEADCHNL_RA_IMPEDANCE_VALUE: 285 OHM
MDC_IDC_MSMT_LEADCHNL_RA_IMPEDANCE_VALUE: 418 OHM
MDC_IDC_MSMT_LEADCHNL_RA_PACING_THRESHOLD_AMPLITUDE: 0.88 V
MDC_IDC_MSMT_LEADCHNL_RA_PACING_THRESHOLD_PULSEWIDTH: 0.4 MS
MDC_IDC_MSMT_LEADCHNL_RA_SENSING_INTR_AMPL: 1.75 MV
MDC_IDC_MSMT_LEADCHNL_RA_SENSING_INTR_AMPL: 1.75 MV
MDC_IDC_MSMT_LEADCHNL_RV_IMPEDANCE_VALUE: 399 OHM
MDC_IDC_MSMT_LEADCHNL_RV_IMPEDANCE_VALUE: 532 OHM
MDC_IDC_MSMT_LEADCHNL_RV_PACING_THRESHOLD_AMPLITUDE: 0.75 V
MDC_IDC_MSMT_LEADCHNL_RV_PACING_THRESHOLD_PULSEWIDTH: 0.4 MS
MDC_IDC_MSMT_LEADCHNL_RV_SENSING_INTR_AMPL: 16.25 MV
MDC_IDC_MSMT_LEADCHNL_RV_SENSING_INTR_AMPL: 16.25 MV
MDC_IDC_PG_IMPLANT_DTM: NORMAL
MDC_IDC_PG_MFG: NORMAL
MDC_IDC_PG_MODEL: NORMAL
MDC_IDC_PG_SERIAL: NORMAL
MDC_IDC_PG_TYPE: NORMAL
MDC_IDC_SESS_CLINIC_NAME: NORMAL
MDC_IDC_SESS_DTM: NORMAL
MDC_IDC_SESS_TYPE: NORMAL
MDC_IDC_SET_BRADY_AT_MODE_SWITCH_RATE: 171 {BEATS}/MIN
MDC_IDC_SET_BRADY_HYSTRATE: NORMAL
MDC_IDC_SET_BRADY_LOWRATE: 80 {BEATS}/MIN
MDC_IDC_SET_BRADY_MAX_SENSOR_RATE: 120 {BEATS}/MIN
MDC_IDC_SET_BRADY_MAX_TRACKING_RATE: 120 {BEATS}/MIN
MDC_IDC_SET_BRADY_MODE: NORMAL
MDC_IDC_SET_BRADY_PAV_DELAY_LOW: 180 MS
MDC_IDC_SET_BRADY_SAV_DELAY_LOW: 150 MS
MDC_IDC_SET_LEADCHNL_RA_PACING_AMPLITUDE: 1.5 V
MDC_IDC_SET_LEADCHNL_RA_PACING_ANODE_ELECTRODE_1: NORMAL
MDC_IDC_SET_LEADCHNL_RA_PACING_ANODE_LOCATION_1: NORMAL
MDC_IDC_SET_LEADCHNL_RA_PACING_CAPTURE_MODE: NORMAL
MDC_IDC_SET_LEADCHNL_RA_PACING_CATHODE_ELECTRODE_1: NORMAL
MDC_IDC_SET_LEADCHNL_RA_PACING_CATHODE_LOCATION_1: NORMAL
MDC_IDC_SET_LEADCHNL_RA_PACING_POLARITY: NORMAL
MDC_IDC_SET_LEADCHNL_RA_PACING_PULSEWIDTH: 0.4 MS
MDC_IDC_SET_LEADCHNL_RA_SENSING_ANODE_ELECTRODE_1: NORMAL
MDC_IDC_SET_LEADCHNL_RA_SENSING_ANODE_LOCATION_1: NORMAL
MDC_IDC_SET_LEADCHNL_RA_SENSING_CATHODE_ELECTRODE_1: NORMAL
MDC_IDC_SET_LEADCHNL_RA_SENSING_CATHODE_LOCATION_1: NORMAL
MDC_IDC_SET_LEADCHNL_RA_SENSING_POLARITY: NORMAL
MDC_IDC_SET_LEADCHNL_RA_SENSING_SENSITIVITY: 0.3 MV
MDC_IDC_SET_LEADCHNL_RV_PACING_AMPLITUDE: 2 V
MDC_IDC_SET_LEADCHNL_RV_PACING_ANODE_ELECTRODE_1: NORMAL
MDC_IDC_SET_LEADCHNL_RV_PACING_ANODE_LOCATION_1: NORMAL
MDC_IDC_SET_LEADCHNL_RV_PACING_CAPTURE_MODE: NORMAL
MDC_IDC_SET_LEADCHNL_RV_PACING_CATHODE_ELECTRODE_1: NORMAL
MDC_IDC_SET_LEADCHNL_RV_PACING_CATHODE_LOCATION_1: NORMAL
MDC_IDC_SET_LEADCHNL_RV_PACING_POLARITY: NORMAL
MDC_IDC_SET_LEADCHNL_RV_PACING_PULSEWIDTH: 0.4 MS
MDC_IDC_SET_LEADCHNL_RV_SENSING_ANODE_ELECTRODE_1: NORMAL
MDC_IDC_SET_LEADCHNL_RV_SENSING_ANODE_LOCATION_1: NORMAL
MDC_IDC_SET_LEADCHNL_RV_SENSING_CATHODE_ELECTRODE_1: NORMAL
MDC_IDC_SET_LEADCHNL_RV_SENSING_CATHODE_LOCATION_1: NORMAL
MDC_IDC_SET_LEADCHNL_RV_SENSING_POLARITY: NORMAL
MDC_IDC_SET_LEADCHNL_RV_SENSING_SENSITIVITY: 0.9 MV
MDC_IDC_SET_ZONE_DETECTION_INTERVAL: 200 MS
MDC_IDC_SET_ZONE_DETECTION_INTERVAL: 350 MS
MDC_IDC_SET_ZONE_DETECTION_INTERVAL: 430 MS
MDC_IDC_SET_ZONE_TYPE: NORMAL
MDC_IDC_STAT_AT_BURDEN_PERCENT: 0 %
MDC_IDC_STAT_AT_DTM_END: NORMAL
MDC_IDC_STAT_AT_DTM_START: NORMAL
MDC_IDC_STAT_BRADY_AP_VP_PERCENT: 97.6 %
MDC_IDC_STAT_BRADY_AP_VS_PERCENT: 0.03 %
MDC_IDC_STAT_BRADY_AS_VP_PERCENT: 2.23 %
MDC_IDC_STAT_BRADY_AS_VS_PERCENT: 0.13 %
MDC_IDC_STAT_BRADY_DTM_END: NORMAL
MDC_IDC_STAT_BRADY_DTM_START: NORMAL
MDC_IDC_STAT_BRADY_RA_PERCENT_PACED: 97.73 %
MDC_IDC_STAT_BRADY_RV_PERCENT_PACED: 99.83 %
MDC_IDC_STAT_EPISODE_RECENT_COUNT: 0
MDC_IDC_STAT_EPISODE_RECENT_COUNT: 2
MDC_IDC_STAT_EPISODE_RECENT_COUNT_DTM_END: NORMAL
MDC_IDC_STAT_EPISODE_RECENT_COUNT_DTM_START: NORMAL
MDC_IDC_STAT_EPISODE_TOTAL_COUNT: 0
MDC_IDC_STAT_EPISODE_TOTAL_COUNT: 0
MDC_IDC_STAT_EPISODE_TOTAL_COUNT: 100
MDC_IDC_STAT_EPISODE_TOTAL_COUNT: 21
MDC_IDC_STAT_EPISODE_TOTAL_COUNT: 466
MDC_IDC_STAT_EPISODE_TOTAL_COUNT_DTM_END: NORMAL
MDC_IDC_STAT_EPISODE_TOTAL_COUNT_DTM_START: NORMAL
MDC_IDC_STAT_EPISODE_TYPE: NORMAL

## 2022-03-23 ENCOUNTER — TELEPHONE (OUTPATIENT)
Dept: INTERNAL MEDICINE | Facility: CLINIC | Age: 82
End: 2022-03-23
Payer: COMMERCIAL

## 2022-03-23 NOTE — TELEPHONE ENCOUNTER
Let Gabriella know I will happily provide the documentation for her to have a financial power of . If they are working with a  already, do they have his/her information so I could contact them about where to send this information? We can also discuss this at the next office visit if she would prefer and can come with Pat.

## 2022-03-23 NOTE — TELEPHONE ENCOUNTER
"Patient friend (Antonella) calling this afternoon to pass information along to Rose Mcelroy as patient has appt tomorrow 3/24/22.      Gabriella received a call from social work at patient living facility earlier today to notify her that patient is severely behind on rent payments at this time.  Gabriella and  concerned about patient ability to manage finances at this stage and  advised they look into some sort of  or \"personal \" of sorts to maintain patient book keeping and payments on a monthly basis.   stating this type of this can be ordered by provider or at least recommended by provider should they find it medically reasonable.      Any questions, please call Gabriella at [phone]  "

## 2022-03-24 ENCOUNTER — OFFICE VISIT (OUTPATIENT)
Dept: INTERNAL MEDICINE | Facility: CLINIC | Age: 82
End: 2022-03-24
Payer: COMMERCIAL

## 2022-03-24 ENCOUNTER — TELEPHONE (OUTPATIENT)
Dept: INTERNAL MEDICINE | Facility: CLINIC | Age: 82
End: 2022-03-24

## 2022-03-24 VITALS
WEIGHT: 131.2 LBS | SYSTOLIC BLOOD PRESSURE: 108 MMHG | DIASTOLIC BLOOD PRESSURE: 76 MMHG | HEART RATE: 92 BPM | BODY MASS INDEX: 23.24 KG/M2 | OXYGEN SATURATION: 96 %

## 2022-03-24 DIAGNOSIS — I48.0 PAROXYSMAL ATRIAL FIBRILLATION (H): Primary | ICD-10-CM

## 2022-03-24 DIAGNOSIS — E78.5 DYSLIPIDEMIA, GOAL LDL BELOW 70: ICD-10-CM

## 2022-03-24 DIAGNOSIS — I48.0 PAROXYSMAL ATRIAL FIBRILLATION (H): ICD-10-CM

## 2022-03-24 DIAGNOSIS — E78.2 MIXED HYPERLIPIDEMIA: ICD-10-CM

## 2022-03-24 DIAGNOSIS — I50.31 ACUTE DIASTOLIC CONGESTIVE HEART FAILURE (H): ICD-10-CM

## 2022-03-24 DIAGNOSIS — I50.20 HEART FAILURE WITH REDUCED EJECTION FRACTION, NYHA CLASS I (H): ICD-10-CM

## 2022-03-24 DIAGNOSIS — I10 ESSENTIAL HYPERTENSION: ICD-10-CM

## 2022-03-24 DIAGNOSIS — N18.30 STAGE 3 CHRONIC KIDNEY DISEASE, UNSPECIFIED WHETHER STAGE 3A OR 3B CKD (H): ICD-10-CM

## 2022-03-24 DIAGNOSIS — I47.10 SVT (SUPRAVENTRICULAR TACHYCARDIA) (H): ICD-10-CM

## 2022-03-24 DIAGNOSIS — N18.32 STAGE 3B CHRONIC KIDNEY DISEASE (H): ICD-10-CM

## 2022-03-24 DIAGNOSIS — D64.9 ANEMIA, UNSPECIFIED TYPE: ICD-10-CM

## 2022-03-24 DIAGNOSIS — F01.50 VASCULAR DEMENTIA WITHOUT BEHAVIORAL DISTURBANCE (H): ICD-10-CM

## 2022-03-24 DIAGNOSIS — Z95.0 CARDIAC PACEMAKER IN SITU: ICD-10-CM

## 2022-03-24 PROBLEM — R46.89 COGNITIVE AND BEHAVIORAL CHANGES: Status: ACTIVE | Noted: 2021-06-17

## 2022-03-24 PROBLEM — R41.89 COGNITIVE AND BEHAVIORAL CHANGES: Status: ACTIVE | Noted: 2021-06-17

## 2022-03-24 PROBLEM — Z66 DNAR (DO NOT ATTEMPT RESUSCITATION): Status: ACTIVE | Noted: 2021-06-17

## 2022-03-24 LAB
ALT SERPL W P-5'-P-CCNC: 13 U/L (ref 0–45)
ANION GAP SERPL CALCULATED.3IONS-SCNC: 10 MMOL/L (ref 5–18)
BUN SERPL-MCNC: 22 MG/DL (ref 8–28)
CALCIUM SERPL-MCNC: 9.3 MG/DL (ref 8.5–10.5)
CHLORIDE BLD-SCNC: 105 MMOL/L (ref 98–107)
CHOLEST SERPL-MCNC: 184 MG/DL
CO2 SERPL-SCNC: 27 MMOL/L (ref 22–31)
CREAT SERPL-MCNC: 1.15 MG/DL (ref 0.6–1.1)
CREAT UR-MCNC: 135 MG/DL
ERYTHROCYTE [DISTWIDTH] IN BLOOD BY AUTOMATED COUNT: 12.8 % (ref 10–15)
FASTING STATUS PATIENT QL REPORTED: NO
FERRITIN SERPL-MCNC: 26 NG/ML (ref 10–130)
GFR SERPL CREATININE-BSD FRML MDRD: 48 ML/MIN/1.73M2
GLUCOSE BLD-MCNC: 108 MG/DL (ref 70–125)
HCT VFR BLD AUTO: 39.6 % (ref 35–47)
HDLC SERPL-MCNC: 50 MG/DL
HGB BLD-MCNC: 13.2 G/DL (ref 11.7–15.7)
IRON SATN MFR SERPL: 36 % (ref 15–46)
IRON SERPL-MCNC: 121 UG/DL (ref 35–180)
LDLC SERPL CALC-MCNC: 102 MG/DL
MCH RBC QN AUTO: 33.5 PG (ref 26.5–33)
MCHC RBC AUTO-ENTMCNC: 33.3 G/DL (ref 31.5–36.5)
MCV RBC AUTO: 101 FL (ref 78–100)
MICROALBUMIN UR-MCNC: 0.55 MG/DL (ref 0–1.99)
MICROALBUMIN/CREAT UR: 4.1 MG/G CR
PLATELET # BLD AUTO: 201 10E3/UL (ref 150–450)
POTASSIUM BLD-SCNC: 4.8 MMOL/L (ref 3.5–5)
RBC # BLD AUTO: 3.94 10E6/UL (ref 3.8–5.2)
SODIUM SERPL-SCNC: 142 MMOL/L (ref 136–145)
TIBC SERPL-MCNC: 332 UG/DL (ref 240–430)
TRIGL SERPL-MCNC: 160 MG/DL
WBC # BLD AUTO: 4.6 10E3/UL (ref 4–11)

## 2022-03-24 PROCEDURE — 99215 OFFICE O/P EST HI 40 MIN: CPT | Performed by: NURSE PRACTITIONER

## 2022-03-24 PROCEDURE — 85027 COMPLETE CBC AUTOMATED: CPT | Performed by: NURSE PRACTITIONER

## 2022-03-24 PROCEDURE — 82728 ASSAY OF FERRITIN: CPT | Performed by: NURSE PRACTITIONER

## 2022-03-24 PROCEDURE — 82043 UR ALBUMIN QUANTITATIVE: CPT | Performed by: NURSE PRACTITIONER

## 2022-03-24 PROCEDURE — 80061 LIPID PANEL: CPT | Performed by: NURSE PRACTITIONER

## 2022-03-24 PROCEDURE — 36415 COLL VENOUS BLD VENIPUNCTURE: CPT | Performed by: NURSE PRACTITIONER

## 2022-03-24 PROCEDURE — 84460 ALANINE AMINO (ALT) (SGPT): CPT | Performed by: NURSE PRACTITIONER

## 2022-03-24 PROCEDURE — 83550 IRON BINDING TEST: CPT | Performed by: NURSE PRACTITIONER

## 2022-03-24 PROCEDURE — 80048 BASIC METABOLIC PNL TOTAL CA: CPT | Performed by: NURSE PRACTITIONER

## 2022-03-24 RX ORDER — METOPROLOL SUCCINATE 50 MG/1
50 TABLET, EXTENDED RELEASE ORAL DAILY
Qty: 30 TABLET | Refills: 6 | Status: SHIPPED | OUTPATIENT
Start: 2022-03-24 | End: 2022-03-24

## 2022-03-24 RX ORDER — FUROSEMIDE 20 MG
20 TABLET ORAL EVERY MORNING
Qty: 30 TABLET | Refills: 5 | Status: SHIPPED | OUTPATIENT
Start: 2022-03-24 | End: 2022-03-24

## 2022-03-24 RX ORDER — ATORVASTATIN CALCIUM 10 MG/1
10 TABLET, FILM COATED ORAL EVERY MORNING
Qty: 30 TABLET | Refills: 11 | Status: SHIPPED | OUTPATIENT
Start: 2022-03-24 | End: 2023-01-19

## 2022-03-24 RX ORDER — FUROSEMIDE 20 MG
20 TABLET ORAL EVERY MORNING
Qty: 30 TABLET | Refills: 5 | Status: SHIPPED | OUTPATIENT
Start: 2022-03-24 | End: 2022-09-08

## 2022-03-24 RX ORDER — METOPROLOL SUCCINATE 50 MG/1
50 TABLET, EXTENDED RELEASE ORAL DAILY
Qty: 30 TABLET | Refills: 6 | Status: ON HOLD | OUTPATIENT
Start: 2022-03-24 | End: 2022-09-15

## 2022-03-24 RX ORDER — ATORVASTATIN CALCIUM 10 MG/1
10 TABLET, FILM COATED ORAL EVERY MORNING
Qty: 30 TABLET | Refills: 11 | Status: SHIPPED | OUTPATIENT
Start: 2022-03-24 | End: 2022-03-24

## 2022-03-24 NOTE — LETTER
March 25, 2022      Laney Hahn  [address]   [place] [address]        Dear ,    We are writing to inform you of your test results.    Your labs all look good.  No changes to make in your current medication regimen.    Resulted Orders   ALT   Result Value Ref Range    ALT 13 0 - 45 U/L   Lipid panel reflex to direct LDL Fasting   Result Value Ref Range    Cholesterol 184 <=199 mg/dL    Triglycerides 160 (H) <=149 mg/dL    Direct Measure HDL 50 >=50 mg/dL      Comment:      HDL Cholesterol Reference Range:     0-2 years:   No reference ranges established for patients under 2 years old  at [facility] for lipid analytes.    2-8 years:  Greater than 45 mg/dL     18 years and older:   Female: Greater than or equal to 50 mg/dL   Male:   Greater than or equal to 40 mg/dL    LDL Cholesterol Calculated 102 <=129 mg/dL    Patient Fasting > 8hrs? No    BASIC METABOLIC PANEL   Result Value Ref Range    Sodium 142 136 - 145 mmol/L    Potassium 4.8 3.5 - 5.0 mmol/L    Chloride 105 98 - 107 mmol/L    Carbon Dioxide (CO2) 27 22 - 31 mmol/L    Anion Gap 10 5 - 18 mmol/L    Urea Nitrogen 22 8 - 28 mg/dL    Creatinine 1.15 (H) 0.60 - 1.10 mg/dL    Calcium 9.3 8.5 - 10.5 mg/dL    Glucose 108 70 - 125 mg/dL    GFR Estimate 48 (L) >60 mL/min/1.73m2      Comment:      Effective December 21, 2021 eGFRcr in adults is calculated using the 2021 CKD-EPI creatinine equation which includes age and gender (Sydni et al., NE, DOI: 10.1056/LAXTdt5730848)   CBC with platelets   Result Value Ref Range    WBC Count 4.6 4.0 - 11.0 10e3/uL    RBC Count 3.94 3.80 - 5.20 10e6/uL    Hemoglobin 13.2 11.7 - 15.7 g/dL    Hematocrit 39.6 35.0 - 47.0 %     (H) 78 - 100 fL    MCH 33.5 (H) 26.5 - 33.0 pg    MCHC 33.3 31.5 - 36.5 g/dL    RDW 12.8 10.0 - 15.0 %    Platelet Count 201 150 - 450 10e3/uL   Iron and iron binding capacity   Result Value Ref Range    Iron 121 35 - 180 ug/dL    Iron Binding Capacity  332 240 - 430 ug/dL    Iron Sat Index 36 15 - 46 %   Ferritin   Result Value Ref Range    Ferritin 26 10 - 130 ng/mL   Albumin Random Urine Quantitative with Creat Ratio   Result Value Ref Range    Microalbumin Urine mg/dL 0.55 0.00 - 1.99 mg/dL    Creatinine Urine mg/dL 135 mg/dL    Microalbumin Urine mg/g Cr 4.1 <=19.9 mg/g Cr    Narrative    Microalbumin, Random Urine   <2.0 mg/dL . . . . . . . . Normal   3.0-30.0 mg/dL . . . . . . Microalbuminuria   >30.0 mg/dL . . . . . .  . Clinical Proteinuria     Microalbumin/Creatinine Ratio, Random Urine   <20 mg/g . . . . .. . . . Normal    mg/g . . . . . . . Microalbuminuria   >300 mg/g . . . . . . . . Clinical Proteinuria       If you have any questions or concerns, please call the clinic at the number listed above.       Sincerely,      Rose Mcelroy NP

## 2022-03-24 NOTE — TELEPHONE ENCOUNTER
Will be discussing with patient at appointment today 03/24/2022. Closing encounter.  Reddy Augustin MA on 3/24/2022 at 8:16 AM

## 2022-03-24 NOTE — PROGRESS NOTES
Internal Medicine Office Visit  [facility]   Patient Name: Laney Hahn  Patient Age: 81 year old  YOB: 1940  MRN: [id number]    Date of Visit: 3/24/2022  Patient presents with:  RECHECK           Assessment / Plan / Medical Decision Making:    Problem List Items Addressed This Visit        Nervous and Auditory    Vascular dementia without behavioral disturbance (H)     Medications administered by a nurse through her assisted living facility.  3 meals per day are provided by her facility.  My concern is that she and her brother are unable to manage their finances, according to a phone call they are significantly behind on their rent.  She is currently able to manage all 6 ADLs but cannot manage IADLs and I believe they need additional assistance.  She is also unable to make medical decisions and lacks cognitive ability to follow through on medical plans discussed in the clinic.  She needs someone to attend appointments with her that can help her to manage her medical care.  I do not have contact information for a  through their living facility although I believe that they are connected to one.  Additionally, her friend Gabriella that typically helps to manage her care was unable to talk when I called due to emergency.  Vulnerable adult report filed.            Endocrine    Mixed hyperlipidemia     Continue statin  Repeat lipid panel            Circulatory    Paroxysmal atrial fibrillation (H) - Primary     Rate controlled.  Appears to be tolerating anticoagulation well and no recently reported falls although she is a poor historian.  At this time, continue with apixaban for stroke prevention in the setting of atrial fibrillation         Heart failure with reduced ejection fraction, NYHA class I (H)     Due to finding of pericardial effusion on last echo and during presence of a pericardial rub, repeat echocardiogram is recommended.  This had been discussed at  her September 2021 appointment, however the test never got scheduled.  I plan to discuss this with her friend, Gabriella, who helps her with medical appointments but it is clear to me that she needs additional assistance in understanding and making decisions regarding her medical care.  I intend to recommend to and at that someone attend her appointments that can help with medical decision making and setting appointments.         Essential hypertension     Stable with current medications         Relevant Orders    Albumin Random Urine Quantitative with Creat Ratio (Completed)    ALT (Completed)    BASIC METABOLIC PANEL (Completed)    Hemoglobin    Cardiac pacemaker in situ     Regular pacemaker checks through cardiology            Urinary    Chronic kidney disease, stage III (moderate) (H)     Repeat basic metabolic panel today           Other Visit Diagnoses     Anemia, unspecified type        Relevant Orders    CBC with platelets (Completed)    Iron and iron binding capacity (Completed)    Ferritin (Completed)           I am having Laney Hahn maintain her aspirin-acetaminophen-caffeine and aspirin-acetaminophen-caffeine.          Orders Placed This Encounter   Procedures     Albumin Random Urine Quantitative with Creat Ratio     ALT     Lipid panel reflex to direct LDL Fasting     BASIC METABOLIC PANEL     Hemoglobin     CBC with platelets     Iron and iron binding capacity     Ferritin   Followup: Return in about 6 months (around 9/24/2022) for Follow up. earlier if needed.    60 minutes spent on the date of the encounter doing chart review, patient visit and documentation     Rose Mcelroy NP, CNP        HPI:  Laney Hahn is a 81 year old year old who presents to the office today for follow-up of chronic medical conditions.  She is here with her brother, Adam, and a friend, Tami, from her [employer] who drives Janette and Adam to appointments.  As is typical for Janette, she has no concerns today and would  really prefer not to be at the clinic at all. She and her brother deny any episodes of syncope, falls, or lightheadedness. She denies chest pains or shortness of breath.  An echocardiogram was ordered at her last office visit due to finding of a pericardial effusion in June 2021 as well as clinical findings of a cardiac rub.  She does not have any recollection of this test being ordered.    She and her brother are living in assisted living, [facility].  Reportedly a nurse gives her her medications.  Their meals are provided by the facility.  I received a phone call from another friend, Gabriella, that she and her brother are severely behind rent.  They presently manage their own finances.  It sounds like there is a  working with them but I do not have their name and they did not bring me any additional information with them today.    An RN completed a home assessment.  Pat has this form with her today with information regarding healthcare maintenance items.    Answers for HPI/ROS submitted by the patient on 3/24/2022  How many servings of fruits and vegetables do you eat daily?: 2-3  On average, how many sweetened beverages do you drink each day (Examples: soda, juice, sweet tea, etc.  Do NOT count diet or artificially sweetened beverages)?: 1  How many minutes a day do you exercise enough to make your heart beat faster?: 9 or less  How many days a week do you exercise enough to make your heart beat faster?: 3 or less  How many days per week do you miss taking your medication?: 0  What is the reason for your visit today?: Checkup9      Health Maintenance Review  Health Maintenance   Topic Date Due     DEXA  Never done     HF ACTION PLAN  Never done     ANNUAL REVIEW OF HM ORDERS  Never done     ADVANCE CARE PLANNING  Never done     DTAP/TDAP/TD IMMUNIZATION (1 - Tdap) Never done     ZOSTER IMMUNIZATION (1 of 2) Never done     MEDICARE ANNUAL WELLNESS VISIT  11/16/2017     Pneumococcal  Vaccine: 65+ Years (1 of 1 - PPSV23) 11/16/2017     COVID-19 Vaccine (2 - Booster for Frida series) 05/07/2021     INFLUENZA VACCINE (1) 09/01/2021     FALL RISK ASSESSMENT  08/25/2022     BMP  09/24/2022     ALT  03/24/2023     LIPID  03/24/2023     MICROALBUMIN  03/24/2023     CBC  03/24/2023     HEMOGLOBIN  03/24/2023     TSH W/FREE T4 REFLEX  Completed     PHQ-2 (once per calendar year)  Completed     URINALYSIS  Completed     IPV IMMUNIZATION  Aged Out     MENINGITIS IMMUNIZATION  Aged Out     HEPATITIS B IMMUNIZATION  Aged Out       Current Scheduled Meds:  Outpatient Encounter Medications as of 3/24/2022   Medication Sig Dispense Refill     aspirin-acetaminophen-caffeine (EXCEDRIN MIGRAINE) 250-250-65 MG tablet Take 1 tablet by mouth every 6 hours as needed for headaches 30 tablet 11     [DISCONTINUED] apixaban ANTICOAGULANT (ELIQUIS) 2.5 MG tablet Take 1 tablet (2.5 mg) by mouth 2 times daily 60 tablet 5     [DISCONTINUED] atorvastatin (LIPITOR) 10 MG tablet Take 1 tablet (10 mg) by mouth every morning 30 tablet 11     [DISCONTINUED] furosemide (LASIX) 20 MG tablet Take 1 tablet (20 mg) by mouth every morning 30 tablet 5     [DISCONTINUED] metoprolol succinate ER (TOPROL XL) 50 MG 24 hr tablet Take 1 tablet (50 mg) by mouth daily 30 tablet 6     aspirin-acetaminophen-caffeine (EXCEDRIN MIGRAINE) 250-250-65 MG tablet Take 1 tablet by mouth as needed for headaches        [DISCONTINUED] apixaban ANTICOAGULANT (ELIQUIS) 2.5 MG tablet Take 1 tablet (2.5 mg) by mouth 2 times daily 60 tablet 5     [DISCONTINUED] atorvastatin (LIPITOR) 10 MG tablet Take 1 tablet (10 mg) by mouth every morning 30 tablet 11     [DISCONTINUED] furosemide (LASIX) 20 MG tablet Take 1 tablet (20 mg) by mouth every morning 30 tablet 5     [DISCONTINUED] metoprolol succinate ER (TOPROL XL) 50 MG 24 hr tablet Take 1 tablet (50 mg) by mouth daily 30 tablet 6     No facility-administered encounter medications on file as of 3/24/2022.          Objective / Physical Examination:  Vitals:    03/24/22 0836   BP: 108/76   BP Location: Right arm   Patient Position: Sitting   Cuff Size: Adult Small   Pulse: 92   SpO2: 96%   Weight: 59.5 kg (131 lb 3.2 oz)     Wt Readings from Last 3 Encounters:   03/24/22 59.5 kg (131 lb 3.2 oz)   09/16/21 54.9 kg (121 lb)   07/21/21 53.5 kg (118 lb)     Body mass index is 23.24 kg/m .     Constitutional: In no apparent distress  Respiratory: Clear to auscultation bilaterally. Normal inspiratory and expiratory effort  Cardiovascular: Regular rate and rhythm.  Cardiac rub noted. No edema. .   Neuro: Normal gait.  Ambulates with a cane  Psych: Poor historian.  Dressed appropriately for the weather but disheveled appearance.

## 2022-03-24 NOTE — PATIENT INSTRUCTIONS
- Senior Link[employer] Line at [phone] has resources for setting up a person to help with managing finances  - You need to have a heart ultrasound (ECHO)

## 2022-03-25 ENCOUNTER — TELEPHONE (OUTPATIENT)
Dept: INTERNAL MEDICINE | Facility: CLINIC | Age: 82
End: 2022-03-25
Payer: COMMERCIAL

## 2022-03-25 PROBLEM — E78.2 MIXED HYPERLIPIDEMIA: Status: ACTIVE | Noted: 2022-03-25

## 2022-03-25 PROBLEM — E78.5 DYSLIPIDEMIA, GOAL LDL BELOW 70: Status: ACTIVE | Noted: 2022-03-25

## 2022-03-25 NOTE — TELEPHONE ENCOUNTER
Gabriella is returning call to Rose Mcelroy.  States PCP called her yesterday and she was not available to talk regarding /Financial Assistance services to arrange for patient.    Gabriella is requesting a call back.  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

## 2022-03-25 NOTE — TELEPHONE ENCOUNTER
Called Gabriella Dudley to update her on patient's recent visit and need for additional oversight at medical appointments to assist with decision making and follow through. No answer. Unable to leave voicemail.

## 2022-03-25 NOTE — ASSESSMENT & PLAN NOTE
Medications administered by a nurse through her assisted living facility.  3 meals per day are provided by her facility.  My concern is that she and her brother are unable to manage their finances, according to a phone call they are significantly behind on their rent.  She is currently able to manage all 6 ADLs but cannot manage IADLs and I believe they need additional assistance.  She is also unable to make medical decisions and lacks cognitive ability to follow through on medical plans discussed in the clinic.  She needs someone to attend appointments with her that can help her to manage her medical care.  I do not have contact information for a  through their living facility although I believe that they are connected to one.  Additionally, her friend Gabriella that typically helps to manage her care was unable to talk when I called due to emergency.  Vulnerable adult report filed.

## 2022-03-25 NOTE — ASSESSMENT & PLAN NOTE
Due to finding of pericardial effusion on last echo and during presence of a pericardial rub, repeat echocardiogram is recommended.  This had been discussed at her September 2021 appointment, however the test never got scheduled.  I plan to discuss this with her friend, Gabriella, who helps her with medical appointments but it is clear to me that she needs additional assistance in understanding and making decisions regarding her medical care.  I intend to recommend to and at that someone attend her appointments that can help with medical decision making and setting appointments.

## 2022-03-25 NOTE — ASSESSMENT & PLAN NOTE
Rate controlled.  Appears to be tolerating anticoagulation well and no recently reported falls although she is a poor historian.  At this time, continue with apixaban for stroke prevention in the setting of atrial fibrillation

## 2022-04-05 ENCOUNTER — TELEPHONE (OUTPATIENT)
Dept: INTERNAL MEDICINE | Facility: CLINIC | Age: 82
End: 2022-04-05
Payer: COMMERCIAL

## 2022-04-06 NOTE — TELEPHONE ENCOUNTER
Called Gabriella. The overdue rent was related to [facility] not sending an invoice so Adam missed paying the rent. She will follow up with [facility] to assure they are making payments now. She will also talk with a  that had been working with them (Adeline) about setting up a payment representative for them for the future.     Reviewed Janette's ECHO that is ordered and recommended follow up with cardiology which she will schedule for her.

## 2022-04-08 NOTE — TELEPHONE ENCOUNTER
Spoke with Antonella. She states that Rose and her spoke last night around 6:30 PM. Closing encounter.

## 2022-05-10 ENCOUNTER — HOSPITAL ENCOUNTER (OUTPATIENT)
Dept: CARDIOLOGY | Facility: HOSPITAL | Age: 82
Discharge: HOME | End: 2022-05-10
Attending: NURSE PRACTITIONER | Admitting: NURSE PRACTITIONER
Payer: COMMERCIAL

## 2022-05-10 DIAGNOSIS — I47.10 SVT (SUPRAVENTRICULAR TACHYCARDIA) (H): ICD-10-CM

## 2022-05-10 LAB — LVEF ECHO: NORMAL

## 2022-05-10 PROCEDURE — 93306 TTE W/DOPPLER COMPLETE: CPT | Mod: 26 | Performed by: INTERNAL MEDICINE

## 2022-05-10 PROCEDURE — 93306 TTE W/DOPPLER COMPLETE: CPT

## 2022-05-12 ENCOUNTER — TELEPHONE (OUTPATIENT)
Dept: INTERNAL MEDICINE | Facility: CLINIC | Age: 82
End: 2022-05-12
Payer: COMMERCIAL

## 2022-05-12 NOTE — TELEPHONE ENCOUNTER
----- Message from Rose Mcelroy NP sent at 5/11/2022  8:38 AM CDT -----  Call patient's friend, Gabriella Givens with results of echocardiogram.  The results do show some changes including decreased heart pumping function.  I see that she already has an appointment scheduled for June to follow-up with cardiology.  She should plan to keep this appointment for following up on these results with cardiology.

## 2022-06-14 ENCOUNTER — MEDICAL CORRESPONDENCE (OUTPATIENT)
Dept: MEDSURG UNIT | Facility: HOSPITAL | Age: 82
End: 2022-06-14

## 2022-06-15 ENCOUNTER — ANCILLARY PROCEDURE (OUTPATIENT)
Dept: CARDIOLOGY | Facility: CLINIC | Age: 82
End: 2022-06-15
Attending: INTERNAL MEDICINE
Payer: COMMERCIAL

## 2022-06-15 DIAGNOSIS — I49.5 SINUS NODE DYSFUNCTION (H): ICD-10-CM

## 2022-06-15 DIAGNOSIS — Z95.0 PACEMAKER: ICD-10-CM

## 2022-06-15 LAB
MDC_IDC_EPISODE_DTM: NORMAL
MDC_IDC_EPISODE_DTM: NORMAL
MDC_IDC_EPISODE_DURATION: 1 S
MDC_IDC_EPISODE_DURATION: 1 S
MDC_IDC_EPISODE_ID: 668
MDC_IDC_EPISODE_ID: 669
MDC_IDC_EPISODE_TYPE: NORMAL
MDC_IDC_EPISODE_TYPE: NORMAL
MDC_IDC_LEAD_IMPLANT_DT: NORMAL
MDC_IDC_LEAD_IMPLANT_DT: NORMAL
MDC_IDC_LEAD_LOCATION: NORMAL
MDC_IDC_LEAD_LOCATION: NORMAL
MDC_IDC_LEAD_LOCATION_DETAIL_1: NORMAL
MDC_IDC_LEAD_LOCATION_DETAIL_1: NORMAL
MDC_IDC_LEAD_MFG: NORMAL
MDC_IDC_LEAD_MFG: NORMAL
MDC_IDC_LEAD_MODEL: NORMAL
MDC_IDC_LEAD_MODEL: NORMAL
MDC_IDC_LEAD_POLARITY_TYPE: NORMAL
MDC_IDC_LEAD_POLARITY_TYPE: NORMAL
MDC_IDC_LEAD_SERIAL: NORMAL
MDC_IDC_LEAD_SERIAL: NORMAL
MDC_IDC_LEAD_SPECIAL_FUNCTION: NORMAL
MDC_IDC_LEAD_SPECIAL_FUNCTION: NORMAL
MDC_IDC_MSMT_BATTERY_DTM: NORMAL
MDC_IDC_MSMT_BATTERY_REMAINING_LONGEVITY: 126 MO
MDC_IDC_MSMT_BATTERY_RRT_TRIGGER: 2.62
MDC_IDC_MSMT_BATTERY_STATUS: NORMAL
MDC_IDC_MSMT_BATTERY_VOLTAGE: 3.04 V
MDC_IDC_MSMT_LEADCHNL_RA_IMPEDANCE_VALUE: 304 OHM
MDC_IDC_MSMT_LEADCHNL_RA_IMPEDANCE_VALUE: 513 OHM
MDC_IDC_MSMT_LEADCHNL_RA_PACING_THRESHOLD_AMPLITUDE: 1.12 V
MDC_IDC_MSMT_LEADCHNL_RA_PACING_THRESHOLD_PULSEWIDTH: 0.4 MS
MDC_IDC_MSMT_LEADCHNL_RA_SENSING_INTR_AMPL: 1.75 MV
MDC_IDC_MSMT_LEADCHNL_RA_SENSING_INTR_AMPL: 1.75 MV
MDC_IDC_MSMT_LEADCHNL_RV_IMPEDANCE_VALUE: 418 OHM
MDC_IDC_MSMT_LEADCHNL_RV_IMPEDANCE_VALUE: 551 OHM
MDC_IDC_MSMT_LEADCHNL_RV_PACING_THRESHOLD_AMPLITUDE: 0.75 V
MDC_IDC_MSMT_LEADCHNL_RV_PACING_THRESHOLD_PULSEWIDTH: 0.4 MS
MDC_IDC_MSMT_LEADCHNL_RV_SENSING_INTR_AMPL: 20.62 MV
MDC_IDC_MSMT_LEADCHNL_RV_SENSING_INTR_AMPL: 20.62 MV
MDC_IDC_PG_IMPLANT_DTM: NORMAL
MDC_IDC_PG_MFG: NORMAL
MDC_IDC_PG_MODEL: NORMAL
MDC_IDC_PG_SERIAL: NORMAL
MDC_IDC_PG_TYPE: NORMAL
MDC_IDC_SESS_CLINIC_NAME: NORMAL
MDC_IDC_SESS_DTM: NORMAL
MDC_IDC_SESS_TYPE: NORMAL
MDC_IDC_SET_BRADY_AT_MODE_SWITCH_RATE: 171 {BEATS}/MIN
MDC_IDC_SET_BRADY_HYSTRATE: NORMAL
MDC_IDC_SET_BRADY_LOWRATE: 80 {BEATS}/MIN
MDC_IDC_SET_BRADY_MAX_SENSOR_RATE: 120 {BEATS}/MIN
MDC_IDC_SET_BRADY_MAX_TRACKING_RATE: 120 {BEATS}/MIN
MDC_IDC_SET_BRADY_MODE: NORMAL
MDC_IDC_SET_BRADY_PAV_DELAY_LOW: 180 MS
MDC_IDC_SET_BRADY_SAV_DELAY_LOW: 150 MS
MDC_IDC_SET_LEADCHNL_RA_PACING_AMPLITUDE: 1.75 V
MDC_IDC_SET_LEADCHNL_RA_PACING_ANODE_ELECTRODE_1: NORMAL
MDC_IDC_SET_LEADCHNL_RA_PACING_ANODE_LOCATION_1: NORMAL
MDC_IDC_SET_LEADCHNL_RA_PACING_CAPTURE_MODE: NORMAL
MDC_IDC_SET_LEADCHNL_RA_PACING_CATHODE_ELECTRODE_1: NORMAL
MDC_IDC_SET_LEADCHNL_RA_PACING_CATHODE_LOCATION_1: NORMAL
MDC_IDC_SET_LEADCHNL_RA_PACING_POLARITY: NORMAL
MDC_IDC_SET_LEADCHNL_RA_PACING_PULSEWIDTH: 0.4 MS
MDC_IDC_SET_LEADCHNL_RA_SENSING_ANODE_ELECTRODE_1: NORMAL
MDC_IDC_SET_LEADCHNL_RA_SENSING_ANODE_LOCATION_1: NORMAL
MDC_IDC_SET_LEADCHNL_RA_SENSING_CATHODE_ELECTRODE_1: NORMAL
MDC_IDC_SET_LEADCHNL_RA_SENSING_CATHODE_LOCATION_1: NORMAL
MDC_IDC_SET_LEADCHNL_RA_SENSING_POLARITY: NORMAL
MDC_IDC_SET_LEADCHNL_RA_SENSING_SENSITIVITY: 0.3 MV
MDC_IDC_SET_LEADCHNL_RV_PACING_AMPLITUDE: 2 V
MDC_IDC_SET_LEADCHNL_RV_PACING_ANODE_ELECTRODE_1: NORMAL
MDC_IDC_SET_LEADCHNL_RV_PACING_ANODE_LOCATION_1: NORMAL
MDC_IDC_SET_LEADCHNL_RV_PACING_CAPTURE_MODE: NORMAL
MDC_IDC_SET_LEADCHNL_RV_PACING_CATHODE_ELECTRODE_1: NORMAL
MDC_IDC_SET_LEADCHNL_RV_PACING_CATHODE_LOCATION_1: NORMAL
MDC_IDC_SET_LEADCHNL_RV_PACING_POLARITY: NORMAL
MDC_IDC_SET_LEADCHNL_RV_PACING_PULSEWIDTH: 0.4 MS
MDC_IDC_SET_LEADCHNL_RV_SENSING_ANODE_ELECTRODE_1: NORMAL
MDC_IDC_SET_LEADCHNL_RV_SENSING_ANODE_LOCATION_1: NORMAL
MDC_IDC_SET_LEADCHNL_RV_SENSING_CATHODE_ELECTRODE_1: NORMAL
MDC_IDC_SET_LEADCHNL_RV_SENSING_CATHODE_LOCATION_1: NORMAL
MDC_IDC_SET_LEADCHNL_RV_SENSING_POLARITY: NORMAL
MDC_IDC_SET_LEADCHNL_RV_SENSING_SENSITIVITY: 0.9 MV
MDC_IDC_SET_ZONE_DETECTION_INTERVAL: 200 MS
MDC_IDC_SET_ZONE_DETECTION_INTERVAL: 350 MS
MDC_IDC_SET_ZONE_DETECTION_INTERVAL: 430 MS
MDC_IDC_SET_ZONE_TYPE: NORMAL
MDC_IDC_STAT_AT_BURDEN_PERCENT: 0 %
MDC_IDC_STAT_AT_DTM_END: NORMAL
MDC_IDC_STAT_AT_DTM_START: NORMAL
MDC_IDC_STAT_BRADY_AP_VP_PERCENT: 97.43 %
MDC_IDC_STAT_BRADY_AP_VS_PERCENT: 0.08 %
MDC_IDC_STAT_BRADY_AS_VP_PERCENT: 2.25 %
MDC_IDC_STAT_BRADY_AS_VS_PERCENT: 0.24 %
MDC_IDC_STAT_BRADY_DTM_END: NORMAL
MDC_IDC_STAT_BRADY_DTM_START: NORMAL
MDC_IDC_STAT_BRADY_RA_PERCENT_PACED: 97.7 %
MDC_IDC_STAT_BRADY_RV_PERCENT_PACED: 99.68 %
MDC_IDC_STAT_EPISODE_RECENT_COUNT: 0
MDC_IDC_STAT_EPISODE_RECENT_COUNT: 2
MDC_IDC_STAT_EPISODE_RECENT_COUNT_DTM_END: NORMAL
MDC_IDC_STAT_EPISODE_RECENT_COUNT_DTM_START: NORMAL
MDC_IDC_STAT_EPISODE_TOTAL_COUNT: 0
MDC_IDC_STAT_EPISODE_TOTAL_COUNT: 0
MDC_IDC_STAT_EPISODE_TOTAL_COUNT: 100
MDC_IDC_STAT_EPISODE_TOTAL_COUNT: 23
MDC_IDC_STAT_EPISODE_TOTAL_COUNT: 466
MDC_IDC_STAT_EPISODE_TOTAL_COUNT_DTM_END: NORMAL
MDC_IDC_STAT_EPISODE_TOTAL_COUNT_DTM_START: NORMAL
MDC_IDC_STAT_EPISODE_TYPE: NORMAL

## 2022-06-15 PROCEDURE — 93294 REM INTERROG EVL PM/LDLS PM: CPT | Performed by: INTERNAL MEDICINE

## 2022-06-15 PROCEDURE — 93296 REM INTERROG EVL PM/IDS: CPT | Performed by: INTERNAL MEDICINE

## 2022-07-22 ENCOUNTER — APPOINTMENT (OUTPATIENT)
Dept: CT IMAGING | Facility: HOSPITAL | Age: 82
DRG: 378 | End: 2022-07-22
Attending: EMERGENCY MEDICINE
Payer: COMMERCIAL

## 2022-07-22 ENCOUNTER — HOSPITAL ENCOUNTER (OUTPATIENT)
Facility: HOSPITAL | Age: 82
Setting detail: OBSERVATION
Discharge: NURSING, REHAB OR LONG-TERM CARE | DRG: 378 | End: 2022-07-25
Attending: EMERGENCY MEDICINE | Admitting: INTERNAL MEDICINE
Payer: COMMERCIAL

## 2022-07-22 ENCOUNTER — ANCILLARY PROCEDURE (OUTPATIENT)
Dept: ULTRASOUND IMAGING | Facility: HOSPITAL | Age: 82
DRG: 378 | End: 2022-07-22
Attending: EMERGENCY MEDICINE
Payer: COMMERCIAL

## 2022-07-22 DIAGNOSIS — R79.89 ELEVATED SERUM CREATININE: ICD-10-CM

## 2022-07-22 DIAGNOSIS — K52.9 COLITIS: ICD-10-CM
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
## 2022-07-22 DIAGNOSIS — K62.5 BRIGHT RED BLOOD PER RECTUM: ICD-10-CM

## 2022-07-22 PROBLEM — I45.2 BIFASCICULAR BUNDLE BRANCH BLOCK: Status: ACTIVE | Noted: 2017-08-22

## 2022-07-22 PROBLEM — E87.5 HYPERKALEMIA: Status: ACTIVE | Noted: 2022-07-22

## 2022-07-22 PROBLEM — R19.7 DIARRHEA: Status: ACTIVE | Noted: 2022-07-22

## 2022-07-22 PROBLEM — F43.22 ADJUSTMENT DISORDER WITH ANXIOUS MOOD: Status: ACTIVE | Noted: 2022-07-22

## 2022-07-22 PROBLEM — I31.39 PERICARDIAL EFFUSION: Status: ACTIVE | Noted: 2021-06-17

## 2022-07-22 PROBLEM — D64.9 ANEMIA: Status: ACTIVE | Noted: 2021-04-11

## 2022-07-22 PROBLEM — R00.1 JUNCTIONAL BRADYCARDIA: Status: ACTIVE | Noted: 2021-04-10

## 2022-07-22 PROBLEM — I45.89 AV NODE DYSFUNCTION: Status: ACTIVE | Noted: 2021-06-17

## 2022-07-22 LAB
ABO/RH(D): NORMAL
ANION GAP SERPL CALCULATED.3IONS-SCNC: 14 MMOL/L (ref 5–18)
ANTIBODY SCREEN: NEGATIVE
BASOPHILS # BLD AUTO: 0 10E3/UL (ref 0–0.2)
BASOPHILS NFR BLD AUTO: 0 %
BUN SERPL-MCNC: 43 MG/DL (ref 8–28)
CALCIUM SERPL-MCNC: 9.2 MG/DL (ref 8.5–10.5)
CHLORIDE BLD-SCNC: 99 MMOL/L (ref 98–107)
CO2 SERPL-SCNC: 26 MMOL/L (ref 22–31)
CREAT SERPL-MCNC: 1.8 MG/DL (ref 0.6–1.1)
EOSINOPHIL # BLD AUTO: 0.2 10E3/UL (ref 0–0.7)
EOSINOPHIL NFR BLD AUTO: 1 %
ERYTHROCYTE [DISTWIDTH] IN BLOOD BY AUTOMATED COUNT: 12.8 % (ref 10–15)
GFR SERPL CREATININE-BSD FRML MDRD: 28 ML/MIN/1.73M2
GLUCOSE BLD-MCNC: 162 MG/DL (ref 70–125)
HCT VFR BLD AUTO: 44 % (ref 35–47)
HGB BLD-MCNC: 15.4 G/DL (ref 11.7–15.7)
IMM GRANULOCYTES # BLD: 0 10E3/UL
IMM GRANULOCYTES NFR BLD: 0 %
INR PPP: 1.23 (ref 0.85–1.15)
LYMPHOCYTES # BLD AUTO: 1.4 10E3/UL (ref 0.8–5.3)
LYMPHOCYTES NFR BLD AUTO: 12 %
MCH RBC QN AUTO: 34 PG (ref 26.5–33)
MCHC RBC AUTO-ENTMCNC: 35 G/DL (ref 31.5–36.5)
MCV RBC AUTO: 97 FL (ref 78–100)
MONOCYTES # BLD AUTO: 1 10E3/UL (ref 0–1.3)
MONOCYTES NFR BLD AUTO: 9 %
NEUTROPHILS # BLD AUTO: 8.6 10E3/UL (ref 1.6–8.3)
NEUTROPHILS NFR BLD AUTO: 78 %
NRBC # BLD AUTO: 0 10E3/UL
NRBC BLD AUTO-RTO: 0 /100
PLATELET # BLD AUTO: 221 10E3/UL (ref 150–450)
POTASSIUM BLD-SCNC: 4 MMOL/L (ref 3.5–5)
RBC # BLD AUTO: 4.53 10E6/UL (ref 3.8–5.2)
SODIUM SERPL-SCNC: 139 MMOL/L (ref 136–145)
SPECIMEN EXPIRATION DATE: NORMAL
TROPONIN I SERPL-MCNC: 0.02 NG/ML (ref 0–0.29)
WBC # BLD AUTO: 11.2 10E3/UL (ref 4–11)

## 2022-07-22 PROCEDURE — 84484 ASSAY OF TROPONIN QUANT: CPT | Performed by: EMERGENCY MEDICINE

## 2022-07-22 PROCEDURE — C9803 HOPD COVID-19 SPEC COLLECT: HCPCS

## 2022-07-22 PROCEDURE — 76705 ECHO EXAM OF ABDOMEN: CPT

## 2022-07-22 PROCEDURE — 72125 CT NECK SPINE W/O DYE: CPT

## 2022-07-22 PROCEDURE — 72128 CT CHEST SPINE W/O DYE: CPT

## 2022-07-22 PROCEDURE — 72131 CT LUMBAR SPINE W/O DYE: CPT

## 2022-07-22 PROCEDURE — 93005 ELECTROCARDIOGRAM TRACING: CPT | Performed by: EMERGENCY MEDICINE

## 2022-07-22 PROCEDURE — 85610 PROTHROMBIN TIME: CPT | Performed by: EMERGENCY MEDICINE

## 2022-07-22 PROCEDURE — 82248 BILIRUBIN DIRECT: CPT | Performed by: INTERNAL MEDICINE

## 2022-07-22 PROCEDURE — 74176 CT ABD & PELVIS W/O CONTRAST: CPT

## 2022-07-22 PROCEDURE — 80053 COMPREHEN METABOLIC PANEL: CPT | Performed by: EMERGENCY MEDICINE

## 2022-07-22 PROCEDURE — 70450 CT HEAD/BRAIN W/O DYE: CPT

## 2022-07-22 PROCEDURE — G0378 HOSPITAL OBSERVATION PER HR: HCPCS

## 2022-07-22 PROCEDURE — 36415 COLL VENOUS BLD VENIPUNCTURE: CPT | Performed by: EMERGENCY MEDICINE

## 2022-07-22 PROCEDURE — 86850 RBC ANTIBODY SCREEN: CPT | Performed by: EMERGENCY MEDICINE

## 2022-07-22 PROCEDURE — 99285 EMERGENCY DEPT VISIT HI MDM: CPT | Mod: 25

## 2022-07-22 PROCEDURE — 87635 SARS-COV-2 COVID-19 AMP PRB: CPT | Performed by: INTERNAL MEDICINE

## 2022-07-22 PROCEDURE — 99223 1ST HOSP IP/OBS HIGH 75: CPT | Performed by: INTERNAL MEDICINE

## 2022-07-22 PROCEDURE — 120N000001 HC R&B MED SURG/OB

## 2022-07-22 PROCEDURE — 85014 HEMATOCRIT: CPT | Performed by: EMERGENCY MEDICINE

## 2022-07-22 ASSESSMENT — ACTIVITIES OF DAILY LIVING (ADL): ADLS_ACUITY_SCORE: 35

## 2022-07-22 ASSESSMENT — ENCOUNTER SYMPTOMS: ABDOMINAL PAIN: 1

## 2022-07-22 NOTE — ED TRIAGE NOTES
Pt arrives via [facility] from assisted living. Pt felt weak and fell. Pt's brother who she lives with told EMS she has had diarrhea all day. Pt did hit back of head and is on Eloquist. Hx of dementia. Pt is DNR/DNI and on comfort cares.

## 2022-07-22 NOTE — ED NOTES
Bed: JNED-21  Expected date: 7/22/22  Expected time: 6:42 PM  Means of arrival: Ambulance  Comments:  Jonathan - sadiq on thinners

## 2022-07-23 ENCOUNTER — APPOINTMENT (OUTPATIENT)
Dept: OCCUPATIONAL THERAPY | Facility: HOSPITAL | Age: 82
DRG: 378 | End: 2022-07-23
Attending: INTERNAL MEDICINE
Payer: COMMERCIAL

## 2022-07-23 ENCOUNTER — APPOINTMENT (OUTPATIENT)
Dept: PHYSICAL THERAPY | Facility: HOSPITAL | Age: 82
DRG: 378 | End: 2022-07-23
Attending: INTERNAL MEDICINE
Payer: COMMERCIAL

## 2022-07-23 PROBLEM — N17.9 AKI (ACUTE KIDNEY INJURY) (H): Status: ACTIVE | Noted: 2022-07-23

## 2022-07-23 PROBLEM — K52.9 COLITIS: Status: ACTIVE | Noted: 2022-07-23

## 2022-07-23 LAB
ALBUMIN SERPL-MCNC: 3.4 G/DL (ref 3.5–5)
ALBUMIN SERPL-MCNC: 4.2 G/DL (ref 3.5–5)
ALP SERPL-CCNC: 113 U/L (ref 45–120)
ALP SERPL-CCNC: 135 U/L (ref 45–120)
ALT SERPL W P-5'-P-CCNC: 21 U/L (ref 0–45)
ALT SERPL W P-5'-P-CCNC: 30 U/L (ref 0–45)
ANION GAP SERPL CALCULATED.3IONS-SCNC: 9 MMOL/L (ref 5–18)
AST SERPL W P-5'-P-CCNC: 19 U/L (ref 0–40)
AST SERPL W P-5'-P-CCNC: 34 U/L (ref 0–40)
ATRIAL RATE - MUSE: 82 BPM
BILIRUB DIRECT SERPL-MCNC: 0.3 MG/DL
BILIRUB SERPL-MCNC: 0.6 MG/DL (ref 0–1)
BILIRUB SERPL-MCNC: 0.8 MG/DL (ref 0–1)
BUN SERPL-MCNC: 41 MG/DL (ref 8–28)
CALCIUM SERPL-MCNC: 8.4 MG/DL (ref 8.5–10.5)
CHLORIDE BLD-SCNC: 103 MMOL/L (ref 98–107)
CO2 SERPL-SCNC: 27 MMOL/L (ref 22–31)
CREAT SERPL-MCNC: 1.49 MG/DL (ref 0.6–1.1)
DIASTOLIC BLOOD PRESSURE - MUSE: 68 MMHG
ERYTHROCYTE [DISTWIDTH] IN BLOOD BY AUTOMATED COUNT: 12.8 % (ref 10–15)
GFR SERPL CREATININE-BSD FRML MDRD: 35 ML/MIN/1.73M2
GLUCOSE BLD-MCNC: 132 MG/DL (ref 70–125)
HCT VFR BLD AUTO: 36 % (ref 35–47)
HGB BLD-MCNC: 12.5 G/DL (ref 11.7–15.7)
INTERPRETATION ECG - MUSE: NORMAL
MCH RBC QN AUTO: 34.3 PG (ref 26.5–33)
MCHC RBC AUTO-ENTMCNC: 34.7 G/DL (ref 31.5–36.5)
MCV RBC AUTO: 99 FL (ref 78–100)
P AXIS - MUSE: 60 DEGREES
PLATELET # BLD AUTO: 169 10E3/UL (ref 150–450)
POTASSIUM BLD-SCNC: 3.7 MMOL/L (ref 3.5–5)
PR INTERVAL - MUSE: 196 MS
PROT SERPL-MCNC: 6.3 G/DL (ref 6–8)
PROT SERPL-MCNC: 7.5 G/DL (ref 6–8)
QRS DURATION - MUSE: 138 MS
QT - MUSE: 444 MS
QTC - MUSE: 518 MS
R AXIS - MUSE: 96 DEGREES
RBC # BLD AUTO: 3.64 10E6/UL (ref 3.8–5.2)
SARS-COV-2 RNA RESP QL NAA+PROBE: NEGATIVE
SODIUM SERPL-SCNC: 139 MMOL/L (ref 136–145)
SYSTOLIC BLOOD PRESSURE - MUSE: 129 MMHG
T AXIS - MUSE: -53 DEGREES
VENTRICULAR RATE- MUSE: 82 BPM
WBC # BLD AUTO: 5.5 10E3/UL (ref 4–11)

## 2022-07-23 PROCEDURE — G0378 HOSPITAL OBSERVATION PER HR: HCPCS

## 2022-07-23 PROCEDURE — 36415 COLL VENOUS BLD VENIPUNCTURE: CPT | Performed by: INTERNAL MEDICINE

## 2022-07-23 PROCEDURE — 80053 COMPREHEN METABOLIC PANEL: CPT | Performed by: INTERNAL MEDICINE

## 2022-07-23 PROCEDURE — 99232 SBSQ HOSP IP/OBS MODERATE 35: CPT | Performed by: HOSPITALIST

## 2022-07-23 PROCEDURE — 97166 OT EVAL MOD COMPLEX 45 MIN: CPT | Mod: GO

## 2022-07-23 PROCEDURE — 120N000001 HC R&B MED SURG/OB

## 2022-07-23 PROCEDURE — 87493 C DIFF AMPLIFIED PROBE: CPT | Performed by: EMERGENCY MEDICINE

## 2022-07-23 PROCEDURE — 96361 HYDRATE IV INFUSION ADD-ON: CPT

## 2022-07-23 PROCEDURE — 85027 COMPLETE CBC AUTOMATED: CPT | Performed by: INTERNAL MEDICINE

## 2022-07-23 PROCEDURE — 97161 PT EVAL LOW COMPLEX 20 MIN: CPT | Mod: GP

## 2022-07-23 PROCEDURE — 96360 HYDRATION IV INFUSION INIT: CPT

## 2022-07-23 PROCEDURE — 87506 IADNA-DNA/RNA PROBE TQ 6-11: CPT | Performed by: INTERNAL MEDICINE

## 2022-07-23 PROCEDURE — 97535 SELF CARE MNGMENT TRAINING: CPT | Mod: GO

## 2022-07-23 PROCEDURE — 258N000003 HC RX IP 258 OP 636: Performed by: INTERNAL MEDICINE

## 2022-07-23 PROCEDURE — 250N000013 HC RX MED GY IP 250 OP 250 PS 637: Performed by: INTERNAL MEDICINE

## 2022-07-23 PROCEDURE — 97116 GAIT TRAINING THERAPY: CPT | Mod: GP

## 2022-07-23 RX ORDER — ATORVASTATIN CALCIUM 10 MG/1
10 TABLET, FILM COATED ORAL EVERY MORNING
Status: DISCONTINUED | OUTPATIENT
Start: 2022-07-23 | End: 2022-07-25 | Stop reason: HOSPADM

## 2022-07-23 RX ORDER — CALCIUM CARBONATE 500 MG/1
1000 TABLET, CHEWABLE ORAL 4 TIMES DAILY PRN
Status: DISCONTINUED | OUTPATIENT
Start: 2022-07-23 | End: 2022-07-25 | Stop reason: HOSPADM

## 2022-07-23 RX ORDER — ONDANSETRON 4 MG/1
4 TABLET, ORALLY DISINTEGRATING ORAL EVERY 6 HOURS PRN
Status: DISCONTINUED | OUTPATIENT
Start: 2022-07-23 | End: 2022-07-25 | Stop reason: HOSPADM

## 2022-07-23 RX ORDER — ACETAMINOPHEN 325 MG/1
650 TABLET ORAL EVERY 6 HOURS PRN
Status: DISCONTINUED | OUTPATIENT
Start: 2022-07-23 | End: 2022-07-25 | Stop reason: HOSPADM

## 2022-07-23 RX ORDER — ONDANSETRON 2 MG/ML
4 INJECTION INTRAMUSCULAR; INTRAVENOUS EVERY 6 HOURS PRN
Status: DISCONTINUED | OUTPATIENT
Start: 2022-07-23 | End: 2022-07-25 | Stop reason: HOSPADM

## 2022-07-23 RX ORDER — ACETAMINOPHEN 650 MG/1
650 SUPPOSITORY RECTAL EVERY 6 HOURS PRN
Status: DISCONTINUED | OUTPATIENT
Start: 2022-07-23 | End: 2022-07-25 | Stop reason: HOSPADM

## 2022-07-23 RX ORDER — SODIUM CHLORIDE 9 MG/ML
INJECTION, SOLUTION INTRAVENOUS CONTINUOUS
Status: DISCONTINUED | OUTPATIENT
Start: 2022-07-23 | End: 2022-07-25 | Stop reason: HOSPADM

## 2022-07-23 RX ORDER — LIDOCAINE 40 MG/G
CREAM TOPICAL
Status: DISCONTINUED | OUTPATIENT
Start: 2022-07-23 | End: 2022-07-25 | Stop reason: HOSPADM

## 2022-07-23 RX ORDER — METOPROLOL SUCCINATE 50 MG/1
50 TABLET, EXTENDED RELEASE ORAL DAILY
Status: DISCONTINUED | OUTPATIENT
Start: 2022-07-23 | End: 2022-07-25 | Stop reason: HOSPADM

## 2022-07-23 RX ADMIN — APIXABAN 2.5 MG: 2.5 TABLET, FILM COATED ORAL at 17:26

## 2022-07-23 RX ADMIN — METOPROLOL SUCCINATE 50 MG: 50 TABLET, EXTENDED RELEASE ORAL at 08:16

## 2022-07-23 RX ADMIN — SODIUM CHLORIDE: 9 INJECTION, SOLUTION INTRAVENOUS at 11:11

## 2022-07-23 RX ADMIN — ATORVASTATIN CALCIUM 10 MG: 10 TABLET, FILM COATED ORAL at 08:16

## 2022-07-23 RX ADMIN — SODIUM CHLORIDE: 9 INJECTION, SOLUTION INTRAVENOUS at 00:30

## 2022-07-23 RX ADMIN — APIXABAN 2.5 MG: 2.5 TABLET, FILM COATED ORAL at 04:28

## 2022-07-23 ASSESSMENT — ACTIVITIES OF DAILY LIVING (ADL)
FALL_HISTORY_WITHIN_LAST_SIX_MONTHS: YES
WEAR_GLASSES_OR_BLIND: YES
ADLS_ACUITY_SCORE: 23
TRANSFERRING: 0-->INDEPENDENT
ADLS_ACUITY_SCORE: 23
ADLS_ACUITY_SCORE: 35
ADLS_ACUITY_SCORE: 23
DIFFICULTY_EATING/SWALLOWING: NO
ADLS_ACUITY_SCORE: 35
ADLS_ACUITY_SCORE: 35
ADLS_ACUITY_SCORE: 23
TRANSFERRING: 0-->INDEPENDENT
ADLS_ACUITY_SCORE: 35
ADLS_ACUITY_SCORE: 35
CONCENTRATING,_REMEMBERING_OR_MAKING_DECISIONS_DIFFICULTY: YES
WALKING_OR_CLIMBING_STAIRS_DIFFICULTY: YES
ADLS_ACUITY_SCORE: 35
NUMBER_OF_TIMES_PATIENT_HAS_FALLEN_WITHIN_LAST_SIX_MONTHS: 2
CHANGE_IN_FUNCTIONAL_STATUS_SINCE_ONSET_OF_CURRENT_ILLNESS/INJURY: YES
TOILETING_ISSUES: NO
EQUIPMENT_CURRENTLY_USED_AT_HOME: CANE, STRAIGHT
WALKING_OR_CLIMBING_STAIRS: OTHER (SEE COMMENTS)
ADLS_ACUITY_SCORE: 35
DRESSING/BATHING_DIFFICULTY: NO
ADLS_ACUITY_SCORE: 35
DOING_ERRANDS_INDEPENDENTLY_DIFFICULTY: YES

## 2022-07-23 NOTE — PLAN OF CARE
Goal Outcome Evaluation:      Pt. Alert, pleasant, and cooperative.attempts to get OOB without calling for help. . Seems to have poor short term memory . Very forgetful. Oriented to person. Disoriented to place , and time as well as situation. Bed alarms on. Her brother is visiting and appears to be very supportive.

## 2022-07-23 NOTE — PROGRESS NOTES
07/23/22 1515   Quick Adds   Type of Visit Initial PT Evaluation   Living Environment   People in Home sibling(s)  (brother)   Current Living Arrangements independent living facility  (senior living)   Home Accessibility no concerns   Transportation Anticipated health plan transportation   Self-Care   Usual Activity Tolerance good   Current Activity Tolerance moderate   Equipment Currently Used at Home cane, straight  (pt has FWW at home but does not use it)   Fall history within last six months yes   Number of times patient has fallen within last six months 1   Activity/Exercise/Self-Care Comment patient independent with mobility and basic ADL ,Gets assist with cleaning ,laundry.Brother cooks,Both do not drive.   General Information   Onset of Illness/Injury or Date of Surgery 07/22/22   Referring Physician Rose Mcelroy   Patient/Family Therapy Goals Statement (PT) wants to go home today   Pertinent History of Current Problem (include personal factors and/or comorbidities that impact the POC) fall,diarrhea   Existing Precautions/Restrictions fall   Weight-Bearing Status - LLE full weight-bearing   Weight-Bearing Status - RLE full weight-bearing   General Observations cooperative   Cognition   Affect/Mental Status (Cognition) WFL   Orientation Status (Cognition) oriented x 3  (knew month and year)   Pain Assessment   Patient Currently in Pain No   Range of Motion (ROM)   Range of Motion ROM is WFL   Strength Comprehensive (MMT)   General Manual Muscle Testing (MMT) Assessment no strength deficits identified   Bed Mobility   Supine-Sit [place] (Bed Mobility) independent   Sit-Supine [place] (Bed Mobility) independent   Sit-Stand Transfer   Sit-Stand [place] (Transfers) supervision;verbal cues   Assistive Device (Sit-Stand Transfers) walker, front-wheeled   Comment, (Sit-Stand Transfer) cues for safety   Stand-Sit Transfer   Stand-Sit [place] (Transfers) supervision;verbal cues   Assistive Device  (Stand-Sit Transfers) walker, front-wheeled   Comment, (Stand-Sit Transfer) cues for safety   Gait/Stairs (Locomotion)   [place] Level (Gait) contact guard   Assistive Device (Gait) walker, front-wheeled   Distance in Feet (Required for LE Total Joints) 150 feet   Pattern (Gait) step-through   Deviations/Abnormal Patterns (Gait) base of support, narrow   Maintains Weight-bearing Status (Gait) able to maintain   Comment, (Gait/Stairs) cues for FWW safety   Clinical Impression   Criteria for Skilled Therapeutic Intervention Yes, treatment indicated   PT Diagnosis (PT) impaired functional mobility   Influenced by the following impairments cognition   Functional limitations due to impairments gait stability ,transfers safety   Clinical Presentation (PT Evaluation Complexity) Stable/Uncomplicated   Clinical Presentation Rationale pt presents as med diagnosed   Clinical Decision Making (Complexity) low complexity   Planned Therapy Interventions (PT) gait training;transfer training;balance training   Anticipated Equipment Needs at Discharge (PT) walker, rolling   Risk & Benefits of therapy have been explained evaluation/treatment results reviewed;patient;sibling   Clinical Impression Comments Patient is a 81 yo female admitted after fall.Presents with gait stability and decreased safety with transfers and amb   PT Discharge Planning   PT Discharge Recommendation (DC Rec) home with assist   PT Rationale for DC Rec fairly independent with mobility prior ,brother lives in the same apt and can assist as needed   Total Evaluation Time   Total Evaluation Time (Minutes) 12   Physical Therapy Goals   PT Frequency Daily   PT Predicted Duration/Target Date for Goal Attainment 07/30/22   PT Goals Transfers;Gait   PT: Transfers Supervision/stand-by assist;Assistive device;Sit to/from stand   PT: Gait Supervision/stand-by assist;Rolling walker;150 feet

## 2022-07-23 NOTE — PHARMACY-ADMISSION MEDICATION HISTORY
Pharmacy Note - Admission Medication History    Pertinent Provider Information: None     ______________________________________________________________________    Prior To Admission (PTA) med list completed and updated in EMR.       PTA Med List   Medication Sig Last Dose     apixaban ANTICOAGULANT (ELIQUIS) 2.5 MG tablet Take 1 tablet (2.5 mg) by mouth 2 times daily 7/22/2022 at x1     aspirin-acetaminophen-caffeine (EXCEDRIN MIGRAINE) 250-250-65 MG tablet Take 1 tablet by mouth every 6 hours as needed for headaches      atorvastatin (LIPITOR) 10 MG tablet Take 1 tablet (10 mg) by mouth every morning 7/22/2022 at Unknown time     furosemide (LASIX) 20 MG tablet Take 1 tablet (20 mg) by mouth every morning 7/22/2022 at Unknown time     metoprolol succinate ER (TOPROL XL) 50 MG 24 hr tablet Take 1 tablet (50 mg) by mouth daily 7/22/2022 at Unknown time       Information source(s): Patient and CareEverywhere/[facility]  Method of interview communication: in-person    Summary of Changes to PTA Med List  New: None  Discontinued: None  Changed: None    Patient was asked about OTC/herbal products specifically.  PTA med list reflects this.    In the past week, patient estimated taking medication this percent of the time:  greater than 90%.    Allergies were reviewed, assessed, and updated with the patient.      Patient does not use any multi-dose medications prior to admission.    The information provided in this note is only as accurate as the sources available at the time of the update(s).    Thank you for the opportunity to participate in the care of this patient.    Danielle Navas, [facility]  7/22/2022 10:24 PM

## 2022-07-23 NOTE — PROGRESS NOTES
07/23/22 1145   Quick Adds   Type of Visit Initial Occupational Therapy Evaluation   Living Environment   People in Home sibling(s)  (brother)   Current Living Arrangements assisted living   Home Accessibility no concerns   Self-Care   Current Activity Tolerance good   Equipment Currently Used at Home none  (has walker)   Instrumental Activities of Daily Living (IADL)   IADL Comments indep. drsg, sponge, bathing,toileting, brother A w/ meals-getting from dining room as needed   General Information: admit for fall, acute kidney injury, diarrhea, h/o dementia, HTN   Onset of Illness/Injury or Date of Surgery 07/22/22   Patient/Family Therapy Goal Statement (OT) home   Existing Precautions/Restrictions fall   Limitations/Impairments safety/cognitive   Cognitive Status Examination   Orientation Status person;place   Visual Perception   Visual Impairment/Limitations corrective lenses full-time   Range of Motion Comprehensive   General Range of Motion no range of motion deficits identified   Strength Comprehensive (MMT)   General Manual Muscle Testing (MMT) Assessment no strength deficits identified   Coordination   Upper Extremity Coordination No deficits were identified   Bed Mobility   Bed Mobility supine-sit;sit-supine   Supine-Sit [place] (Bed Mobility) supervision   Sit-Supine [place] (Bed Mobility) supervision   Transfers   Transfers bed-chair transfer;toilet transfer   Transfer Skill: Bed to Chair/Chair to Bed   Bed-Chair [place] (Transfers) contact guard   Assistive Device (Bed-Chair Transfers) rolling walker   Toilet Transfer   Type (Toilet Transfer) sit-stand;stand-sit   [place] Level (Toilet Transfer) contact guard   Assistive Device (Toilet Transfer) grab bars/safety frame;walker, front-wheeled   Balance   Balance Assessment standing balance: dynamic   Balance Comments WFL   Grooming Assessment/Training   [place] Level (Grooming) contact guard assist   Comment, (Grooming) washed  hands   Toileting   [place] Level (Toileting) maximum assist (25% patient effort)   Comment, (Toileting) pull up/down brief   Clinical Impression   Criteria for Skilled Therapeutic Interventions Met (OT) Yes, treatment indicated   OT Diagnosis decreased ADLs   OT Problem List-Impairments impacting ADL cognition;mobility   Assessment of Occupational Performance 3-5 Performance Deficits   Identified Performance Deficits drsg, trsfs, toileting, cognition   Planned Therapy Interventions (OT) cognition;ADL retraining;transfer training   Clinical Decision Making Complexity (OT) moderate complexity   Anticipated Equipment Needs Upon Discharge (OT)   (cont.to assess)   Risk & Benefits of therapy have been explained evaluation/treatment results reviewed;care plan/treatment goals reviewed;risks/benefits reviewed;patient;participants voiced agreement with care plan   OT Discharge Planning   OT Discharge Recommendation (DC Rec) home with assist   OT Rationale for DC Rec rec. daily checks and contintue dA from family as needed   Total Evaluation Time (Minutes)   Total Evaluation Time (Minutes) 10   OT Goals   Therapy Frequency (OT) Daily   OT Predicted Duration/Target Date for Goal Attainment 07/28/22   OT Goals Hygiene/Grooming;Toilet Transfer/Toileting;Cognition;Transfers   OT: Hygiene/Grooming supervision/stand-by assist;while standing   OT: Transfer Supervision/stand-by assist;with assistive device   OT: Toilet Transfer/Toileting Supervision/stand-by assist   OT: Cognitive Patient/caregiver will verbalize understanding of cognitive assessment results/recommendations as needed for safe discharge planning

## 2022-07-23 NOTE — ED PROVIDER NOTES
EMERGENCY DEPARTMENT ENCOUNTER      NAME: Laney Hahn  AGE: 82 year old female  YOB: 1940  MRN: [id number]  EVALUATION DATE & TIME: 7/22/2022  6:55 PM    PCP: Rose Mcelroy    ED PROVIDER: Tavares Roach MD        Chief Complaint   Patient presents with     Fall         FINAL IMPRESSION:  1. Bright red blood per rectum    2. Fall, initial encounter    3. Colitis    4. Elevated serum creatinine          ED COURSE & MEDICAL DECISION MAKING:    Pertinent Labs & Imaging studies reviewed. (See chart for details)  82 year old female presents to the Emergency Department for evaluation of fall on anticoagulation and diarrhea    Primary survey negative  FAST exam negative  Secondary survey notable for bright red blood per rectum as well as contusion to left occipital scalp    Plan for CT head and neck and back based on tenderness does report some abdominal tenderness of her CT abdomen ordered unfortunately decreased GFR therefore CT without contrast.  Possible concussion.    Negative for intracranial hemorrhage.  Imaging does show evidence of colitis.  C. difficile ordered.  Ischemic colitis less likely    Hemoglobin normal.    Plan for admission for serial hemoglobins based on anticoagulation and bright red blood per rectum             6:56 PM I met with the patient to gather history and to perform my initial exam. I discussed the plan for care while in the Emergency Department.   7:45 PM Spoke with CT.   8:50 PM Nurse informed me that patient had bright red blood in her rectum.   9:56 PM Spoke with hospitalist, Dr. Hurst.     At the conclusion of the encounter I discussed the results of all of the tests and the disposition. The questions were answered. The patient or family acknowledged understanding and was agreeable with the care plan.     I wore goggles and an N95 mask during my time with the patient.       MEDICATIONS GIVEN IN THE EMERGENCY:  Medications - No data to display    NEW  "PRESCRIPTIONS STARTED AT TODAY'S ER VISIT  New Prescriptions    No medications on file          =================================================================    HPI    Triage note  \"Pt arrives via [facility] from assisted living. Pt felt weak and fell. Pt's brother who she lives with told EMS she has had diarrhea all day. Pt did hit back of head and is on Eloquist. Hx of dementia. Pt is DNR/DNI and on comfort cares. \"      Patient information was obtained from: EMS  HPI Limited Due to Dementia    Use of : N/A        Laney Hahn is a 82 year old female with a pertinent history of SVT, CKD stage 3, dementia, aortic valve stenosis, HF, hypertension, cardiac pacemaker, hyperlipidemia, anemia, stroke, and hyperkalemia who presents to this ED by EMS for evaluation of a fall.     Per EMS, patient arrived from assisted living. Patient's brother stated she has had diarrhea all day. She was feeling weak and fell hitting the back of her head. Patient is on eliquis.       REVIEW OF SYSTEMS   Review of Systems   Unable to perform ROS: Dementia          PAST MEDICAL HISTORY:  Past Medical History:   Diagnosis Date     COVID-19 09/14/2020    positive test at [facility]     DNAR (do not attempt resuscitation)      Dyslipidemia, goal LDL below 70 09/15/2020     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 Nini Rasmussen     Metabolic encephalopathy      Moderate aortic valve stenosis 08/22/2017    stable on 2020 echo     Nonobstructive atherosclerosis of coronary artery 09/15/2020    with normal LVEDP     Paroxysmal SVT (supraventricular tachycardia) (H) 09/07/2017    said to have short episodes while hospitalized for UTI per Dr. Rhys Mensah     Syncope 08/22/2017     UTI (urinary tract infection) 08/21/2017    hospitalized with weakness       PAST SURGICAL HISTORY:  Past Surgical History: "   Procedure Laterality Date     CATARACT EXTRACTION, BILATERAL       CV CORONARY ANGIOGRAM N/A 9/15/2020    Procedure: Coronary Angiogram;  Surgeon: Radhika Santa MD;  Location: [facility] Cath Lab;  Service: Cardiology     CV LEFT HEART CATHETERIZATION WITHOUT LEFT VENTRICULOGRAM Left 9/15/2020    Procedure: Left Heart Catheterization Without Left Ventriculogram;  Surgeon: Radhika Santa MD;  Location: [facility] Cath Lab;  Service: Cardiology     EP PACEMAKER INSERT N/A 4/13/2021    Procedure: EP Pacemaker Insertion;  Surgeon: Frederic Burgos MD;  Location: [facility] Cardiac Cath Lab;  Service: Cardiology     HYSTERECTOMY       PARTIAL GASTRECTOMY  1969    for ulcers     TONSILLECTOMY             CURRENT MEDICATIONS:    apixaban ANTICOAGULANT (ELIQUIS) 2.5 MG tablet  aspirin-acetaminophen-caffeine (EXCEDRIN MIGRAINE) 250-250-65 MG tablet  atorvastatin (LIPITOR) 10 MG tablet  furosemide (LASIX) 20 MG tablet  metoprolol succinate ER (TOPROL XL) 50 MG 24 hr tablet        ALLERGIES:  Allergies   Allergen Reactions     Sulfa Drugs Anxiety       FAMILY HISTORY:  Family History   Adopted: Yes       SOCIAL HISTORY:   Social History     Socioeconomic History     Marital status: Single     Number of children: 0   Tobacco Use     Smoking status: Never Smoker     Smokeless tobacco: Never Used   Substance and Sexual Activity     Alcohol use: No     Drug use: No   Social History Narrative    Her adopted brother, Jeff, lives with her. He is five years younger than her.       VITALS:  /60   Pulse 80   Temp 98.2  F (36.8  C) (Oral)   Resp 20   LMP  (LMP Unknown)   SpO2 95%     PHYSICAL EXAM      Vitals: /60   Pulse 80   Temp 98.2  F (36.8  C) (Oral)   Resp 20   LMP  (LMP Unknown)   SpO2 95%    /60   Pulse 80   Temp 98.2  F (36.8  C) (Oral)   Resp 20   LMP  (LMP Unknown)   SpO2 95%     General Appearance: Alert, cooperative, normal speech and facial symmetry,  appears stated  age,    Primary survey:     Airway patent  Breath sounds: bilateral breath sounds  Cardiovascular: 2+ radial pulses and DP pulses  Disability GCS 14 (does not know what year it is)    Secondary survey    Head:  Normocephalic, without obvious abnormality, atraumatic  Eyes:  PERRL,pupils midsized, conjunctiva/corneas clear, EOM's intact, no orbital injury  ENT:  No obvious facial deformity.    Neck:  No midline cervical spine tenderness.  No paraspinal tenderness.  Supple, symmetrical, trachea midline, no stridor or dysphonia, no soft tissue swelling or tenderness  Chest:  No tenderness or deformity, no crepitus  Cardio:  Regular rate and rhythm  Pulm:  Clear to auscultation bilaterally, respirations unlabored,   Back: Tenderness to thoracic spine  Abdomen: Tender to right lower quadrant without guarding  Extremities:  No obvious deformity, all joints palpated in place with full range of motion.  No tenderness or instability.   Skin:  Skin color, texture, turgor normal, no rashes or lesions  Neuro:  Awake, alert, responsive to voice, follows commands, normal speech, No gross motor weakness or sensory loss, moves all extremities, baseline ambulation, GCS 14 (does not know what year it is)       LAB:  All pertinent labs reviewed and interpreted.  Results for orders placed or performed during the hospital encounter of 07/22/22   Head CT w/o contrast    Impression    IMPRESSION:  HEAD CT:  1.  Motion artifact makes evaluation suboptimal. Within this constraint, there is no acute intracranial abnormality.    CERVICAL SPINE CT:  1.  No acute fracture.    THORACIC SPINE CT:  1.  No acute fracture.    LUMBAR SPINE CT:  1.  No acute fracture.   Cervical spine CT w/o contrast    Impression    IMPRESSION:  HEAD CT:  1.  Motion artifact makes evaluation suboptimal. Within this constraint, there is no acute intracranial abnormality.    CERVICAL SPINE CT:  1.  No acute fracture.    THORACIC SPINE CT:  1.  No acute  fracture.    LUMBAR SPINE CT:  1.  No acute fracture.   CT Thoracic Spine w/o Contrast    Impression    IMPRESSION:  HEAD CT:  1.  Motion artifact makes evaluation suboptimal. Within this constraint, there is no acute intracranial abnormality.    CERVICAL SPINE CT:  1.  No acute fracture.    THORACIC SPINE CT:  1.  No acute fracture.    LUMBAR SPINE CT:  1.  No acute fracture.   Lumbar spine CT w/o contrast    Impression    IMPRESSION:  HEAD CT:  1.  Motion artifact makes evaluation suboptimal. Within this constraint, there is no acute intracranial abnormality.    CERVICAL SPINE CT:  1.  No acute fracture.    THORACIC SPINE CT:  1.  No acute fracture.    LUMBAR SPINE CT:  1.  No acute fracture.   CT Abdomen Pelvis w/o Contrast    Impression    IMPRESSION:   1.  Mild mural and pericolic edema transverse and left colon consistent with a nonspecific colitis.   2.  Gallbladder distention and mild extrahepatic bile duct dilatation to the level of the ampulla but no visible radiodense stone or mass.  3.  Postop change Billroth II with distal gastrectomy and loop gastrojejunostomy.  4.  Generalized atrophy left kidney, probably sequela to chronic renal artery stenosis.   5.  A 2 mm nonobstructing left kidney stone.      Result Value Ref Range    INR 1.23 (H) 0.85 - 1.15   Basic metabolic panel   Result Value Ref Range    Sodium 139 136 - 145 mmol/L    Potassium 4.0 3.5 - 5.0 mmol/L    Chloride 99 98 - 107 mmol/L    Carbon Dioxide (CO2) 26 22 - 31 mmol/L    Anion Gap 14 5 - 18 mmol/L    Urea Nitrogen 43 (H) 8 - 28 mg/dL    Creatinine 1.80 (H) 0.60 - 1.10 mg/dL    Calcium 9.2 8.5 - 10.5 mg/dL    Glucose 162 (H) 70 - 125 mg/dL    GFR Estimate 28 (L) >60 mL/min/1.73m2   Troponin I (now)   Result Value Ref Range    Troponin I 0.02 0.00 - 0.29 ng/mL   CBC with platelets and differential   Result Value Ref Range    WBC Count 11.2 (H) 4.0 - 11.0 10e3/uL    RBC Count 4.53 3.80 - 5.20 10e6/uL    Hemoglobin 15.4 11.7 - 15.7 g/dL     Hematocrit 44.0 35.0 - 47.0 %    MCV 97 78 - 100 fL    MCH 34.0 (H) 26.5 - 33.0 pg    MCHC 35.0 31.5 - 36.5 g/dL    RDW 12.8 10.0 - 15.0 %    Platelet Count 221 150 - 450 10e3/uL    % Neutrophils 78 %    % Lymphocytes 12 %    % Monocytes 9 %    % Eosinophils 1 %    % Basophils 0 %    % Immature Granulocytes 0 %    NRBCs per 100 WBC 0 <1 /100    Absolute Neutrophils 8.6 (H) 1.6 - 8.3 10e3/uL    Absolute Lymphocytes 1.4 0.8 - 5.3 10e3/uL    Absolute Monocytes 1.0 0.0 - 1.3 10e3/uL    Absolute Eosinophils 0.2 0.0 - 0.7 10e3/uL    Absolute Basophils 0.0 0.0 - 0.2 10e3/uL    Absolute Immature Granulocytes 0.0 <=0.4 10e3/uL    Absolute NRBCs 0.0 10e3/uL   Adult Type and Screen   Result Value Ref Range    ABO/RH(D) A NEG     Antibody Screen Negative Negative    SPECIMEN EXPIRATION DATE [id number]        RADIOLOGY:  Reviewed all pertinent imaging. Please see official radiology report.  CT Abdomen Pelvis w/o Contrast   Final Result   IMPRESSION:    1.  Mild mural and pericolic edema transverse and left colon consistent with a nonspecific colitis.    2.  Gallbladder distention and mild extrahepatic bile duct dilatation to the level of the ampulla but no visible radiodense stone or mass.   3.  Postop change Billroth II with distal gastrectomy and loop gastrojejunostomy.   4.  Generalized atrophy left kidney, probably sequela to chronic renal artery stenosis.    5.  A 2 mm nonobstructing left kidney stone.          Lumbar spine CT w/o contrast   Final Result   IMPRESSION:   HEAD CT:   1.  Motion artifact makes evaluation suboptimal. Within this constraint, there is no acute intracranial abnormality.      CERVICAL SPINE CT:   1.  No acute fracture.      THORACIC SPINE CT:   1.  No acute fracture.      LUMBAR SPINE CT:   1.  No acute fracture.      CT Thoracic Spine w/o Contrast   Final Result   IMPRESSION:   HEAD CT:   1.  Motion artifact makes evaluation suboptimal. Within this constraint, there is no acute intracranial  abnormality.      CERVICAL SPINE CT:   1.  No acute fracture.      THORACIC SPINE CT:   1.  No acute fracture.      LUMBAR SPINE CT:   1.  No acute fracture.      Cervical spine CT w/o contrast   Final Result   IMPRESSION:   HEAD CT:   1.  Motion artifact makes evaluation suboptimal. Within this constraint, there is no acute intracranial abnormality.      CERVICAL SPINE CT:   1.  No acute fracture.      THORACIC SPINE CT:   1.  No acute fracture.      LUMBAR SPINE CT:   1.  No acute fracture.      Head CT w/o contrast   Final Result   IMPRESSION:   HEAD CT:   1.  Motion artifact makes evaluation suboptimal. Within this constraint, there is no acute intracranial abnormality.      CERVICAL SPINE CT:   1.  No acute fracture.      THORACIC SPINE CT:   1.  No acute fracture.      LUMBAR SPINE CT:   1.  No acute fracture.      POC US ABDOMEN LIMITED    (Results Pending)       EKG:    Performed at: 19:12    Impression: AV pace rhythm     Rate: 82 BPM  Rhythm: AV pace  Axis: 96  KY Interval: 196 ms  QRS Interval: 138 ms  QTc Interval: 518 ms  ST Changes: T wave abnormality  Comparison: When compared with ECG of 16-JUN-2021, AV rhythm replaced sinus rhythm     I have independently reviewed and interpreted the EKG(s) documented above.    PROCEDURES:     POC US ABDOMEN LIMITED    Date/Time: 7/22/2022 7:10 PM  Performed by: Flavio Roach MD  Authorized by: Flavio Roach MD     Procedure details:     Indications: abdominal pain    Comments:      FAST exam: negative. Images saved and uploaded      I, Melinda Leija, am serving as a scribe to document services personally performed by Flavio Roach MD based on my observation and the provider's statements to me. I, Dr. Flavio Roach, attest that Melinda Leija is acting in a scribe capacity, has observed my performance of the services and has documented them in accordance with my direction.    Flavio Roach MD  Emergency Medicine  [facility] EMERGENCY  DEPARTMENT  [address] [place] [id number]  [phone]     Flavio Roach MD  07/22/22 [id number]

## 2022-07-23 NOTE — ED NOTES
While writer and EDT were changing pt's brief we noticed bright red blood in pt's stool. Provider notified.

## 2022-07-23 NOTE — H&P
Admission History and Physical   Laney Hahn,  1940, MRN [id number]    [facility]  Diarrhea [R19.7]    PCP: Rose Mcelroy, [address]  [facility] [address], [phone]   Code status:  DNR/DNI       Extended Emergency Contact Information  Primary Emergency Contact: Adam Aguirre   [facility]  Home Phone: [phone]  Mobile Phone: [phone]  Relation: Other  Secondary Emergency Contact: Gabriella Givens   [place]  Mobile Phone: [phone]  Relation: Friend       Assessment and Plan   82 year old old female with past medical history of vascular dementia, hyperlipidemia, hypertension, paroxysmal A. fib, s/p PPM, heart failure with reduced EF, CKD stage III, chronic anemia who presented for evaluation of fall and diarrhea, she is found to have acute kidney injury and nonspecific colitis on the CT abdomen and pelvis    1.  Diarrhea with bright red blood per rectum noted in the ER.  CT abdomen and pelvis with nonspecific colitis.  Abdomen is benign.  Obtain stool culture for enteric pathogens and C. difficile.  Hold off on antibiotics at this time  2.  Acute kidney injury.  Likely prerenal in the setting of diarrhea.  IV hydration.  Recheck renal function in a.m.  3.  Presyncope.  Likely due to orthostatic hypotension in the setting of intravascular volume depletion.  PT/OT eval  4.  Paroxysmal A. fib, s/p PPM.  Continue metoprolol for ventricular rate control.  On Eliquis for CVA prevention.  Hemoglobin is stable from prior however if patient continues to have episodes of bloody stool will need to hold Eliquis  5.  Essential hypertension.  Resume PTA meds  6.  Vascular dementia without behavioral disturbances.  Supportive cares  7.  DNR/DNI      DVT Prophylaxis: On Eliquis     Chief Complaint:  Fall, diarrhea     HPI:    Laney Hahn is a 82 year old old female with past medical history of vascular dementia, hyperlipidemia, hypertension, paroxysmal A. fib, s/p  PPM, heart failure with reduced EF, CKD stage III, chronic anemia who presented for evaluation of fall and diarrhea  History is provided by patient, medical records.  Patient lives with her brother at the assisted living facility.  Today she was standing at the kitchen counter when she felt lightheaded and fell onto the floor hitting her head.  She does have scalp contusion and mild tenderness but no headache.  Denies having any loss of consciousness.  Reports having diarrhea over the past 1 day but no abdominal pain or nausea.  Denies fevers or chills.  She states that she has been eating well.  Denies any recent antibiotics or sick contacts.  On initial evaluation in the ER patient is afebrile, not tachycardic, normotensive.  Labs are significant for WBC 11.2, creatinine 1.8, hemoglobin 15.4.  CT head, cervical, thoracic and lumbar spine without any acute traumatic injuries.  CT abdomen and pelvis shows mild mural and pericolic edema in the transverse and left colon consistent with a nonspecific colitis, gallbladder distention and mild extrahepatic bile duct dilatation to the level of the ampulla.       Medical History  Past Medical History:   Diagnosis Date     COVID-19 09/14/2020    positive test at [facility]     DNAR (do not attempt resuscitation)      Dyslipidemia, goal LDL below 70 09/15/2020     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 Nini Rasmussen     Metabolic encephalopathy      Moderate aortic valve stenosis 08/22/2017    stable on 2020 echo     Nonobstructive atherosclerosis of coronary artery 09/15/2020    with normal LVEDP     Paroxysmal SVT (supraventricular tachycardia) (H) 09/07/2017    said to have short episodes while hospitalized for UTI per Dr. Rhys Mensah     Syncope 08/22/2017     UTI (urinary tract infection) 08/21/2017    hospitalized with weakness        Surgical  History  She  has a past surgical history that includes Cataract Extraction, Bilateral; Tonsillectomy; Partial Gastrectomy (1969); Hysterectomy; Cv Coronary Angiogram (N/A, 9/15/2020); Cv Left Heart Catheterization Without Left Ventriculogram (Left, 9/15/2020); and Ep Pacemaker Insert (N/A, 4/13/2021).       Social History  Reviewed, and she  reports that she has never smoked. She has never used smokeless tobacco. She reports that she does not drink alcohol and does not use drugs.   Social History     Tobacco Use     Smoking status: Never Smoker     Smokeless tobacco: Never Used   Substance Use Topics     Alcohol use: No          Allergies  Allergies   Allergen Reactions     Sulfa Drugs Anxiety    Family History  Reviewed, and   Family History   Adopted: Yes             Prior to Admission Medications   (Not in a hospital admission)         Review of Systems:  A 12 point comprehensive review of systems was negative except as noted. Physical Exam:  Temp:  [98.2  F (36.8  C)] 98.2  F (36.8  C)  Pulse:  [80-81] 80  Resp:  [20] 20  BP: (105-135)/(55-71) 110/55  SpO2:  [96 %-98 %] 97 %    /55   Pulse 80   Temp 98.2  F (36.8  C) (Oral)   Resp 20   LMP  (LMP Unknown)   SpO2 97%     General Appearance:   No acute distress   Head:    Normocephalic, without obvious abnormality, atraumatic   Eyes:    PERRL, conjunctiva/corneas clear, EOM's intact,both eyes    Ears:    Normal external ear canals no drainage or erythema bilat.   Nose:   Nares normal by gross inspection,  mucosa normal, no drainage or sinus tenderness   Throat:   Lips, mucosa, and tongue normal; teeth and gums normal   Neck:   Supple, symmetrical, trachea midline, no adenopathy;        thyroid:  No enlargement/tenderness/nodules   Back:     Symmetric, no curvature, ROM normal, no CVA tenderness   Lungs:    Clear bilaterally   Chest wall:    No tenderness or deformity   Heart:    Regular rate and rhythm, S1 and S2 normal, 2/6 systolic murmur, no rubs, no  JVD, no edema   Abdomen:     Soft, non-tender, bowel sounds active all four quadrants,     no masses, no hepatosplenomegaly   Musculoskeletal:   Extremities are warm and non-tender, atraumatic, no joint swelling or tenderness   Pulses:   2+ and symmetric all extremities   Skin:   Skin color, texture, turgor normal, no rashes or lesions on exposed areas, please see nursing assessment for full skin assessment   Neurologic:  Awake and alert, knows she is at [facility] and that it is July, does not know the year        Pertinent Labs  Lab Results: personally reviewed.   Recent Labs   Lab 07/22/22 1917      CO2 26   BUN 43*     Recent Labs   Lab 07/22/22 1917   WBC 11.2*   HGB 15.4   HCT 44.0        Recent Labs   Lab 07/22/22 1917   TROPONINI 0.02       MOST RECENT A1c, Iron, TIBC, Coags, TFTs  Lab Results   Component Value Date    INR 1.23 (H) 07/22/2022    PTT 30 03/14/2021     Lab Results   Component Value Date    IRON 121 03/24/2022     Lab Results   Component Value Date    TSH 1.93 06/10/2021         Pertinent Radiology  Radiology Results: I personally reviewed.  Results for orders placed or performed during the hospital encounter of 07/22/22   Head CT w/o contrast    Impression    IMPRESSION:  HEAD CT:  1.  Motion artifact makes evaluation suboptimal. Within this constraint, there is no acute intracranial abnormality.    CERVICAL SPINE CT:  1.  No acute fracture.    THORACIC SPINE CT:  1.  No acute fracture.    LUMBAR SPINE CT:  1.  No acute fracture.   Cervical spine CT w/o contrast    Impression    IMPRESSION:  HEAD CT:  1.  Motion artifact makes evaluation suboptimal. Within this constraint, there is no acute intracranial abnormality.    CERVICAL SPINE CT:  1.  No acute fracture.    THORACIC SPINE CT:  1.  No acute fracture.    LUMBAR SPINE CT:  1.  No acute fracture.   CT Thoracic Spine w/o Contrast    Impression    IMPRESSION:  HEAD CT:  1.  Motion artifact makes evaluation suboptimal.  Within this constraint, there is no acute intracranial abnormality.    CERVICAL SPINE CT:  1.  No acute fracture.    THORACIC SPINE CT:  1.  No acute fracture.    LUMBAR SPINE CT:  1.  No acute fracture.   Lumbar spine CT w/o contrast    Impression    IMPRESSION:  HEAD CT:  1.  Motion artifact makes evaluation suboptimal. Within this constraint, there is no acute intracranial abnormality.    CERVICAL SPINE CT:  1.  No acute fracture.    THORACIC SPINE CT:  1.  No acute fracture.    LUMBAR SPINE CT:  1.  No acute fracture.   CT Abdomen Pelvis w/o Contrast    Impression    IMPRESSION:   1.  Mild mural and pericolic edema transverse and left colon consistent with a nonspecific colitis.   2.  Gallbladder distention and mild extrahepatic bile duct dilatation to the level of the ampulla but no visible radiodense stone or mass.  3.  Postop change Billroth II with distal gastrectomy and loop gastrojejunostomy.  4.  Generalized atrophy left kidney, probably sequela to chronic renal artery stenosis.   5.  A 2 mm nonobstructing left kidney stone.          EKG Results: Paced rhythm    Advanced Care Planning  Treatment and discharge Planning discussed with patient  Anticipated Length of Stay in midnights (including a midnight in the Emergency Department after triage if applicable): At least 2 midnights for evaluation and treatment of bloody diarrhea, acute kidney injury  I attempted to reach patient's friend Gabriella who was not answering her phone    Krystin Hurst MD  Internal Medicine Hospitalist  7/22/2022

## 2022-07-23 NOTE — PLAN OF CARE
Problem: Plan of Care - These are the overarching goals to be used throughout the patient stay.    Goal: Optimal Comfort and Wellbeing  Outcome: Ongoing, Progressing     Problem: Risk for Delirium  Goal: Improved Attention and Thought Clarity  Outcome: Ongoing, Progressing   Patient alert with some forgetfulness. Denied pain. Denied headache/shortness of breath. Denied loose stool. Pure wick in place. Will continue to monitor.

## 2022-07-23 NOTE — ED NOTES
Laney has been repositioned for sleep; VSS; pt given warm blankets and new linen and extra pillow. Adult brief is dry/clean; pure wick in place with no urine flow at this time. Pt has eaten some applesauce and has been sipping on liquids. She requests another applesauce and I've placed a container on her bedside table. Lights dimmed. Pt has call light available.

## 2022-07-23 NOTE — PROGRESS NOTES
Hospitalist Progress Note        CODE STATUS:  No CPR- Do NOT Intubate    Assessment/Plan:  Laney Hahn is an 82 year old old female with past medical history of vascular dementia, hyperlipidemia, hypertension, paroxysmal A. fib, s/p PPM, heart failure with reduced EF, CKD stage III, chronic anemia who presented for evaluation of fall and diarrhea, she is found to have acute kidney injury and nonspecific colitis on the CT abdomen and pelvis.      Diarrhea with bright red blood per rectum noted in the ER  - Clinically stable. Afebrile. Initial leukocytosis like reactive vs hemoconcentrated   - CT abdomen and pelvis with nonspecific colitis.    - Obtain stool culture for enteric pathogens and C. difficile.    - Continue to hold off on abx    Acute kidney injury, improved  - Likely prerenal in the setting of diarrhea.   - Continue  IV hydration. Hold home lasix.    Presyncope  - Likely due to orthostatic hypotension in the setting of intravascular volume depletion  - PT/OT eval    Paroxysmal A. fib, s/p PPM  - Continue metoprolol.   - On Eliquis, monitor hgb given report of bloody stool. Initial Hgb was likely concentrated.     Essential hypertension  - BP readings acceptable. Continue     Vascular dementia without behavioral disturbances      DVT Prophylaxis: On Eliquis as above      Subjective:  Interval History:   Patient seen and examined.   Pleasant. Requests lunch, otherwise, no issues at this time.    Review of Systems:   As mentioned in subjective.    Patient Active Problem List   Diagnosis     SVT (supraventricular tachycardia) (H)     Chronic kidney disease, stage III (moderate) (H)     Vascular dementia without behavioral disturbance (H)     Paroxysmal atrial fibrillation (H)     Nonrheumatic aortic valve stenosis     Heart failure with reduced ejection fraction, NYHA class I (H)     Essential hypertension     DNAR (do not attempt resuscitation)     Cognitive and behavioral changes     Cardiac  pacemaker in situ     Mixed hyperlipidemia     Adjustment disorder with anxious mood     Anemia     AV node dysfunction     Bifascicular bundle branch block     Frequent falls     Hyperkalemia     Junctional bradycardia     Nonobstructive atherosclerosis of coronary artery     Pericardial effusion     Diarrhea     Colitis     JESUS (acute kidney injury) (H)       Scheduled Meds:    apixaban ANTICOAGULANT  2.5 mg Oral BID     atorvastatin  10 mg Oral QAM     metoprolol succinate ER  50 mg Oral Daily     sodium chloride (PF)  3 mL Intracatheter Q8H     Continuous Infusions:    - MEDICATION INSTRUCTIONS -       sodium chloride 100 mL/hr at 07/23/22 1111     PRN Meds:.acetaminophen **OR** acetaminophen, calcium carbonate, lidocaine 4%, lidocaine (buffered or not buffered), melatonin, ondansetron **OR** ondansetron, - MEDICATION INSTRUCTIONS -, sodium chloride (PF)    Objective:  Vital signs in last 24 hours:  Temp:  [96.4  F (35.8  C)-98.2  F (36.8  C)] 96.4  F (35.8  C)  Pulse:  [80-83] 80  Resp:  [19-30] 22  BP: (103-135)/(55-71) 116/58  SpO2:  [95 %-98 %] 98 %      No intake or output data in the 24 hours ending 07/23/22 1315    Physical Exam:  General:  In NAD.  HEENT: NCAT, EOMI  CV: Normal S1S2, regular rhythm, normal rate  Lungs: CTAB  Abdomen: Soft, NT, ND, +BS  Extremities: No LE edema b/l  Neuro: AAOx3.   Psych: Normal Affect.     Lab Results:    Recent Results (from the past 24 hour(s))   ECG 12-LEAD WITH MUSE (LHE)    Collection Time: 07/22/22  7:12 PM   Result Value Ref Range    Systolic Blood Pressure 129 mmHg    Diastolic Blood Pressure 68 mmHg    Ventricular Rate 82 BPM    Atrial Rate 82 BPM    NC Interval 196 ms    QRS Duration 138 ms     ms    QTc 518 ms    P Axis 60 degrees    R AXIS 96 degrees    T Axis -53 degrees    Interpretation ECG       Sinus rhythm  Rightward axis  Non-specific intra-ventricular conduction block  Cannot rule out Septal infarct , age undetermined  T wave abnormality,  consider inferolateral ischemia  Abnormal ECG  When compared with ECG of 16-JUN-2021 17:20,  Non-specific intra-ventricular conduction block has replaced Right bundle branch block  Minimal criteria for Septal infarct are now Present  Confirmed by SEE ED PROVIDER NOTE FOR, ECG INTERPRETATION ([id number]),  TWIN ZARAGOZA ([id number]) on 7/23/2022 8:01:26 AM     INR    Collection Time: 07/22/22  7:17 PM   Result Value Ref Range    INR 1.23 (H) 0.85 - 1.15   Basic metabolic panel    Collection Time: 07/22/22  7:17 PM   Result Value Ref Range    Sodium 139 136 - 145 mmol/L    Potassium 4.0 3.5 - 5.0 mmol/L    Chloride 99 98 - 107 mmol/L    Carbon Dioxide (CO2) 26 22 - 31 mmol/L    Anion Gap 14 5 - 18 mmol/L    Urea Nitrogen 43 (H) 8 - 28 mg/dL    Creatinine 1.80 (H) 0.60 - 1.10 mg/dL    Calcium 9.2 8.5 - 10.5 mg/dL    Glucose 162 (H) 70 - 125 mg/dL    GFR Estimate 28 (L) >60 mL/min/1.73m2   Troponin I (now)    Collection Time: 07/22/22  7:17 PM   Result Value Ref Range    Troponin I 0.02 0.00 - 0.29 ng/mL   CBC with platelets and differential    Collection Time: 07/22/22  7:17 PM   Result Value Ref Range    WBC Count 11.2 (H) 4.0 - 11.0 10e3/uL    RBC Count 4.53 3.80 - 5.20 10e6/uL    Hemoglobin 15.4 11.7 - 15.7 g/dL    Hematocrit 44.0 35.0 - 47.0 %    MCV 97 78 - 100 fL    MCH 34.0 (H) 26.5 - 33.0 pg    MCHC 35.0 31.5 - 36.5 g/dL    RDW 12.8 10.0 - 15.0 %    Platelet Count 221 150 - 450 10e3/uL    % Neutrophils 78 %    % Lymphocytes 12 %    % Monocytes 9 %    % Eosinophils 1 %    % Basophils 0 %    % Immature Granulocytes 0 %    NRBCs per 100 WBC 0 <1 /100    Absolute Neutrophils 8.6 (H) 1.6 - 8.3 10e3/uL    Absolute Lymphocytes 1.4 0.8 - 5.3 10e3/uL    Absolute Monocytes 1.0 0.0 - 1.3 10e3/uL    Absolute Eosinophils 0.2 0.0 - 0.7 10e3/uL    Absolute Basophils 0.0 0.0 - 0.2 10e3/uL    Absolute Immature Granulocytes 0.0 <=0.4 10e3/uL    Absolute NRBCs 0.0 10e3/uL   Adult Type and Screen    Collection Time: 07/22/22  7:17 PM    Result Value Ref Range    ABO/RH(D) A NEG     Antibody Screen Negative Negative    SPECIMEN EXPIRATION DATE [id number]    Hepatic panel    Collection Time: 07/22/22  7:17 PM   Result Value Ref Range    Bilirubin Total 0.8 0.0 - 1.0 mg/dL    Bilirubin Direct 0.3 <=0.5 mg/dL    Protein Total 7.5 6.0 - 8.0 g/dL    Albumin 4.2 3.5 - 5.0 g/dL    Alkaline Phosphatase 135 (H) 45 - 120 U/L    AST 34 0 - 40 U/L    ALT 30 0 - 45 U/L   Asymptomatic COVID-19 Virus (Coronavirus) by PCR Nasopharyngeal    Collection Time: 07/22/22 10:40 PM    Specimen: Nasopharyngeal; Swab   Result Value Ref Range    SARS CoV2 PCR Negative Negative   Comprehensive metabolic panel    Collection Time: 07/23/22  6:32 AM   Result Value Ref Range    Sodium 139 136 - 145 mmol/L    Potassium 3.7 3.5 - 5.0 mmol/L    Chloride 103 98 - 107 mmol/L    Carbon Dioxide (CO2) 27 22 - 31 mmol/L    Anion Gap 9 5 - 18 mmol/L    Urea Nitrogen 41 (H) 8 - 28 mg/dL    Creatinine 1.49 (H) 0.60 - 1.10 mg/dL    Calcium 8.4 (L) 8.5 - 10.5 mg/dL    Glucose 132 (H) 70 - 125 mg/dL    Alkaline Phosphatase 113 45 - 120 U/L    AST 19 0 - 40 U/L    ALT 21 0 - 45 U/L    Protein Total 6.3 6.0 - 8.0 g/dL    Albumin 3.4 (L) 3.5 - 5.0 g/dL    Bilirubin Total 0.6 0.0 - 1.0 mg/dL    GFR Estimate 35 (L) >60 mL/min/1.73m2   CBC with platelets    Collection Time: 07/23/22  6:32 AM   Result Value Ref Range    WBC Count 5.5 4.0 - 11.0 10e3/uL    RBC Count 3.64 (L) 3.80 - 5.20 10e6/uL    Hemoglobin 12.5 11.7 - 15.7 g/dL    Hematocrit 36.0 35.0 - 47.0 %    MCV 99 78 - 100 fL    MCH 34.3 (H) 26.5 - 33.0 pg    MCHC 34.7 31.5 - 36.5 g/dL    RDW 12.8 10.0 - 15.0 %    Platelet Count 169 150 - 450 10e3/uL       Serum Glucose range:   Recent Labs   Lab 07/23/22  0632 07/22/22 1917   * 162*     ABG: No lab results found in last 7 days.  CBC:   Recent Labs   Lab 07/23/22  0632 07/22/22 1917   WBC 5.5 11.2*   HGB 12.5 15.4   HCT 36.0 44.0   MCV 99 97    221   NEUTROPHIL  --  78    LYMPH  --  12   MONOCYTE  --  9   EOSINOPHIL  --  1     Chemistry:   Recent Labs   Lab 07/23/22  0632 07/22/22 1917    139   POTASSIUM 3.7 4.0   CHLORIDE 103 99   CO2 27 26   BUN 41* 43*   CR 1.49* 1.80*   GFRESTIMATED 35* 28*   CURT 8.4* 9.2   PROTTOTAL 6.3 7.5   ALBUMIN 3.4* 4.2   AST 19 34   ALT 21 30   ALKPHOS 113 135*   BILITOTAL 0.6 0.8     Coags:  Recent Labs   Lab 07/22/22 1917   INR 1.23*     Cardiac Markers:  Recent Labs   Lab 07/22/22 1917   TROPONINI 0.02          CT Abdomen Pelvis w/o Contrast    Result Date: 7/22/2022  EXAM: CT ABDOMEN PELVIS W/O CONTRAST LOCATION: [facility] DATE/TIME: 7/22/2022 8:11 PM INDICATION: Fall, diarrhea, right lower quadrant tenderness. COMPARISON: None. TECHNIQUE: CT scan of the abdomen and pelvis was performed without IV contrast. Multiplanar reformats were obtained. Dose reduction techniques were used. CONTRAST: None. FINDINGS: LOWER CHEST: Heart size within normal limits with no pericardial effusion. Aortic valve, mitral annular and coronary artery calcification. Pacer lead tips in RA and RV. Visualized lungs are clear. HEPATOBILIARY: Gallbladder distention and mild extrahepatic bile duct dilatation to the level of the ampulla but no visible radiodense stone or mass. Liver with a normal appearance at noncontrast CT. PANCREAS: Atrophy and fatty replacement of the pancreatic parenchyma. SPLEEN: Spleen size normal. ADRENAL GLANDS: Normal. KIDNEY/BLADDER: Generalized atrophy left kidney, probably sequela to chronic renal artery stenosis. A 1-2 mm nonobstructing left kidney stone. Kidneys, ureters and bladder are otherwise normal. BOWEL: Mild mural and pericolic edema transverse and left colon consistent with a nonspecific colitis. Colonic diverticulosis without diverticulitis. Normal appendix. Postop change Billroth II with distal gastrectomy and loop gastrojejunostomy. No bowel obstruction. No free air. No free fluid. LYMPH NODES: No  lymphadenopathy. VASCULATURE: Mild to moderate calcified atheromatous plaque throughout the normal caliber abdominal aorta, iliofemoral arteries and at the origin of the renal and mesenteric arteries. PELVIC ORGANS: No pelvic mass or fluid. MUSCULOSKELETAL: Bones are demineralized. No fractures. Spondylotic change lumbar spine.     IMPRESSION: 1.  Mild mural and pericolic edema transverse and left colon consistent with a nonspecific colitis. 2.  Gallbladder distention and mild extrahepatic bile duct dilatation to the level of the ampulla but no visible radiodense stone or mass. 3.  Postop change Billroth II with distal gastrectomy and loop gastrojejunostomy. 4.  Generalized atrophy left kidney, probably sequela to chronic renal artery stenosis. 5.  A 2 mm nonobstructing left kidney stone.     Cervical spine CT w/o contrast    Result Date: 7/22/2022  EXAM: CT HEAD W/O CONTRAST, CT LUMBAR SPINE W/O CONTRAST, CT THORACIC SPINE W/O CONTRAST, CT CERVICAL SPINE W/O CONTRAST LOCATION: [facility] DATE/TIME: 7/22/2022 8:09 PM INDICATION: Fall, headache, neck pain, thoracic and low back pain COMPARISON: CT head 2/14/2017 TECHNIQUE: 1) Routine CT Head without IV contrast. Multiplanar reformats. Dose reduction techniques were used. 2) Routine CT Cervical Spine without IV contrast. Multiplanar reformats. Dose reduction techniques were used. 3) Routine CT Thoracic Spine without IV contrast. Multiplanar reformats. Dose reduction techniques were used. 4) Routine CT Lumbar Spine without IV contrast. Multiplanar reformats. Dose reduction techniques were used. FINDINGS: HEAD CT: INTRACRANIAL CONTENTS: No intracranial hemorrhage, extraaxial collection, or mass effect.  No CT evidence of acute infarct. Unchanged moderate presumed chronic small vessel ischemic changes. Unchanged moderate to severe generalized volume loss. No hydrocephalus. VISUALIZED ORBITS/SINUSES/MASTOIDS: No intraorbital abnormality. No  paranasal sinus mucosal disease. No middle ear or mastoid effusion. BONES/SOFT TISSUES: No acute abnormality. CERVICAL SPINE CT: VERTEBRA: Normal vertebral body heights. No fracture or posttraumatic subluxation. CANAL/FORAMINA: No high-grade spinal canal stenosis. PARASPINAL: No extraspinal abnormality. Visualized lung fields are clear. THORACIC SPINE CT: VERTEBRA: Normal vertebral body heights. No fracture or posttraumatic subluxation. CANAL/FORAMINA: No high-grade spinal canal stenosis. PARASPINAL: No extraspinal abnormality. LUMBAR SPINE CT: VERTEBRA: Normal vertebral body heights. No fracture or posttraumatic subluxation. CANAL/FORAMINA: No high-grade spinal canal stenosis. PARASPINAL: No extraspinal abnormality.     IMPRESSION: HEAD CT: 1.  Motion artifact makes evaluation suboptimal. Within this constraint, there is no acute intracranial abnormality. CERVICAL SPINE CT: 1.  No acute fracture. THORACIC SPINE CT: 1.  No acute fracture. LUMBAR SPINE CT: 1.  No acute fracture.    Head CT w/o contrast    Result Date: 7/22/2022  EXAM: CT HEAD W/O CONTRAST, CT LUMBAR SPINE W/O CONTRAST, CT THORACIC SPINE W/O CONTRAST, CT CERVICAL SPINE W/O CONTRAST LOCATION: [facility] DATE/TIME: 7/22/2022 8:09 PM INDICATION: Fall, headache, neck pain, thoracic and low back pain COMPARISON: CT head 2/14/2017 TECHNIQUE: 1) Routine CT Head without IV contrast. Multiplanar reformats. Dose reduction techniques were used. 2) Routine CT Cervical Spine without IV contrast. Multiplanar reformats. Dose reduction techniques were used. 3) Routine CT Thoracic Spine without IV contrast. Multiplanar reformats. Dose reduction techniques were used. 4) Routine CT Lumbar Spine without IV contrast. Multiplanar reformats. Dose reduction techniques were used. FINDINGS: HEAD CT: INTRACRANIAL CONTENTS: No intracranial hemorrhage, extraaxial collection, or mass effect.  No CT evidence of acute infarct. Unchanged moderate presumed  chronic small vessel ischemic changes. Unchanged moderate to severe generalized volume loss. No hydrocephalus. VISUALIZED ORBITS/SINUSES/MASTOIDS: No intraorbital abnormality. No paranasal sinus mucosal disease. No middle ear or mastoid effusion. BONES/SOFT TISSUES: No acute abnormality. CERVICAL SPINE CT: VERTEBRA: Normal vertebral body heights. No fracture or posttraumatic subluxation. CANAL/FORAMINA: No high-grade spinal canal stenosis. PARASPINAL: No extraspinal abnormality. Visualized lung fields are clear. THORACIC SPINE CT: VERTEBRA: Normal vertebral body heights. No fracture or posttraumatic subluxation. CANAL/FORAMINA: No high-grade spinal canal stenosis. PARASPINAL: No extraspinal abnormality. LUMBAR SPINE CT: VERTEBRA: Normal vertebral body heights. No fracture or posttraumatic subluxation. CANAL/FORAMINA: No high-grade spinal canal stenosis. PARASPINAL: No extraspinal abnormality.     IMPRESSION: HEAD CT: 1.  Motion artifact makes evaluation suboptimal. Within this constraint, there is no acute intracranial abnormality. CERVICAL SPINE CT: 1.  No acute fracture. THORACIC SPINE CT: 1.  No acute fracture. LUMBAR SPINE CT: 1.  No acute fracture.    Lumbar spine CT w/o contrast    Result Date: 7/22/2022  EXAM: CT HEAD W/O CONTRAST, CT LUMBAR SPINE W/O CONTRAST, CT THORACIC SPINE W/O CONTRAST, CT CERVICAL SPINE W/O CONTRAST LOCATION: [facility] DATE/TIME: 7/22/2022 8:09 PM INDICATION: Fall, headache, neck pain, thoracic and low back pain COMPARISON: CT head 2/14/2017 TECHNIQUE: 1) Routine CT Head without IV contrast. Multiplanar reformats. Dose reduction techniques were used. 2) Routine CT Cervical Spine without IV contrast. Multiplanar reformats. Dose reduction techniques were used. 3) Routine CT Thoracic Spine without IV contrast. Multiplanar reformats. Dose reduction techniques were used. 4) Routine CT Lumbar Spine without IV contrast. Multiplanar reformats. Dose reduction  techniques were used. FINDINGS: HEAD CT: INTRACRANIAL CONTENTS: No intracranial hemorrhage, extraaxial collection, or mass effect.  No CT evidence of acute infarct. Unchanged moderate presumed chronic small vessel ischemic changes. Unchanged moderate to severe generalized volume loss. No hydrocephalus. VISUALIZED ORBITS/SINUSES/MASTOIDS: No intraorbital abnormality. No paranasal sinus mucosal disease. No middle ear or mastoid effusion. BONES/SOFT TISSUES: No acute abnormality. CERVICAL SPINE CT: VERTEBRA: Normal vertebral body heights. No fracture or posttraumatic subluxation. CANAL/FORAMINA: No high-grade spinal canal stenosis. PARASPINAL: No extraspinal abnormality. Visualized lung fields are clear. THORACIC SPINE CT: VERTEBRA: Normal vertebral body heights. No fracture or posttraumatic subluxation. CANAL/FORAMINA: No high-grade spinal canal stenosis. PARASPINAL: No extraspinal abnormality. LUMBAR SPINE CT: VERTEBRA: Normal vertebral body heights. No fracture or posttraumatic subluxation. CANAL/FORAMINA: No high-grade spinal canal stenosis. PARASPINAL: No extraspinal abnormality.     IMPRESSION: HEAD CT: 1.  Motion artifact makes evaluation suboptimal. Within this constraint, there is no acute intracranial abnormality. CERVICAL SPINE CT: 1.  No acute fracture. THORACIC SPINE CT: 1.  No acute fracture. LUMBAR SPINE CT: 1.  No acute fracture.    CT Thoracic Spine w/o Contrast    Result Date: 7/22/2022  EXAM: CT HEAD W/O CONTRAST, CT LUMBAR SPINE W/O CONTRAST, CT THORACIC SPINE W/O CONTRAST, CT CERVICAL SPINE W/O CONTRAST LOCATION: [facility] DATE/TIME: 7/22/2022 8:09 PM INDICATION: Fall, headache, neck pain, thoracic and low back pain COMPARISON: CT head 2/14/2017 TECHNIQUE: 1) Routine CT Head without IV contrast. Multiplanar reformats. Dose reduction techniques were used. 2) Routine CT Cervical Spine without IV contrast. Multiplanar reformats. Dose reduction techniques were used. 3) Routine  CT Thoracic Spine without IV contrast. Multiplanar reformats. Dose reduction techniques were used. 4) Routine CT Lumbar Spine without IV contrast. Multiplanar reformats. Dose reduction techniques were used. FINDINGS: HEAD CT: INTRACRANIAL CONTENTS: No intracranial hemorrhage, extraaxial collection, or mass effect.  No CT evidence of acute infarct. Unchanged moderate presumed chronic small vessel ischemic changes. Unchanged moderate to severe generalized volume loss. No hydrocephalus. VISUALIZED ORBITS/SINUSES/MASTOIDS: No intraorbital abnormality. No paranasal sinus mucosal disease. No middle ear or mastoid effusion. BONES/SOFT TISSUES: No acute abnormality. CERVICAL SPINE CT: VERTEBRA: Normal vertebral body heights. No fracture or posttraumatic subluxation. CANAL/FORAMINA: No high-grade spinal canal stenosis. PARASPINAL: No extraspinal abnormality. Visualized lung fields are clear. THORACIC SPINE CT: VERTEBRA: Normal vertebral body heights. No fracture or posttraumatic subluxation. CANAL/FORAMINA: No high-grade spinal canal stenosis. PARASPINAL: No extraspinal abnormality. LUMBAR SPINE CT: VERTEBRA: Normal vertebral body heights. No fracture or posttraumatic subluxation. CANAL/FORAMINA: No high-grade spinal canal stenosis. PARASPINAL: No extraspinal abnormality.     IMPRESSION: HEAD CT: 1.  Motion artifact makes evaluation suboptimal. Within this constraint, there is no acute intracranial abnormality. CERVICAL SPINE CT: 1.  No acute fracture. THORACIC SPINE CT: 1.  No acute fracture. LUMBAR SPINE CT: 1.  No acute fracture.    POC US ABDOMEN LIMITED    Result Date: 7/22/2022  Flavio Roach MD     7/22/2022 11:24 PM POC US ABDOMEN LIMITED Date/Time: 7/22/2022 7:10 PM Performed by: Flavio Roach MD Authorized by: Flavio Roach MD Procedure details:   Indications: abdominal pain  Comments:    FAST exam: negative. Images saved and uploaded          07/23/2022   Mayra Santillan MD  Hospitalist  Pager: [phone]  [phone]

## 2022-07-23 NOTE — CONSULTS
Care Management Initial Consult    General Information  Assessment completed with: VM-chart review,    Type of CM/SW Visit: Initial Assessment    Primary Care Provider verified and updated as needed: Yes   Readmission within the last 30 days: no previous admission in last 30 days      Reason for Consult: discharge planning  Advance Care Planning:            Communication Assessment  Patient's communication style: spoken language (English or Bilingual)             Cognitive  Cognitive/Neuro/Behavioral: .WDL except, orientation  Level of Consciousness: confused  Arousal Level: opens eyes spontaneously  Orientation: disoriented to, time, situation  Mood/Behavior: calm, cooperative          Living Environment:   People in home: sibling(s)     Current living Arrangements: assisted living  Name of Facility: [facility]   Able to return to prior arrangements: yes       Family/Social Support:  Care provided by: self  Provides care for: no one  Marital Status: Single  Sibling(s), Other (specify) (friends)          Description of Support System: Involved, Supportive    Support Assessment: Adequate social supports, Adequate family and caregiver support    Current Resources:   Patient receiving home care services: No     Community Resources: [facility] [employer], [employer]  Equipment currently used at home: none  Supplies currently used at home: None    Employment/Financial:  Employment Status:          Financial Concerns: No concerns identified   Referral to Financial Worker: No       Lifestyle & Psychosocial Needs:  Social Determinants of Health     Tobacco Use: Low Risk      Smoking Tobacco Use: Never Smoker     Smokeless Tobacco Use: Never Used   Alcohol Use: Not on file   Financial Resource Strain: Not on file   Food Insecurity: Not on file   Transportation Needs: Not on file   Physical Activity: Not on file   Stress: Not on file   Social Connections: Not on file   Intimate Partner Violence: Not on file    Depression: Not at risk     PHQ-2 Score: 0   Housing Stability: Not on file       Functional Status:  Prior to admission patient needed assistance:   Dependent ADLs:: Ambulation-cane  Dependent IADLs:: Medication Management, Meal Preparation, Cleaning, Cooking, Laundry, Shopping  Assesssment of Functional Status: At functional baseline    Mental Health Status:  Mental Health Status: No Current Concerns       Chemical Dependency Status:  Chemical Dependency Status: No Current Concerns             Values/Beliefs:  Spiritual, Cultural Beliefs, Sabianist Practices, Values that affect care: no               Additional Information:  BIBIANA assessed. Pt resides at [facility] with her brother. She is independent with ADLs, receives assistance with transportation, meals, and medications. She has some confusion at baseline due to Dementia. CM left  for [facility] staff, and was unable to reach Adam or Gabriella for assessment. Likely discharge plan to return to [facility], transportation TBD.    KIET Mendiola

## 2022-07-24 ENCOUNTER — APPOINTMENT (OUTPATIENT)
Dept: OCCUPATIONAL THERAPY | Facility: HOSPITAL | Age: 82
DRG: 378 | End: 2022-07-24
Payer: COMMERCIAL

## 2022-07-24 LAB
ALBUMIN SERPL-MCNC: 3 G/DL (ref 3.5–5)
ALP SERPL-CCNC: 101 U/L (ref 45–120)
ALT SERPL W P-5'-P-CCNC: 15 U/L (ref 0–45)
ANION GAP SERPL CALCULATED.3IONS-SCNC: 4 MMOL/L (ref 5–18)
AST SERPL W P-5'-P-CCNC: 18 U/L (ref 0–40)
BASOPHILS # BLD AUTO: 0 10E3/UL (ref 0–0.2)
BASOPHILS NFR BLD AUTO: 1 %
BILIRUB SERPL-MCNC: 0.4 MG/DL (ref 0–1)
BUN SERPL-MCNC: 25 MG/DL (ref 8–28)
C COLI+JEJUNI+LARI FUSA STL QL NAA+PROBE: NOT DETECTED
C DIFF TOX B STL QL: NEGATIVE
CALCIUM SERPL-MCNC: 8.3 MG/DL (ref 8.5–10.5)
CHLORIDE BLD-SCNC: 109 MMOL/L (ref 98–107)
CO2 SERPL-SCNC: 28 MMOL/L (ref 22–31)
CREAT SERPL-MCNC: 1.01 MG/DL (ref 0.6–1.1)
EC STX1 GENE STL QL NAA+PROBE: NOT DETECTED
EC STX2 GENE STL QL NAA+PROBE: NOT DETECTED
EOSINOPHIL # BLD AUTO: 0.3 10E3/UL (ref 0–0.7)
EOSINOPHIL NFR BLD AUTO: 7 %
ERYTHROCYTE [DISTWIDTH] IN BLOOD BY AUTOMATED COUNT: 12.8 % (ref 10–15)
GFR SERPL CREATININE-BSD FRML MDRD: 56 ML/MIN/1.73M2
GLUCOSE BLD-MCNC: 120 MG/DL (ref 70–125)
HCT VFR BLD AUTO: 34.7 % (ref 35–47)
HGB BLD-MCNC: 11.8 G/DL (ref 11.7–15.7)
IMM GRANULOCYTES # BLD: 0 10E3/UL
IMM GRANULOCYTES NFR BLD: 0 %
LYMPHOCYTES # BLD AUTO: 1.3 10E3/UL (ref 0.8–5.3)
LYMPHOCYTES NFR BLD AUTO: 35 %
MAGNESIUM SERPL-MCNC: 2 MG/DL (ref 1.8–2.6)
MCH RBC QN AUTO: 34 PG (ref 26.5–33)
MCHC RBC AUTO-ENTMCNC: 34 G/DL (ref 31.5–36.5)
MCV RBC AUTO: 100 FL (ref 78–100)
MONOCYTES # BLD AUTO: 0.3 10E3/UL (ref 0–1.3)
MONOCYTES NFR BLD AUTO: 9 %
NEUTROPHILS # BLD AUTO: 1.7 10E3/UL (ref 1.6–8.3)
NEUTROPHILS NFR BLD AUTO: 48 %
NOROV GI+II ORF1-ORF2 JNC STL QL NAA+PR: NOT DETECTED
NRBC # BLD AUTO: 0 10E3/UL
NRBC BLD AUTO-RTO: 0 /100
PLATELET # BLD AUTO: 164 10E3/UL (ref 150–450)
POTASSIUM BLD-SCNC: 4.3 MMOL/L (ref 3.5–5)
PROT SERPL-MCNC: 5.7 G/DL (ref 6–8)
RBC # BLD AUTO: 3.47 10E6/UL (ref 3.8–5.2)
RVA NSP5 STL QL NAA+PROBE: NOT DETECTED
SALMONELLA SP RPOD STL QL NAA+PROBE: NOT DETECTED
SHIGELLA SP+EIEC IPAH STL QL NAA+PROBE: NOT DETECTED
SODIUM SERPL-SCNC: 141 MMOL/L (ref 136–145)
V CHOL+PARA RFBL+TRKH+TNAA STL QL NAA+PR: NOT DETECTED
WBC # BLD AUTO: 3.6 10E3/UL (ref 4–11)
Y ENTERO RECN STL QL NAA+PROBE: NOT DETECTED

## 2022-07-24 PROCEDURE — 258N000003 HC RX IP 258 OP 636: Performed by: INTERNAL MEDICINE

## 2022-07-24 PROCEDURE — 80053 COMPREHEN METABOLIC PANEL: CPT | Performed by: INTERNAL MEDICINE

## 2022-07-24 PROCEDURE — 83735 ASSAY OF MAGNESIUM: CPT | Performed by: HOSPITALIST

## 2022-07-24 PROCEDURE — 96361 HYDRATE IV INFUSION ADD-ON: CPT

## 2022-07-24 PROCEDURE — 99232 SBSQ HOSP IP/OBS MODERATE 35: CPT | Performed by: HOSPITALIST

## 2022-07-24 PROCEDURE — 85025 COMPLETE CBC W/AUTO DIFF WBC: CPT | Performed by: HOSPITALIST

## 2022-07-24 PROCEDURE — 120N000001 HC R&B MED SURG/OB

## 2022-07-24 PROCEDURE — G0378 HOSPITAL OBSERVATION PER HR: HCPCS

## 2022-07-24 PROCEDURE — 36415 COLL VENOUS BLD VENIPUNCTURE: CPT | Performed by: INTERNAL MEDICINE

## 2022-07-24 PROCEDURE — 97129 THER IVNTJ 1ST 15 MIN: CPT | Mod: GO

## 2022-07-24 PROCEDURE — 250N000013 HC RX MED GY IP 250 OP 250 PS 637: Performed by: INTERNAL MEDICINE

## 2022-07-24 PROCEDURE — 97535 SELF CARE MNGMENT TRAINING: CPT | Mod: GO

## 2022-07-24 RX ADMIN — APIXABAN 2.5 MG: 2.5 TABLET, FILM COATED ORAL at 18:44

## 2022-07-24 RX ADMIN — APIXABAN 2.5 MG: 2.5 TABLET, FILM COATED ORAL at 06:30

## 2022-07-24 RX ADMIN — SODIUM CHLORIDE: 9 INJECTION, SOLUTION INTRAVENOUS at 11:35

## 2022-07-24 RX ADMIN — SODIUM CHLORIDE: 9 INJECTION, SOLUTION INTRAVENOUS at 01:24

## 2022-07-24 RX ADMIN — ATORVASTATIN CALCIUM 10 MG: 10 TABLET, FILM COATED ORAL at 08:10

## 2022-07-24 RX ADMIN — SODIUM CHLORIDE: 9 INJECTION, SOLUTION INTRAVENOUS at 20:52

## 2022-07-24 RX ADMIN — METOPROLOL SUCCINATE 50 MG: 50 TABLET, EXTENDED RELEASE ORAL at 08:10

## 2022-07-24 ASSESSMENT — ACTIVITIES OF DAILY LIVING (ADL)
ADLS_ACUITY_SCORE: 23
ADLS_ACUITY_SCORE: 26
ADLS_ACUITY_SCORE: 23

## 2022-07-24 NOTE — PLAN OF CARE
Goal Outcome Evaluation:      Problem: Plan of Care - These are the overarching goals to be used throughout the patient stay.    Goal: Optimal Comfort and Wellbeing  Outcome: Ongoing, Progressing  Intervention: Monitor Pain and Promote Comfort  Recent Flowsheet Documentation  Taken 7/24/2022 0033 by Petra Peralta RN  Pain Management Interventions: rest  Taken 7/23/2022 2024 by Petra Peralta RN  Pain Management Interventions: distraction     Problem: Risk for Delirium  Goal: Optimal Coping  Outcome: Ongoing, Progressing  Goal: Improved Behavioral Control  Outcome: Ongoing, Progressing  Intervention: Prevent and Manage Agitation  Recent Flowsheet Documentation  Taken 7/24/2022 0033 by Petra Peralta RN  Environment Familiarity/Consistency: daily routine followed  Taken 7/23/2022 2024 by Petra Peralta RN  Environment Familiarity/Consistency: daily routine followed  Goal: Improved Attention and Thought Clarity  Outcome: Ongoing, Progressing  Intervention: Maximize Cognitive Function  Recent Flowsheet Documentation  Taken 7/24/2022 0033 by Petra Peralta RN  Sensory Stimulation Regulation:    auditory stimulation minimized    care clustered    lighting decreased    quiet environment promoted    tactile stimulation minimized  Reorientation Measures: calendar in view  Taken 7/23/2022 2024 by Petra Peralta RN  Sensory Stimulation Regulation:    auditory stimulation minimized    care clustered    lighting decreased    quiet environment promoted    tactile stimulation minimized  Reorientation Measures: calendar in view  Goal: Improved Sleep  Outcome: Ongoing, Progressing    Patient has slept well overnight having no complaints of nausea or abdominal discomfort. Patient has been urinating well and continues to have 0.9 at 100mL/hr infusing into peripheral iv. Patient is alert and oriented to self and place and hopeful to be able to go home soon. C.Diff and GI panel sent this shift and pending. Will continue to monitor and  assess patient.

## 2022-07-24 NOTE — PROGRESS NOTES
Chart reviewed. Therapy REC is home with assist. Anticipate pt to return to [facility] ([facility]) where she lives with her brother, once medically cleared. Transportation TBD.

## 2022-07-24 NOTE — PLAN OF CARE
Goal Outcome Evaluation:      Pt. Alert and pleasant. Wants to go home. Bed alarm as well as chair alarm is on. . Pt. Does not remember to call for assistance when getting up. Calls to make other needs known frequently. Maintain Pt. Safety. Care plan reviewed.

## 2022-07-24 NOTE — PROGRESS NOTES
Hospitalist Progress Note        CODE STATUS:  No CPR- Do NOT Intubate    Assessment/Plan:  Laney Hahn is an 82 year old old female with past medical history of vascular dementia, hyperlipidemia, hypertension, paroxysmal A. fib, s/p PPM, heart failure with reduced EF, CKD stage III, chronic anemia who presented for evaluation of fall and diarrhea, she is found to have acute kidney injury and nonspecific colitis on the CT abdomen and pelvis.      Diarrhea with bright red blood per rectum noted in the ER  - Clinically stable. Afebrile. Initial leukocytosis like reactive vs hemoconcentrated   - CT abdomen and pelvis with nonspecific colitis.    - Stool sent for enteric pathogens and C. Difficile last night  - Continue to hold off on abx    Acute kidney injury - resolved  - Likely prerenal in the setting of diarrhea.   - Continue IV hydration as she is still having diarrhea. Hold home lasix.    Presyncope  - Likely due to orthostatic hypotension in the setting of intravascular volume depletion  - PT/OT eval    Paroxysmal A. fib, s/p PPM  - Continue metoprolol.   - On Eliquis, monitor hgb given report of bloody stool. Initial Hgb was likely concentrated.     Essential hypertension  - BP readings acceptable. Continue     Vascular dementia without behavioral disturbances  - AAOx3 and communicating appropriately/pleasantly but she is not reliable.      DVT Prophylaxis: On Eliquis as above      Subjective:  Interval History:   Patient seen and examined.   Pleasant. She denies every having diarrhea then says she has not had diarrhea in over a week.   Per RN, night RN reported very large, very foul smelling diarrhea without blood which was sent to the lab.   Patient denies abdominal pain. She is afebrile.     Review of Systems:   As mentioned in subjective.    Patient Active Problem List   Diagnosis     SVT (supraventricular tachycardia) (H)     Chronic kidney disease, stage III (moderate) (H)     Vascular dementia  without behavioral disturbance (H)     Paroxysmal atrial fibrillation (H)     Nonrheumatic aortic valve stenosis     Heart failure with reduced ejection fraction, NYHA class I (H)     Essential hypertension     DNAR (do not attempt resuscitation)     Cognitive and behavioral changes     Cardiac pacemaker in situ     Mixed hyperlipidemia     Adjustment disorder with anxious mood     Anemia     AV node dysfunction     Bifascicular bundle branch block     Frequent falls     Hyperkalemia     Junctional bradycardia     Nonobstructive atherosclerosis of coronary artery     Pericardial effusion     Diarrhea     Colitis     JESUS (acute kidney injury) (H)       Scheduled Meds:    apixaban ANTICOAGULANT  2.5 mg Oral BID     atorvastatin  10 mg Oral QAM     metoprolol succinate ER  50 mg Oral Daily     sodium chloride (PF)  3 mL Intracatheter Q8H     Continuous Infusions:    - MEDICATION INSTRUCTIONS -       sodium chloride 100 mL/hr at 07/24/22 0124     PRN Meds:.acetaminophen **OR** acetaminophen, calcium carbonate, lidocaine 4%, lidocaine (buffered or not buffered), melatonin, ondansetron **OR** ondansetron, - MEDICATION INSTRUCTIONS -, sodium chloride (PF)    Objective:  Vital signs in last 24 hours:  Temp:  [97.1  F (36.2  C)-97.8  F (36.6  C)] 97.1  F (36.2  C)  Pulse:  [77-86] 86  Resp:  [18-20] 20  BP: ()/(54-62) 108/62  SpO2:  [96 %-99 %] 98 %      No intake or output data in the 24 hours ending 07/23/22 1315    Physical Exam:  General:  In NAD.e  HEENT: NCAT, EOMI  CV: Normal S1S2, regular rhythm, normal rate  Lungs: CTAB  Abdomen: Soft, NT, ND, +BS  Extremities: No LE edema b/l  Neuro: AAOx3.   Psych: Normal Affect.     Lab Results:    Recent Results (from the past 24 hour(s))   Clostridium difficile toxin B PCR    Collection Time: 07/23/22  8:06 PM    Specimen: Per Rectum; Stool   Result Value Ref Range    C Difficile Toxin B by PCR Negative Negative   Comprehensive metabolic panel    Collection Time: 07/24/22   7:20 AM   Result Value Ref Range    Sodium 141 136 - 145 mmol/L    Potassium 4.3 3.5 - 5.0 mmol/L    Chloride 109 (H) 98 - 107 mmol/L    Carbon Dioxide (CO2) 28 22 - 31 mmol/L    Anion Gap 4 (L) 5 - 18 mmol/L    Urea Nitrogen 25 8 - 28 mg/dL    Creatinine 1.01 0.60 - 1.10 mg/dL    Calcium 8.3 (L) 8.5 - 10.5 mg/dL    Glucose 120 70 - 125 mg/dL    Alkaline Phosphatase 101 45 - 120 U/L    AST 18 0 - 40 U/L    ALT 15 0 - 45 U/L    Protein Total 5.7 (L) 6.0 - 8.0 g/dL    Albumin 3.0 (L) 3.5 - 5.0 g/dL    Bilirubin Total 0.4 0.0 - 1.0 mg/dL    GFR Estimate 56 (L) >60 mL/min/1.73m2   Magnesium    Collection Time: 07/24/22  7:20 AM   Result Value Ref Range    Magnesium 2.0 1.8 - 2.6 mg/dL   CBC with platelets and differential    Collection Time: 07/24/22  7:20 AM   Result Value Ref Range    WBC Count 3.6 (L) 4.0 - 11.0 10e3/uL    RBC Count 3.47 (L) 3.80 - 5.20 10e6/uL    Hemoglobin 11.8 11.7 - 15.7 g/dL    Hematocrit 34.7 (L) 35.0 - 47.0 %     78 - 100 fL    MCH 34.0 (H) 26.5 - 33.0 pg    MCHC 34.0 31.5 - 36.5 g/dL    RDW 12.8 10.0 - 15.0 %    Platelet Count 164 150 - 450 10e3/uL    % Neutrophils 48 %    % Lymphocytes 35 %    % Monocytes 9 %    % Eosinophils 7 %    % Basophils 1 %    % Immature Granulocytes 0 %    NRBCs per 100 WBC 0 <1 /100    Absolute Neutrophils 1.7 1.6 - 8.3 10e3/uL    Absolute Lymphocytes 1.3 0.8 - 5.3 10e3/uL    Absolute Monocytes 0.3 0.0 - 1.3 10e3/uL    Absolute Eosinophils 0.3 0.0 - 0.7 10e3/uL    Absolute Basophils 0.0 0.0 - 0.2 10e3/uL    Absolute Immature Granulocytes 0.0 <=0.4 10e3/uL    Absolute NRBCs 0.0 10e3/uL       Serum Glucose range:   Recent Labs   Lab 07/24/22  0720 07/23/22  0632 07/22/22 1917    132* 162*     ABG: No lab results found in last 7 days.  CBC:   Recent Labs   Lab 07/24/22  0720 07/23/22  0632 07/22/22 1917   WBC 3.6* 5.5 11.2*   HGB 11.8 12.5 15.4   HCT 34.7* 36.0 44.0    99 97    169 221   NEUTROPHIL 48  --  78   LYMPH 35  --  12    MONOCYTE 9  --  9   EOSINOPHIL 7  --  1     Chemistry:   Recent Labs   Lab 07/24/22  0720 07/23/22  0632 07/22/22 1917    139 139   POTASSIUM 4.3 3.7 4.0   CHLORIDE 109* 103 99   CO2 28 27 26   BUN 25 41* 43*   CR 1.01 1.49* 1.80*   GFRESTIMATED 56* 35* 28*   CURT 8.3* 8.4* 9.2   MAG 2.0  --   --    PROTTOTAL 5.7* 6.3 7.5   ALBUMIN 3.0* 3.4* 4.2   AST 18 19 34   ALT 15 21 30   ALKPHOS 101 113 135*   BILITOTAL 0.4 0.6 0.8     Coags:  Recent Labs   Lab 07/22/22 1917   INR 1.23*     Cardiac Markers:  Recent Labs   Lab 07/22/22 1917   TROPONINI 0.02          CT Abdomen Pelvis w/o Contrast    Result Date: 7/22/2022  EXAM: CT ABDOMEN PELVIS W/O CONTRAST LOCATION: [facility] DATE/TIME: 7/22/2022 8:11 PM INDICATION: Fall, diarrhea, right lower quadrant tenderness. COMPARISON: None. TECHNIQUE: CT scan of the abdomen and pelvis was performed without IV contrast. Multiplanar reformats were obtained. Dose reduction techniques were used. CONTRAST: None. FINDINGS: LOWER CHEST: Heart size within normal limits with no pericardial effusion. Aortic valve, mitral annular and coronary artery calcification. Pacer lead tips in RA and RV. Visualized lungs are clear. HEPATOBILIARY: Gallbladder distention and mild extrahepatic bile duct dilatation to the level of the ampulla but no visible radiodense stone or mass. Liver with a normal appearance at noncontrast CT. PANCREAS: Atrophy and fatty replacement of the pancreatic parenchyma. SPLEEN: Spleen size normal. ADRENAL GLANDS: Normal. KIDNEY/BLADDER: Generalized atrophy left kidney, probably sequela to chronic renal artery stenosis. A 1-2 mm nonobstructing left kidney stone. Kidneys, ureters and bladder are otherwise normal. BOWEL: Mild mural and pericolic edema transverse and left colon consistent with a nonspecific colitis. Colonic diverticulosis without diverticulitis. Normal appendix. Postop change Billroth II with distal gastrectomy and loop  gastrojejunostomy. No bowel obstruction. No free air. No free fluid. LYMPH NODES: No lymphadenopathy. VASCULATURE: Mild to moderate calcified atheromatous plaque throughout the normal caliber abdominal aorta, iliofemoral arteries and at the origin of the renal and mesenteric arteries. PELVIC ORGANS: No pelvic mass or fluid. MUSCULOSKELETAL: Bones are demineralized. No fractures. Spondylotic change lumbar spine.     IMPRESSION: 1.  Mild mural and pericolic edema transverse and left colon consistent with a nonspecific colitis. 2.  Gallbladder distention and mild extrahepatic bile duct dilatation to the level of the ampulla but no visible radiodense stone or mass. 3.  Postop change Billroth II with distal gastrectomy and loop gastrojejunostomy. 4.  Generalized atrophy left kidney, probably sequela to chronic renal artery stenosis. 5.  A 2 mm nonobstructing left kidney stone.     Cervical spine CT w/o contrast    Result Date: 7/22/2022  EXAM: CT HEAD W/O CONTRAST, CT LUMBAR SPINE W/O CONTRAST, CT THORACIC SPINE W/O CONTRAST, CT CERVICAL SPINE W/O CONTRAST LOCATION: [facility] DATE/TIME: 7/22/2022 8:09 PM INDICATION: Fall, headache, neck pain, thoracic and low back pain COMPARISON: CT head 2/14/2017 TECHNIQUE: 1) Routine CT Head without IV contrast. Multiplanar reformats. Dose reduction techniques were used. 2) Routine CT Cervical Spine without IV contrast. Multiplanar reformats. Dose reduction techniques were used. 3) Routine CT Thoracic Spine without IV contrast. Multiplanar reformats. Dose reduction techniques were used. 4) Routine CT Lumbar Spine without IV contrast. Multiplanar reformats. Dose reduction techniques were used. FINDINGS: HEAD CT: INTRACRANIAL CONTENTS: No intracranial hemorrhage, extraaxial collection, or mass effect.  No CT evidence of acute infarct. Unchanged moderate presumed chronic small vessel ischemic changes. Unchanged moderate to severe generalized volume loss. No  hydrocephalus. VISUALIZED ORBITS/SINUSES/MASTOIDS: No intraorbital abnormality. No paranasal sinus mucosal disease. No middle ear or mastoid effusion. BONES/SOFT TISSUES: No acute abnormality. CERVICAL SPINE CT: VERTEBRA: Normal vertebral body heights. No fracture or posttraumatic subluxation. CANAL/FORAMINA: No high-grade spinal canal stenosis. PARASPINAL: No extraspinal abnormality. Visualized lung fields are clear. THORACIC SPINE CT: VERTEBRA: Normal vertebral body heights. No fracture or posttraumatic subluxation. CANAL/FORAMINA: No high-grade spinal canal stenosis. PARASPINAL: No extraspinal abnormality. LUMBAR SPINE CT: VERTEBRA: Normal vertebral body heights. No fracture or posttraumatic subluxation. CANAL/FORAMINA: No high-grade spinal canal stenosis. PARASPINAL: No extraspinal abnormality.     IMPRESSION: HEAD CT: 1.  Motion artifact makes evaluation suboptimal. Within this constraint, there is no acute intracranial abnormality. CERVICAL SPINE CT: 1.  No acute fracture. THORACIC SPINE CT: 1.  No acute fracture. LUMBAR SPINE CT: 1.  No acute fracture.    Head CT w/o contrast    Result Date: 7/22/2022  EXAM: CT HEAD W/O CONTRAST, CT LUMBAR SPINE W/O CONTRAST, CT THORACIC SPINE W/O CONTRAST, CT CERVICAL SPINE W/O CONTRAST LOCATION: [facility] DATE/TIME: 7/22/2022 8:09 PM INDICATION: Fall, headache, neck pain, thoracic and low back pain COMPARISON: CT head 2/14/2017 TECHNIQUE: 1) Routine CT Head without IV contrast. Multiplanar reformats. Dose reduction techniques were used. 2) Routine CT Cervical Spine without IV contrast. Multiplanar reformats. Dose reduction techniques were used. 3) Routine CT Thoracic Spine without IV contrast. Multiplanar reformats. Dose reduction techniques were used. 4) Routine CT Lumbar Spine without IV contrast. Multiplanar reformats. Dose reduction techniques were used. FINDINGS: HEAD CT: INTRACRANIAL CONTENTS: No intracranial hemorrhage, extraaxial  collection, or mass effect.  No CT evidence of acute infarct. Unchanged moderate presumed chronic small vessel ischemic changes. Unchanged moderate to severe generalized volume loss. No hydrocephalus. VISUALIZED ORBITS/SINUSES/MASTOIDS: No intraorbital abnormality. No paranasal sinus mucosal disease. No middle ear or mastoid effusion. BONES/SOFT TISSUES: No acute abnormality. CERVICAL SPINE CT: VERTEBRA: Normal vertebral body heights. No fracture or posttraumatic subluxation. CANAL/FORAMINA: No high-grade spinal canal stenosis. PARASPINAL: No extraspinal abnormality. Visualized lung fields are clear. THORACIC SPINE CT: VERTEBRA: Normal vertebral body heights. No fracture or posttraumatic subluxation. CANAL/FORAMINA: No high-grade spinal canal stenosis. PARASPINAL: No extraspinal abnormality. LUMBAR SPINE CT: VERTEBRA: Normal vertebral body heights. No fracture or posttraumatic subluxation. CANAL/FORAMINA: No high-grade spinal canal stenosis. PARASPINAL: No extraspinal abnormality.     IMPRESSION: HEAD CT: 1.  Motion artifact makes evaluation suboptimal. Within this constraint, there is no acute intracranial abnormality. CERVICAL SPINE CT: 1.  No acute fracture. THORACIC SPINE CT: 1.  No acute fracture. LUMBAR SPINE CT: 1.  No acute fracture.    Lumbar spine CT w/o contrast    Result Date: 7/22/2022  EXAM: CT HEAD W/O CONTRAST, CT LUMBAR SPINE W/O CONTRAST, CT THORACIC SPINE W/O CONTRAST, CT CERVICAL SPINE W/O CONTRAST LOCATION: [facility] DATE/TIME: 7/22/2022 8:09 PM INDICATION: Fall, headache, neck pain, thoracic and low back pain COMPARISON: CT head 2/14/2017 TECHNIQUE: 1) Routine CT Head without IV contrast. Multiplanar reformats. Dose reduction techniques were used. 2) Routine CT Cervical Spine without IV contrast. Multiplanar reformats. Dose reduction techniques were used. 3) Routine CT Thoracic Spine without IV contrast. Multiplanar reformats. Dose reduction techniques were used. 4)  Routine CT Lumbar Spine without IV contrast. Multiplanar reformats. Dose reduction techniques were used. FINDINGS: HEAD CT: INTRACRANIAL CONTENTS: No intracranial hemorrhage, extraaxial collection, or mass effect.  No CT evidence of acute infarct. Unchanged moderate presumed chronic small vessel ischemic changes. Unchanged moderate to severe generalized volume loss. No hydrocephalus. VISUALIZED ORBITS/SINUSES/MASTOIDS: No intraorbital abnormality. No paranasal sinus mucosal disease. No middle ear or mastoid effusion. BONES/SOFT TISSUES: No acute abnormality. CERVICAL SPINE CT: VERTEBRA: Normal vertebral body heights. No fracture or posttraumatic subluxation. CANAL/FORAMINA: No high-grade spinal canal stenosis. PARASPINAL: No extraspinal abnormality. Visualized lung fields are clear. THORACIC SPINE CT: VERTEBRA: Normal vertebral body heights. No fracture or posttraumatic subluxation. CANAL/FORAMINA: No high-grade spinal canal stenosis. PARASPINAL: No extraspinal abnormality. LUMBAR SPINE CT: VERTEBRA: Normal vertebral body heights. No fracture or posttraumatic subluxation. CANAL/FORAMINA: No high-grade spinal canal stenosis. PARASPINAL: No extraspinal abnormality.     IMPRESSION: HEAD CT: 1.  Motion artifact makes evaluation suboptimal. Within this constraint, there is no acute intracranial abnormality. CERVICAL SPINE CT: 1.  No acute fracture. THORACIC SPINE CT: 1.  No acute fracture. LUMBAR SPINE CT: 1.  No acute fracture.    CT Thoracic Spine w/o Contrast    Result Date: 7/22/2022  EXAM: CT HEAD W/O CONTRAST, CT LUMBAR SPINE W/O CONTRAST, CT THORACIC SPINE W/O CONTRAST, CT CERVICAL SPINE W/O CONTRAST LOCATION: [facility] DATE/TIME: 7/22/2022 8:09 PM INDICATION: Fall, headache, neck pain, thoracic and low back pain COMPARISON: CT head 2/14/2017 TECHNIQUE: 1) Routine CT Head without IV contrast. Multiplanar reformats. Dose reduction techniques were used. 2) Routine CT Cervical Spine without  IV contrast. Multiplanar reformats. Dose reduction techniques were used. 3) Routine CT Thoracic Spine without IV contrast. Multiplanar reformats. Dose reduction techniques were used. 4) Routine CT Lumbar Spine without IV contrast. Multiplanar reformats. Dose reduction techniques were used. FINDINGS: HEAD CT: INTRACRANIAL CONTENTS: No intracranial hemorrhage, extraaxial collection, or mass effect.  No CT evidence of acute infarct. Unchanged moderate presumed chronic small vessel ischemic changes. Unchanged moderate to severe generalized volume loss. No hydrocephalus. VISUALIZED ORBITS/SINUSES/MASTOIDS: No intraorbital abnormality. No paranasal sinus mucosal disease. No middle ear or mastoid effusion. BONES/SOFT TISSUES: No acute abnormality. CERVICAL SPINE CT: VERTEBRA: Normal vertebral body heights. No fracture or posttraumatic subluxation. CANAL/FORAMINA: No high-grade spinal canal stenosis. PARASPINAL: No extraspinal abnormality. Visualized lung fields are clear. THORACIC SPINE CT: VERTEBRA: Normal vertebral body heights. No fracture or posttraumatic subluxation. CANAL/FORAMINA: No high-grade spinal canal stenosis. PARASPINAL: No extraspinal abnormality. LUMBAR SPINE CT: VERTEBRA: Normal vertebral body heights. No fracture or posttraumatic subluxation. CANAL/FORAMINA: No high-grade spinal canal stenosis. PARASPINAL: No extraspinal abnormality.     IMPRESSION: HEAD CT: 1.  Motion artifact makes evaluation suboptimal. Within this constraint, there is no acute intracranial abnormality. CERVICAL SPINE CT: 1.  No acute fracture. THORACIC SPINE CT: 1.  No acute fracture. LUMBAR SPINE CT: 1.  No acute fracture.    POC US ABDOMEN LIMITED    Result Date: 7/22/2022  Flavio Roach MD     7/22/2022 11:24 PM POC US ABDOMEN LIMITED Date/Time: 7/22/2022 7:10 PM Performed by: Flavio Roach MD Authorized by: Flavio Roach MD Procedure details:   Indications: abdominal pain  Comments:    FAST exam: negative. Images saved and  uploaded          07/24/2022   Mayra Santillan MD  Hospitalist  Pager: [phone]  Hospitalist Progress Note        CODE STATUS:  No CPR- Do NOT Intubate    Assessment/Plan:  Laney Hahn is an 82 year old old female with past medical history of vascular dementia, hyperlipidemia, hypertension, paroxysmal A. fib, s/p PPM, heart failure with reduced EF, CKD stage III, chronic anemia who presented for evaluation of fall and diarrhea, she is found to have acute kidney injury and nonspecific colitis on the CT abdomen and pelvis.      Diarrhea with bright red blood per rectum noted in the ER  - Clinically stable. Afebrile. Initial leukocytosis like reactive vs hemoconcentrated   - CT abdomen and pelvis with nonspecific colitis.    - Obtain stool culture for enteric pathogens and C. difficile.    - Continue to hold off on abx    Acute kidney injury, improved  - Likely prerenal in the setting of diarrhea.   - Continue  IV hydration. Hold home lasix.    Presyncope  - Likely due to orthostatic hypotension in the setting of intravascular volume depletion  - PT/OT eval    Paroxysmal A. fib, s/p PPM  - Continue metoprolol.   - On Eliquis, monitor hgb given report of bloody stool. Initial Hgb was likely concentrated.     Essential hypertension  - BP readings acceptable. Continue     Vascular dementia without behavioral disturbances      DVT Prophylaxis: On Eliquis as above      Subjective:  Interval History:   Patient seen and examined.   Pleasant. Requests lunch, otherwise, no issues at this time.    Review of Systems:   As mentioned in subjective.    Patient Active Problem List   Diagnosis     SVT (supraventricular tachycardia) (H)     Chronic kidney disease, stage III (moderate) (H)     Vascular dementia without behavioral disturbance (H)     Paroxysmal atrial fibrillation (H)     Nonrheumatic aortic valve stenosis     Heart failure with reduced ejection fraction, NYHA class I (H)     Essential hypertension      DNAR (do not attempt resuscitation)     Cognitive and behavioral changes     Cardiac pacemaker in situ     Mixed hyperlipidemia     Adjustment disorder with anxious mood     Anemia     AV node dysfunction     Bifascicular bundle branch block     Frequent falls     Hyperkalemia     Junctional bradycardia     Nonobstructive atherosclerosis of coronary artery     Pericardial effusion     Diarrhea     Colitis     JESUS (acute kidney injury) (H)       Scheduled Meds:    apixaban ANTICOAGULANT  2.5 mg Oral BID     atorvastatin  10 mg Oral QAM     metoprolol succinate ER  50 mg Oral Daily     sodium chloride (PF)  3 mL Intracatheter Q8H     Continuous Infusions:    - MEDICATION INSTRUCTIONS -       sodium chloride 100 mL/hr at 07/24/22 0124     PRN Meds:.acetaminophen **OR** acetaminophen, calcium carbonate, lidocaine 4%, lidocaine (buffered or not buffered), melatonin, ondansetron **OR** ondansetron, - MEDICATION INSTRUCTIONS -, sodium chloride (PF)    Objective:  Vital signs in last 24 hours:  Temp:  [97.1  F (36.2  C)-97.8  F (36.6  C)] 97.1  F (36.2  C)  Pulse:  [77-86] 86  Resp:  [18-20] 20  BP: ()/(54-62) 108/62  SpO2:  [96 %-99 %] 98 %      No intake or output data in the 24 hours ending 07/23/22 1315    Physical Exam:  General:  In NAD.  HEENT: NCAT, EOMI  CV: Normal S1S2, regular rhythm, normal rate  Lungs: CTAB  Abdomen: Soft, NT, ND, +BS  Extremities: No LE edema b/l  Neuro: AAOx3.   Psych: Normal Affect.     Lab Results:    Recent Results (from the past 24 hour(s))   Clostridium difficile toxin B PCR    Collection Time: 07/23/22  8:06 PM    Specimen: Per Rectum; Stool   Result Value Ref Range    C Difficile Toxin B by PCR Negative Negative   Comprehensive metabolic panel    Collection Time: 07/24/22  7:20 AM   Result Value Ref Range    Sodium 141 136 - 145 mmol/L    Potassium 4.3 3.5 - 5.0 mmol/L    Chloride 109 (H) 98 - 107 mmol/L    Carbon Dioxide (CO2) 28 22 - 31 mmol/L    Anion Gap 4 (L) 5 - 18 mmol/L     Urea Nitrogen 25 8 - 28 mg/dL    Creatinine 1.01 0.60 - 1.10 mg/dL    Calcium 8.3 (L) 8.5 - 10.5 mg/dL    Glucose 120 70 - 125 mg/dL    Alkaline Phosphatase 101 45 - 120 U/L    AST 18 0 - 40 U/L    ALT 15 0 - 45 U/L    Protein Total 5.7 (L) 6.0 - 8.0 g/dL    Albumin 3.0 (L) 3.5 - 5.0 g/dL    Bilirubin Total 0.4 0.0 - 1.0 mg/dL    GFR Estimate 56 (L) >60 mL/min/1.73m2   Magnesium    Collection Time: 07/24/22  7:20 AM   Result Value Ref Range    Magnesium 2.0 1.8 - 2.6 mg/dL   CBC with platelets and differential    Collection Time: 07/24/22  7:20 AM   Result Value Ref Range    WBC Count 3.6 (L) 4.0 - 11.0 10e3/uL    RBC Count 3.47 (L) 3.80 - 5.20 10e6/uL    Hemoglobin 11.8 11.7 - 15.7 g/dL    Hematocrit 34.7 (L) 35.0 - 47.0 %     78 - 100 fL    MCH 34.0 (H) 26.5 - 33.0 pg    MCHC 34.0 31.5 - 36.5 g/dL    RDW 12.8 10.0 - 15.0 %    Platelet Count 164 150 - 450 10e3/uL    % Neutrophils 48 %    % Lymphocytes 35 %    % Monocytes 9 %    % Eosinophils 7 %    % Basophils 1 %    % Immature Granulocytes 0 %    NRBCs per 100 WBC 0 <1 /100    Absolute Neutrophils 1.7 1.6 - 8.3 10e3/uL    Absolute Lymphocytes 1.3 0.8 - 5.3 10e3/uL    Absolute Monocytes 0.3 0.0 - 1.3 10e3/uL    Absolute Eosinophils 0.3 0.0 - 0.7 10e3/uL    Absolute Basophils 0.0 0.0 - 0.2 10e3/uL    Absolute Immature Granulocytes 0.0 <=0.4 10e3/uL    Absolute NRBCs 0.0 10e3/uL       Serum Glucose range:   Recent Labs   Lab 07/24/22  0720 07/23/22  0632 07/22/22  1917    132* 162*     ABG: No lab results found in last 7 days.  CBC:   Recent Labs   Lab 07/24/22  0720 07/23/22  0632 07/22/22 1917   WBC 3.6* 5.5 11.2*   HGB 11.8 12.5 15.4   HCT 34.7* 36.0 44.0    99 97    169 221   NEUTROPHIL 48  --  78   LYMPH 35  --  12   MONOCYTE 9  --  9   EOSINOPHIL 7  --  1     Chemistry:   Recent Labs   Lab 07/24/22  0720 07/23/22  0632 07/22/22 1917    139 139   POTASSIUM 4.3 3.7 4.0   CHLORIDE 109* 103 99   CO2 28 27 26   BUN 25 41* 43*    CR 1.01 1.49* 1.80*   GFRESTIMATED 56* 35* 28*   CURT 8.3* 8.4* 9.2   MAG 2.0  --   --    PROTTOTAL 5.7* 6.3 7.5   ALBUMIN 3.0* 3.4* 4.2   AST 18 19 34   ALT 15 21 30   ALKPHOS 101 113 135*   BILITOTAL 0.4 0.6 0.8     Coags:  Recent Labs   Lab 07/22/22 1917   INR 1.23*     Cardiac Markers:  Recent Labs   Lab 07/22/22 1917   TROPONINI 0.02          CT Abdomen Pelvis w/o Contrast    Result Date: 7/22/2022  EXAM: CT ABDOMEN PELVIS W/O CONTRAST LOCATION: [facility] DATE/TIME: 7/22/2022 8:11 PM INDICATION: Fall, diarrhea, right lower quadrant tenderness. COMPARISON: None. TECHNIQUE: CT scan of the abdomen and pelvis was performed without IV contrast. Multiplanar reformats were obtained. Dose reduction techniques were used. CONTRAST: None. FINDINGS: LOWER CHEST: Heart size within normal limits with no pericardial effusion. Aortic valve, mitral annular and coronary artery calcification. Pacer lead tips in RA and RV. Visualized lungs are clear. HEPATOBILIARY: Gallbladder distention and mild extrahepatic bile duct dilatation to the level of the ampulla but no visible radiodense stone or mass. Liver with a normal appearance at noncontrast CT. PANCREAS: Atrophy and fatty replacement of the pancreatic parenchyma. SPLEEN: Spleen size normal. ADRENAL GLANDS: Normal. KIDNEY/BLADDER: Generalized atrophy left kidney, probably sequela to chronic renal artery stenosis. A 1-2 mm nonobstructing left kidney stone. Kidneys, ureters and bladder are otherwise normal. BOWEL: Mild mural and pericolic edema transverse and left colon consistent with a nonspecific colitis. Colonic diverticulosis without diverticulitis. Normal appendix. Postop change Billroth II with distal gastrectomy and loop gastrojejunostomy. No bowel obstruction. No free air. No free fluid. LYMPH NODES: No lymphadenopathy. VASCULATURE: Mild to moderate calcified atheromatous plaque throughout the normal caliber abdominal aorta, iliofemoral  arteries and at the origin of the renal and mesenteric arteries. PELVIC ORGANS: No pelvic mass or fluid. MUSCULOSKELETAL: Bones are demineralized. No fractures. Spondylotic change lumbar spine.     IMPRESSION: 1.  Mild mural and pericolic edema transverse and left colon consistent with a nonspecific colitis. 2.  Gallbladder distention and mild extrahepatic bile duct dilatation to the level of the ampulla but no visible radiodense stone or mass. 3.  Postop change Billroth II with distal gastrectomy and loop gastrojejunostomy. 4.  Generalized atrophy left kidney, probably sequela to chronic renal artery stenosis. 5.  A 2 mm nonobstructing left kidney stone.     Cervical spine CT w/o contrast    Result Date: 7/22/2022  EXAM: CT HEAD W/O CONTRAST, CT LUMBAR SPINE W/O CONTRAST, CT THORACIC SPINE W/O CONTRAST, CT CERVICAL SPINE W/O CONTRAST LOCATION: [facility] DATE/TIME: 7/22/2022 8:09 PM INDICATION: Fall, headache, neck pain, thoracic and low back pain COMPARISON: CT head 2/14/2017 TECHNIQUE: 1) Routine CT Head without IV contrast. Multiplanar reformats. Dose reduction techniques were used. 2) Routine CT Cervical Spine without IV contrast. Multiplanar reformats. Dose reduction techniques were used. 3) Routine CT Thoracic Spine without IV contrast. Multiplanar reformats. Dose reduction techniques were used. 4) Routine CT Lumbar Spine without IV contrast. Multiplanar reformats. Dose reduction techniques were used. FINDINGS: HEAD CT: INTRACRANIAL CONTENTS: No intracranial hemorrhage, extraaxial collection, or mass effect.  No CT evidence of acute infarct. Unchanged moderate presumed chronic small vessel ischemic changes. Unchanged moderate to severe generalized volume loss. No hydrocephalus. VISUALIZED ORBITS/SINUSES/MASTOIDS: No intraorbital abnormality. No paranasal sinus mucosal disease. No middle ear or mastoid effusion. BONES/SOFT TISSUES: No acute abnormality. CERVICAL SPINE CT: VERTEBRA:  Normal vertebral body heights. No fracture or posttraumatic subluxation. CANAL/FORAMINA: No high-grade spinal canal stenosis. PARASPINAL: No extraspinal abnormality. Visualized lung fields are clear. THORACIC SPINE CT: VERTEBRA: Normal vertebral body heights. No fracture or posttraumatic subluxation. CANAL/FORAMINA: No high-grade spinal canal stenosis. PARASPINAL: No extraspinal abnormality. LUMBAR SPINE CT: VERTEBRA: Normal vertebral body heights. No fracture or posttraumatic subluxation. CANAL/FORAMINA: No high-grade spinal canal stenosis. PARASPINAL: No extraspinal abnormality.     IMPRESSION: HEAD CT: 1.  Motion artifact makes evaluation suboptimal. Within this constraint, there is no acute intracranial abnormality. CERVICAL SPINE CT: 1.  No acute fracture. THORACIC SPINE CT: 1.  No acute fracture. LUMBAR SPINE CT: 1.  No acute fracture.    Head CT w/o contrast    Result Date: 7/22/2022  EXAM: CT HEAD W/O CONTRAST, CT LUMBAR SPINE W/O CONTRAST, CT THORACIC SPINE W/O CONTRAST, CT CERVICAL SPINE W/O CONTRAST LOCATION: [facility] DATE/TIME: 7/22/2022 8:09 PM INDICATION: Fall, headache, neck pain, thoracic and low back pain COMPARISON: CT head 2/14/2017 TECHNIQUE: 1) Routine CT Head without IV contrast. Multiplanar reformats. Dose reduction techniques were used. 2) Routine CT Cervical Spine without IV contrast. Multiplanar reformats. Dose reduction techniques were used. 3) Routine CT Thoracic Spine without IV contrast. Multiplanar reformats. Dose reduction techniques were used. 4) Routine CT Lumbar Spine without IV contrast. Multiplanar reformats. Dose reduction techniques were used. FINDINGS: HEAD CT: INTRACRANIAL CONTENTS: No intracranial hemorrhage, extraaxial collection, or mass effect.  No CT evidence of acute infarct. Unchanged moderate presumed chronic small vessel ischemic changes. Unchanged moderate to severe generalized volume loss. No hydrocephalus. VISUALIZED  ORBITS/SINUSES/MASTOIDS: No intraorbital abnormality. No paranasal sinus mucosal disease. No middle ear or mastoid effusion. BONES/SOFT TISSUES: No acute abnormality. CERVICAL SPINE CT: VERTEBRA: Normal vertebral body heights. No fracture or posttraumatic subluxation. CANAL/FORAMINA: No high-grade spinal canal stenosis. PARASPINAL: No extraspinal abnormality. Visualized lung fields are clear. THORACIC SPINE CT: VERTEBRA: Normal vertebral body heights. No fracture or posttraumatic subluxation. CANAL/FORAMINA: No high-grade spinal canal stenosis. PARASPINAL: No extraspinal abnormality. LUMBAR SPINE CT: VERTEBRA: Normal vertebral body heights. No fracture or posttraumatic subluxation. CANAL/FORAMINA: No high-grade spinal canal stenosis. PARASPINAL: No extraspinal abnormality.     IMPRESSION: HEAD CT: 1.  Motion artifact makes evaluation suboptimal. Within this constraint, there is no acute intracranial abnormality. CERVICAL SPINE CT: 1.  No acute fracture. THORACIC SPINE CT: 1.  No acute fracture. LUMBAR SPINE CT: 1.  No acute fracture.    Lumbar spine CT w/o contrast    Result Date: 7/22/2022  EXAM: CT HEAD W/O CONTRAST, CT LUMBAR SPINE W/O CONTRAST, CT THORACIC SPINE W/O CONTRAST, CT CERVICAL SPINE W/O CONTRAST LOCATION: [facility] DATE/TIME: 7/22/2022 8:09 PM INDICATION: Fall, headache, neck pain, thoracic and low back pain COMPARISON: CT head 2/14/2017 TECHNIQUE: 1) Routine CT Head without IV contrast. Multiplanar reformats. Dose reduction techniques were used. 2) Routine CT Cervical Spine without IV contrast. Multiplanar reformats. Dose reduction techniques were used. 3) Routine CT Thoracic Spine without IV contrast. Multiplanar reformats. Dose reduction techniques were used. 4) Routine CT Lumbar Spine without IV contrast. Multiplanar reformats. Dose reduction techniques were used. FINDINGS: HEAD CT: INTRACRANIAL CONTENTS: No intracranial hemorrhage, extraaxial collection, or mass effect.   No CT evidence of acute infarct. Unchanged moderate presumed chronic small vessel ischemic changes. Unchanged moderate to severe generalized volume loss. No hydrocephalus. VISUALIZED ORBITS/SINUSES/MASTOIDS: No intraorbital abnormality. No paranasal sinus mucosal disease. No middle ear or mastoid effusion. BONES/SOFT TISSUES: No acute abnormality. CERVICAL SPINE CT: VERTEBRA: Normal vertebral body heights. No fracture or posttraumatic subluxation. CANAL/FORAMINA: No high-grade spinal canal stenosis. PARASPINAL: No extraspinal abnormality. Visualized lung fields are clear. THORACIC SPINE CT: VERTEBRA: Normal vertebral body heights. No fracture or posttraumatic subluxation. CANAL/FORAMINA: No high-grade spinal canal stenosis. PARASPINAL: No extraspinal abnormality. LUMBAR SPINE CT: VERTEBRA: Normal vertebral body heights. No fracture or posttraumatic subluxation. CANAL/FORAMINA: No high-grade spinal canal stenosis. PARASPINAL: No extraspinal abnormality.     IMPRESSION: HEAD CT: 1.  Motion artifact makes evaluation suboptimal. Within this constraint, there is no acute intracranial abnormality. CERVICAL SPINE CT: 1.  No acute fracture. THORACIC SPINE CT: 1.  No acute fracture. LUMBAR SPINE CT: 1.  No acute fracture.    CT Thoracic Spine w/o Contrast    Result Date: 7/22/2022  EXAM: CT HEAD W/O CONTRAST, CT LUMBAR SPINE W/O CONTRAST, CT THORACIC SPINE W/O CONTRAST, CT CERVICAL SPINE W/O CONTRAST LOCATION: [facility] DATE/TIME: 7/22/2022 8:09 PM INDICATION: Fall, headache, neck pain, thoracic and low back pain COMPARISON: CT head 2/14/2017 TECHNIQUE: 1) Routine CT Head without IV contrast. Multiplanar reformats. Dose reduction techniques were used. 2) Routine CT Cervical Spine without IV contrast. Multiplanar reformats. Dose reduction techniques were used. 3) Routine CT Thoracic Spine without IV contrast. Multiplanar reformats. Dose reduction techniques were used. 4) Routine CT Lumbar Spine  without IV contrast. Multiplanar reformats. Dose reduction techniques were used. FINDINGS: HEAD CT: INTRACRANIAL CONTENTS: No intracranial hemorrhage, extraaxial collection, or mass effect.  No CT evidence of acute infarct. Unchanged moderate presumed chronic small vessel ischemic changes. Unchanged moderate to severe generalized volume loss. No hydrocephalus. VISUALIZED ORBITS/SINUSES/MASTOIDS: No intraorbital abnormality. No paranasal sinus mucosal disease. No middle ear or mastoid effusion. BONES/SOFT TISSUES: No acute abnormality. CERVICAL SPINE CT: VERTEBRA: Normal vertebral body heights. No fracture or posttraumatic subluxation. CANAL/FORAMINA: No high-grade spinal canal stenosis. PARASPINAL: No extraspinal abnormality. Visualized lung fields are clear. THORACIC SPINE CT: VERTEBRA: Normal vertebral body heights. No fracture or posttraumatic subluxation. CANAL/FORAMINA: No high-grade spinal canal stenosis. PARASPINAL: No extraspinal abnormality. LUMBAR SPINE CT: VERTEBRA: Normal vertebral body heights. No fracture or posttraumatic subluxation. CANAL/FORAMINA: No high-grade spinal canal stenosis. PARASPINAL: No extraspinal abnormality.     IMPRESSION: HEAD CT: 1.  Motion artifact makes evaluation suboptimal. Within this constraint, there is no acute intracranial abnormality. CERVICAL SPINE CT: 1.  No acute fracture. THORACIC SPINE CT: 1.  No acute fracture. LUMBAR SPINE CT: 1.  No acute fracture.    POC US ABDOMEN LIMITED    Result Date: 7/22/2022  Flavio Roach MD     7/22/2022 11:24 PM POC US ABDOMEN LIMITED Date/Time: 7/22/2022 7:10 PM Performed by: Flavio Roach MD Authorized by: Flavio Roach MD Procedure details:   Indications: abdominal pain  Comments:    FAST exam: negative. Images saved and uploaded          07/24/2022   Mayra Santillan MD  Hospitalist  Pager: [phone]

## 2022-07-25 ENCOUNTER — DOCUMENTATION ONLY (OUTPATIENT)
Dept: OTHER | Facility: CLINIC | Age: 82
End: 2022-07-25

## 2022-07-25 VITALS
OXYGEN SATURATION: 94 % | HEART RATE: 82 BPM | WEIGHT: 124.8 LBS | RESPIRATION RATE: 18 BRPM | SYSTOLIC BLOOD PRESSURE: 133 MMHG | TEMPERATURE: 98 F | HEIGHT: 64 IN | BODY MASS INDEX: 21.31 KG/M2 | DIASTOLIC BLOOD PRESSURE: 60 MMHG

## 2022-07-25 LAB
ANION GAP SERPL CALCULATED.3IONS-SCNC: 6 MMOL/L (ref 5–18)
BASOPHILS # BLD AUTO: 0 10E3/UL (ref 0–0.2)
BASOPHILS NFR BLD AUTO: 1 %
BUN SERPL-MCNC: 21 MG/DL (ref 8–28)
CALCIUM SERPL-MCNC: 7.9 MG/DL (ref 8.5–10.5)
CHLORIDE BLD-SCNC: 113 MMOL/L (ref 98–107)
CO2 SERPL-SCNC: 20 MMOL/L (ref 22–31)
CREAT SERPL-MCNC: 0.91 MG/DL (ref 0.6–1.1)
EOSINOPHIL # BLD AUTO: 0.3 10E3/UL (ref 0–0.7)
EOSINOPHIL NFR BLD AUTO: 7 %
ERYTHROCYTE [DISTWIDTH] IN BLOOD BY AUTOMATED COUNT: 12.7 % (ref 10–15)
GFR SERPL CREATININE-BSD FRML MDRD: 63 ML/MIN/1.73M2
GLUCOSE BLD-MCNC: 113 MG/DL (ref 70–125)
HCT VFR BLD AUTO: 31.4 % (ref 35–47)
HGB BLD-MCNC: 10.6 G/DL (ref 11.7–15.7)
IMM GRANULOCYTES # BLD: 0 10E3/UL
IMM GRANULOCYTES NFR BLD: 0 %
LYMPHOCYTES # BLD AUTO: 1.4 10E3/UL (ref 0.8–5.3)
LYMPHOCYTES NFR BLD AUTO: 38 %
MAGNESIUM SERPL-MCNC: 1.8 MG/DL (ref 1.8–2.6)
MCH RBC QN AUTO: 33.8 PG (ref 26.5–33)
MCHC RBC AUTO-ENTMCNC: 33.8 G/DL (ref 31.5–36.5)
MCV RBC AUTO: 100 FL (ref 78–100)
MONOCYTES # BLD AUTO: 0.3 10E3/UL (ref 0–1.3)
MONOCYTES NFR BLD AUTO: 8 %
NEUTROPHILS # BLD AUTO: 1.7 10E3/UL (ref 1.6–8.3)
NEUTROPHILS NFR BLD AUTO: 46 %
NRBC # BLD AUTO: 0 10E3/UL
NRBC BLD AUTO-RTO: 0 /100
PLATELET # BLD AUTO: 143 10E3/UL (ref 150–450)
POTASSIUM BLD-SCNC: 3.9 MMOL/L (ref 3.5–5)
RBC # BLD AUTO: 3.14 10E6/UL (ref 3.8–5.2)
SODIUM SERPL-SCNC: 139 MMOL/L (ref 136–145)
WBC # BLD AUTO: 3.7 10E3/UL (ref 4–11)

## 2022-07-25 PROCEDURE — 96361 HYDRATE IV INFUSION ADD-ON: CPT

## 2022-07-25 PROCEDURE — G0378 HOSPITAL OBSERVATION PER HR: HCPCS

## 2022-07-25 PROCEDURE — 36415 COLL VENOUS BLD VENIPUNCTURE: CPT | Performed by: HOSPITALIST

## 2022-07-25 PROCEDURE — 83735 ASSAY OF MAGNESIUM: CPT | Performed by: HOSPITALIST

## 2022-07-25 PROCEDURE — 80048 BASIC METABOLIC PNL TOTAL CA: CPT | Performed by: HOSPITALIST

## 2022-07-25 PROCEDURE — 250N000013 HC RX MED GY IP 250 OP 250 PS 637: Performed by: INTERNAL MEDICINE

## 2022-07-25 PROCEDURE — 85025 COMPLETE CBC W/AUTO DIFF WBC: CPT | Performed by: HOSPITALIST

## 2022-07-25 PROCEDURE — 258N000003 HC RX IP 258 OP 636: Performed by: INTERNAL MEDICINE

## 2022-07-25 PROCEDURE — 99239 HOSP IP/OBS DSCHRG MGMT >30: CPT | Performed by: HOSPITALIST

## 2022-07-25 RX ADMIN — ATORVASTATIN CALCIUM 10 MG: 10 TABLET, FILM COATED ORAL at 09:45

## 2022-07-25 RX ADMIN — SODIUM CHLORIDE: 9 INJECTION, SOLUTION INTRAVENOUS at 06:27

## 2022-07-25 RX ADMIN — APIXABAN 2.5 MG: 2.5 TABLET, FILM COATED ORAL at 06:26

## 2022-07-25 RX ADMIN — METOPROLOL SUCCINATE 50 MG: 50 TABLET, EXTENDED RELEASE ORAL at 09:45

## 2022-07-25 ASSESSMENT — ACTIVITIES OF DAILY LIVING (ADL)
ADLS_ACUITY_SCORE: 24
ADLS_ACUITY_SCORE: 23
ADLS_ACUITY_SCORE: 23
ADLS_ACUITY_SCORE: 24
ADLS_ACUITY_SCORE: 24

## 2022-07-25 NOTE — DISCHARGE SUMMARY
[facility] MEDICINE  DISCHARGE SUMMARY     Primary Care Physician: Rose Mcelroy  Admission Date: 7/22/2022   Discharge Provider: Mayra Santillan MD Discharge Date: 7/25/2022   Diet: Regular   Code Status: No CPR- Do NOT Intubate   Activity: DCACTIVITY: Activity as tolerated        Condition at Discharge: Stable     REASON FOR PRESENTATION(See Admission Note for Details)   Diarrhea   Dehydration   Acute Kidney Injury     PRINCIPAL & ACTIVE DISCHARGE DIAGNOSES     Active Problems:    Chronic kidney disease, stage III (moderate) (H)    Vascular dementia without behavioral disturbance (H)    Paroxysmal atrial fibrillation (H)    Heart failure with reduced ejection fraction, NYHA class I (H)    Essential hypertension    DNAR (do not attempt resuscitation)    Cardiac pacemaker in situ    Diarrhea    Colitis    JESUS (acute kidney injury) (H)      PENDING LABS     Unresulted Labs Ordered in the Past 30 Days of this Admission     No orders found from 6/22/2022 to 7/23/2022.            PROCEDURES ( this hospitalization only)          RECOMMENDATIONS TO OUTPATIENT PROVIDER FOR F/U VISIT     Follow-up Appointments     Follow-up and recommended labs and tests       Follow up with primary care provider, Rose Mcelroy, within 7 days for   hospital follow- up.  The following labs/tests are recommended: Basic   Metabolic Panel.                 DISPOSITION     Home    SUMMARY OF HOSPITAL COURSE:    Laney Hahn is an 82 year old old female with past medical history of vascular dementia, hyperlipidemia, hypertension, paroxysmal A. fib, s/p PPM, heart failure with reduced EF, CKD stage III, chronic anemia who presented for evaluation of fall and diarrhea, she is found to have acute kidney injury and nonspecific colitis on the CT abdomen and pelvis.       Diarrhea with bright red blood per rectum noted in the ER  - Clinically stable. Afebrile. Initial leukocytosis like reactive vs  hemoconcentrated   - CT abdomen and pelvis with nonspecific colitis.    - Stool sent for enteric pathogens and C. Difficile and returned negative.  - She only had one episode of diarrhea while in hospital, no blood seen it it. She has not had any more diarrhea for >24 hours.     Acute kidney injury - resolved  - Likely prerenal in the setting of diarrhea.      Presyncope  - Likely due to orthostatic hypotension in the setting of intravascular volume depletion  - Cleared by PT/OT     Paroxysmal A. fib, s/p PPM  - Continue metoprolol for rate control. Eliquis for CVA prevention.     Essential hypertension  - BP readings acceptable    Anemia  - Suspect chronic anemia.  - Outpatient monitoring -- She is on eliquis.     Vascular dementia without behavioral disturbances  - AAOx3 and communicating appropriately/pleasantly but she is not reliable    Discharge Medications with Med changes:     Current Discharge Medication List      CONTINUE these medications which have NOT CHANGED    Details   apixaban ANTICOAGULANT (ELIQUIS) 2.5 MG tablet Take 1 tablet (2.5 mg) by mouth 2 times daily  Qty: 60 tablet, Refills: 5    Associated Diagnoses: Paroxysmal atrial fibrillation (H)      aspirin-acetaminophen-caffeine (EXCEDRIN MIGRAINE) 250-250-65 MG tablet Take 1 tablet by mouth every 6 hours as needed for headaches  Qty: 30 tablet, Refills: 11    Comments: Okay to dispense generic  Associated Diagnoses: Tension headache      atorvastatin (LIPITOR) 10 MG tablet Take 1 tablet (10 mg) by mouth every morning  Qty: 30 tablet, Refills: 11    Associated Diagnoses: Dyslipidemia, goal LDL below 70      furosemide (LASIX) 20 MG tablet Take 1 tablet (20 mg) by mouth every morning  Qty: 30 tablet, Refills: 5    Associated Diagnoses: Acute diastolic congestive heart failure (H)      metoprolol succinate ER (TOPROL XL) 50 MG 24 hr tablet Take 1 tablet (50 mg) by mouth daily  Qty: 30 tablet, Refills: 6    Associated Diagnoses: SVT (supraventricular  tachycardia) (H)                   Consults   None      Immunizations given this encounter     Most Recent Immunizations   Administered Date(s) Administered     COVID-19,PF,Frida 03/12/2021     Influenza (High Dose) 3 valent vaccine 10/17/2016     Pneumo Conj 13-V (2010&after) 11/16/2016           Anticoagulation Information      Recent INR results:   Recent Labs   Lab 07/22/22  1917   INR 1.23*         SIGNIFICANT IMAGING FINDINGS     Results for orders placed or performed during the hospital encounter of 07/22/22   Head CT w/o contrast    Impression    IMPRESSION:  HEAD CT:  1.  Motion artifact makes evaluation suboptimal. Within this constraint, there is no acute intracranial abnormality.    CERVICAL SPINE CT:  1.  No acute fracture.    THORACIC SPINE CT:  1.  No acute fracture.    LUMBAR SPINE CT:  1.  No acute fracture.   Cervical spine CT w/o contrast    Impression    IMPRESSION:  HEAD CT:  1.  Motion artifact makes evaluation suboptimal. Within this constraint, there is no acute intracranial abnormality.    CERVICAL SPINE CT:  1.  No acute fracture.    THORACIC SPINE CT:  1.  No acute fracture.    LUMBAR SPINE CT:  1.  No acute fracture.   CT Thoracic Spine w/o Contrast    Impression    IMPRESSION:  HEAD CT:  1.  Motion artifact makes evaluation suboptimal. Within this constraint, there is no acute intracranial abnormality.    CERVICAL SPINE CT:  1.  No acute fracture.    THORACIC SPINE CT:  1.  No acute fracture.    LUMBAR SPINE CT:  1.  No acute fracture.   Lumbar spine CT w/o contrast    Impression    IMPRESSION:  HEAD CT:  1.  Motion artifact makes evaluation suboptimal. Within this constraint, there is no acute intracranial abnormality.    CERVICAL SPINE CT:  1.  No acute fracture.    THORACIC SPINE CT:  1.  No acute fracture.    LUMBAR SPINE CT:  1.  No acute fracture.   CT Abdomen Pelvis w/o Contrast    Impression    IMPRESSION:   1.  Mild mural and pericolic edema transverse and left colon  consistent with a nonspecific colitis.   2.  Gallbladder distention and mild extrahepatic bile duct dilatation to the level of the ampulla but no visible radiodense stone or mass.  3.  Postop change Billroth II with distal gastrectomy and loop gastrojejunostomy.  4.  Generalized atrophy left kidney, probably sequela to chronic renal artery stenosis.   5.  A 2 mm nonobstructing left kidney stone.          SIGNIFICANT LABORATORY FINDINGS     Most Recent 3 BMP's:Recent Labs   Lab Test 07/25/22  0705 07/24/22  0720 07/23/22  0632    141 139   POTASSIUM 3.9 4.3 3.7   CHLORIDE 113* 109* 103   CO2 20* 28 27   BUN 21 25 41*   CR 0.91 1.01 1.49*   ANIONGAP 6 4* 9   CURT 7.9* 8.3* 8.4*    120 132*         Discharge Orders        Reason for your hospital stay    Dehydration   Diarrhea  Acute Kidney Injury     Follow-up and recommended labs and tests     Follow up with primary care provider, Rose Mcelroy, within 7 days for hospital follow- up.  The following labs/tests are recommended: Basic Metabolic Panel.     Activity    Your activity upon discharge: activity as tolerated     Diet    Follow this diet upon discharge: Regular diet       Examination   General:  In NAD.e  HEENT: NCAT, EOMI  CV: Normal S1S2, regular rhythm, normal rate  Lungs: CTAB  Abdomen: Soft, NT, ND, +BS  Extremities: No LE edema b/l  Neuro: AAOx3 but confused  Psych: Normal Affect    Please see EMR for more detailed significant labs, imaging, consultant notes etc.    Mayra QURESHI MD, personally saw the patient today and spent greater than 30 minutes discharging this patient.    Mayra Santillan MD  [facility]    CC:Rose Mcelroy

## 2022-07-25 NOTE — PLAN OF CARE
Physical Therapy Discharge Summary    Reason for therapy discharge:    Discharged to home.    Progress towards therapy goal(s). See goals on Care Plan in [facility] electronic health record for goal details.  Goals partially met.  Barriers to achieving goals:   discharge from facility.    Therapy recommendation(s):    No further therapy is recommended. Lives w/ brother who can help assist as needed.     Valarie Segura, SPT  07/25/2022

## 2022-07-25 NOTE — PROGRESS NOTES
Care Management Discharge Note    Discharge Date: 07/25/2022       Discharge Disposition:  Return to Assisted living.     Discharge Services:  None recommended.     Discharge DME:      Discharge Transportation:friend, Gabriella Givens at 1:00PM  Private pay costs discussed: Not applicable    Patient/family educated on Medicare website which has current facility and service quality ratings:  NA    Education Provided on the Discharge Plan:  yes  Persons Notified of Discharge Plans: bedside nurse, MD, charge.  Patient/Family in Agreement with the Plan: yes    Handoff Referral Completed: Yes    Additional Information:  SW contacted Harold[facility] Patel [phone]- they will check on fax machine to see if its working. Elias will call me back, he is .   Pt friend will transport her back to her assisted living, MERYL spoke to bedside nurse they will bring pt to front door at 1PM for friend to .          Soraida Pichardo MSW

## 2022-07-25 NOTE — PLAN OF CARE
Patient is discharged back to her care long term care facility. She received instructions and discharge package.  Patient has all her belongings. Patient friend waiting to provide transport.

## 2022-07-25 NOTE — PLAN OF CARE
Goal Outcome Evaluation:       Problem: Plan of Care - These are the overarching goals to be used throughout the patient stay.    Goal: Optimal Comfort and Wellbeing  Outcome: Ongoing, Progressing  Intervention: Monitor Pain and Promote Comfort  Recent Flowsheet Documentation  Taken 7/24/2022 2315 by Petra Peralta RN  Pain Management Interventions: rest     Problem: Risk for Delirium  Goal: Optimal Coping  Outcome: Ongoing, Progressing  Goal: Improved Behavioral Control  Outcome: Ongoing, Progressing  Intervention: Prevent and Manage Agitation  Recent Flowsheet Documentation  Taken 7/24/2022 2315 by Petra Peralta RN  Environment Familiarity/Consistency: daily routine followed  Taken 7/24/2022 2045 by Petra Peralta RN  Environment Familiarity/Consistency: daily routine followed  Goal: Improved Attention and Thought Clarity  Outcome: Ongoing, Progressing  Intervention: Maximize Cognitive Function  Recent Flowsheet Documentation  Taken 7/24/2022 2315 by Petra Peralta RN  Sensory Stimulation Regulation:   auditory stimulation minimized   care clustered   lighting decreased   quiet environment promoted   tactile stimulation minimized  Reorientation Measures: calendar in view  Taken 7/24/2022 2045 by Petra Peralta RN  Sensory Stimulation Regulation:   auditory stimulation minimized   care clustered   lighting decreased   quiet environment promoted   tactile stimulation minimized  Reorientation Measures: calendar in view  Goal: Improved Sleep  Outcome: Ongoing, Progressing     Problem: Behavioral Health Comorbidity  Goal: Maintenance of Behavioral Health Symptom Control  Outcome: Ongoing, Progressing  Intervention: Maintain Behavioral Health Symptom Control  Recent Flowsheet Documentation  Taken 7/24/2022 2315 by Petra Peralta RN  Medication Review/Management: medications reviewed  Taken 7/24/2022 2045 by Petra Peralta RN  Medication Review/Management: medications reviewed    Patient slept well overnight, fluids  continue at 100mL/hr. Patient has been able to have proper intake. No stools overnight.

## 2022-07-25 NOTE — PLAN OF CARE
Occupational Therapy Discharge Summary    Reason for therapy discharge:    Discharged to home.    Progress towards therapy goal(s). See goals on Care Plan in [facility] electronic health record for goal details.  Goals partially met.  Barriers to achieving goals:   discharge from facility.    Therapy recommendation(s):    No further therapy is recommended.

## 2022-07-26 ENCOUNTER — PATIENT OUTREACH (OUTPATIENT)
Dept: CARE COORDINATION | Facility: CLINIC | Age: 82
End: 2022-07-26

## 2022-07-26 DIAGNOSIS — Z71.89 OTHER SPECIFIED COUNSELING: ICD-10-CM

## 2022-07-26 NOTE — PROGRESS NOTES
Clinic Care Coordination Contact  [facility]/Voicemail       Clinical Data: Care Coordinator Outreach  Outreach attempted x 1. The CHW was not able to leave a voicemail at this time  Plan: Care Coordinator will try to reach patient again in 1-2 business days.    PLACIDO Fountain  [phone]  [facility]

## 2022-07-27 NOTE — PROGRESS NOTES
"Clinic Care Coordination Contact  [facility]: Post-Discharge Note  SITUATION                                                      Admission:    Admission Date: 07/22/22   Reason for Admission: Chronic kidney disease, stage III (moderate)  Discharge:   Discharge Date: 07/25/22  Discharge Diagnosis: Chronic kidney disease, stage III (moderate)    BACKGROUND                                                      Per hospital discharge summary and inpatient provider notes:    Laney Hahn is a 82 year old old female with past medical history of vascular dementia, hyperlipidemia, hypertension, paroxysmal A. fib, s/p PPM, heart failure with reduced EF, CKD stage III, chronic anemia who presented for evaluation of fall and diarrhea  History is provided by patient, medical records.  Patient lives with her brother at the assisted living facility.  Today she was standing at the kitchen counter when she felt lightheaded and fell onto the floor hitting her head.  She does have scalp contusion and mild tenderness but no headache.  Denies having any loss of consciousness.  Reports having diarrhea over the past 1 day but no abdominal pain or nausea.  Denies fevers or chills.  She states that she has been eating well.  Denies any recent antibiotics or sick contacts.  On initial evaluation in the ER patient is afebrile, not tachycardic, normotensive.  Labs are significant for WBC 11.2, creatinine 1.8, hemoglobin 15.4.  CT head, cervical, thoracic and lumbar spine without any acute traumatic injuries.  CT abdomen and pelvis shows mild mural and pericolic edema in the transverse and left colon consistent with a nonspecific colitis, gallbladder distention and mild extrahepatic bile duct dilatation to the level of the ampulla.    ASSESSMENT      Enrollment  Primary Care Care Coordination Status: Declined    Discharge Assessment  How are you doing now that you are home?: \"Doing fine\"  How are your symptoms? (Red Flag " symptoms escalate to triage hotline per guidelines): Improved  Do you feel your condition is stable enough to be safe at home until your provider visit?: Yes  Does the patient have their discharge instructions? : Yes  Does the patient have questions regarding their discharge instructions? : No  Were you started on any new medications or were there changes to any of your previous medications? : No  Does the patient have all of their medications?: Yes  Do you have questions regarding any of your medications? : No  Do you have all of your needed medical supplies or equipment (DME)?  (i.e. oxygen tank, CPAP, cane, etc.): Yes  Discharge follow-up appointment scheduled within 14 calendar days? : Yes  Discharge Follow Up Appointment Date: 08/03/22  Discharge Follow Up Appointment Scheduled with?: Primary Care Provider    Post-op (W CTA Only)  If the patient had a surgery or procedure, do they have any questions for a nurse?: No    PLAN                                                      Outpatient Plan:    Follow-up and recommended labs and tests      Follow up with primary care provider, Rose Mcelroy, within 7 days for   hospital follow- up.  The following labs/tests are recommended: Basic   Metabolic Panel.      Future Appointments   Date Time Provider Department Center   8/3/2022  9:00 AM [facility]   9/29/2022  9:30 AM [facility]         For any urgent concerns, please contact our 24 hour nurse triage line: [phone] ([phone])         PLACIDO Fountain  [phone]  [facility]

## 2022-08-01 NOTE — PROGRESS NOTES
Assessment & Plan   Problem List Items Addressed This Visit        Circulatory    SVT (supraventricular tachycardia) (H)     Rate controlled with metoprolol           Paroxysmal atrial fibrillation (H)     Continue metoprolol and apixaban            Nonrheumatic aortic valve stenosis       Musculoskeletal and Integumentary    JESUS (acute kidney injury) (H) - Primary     Repeat BMP today. She has baseline CKD stage 3. Encouraged good hydration, reinforced this with her brother who helps her and lives with her            Relevant Orders    CBC with platelets (Completed)    Basic metabolic panel (Completed)      Other Visit Diagnoses     Post-menopausal        Relevant Orders    DX Hip/Pelvis/Spine               Return in about 6 months (around 2/8/2023) for Follow up.    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 Information/instruction (Per telephone encounter pt was feeling weak and dizzy, pt states I still feel weak but getting stronger, no more dizziness)      HPI     She is here today with her brother and a friend from [facility], for follow-up of her recent hospitalization.  She was admitted for a complaint of diarrhea with blood seen rectally as well as dehydration and acute kidney injury.  Nonspecific colitis was seen on CT of the abdomen and pelvis.  She had only 1 episode of diarrhea while in the hospital and there was no blood seen.  Stool test for enteric  "pathogens and C. difficile were both negative.  She states that the diarrhea has resolved. She feels well and has been eating normally. No abdominal pain, hematochezia, or melena. She has not fallen since the hospitalization.      Her brother reports that earlier today they were walking to lunch around 11am and she told him that she felt weak and wanted to return to the room. The nurse came and took her vitals and they were told that it was normal. She ate her lunch and took a nap and symptoms passed.     She has a past medical history of SVT and paroxysmal atrial fibrillation.  She has a permanent pacemaker.  Her last pacemaker check in June shows 2 ventricular high rate episodes.  She is a poor historian, she does not recall whether she had any heart palpitations earlier today.      Post Discharge Outreach 7/27/2022   Admission Date 7/22/2022   Reason for Admission Chronic kidney disease, stage III (moderate)   Discharge Date 7/25/2022   Discharge Diagnosis Chronic kidney disease, stage III (moderate)   How are you doing now that you are home? \"Doing fine\"   How are your symptoms? (Red Flag symptoms escalate to triage hotline per guidelines) Improved   Do you feel your condition is stable enough to be safe at home until your provider visit? Yes   Does the patient have their discharge instructions?  Yes   Does the patient have questions regarding their discharge instructions?  No   Were you started on any new medications or were there changes to any of your previous medications?  No   Does the patient have all of their medications? Yes   Do you have questions regarding any of your medications?  No   Do you have all of your needed medical supplies or equipment (DME)?  (i.e. oxygen tank, CPAP, cane, etc.) Yes   Discharge follow-up appointment scheduled within 14 calendar days?  Yes   Discharge Follow Up Appointment Date 8/3/2022   Discharge Follow Up Appointment Scheduled with? Primary Care Provider     Hospital " "Follow-up Visit:    Hospital/Nursing Home/IP Rehab Facility: [facility]  Date of Admission: 07/22/2022  Date of Discharge: 07/25/2022  Reason(s) for Admission: fall, diarrhea      Was your hospitalization related to COVID-19? No   Problems taking medications regularly:  None  Medication changes since discharge: None  Problems adhering to non-medication therapy:  None            Objective    /72 (BP Location: Right arm, Patient Position: Sitting, Cuff Size: Adult Regular)   Pulse 80   Temp 98.1  F (36.7  C) (Oral)   Resp 16   Ht 1.613 m (5' 3.5\")   Wt 57.9 kg (127 lb 9.6 oz)   LMP  (LMP Unknown)   SpO2 96%   BMI 22.25 kg/m    Body mass index is 22.25 kg/m .  Physical Exam   GENERAL: healthy, alert and no distress  EYES: Eyes grossly normal to inspection, conjunctivae and sclerae normal  RESP: lungs clear to auscultation - no rales, rhonchi or wheezes  CV: regular rate and rhythm, normal S1 S2, no S3 or S4, no murmur, click or rub                  .  ..  "

## 2022-08-03 NOTE — PROGRESS NOTES
[facility]      OUTPATIENT PHYSICAL THERAPY EVALUATION  PLAN OF TREATMENT FOR OUTPATIENT REHABILITATION  (COMPLETE FOR INITIAL CLAIMS ONLY)  Patient's Last Name, First Name, M.I.  YOB: 1940  Laney Hahn                        Provider's Name  [facility] Medical Record No.  [id number]                               Onset Date:  07/22/22   Start of Care Date:         Type:     _X_PT   ___OT   ___SLP Medical Diagnosis:                           PT Diagnosis:  impaired functional mobility   Visits from SOC:  1   _________________________________________________________________________________  Plan of Treatment/Functional Goals    Planned Interventions: gait training, transfer training, balance training     Goals: See Physical Therapy Goals on Care Plan in [employer] electronic health record.    Therapy Frequency: Daily  Predicted Duration of Therapy Intervention: 07/30/22  _________________________________________________________________________________    I CERTIFY THE NEED FOR THESE SERVICES FURNISHED UNDER        THIS PLAN OF TREATMENT AND WHILE UNDER MY CARE     (Physician co-signature of this document indicates review and certification of the therapy plan).               , Certification date to: (P) 07/30/22    Referring Physician: Rose Mcelroy            Initial Assessment        See Physical Therapy evaluation dated   in Epic electronic health record.

## 2022-08-05 NOTE — PLAN OF CARE
[facility]      OUTPATIENT OCCUPATIONAL THERAPY  EVALUATION  PLAN OF TREATMENT FOR OUTPATIENT REHABILITATION  (COMPLETE FOR INITIAL CLAIMS ONLY)  Patient's Last Name, First Name, M.I.  YOB: 1940  Laney Hahn                          Provider's Name  [facility] Medical Record No.  [id number]                               Onset Date:  07/22/22   Start of Care Date:        Type:     ___PT   _X_OT   ___SLP Medical Diagnosis:                           OT Diagnosis:  decreased ADLs   Visits from SOC:  1   _________________________________________________________________________________  Plan of Treatment/Functional Goals    Planned Interventions: cognition, ADL retraining, transfer training   Goals: See Occupational Therapy Goals on Care Plan in [employer] electronic health record.    Therapy Frequency: Daily  Predicted Duration of Therapy Intervention: 07/28/22  _________________________________________________________________________________    I CERTIFY THE NEED FOR THESE SERVICES FURNISHED UNDER        THIS PLAN OF TREATMENT AND WHILE UNDER MY CARE     (Physician co-signature of this document indicates review and certification of the therapy plan).                 ,      Referring Physician: Dr. Krystin Hurst            Initial Assessment        See Occupational Therapy evaluation dated   in Epic electronic health record.              Goal Outcome Evaluation:

## 2022-08-08 ENCOUNTER — OFFICE VISIT (OUTPATIENT)
Dept: INTERNAL MEDICINE | Facility: CLINIC | Age: 82
End: 2022-08-08
Payer: COMMERCIAL

## 2022-08-08 VITALS
WEIGHT: 127.6 LBS | HEIGHT: 64 IN | RESPIRATION RATE: 16 BRPM | SYSTOLIC BLOOD PRESSURE: 126 MMHG | BODY MASS INDEX: 21.78 KG/M2 | TEMPERATURE: 98.1 F | DIASTOLIC BLOOD PRESSURE: 72 MMHG | OXYGEN SATURATION: 96 % | HEART RATE: 80 BPM

## 2022-08-08 DIAGNOSIS — N17.9 AKI (ACUTE KIDNEY INJURY) (H): Primary | ICD-10-CM

## 2022-08-08 DIAGNOSIS — I35.0 NONRHEUMATIC AORTIC VALVE STENOSIS: ICD-10-CM

## 2022-08-08 DIAGNOSIS — I47.10 SVT (SUPRAVENTRICULAR TACHYCARDIA) (H): ICD-10-CM

## 2022-08-08 DIAGNOSIS — Z78.0 POST-MENOPAUSAL: ICD-10-CM

## 2022-08-08 DIAGNOSIS — I48.0 PAROXYSMAL ATRIAL FIBRILLATION (H): ICD-10-CM

## 2022-08-08 LAB
ANION GAP SERPL CALCULATED.3IONS-SCNC: 9 MMOL/L (ref 7–15)
BUN SERPL-MCNC: 28.6 MG/DL (ref 8–23)
CALCIUM SERPL-MCNC: 9.4 MG/DL (ref 8.8–10.2)
CHLORIDE SERPL-SCNC: 104 MMOL/L (ref 98–107)
CREAT SERPL-MCNC: 1.23 MG/DL (ref 0.51–0.95)
DEPRECATED HCO3 PLAS-SCNC: 25 MMOL/L (ref 22–29)
ERYTHROCYTE [DISTWIDTH] IN BLOOD BY AUTOMATED COUNT: 12.6 % (ref 10–15)
GFR SERPL CREATININE-BSD FRML MDRD: 44 ML/MIN/1.73M2
GLUCOSE SERPL-MCNC: 146 MG/DL (ref 70–99)
HCT VFR BLD AUTO: 38.3 % (ref 35–47)
HGB BLD-MCNC: 13.2 G/DL (ref 11.7–15.7)
MCH RBC QN AUTO: 34.1 PG (ref 26.5–33)
MCHC RBC AUTO-ENTMCNC: 34.5 G/DL (ref 31.5–36.5)
MCV RBC AUTO: 99 FL (ref 78–100)
PLATELET # BLD AUTO: 202 10E3/UL (ref 150–450)
POTASSIUM SERPL-SCNC: 5.1 MMOL/L (ref 3.4–5.3)
RBC # BLD AUTO: 3.87 10E6/UL (ref 3.8–5.2)
SODIUM SERPL-SCNC: 138 MMOL/L (ref 136–145)
WBC # BLD AUTO: 5.8 10E3/UL (ref 4–11)

## 2022-08-08 PROCEDURE — 85027 COMPLETE CBC AUTOMATED: CPT | Performed by: NURSE PRACTITIONER

## 2022-08-08 PROCEDURE — 36415 COLL VENOUS BLD VENIPUNCTURE: CPT | Performed by: NURSE PRACTITIONER

## 2022-08-08 PROCEDURE — 80048 BASIC METABOLIC PNL TOTAL CA: CPT | Performed by: NURSE PRACTITIONER

## 2022-08-08 PROCEDURE — 99495 TRANSJ CARE MGMT MOD F2F 14D: CPT | Performed by: NURSE PRACTITIONER

## 2022-08-08 ASSESSMENT — PAIN SCALES - GENERAL: PAINLEVEL: EXTREME PAIN (9)

## 2022-08-08 NOTE — LETTER
August 11, 2022      Laney Hahn  [address]   [place] [address]        Dear ,    We are writing to inform you of your test results.    Your blood counts are stable, no anemia.    Your kidney function labs have returned to baseline.  Electrolytes are normal.  These are all reassuring findings.    Resulted Orders   CBC with platelets   Result Value Ref Range    WBC Count 5.8 4.0 - 11.0 10e3/uL    RBC Count 3.87 3.80 - 5.20 10e6/uL    Hemoglobin 13.2 11.7 - 15.7 g/dL    Hematocrit 38.3 35.0 - 47.0 %    MCV 99 78 - 100 fL    MCH 34.1 (H) 26.5 - 33.0 pg    MCHC 34.5 31.5 - 36.5 g/dL    RDW 12.6 10.0 - 15.0 %    Platelet Count 202 150 - 450 10e3/uL   Basic metabolic panel   Result Value Ref Range    Creatinine 1.23 (H) 0.51 - 0.95 mg/dL    Sodium 138 136 - 145 mmol/L    Potassium 5.1 3.4 - 5.3 mmol/L    Urea Nitrogen 28.6 (H) 8.0 - 23.0 mg/dL    Chloride 104 98 - 107 mmol/L    Carbon Dioxide (CO2) 25 22 - 29 mmol/L    Anion Gap 9 7 - 15 mmol/L    Glucose 146 (H) 70 - 99 mg/dL    GFR Estimate 44 (L) >60 mL/min/1.73m2      Comment:      Effective December 21, 2021 eGFRcr in adults is calculated using the 2021 CKD-EPI creatinine equation which includes age and gender ( et al., NEJ, DOI: 10.1056/ZWSGuj1013440)    Calcium 9.4 8.8 - 10.2 mg/dL       If you have any questions or concerns, please call the clinic at the number listed above.       Sincerely,      Rose Mcelroy NP

## 2022-08-08 NOTE — PATIENT INSTRUCTIONS
- Reminder to schedule an appointment with cardiology for your heart  - We ordered a DEXA test which is to screen you for bone thinning. Mrs. Dudley should get a call to help with scheduling

## 2022-08-16 PROBLEM — R19.7 DIARRHEA: Status: RESOLVED | Noted: 2022-07-22 | Resolved: 2022-08-16

## 2022-08-16 PROBLEM — K52.9 COLITIS: Status: RESOLVED | Noted: 2022-07-23 | Resolved: 2022-08-16

## 2022-08-16 NOTE — ASSESSMENT & PLAN NOTE
Repeat BMP today. She has baseline CKD stage 3. Encouraged good hydration, reinforced this with her brother who helps her and lives with her

## 2022-09-11 ENCOUNTER — APPOINTMENT (OUTPATIENT)
Dept: CT IMAGING | Facility: HOSPITAL | Age: 82
End: 2022-09-11
Attending: EMERGENCY MEDICINE
Payer: COMMERCIAL

## 2022-09-11 ENCOUNTER — HOSPITAL ENCOUNTER (OUTPATIENT)
Facility: HOSPITAL | Age: 82
Setting detail: OBSERVATION
Discharge: NURSING, REHAB OR LONG-TERM CARE | End: 2022-09-15
Attending: EMERGENCY MEDICINE | Admitting: INTERNAL MEDICINE
Payer: COMMERCIAL

## 2022-09-11 DIAGNOSIS — N30.01 ACUTE CYSTITIS WITH HEMATURIA: Primary | ICD-10-CM

## 2022-09-11 DIAGNOSIS — I50.31 ACUTE DIASTOLIC CONGESTIVE HEART FAILURE (H): ICD-10-CM

## 2022-09-11 DIAGNOSIS — R93.1 REGIONAL WALL MOTION ABNORMALITY OF HEART: ICD-10-CM

## 2022-09-11 DIAGNOSIS — I47.10 SVT (SUPRAVENTRICULAR TACHYCARDIA) (H): ICD-10-CM

## 2022-09-11 DIAGNOSIS — I35.0 AORTIC VALVE STENOSIS, ETIOLOGY OF CARDIAC VALVE DISEASE UNSPECIFIED: ICD-10-CM

## 2022-09-11 DIAGNOSIS — R31.9 HEMATURIA, UNSPECIFIED TYPE: ICD-10-CM

## 2022-09-11 DIAGNOSIS — R55 SYNCOPE, UNSPECIFIED SYNCOPE TYPE: ICD-10-CM

## 2022-09-11 LAB
ALBUMIN SERPL-MCNC: 3.8 G/DL (ref 3.5–5)
ALP SERPL-CCNC: 142 U/L (ref 45–120)
ALT SERPL W P-5'-P-CCNC: 31 U/L (ref 0–45)
ANION GAP SERPL CALCULATED.3IONS-SCNC: 14 MMOL/L (ref 5–18)
AST SERPL W P-5'-P-CCNC: 46 U/L (ref 0–40)
BASOPHILS # BLD AUTO: 0 10E3/UL (ref 0–0.2)
BASOPHILS NFR BLD AUTO: 0 %
BILIRUB SERPL-MCNC: 1 MG/DL (ref 0–1)
BUN SERPL-MCNC: 30 MG/DL (ref 8–28)
CALCIUM SERPL-MCNC: 9.3 MG/DL (ref 8.5–10.5)
CHLORIDE BLD-SCNC: 106 MMOL/L (ref 98–107)
CO2 SERPL-SCNC: 22 MMOL/L (ref 22–31)
CREAT SERPL-MCNC: 1.47 MG/DL (ref 0.6–1.1)
EOSINOPHIL # BLD AUTO: 0.1 10E3/UL (ref 0–0.7)
EOSINOPHIL NFR BLD AUTO: 1 %
ERYTHROCYTE [DISTWIDTH] IN BLOOD BY AUTOMATED COUNT: 12.9 % (ref 10–15)
GFR SERPL CREATININE-BSD FRML MDRD: 35 ML/MIN/1.73M2
GLUCOSE BLD-MCNC: 122 MG/DL (ref 70–125)
HCT VFR BLD AUTO: 42.6 % (ref 35–47)
HGB BLD-MCNC: 14.6 G/DL (ref 11.7–15.7)
IMM GRANULOCYTES # BLD: 0 10E3/UL
IMM GRANULOCYTES NFR BLD: 0 %
LYMPHOCYTES # BLD AUTO: 1 10E3/UL (ref 0.8–5.3)
LYMPHOCYTES NFR BLD AUTO: 12 %
MCH RBC QN AUTO: 33.9 PG (ref 26.5–33)
MCHC RBC AUTO-ENTMCNC: 34.3 G/DL (ref 31.5–36.5)
MCV RBC AUTO: 99 FL (ref 78–100)
MONOCYTES # BLD AUTO: 0.7 10E3/UL (ref 0–1.3)
MONOCYTES NFR BLD AUTO: 9 %
NEUTROPHILS # BLD AUTO: 6.6 10E3/UL (ref 1.6–8.3)
NEUTROPHILS NFR BLD AUTO: 78 %
NRBC # BLD AUTO: 0 10E3/UL
NRBC BLD AUTO-RTO: 0 /100
PLATELET # BLD AUTO: 168 10E3/UL (ref 150–450)
POTASSIUM BLD-SCNC: 4.7 MMOL/L (ref 3.5–5)
PROT SERPL-MCNC: 7 G/DL (ref 6–8)
RBC # BLD AUTO: 4.31 10E6/UL (ref 3.8–5.2)
SARS-COV-2 RNA RESP QL NAA+PROBE: NEGATIVE
SODIUM SERPL-SCNC: 142 MMOL/L (ref 136–145)
TROPONIN I SERPL-MCNC: 0.02 NG/ML (ref 0–0.29)
WBC # BLD AUTO: 8.5 10E3/UL (ref 4–11)

## 2022-09-11 PROCEDURE — C9803 HOPD COVID-19 SPEC COLLECT: HCPCS

## 2022-09-11 PROCEDURE — 99220 PR INITIAL OBSERVATION CARE,LEVEL III: CPT | Performed by: INTERNAL MEDICINE

## 2022-09-11 PROCEDURE — 250N000011 HC RX IP 250 OP 636: Performed by: EMERGENCY MEDICINE

## 2022-09-11 PROCEDURE — G0378 HOSPITAL OBSERVATION PER HR: HCPCS

## 2022-09-11 PROCEDURE — U0003 INFECTIOUS AGENT DETECTION BY NUCLEIC ACID (DNA OR RNA); SEVERE ACUTE RESPIRATORY SYNDROME CORONAVIRUS 2 (SARS-COV-2) (CORONAVIRUS DISEASE [COVID-19]), AMPLIFIED PROBE TECHNIQUE, MAKING USE OF HIGH THROUGHPUT TECHNOLOGIES AS DESCRIBED BY CMS-2020-01-R: HCPCS | Performed by: EMERGENCY MEDICINE

## 2022-09-11 PROCEDURE — 80053 COMPREHEN METABOLIC PANEL: CPT | Performed by: EMERGENCY MEDICINE

## 2022-09-11 PROCEDURE — 258N000003 HC RX IP 258 OP 636: Performed by: EMERGENCY MEDICINE

## 2022-09-11 PROCEDURE — 85025 COMPLETE CBC W/AUTO DIFF WBC: CPT | Performed by: EMERGENCY MEDICINE

## 2022-09-11 PROCEDURE — 84484 ASSAY OF TROPONIN QUANT: CPT | Performed by: EMERGENCY MEDICINE

## 2022-09-11 PROCEDURE — 70450 CT HEAD/BRAIN W/O DYE: CPT

## 2022-09-11 PROCEDURE — 36415 COLL VENOUS BLD VENIPUNCTURE: CPT | Performed by: EMERGENCY MEDICINE

## 2022-09-11 PROCEDURE — 87088 URINE BACTERIA CULTURE: CPT | Performed by: EMERGENCY MEDICINE

## 2022-09-11 PROCEDURE — 258N000003 HC RX IP 258 OP 636: Performed by: INTERNAL MEDICINE

## 2022-09-11 PROCEDURE — 72125 CT NECK SPINE W/O DYE: CPT

## 2022-09-11 PROCEDURE — 93005 ELECTROCARDIOGRAM TRACING: CPT | Performed by: EMERGENCY MEDICINE

## 2022-09-11 PROCEDURE — 96361 HYDRATE IV INFUSION ADD-ON: CPT

## 2022-09-11 PROCEDURE — 99285 EMERGENCY DEPT VISIT HI MDM: CPT | Mod: 25

## 2022-09-11 PROCEDURE — 74178 CT ABD&PLV WO CNTR FLWD CNTR: CPT

## 2022-09-11 RX ORDER — SODIUM CHLORIDE 9 MG/ML
INJECTION, SOLUTION INTRAVENOUS CONTINUOUS
Status: DISCONTINUED | OUTPATIENT
Start: 2022-09-11 | End: 2022-09-11

## 2022-09-11 RX ORDER — AMOXICILLIN 250 MG
2 CAPSULE ORAL 2 TIMES DAILY PRN
Status: DISCONTINUED | OUTPATIENT
Start: 2022-09-11 | End: 2022-09-15 | Stop reason: HOSPADM

## 2022-09-11 RX ORDER — ATORVASTATIN CALCIUM 10 MG/1
10 TABLET, FILM COATED ORAL EVERY MORNING
Status: DISCONTINUED | OUTPATIENT
Start: 2022-09-12 | End: 2022-09-15 | Stop reason: HOSPADM

## 2022-09-11 RX ORDER — LIDOCAINE 40 MG/G
CREAM TOPICAL
Status: DISCONTINUED | OUTPATIENT
Start: 2022-09-11 | End: 2022-09-15 | Stop reason: HOSPADM

## 2022-09-11 RX ORDER — PROCHLORPERAZINE 25 MG
12.5 SUPPOSITORY, RECTAL RECTAL EVERY 12 HOURS PRN
Status: DISCONTINUED | OUTPATIENT
Start: 2022-09-11 | End: 2022-09-15 | Stop reason: HOSPADM

## 2022-09-11 RX ORDER — SODIUM CHLORIDE 9 MG/ML
INJECTION, SOLUTION INTRAVENOUS CONTINUOUS
Status: ACTIVE | OUTPATIENT
Start: 2022-09-11 | End: 2022-09-12

## 2022-09-11 RX ORDER — PROCHLORPERAZINE MALEATE 5 MG
5 TABLET ORAL EVERY 6 HOURS PRN
Status: DISCONTINUED | OUTPATIENT
Start: 2022-09-11 | End: 2022-09-15 | Stop reason: HOSPADM

## 2022-09-11 RX ORDER — IOPAMIDOL 755 MG/ML
75 INJECTION, SOLUTION INTRAVASCULAR ONCE
Status: COMPLETED | OUTPATIENT
Start: 2022-09-11 | End: 2022-09-11

## 2022-09-11 RX ORDER — AMOXICILLIN 250 MG
1 CAPSULE ORAL 2 TIMES DAILY PRN
Status: DISCONTINUED | OUTPATIENT
Start: 2022-09-11 | End: 2022-09-15 | Stop reason: HOSPADM

## 2022-09-11 RX ADMIN — IOPAMIDOL 75 ML: 755 INJECTION, SOLUTION INTRAVENOUS at 20:33

## 2022-09-11 RX ADMIN — SODIUM CHLORIDE: 9 INJECTION, SOLUTION INTRAVENOUS at 23:03

## 2022-09-11 RX ADMIN — SODIUM CHLORIDE 500 ML: 9 INJECTION, SOLUTION INTRAVENOUS at 19:53

## 2022-09-11 ASSESSMENT — ACTIVITIES OF DAILY LIVING (ADL)
ADLS_ACUITY_SCORE: 37
ADLS_ACUITY_SCORE: 35

## 2022-09-11 NOTE — ED PROVIDER NOTES
"EMERGENCY DEPARTMENT NOTE     Name: Laney Hahn    Age/Sex: 82 year old female   MRN: [id number]   Evaluation Date & Time:  9/11/2022  4:53 PM    PCP:    Rose Mcelroy   ED Provider: Luc Marshall D.O.       CHIEF COMPLAINT    Fall       DIAGNOSIS & DISPOSITION     1. Syncope, unspecified syncope type    2. Hematuria, unspecified type      DISPOSITION: Admit to cardiac telemetry observation/[facility] [place] [facility]    At the conclusion of the encounter I discussed the results of all of the tests and the disposition. The questions were answered. The patient or family acknowledged understanding and was agreeable with the care plan.    TOTAL CRITICAL CARE TIME (EXCLUDING PROCEDURES): Not applicable    PROCEDURES:   None    EMERGENCY DEPARTMENT COURSE/MEDICAL DECISION MAKING   4:58 PM I met with the patient to gather history and to perform my initial exam.  We discussed treatment options and the plan for care while in the Emergency Department.  6:40 PM I rechecked on the patient.     Laney Hahn is a 82 year old female with relevant past history of  pacemaker for symptomatic bradycardia, paroxysmal atrial fibrillation on warfarin, valvular heart disease with moderate aortic stenosis on echocardiogram 2020, stage III kidney disease, CHF with systolic dysfunction, hypertension hyperlipidemia, chronic anemia, dementia who presents to the emergency department for evaluation of a fall with possible syncope.    Triage note reviewed:   Differential  diagnosis considered included but not limited to ACS, sepsis, GI bleeding, progression of aortic stenosis  Vital signs:/63   Pulse 91   Temp 98.5  F (36.9  C) (Oral)   Resp 16   Ht 1.6 m (5' 3\")   Wt 54.4 kg (120 lb)   LMP  (LMP Unknown)   SpO2 96%   BMI 21.26 kg/m    Pertinent physical exam findings:  Cardiac: Regular rate and rhythm S1-S2 2 /6 systolic murmur  Diagnostic studies:  Imaging:  Cervical spine CT w/o contrast   Final Result   IMPRESSION:   HEAD CT:   1.  No " acute intracranial abnormality.   2.  Stable at least moderate age-related changes.      CERVICAL SPINE CT:   1.  No CT evidence for acute fracture or post traumatic subluxation.      Head CT w/o contrast   Final Result   IMPRESSION:   HEAD CT:   1.  No acute intracranial abnormality.   2.  Stable at least moderate age-related changes.      CERVICAL SPINE CT:   1.  No CT evidence for acute fracture or post traumatic subluxation.         Lab:  Labs Ordered and Resulted from Time of ED Arrival to Time of ED Departure   COMPREHENSIVE METABOLIC PANEL - Abnormal       Result Value    Sodium 142      Potassium 4.7      Chloride 106      Carbon Dioxide (CO2) 22      Anion Gap 14      Urea Nitrogen 30 (*)     Creatinine 1.47 (*)     Calcium 9.3      Glucose 122      Alkaline Phosphatase 142 (*)     AST 46 (*)     ALT 31      Protein Total 7.0      Albumin 3.8      Bilirubin Total 1.0      GFR Estimate 35 (*)    CBC WITH PLATELETS AND DIFFERENTIAL - Abnormal    WBC Count 8.5      RBC Count 4.31      Hemoglobin 14.6      Hematocrit 42.6      MCV 99      MCH 33.9 (*)     MCHC 34.3      RDW 12.9      Platelet Count 168      % Neutrophils 78      % Lymphocytes 12      % Monocytes 9      % Eosinophils 1      % Basophils 0      % Immature Granulocytes 0      NRBCs per 100 WBC 0      Absolute Neutrophils 6.6      Absolute Lymphocytes 1.0      Absolute Monocytes 0.7      Absolute Eosinophils 0.1      Absolute Basophils 0.0      Absolute Immature Granulocytes 0.0      Absolute NRBCs 0.0     TROPONIN I - Normal    Troponin I 0.02     COVID-19 VIRUS (CORONAVIRUS) BY PCR   URINE CULTURE      Interventions:None  Medical decision making: Patient is remained hemodynamically stable.  In attempting to collect urine sample patient was noted to have bloody briefs and then when she urinated.  He had gross hematuria.  Hematuria precludes urinalysis and urine has been sent for culture.  Patient was noted to stool normally and did not appear to  have melena.  Will order CT hematuria protocol for further evaluation.  Patient may have had episode of syncope.  2 years ago showed moderate aortic stenosis and cannot exclude progression or critical AS as cause of possible syncope.  Patient's hemoglobin is stable does not appear to have GI bleeding.  Afebrile with normal WBC and does not appear to be septic.  Patient will be admitted to cardiac telemetry observation and further evaluation.    ED INTERVENTIONS   Medications - No data to display    DISCHARGE MEDICATIONS        Review of your medicines      UNREVIEWED medicines. Ask your doctor about these medicines      Dose / Directions   aspirin-acetaminophen-caffeine 250-250-65 MG tablet  Commonly known as: EXCEDRIN MIGRAINE  Used for: Tension headache      Dose: 1 tablet  Take 1 tablet by mouth every 6 hours as needed for headaches  Quantity: 30 tablet  Refills: 11     atorvastatin 10 MG tablet  Commonly known as: LIPITOR  Used for: Dyslipidemia, goal LDL below 70      Dose: 10 mg  Take 1 tablet (10 mg) by mouth every morning  Quantity: 30 tablet  Refills: 11     ELIQUIS ANTICOAGULANT 2.5 MG tablet  Used for: Paroxysmal atrial fibrillation (H)  Generic drug: apixaban ANTICOAGULANT      TAKE 1 TABLET BY MOUTH TWICE DAILY  Quantity: 60 tablet  Refills: 5     furosemide 20 MG tablet  Commonly known as: LASIX  Used for: Acute diastolic congestive heart failure (H)      TAKE 1 TABLET BY MOUTH EVERY MORNING  Quantity: 30 tablet  Refills: 5     metoprolol succinate ER 50 MG 24 hr tablet  Commonly known as: Toprol XL  Used for: SVT (supraventricular tachycardia) (H)      Dose: 50 mg  Take 1 tablet (50 mg) by mouth daily  Quantity: 30 tablet  Refills: 6              INFORMATION SOURCE AND LIMITATIONS    History/Exam limitations: none  Patient information was obtained from: Patient  Use of : N/A    HISTORY OF PRESENT ILLNESS     Laney Hahn is a 82 year old female with relevant past history of   pacemaker for symptomatic bradycardia, paroxysmal atrial fibrillation on warfarin, valvular heart disease with moderate aortic stenosis on echocardiogram 2020, stage III kidney disease, CHF with systolic dysfunction, hypertension hyperlipidemia, chronic anemia, dementia who presents to the emergency department for evaluation of a fall with possible syncope.  Patient reports a fall that occurred today after walking to dinner and grabbing on to the chair kialy. Patient's brother found her on the ground and the patient believes she was out for a few seconds. Patient states she has never had episodes of syncope before. Patient use a cane every day and live in independently living in a senior apartment. She associated posterior neck pain but denies hip pain. Patient does have a pacemaker. Patient is unsure if she is on blood. Otherwise, patient denies vomiting, diarrhea, and dysuria. No other complaints at this time.     REVIEW OF SYSTEMS:   Constitutional: Negative for  fever.   HENT: Negative for URI symptoms or sore throat.  Positive for posterior neck pain.   Cardiac: Negative for  chest pain,palpitations, near syncope or syncope  Respiratory: Negative for cough and shortness of breath.    Gastrointestinal: Negative for abdominal pain, nausea, vomiting, constipation, diarrhea, rectal bleeding or melena.  Genitourinary: Negative for dysuria, flank pain and hematuria.   Musculoskeletal: Negative for back pain.   Skin: Negative for  rash  Neurological: Negative for dizziness, headache, speech difficulty, unilateral weakness or imbalance with walking. Positive for syncope  Hematological: Negative for adenopathy. Does not bruise/bleed easily.   Psychiatric/Behavioral: Negative for confusion.       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     Metabolic  encephalopathy      Moderate aortic valve stenosis 08/22/2017     Nonobstructive atherosclerosis of coronary artery 09/15/2020     Paroxysmal SVT (supraventricular tachycardia) (H) 09/07/2017     Syncope 08/22/2017     UTI (urinary tract infection) 08/21/2017     Patient Active Problem List   Diagnosis     SVT (supraventricular tachycardia) (H)     Chronic kidney disease, stage III (moderate) (H)     Vascular dementia without behavioral disturbance (H)     Paroxysmal atrial fibrillation (H)     Nonrheumatic aortic valve stenosis     Heart failure with reduced ejection fraction, NYHA class I (H)     Essential hypertension     DNAR (do not attempt resuscitation)     Cognitive and behavioral changes     Cardiac pacemaker in situ     Mixed hyperlipidemia     Adjustment disorder with anxious mood     Anemia     AV node dysfunction     Bifascicular bundle branch block     Frequent falls     Hyperkalemia     Junctional bradycardia, s/p PPM placement      Nonobstructive atherosclerosis of coronary artery     Pericardial effusion     JESUS (acute kidney injury) (H)     Syncope, unspecified syncope type     Past Surgical History:   Procedure Laterality Date     CATARACT EXTRACTION, BILATERAL       CV CORONARY ANGIOGRAM N/A 9/15/2020    Procedure: Coronary Angiogram;  Surgeon: Radhika Santa MD;  Location: [facility] Cath Lab;  Service: Cardiology     CV LEFT HEART CATHETERIZATION WITHOUT LEFT VENTRICULOGRAM Left 9/15/2020    Procedure: Left Heart Catheterization Without Left Ventriculogram;  Surgeon: Radhika Santa MD;  Location: [facility] Cath Lab;  Service: Cardiology     EP PACEMAKER INSERT N/A 4/13/2021    Procedure: EP Pacemaker Insertion;  Surgeon: Frederic Burgos MD;  Location: [facility] Cardiac Cath Lab;  Service: Cardiology     HYSTERECTOMY       PARTIAL GASTRECTOMY  1969    for ulcers     TONSILLECTOMY       Social Histrory  Smoking: None  Alcohol Use:None  Allergies   Allergen Reactions     Sulfa Drugs  "Anxiety         OUTPATIENT MEDICATIONS     New Prescriptions    No medications on file      Vitals:    09/11/22 1658 09/11/22 1708   BP: 118/63    Pulse: 91    Resp: 16    Temp:  98.5  F (36.9  C)   TempSrc:  Oral   SpO2: 96%    Weight: 54.4 kg (120 lb)    Height: 1.6 m (5' 3\")        Physical Exam   Constitutional: Oriented to person, place, and time. Appears well-developed and well-nourished.   HEENT:    Head: Atraumatic.   Neck: Normal range of motion. Neck supple. Posterior neck pain.   Cardiovascular: Normal rate, regular rhythm and normal heart sounds.    Pulmonary/Chest: Normal effort, 2/6 systolic murmur.  Abdominal: Soft. Bowel sounds are normal.   Musculoskeletal: Normal range of motion.   Neurological: Alert and oriented to person, place, and time. Normal strength.No sensory deficit. No cranial nerve deficit . Skin: Skin is warm and dry.   Psychiatric: Normal mood and affect. Behavior is normal. Thought content normal.       DIAGNOSTICS    LABORATORY FINDINGS (REVIEWED AND INTERPRETED):  Labs Ordered and Resulted from Time of ED Arrival to Time of ED Departure   COMPREHENSIVE METABOLIC PANEL - Abnormal       Result Value    Sodium 142      Potassium 4.7      Chloride 106      Carbon Dioxide (CO2) 22      Anion Gap 14      Urea Nitrogen 30 (*)     Creatinine 1.47 (*)     Calcium 9.3      Glucose 122      Alkaline Phosphatase 142 (*)     AST 46 (*)     ALT 31      Protein Total 7.0      Albumin 3.8      Bilirubin Total 1.0      GFR Estimate 35 (*)    CBC WITH PLATELETS AND DIFFERENTIAL - Abnormal    WBC Count 8.5      RBC Count 4.31      Hemoglobin 14.6      Hematocrit 42.6      MCV 99      MCH 33.9 (*)     MCHC 34.3      RDW 12.9      Platelet Count 168      % Neutrophils 78      % Lymphocytes 12      % Monocytes 9      % Eosinophils 1      % Basophils 0      % Immature Granulocytes 0      NRBCs per 100 WBC 0      Absolute Neutrophils 6.6      Absolute Lymphocytes 1.0      Absolute Monocytes 0.7      " Absolute Eosinophils 0.1      Absolute Basophils 0.0      Absolute Immature Granulocytes 0.0      Absolute NRBCs 0.0     TROPONIN I - Normal    Troponin I 0.02     COVID-19 VIRUS (CORONAVIRUS) BY PCR   URINE CULTURE         IMAGING (REVIEWED AND INTERPRETED):  Cervical spine CT w/o contrast   Final Result   IMPRESSION:   HEAD CT:   1.  No acute intracranial abnormality.   2.  Stable at least moderate age-related changes.      CERVICAL SPINE CT:   1.  No CT evidence for acute fracture or post traumatic subluxation.      Head CT w/o contrast   Final Result   IMPRESSION:   HEAD CT:   1.  No acute intracranial abnormality.   2.  Stable at least moderate age-related changes.      CERVICAL SPINE CT:   1.  No CT evidence for acute fracture or post traumatic subluxation.              ECG (REVIEWED AND INTERPRETED):   ECG:   Performed at: [facility] Emergency Department, 11-SEP-2022 17:23:32  HR:  94 bpm  Rhythm: Sinus   Axis: 94  QRS duration: 124 ms  QTC: 525 ms   ST changes: No ST segment elevation or depression,  T wave inversion II, III, aVF and V6, Q wave V2  Interpretation: Sinus rhythm. Non-specific intra-ventricular conduction delay.  Nonspecific T wave abnormality.  Compared to most recent ECG from: Compared with ECG of 22-JUL-2022 19:12, QRS duration has decreased.  No acute change  I have reviewed the patient's ECG, with comments made as listed above. Please see scanned image for full interpretation.         I, Neli Snyder, am serving as a scribe to document services personally performed by Luc Marshall D.O., based on my observation and the provider s statements to me.    I, Luc Marshall D.O., attest that Neli Snyder is acting in a scribe capacity, has observed my performance of the services and has documented them in accordance with my direction.    Luc Marshall D.O.  EMERGENCY MEDICINE   09/11/22  [facility] EMERGENCY DEPARTMENT  [facility] [facility]  [address]  [phone]  Dept: [phone]     Luc Marshall DO  09/11/22 1923

## 2022-09-11 NOTE — ED NOTES
Ambulated patient to bathroom, denies weakness/dizziness.     Moderate amount of blood noted in patients brief, patient unsure if she has had any bloody output prior to this.   Urine sample obtained that was bloody as well.     Provider notified.

## 2022-09-11 NOTE — ED TRIAGE NOTES
Patient arrives via [facility] from Independent living at .     Patients brother called EMS after patient fell while walking to dinner tonight.     Story fluctuating between patient and her brother as to whether patient was weak and sat down on floor or fainted and fell to floor.     Denies hitting head. Denies blood thinners.     EMS state patient and her brother are very well known to them and that patient has had significant decline in physical and mental health over the past few months.   Brother is primary caregiver to patient.

## 2022-09-11 NOTE — ED NOTES
Bed: JNEDH-01  Expected date: 9/11/22  Expected time:   Means of arrival: Ambulance  Comments:  CORINNA  Fall 82 F

## 2022-09-12 ENCOUNTER — APPOINTMENT (OUTPATIENT)
Dept: OCCUPATIONAL THERAPY | Facility: HOSPITAL | Age: 82
End: 2022-09-12
Attending: INTERNAL MEDICINE
Payer: COMMERCIAL

## 2022-09-12 ENCOUNTER — APPOINTMENT (OUTPATIENT)
Dept: CARDIOLOGY | Facility: HOSPITAL | Age: 82
End: 2022-09-12
Attending: INTERNAL MEDICINE
Payer: COMMERCIAL

## 2022-09-12 ENCOUNTER — APPOINTMENT (OUTPATIENT)
Dept: PHYSICAL THERAPY | Facility: HOSPITAL | Age: 82
End: 2022-09-12
Attending: INTERNAL MEDICINE
Payer: COMMERCIAL

## 2022-09-12 LAB — LVEF ECHO: NORMAL

## 2022-09-12 PROCEDURE — 96374 THER/PROPH/DIAG INJ IV PUSH: CPT

## 2022-09-12 PROCEDURE — 97166 OT EVAL MOD COMPLEX 45 MIN: CPT | Mod: GO

## 2022-09-12 PROCEDURE — 258N000003 HC RX IP 258 OP 636: Performed by: INTERNAL MEDICINE

## 2022-09-12 PROCEDURE — 93306 TTE W/DOPPLER COMPLETE: CPT

## 2022-09-12 PROCEDURE — G0378 HOSPITAL OBSERVATION PER HR: HCPCS

## 2022-09-12 PROCEDURE — 250N000013 HC RX MED GY IP 250 OP 250 PS 637: Performed by: INTERNAL MEDICINE

## 2022-09-12 PROCEDURE — 93306 TTE W/DOPPLER COMPLETE: CPT | Mod: 26 | Performed by: INTERNAL MEDICINE

## 2022-09-12 PROCEDURE — 97161 PT EVAL LOW COMPLEX 20 MIN: CPT | Mod: GP

## 2022-09-12 PROCEDURE — 82962 GLUCOSE BLOOD TEST: CPT

## 2022-09-12 PROCEDURE — 99226 PR SUBSEQUENT OBSERVATION CARE,LEVEL III: CPT | Performed by: INTERNAL MEDICINE

## 2022-09-12 PROCEDURE — 97535 SELF CARE MNGMENT TRAINING: CPT | Mod: GO

## 2022-09-12 PROCEDURE — 99214 OFFICE O/P EST MOD 30 MIN: CPT | Performed by: INTERNAL MEDICINE

## 2022-09-12 PROCEDURE — 97116 GAIT TRAINING THERAPY: CPT | Mod: GP

## 2022-09-12 PROCEDURE — 250N000011 HC RX IP 250 OP 636: Performed by: INTERNAL MEDICINE

## 2022-09-12 PROCEDURE — 96361 HYDRATE IV INFUSION ADD-ON: CPT

## 2022-09-12 RX ORDER — SULFAMETHOXAZOLE AND TRIMETHOPRIM 400; 80 MG/1; MG/1
1 TABLET ORAL 2 TIMES DAILY
Status: DISCONTINUED | OUTPATIENT
Start: 2022-09-12 | End: 2022-09-12

## 2022-09-12 RX ORDER — CEFTRIAXONE 1 G/1
1 INJECTION, POWDER, FOR SOLUTION INTRAMUSCULAR; INTRAVENOUS EVERY 24 HOURS
Status: DISCONTINUED | OUTPATIENT
Start: 2022-09-12 | End: 2022-09-14

## 2022-09-12 RX ORDER — SULFAMETHOXAZOLE/TRIMETHOPRIM 800-160 MG
1 TABLET ORAL 2 TIMES DAILY
Qty: 6 TABLET | Refills: 0 | Status: SHIPPED | OUTPATIENT
Start: 2022-09-12 | End: 2022-09-12

## 2022-09-12 RX ORDER — FUROSEMIDE 20 MG
10 TABLET ORAL DAILY
Qty: 15 TABLET | Refills: 0 | Status: SHIPPED | OUTPATIENT
Start: 2022-09-12 | End: 2022-09-29

## 2022-09-12 RX ADMIN — SODIUM CHLORIDE: 9 INJECTION, SOLUTION INTRAVENOUS at 06:19

## 2022-09-12 RX ADMIN — ATORVASTATIN CALCIUM 10 MG: 10 TABLET, FILM COATED ORAL at 10:34

## 2022-09-12 RX ADMIN — APIXABAN 2.5 MG: 2.5 TABLET, FILM COATED ORAL at 21:38

## 2022-09-12 RX ADMIN — APIXABAN 2.5 MG: 2.5 TABLET, FILM COATED ORAL at 00:31

## 2022-09-12 RX ADMIN — APIXABAN 2.5 MG: 2.5 TABLET, FILM COATED ORAL at 10:35

## 2022-09-12 RX ADMIN — CEFTRIAXONE SODIUM 1 G: 1 INJECTION, POWDER, FOR SOLUTION INTRAMUSCULAR; INTRAVENOUS at 21:39

## 2022-09-12 ASSESSMENT — ACTIVITIES OF DAILY LIVING (ADL)
ADLS_ACUITY_SCORE: 37
DEPENDENT_IADLS:: CLEANING;COOKING;LAUNDRY;SHOPPING;MEAL PREPARATION;MEDICATION MANAGEMENT;MONEY MANAGEMENT;TRANSPORTATION
ADLS_ACUITY_SCORE: 37
ADLS_ACUITY_SCORE: 33
ADLS_ACUITY_SCORE: 33
ADLS_ACUITY_SCORE: 37
ADLS_ACUITY_SCORE: 33
ADLS_ACUITY_SCORE: 37

## 2022-09-12 NOTE — PHARMACY-ADMISSION MEDICATION HISTORY
Pharmacy Note - Admission Medication History    Pertinent Provider Information:   -Attempted to call [facility] after hours number ([phone]) x4, no answer. Per pt and family member, staff RN administers medications to patient, and they are unsure which medications pt takes.   -Pt does believe she received medications today.   -Medication list does match fill history. Unable to verify if patient is taking an OTC meds.  ______________________________________________________________________    Prior To Admission (PTA) med list completed and updated in EMR.       PTA Med List   Medication Sig Last Dose     aspirin-acetaminophen-caffeine (EXCEDRIN MIGRAINE) 250-250-65 MG tablet Take 1 tablet by mouth every 6 hours as needed for headaches Unknown     atorvastatin (LIPITOR) 10 MG tablet Take 1 tablet (10 mg) by mouth every morning 9/11/2022     ELIQUIS ANTICOAGULANT 2.5 MG tablet TAKE 1 TABLET BY MOUTH TWICE DAILY 9/11/2022     furosemide (LASIX) 20 MG tablet TAKE 1 TABLET BY MOUTH EVERY MORNING 9/11/2022     metoprolol succinate ER (TOPROL XL) 50 MG 24 hr tablet Take 1 tablet (50 mg) by mouth daily 9/11/2022       Information source(s): Patient, Family member and [facility]  Method of interview communication: in-person    Summary of Changes to PTA Med List  New: -  Discontinued: -  Changed: -    In the past week, patient estimated taking medication this percent of the time:  greater than 90%.    Allergies were reviewed, assessed, and updated with the patient.      Patient does not use any multi-dose medications prior to admission.    The information provided in this note is only as accurate as the sources available at the time of the update(s).    Thank you for the opportunity to participate in the care of this patient.    Rose Velasco, PharmD, BCPS 09/11/22 7:10 PM

## 2022-09-12 NOTE — PROGRESS NOTES
09/12/22 1040   Quick Adds   Type of Visit Initial PT Evaluation   Living Environment   People in Home sibling(s)  (brother)   Current Living Arrangements independent living facility   Home Accessibility no concerns   Transportation Anticipated health plan transportation;family or friend will provide   Self-Care   Usual Activity Tolerance good   Current Activity Tolerance good   Equipment Currently Used at Home cane, straight  (pt has FWW -not using one)   Fall history within last six months yes   Activity/Exercise/Self-Care Comment ambulates assisted by brother and SEC,meals provided   General Information   Onset of Illness/Injury or Date of Surgery 09/11/22   Referring Physician Yaz Poon   Patient/Family Therapy Goals Statement (PT) go home   Pertinent History of Current Problem (include personal factors and/or comorbidities that impact the POC) a -fib,fall   Existing Precautions/Restrictions fall;cardiac   Weight-Bearing Status - LLE full weight-bearing   Weight-Bearing Status - RLE full weight-bearing   General Observations cooperative ,reluctant to use FWW at home   Cognition   Affect/Mental Status (Cognition) WFL   Orientation Status (Cognition) person;place;situation;time   Cognitive Status Comments unreliable historian   Pain Assessment   Patient Currently in Pain No   Range of Motion (ROM)   Range of Motion ROM is WFL   Strength (Manual Muscle Testing)   Strength (Manual Muscle Testing) strength is WFL   Bed Mobility   Supine-Sit [place] (Bed Mobility) independent   Sit-Supine [place] (Bed Mobility) independent   Sit-Stand Transfer   Sit-Stand [place] (Transfers) supervision   Assistive Device (Sit-Stand Transfers) cane, straight;walker, front-wheeled   Stand-Sit Transfer   Stand-Sit [place] (Transfers) supervision   Assistive Device (Stand-Sit Transfers) cane, straight;walker, front-wheeled   Gait/Stairs (Locomotion)   [place] Level (Gait) minimum assist (75% patient  effort)   Assistive Device (Gait) cane, straight   Distance in Feet (Required for LE Total Joints) 30 feet   Pattern (Gait) swing-through   Deviations/Abnormal Patterns (Gait) base of support, wide   Maintains Weight-bearing Status (Gait) able to maintain   Comment, (Gait/Stairs) unstaedy,not using SEC for balance much   Clinical Impression   Criteria for Skilled Therapeutic Intervention Yes, treatment indicated   Clinical Presentation (PT Evaluation Complexity) Stable/Uncomplicated   Clinical Presentation Rationale pt presents as med diagnosed   Clinical Decision Making (Complexity) low complexity   Planned Therapy Interventions (PT) gait training   Anticipated Equipment Needs at Discharge (PT) walker, rolling   Risk & Benefits of therapy have been explained evaluation/treatment results reviewed;patient   Clinical Impression Comments Patient is a 83 yo female admitted from [facility] after fall and a-fib.patient presents independent mobility.Ambulate at home with assist from brother and SEC.patient appears at her baseline mobility .Insafe with use of SEC only  and FWW .Recommend assist from brother for amb at home.NO skilled PT is indicated at this time   PT Discharge Planning   PT Discharge Recommendation (DC Rec) home with assist   PT Rationale for DC Rec pt presents independent mobility ,gets assist form brother with amb.recommend 24 hrs supervision   PT Brief overview of current status Patient has a hx of pacemaker and dementia   Total Evaluation Time   Total Evaluation Time (Minutes) 15   Physical Therapy Goals   PT Frequency One time eval and treatment only   PT Predicted Duration/Target Date for Goal Attainment 09/12/22   PT Goals Bed Mobility;Transfers;Gait   PT: Bed Mobility Independent;Supine to/from sit;Goal Met   PT: Transfers Independent;Sit to/from stand;Goal Met   PT: Gait Minimal assist;150 feet;Goal Met

## 2022-09-12 NOTE — ED NOTES
Pt a/o x3, not to date. Pt is cooperative and very pleasant. Pt was able to walk to the restroom with a FWW with standby assist, steady gait. Denies dizziness/lightheaded. VSS.    Quality 130: Documentation Of Current Medications In The Medical Record: Current Medications Documented Quality 226: Preventive Care And Screening: Tobacco Use: Screening And Cessation Intervention: Patient screened for tobacco use and is an ex/non-smoker Detail Level: Detailed

## 2022-09-12 NOTE — PROGRESS NOTES
[facility]      OUTPATIENT PHYSICAL THERAPY EVALUATION  PLAN OF TREATMENT FOR OUTPATIENT REHABILITATION  (COMPLETE FOR INITIAL CLAIMS ONLY)  Patient's Last Name, First Name, M.I.  YOB: 1940  Laney Hahn                        Provider's Name  [facility] Medical Record No.  [id number]                               Onset Date:  09/11/22   Start of Care Date:         Type:     _X_PT   ___OT   ___SLP Medical Diagnosis:                           PT Diagnosis:      Visits from SOC:  1   _________________________________________________________________________________  Plan of Treatment/Functional Goals    Planned Interventions: gait training     Goals: See Physical Therapy Goals on Care Plan in [facility] electronic health record.    Therapy Frequency: One time eval and treatment only  Predicted Duration of Therapy Intervention: 09/12/22  _________________________________________________________________________________    I CERTIFY THE NEED FOR THESE SERVICES FURNISHED UNDER        THIS PLAN OF TREATMENT AND WHILE UNDER MY CARE     (Physician co-signature of this document indicates review and certification of the therapy plan).              Certification date from: (P) 09/12/22, Certification date to: (P) 09/19/22    Referring Physician: Yaz Poon            Initial Assessment        See Physical Therapy evaluation dated   in Epic electronic health record.

## 2022-09-12 NOTE — CONSULTS
Care Management Initial Consult    General Information  Assessment completed with: Family, Friend, VM-chart review, Care Team Member, cousin: Rosana, friend: RAHEEL Quiroz at [facility]: Bibiana  Type of CM/SW Visit: Initial Assessment    Primary Care Provider verified and updated as needed: Yes   Readmission within the last 30 days: no previous admission in last 30 days   Return Category: Exacerbation of disease  Reason for Consult: discharge planning, facility placement  Advance Care Planning: Advance Care Planning Reviewed: present on chart          Communication Assessment  Patient's communication style: spoken language (English or Bilingual)             Cognitive  Cognitive/Neuro/Behavioral:                        Living Environment:   People in home: sibling(s) (brother Adam)     Current living Arrangements: assisted living  Name of Facility: [facility]   Able to return to prior arrangements: no  Living Arrangement Comments: lives w/brother at [facility] and needs 24hr cares and reminders    Family/Social Support:  Care provided by: self, other (see comments) (independent w/her own personal cares,, meals and medications provided by facility)  Provides care for: no one  Marital Status: Single  Sibling(s), Other (specify) (relative and friends)          Description of Support System: Supportive, Involved    Support Assessment: Adequate family and caregiver support, Adequate social supports, Lacks necessary supervision and assistance, Lacks adequate physical care    Current Resources:   Patient receiving home care services: No     Community Resources: County Programs, County Worker, [facility] [place]  Equipment currently used at home: cane, straight  Supplies currently used at home: None    Employment/Financial:  Employment Status:          Financial Concerns: No concerns identified   Referral to Financial Worker: No       Lifestyle & Psychosocial Needs:  Social Determinants of Health     Tobacco Use: Low Risk       Smoking Tobacco Use: Never Smoker     Smokeless Tobacco Use: Never Used   Alcohol Use: Not on file   Financial Resource Strain: Not on file   Food Insecurity: Not on file   Transportation Needs: Not on file   Physical Activity: Not on file   Stress: Not on file   Social Connections: Not on file   Intimate Partner Violence: Not on file   Depression: Not at risk     PHQ-2 Score: 0   Housing Stability: Not on file       Functional Status:  Prior to admission patient needed assistance:   Dependent ADLs:: Ambulation-cane  Dependent IADLs:: Cleaning, Cooking, Laundry, Shopping, Meal Preparation, Medication Management, Money Management, Transportation  Assesssment of Functional Status: Not at baseline with ADL Functioning, Not at baseline with mobility, Not at  functional baseline, Needs placement in a SNF/TCF for rehabilitation    Mental Health Status:  Mental Health Status: No Current Concerns       Chemical Dependency Status:                Values/Beliefs:  Spiritual, Cultural Beliefs, Islam Practices, Values that affect care:                 Additional Information:    Spoke to cousin Rosana and friend Gabriella, they can help w/discharge planning and discuss TCU placement, they are in agreement that pt was independent at baseline w/personal cares and brother Adam helps remind her of meal times, Staff at [facility] checks in on her twice a day and Rosana can transport at discharge. Discussed [facility]AND w/Gabriella.      Spoke to Nurse Bibiana [phone] at [facility], pt is normally active and independent in her own personal cares and requires frequent reminders and tend to pocket her meds and is not able to carry out instructions/recommendations without reminders; she won't remember to do so. At her current level of care at [facility], Bibiana also agrees with TCU placement.     Pt lives w/brother Adam who is developmentally delayed and is not able to help make  decisions. Their friend Gabriella [phone] has been helping them with navigating system for needs and is aware of their situation, she is helpful to them when needed, more so than their cousin Rosana, who provided this information to . Rosana [phone] is available to transport and has a backup  if needed as well.  She has a sick spouse and will be available but would need advance notice for transportation.     Babar Martino RN

## 2022-09-12 NOTE — PROGRESS NOTES
"   09/12/22 0840   Quick Adds   Quick Adds Certification   Type of Visit Initial Occupational Therapy Evaluation   Living Environment   People in Home sibling(s)  (brother)   Current Living Arrangements independent living facility   Self-Care   Equipment Currently Used at Home cane, straight;shower chair   Fall history within last six months yes   Activity/Exercise/Self-Care Comment ind for ADLs   Instrumental Activities of Daily Living (IADL)   IADL Comments assist from brother for meds and for walking to cafeteria for meals - reports she walks with her cane in one hand and holds onto her brother with the other, doesn't drive; pt states she is typically always with her brother and \"rarely alone\"   General Information   Onset of Illness/Injury or Date of Surgery 09/11/22   Referring Physician Laurie Rob MD   Patient/Family Therapy Goal Statement (OT) go home   Additional Occupational Profile Info/Pertinent History of Current Problem admitted following syncope; PMH of vascular dementia   Existing Precautions/Restrictions fall   Limitations/Impairments safety/cognitive   Cognitive Status Examination   Orientation Status person;place  (not oriented to month, date, or year)   Follows Commands WFL   Safety Deficit safety precautions awareness   Memory Deficit severe deficit;short-term memory;minimal deficit;immediate recall   Cognitive Screens/Assessments   Cognitive Assessments Completed [facility] Mental Status Exam (UMS):  Total Score out of /30 (S)  11/30   SLUMS Norms 1-20 equals dementia   SLUMS Domains assessed: orientation, memory, attention, executive functions   SLUMS Interpretation had significant difficulty with short-term memory, orientation, and attention. Recommending pt have 24/7 supervision d/t cognitive deficits to ensure safety within the home and activities and have total assist for med management, finances, and transportation.   Pain Assessment   Patient Currently in " Pain No   Range of Motion Comprehensive   General Range of Motion no range of motion deficits identified   Strength Comprehensive (MMT)   Comment, General Manual Muscle Testing (MMT) Assessment BUE WFL   Bed Mobility   Bed Mobility No deficits identified   Transfers   Transfers sit-stand transfer   Sit-Stand Transfer   Sit-Stand [place] (Transfers) verbal cues   Assistive Device (Sit-Stand Transfers) walker, front-wheeled   Sit/Stand Transfer Comments CGA   Balance   Balance Comments CGA with FWW for mobility with mod A to navigate obstacles for safety   Activities of Daily Living   BADL Assessment/Intervention lower body dressing;toileting;grooming   Lower Body Dressing Assessment/Training   Position (Lower Body Dressing) unsupported sitting   [place]ok Level (Lower Body Dressing) contact guard assist   Grooming Assessment/Training   Position (Grooming) sink side   [place] Level (Grooming) contact guard assist   Toileting   Position (Toileting) unsupported sitting   [place] Level (Toileting) contact guard assist   Clinical Impression   Criteria for Skilled Therapeutic Interventions Met (OT) Yes, treatment indicated   OT Diagnosis dec ADL indep   OT Problem List-Impairments impacting ADL problems related to;cognition;mobility   Assessment of Occupational Performance 3-5 Performance Deficits   Identified Performance Deficits dressing, toileting, grooming, functional transfers   Planned Therapy Interventions (OT) ADL retraining;cognition;transfer training;home program guidelines;progressive activity/exercise   Clinical Decision Making Complexity (OT) moderate complexity   Risk & Benefits of therapy have been explained evaluation/treatment results reviewed;participants included;patient   OT Discharge Planning   OT Discharge Recommendation (DC Rec) home with home care occupational therapy;Transitional Care Facility   OT Rationale for DC Rec pt currently requires 24/7 cognitive supervision and light  physical assist with transfers, mobility, and ADLs d/t limited safety awareness. pt reports she is nearly always with her brother; if he's able to provide this level of assist, pt can safety d/c home. if assist is unavailable at home, TCU or a setting with increased support is recommended (LTC)   Therapy Certification   Start of Care Date 09/12/22   Certification date from 09/12/22   Certification date to 09/19/22   Medical Diagnosis syncope   OT Goals   Therapy Frequency (OT) Daily   OT Predicted Duration/Target Date for Goal Attainment 09/19/22   OT Goals Lower Body Dressing;Hygiene/Grooming;Toilet Transfer/Toileting;Cognition   OT: Hygiene/Grooming modified independent;while standing   OT: Lower Body Dressing Modified independent;including set-up/clothing retrieval   OT: Toilet Transfer/Toileting Modified independent;toilet transfer;cleaning and garment management   OT: Cognitive Patient/caregiver will verbalize understanding of cognitive assessment results/recommendations as needed for safe discharge planning

## 2022-09-12 NOTE — H&P
[facility]    History and Physical - Hospitalist Service       Date of Admission:  9/11/2022    Assessment & Plan      Laney Hahn is 82 year old female with history of SVT, stage III CKD, vascular dementia, paroxysmal atrial fibrillation on apixaban, aortic valve stenosis,  HFrEF, hypertension, hyperlipidemia, AV node dysfunction status post pacemaker placement and CAD who presents to the ER due to episode of syncope preceded by dizziness.    Syncope  Dizziness  Possibly secondary to volume depletion in the setting of diuretic use and poor oral intake versus arrhythmia given history of SVT and paroxysmal atrial fibrillation versus moderate aortic valve stenosis and HFrEF versus medication induced hypotension.   Echocardiogram 5/10/2022 revealed mildly reduced LVEF of 40 to 45%, hypokinesis of the basal to mid inferolateral wall and mild to moderate valvular aortic stenosis.  Consider limited echocardiogram  Follow cardiology consult    She received IV fluid in the ER  Hold PTA furosemide and metoprolol for now  Continue gentle IV hydration  Monitor for fluid overload  Check orthostatic blood pressure  Telemetry  Consider limited echocardiogram  Follow-up cardiology consult    SVT  Paroxysmal atrial fibrillation  AV node dysfunction status post pacemaker placement  Heart rate is controlled  Hold PTA metoprolol succinate for now  Continue PTA apixaban  Telemetry      Aortic valve stenosis  HFrEF  Echocardiogram 5/10/2022 revealed mildly reduced LVEF of 40 to 45%, hypokinesis of the basal to mid inferolateral wall and mild to moderate valvular aortic stenosis.  Hold PTA furosemide and metoprolol succinate for now   Continue gentle IV hydration  Monitor BMP  Follow cardiology consult      Hematuria  Abdominal CT showed small benign left renal cysts are present, as well as a single tiny 2 mm nonobstructing stone, circumferential inflammatory wall thickening involving splenic flexure and  left hemicolon consistent with colitis, infectious or inflammatory etiologies most likely and moderate dilatation of extrahepatic bile duct without suggestion of obstructing lesion.  Unable to perform urinalysis due to gross hematuria.  Hemoglobin is stable    Follow-up urine culture  Consider urology evaluation if hematuria persist  Consider starting antibiotics if there is evidence of infection on urine culture.  Monitor hemoglobin     Suspected JESUS/Stage III  Creatinine is elevated at 1.47 compared to 0.91 on 7/25/2022.  Gentle IV hydration as above  Monitor BMP  Avoid nephrotoxins      Fall  Vascular dementia  Brain CT showed no acute intracranial abnormality. Cervical spine CT showed no evidence for acute fracture or post traumatic subluxation.  PT/OT    Hypertension  Hold PTA metoprolol succinate for now and restart if blood pressure is elevated; consider resuming at a lower dose tomorrow    Hyperlipidemia  CAD  Continue PTA atorvastatin  Consider resuming PTA metoprolol succinate tomorrow at a lower dose         Diet: 2 Gram Sodium Diet  DVT Prophylaxis: DOAC  Tenorio Catheter: Not present  Central Lines: None  Cardiac Monitoring: None  Code Status: Full Code    Clinically Significant Risk Factors Present on Admission               # Coagulation Defect: home medication list includes an anticoagulant medication            Disposition Plan      Expected Discharge Date: 09/13/2022, 12:00 PM              The patient's care was discussed with the Patient.    Laurie Rob MD  Hospitalist Service  [facility]  Securely message with the [employer] Web Console (learn more here)  Text page via [employer] Paging/Directory         ______________________________________________________________________    Chief Complaint   Syncope    History is obtained from the patient    History of Present Illness   Laney Hahn is 82 year old female with history of SVT, stage III CKD, vascular dementia,  paroxysmal atrial fibrillation on apixaban, aortic valve stenosis,  HFrEF, hypertension, hyperlipidemia, AV node dysfunction status post pacemaker placement and CAD who presents to the ER due to episode of syncope    She was in her usual state of health until this afternoon when she suddenly developed dizziness and fell at the assisted living facility while going to get her lunch with her brother.  Patient passed out for a brief period.  She states she hit her head on a soft chair when she fell.  She denies palpitation, diaphoresis, shortness of breath or chest pain.  She denies cough, upper respiratory tract symptoms, fever, chills, nausea or vomiting. She ambulates with a cane at baseline.    She had posterior neck tenderness upon arrival in the ER.  She went to the bathroom in the ER avoided bloody urine for which ED attending CT hematuria protocol with and without contrast.     Initial blood pressure was 118/62, pulse 91, respiratory rate 16, temperature 98.5  F and oxygen saturation of 96% on room air.  Notable laboratory findings include creatinine 1.47, , AST 46.  CBC was unremarkable. Urine culture is pending. Brain CT showed no acute intracranial abnormality.  Cervical spine CT showed no evidence for acute fracture or post traumatic subluxation. Abdominal CT showed small benign left renal cysts are present, as well as a single tiny 2 mm nonobstructing stone, circumferential inflammatory wall thickening involving splenic flexure and left hemicolon consistent with colitis, infectious or inflammatory etiologies most likely and moderate dilatation of extrahepatic bile duct without suggestion of obstructing lesion.    She received normal saline 500 mL bolus x1 followed by 125 mL/h.  She wants to be full code.      Review of Systems    The 10 point Review of Systems is negative other than noted in the HPI     Past Medical History    I have reviewed this patient's medical history and updated it with  pertinent information if needed.   Past Medical History:   Diagnosis Date     COVID-19 09/14/2020    positive test at [facility]     DNAR (do not attempt resuscitation)      Dyslipidemia, goal LDL below 70 09/15/2020     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 Nini Rasmussen     Metabolic encephalopathy      Moderate aortic valve stenosis 08/22/2017    stable on 2020 echo     Nonobstructive atherosclerosis of coronary artery 09/15/2020    with normal LVEDP     Paroxysmal SVT (supraventricular tachycardia) (H) 09/07/2017    said to have short episodes while hospitalized for UTI per Dr. Rhys Mensah     Syncope 08/22/2017     UTI (urinary tract infection) 08/21/2017    hospitalized with weakness       Past Surgical History   I have reviewed this patient's surgical history and updated it with pertinent information if needed.  Past Surgical History:   Procedure Laterality Date     CATARACT EXTRACTION, BILATERAL       CV CORONARY ANGIOGRAM N/A 9/15/2020    Procedure: Coronary Angiogram;  Surgeon: Radhika Santa MD;  Location: [facility] Cath Lab;  Service: Cardiology     CV LEFT HEART CATHETERIZATION WITHOUT LEFT VENTRICULOGRAM Left 9/15/2020    Procedure: Left Heart Catheterization Without Left Ventriculogram;  Surgeon: Radhika Santa MD;  Location: [facility] Cath Lab;  Service: Cardiology     EP PACEMAKER INSERT N/A 4/13/2021    Procedure: EP Pacemaker Insertion;  Surgeon: Frederic Burgos MD;  Location: [facility] Cardiac Cath Lab;  Service: Cardiology     HYSTERECTOMY       PARTIAL GASTRECTOMY  1969    for ulcers     TONSILLECTOMY         Social History   I have reviewed this patient's social history and updated it with pertinent information if needed.  Social History     Tobacco Use     Smoking status: Never Smoker     Smokeless tobacco: Never Used   Substance Use Topics     Alcohol use: No      Drug use: No       Family History     No significant family history    Prior to Admission Medications   Prior to Admission Medications   Prescriptions Last Dose Informant Patient Reported? Taking?   ELIQUIS ANTICOAGULANT 2.5 MG tablet 9/11/2022  No Yes   Sig: TAKE 1 TABLET BY MOUTH TWICE DAILY   aspirin-acetaminophen-caffeine (EXCEDRIN MIGRAINE) 250-250-65 MG tablet Unknown  No Yes   Sig: Take 1 tablet by mouth every 6 hours as needed for headaches   atorvastatin (LIPITOR) 10 MG tablet 9/11/2022  No Yes   Sig: Take 1 tablet (10 mg) by mouth every morning   furosemide (LASIX) 20 MG tablet 9/11/2022  No Yes   Sig: TAKE 1 TABLET BY MOUTH EVERY MORNING   metoprolol succinate ER (TOPROL XL) 50 MG 24 hr tablet 9/11/2022  No Yes   Sig: Take 1 tablet (50 mg) by mouth daily      Facility-Administered Medications: None     Allergies   Allergies   Allergen Reactions     Sulfa Drugs Anxiety       Physical Exam   Vital Signs: Temp: 98.5  F (36.9  C) Temp src: Oral BP: 118/63 Pulse: 91   Resp: 16 SpO2: 96 % O2 Device: None (Room air)    Weight: 120 lbs 0 oz    General appearance: Awake, Alert, Cooperative, not in any obvious distress and appears stated age   HEENT: Normocephalic, atraumatic, conjunctiva clear without icterus and ears without discharge  Lungs: Clear to auscultation bilaterally, no wheezing, good air exchange, normal work of breathing  Cardiovascular: Regular Rate and Rythm, normal apical impulse, normal S1 and S2, no lower extremity edema bilaterally  Abdomen: Soft, non-tender and Non-distended, active bowel sounds  Skin: Skin color, texture normal and bruising or bleeding. No rashes or lesions over face, neck, arms and legs, turgor normal.  Musculoskeletal: No bony deformities or joint tenderness. Normal ROM upon flexion & extension.   Neurologic: Alert & Oriented X 3, Facial symmetry preserved and upper & lower extremities moving well with symmetry  Psychiatric: Calm, normal eye contact and normal  affect      Data   Data reviewed today: I reviewed all medications, new labs and imaging results over the last 24 hours. I personally reviewed the CT image(s) showing Cervical spine CT showed no evidence for acute fracture or post traumatic subluxation. Abdominal CT showed small benign left renal cysts are present, as well as a single tiny 2 mm nonobstructing stone, circumferential inflammatory wall thickening involving splenic flexure and left hemicolon consistent with colitis, infectious or inflammatory etiologies most likely and moderate dilatation of extrahepatic bile duct without suggestion of obstructing lesion..    Recent Results (from the past 24 hour(s))   Head CT w/o contrast    Narrative    EXAM: CT HEAD W/O CONTRAST, CT CERVICAL SPINE W/O CONTRAST  LOCATION: [facility]  DATE/TIME: 9/11/2022 5:53 PM    INDICATION: syncope, fall. Dementia. Posterior neck pain.  COMPARISON: CT head and cervical spine 07/22/2022.  TECHNIQUE:   1) Routine CT Head without IV contrast. Multiplanar reformats. Dose reduction techniques were used.  2) Routine CT Cervical Spine without IV contrast. Multiplanar reformats. Dose reduction techniques were used.    FINDINGS:   HEAD CT:   INTRACRANIAL CONTENTS: No intracranial hemorrhage, extraaxial collection, or mass effect.  No CT evidence of acute infarct. Stable chronic infarct in the left anterior periventricular area and in the right frontal white matter. Moderate presumed chronic   small vessel ischemic changes. Moderate generalized volume loss. No hydrocephalus.     VISUALIZED ORBITS/SINUSES/MASTOIDS: No intraorbital abnormality. No paranasal sinus mucosal disease. No middle ear or mastoid effusion.    BONES/SOFT TISSUES: No acute abnormality.    CERVICAL SPINE CT:   VERTEBRA: Normal vertebral body heights. Straightened cervical lordosis unchanged. No acute fracture or posttraumatic subluxation.     CANAL/FORAMINA: Grossly the central canal is  adequate.    PARASPINAL: No extraspinal abnormality. Visualized lung fields are clear. Pacemaker.      Impression    IMPRESSION:  HEAD CT:  1.  No acute intracranial abnormality.  2.  Stable at least moderate age-related changes.    CERVICAL SPINE CT:  1.  No CT evidence for acute fracture or post traumatic subluxation.   Cervical spine CT w/o contrast    Narrative    EXAM: CT HEAD W/O CONTRAST, CT CERVICAL SPINE W/O CONTRAST  LOCATION: [facility]  DATE/TIME: 9/11/2022 5:53 PM    INDICATION: syncope, fall. Dementia. Posterior neck pain.  COMPARISON: CT head and cervical spine 07/22/2022.  TECHNIQUE:   1) Routine CT Head without IV contrast. Multiplanar reformats. Dose reduction techniques were used.  2) Routine CT Cervical Spine without IV contrast. Multiplanar reformats. Dose reduction techniques were used.    FINDINGS:   HEAD CT:   INTRACRANIAL CONTENTS: No intracranial hemorrhage, extraaxial collection, or mass effect.  No CT evidence of acute infarct. Stable chronic infarct in the left anterior periventricular area and in the right frontal white matter. Moderate presumed chronic   small vessel ischemic changes. Moderate generalized volume loss. No hydrocephalus.     VISUALIZED ORBITS/SINUSES/MASTOIDS: No intraorbital abnormality. No paranasal sinus mucosal disease. No middle ear or mastoid effusion.    BONES/SOFT TISSUES: No acute abnormality.    CERVICAL SPINE CT:   VERTEBRA: Normal vertebral body heights. Straightened cervical lordosis unchanged. No acute fracture or posttraumatic subluxation.     CANAL/FORAMINA: Grossly the central canal is adequate.    PARASPINAL: No extraspinal abnormality. Visualized lung fields are clear. Pacemaker.      Impression    IMPRESSION:  HEAD CT:  1.  No acute intracranial abnormality.  2.  Stable at least moderate age-related changes.    CERVICAL SPINE CT:  1.  No CT evidence for acute fracture or post traumatic subluxation.   CT Abdomen Pelvis w/o & w  Contrast    Narrative    EXAM: CT ABDOMEN PELVIS W/O and W CONTRAST  LOCATION: [facility]  DATE/TIME: 9/11/2022 8:47 PM    INDICATION: hematuria  COMPARISON: 7/22/2022  TECHNIQUE: CT scan of the abdomen and pelvis was performed before and after injection of IV contrast. Multiplanar reformats were obtained. Dose reduction techniques were used.  CONTRAST: 75ml Isovue 370     FINDINGS:   LOWER CHEST: Normal.    HEPATOBILIARY: Liver and gallbladder unremarkable. There is dilatation of the extrahepatic bile duct which measures 1.4 cm at lisa hepatis and CBD measures 9 mm within pancreatic head. No obstructing stone or lesion identified. This is similar to   previous exam.    PANCREAS: Parenchymal atrophy unchanged.    SPLEEN: Normal.    ADRENAL GLANDS: Normal.    KIDNEYS/BLADDER: Faint 2 mm stone left lower pole unchanged. Small left renal cysts, no suspicious mass, no further follow-up indicated. Right kidney negative. Minimal bladder wall thickening.    BOWEL: Moderate sigmoid diverticulosis. Circumferential wall thickening involving distal transverse colon and descending colon consistent with colitis.    LYMPH NODES: Normal.    VASCULATURE: Unremarkable.    PELVIC ORGANS: No adnexal lesions. No free fluid.    MUSCULOSKELETAL: No suspicious bony lesion.      Impression    IMPRESSION:   1.  No suspicious urinary tract mass. Small benign left renal cysts are present, as well as a single tiny 2 mm nonobstructing stone.  2.  There is circumferential inflammatory wall thickening involving splenic flexure and left hemicolon consistent with colitis, infectious or inflammatory etiologies most likely.  3.  Diverticulosis without definite evidence for acute diverticulitis.  4.  Moderate dilatation of extrahepatic bile duct without suggestion of obstructing lesion, unchanged. Can't exclude ampullary stenosis.

## 2022-09-12 NOTE — CONSULTS
CARDIOLOGY CONSULT NOTE         Assessment:   1.  Syncope, unspecified syncope type-sounds orthostatic, came on after getting up, possibly due to hypovolemia.  Would back off on doses of diuretics.  Given history of aortic stenosis we we will obtain repeat echo to make sure the stenosis is not severe although by physical exam does not sound that.  In addition, she has a pacemaker, doubt bradycardia the etiology but will rule out tachyarrhythmia.  Once these are done if unremarkable could discharge patient home.  2.  Aortic Stenosis-mild to moderate based on old echo but no hemodynamics recorded.  Would recheck echo completely look into this.  3.  Pacemaker-Medtronic device with Medtronic right atrial right ventricular leads placed in 2015.  Close to 99% atrial pacing with close to 100% ventricular pacing.  4.  Atrial Fibrillation-2 brief episodes noted on prior interrogation.  Will obtain repeat interrogation today looking for major arrhythmias.  5.  Cardiomyopathy-new wall motion abnormalities seen on echo.  Has not been seen by cardiology since 2020.  Will obtain stress test as an outpatient.     Plan:   1.  Repeat echocardiogram looking for aortic stenosis and address if abnormal.  2.  Interrogate pacemaker device looking for arrhythmias.  3.  If both above essentially unremarkable would recommend outpatient evaluation of pharmacological stress nuclear.  4.  Can follow with Dr. Cuco Aguilar or myself.  5.  Lower dose of diuretic.     Current History:   [facility] has been requested by Dr. Carson Jack to evaluate Laney Hahn  who is a 82 year old year old white female for syncope.  Patient was in her usual state of health until the day of presentation when she got up out of the bed to walk to the bathroom and then found herself on the floor.  There was no aura, nor was any postictal state nor was any seizure activity.  She was out for a minute or less.  There is no significant  trauma.  Due to this she was brought to the emergency department.  Records state that she has had several syncopal spells in the past but none recently.  She denies any significant shortness of breath, PND, orthopnea, chest pains or palpitations.    Past Medical History:     Past Medical History:   Diagnosis Date     COVID-19 09/14/2020    positive test at [facility]     Dyslipidemia, goal LDL below 70 09/15/2020     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 Nini Rasmussen     Metabolic encephalopathy      Moderate aortic valve stenosis 08/22/2017    stable on 2020 echo     Nonobstructive atherosclerosis of coronary artery 09/15/2020    with normal LVEDP     Paroxysmal SVT (supraventricular tachycardia) (H) 09/07/2017    said to have short episodes while hospitalized for UTI per Dr. Rhys Mensah     Syncope 08/22/2017     UTI (urinary tract infection) 08/21/2017    hospitalized with weakness      Past history is negative for cancer, tuberculosis, diabetes mellitus, myocardial infarction,  rheumatic fever, hypertension, cerebrovascular accident, chronic kidney disease, peptic ulcer disease, chronic obstructive pulmonary disease, or thyroid disorder  and no lipid disorder.    Past Surgical History:     Past Surgical History:   Procedure Laterality Date     CATARACT EXTRACTION, BILATERAL       CV CORONARY ANGIOGRAM N/A 9/15/2020    Procedure: Coronary Angiogram;  Surgeon: Radhika Santa MD;  Location: [facility] Cath Lab;  Service: Cardiology     CV LEFT HEART CATHETERIZATION WITHOUT LEFT VENTRICULOGRAM Left 9/15/2020    Procedure: Left Heart Catheterization Without Left Ventriculogram;  Surgeon: Radhika Santa MD;  Location: [facility] Cath Lab;  Service: Cardiology     EP PACEMAKER INSERT N/A 4/13/2021    Procedure: EP Pacemaker Insertion;  Surgeon: Frederic Burgos MD;  Location: [facility]  Cardiac Cath Lab;  Service: Cardiology     HYSTERECTOMY       PARTIAL GASTRECTOMY  1969    for ulcers     TONSILLECTOMY       Family History:   Reviewed, and unknown as she was adopted.    Social History:   Reviewed, and she  reports that she has never smoked. She has never used smokeless tobacco. She reports that she does not drink alcohol and does not use drugs.  She lives in assisted living, her brother lives in assisted living near her as well.  Primary care provider is Rose Mcelroy, certified nurse practitioner.    Meds:       apixaban ANTICOAGULANT  2.5 mg Oral BID     atorvastatin  10 mg Oral QAM     sodium chloride (PF)  3 mL Intracatheter Q8H     Allergies:   Sulfa drugs    Review of Systems:   A 12 point comprehensive review of systems was  as follows:   General: negative for fatigue, weight loss or weight gain  Constitutional: negative for anorexia, chills, fevers, malaise, night sweats   Eyes: negative for cataracts, color blindness, contacts/glasses, glaucoma, icterus, irritation, redness and visual disturbance  Ears, nose, mouth, throat, and face: negative for ear drainage, earaches, epistaxis, facial trauma, hearing loss, hoarseness, nasal congestion, snoring, sore mouth, sore throat, tinnitus and voice change  Respiratory: negative for cough, dyspnea on exertion,  hemoptysis, sputum production, pleurisy, stridor, wheezing, PND, orthopnea  Cardiovascular: negative for chest pain, chest pressure/discomfort, claudication, dyspnea, exertional chest pressure/discomfort, fatigue, irregular heart beat, lower extremity edema, near-syncope,  palpitations,  syncope   Gastrointestinal: negative for abdominal pain, change in bowel haibits, constipation, diarrhea, dyspepsia, dysphagia, jaundice, melena, nausea, odynophagia, reflux symptoms and vomiting  Genitourinary: negative for decreased stream  Hematologic/lymphatic: negative for bleeding  Musculoskeletal: negative for arthralgias, back pain, bone pain, muscle  "weakness, myalgias, neck pain and stiff joints  Neurological: negative for syncope, presyncope, coordination problems, dizziness, gait problems, headaches, memory problems, paresthesia, seizures, speech problems, tremors, vertigo and weakness      Objective:     Physical Exam:  /51   Pulse 80   Temp 98.5  F (36.9  C) (Oral)   Resp 30   Ht 1.6 m (5' 3\")   Wt 54.4 kg (120 lb)   LMP  (LMP Unknown)   SpO2 97%   BMI 21.26 kg/m       Head:    Normocephalic, without obvious abnormality, atraumatic   Eyes:    PERRL, conjunctiva/corneas clear, EOM's intact,           Nose:   Nares normal, septum midline, mucosa normal, no drainage  or sinus tenderness   Throat:   Lips, mucosa, and tongue normal; teeth and gums normal   Neck:   Supple, symmetrical, trachea midline, no adenopathy;        thyroid:  No enlargement/tenderness/nodules; no carotid    bruit or JVD   Back:     Symmetric, no curvature, ROM normal, no CVA tenderness   Lungs:     Clear to auscultation bilaterally, respirations unlabored   Chest wall:    No tenderness, left sided pacer   Heart:    Regular rate and rhythm, S1 and S2 normal, 2-3/6 high-pitched early peaking systolic ejection murmur, no rub   or gallop   Abdomen:     Soft, non-tender, bowel sounds active all four quadrants,     no masses, no organomegaly   Extremities:   Extremities normal, atraumatic, no cyanosis or edema   Pulses:   2+ and symmetric all extremities   Skin:   Skin color, texture, turgor normal, no rashes or lesions   Neurologic:   CNII-XII intact. Normal strength, sensation and reflexes       throughout     Cardiographics and Imaging  Radiology Results: Chest x-ray shows not performed    EKG Results: personally reviewed a paced V paced sequential rhythm.    Lab Review   Pertinent Labs  Lab Results: personally reviewed       Lab Results   Component Value Date    TROPONINI 0.02 09/11/2022       Lab Results   Component Value Date    BUN 30 (H) 09/11/2022     09/11/2022    " CO2 22 09/11/2022       Lab Results   Component Value Date    WBC 8.5 09/11/2022    HGB 14.6 09/11/2022    HCT 42.6 09/11/2022    MCV 99 09/11/2022     09/11/2022       Lab Results   Component Value Date    BNP 1,170 (H) 06/10/2021

## 2022-09-13 ENCOUNTER — APPOINTMENT (OUTPATIENT)
Dept: OCCUPATIONAL THERAPY | Facility: HOSPITAL | Age: 82
End: 2022-09-13
Payer: COMMERCIAL

## 2022-09-13 LAB
BACTERIA UR CULT: ABNORMAL
GLUCOSE BLDC GLUCOMTR-MCNC: 122 MG/DL (ref 70–99)

## 2022-09-13 PROCEDURE — 250N000013 HC RX MED GY IP 250 OP 250 PS 637: Performed by: INTERNAL MEDICINE

## 2022-09-13 PROCEDURE — 99214 OFFICE O/P EST MOD 30 MIN: CPT | Performed by: INTERNAL MEDICINE

## 2022-09-13 PROCEDURE — 97535 SELF CARE MNGMENT TRAINING: CPT | Mod: GO

## 2022-09-13 PROCEDURE — 97110 THERAPEUTIC EXERCISES: CPT | Mod: GO

## 2022-09-13 PROCEDURE — 96376 TX/PRO/DX INJ SAME DRUG ADON: CPT

## 2022-09-13 PROCEDURE — 250N000011 HC RX IP 250 OP 636: Performed by: INTERNAL MEDICINE

## 2022-09-13 PROCEDURE — G0378 HOSPITAL OBSERVATION PER HR: HCPCS

## 2022-09-13 PROCEDURE — 99226 PR SUBSEQUENT OBSERVATION CARE,LEVEL III: CPT | Performed by: INTERNAL MEDICINE

## 2022-09-13 RX ADMIN — ATORVASTATIN CALCIUM 10 MG: 10 TABLET, FILM COATED ORAL at 07:54

## 2022-09-13 RX ADMIN — CEFTRIAXONE SODIUM 1 G: 1 INJECTION, POWDER, FOR SOLUTION INTRAMUSCULAR; INTRAVENOUS at 21:03

## 2022-09-13 RX ADMIN — APIXABAN 2.5 MG: 2.5 TABLET, FILM COATED ORAL at 07:54

## 2022-09-13 RX ADMIN — APIXABAN 2.5 MG: 2.5 TABLET, FILM COATED ORAL at 21:04

## 2022-09-13 ASSESSMENT — ACTIVITIES OF DAILY LIVING (ADL)
ADLS_ACUITY_SCORE: 33

## 2022-09-13 NOTE — PROGRESS NOTES
CARDIOLOGY PROGRESS NOTE      Assessment/Plan:  1.  Syncope, unspecified syncope type-sounds orthostatic, came on after getting up, possibly due to hypovolemia.  Backed off on doses of diuretics.  Device interrogation shows no arrhythmias, aortic stenosis is not critical.  Most likely orthostatic syncope.  Telemetry unremarkable.  At this point time could discharge patient home.  2.  Aortic Stenosis-moderate to severe based on old echo with peak velocity of 2.7 m/s, mean gradient 22 mmHg, valve area 0.73 cm .  Would refer to valve clinic as an outpatient.    3.  Pacemaker-Medtronic device with Medtronic right atrial right ventricular leads placed in 2015.  Close to 99% atrial pacing with close to 100% ventricular pacing.  No arrhythmias during time of syncope.  4.  Atrial Fibrillation-2 brief episodes noted on recent interrogation.  Asymptomatic.  On chronic anticoagulation with Eliquis 2.5 mg twice a day given her age as well as weight.  5.  Cardiomyopathy-new wall motion abnormalities seen on echo.  Would arrange for outpatient pharmacological stress Cardiolite given her pacer.   6.  Mitral stenosis-mild to moderate.  Valve clinic as above.    Plan:  1.  Cardiology has nothing further inpatient to add, we will sign off, available as needed.    Discharge Plannin.  No cardiac barrier to discharge, could send home today.  2.  Would arrange for outpatient pharmacological stress nuclear.  3.  Would recommend being seen by valve clinic for consideration for TAVR.     LOS: 0 days     Subjective:  Interval History:    82-year-old white female being seen on second day of hospitalization.  She is alert, wants to go home.  Denies any syncope, dizziness, chest pains, palpitations, shortness of breath, PND, orthopnea or peripheral edema.    Medications    apixaban ANTICOAGULANT  2.5 mg Oral BID     atorvastatin  10 mg Oral QAM     cefTRIAXone  1 g Intravenous Q24H     sodium chloride (PF)  3 mL Intracatheter Q8H  "    Objective:   Vital signs in last 24 hours:  Temp:  [97.4  F (36.3  C)-98.6  F (37  C)] 98.4  F (36.9  C)  Pulse:  [79-84] 84  Resp:  [15-43] 18  BP: (119-131)/(58-85) 121/85  SpO2:  [95 %-98 %] 98 %    Physical Exam:  /85 (BP Location: Right arm)   Pulse 84   Temp 98.4  F (36.9  C) (Oral)   Resp 18   Ht 1.6 m (5' 3\")   Wt 57 kg (125 lb 10.6 oz)   LMP  (LMP Unknown)   SpO2 98%   BMI 22.26 kg/m      General Appearance:    Alert, cooperative, no distress, appears stated age   Head:    Normocephalic, without obvious abnormality, atraumatic   Throat:   Lips, mucosa, and tongue normal; teeth and gums normal   Neck:   Supple, symmetrical, trachea midline, no adenopathy;        thyroid:  No enlargement/tenderness/nodules; no carotid    bruit or JVD   Back:     Symmetric, no curvature, ROM normal, no CVA tenderness   Lungs:     Clear to auscultation bilaterally, respirations unlabored   Chest wall:    No tenderness or deformity   Heart:    Regular rate and rhythm, S1 and S2 normal, 2-3/6 systolic ejection murmur, no rub   or gallop   Abdomen:     Soft, non-tender, bowel sounds active all four quadrants,     no masses, no organomegaly   Extremities:   Normal, atraumatic, no cyanosis or edema   Pulses:   2+ and symmetric all extremities   Skin:   Skin color, texture, turgor normal, no rashes or lesions     Cardiographics:      ECG: Personally reviewed by myself shows atrial sensed, ventricular paced rhythm.    Echocardiogram:   The left ventricle is normal in size. There is normal left ventricular wall thickness.  Diastolic Doppler findings (E/E' ratio and/or other parameters) suggest left ventricular filling pressures are increased.  The visual ejection fraction is 40-45%.There is mild-moderate global hypokinesia of the left ventricle.  Normal right ventricle size and systolic function.  The left atrium is moderately dilated.  Trileaflet aortic valve with calcification and reduced excursion. It appears " moderate to severe aortic stenosis. The mean gradient is 23 mmHg and area aortic valve 0.7-0.8 cm2. SVi 32ml/m2.  Moderate mitral valve stenosis 8 mmHg at 80 BPM.  Mild mitral valve regurgitation.     When compared to previous on 5-, aortic valve stenosis and laosi mitral valve stenosis is getting worse. Both mitral valve and aortic valve gradients are higher than previous study.      Lab Results:   Recent Labs   Lab 09/11/22  1733   WBC 8.5   HGB 14.6   HCT 42.6        Recent Labs   Lab 09/11/22  1733      CO2 22   BUN 30*   .       Lab Results   Component Value Date    TROPONINI 0.02 09/11/2022

## 2022-09-13 NOTE — PROGRESS NOTES
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    Syncope  Dizziness  Possibly secondary to volume depletion in the setting of diuretic use and poor oral intake versus arrhythmia given history of SVT and paroxysmal atrial fibrillation versus moderate aortic valve stenosis and HFrEF versus medication induced hypotension.   Echocardiogram 5/10/2022 revealed mildly reduced LVEF of 40 to 45%, hypokinesis of the basal to mid inferolateral wall and mild to moderate valvular aortic stenosis.  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  Hold PTA furosemide and metoprolol succinate for now   Continue gentle IV hydration  Monitor BMP  Follow cardiology consult      Hematuria  Abdominal CT showed small benign left renal cysts are present, as well as a single tiny 2 mm nonobstructing stone, circumferential inflammatory wall thickening involving splenic flexure and left hemicolon consistent with colitis, infectious or inflammatory etiologies most likely  and moderate dilatation of extrahepatic bile duct without suggestion of obstructing lesion.  Unable to perform urinalysis due to gross hematuria.  Hemoglobin is stable    Follow-up urine culture  Bactrim sliding scale BID   Monitor hemoglobin     JESUS on CKD stage 3a  Creatinine is elevated on admission at 1.47 compared to ~ baseline 0.9-1.0   Monitor BMP  Avoid nephrotoxins    Fall  Vascular dementia  Brain CT showed no acute intracranial abnormality. Cervical spine CT showed no evidence for acute fracture or post traumatic subluxation.  PT/OT    Hypertension  Hold PTA metoprolol succinate for now and restart if blood pressure is elevated; consider resuming at a lower dose tomorrow    Hyperlipidemia  CAD  Continue PTA atorvastatin  Consider resuming PTA metoprolol succinate tomorrow at a lower dose           Diet: 2 Gram Sodium Diet    DVT Prophylaxis: DOAC  Tenorio Catheter: Not present  Central Lines: None  Cardiac Monitoring: None  Code Status: Full Code      Disposition Plan     Expected Discharge Date: 09/13/2022, 12:00 PM              The patient's care was discussed with the Patient.    Yaz Poon MD  Hospitalist Service  [facility]  Securely message with the Vocera Web Console (learn more here)  Text page via [employer] Paging/Directory         Clinically Significant Risk Factors Present on Admission               # Coagulation Defect: home medication list includes an anticoagulant medication            ______________________________________________________________________    Interval History   Mrs. Hahn was wanting to go home but she was noted to have hematuria and a change in the smell of her urine. She is also having an JESUS nad some dehydration.     Data reviewed today: I reviewed all medications, new labs and imaging results over the last 24 hours. I personally reviewed no images or EKG's today.    Physical Exam   Vital Signs: Temp: 98.6  F (37  C) Temp src: Oral BP: 122/69  Pulse: 80   Resp: 18 SpO2: 96 % O2 Device: None (Room air)    Weight: 125 lbs 10.6 oz  General Appearance: Awake, alert  Respiratory: CTAB, no wheeze  Cardiovascular: RRR, no murmur noted  GI: soft, nontender, non distended, normal bowel sounds  Skin: no jaundice, no rash      Data   Recent Labs   Lab 09/11/22  1733   WBC 8.5   HGB 14.6   MCV 99         POTASSIUM 4.7   CHLORIDE 106   CO2 22   BUN 30*   CR 1.47*   ANIONGAP 14   CURT 9.3      ALBUMIN 3.8   PROTTOTAL 7.0   BILITOTAL 1.0   ALKPHOS 142*   ALT 31   AST 46*     Medications     - MEDICATION INSTRUCTIONS -         apixaban ANTICOAGULANT  2.5 mg Oral BID     atorvastatin  10 mg Oral QAM     cefTRIAXone  1 g Intravenous Q24H     sodium chloride (PF)  3 mL Intracatheter Q8H

## 2022-09-13 NOTE — PROGRESS NOTES
MERYL called to follow up on TCU referrals. [facility] could potentially take patient on Thursday or Friday and asked SW to call back with patient's anticipated discharge date. [facility] on [facility] asked SW to resend referral as they were having issues with their fax yesterday.    1:21  SW talked to Patient's friend gabriella about TCU vs home with assist. Gabriella said that she is not sure if patient would have enough support at home and would like referrals sent to [facility]. MERYL also emailed gabriella the TCU list. Gabriella asked if SW could reach out to patient AL. MERYL stated that per chart review, RNCM talked to AL yesterday and they agreed with the TCU recommendation.    3:47 PM  Patient was accepted at [facility]. MERYL left VM for Gabriella to let her know and see if patient would like to accept bed or try to go home with assist.    Latasha Baker

## 2022-09-13 NOTE — PROGRESS NOTES
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    Syncope  Dizziness  Possibly secondary to volume depletion in the setting of diuretic use and poor oral intake versus arrhythmia given history of SVT and paroxysmal atrial fibrillation versus moderate aortic valve stenosis and HFrEF versus medication induced hypotension.   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  -Heart rate is controlled  -Hold PTA metoprolol succinate for now  -Continue PTA apixaban  -Telemetry    Aortic valve stenosis  HFrEF  -Hold PTA furosemide and metoprolol succinate for now   -Continue gentle IV hydration, discontinued  -Monitor BMP  -Cardiology consulted, appreciate recommendations:  -Echo 9/12: EF 40 to 45%, moderate to severe aortic stenosis, moderate mitral valve stenosis, mild MR    Urinary tract infection with hematuria  -Abdominal CT showed small benign left renal cysts are present, as well as a single tiny 2 mm nonobstructing stone, circumferential inflammatory wall thickening involving splenic flexure and left hemicolon consistent with colitis, infectious or inflammatory etiologies most likely and moderate dilatation of extrahepatic bile duct without suggestion of obstructing lesion.  Unable to perform urinalysis due to gross hematuria.  Hemoglobin is stable    -Urine culture growing Klebsiella, pending susceptibilities  -Ceftriaxone 1 g every 24 hours  -Monitor hemoglobin     JESUS on CKD stage 3a  -Creatinine is elevated on  admission at 1.47 compared to ~ baseline 0.9-1.0   -Monitor BMP  -Avoid nephrotoxins    Fall  Vascular dementia  Brain CT showed no acute intracranial abnormality. Cervical spine CT showed no evidence for acute fracture or post traumatic subluxation.  PT/OT    Hypertension  Medications held at this time, still somewhat well controlled    Hyperlipidemia  CAD  Continue PTA atorvastatin  Consider resuming PTA metoprolol succinate tomorrow at a lower dose       Diet: 2 Gram Sodium Diet    DVT Prophylaxis: DOAC  Tenorio Catheter: Not present  Central Lines: None  Cardiac Monitoring: None  Code Status: Full Code      Disposition Plan      Expected Discharge Date: 09/13/2022                The patient's care was discussed with the Patient.    Yaz Poon MD  Hospitalist Service  [facility]  Securely message with the Vocera Web Console (learn more here)  Text page via [employer] Paging/Directory         Clinically Significant Risk Factors Present on Admission               # Coagulation Defect: home medication list includes an anticoagulant medication            ______________________________________________________________________    Interval History   Ms. Hahn was doing well today.  I discussed with her TCU placement.  She is not having any complaints at this time.  She was seen by cardiology and they have signed off.    Data reviewed today: I reviewed all medications, new labs and imaging results over the last 24 hours. I personally reviewed no images or EKG's today.    Physical Exam   Vital Signs: Temp: 97.4  F (36.3  C) Temp src: Oral BP: 131/62 Pulse: 80   Resp: 20 SpO2: 97 % O2 Device: None (Room air)    Weight: 125 lbs 10.6 oz  General Appearance: Awake, alert, in no acute distress  Respiratory: CTAB, no wheeze  Cardiovascular: RRR, murmur noted  GI: soft, nontender, non distended, normal bowel sounds  Skin: no jaundice, no rash      Data   Recent Labs   Lab 09/11/22  1733   WBC 8.5    HGB 14.6   MCV 99         POTASSIUM 4.7   CHLORIDE 106   CO2 22   BUN 30*   CR 1.47*   ANIONGAP 14   CURT 9.3      ALBUMIN 3.8   PROTTOTAL 7.0   BILITOTAL 1.0   ALKPHOS 142*   ALT 31   AST 46*     Medications     - MEDICATION INSTRUCTIONS -         apixaban ANTICOAGULANT  2.5 mg Oral BID     atorvastatin  10 mg Oral QAM     cefTRIAXone  1 g Intravenous Q24H     sodium chloride (PF)  3 mL Intracatheter Q8H

## 2022-09-14 ENCOUNTER — APPOINTMENT (OUTPATIENT)
Dept: OCCUPATIONAL THERAPY | Facility: HOSPITAL | Age: 82
End: 2022-09-14
Payer: COMMERCIAL

## 2022-09-14 LAB
ANION GAP SERPL CALCULATED.3IONS-SCNC: 6 MMOL/L (ref 5–18)
BUN SERPL-MCNC: 24 MG/DL (ref 8–28)
CALCIUM SERPL-MCNC: 8.5 MG/DL (ref 8.5–10.5)
CHLORIDE BLD-SCNC: 111 MMOL/L (ref 98–107)
CO2 SERPL-SCNC: 24 MMOL/L (ref 22–31)
CREAT SERPL-MCNC: 1.09 MG/DL (ref 0.6–1.1)
ERYTHROCYTE [DISTWIDTH] IN BLOOD BY AUTOMATED COUNT: 12.3 % (ref 10–15)
GFR SERPL CREATININE-BSD FRML MDRD: 51 ML/MIN/1.73M2
GLUCOSE BLD-MCNC: 113 MG/DL (ref 70–125)
HCT VFR BLD AUTO: 34.2 % (ref 35–47)
HGB BLD-MCNC: 11.6 G/DL (ref 11.7–15.7)
MCH RBC QN AUTO: 33.3 PG (ref 26.5–33)
MCHC RBC AUTO-ENTMCNC: 33.9 G/DL (ref 31.5–36.5)
MCV RBC AUTO: 98 FL (ref 78–100)
PLATELET # BLD AUTO: 148 10E3/UL (ref 150–450)
POTASSIUM BLD-SCNC: 4.2 MMOL/L (ref 3.5–5)
RBC # BLD AUTO: 3.48 10E6/UL (ref 3.8–5.2)
SODIUM SERPL-SCNC: 141 MMOL/L (ref 136–145)
WBC # BLD AUTO: 4.1 10E3/UL (ref 4–11)

## 2022-09-14 PROCEDURE — 80048 BASIC METABOLIC PNL TOTAL CA: CPT | Performed by: INTERNAL MEDICINE

## 2022-09-14 PROCEDURE — 250N000013 HC RX MED GY IP 250 OP 250 PS 637: Performed by: INTERNAL MEDICINE

## 2022-09-14 PROCEDURE — 97535 SELF CARE MNGMENT TRAINING: CPT | Mod: GO

## 2022-09-14 PROCEDURE — G0378 HOSPITAL OBSERVATION PER HR: HCPCS

## 2022-09-14 PROCEDURE — 85027 COMPLETE CBC AUTOMATED: CPT | Performed by: INTERNAL MEDICINE

## 2022-09-14 PROCEDURE — 36415 COLL VENOUS BLD VENIPUNCTURE: CPT | Performed by: INTERNAL MEDICINE

## 2022-09-14 PROCEDURE — 99226 PR SUBSEQUENT OBSERVATION CARE,LEVEL III: CPT | Performed by: INTERNAL MEDICINE

## 2022-09-14 PROCEDURE — 97110 THERAPEUTIC EXERCISES: CPT | Mod: GO

## 2022-09-14 RX ORDER — DIPHENHYDRAMINE HCL 50 MG
50 CAPSULE ORAL
Status: DISCONTINUED | OUTPATIENT
Start: 2022-09-15 | End: 2022-09-15 | Stop reason: HOSPADM

## 2022-09-14 RX ORDER — DIPHENHYDRAMINE HYDROCHLORIDE 50 MG/ML
50 INJECTION INTRAMUSCULAR; INTRAVENOUS
Status: DISCONTINUED | OUTPATIENT
Start: 2022-09-15 | End: 2022-09-15 | Stop reason: HOSPADM

## 2022-09-14 RX ORDER — CIPROFLOXACIN 500 MG/1
500 TABLET, FILM COATED ORAL EVERY 12 HOURS SCHEDULED
Status: DISCONTINUED | OUTPATIENT
Start: 2022-09-14 | End: 2022-09-15 | Stop reason: HOSPADM

## 2022-09-14 RX ORDER — METOPROLOL SUCCINATE 25 MG/1
25 TABLET, EXTENDED RELEASE ORAL DAILY
Status: DISCONTINUED | OUTPATIENT
Start: 2022-09-15 | End: 2022-09-15 | Stop reason: HOSPADM

## 2022-09-14 RX ADMIN — CIPROFLOXACIN 500 MG: 500 TABLET, FILM COATED ORAL at 09:12

## 2022-09-14 RX ADMIN — APIXABAN 2.5 MG: 2.5 TABLET, FILM COATED ORAL at 09:13

## 2022-09-14 RX ADMIN — CIPROFLOXACIN 500 MG: 500 TABLET, FILM COATED ORAL at 16:38

## 2022-09-14 RX ADMIN — ATORVASTATIN CALCIUM 10 MG: 10 TABLET, FILM COATED ORAL at 09:12

## 2022-09-14 RX ADMIN — APIXABAN 2.5 MG: 2.5 TABLET, FILM COATED ORAL at 21:18

## 2022-09-14 ASSESSMENT — ACTIVITIES OF DAILY LIVING (ADL)
ADLS_ACUITY_SCORE: 33
ADLS_ACUITY_SCORE: 35
ADLS_ACUITY_SCORE: 33

## 2022-09-14 NOTE — PROGRESS NOTES
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    Syncope  Dizziness-resolved  Possibly secondary to volume depletion in the setting of diuretic use and poor oral intake versus arrhythmia given history of SVT and paroxysmal atrial fibrillation versus moderate aortic valve stenosis and HFrEF versus medication induced hypotension.   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  -Heart rate is controlled  -Starting metoprolol XL 25 mg in the morning with parameters, this is half her home dose  -Continue PTA apixaban  -Telemetry discontinued    Aortic valve stenosis  HFrEF  -Hold PTA furosemide  -Continue gentle IV hydration, discontinued  -Monitor BMP  -Cardiology consulted, appreciate recommendations:  -Echo 9/12: EF 40 to 45%, moderate to severe aortic stenosis, moderate mitral valve stenosis, mild MR    Urinary tract infection with hematuria  Acute Blood loss anemia due to hematuria  -Abdominal CT showed small benign left renal cysts are present, as well as a single tiny 2 mm nonobstructing stone, circumferential inflammatory wall thickening involving splenic flexure and left hemicolon consistent with colitis, infectious or inflammatory etiologies most likely and moderate dilatation of extrahepatic bile duct without  suggestion of obstructing lesion.  -Unable to perform urinalysis due to gross hematuria.  -Hemoglobin dropped from 14.6-11.6   -Urine culture growing Klebsiella  -Ceftriaxone 1 g every 24 hours transition to ciprofloxacin 50 mg every 12 for 5 days to complete a total of 7-day course  -Monitor hemoglobin     JESUS on CKD stage 3a-improved  -Creatinine is elevated on admission at 1.47 compared to ~ baseline 0.9-1.0   -Monitor BMP  -Avoid nephrotoxins    Fall  Vascular dementia  Brain CT showed no acute intracranial abnormality. Cervical spine CT showed no evidence for acute fracture or post traumatic subluxation.  PT/OT    Hypertension  -Pressures were normal without any medication, restarting metoprolol tomorrow so will watch blood pressure    Hyperlipidemia  CAD  -Continue PTA atorvastatin       Diet: 2 Gram Sodium Diet    DVT Prophylaxis: Pneumatic Compression Devices  Tenorio Catheter: Not present  Central Lines: None  Cardiac Monitoring: None  Code Status: Full Code      Disposition Plan      Expected Discharge Date: 09/15/2022                The patient's care was discussed with the Care Coordinator/ and Patient.    Yaz Poon MD  Hospitalist Service  [facility]  Securely message with the Vocera Web Console (learn more here)  Text page via [employer] Paging/Directory         Clinically Significant Risk Factors Present on Admission               # Coagulation Defect: home medication list includes an anticoagulant medication            ______________________________________________________________________    Interval History   Mr. Tang is doing well today.  She is not having any complaints.  She was excepted to a TCU however they require a COVID booster.  I asked her if she was okay with this and she is.  Will order.    Data reviewed today: I reviewed all medications, new labs and imaging results over the last 24 hours. I personally reviewed no images or EKG's  today.    Physical Exam   Vital Signs: Temp: 97.9  F (36.6  C) Temp src: Oral BP: 115/65 Pulse: 81   Resp: 18 SpO2: 94 % O2 Device: None (Room air)    Weight: 126 lbs 8 oz  General Appearance: Awake, alert, in no acute distress  Respiratory: CTAB, no wheeze  Cardiovascular: Regular rate, no murmur noted  GI: soft, nontender, non distended, normal bowel sounds  Skin: no jaundice, no rash      Data   Recent Labs   Lab 09/14/22  0440 09/12/22  1719 09/11/22  1733   WBC 4.1  --  8.5   HGB 11.6*  --  14.6   MCV 98  --  99   *  --  168     --  142   POTASSIUM 4.2  --  4.7   CHLORIDE 111*  --  106   CO2 24  --  22   BUN 24  --  30*   CR 1.09  --  1.47*   ANIONGAP 6  --  14   CURT 8.5  --  9.3    122* 122   ALBUMIN  --   --  3.8   PROTTOTAL  --   --  7.0   BILITOTAL  --   --  1.0   ALKPHOS  --   --  142*   ALT  --   --  31   AST  --   --  46*     No results found for this or any previous visit (from the past 24 hour(s)).  Medications     - MEDICATION INSTRUCTIONS -         apixaban ANTICOAGULANT  2.5 mg Oral BID     atorvastatin  10 mg Oral QAM     ciprofloxacin  500 mg Oral Q12H [facility] (08/20)     [START ON 9/15/2022] metoprolol succinate ER  25 mg Oral Daily     sodium chloride (PF)  3 mL Intracatheter Q8H

## 2022-09-14 NOTE — PROGRESS NOTES
Care Management Follow Up    Length of Stay (days): 0    Expected Discharge Date: 09/15/2022     Concerns to be Addressed:    Discharge Planning  Patient plan of care discussed at interdisciplinary rounds: Yes    Anticipated Discharge Disposition: Skilled Nursing Facility     Anticipated Discharge Services:  TCU  Anticipated Discharge DME: None    Patient/family educated on Medicare website which has current facility and service quality ratings: yes  Education Provided on the Discharge Plan:  Yes  Patient/Family in Agreement with the Plan: yes    Referrals Placed by CM/SW:  TCU referrals sent  Private pay costs discussed: Not applicable    Additional Information:  MERYL spoke with Patient's friend Gabriella about patient being accepted at [facility]. Gabriella said that she is in agreement that TCU would be safer for patient rather than going home. Gabriella said that patient's cousin Rosana can transport tomorrow. SW will reach out to Rosana to set up transport.    MERYL talked with Hollis at [facility]. Patient needs [facility] prior auth which can take 24-48 hours. Patient also will need Covid booster. MERYL notified MD. Latasha Baker

## 2022-09-15 ENCOUNTER — APPOINTMENT (OUTPATIENT)
Dept: OCCUPATIONAL THERAPY | Facility: HOSPITAL | Age: 82
End: 2022-09-15
Payer: COMMERCIAL

## 2022-09-15 VITALS
RESPIRATION RATE: 20 BRPM | SYSTOLIC BLOOD PRESSURE: 116 MMHG | HEIGHT: 63 IN | BODY MASS INDEX: 22.32 KG/M2 | OXYGEN SATURATION: 94 % | WEIGHT: 126 LBS | HEART RATE: 79 BPM | DIASTOLIC BLOOD PRESSURE: 66 MMHG | TEMPERATURE: 98.4 F

## 2022-09-15 LAB
ANION GAP SERPL CALCULATED.3IONS-SCNC: 6 MMOL/L (ref 5–18)
BUN SERPL-MCNC: 23 MG/DL (ref 8–28)
CALCIUM SERPL-MCNC: 8.6 MG/DL (ref 8.5–10.5)
CHLORIDE BLD-SCNC: 108 MMOL/L (ref 98–107)
CO2 SERPL-SCNC: 25 MMOL/L (ref 22–31)
CREAT SERPL-MCNC: 0.96 MG/DL (ref 0.6–1.1)
ERYTHROCYTE [DISTWIDTH] IN BLOOD BY AUTOMATED COUNT: 12.4 % (ref 10–15)
GFR SERPL CREATININE-BSD FRML MDRD: 59 ML/MIN/1.73M2
GLUCOSE BLD-MCNC: 113 MG/DL (ref 70–125)
HCT VFR BLD AUTO: 35.7 % (ref 35–47)
HGB BLD-MCNC: 12.1 G/DL (ref 11.7–15.7)
MCH RBC QN AUTO: 33.2 PG (ref 26.5–33)
MCHC RBC AUTO-ENTMCNC: 33.9 G/DL (ref 31.5–36.5)
MCV RBC AUTO: 98 FL (ref 78–100)
PLATELET # BLD AUTO: 158 10E3/UL (ref 150–450)
POTASSIUM BLD-SCNC: 4 MMOL/L (ref 3.5–5)
RBC # BLD AUTO: 3.65 10E6/UL (ref 3.8–5.2)
SODIUM SERPL-SCNC: 139 MMOL/L (ref 136–145)
WBC # BLD AUTO: 4.6 10E3/UL (ref 4–11)

## 2022-09-15 PROCEDURE — 36415 COLL VENOUS BLD VENIPUNCTURE: CPT | Performed by: INTERNAL MEDICINE

## 2022-09-15 PROCEDURE — G0378 HOSPITAL OBSERVATION PER HR: HCPCS

## 2022-09-15 PROCEDURE — 91312 HC ADMIN COVID VAC PFIZER 12+ BIVAL ADDITIONAL: CPT | Performed by: INTERNAL MEDICINE

## 2022-09-15 PROCEDURE — 250N000013 HC RX MED GY IP 250 OP 250 PS 637: Performed by: INTERNAL MEDICINE

## 2022-09-15 PROCEDURE — 91312 HC RX IP 250 OP 636: CPT | Performed by: INTERNAL MEDICINE

## 2022-09-15 PROCEDURE — 0124A HC ADMIN COVID VAC PFIZER 12+ BIVAL ADDITIONAL: CPT | Performed by: INTERNAL MEDICINE

## 2022-09-15 PROCEDURE — 99217 PR OBSERVATION CARE DISCHARGE: CPT | Performed by: STUDENT IN AN ORGANIZED HEALTH CARE EDUCATION/TRAINING PROGRAM

## 2022-09-15 PROCEDURE — 80048 BASIC METABOLIC PNL TOTAL CA: CPT | Performed by: INTERNAL MEDICINE

## 2022-09-15 PROCEDURE — 97535 SELF CARE MNGMENT TRAINING: CPT | Mod: GO

## 2022-09-15 PROCEDURE — 85027 COMPLETE CBC AUTOMATED: CPT | Performed by: INTERNAL MEDICINE

## 2022-09-15 PROCEDURE — 250N000011 HC RX IP 250 OP 636: Performed by: INTERNAL MEDICINE

## 2022-09-15 PROCEDURE — 97129 THER IVNTJ 1ST 15 MIN: CPT | Mod: GO

## 2022-09-15 RX ORDER — CIPROFLOXACIN 500 MG/1
500 TABLET, FILM COATED ORAL EVERY 12 HOURS
Qty: 8 TABLET | Refills: 0 | Status: SHIPPED | OUTPATIENT
Start: 2022-09-15 | End: 2022-09-19

## 2022-09-15 RX ORDER — METOPROLOL SUCCINATE 50 MG/1
25 TABLET, EXTENDED RELEASE ORAL DAILY
Qty: 30 TABLET | Refills: 6 | Status: SHIPPED | OUTPATIENT
Start: 2022-09-15 | End: 2022-09-29 | Stop reason: DRUGHIGH

## 2022-09-15 RX ADMIN — APIXABAN 2.5 MG: 2.5 TABLET, FILM COATED ORAL at 08:19

## 2022-09-15 RX ADMIN — BNT162B2 ORIGINAL AND OMICRON BA.4/BA.5 0.3 ML: .1125; .1125 INJECTION, SUSPENSION INTRAMUSCULAR at 10:49

## 2022-09-15 RX ADMIN — CIPROFLOXACIN 500 MG: 500 TABLET, FILM COATED ORAL at 06:18

## 2022-09-15 RX ADMIN — METOPROLOL SUCCINATE 25 MG: 25 TABLET, EXTENDED RELEASE ORAL at 08:18

## 2022-09-15 RX ADMIN — ATORVASTATIN CALCIUM 10 MG: 10 TABLET, FILM COATED ORAL at 08:19

## 2022-09-15 ASSESSMENT — ACTIVITIES OF DAILY LIVING (ADL)
ADLS_ACUITY_SCORE: 33

## 2022-09-15 NOTE — PLAN OF CARE
Occupational Therapy Discharge Summary    Reason for therapy discharge:    Discharged to 24 hour supervision    Progress towards therapy goal(s). See goals on Care Plan in [facility] electronic health record for goal details.  Goals met    Therapy recommendation(s):    Recommend more supportive living environment such as memory care a discharge due to impaired cognition with ADLs

## 2022-09-15 NOTE — PROGRESS NOTES
Care Management Discharge Note    Discharge Date: 09/15/2022       Discharge Disposition: Skilled Nursing Facility    Discharge Services:  Therapy Services    Discharge DME: None    Discharge Transportation: health plan transportation, family or friend will provide    Private pay costs discussed: transportation costs    PAS Confirmation Code:  [id number])  Patient/family educated on Medicare website which has current facility and service quality ratings: yes    Education Provided on the Discharge Plan:  Yes  Persons Notified of Discharge Plans: MD, RN, Patient, Patient family  Patient/Family in Agreement with the Plan: yes    Handoff Referral Completed: Yes    Additional Information:  Patient lives with her brother, Adam at [facility]. Patient will discharge to [facility] today, cousin Rosana will transport. MERYL left a message for Rosana to set up transportation today. MERYL spoke to bedside nurse who is going to give patient COVID booster. MERYL notified MD that TCU can accept patient today before 4.    10:50 AM  MERYL talked with Rosana who said that she can  patient at 1:30 today to transport to TCU.     11:25 AM  MERYL talked with [facility] who asked if we could move the ride back to 2pm. MERYL confirmed with Rosana that 2pm ride works. MERYL notified MD and RN.       Latasha Baker

## 2022-09-15 NOTE — PLAN OF CARE
PRIMARY DIAGNOSIS: SYNCOPE  OUTPATIENT/OBSERVATION GOALS TO BE MET BEFORE DISCHARGE:  1. Orthostatic performed: Yes:          Lying Orthostatic BP: 98/56         Sitting Orthostatic BP: 104/55         Standing Orthostatic BP: 101/55     2. Diagnostic testing complete & at baseline neurologic testing: Yes    3. Cleared by consultants (if involved): Yes    4. Interpretation of cardiac rhythm per telemetry tech: a-paced    5. Tolerating adequate PO diet and medications: Yes    6. Return to near baseline physical activity or neurologic status: Yes    Discharge Planner Nurse   Safe discharge environment identified: Yes  Barriers to discharge: No       Entered by: Mikaela Toribio RN 09/15/2022 10:29 AM     Please review provider order for any additional goals.   Nurse to notify provider when observation goals have been met and patient is ready for discharge.  Problem: Plan of Care - These are the overarching goals to be used throughout the patient stay.    Goal: Optimal Comfort and Wellbeing  Outcome: Ongoing, Progressing     Problem: Plan of Care - These are the overarching goals to be used throughout the patient stay.    Goal: Readiness for Transition of Care  Outcome: Ongoing, Progressing   Goal Outcome Evaluation:    Plan of Care Reviewed With: patient     Overall Patient Progress: improving    Pt is axox4, on RA, a-paced and assistx1 with a cane. Pt received a COVID booster this shift. Pt's family member Rosana will pick-up at 1:30pm.

## 2022-09-16 ENCOUNTER — TRANSITIONAL CARE UNIT VISIT (OUTPATIENT)
Dept: GERIATRICS | Facility: CLINIC | Age: 82
End: 2022-09-16
Payer: COMMERCIAL

## 2022-09-16 ENCOUNTER — PATIENT OUTREACH (OUTPATIENT)
Dept: CARE COORDINATION | Facility: CLINIC | Age: 82
End: 2022-09-16

## 2022-09-16 VITALS
WEIGHT: 120 LBS | RESPIRATION RATE: 17 BRPM | HEART RATE: 82 BPM | BODY MASS INDEX: 23.56 KG/M2 | DIASTOLIC BLOOD PRESSURE: 60 MMHG | HEIGHT: 60 IN | TEMPERATURE: 96.9 F | OXYGEN SATURATION: 98 % | SYSTOLIC BLOOD PRESSURE: 120 MMHG

## 2022-09-16 DIAGNOSIS — N39.0 URINARY TRACT INFECTION WITH HEMATURIA, SITE UNSPECIFIED: ICD-10-CM

## 2022-09-16 DIAGNOSIS — I48.0 PAROXYSMAL ATRIAL FIBRILLATION (H): ICD-10-CM

## 2022-09-16 DIAGNOSIS — F01.50 VASCULAR DEMENTIA WITHOUT BEHAVIORAL DISTURBANCE (H): ICD-10-CM

## 2022-09-16 DIAGNOSIS — D62 ANEMIA DUE TO BLOOD LOSS, ACUTE: ICD-10-CM

## 2022-09-16 DIAGNOSIS — R55 SYNCOPE, UNSPECIFIED SYNCOPE TYPE: Primary | ICD-10-CM

## 2022-09-16 DIAGNOSIS — N18.31 STAGE 3A CHRONIC KIDNEY DISEASE (H): ICD-10-CM

## 2022-09-16 DIAGNOSIS — I50.20 HEART FAILURE WITH REDUCED EJECTION FRACTION, NYHA CLASS I (H): ICD-10-CM

## 2022-09-16 DIAGNOSIS — I35.0 AORTIC VALVE STENOSIS, ETIOLOGY OF CARDIAC VALVE DISEASE UNSPECIFIED: ICD-10-CM

## 2022-09-16 DIAGNOSIS — R31.9 URINARY TRACT INFECTION WITH HEMATURIA, SITE UNSPECIFIED: ICD-10-CM

## 2022-09-16 DIAGNOSIS — I10 ESSENTIAL HYPERTENSION: ICD-10-CM
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
## 2022-09-16 PROCEDURE — 99309 SBSQ NF CARE MODERATE MDM 30: CPT | Performed by: NURSE PRACTITIONER

## 2022-09-16 NOTE — LETTER
9/16/2022        RE: Laney Hahn  [address]  [facility] [address]        [facility] GERIATR[facility]    PRIMARY CARE PROVIDER AND CLINIC:  Rose Mcelroy NP, [address] / [facility] [address]  Chief Complaint   Patient presents with     [facility]      [place] Medical Record Number:  [id number]  Place of Service where encounter took place:  [facility] (TCU) [[id number]]    Laney Hahn  is a 82 year old  (1940), admitted to the above facility from  [facility]. Hospital stay 9/11/22 through 9/15/22. Patient with PMH paroxysmal afib, CKD3, HFrEF, dementia, aortic stenosis, HTN, pacemaker, and CAD presented with syncope. Source was felt to be orthostatic hypotension possibly due to hypovolemia. She was also diagnosed with acute cystitis with gross hematuria. Diuretics were decreased. Telemetry unremarkable. Discharged on half of her usual metoprolol dose. Recommended to follow up with valve clinic for aortic stenosis.     HPI:    Patient denies any dizziness. She is eager to get home. She lives with her brother, indicates that this is possibly at an prison. She changes topics often, gets distracted by the TV, does not answer all questions directly, but is very pleasant and happy.   -130s/60s  HR 80s      CODE STATUS/ADVANCE DIRECTIVES DISCUSSION:  No CPR- Do NOT Intubate    ALLERGIES:   Allergies   Allergen Reactions     Sulfa Drugs Shortness Of Breath      PAST MEDICAL HISTORY:   Past Medical History:   Diagnosis Date     COVID-19 09/14/2020    positive test at [facility]     DNAR (do not attempt resuscitation)      Dyslipidemia, goal LDL below 70 09/15/2020     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 Nini Rasmussen     Metabolic encephalopathy      Moderate aortic valve stenosis 08/22/2017    stable on 2020 echo     Nonobstructive  atherosclerosis of coronary artery 09/15/2020    with normal LVEDP     Paroxysmal SVT (supraventricular tachycardia) (H) 09/07/2017    said to have short episodes while hospitalized for UTI per Dr. Rhys Mensah     Syncope 08/22/2017     UTI (urinary tract infection) 08/21/2017    hospitalized with weakness      PAST SURGICAL HISTORY:   has a past surgical history that includes Cataract Extraction, Bilateral; Tonsillectomy; Partial Gastrectomy (1969); Hysterectomy; Cv Coronary Angiogram (N/A, 9/15/2020); Cv Left Heart Catheterization Without Left Ventriculogram (Left, 9/15/2020); and Ep Pacemaker Insert (N/A, 4/13/2021).  FAMILY HISTORY: family history is not on file. She was adopted.  SOCIAL HISTORY:   reports that she has never smoked. She has never used smokeless tobacco. She reports that she does not drink alcohol and does not use drugs.  Patient's living condition: lives with family, brother     Post Discharge Medication Reconciliation Status:   Post Medication Reconciliation Status: Discharge medications reconciled, continue medications without change      Current Outpatient Medications   Medication Sig     atorvastatin (LIPITOR) 10 MG tablet Take 1 tablet (10 mg) by mouth every morning     ciprofloxacin (CIPRO) 500 MG tablet Take 1 tablet (500 mg) by mouth every 12 hours for 4 days     ELIQUIS ANTICOAGULANT 2.5 MG tablet TAKE 1 TABLET BY MOUTH TWICE DAILY     furosemide (LASIX) 20 MG tablet Take 0.5 tablets (10 mg) by mouth daily for 30 days     metoprolol succinate ER (TOPROL XL) 50 MG 24 hr tablet Take 0.5 tablets (25 mg) by mouth daily     No current facility-administered medications for this visit.       ROS:  4 point ROS including Respiratory, CV, GI and , other than that noted in the HPI,  is negative    Vitals:  /60   Pulse 82   Temp 96.9  F (36.1  C)   Resp 17   Ht 1.524 m (5')   Wt 54.4 kg (120 lb)   LMP  (LMP Unknown)   SpO2 98%   BMI 23.44 kg/m    Exam:  GENERAL APPEARANCE:   Alert, in no distress, thin  EYES:  EOM normal, conjunctiva and lids normal  RESP:  respiratory effort and palpation of chest normal, lungs clear to auscultation , no respiratory distress  CV:  Palpation and auscultation of heart done , no edema, rate-normal, grade 3/6 murmur  SKIN:  Inspection of skin and subcutaneous tissue baseline, Palpation of skin and subcutaneous tissue baseline  PSYCH:  insight and judgement impaired, memory impaired , affect and mood normal    Lab/Diagnostic data:  Recent labs in [facility] reviewed by me today.     ASSESSMENT/PLAN:  (R55) Syncope, unspecified syncope type  (primary encounter diagnosis)  Comment: Likely related to hypotension from illness and overdiuresis  Plan: Monitor BP. Monitor for dizziness.     (I35.0) Aortic valve stenosis, etiology of cardiac valve disease unspecified  Comment: Moderate to severe  Plan: follow up with valve clinic if patient wishes    (I48.0) Paroxysmal atrial fibrillation (H)  Comment: HR controlled  Plan: Continue current POC with no changes at this time and adjustments as needed.    (I50.20) Heart failure with reduced ejection fraction, NYHA class I (H)  Comment: Chronic: CHF due to effects of HTN/Heart Disease. Currently: compensated.  Plan: Continue current medications: lasix. Monitor weights q week. Call provider if greater than 3 pound gain from previous weight. Monitor for shortness of breath, wheezing, increasing lower extremity edema and change in activity tolerance.     (N39.0,  R31.9) Urinary tract infection with hematuria, site unspecified  Comment: Patient denies symptoms  Plan: Complete cipro as ordered    (D62) Anemia due to blood loss, acute  Comment: Due to hematuria. Hgb at discharge WNL  Plan: no follow up needed    (N18.31) Stage 3a chronic kidney disease (H)  Comment: Baseline creatinine 0.9-1.0  Plan: BMP prn    (F01.50) Vascular dementia without behavioral disturbance (H)  Comment: Notable impairment during visit. If she does live  in retirement, this should be appropriate for her to return to  Plan: OT eval and treat    (I10) Essential hypertension  Comment: Controlled  Plan: Continue current POC with no changes at this time and adjustments as needed.      Electronically signed by:  JHONNY Valdez CNP   [facility]  Phone: [phone]                     Sincerely,        JHONNY Valdez CNP

## 2022-09-16 NOTE — PROGRESS NOTES
[facility]    Background: Transitional Care Management program auto-identified and prompting a chart review by [facility] team.    Assessment: Upon chart review, CCR Team member will cancel/close this episode of Transitional Care Management program due to reason below:    Patient discharged to TCU/ARU/LTACH and is established within [facility] Primary [facility]. Referral created for Primary Care-Care Coordination program.    Plan: Transitional Care Management episode closed per reason above.      Kaci Paul MA  Connected Care Resource [place], [facility]    *Connected Care Resource Team does NOT follow patient ongoing. Referrals are identified based on internal discharge reports and the outreach is to ensure patient has an understanding of their discharge instructions.

## 2022-09-16 NOTE — PROGRESS NOTES
[facility] GERIATRICS    PRIMARY CARE PROVIDER AND CLINIC:  Rose Mcelroy NP, [address] / [facility] [address]  Chief Complaint   Patient presents with     [facility] Medical Record Number:  [id number]  Place of Service where encounter took place:  [facility] (TCU) [[id number]]    Laney Hahn  is a 82 year old  (1940), admitted to the above facility from  [facility]. Hospital stay 9/11/22 through 9/15/22. Patient with PMH paroxysmal afib, CKD3, HFrEF, dementia, aortic stenosis, HTN, pacemaker, and CAD presented with syncope. Source was felt to be orthostatic hypotension possibly due to hypovolemia. She was also diagnosed with acute cystitis with gross hematuria. Diuretics were decreased. Telemetry unremarkable. Discharged on half of her usual metoprolol dose. Recommended to follow up with valve clinic for aortic stenosis.     HPI:    Patient denies any dizziness. She is eager to get home. She lives with her brother, indicates that this is possibly at an nursing home. She changes topics often, gets distracted by the TV, does not answer all questions directly, but is very pleasant and happy.   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 Nini Rasmussen     Metabolic encephalopathy      Moderate aortic valve stenosis 08/22/2017    stable on 2020 echo     Nonobstructive atherosclerosis of coronary artery 09/15/2020    with normal LVEDP     Paroxysmal SVT (supraventricular  tachycardia) (H) 09/07/2017    said to have short episodes while hospitalized for UTI per Dr. Rhys Mensah     Syncope 08/22/2017     UTI (urinary tract infection) 08/21/2017    hospitalized with weakness      PAST SURGICAL HISTORY:   has a past surgical history that includes Cataract Extraction, Bilateral; Tonsillectomy; Partial Gastrectomy (1969); Hysterectomy; Cv Coronary Angiogram (N/A, 9/15/2020); Cv Left Heart Catheterization Without Left Ventriculogram (Left, 9/15/2020); and Ep Pacemaker Insert (N/A, 4/13/2021).  FAMILY HISTORY: family history is not on file. She was adopted.  SOCIAL HISTORY:   reports that she has never smoked. She has never used smokeless tobacco. She reports that she does not drink alcohol and does not use drugs.  Patient's living condition: lives with family, brother     Post Discharge Medication Reconciliation Status:   Post Medication Reconciliation Status: Discharge medications reconciled, continue medications without change      Current Outpatient Medications   Medication Sig     atorvastatin (LIPITOR) 10 MG tablet Take 1 tablet (10 mg) by mouth every morning     ciprofloxacin (CIPRO) 500 MG tablet Take 1 tablet (500 mg) by mouth every 12 hours for 4 days     ELIQUIS ANTICOAGULANT 2.5 MG tablet TAKE 1 TABLET BY MOUTH TWICE DAILY     furosemide (LASIX) 20 MG tablet Take 0.5 tablets (10 mg) by mouth daily for 30 days     metoprolol succinate ER (TOPROL XL) 50 MG 24 hr tablet Take 0.5 tablets (25 mg) by mouth daily     No current facility-administered medications for this visit.       ROS:  4 point ROS including Respiratory, CV, GI and , other than that noted in the HPI,  is negative    Vitals:  /60   Pulse 82   Temp 96.9  F (36.1  C)   Resp 17   Ht 1.524 m (5')   Wt 54.4 kg (120 lb)   LMP  (LMP Unknown)   SpO2 98%   BMI 23.44 kg/m    Exam:  GENERAL APPEARANCE:  Alert, in no distress, thin  EYES:  EOM normal, conjunctiva and lids normal  RESP:  respiratory effort and  palpation of chest normal, lungs clear to auscultation , no respiratory distress  CV:  Palpation and auscultation of heart done , no edema, rate-normal, grade 3/6 murmur  SKIN:  Inspection of skin and subcutaneous tissue baseline, Palpation of skin and subcutaneous tissue baseline  PSYCH:  insight and judgement impaired, memory impaired , affect and mood normal    Lab/Diagnostic data:  Recent labs in [facility] reviewed by me today.     ASSESSMENT/PLAN:  (R55) Syncope, unspecified syncope type  (primary encounter diagnosis)  Comment: Likely related to hypotension from illness and overdiuresis  Plan: Monitor BP. Monitor for dizziness.     (I35.0) Aortic valve stenosis, etiology of cardiac valve disease unspecified  Comment: Moderate to severe  Plan: follow up with valve clinic if patient wishes    (I48.0) Paroxysmal atrial fibrillation (H)  Comment: HR controlled  Plan: Continue current POC with no changes at this time and adjustments as needed.    (I50.20) Heart failure with reduced ejection fraction, NYHA class I (H)  Comment: Chronic: CHF due to effects of HTN/Heart Disease. Currently: compensated.  Plan: Continue current medications: lasix. Monitor weights q week. Call provider if greater than 3 pound gain from previous weight. Monitor for shortness of breath, wheezing, increasing lower extremity edema and change in activity tolerance.     (N39.0,  R31.9) Urinary tract infection with hematuria, site unspecified  Comment: Patient denies symptoms  Plan: Complete cipro as ordered    (D62) Anemia due to blood loss, acute  Comment: Due to hematuria. Hgb at discharge WNL  Plan: no follow up needed    (N18.31) Stage 3a chronic kidney disease (H)  Comment: Baseline creatinine 0.9-1.0  Plan: BMP prn    (F01.50) Vascular dementia without behavioral disturbance (H)  Comment: Notable impairment during visit. If she does live in [place], this should be appropriate for her to return to  Plan: OT eval and treat    (I10) Essential  hypertension  Comment: Controlled  Plan: Continue current POC with no changes at this time and adjustments as needed.      Electronically signed by:  JHONNY Valdez [facility]  Phone: [phone]

## 2022-09-16 NOTE — PROGRESS NOTES
Contact   Chart Review     Situation: Patient chart reviewed by .    Background: Referral to follow after hospitalization to TCU.     Assessment: Patient lives in assisted living and will have support from AL upon discharge from TCU.     Plan/Recommendations: No further outreach scheduled.    [place]ro [place],   [facility]  [phone]

## 2022-09-16 NOTE — DISCHARGE SUMMARY
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  No follow up labs or test are   needed.             Discharge Disposition   Discharged to short-term care facility  Condition at discharge: Stable    Hospital Course   Laney Hahn is 82 year old female with history of SVT, stage III CKD, vascular dementia, paroxysmal atrial fibrillation on apixaban, aortic valve stenosis,  HFrEF, hypertension, hyperlipidemia, AV node dysfunction status post pacemaker placement and CAD who presents to the ER due to episode of syncope preceded by dizziness.     Syncope  Dizziness-resolved  Unspecified syncope type-felt orthostatic, came on after getting up, possibly due to hypovolemia.  Backed off on doses of diuretics.  Device interrogation shows no arrhythmias, aortic stenosis is not critical.  Most likely orthostatic syncope.  Telemetry unremarkable. She received IV fluid in the ER with improvement.  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  Consider uptitrating with heart rate and blood pressure as necessary  -Continue PTA apixaban     Aortic valve stenosis  HFrEF  -Echo 9/12: EF 40 to 45%, moderate to severe aortic stenosis, moderate mitral valve stenosis, mild MR  -Discharged on reduced dose of Lasix and metoprolol  -Outpatient cardiology follow-up     Urinary tract infection with hematuria  Acute Blood loss anemia due to hematuria  -Abdominal CT showed small benign left renal cysts are present, as well as a single tiny 2 mm nonobstructing stone, circumferential inflammatory wall thickening involving splenic flexure and left hemicolon consistent with colitis, infectious or inflammatory etiologies most likely and moderate dilatation of extrahepatic bile duct without suggestion of obstructing lesion.  -Unable to perform urinalysis due to gross hematuria.  -Hemoglobin dropped from 14.6-11.6, then stabilised  -Urine culture growing Klebsiella  -Ceftriaxone 1 g every 24 hours transition to ciprofloxacin 50 mg every 12 for 5 days to complete a total of 7-day course     JESUS on CKD stage 3a-improved  -Creatinine is elevated on admission at 1.47 compared to ~ baseline 0.9-1.0     Fall  Vascular dementia  Brain CT showed no acute intracranial abnormality. Cervical spine CT showed no evidence for acute fracture or post traumatic subluxation.  PT/OT     Hypertension  -Pressures were normal without any medication, restarted metoprolol at half home dose    Hyperlipidemia  CAD  -Continue PTA atorvastatin          Consultations This Hospital Stay   CARDIOLOGY IP CONSULT  PHYSICAL THERAPY ADULT IP CONSULT  OCCUPATIONAL THERAPY ADULT IP CONSULT  CARDIOLOGY IP CONSULT  CARE MANAGEMENT / SOCIAL WORK IP CONSULT  PHYSICAL THERAPY ADULT IP CONSULT  OCCUPATIONAL THERAPY ADULT IP CONSULT    Code  Status   Full Code    Time Spent on this Encounter   I, Bharti Bean MD, personally saw the patient today and spent greater than 30 minutes discharging this patient.       Bharti Bean MD  [facility]  [address] [place] [id number]  Phone: [phone]  Fax: [phone]  ______________________________________________________________________    Physical Exam   Vital Signs:                    Weight: 126 lbs 0 oz  General: AAOx3, NAD  HEENT: Oral mucosa moist and non-erythematous, PERRLA, EOM intact  CV: RRR, normal S1S2, no murmur, clicks, rubs  Resp: Clear to auscultation bilaterally, no wheezes, rhonchi  Abd: Soft, non-tender, BS+, no masses appreciated  Extremities: Radial and pedal pulses intact and symmetric, no pedal edema  Neuro: No lateralizing symptoms or focal neurologic deficits    Primary Care Physician   Rose Mcelroy    Discharge Orders      Adult Cardiology Eval  Referral      Adult Cardiology Eval  Referral      Primary Care - Care Coordination Referral      General info for SNF    Length of Stay Estimate: Short Term Care: Estimated # of Days <30  Condition at Discharge: Improving  Level of care:skilled   Rehabilitation Potential: Good  Admission H&P remains valid and up-to-date: Yes  Recent Chemotherapy: N/A  Use Nursing Home Standing Orders: Yes     Mantoux instructions    Give two-step Mantoux (PPD) Per Facility Policy Yes     Follow Up and recommended labs and tests    Follow up with Nursing home physician.  No follow up labs or test are needed.     Reason for your hospital stay    Episode of syncope preceded by dizziness, felt to be related to medication-diuretics.  Urinary tract infection     Activity - Up with assistive device     Full Code     Physical Therapy Adult Consult    Evaluate and treat as clinically indicated.    Reason:  Weakness     Occupational Therapy Adult Consult    Evaluate and treat as  clinically indicated.    Reason:  Weakness     Fall precautions     Diet    Follow this diet upon discharge: Orders Placed This Encounter      2 Gram Sodium Diet       Significant Results and Procedures   Most Recent 3 CBC's:Recent Labs   Lab Test 09/15/22  0438 09/14/22  0440 09/11/22  1733   WBC 4.6 4.1 8.5   HGB 12.1 11.6* 14.6   MCV 98 98 99    148* 168     Most Recent 3 BMP's:Recent Labs   Lab Test 09/15/22  0438 09/14/22  0440 09/12/22  1719 09/11/22  1733    141  --  142   POTASSIUM 4.0 4.2  --  4.7   CHLORIDE 108* 111*  --  106   CO2 25 24  --  22   BUN 23 24  --  30*   CR 0.96 1.09  --  1.47*   ANIONGAP 6 6  --  14   CURT 8.6 8.5  --  9.3    113 122* 122   ,   Results for orders placed or performed during the hospital encounter of 09/11/22   Head CT w/o contrast    Narrative    EXAM: CT HEAD W/O CONTRAST, CT CERVICAL SPINE W/O CONTRAST  LOCATION: [facility]  DATE/TIME: 9/11/2022 5:53 PM    INDICATION: syncope, fall. Dementia. Posterior neck pain.  COMPARISON: CT head and cervical spine 07/22/2022.  TECHNIQUE:   1) Routine CT Head without IV contrast. Multiplanar reformats. Dose reduction techniques were used.  2) Routine CT Cervical Spine without IV contrast. Multiplanar reformats. Dose reduction techniques were used.    FINDINGS:   HEAD CT:   INTRACRANIAL CONTENTS: No intracranial hemorrhage, extraaxial collection, or mass effect.  No CT evidence of acute infarct. Stable chronic infarct in the left anterior periventricular area and in the right frontal white matter. Moderate presumed chronic   small vessel ischemic changes. Moderate generalized volume loss. No hydrocephalus.     VISUALIZED ORBITS/SINUSES/MASTOIDS: No intraorbital abnormality. No paranasal sinus mucosal disease. No middle ear or mastoid effusion.    BONES/SOFT TISSUES: No acute abnormality.    CERVICAL SPINE CT:   VERTEBRA: Normal vertebral body heights. Straightened cervical lordosis unchanged.  No acute fracture or posttraumatic subluxation.     CANAL/FORAMINA: Grossly the central canal is adequate.    PARASPINAL: No extraspinal abnormality. Visualized lung fields are clear. Pacemaker.      Impression    IMPRESSION:  HEAD CT:  1.  No acute intracranial abnormality.  2.  Stable at least moderate age-related changes.    CERVICAL SPINE CT:  1.  No CT evidence for acute fracture or post traumatic subluxation.   Cervical spine CT w/o contrast    Narrative    EXAM: CT HEAD W/O CONTRAST, CT CERVICAL SPINE W/O CONTRAST  LOCATION: [facility]  DATE/TIME: 9/11/2022 5:53 PM    INDICATION: syncope, fall. Dementia. Posterior neck pain.  COMPARISON: CT head and cervical spine 07/22/2022.  TECHNIQUE:   1) Routine CT Head without IV contrast. Multiplanar reformats. Dose reduction techniques were used.  2) Routine CT Cervical Spine without IV contrast. Multiplanar reformats. Dose reduction techniques were used.    FINDINGS:   HEAD CT:   INTRACRANIAL CONTENTS: No intracranial hemorrhage, extraaxial collection, or mass effect.  No CT evidence of acute infarct. Stable chronic infarct in the left anterior periventricular area and in the right frontal white matter. Moderate presumed chronic   small vessel ischemic changes. Moderate generalized volume loss. No hydrocephalus.     VISUALIZED ORBITS/SINUSES/MASTOIDS: No intraorbital abnormality. No paranasal sinus mucosal disease. No middle ear or mastoid effusion.    BONES/SOFT TISSUES: No acute abnormality.    CERVICAL SPINE CT:   VERTEBRA: Normal vertebral body heights. Straightened cervical lordosis unchanged. No acute fracture or posttraumatic subluxation.     CANAL/FORAMINA: Grossly the central canal is adequate.    PARASPINAL: No extraspinal abnormality. Visualized lung fields are clear. Pacemaker.      Impression    IMPRESSION:  HEAD CT:  1.  No acute intracranial abnormality.  2.  Stable at least moderate age-related changes.    CERVICAL SPINE  CT:  1.  No CT evidence for acute fracture or post traumatic subluxation.   CT Abdomen Pelvis w/o & w Contrast    Narrative    EXAM: CT ABDOMEN PELVIS W/O and W CONTRAST  LOCATION: [facility]  DATE/TIME: 9/11/2022 8:47 PM    INDICATION: hematuria  COMPARISON: 7/22/2022  TECHNIQUE: CT scan of the abdomen and pelvis was performed before and after injection of IV contrast. Multiplanar reformats were obtained. Dose reduction techniques were used.  CONTRAST: 75ml Isovue 370     FINDINGS:   LOWER CHEST: Normal.    HEPATOBILIARY: Liver and gallbladder unremarkable. There is dilatation of the extrahepatic bile duct which measures 1.4 cm at lisa hepatis and CBD measures 9 mm within pancreatic head. No obstructing stone or lesion identified. This is similar to   previous exam.    PANCREAS: Parenchymal atrophy unchanged.    SPLEEN: Normal.    ADRENAL GLANDS: Normal.    KIDNEYS/BLADDER: Faint 2 mm stone left lower pole unchanged. Small left renal cysts, no suspicious mass, no further follow-up indicated. Right kidney negative. Minimal bladder wall thickening.    BOWEL: Moderate sigmoid diverticulosis. Circumferential wall thickening involving distal transverse colon and descending colon consistent with colitis.    LYMPH NODES: Normal.    VASCULATURE: Unremarkable.    PELVIC ORGANS: No adnexal lesions. No free fluid.    MUSCULOSKELETAL: No suspicious bony lesion.      Impression    IMPRESSION:   1.  No suspicious urinary tract mass. Small benign left renal cysts are present, as well as a single tiny 2 mm nonobstructing stone.  2.  There is circumferential inflammatory wall thickening involving splenic flexure and left hemicolon consistent with colitis, infectious or inflammatory etiologies most likely.  3.  Diverticulosis without definite evidence for acute diverticulitis.  4.  Moderate dilatation of extrahepatic bile duct without suggestion of obstructing lesion, unchanged. Can't exclude ampullary  stenosis.     Echocardiogram Complete     Value    LVEF  40-45%    [facility]    [id number]  [id number]  [id number]  [id number]^[address] [address]     Name: NEPTALI PRAKASH  MRN: [id number]  : 1940  Study Date: 2022 12:18 PM  Age: 82 yrs  Gender: Female  Patient Location: [facility]  Reason For Study: Aortic Valve Disorder  Ordering Physician: EDUAR GAO  Performed By: KINSEY     BSA: 1.6 m2  Height: 63 in  Weight: 120 lb  HR: 80  ______________________________________________________________________________  Procedure  Complete Portable Echo Adult.  ______________________________________________________________________________  Interpretation Summary     The left ventricle is normal in size.  There is normal left ventricular wall thickness.  Diastolic Doppler findings (E/E' ratio and/or other parameters) suggest left  ventricular filling pressures are increased.  The visual ejection fraction is 40-45%.  There is mild-moderate global hypokinesia of the left ventricle.  Normal right ventricle size and systolic function.  The left atrium is moderately dilated.  Trileaflet aortic valve with calcification and reduced excursion.  It appears moderate to severe aortic stenosis. The mean gradient is 23 mmHg  and area aortic valve 0.7-0.8 cm2. SVi 32ml/m2.  Moderate mitral valve stenosis 8 mmHg at 80 BPM.  Mild mitral valve regurgitation.     When compared to previous on 5-, aortic valve stenosis and laosi mitral  valve stenosis is getting worse. Both mitral valve and aortic valve gradients  are higher than previous study.     ______________________________________________________________________________  I      WMSI = 2.00     % Normal = 0     X - Cannot   0 -                      (2) - Mildly 2 -          Segments  Size  Interpret    Hyperkinetic 1 - Normal  Hypokinetic  Hypokinetic  1-2     small                                                     7 -           3-5      moderate  3 - Akinetic 4 -          5 -         6 - Akinetic Dyskinetic   6-14    large               Dyskinetic   Aneurysmal  w/scar       w/scar       15-16   diffuse     Left Ventricle  The left ventricle is normal in size. There is normal left ventricular wall  thickness. The visual ejection fraction is 40-45%. Diastolic Doppler findings  (E/E' ratio and/or other parameters) suggest left ventricular filling  pressures are increased. There is mild-moderate global hypokinesia of the left  ventricle.     Right Ventricle  Normal right ventricle size and systolic function.     Atria  The left atrium is moderately dilated. The right atrium is borderline dilated.  There is no atrial shunt seen.     Mitral Valve  The mitral valve leaflets appear thickened, but open well. There is mild to  moderate mitral annular calcification. There is mild (1+) mitral  regurgitation. There is moderate mitral stenosis. The mean mitral valve  gradient is 7.7 mmHg.     Tricuspid Valve  The tricuspid valve is not well visualized, but is grossly normal. There is  mild (1+) tricuspid regurgitation. There is no tricuspid stenosis.     Aortic Valve  The aortic valve is trileaflet with aortic valve sclerosis. Moderate aortic  valve calcification is present. There is trace aortic regurgitation. Moderate  to severe valvular aortic stenosis. The mean AoV pressure gradient is 23.0  mmHg. The calculated aortic valve are is 0.73 cm^2.     Pulmonic Valve  The pulmonic valve is not well seen, but is grossly normal.     Vessels  The aorta root is normal. IVC diameter <2.1 cm collapsing >50% with sniff  suggests a normal RA pressure of 3 mmHg.     Pericardium  There is no pericardial effusion.     Rhythm  Sinus rhythm was noted.     ______________________________________________________________________________  MMode/2D Measurements & Calculations  IVSd: 1.1 cm  LVIDd: 4.4 cm  LVIDs: 3.8 cm  LVPWd: 0.96 cm     FS: 14.1 %  LV mass(C)d:  151.9 grams  LV mass(C)dI: 97.6 grams/m2  Ao root diam: 3.0 cm  LA dimension: 3.3 cm  asc Aorta Diam: 3.2 cm  LA/Ao: 1.1  LVOT diam: 1.8 cm  LVOT area: 2.5 cm2  LA Volume Indexed (AL/bp): 41.1 ml/m2  RWT: 0.43     Doppler Measurements & Calculations  MV E max bandar: 106.0 cm/sec  MV A max bandar: 151.2 cm/sec  MV E/A: 0.70     MV max P.3 mmHg  MV mean P.7 mmHg  MV V2 VTI: 42.0 cm  MVA(VTI): 1.2 cm2  MV P1/2t max bandar: 167.1 cm/sec  MV P1/2t: 76.3 msec  MVA(P1/2t): 2.9 cm2  MV dec slope: 641.5 cm/sec2  MV dec time: 0.24 sec  Ao V2 max: 287.4 cm/sec  Ao max P.0 mmHg  Ao V2 mean: 227.4 cm/sec  Ao mean P.0 mmHg  Ao V2 VTI: 68.8 cm  LANNY(I,D): 0.73 cm2  LANNY(V,D): 0.82 cm2  LV V1 max PG: 3.6 mmHg  LV V1 max: 94.3 cm/sec  LV V1 VTI: 20.4 cm  SV(LVOT): 50.6 ml  SI(LVOT): 32.5 ml/m2  PA acc time: 0.18 sec  TR max bandar: 247.0 cm/sec  TR max P.4 mmHg  AV Bandar Ratio (DI): 0.33  LANNY Index (cm2/m2): 0.47  E/E' av.8  Lateral E/e': 22.1  Medial E/e': 29.5     ______________________________________________________________________________  Report approved by: Jess Cabezas 2022 04:42 PM               Discharge Medications   Discharge Medication List as of 9/15/2022  2:05 PM      START taking these medications    Details   ciprofloxacin (CIPRO) 500 MG tablet Take 1 tablet (500 mg) by mouth every 12 hours for 4 days, Disp-8 tablet, R-0, E-Prescribe         CONTINUE these medications which have CHANGED    Details   furosemide (LASIX) 20 MG tablet Take 0.5 tablets (10 mg) by mouth daily for 30 days, Disp-15 tablet, R-0, E-Prescribe      metoprolol succinate ER (TOPROL XL) 50 MG 24 hr tablet Take 0.5 tablets (25 mg) by mouth daily, Disp-30 tablet, R-6, E-Prescribe         CONTINUE these medications which have NOT CHANGED    Details   atorvastatin (LIPITOR) 10 MG tablet Take 1 tablet (10 mg) by mouth every morning, Disp-30 tablet, R-11, E-Prescribe      ELIQUIS ANTICOAGULANT 2.5 MG tablet TAKE 1 TABLET BY MOUTH  TWICE DAILY, Disp-60 tablet, R-5, E-PrescribePLEASE AUTHORIZE REFILL. NO REFILLS REMAIN. THANK YOU!         STOP taking these medications       aspirin-acetaminophen-caffeine (EXCEDRIN MIGRAINE) 250-250-65 MG tablet Comments:   Reason for Stopping:             Allergies   Allergies   Allergen Reactions     Sulfa Drugs Shortness Of Breath

## 2022-09-19 LAB
ATRIAL RATE - MUSE: 94 BPM
DIASTOLIC BLOOD PRESSURE - MUSE: NORMAL MMHG
INTERPRETATION ECG - MUSE: NORMAL
P AXIS - MUSE: 59 DEGREES
PR INTERVAL - MUSE: 192 MS
QRS DURATION - MUSE: 124 MS
QT - MUSE: 420 MS
QTC - MUSE: 525 MS
R AXIS - MUSE: 94 DEGREES
SYSTOLIC BLOOD PRESSURE - MUSE: NORMAL MMHG
T AXIS - MUSE: -53 DEGREES
VENTRICULAR RATE- MUSE: 94 BPM

## 2022-09-23 ENCOUNTER — DISCHARGE SUMMARY NURSING HOME (OUTPATIENT)
Dept: GERIATRICS | Facility: CLINIC | Age: 82
End: 2022-09-23
Payer: COMMERCIAL

## 2022-09-23 VITALS
TEMPERATURE: 96.5 F | OXYGEN SATURATION: 96 % | RESPIRATION RATE: 16 BRPM | DIASTOLIC BLOOD PRESSURE: 76 MMHG | HEART RATE: 70 BPM | WEIGHT: 127.3 LBS | HEIGHT: 63 IN | BODY MASS INDEX: 22.55 KG/M2 | SYSTOLIC BLOOD PRESSURE: 120 MMHG

## 2022-09-23 DIAGNOSIS — R55 SYNCOPE, UNSPECIFIED SYNCOPE TYPE: Primary | ICD-10-CM

## 2022-09-23 DIAGNOSIS — I48.0 PAROXYSMAL ATRIAL FIBRILLATION (H): ICD-10-CM

## 2022-09-23 DIAGNOSIS — I10 ESSENTIAL HYPERTENSION: ICD-10-CM

## 2022-09-23 DIAGNOSIS — I35.0 AORTIC VALVE STENOSIS, ETIOLOGY OF CARDIAC VALVE DISEASE UNSPECIFIED: ICD-10-CM

## 2022-09-23 DIAGNOSIS — I50.20 HEART FAILURE WITH REDUCED EJECTION FRACTION, NYHA CLASS I (H): ICD-10-CM

## 2022-09-23 DIAGNOSIS — N18.31 STAGE 3A CHRONIC KIDNEY DISEASE (H): ICD-10-CM

## 2022-09-23 DIAGNOSIS — F01.50 VASCULAR DEMENTIA WITHOUT BEHAVIORAL DISTURBANCE (H): ICD-10-CM

## 2022-09-23 PROCEDURE — 99316 NF DSCHRG MGMT 30 MIN+: CPT | Performed by: NURSE PRACTITIONER

## 2022-09-23 NOTE — PROGRESS NOTES
[facility] GERIATRICS DISCHARGE SUMMARY  PATIENT'S NAME: Laney Hahn  YOB: 1940  MEDICAL RECORD NUMBER:  [id number]  Place of Service where encounter took place:  [facility] ([facility] [[id number]]    PRIMARY CARE PROVIDER AND CLINIC RESPONSIBLE AFTER TRANSFER:   Rose Mcelroy NP, [address] / [facility] [address]    [facility]       Transferring providers: JHONNY Valdez CNP, Karo Tolentino MD  Recent Hospitalization/ED:  [facility] stay 9/11/22 to 9/15/22.  Date of SNF Admission: September 15, 2022  Date of SNF (anticipated) Discharge: September 23, 2022  Discharged to: previous assisted living  Physical Function: Ambulating up and down the hallways independently with walker  DME: No new DME needed    CODE STATUS/ADVANCE DIRECTIVES DISCUSSION:  No CPR- Do NOT Intubate   ALLERGIES: Sulfa drugs    NURSING FACILITY COURSE   Medication Changes/Rationale:     none    Summary of nursing facility stay:   [facility] stay 9/11/22 through 9/15/22. Patient with PMH paroxysmal afib, CKD3, HFrEF, dementia, aortic stenosis, HTN, pacemaker, and CAD presented with syncope. Source was felt to be orthostatic hypotension possibly due to hypovolemia. She was also diagnosed with acute cystitis with gross hematuria. Diuretics were decreased. Telemetry unremarkable. Discharged on half of her usual metoprolol dose. Recommended to follow up with valve clinic for aortic stenosis.     Syncope, unspecified syncope type  No dizziness or falls while at TCU    Aortic valve stenosis, etiology of cardiac valve disease unspecified  Moderate to severe per hospital record. Recommend follow up with valve clinic    Paroxysmal atrial fibrillation (H)  HR controlled, 80s    Heart failure with reduced ejection fraction, NYHA class I (H)  Euvolemic. Weight stable    Stage 3a chronic kidney disease (H)  GFR Estimate   Date Value Ref Range Status    09/15/2022 59 (L) >60 mL/min/1.73m2 Final     Comment:     Effective December 21, 2021 eGFRcr in adults is calculated using the 2021 CKD-EPI creatinine equation which includes age and gender (Sydni diamond al., NEJM, DOI: 10.1056/JSKTpp7076938)   06/16/2021 40 (L) >60 mL/min/1.73m2 Final   03/15/2021 41 (L) >60 mL/min/[1.73_m2] Final     Comment:     Non  GFR Calc  Starting 12/18/2018, serum creatinine based estimated GFR (eGFR) will be   calculated using the Chronic Kidney Disease Epidemiology Collaboration   (CKD-EPI) equation.       Vascular dementia without behavioral disturbance (H)  Patient is very pleasant, but has obvious memory issues. Appropriate for RAMNO.     Essential hypertension  -120s/60s      Discharge Medications:  {  Current Outpatient Medications   Medication Sig Dispense Refill     atorvastatin (LIPITOR) 10 MG tablet Take 1 tablet (10 mg) by mouth every morning 30 tablet 11     ELIQUIS ANTICOAGULANT 2.5 MG tablet TAKE 1 TABLET BY MOUTH TWICE DAILY 60 tablet 5     furosemide (LASIX) 20 MG tablet Take 0.5 tablets (10 mg) by mouth daily for 30 days 15 tablet 0     metoprolol succinate ER (TOPROL XL) 50 MG 24 hr tablet Take 0.5 tablets (25 mg) by mouth daily 30 tablet 6        Controlled medications:   not applicable/none     Past Medical History:   Past Medical History:   Diagnosis Date     C[facility]14[facility]2020    [facility]      Dyslipidemia, goal LDL below 70 09/15/2020     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 Nini Rasmussen     Metabolic encephalopathy      Moderate aortic valve stenosis 08/22/2017    stable on 2020 echo     Nonobstructive atherosclerosis of coronary artery 09/15/2020    with normal LVEDP     Paroxysmal SVT (supraventricular tachycardia) (H) 09/07/2017    said to have short episodes while  "hospitalized for UTI per Dr. Rhys Mensah     Syncope 08/22/2017     UTI (urinary tract infection) 08/21/2017    hospitalized with weakness     Physical Exam:   Vitals: /76   Pulse 70   Temp (!) 96.5  F (35.8  C)   Resp 16   Ht 1.6 m (5' 3\")   Wt 57.7 kg (127 lb 4.8 oz)   LMP  (LMP Unknown)   SpO2 96%   BMI 22.55 kg/m    BMI: Body mass index is 22.55 kg/m .   GENERAL APPEARANCE:  Alert, in no distress, thin  EYES:  EOM normal, conjunctiva and lids normal  RESP:  respiratory effort and palpation of chest normal, lungs clear to auscultation , no respiratory distress  CV:  Palpation and auscultation of heart done , no edema, rate-normal, grade 3/6 murmur  SKIN:  Inspection of skin and subcutaneous tissue baseline, Palpation of skin and subcutaneous tissue baseline  PSYCH:  insight and judgement impaired, memory impaired , affect and mood normal      SNF labs: Labs done in SNF are in [place] EPIC. Please refer to them using [employer]/Care Everywhere.    DISCHARGE PLAN:    Follow up labs: No labs orders/due    Medical Follow Up:      Follow up with primary care provider in 2-4 weeks    Current [place] scheduled appointments:  Next 5 appointments (look out 90 days)    Sep 29, 2022  9:30 AM  (Arrive by 9:10 AM)  Provider Visit with Rose Mcelroy NP  [facility] ([facility] ) [address] [place] [id number] [phone]           Discharge Services: Home Care:  Occupational Therapy, Physical Therapy, Registered Nurse and From:  [facility]      TOTAL DISCHARGE TIME:   Greater than 30 minutes  Electronically signed by:  JHONNY Valdez CNP       Documentation of Face to Face and Certification for Home Health Services    I certify that services are/were furnished while this patient was under the care of a physician and that a physician or an allowed non-physician practitioner (NPP), had a face-to-face encounter that meets the " physician face-to-face encounter requirements. The encounter was in whole, or in part, related to the primary reason for home health. The patient is confined to his/her home and needs intermittent skilled nursing, physical therapy, speech-language pathology, or the continued need for occupational therapy. A plan of care has been established by a physician and is periodically reviewed by a physician.  Date of Face-to-Face Encounter: 9/23/2022.    I certify that, based on my findings, the following services are medically necessary home health services: Nursing, Occupational Therapy and Physical Therapy.    My clinical findings support the need for the above skilled services because: Requires assistance of another person or specialized equipment to access medical services because patient: Is prone to wander/get lost without assistance...    Patient to re-establish plan of care with their PCP within 7-10 days after leaving the facility to reestablish care.  Medicare certified SWETHA provider: JHONNY Valdez CNP  Date: September 23, 2022

## 2022-09-23 NOTE — LETTER
9/23/2022        RE: Laney Hahn  [address]  [facility] [address]        [facility] GERIATRICS DISCHARGE SUMMARY  PATIENT'S NAME: Laney Hahn  YOB: 1940  MEDICAL RECORD NUMBER:  [id number]  Place of Service where encounter took place:  [facility] (TCU) [[id number]]    PRIMARY CARE PROVIDER AND CLINIC RESPONSIBLE AFTER TRANSFER:   Rose Mcelroy NP, [address] / [facility] [address]    [facility]       Transferring providers: JHONNY Valdez CNP, Karo Tolentino MD  Recent Hospitalization/ED:  [facility] stay 9/11/22 to 9/15/22.  Date of SNF Admission: September 15, 2022  Date of SNF (anticipated) Discharge: September 23, 2022  Discharged to: previous assisted living  Physical Function: Ambulating up and down the hallways independently with walker  DME: No new DME needed    CODE STATUS/ADVANCE DIRECTIVES DISCUSSION:  No CPR- Do NOT Intubate   ALLERGIES: Sulfa drugs    NURSING FACILITY COURSE   Medication Changes/Rationale:     none    Summary of nursing facility stay:   [facility] stay 9/11/22 through 9/15/22. Patient with PMH paroxysmal afib, CKD3, HFrEF, dementia, aortic stenosis, HTN, pacemaker, and CAD presented with syncope. Source was felt to be orthostatic hypotension possibly due to hypovolemia. She was also diagnosed with acute cystitis with gross hematuria. Diuretics were decreased. Telemetry unremarkable. Discharged on half of her usual metoprolol dose. Recommended to follow up with valve clinic for aortic stenosis.     Syncope, unspecified syncope type  No dizziness or falls while at TCU    Aortic valve stenosis, etiology of cardiac valve disease unspecified  Moderate to severe per hospital record. Recommend follow up with valve clinic    Paroxysmal atrial fibrillation (H)  HR controlled, 80s    Heart failure with reduced ejection fraction, NYHA class I (H)  Euvolemic. Weight  stable    Stage 3a chronic kidney disease (H)  GFR Estimate   Date Value Ref Range Status   09/15/2022 59 (L) >60 mL/min/1.73m2 Final     Comment:     Effective December 21, 2021 eGFRcr in adults is calculated using the 2021 CKD-EPI creatinine equation which includes age and gender (Sydni diamond al., NE, DOI: 10.1056/OOQKpk7630025)   06/16/2021 40 (L) >60 mL/min/1.73m2 Final   03/15/2021 41 (L) >60 mL/min/[1.73_m2] Final     Comment:     Non  GFR Calc  Starting 12/18/2018, serum creatinine based estimated GFR (eGFR) will be   calculated using the Chronic Kidney Disease Epidemiology Collaboration   (CKD-EPI) equation.       Vascular dementia without behavioral disturbance (H)  Patient is very pleasant, but has obvious memory issues. Appropriate for RAMON.     Essential hypertension  -120s/60s      Discharge Medications:  {  Current Outpatient Medications   Medication Sig Dispense Refill     atorvastatin (LIPITOR) 10 MG tablet Take 1 tablet (10 mg) by mouth every morning 30 tablet 11     ELIQUIS ANTICOAGULANT 2.5 MG tablet TAKE 1 TABLET BY MOUTH TWICE DAILY 60 tablet 5     furosemide (LASIX) 20 MG tablet Take 0.5 tablets (10 mg) by mouth daily for 30 days 15 tablet 0     metoprolol succinate ER (TOPROL XL) 50 MG 24 hr tablet Take 0.5 tablets (25 mg) by mouth daily 30 tablet 6        Controlled medications:   not applicable/none     Past Medical History:   Past Medical History:   Diagnosis Date     C[facility]19 09[facility]14/2020    [facility]      Dyslipidemia, goal LDL below 70 09/15/2020     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 Nini Rasmussen     Metabolic encephalopathy      Moderate aortic valve stenosis 08/22/2017    stable on 2020 echo     Nonobstructive atherosclerosis of coronary artery 09/15/2020    with normal LVEDP      "Paroxysmal SVT (supraventricular tachycardia) (H) 09/07/2017    said to have short episodes while hospitalized for UTI per Dr. Rhys Mensah     Syncope 08/22/2017     UTI (urinary tract infection) 08/21/2017    hospitalized with weakness     Physical Exam:   Vitals: /76   Pulse 70   Temp (!) 96.5  F (35.8  C)   Resp 16   Ht 1.6 m (5' 3\")   Wt 57.7 kg (127 lb 4.8 oz)   LMP  (LMP Unknown)   SpO2 96%   BMI 22.55 kg/m    BMI: Body mass index is 22.55 kg/m .   GENERAL APPEARANCE:  Alert, in no distress, thin  EYES:  EOM normal, conjunctiva and lids normal  RESP:  respiratory effort and palpation of chest normal, lungs clear to auscultation , no respiratory distress  CV:  Palpation and auscultation of heart done , no edema, rate-normal, grade 3/6 murmur  SKIN:  Inspection of skin and subcutaneous tissue baseline, Palpation of skin and subcutaneous tissue baseline  PSYCH:  insight and judgement impaired, memory impaired , affect and mood normal      SNF labs: Labs done in SNF are in [place] EPIC. Please refer to them using [email or web address] Everywhere.    DISCHARGE PLAN:    Follow up labs: No labs orders/due    Medical Follow Up:      Follow up with primary care provider in 2-4 weeks    Current [place] scheduled appointments:  Next 5 appointments (look out 90 days)    Sep 29, 2022  9:30 AM  (Arrive by 9:10 AM)  Provider Visit with Rose Mcelroy NP  [facility] ([facility] ) [address] [place] [id number] [phone]           Discharge Services: Home Care:  Occupational Therapy, Physical Therapy, Registered Nurse and From:  [facility]      TOTAL DISCHARGE TIME:   Greater than 30 minutes  Electronically signed by:  Cintia Salvador, JHONNY CNP       Documentation of Face to Face and Certification for Home Health Services    I certify that services are/were furnished while this patient was under the care of a physician and that a physician " or an allowed non-physician practitioner (NPP), had a face-to-face encounter that meets the physician face-to-face encounter requirements. The encounter was in whole, or in part, related to the primary reason for home health. The patient is confined to his/her home and needs intermittent skilled nursing, physical therapy, speech-language pathology, or the continued need for occupational therapy. A plan of care has been established by a physician and is periodically reviewed by a physician.  Date of Face-to-Face Encounter: 9/23/2022.    I certify that, based on my findings, the following services are medically necessary home health services: Nursing, Occupational Therapy and Physical Therapy.    My clinical findings support the need for the above skilled services because: Requires assistance of another person or specialized equipment to access medical services because patient: Is prone to wander/get lost without assistance...    Patient to re-establish plan of care with their PCP within 7-10 days after leaving the facility to reestablish care.  Medicare certified PECOS provider: JHONNY Valdez CNP  Date: September 23, 2022                      Sincerely,        JHONNY Valdez CNP

## 2022-09-29 ENCOUNTER — MEDICAL CORRESPONDENCE (OUTPATIENT)
Dept: HEALTH INFORMATION MANAGEMENT | Facility: CLINIC | Age: 82
End: 2022-09-29

## 2022-09-29 ENCOUNTER — OFFICE VISIT (OUTPATIENT)
Dept: INTERNAL MEDICINE | Facility: CLINIC | Age: 82
End: 2022-09-29
Payer: COMMERCIAL

## 2022-09-29 VITALS
SYSTOLIC BLOOD PRESSURE: 120 MMHG | RESPIRATION RATE: 16 BRPM | WEIGHT: 127.9 LBS | DIASTOLIC BLOOD PRESSURE: 76 MMHG | HEART RATE: 84 BPM | OXYGEN SATURATION: 98 % | BODY MASS INDEX: 22.66 KG/M2

## 2022-09-29 DIAGNOSIS — I50.31 ACUTE DIASTOLIC CONGESTIVE HEART FAILURE (H): ICD-10-CM

## 2022-09-29 DIAGNOSIS — I10 ESSENTIAL HYPERTENSION: ICD-10-CM

## 2022-09-29 DIAGNOSIS — Z09 HOSPITAL DISCHARGE FOLLOW-UP: Primary | ICD-10-CM

## 2022-09-29 DIAGNOSIS — R55 SYNCOPE, UNSPECIFIED SYNCOPE TYPE: ICD-10-CM

## 2022-09-29 PROCEDURE — 99215 OFFICE O/P EST HI 40 MIN: CPT | Performed by: NURSE PRACTITIONER

## 2022-09-29 RX ORDER — METOPROLOL SUCCINATE 25 MG/1
25 TABLET, EXTENDED RELEASE ORAL DAILY
Qty: 90 TABLET | Refills: 1 | Status: SHIPPED | OUTPATIENT
Start: 2022-09-29 | End: 2022-10-21

## 2022-09-29 RX ORDER — FUROSEMIDE 20 MG
10 TABLET ORAL DAILY
Qty: 45 TABLET | Refills: 0 | Status: SHIPPED | OUTPATIENT
Start: 2022-09-29 | End: 2023-01-12

## 2022-09-29 ASSESSMENT — PAIN SCALES - GENERAL: PAINLEVEL: NO PAIN (0)

## 2022-09-29 NOTE — ASSESSMENT & PLAN NOTE
Still on the low side although it's not clear if the recommended medication adjustments were made when she returned to her assisted living. Instructions were given to Carissa and their friend, Siria, to provide an updated medication list to their [facility] nurse team and I provided my contact information as well for any questions.  Updated prescriptions were sent to the pharmacy.

## 2022-09-29 NOTE — PATIENT INSTRUCTIONS
- Please show this to the nurse where you live: Janette's prescription for furosemide (Lasix) was lowered to 10 mg daily. I sent a new prescription to her [facility] pharmacy. Please call me at [phone] if you have any questions about this dose change   - The metoprolol dose was reduced to 25 mg daily  - New prescriptions for the correct dose of Lasix and Metoprolol were sent to [facility]   - Let me know if the blood pressure readings are below 110/60   Rose Mcelroy DNP, APRN, CNP       Adam's next visit is 10/24/22 at 9:10am

## 2022-09-29 NOTE — PROGRESS NOTES
Assessment & Plan   Problem List Items Addressed This Visit        Nervous and Auditory    Syncope, unspecified syncope type       Circulatory    Essential hypertension     Still on the low side although it's not clear if the recommended medication adjustments were made when she returned to her assisted living. Instructions were given to Janette/Adam and their friend, Siria, to provide an updated medication list to their [facility] nurse team and I provided my contact information as well for any questions.  Updated prescriptions were sent to the pharmacy.            Relevant Medications    metoprolol succinate ER (TOPROL XL) 25 MG 24 hr tablet      Other Visit Diagnoses     Hospital discharge follow-up    -  Primary    Acute diastolic congestive heart failure (H)        Relevant Medications    furosemide (LASIX) 20 MG tablet         - Reviewed inpatient records   - Attempted to reconcile medication list. Will communicate with [facility] to assure recommended changes were made. Will also request ambulatory home BP monitoring   - Encouraged better hydration. She likes adding water flavoring packets to her water and is encouraged to do so if this will help her to drink more fluids   - Follow up with cardiology re: aortic valve and wall motion abnormality- appt is scheduled  - See me back in 3 months      Time: 43 minutes spent on chart review, patient visit, and documentation on date of encounter          Return in about 3 months (around 12/29/2022) for Follow up.    Rose Mcelroy NP  [facility] PORFIRIO Davison is a 82 year old female with a past medical history of SVT, stage III chronic kidney disease, vascular dementia, paroxysmal atrial fibrillation on apixaban, aortic valve stenosis, heart failure with reduced ejection fraction, hypertension, hyperlipidemia, AV node dysfunction status post pacemaker placement and coronary artery disease, presenting for the following health issues:  Follow  Up      HPI   Recent Hospitalization/ED:  [facility] stay 9/11/22 to 9/15/22.  Date of SNF Admission: September 15, 2022  Date of SNF (anticipated) Discharge: September 23, 2022    She was seen in the emergency department for syncopal episode.  Diuretic dose was decreased and she was given IV fluids, symptoms improved. She is here today with her brother, Adam, and a friend from her [facility], Siria. She feels well, denies any concerns today. Specifically denies feeling SOB, dizzy, chest pains. She has not had any syncopal episodes since she left the TCU. A nurse at her facility dispenses her medications. Her brother, Adam, reports that he provided the TCU paperwork to the nurse station at their assisted living on Monday 9/26. Neither are reliable historians and do no know whether she has made the recommended medication changes with regards to Lasix and metoprolol. She drinks 1 cup of water and 2 cans of Pepsi per day.    She had an abdominal CT showing a small benign left renal cyst as well as a single tiny 2 mm nonobstructing stone, circumferential inflammatory wall thickening involving splenic flexure and left hemicolon consistent with colitis, infectious or inflammatory etiologies most likely.  She was given ceftriaxone and then transition to ciprofloxacin for total of 7 days (5 days outpatient).  Her hemoglobin dropped from 14.6-11.6 and stabilized. No loose stools or constipation. She denies stomach pains.     She has moderate to severe aortic valve stenosis.  She has not seen cardiac valve specialist.  She also had new wall motion abnormalities noted on echocardiogram.  Outpatient stress test is recommended. She has a cardiology appointment on Oct 3.           Objective    /76   Pulse 84   Resp 16   Wt 58 kg (127 lb 14.4 oz)   LMP  (LMP Unknown)   SpO2 98%   BMI 22.66 kg/m    Body mass index is 22.66 kg/m .  Physical Exam   GENERAL: Alert and no distress  RESP:  lungs clear to auscultation - no rales, rhonchi or wheezes  CV: regular rate and rhythm, normal S1 S2. Systolic murmur heard best RSB. No peripheral edema  ABDOMEN: soft, nontender

## 2022-10-03 ENCOUNTER — OFFICE VISIT (OUTPATIENT)
Dept: CARDIOLOGY | Facility: CLINIC | Age: 82
End: 2022-10-03
Attending: STUDENT IN AN ORGANIZED HEALTH CARE EDUCATION/TRAINING PROGRAM
Payer: COMMERCIAL

## 2022-10-03 VITALS
DIASTOLIC BLOOD PRESSURE: 72 MMHG | RESPIRATION RATE: 16 BRPM | WEIGHT: 124.2 LBS | HEIGHT: 64 IN | OXYGEN SATURATION: 97 % | HEART RATE: 90 BPM | SYSTOLIC BLOOD PRESSURE: 110 MMHG | BODY MASS INDEX: 21.21 KG/M2

## 2022-10-03 DIAGNOSIS — R93.1 REGIONAL WALL MOTION ABNORMALITY OF HEART: ICD-10-CM

## 2022-10-03 DIAGNOSIS — I35.0 AORTIC VALVE STENOSIS, ETIOLOGY OF CARDIAC VALVE DISEASE UNSPECIFIED: ICD-10-CM

## 2022-10-03 PROCEDURE — 99215 OFFICE O/P EST HI 40 MIN: CPT | Performed by: INTERNAL MEDICINE

## 2022-10-03 NOTE — PROGRESS NOTES
HEART CARE ENCOUNTER CONSULTATON NOTE      [facility]  [phone]      Assessment/Recommendations   Assessment:  1.  Coronary artery disease: Wall motion abnormality and mildly reduced left ventricular ejection fraction on echocardiogram during her recent hospitalization.  In reviewing her prior echoes this is essentially unchanged.  She also underwent angiography in 2020 that showed mild to moderate nonobstructive disease.  No anginal complaints.  We did discuss considering stress testing.  Patient declined today.  2.  Moderate to severe aortic stenosis: May not be a good candidate for SAVR.  I discussed TAVR with her today and she also declined this.  3.  Heart failure with preserved ejection fraction: On a very low-dose of Lasix currently.  Dose was cut due to orthostatic hypotension which was symptomatic.  Currently appears well compensated.  4.  Paroxysmal atrial fibrillation  5.  Sinus node dysfunction status post pacemaker  6.  Dementia    Plan:  1.  Continue on Eliquis for anticoagulation dose adjusted for low weight and age  2.  Continue low-dose Lasix, metoprolol and Lipitor  3.  May follow-up with me again in a few months         History of Present Illness/Subjective    HPI: Laney Hahn is a 82 year old female with history of mild to moderate nonobstructive coronary artery disease on angiogram in 2020, mild cardiomyopathy, aortic stenosis, heart failure with preserved ejection fraction, chronic kidney disease, dementia, sinus node dysfunction status post pacemaker placement, paroxysmal atrial fibrillation who I am seeing today for initial consultation.  She was previously seen by my colleagues.  She is here today accompanied by her brother and a friend.  She denies any problems with recurrent falls, dizziness, chest pain or breathing difficulty.  No complaints of lower extremity edema.  We discussed her valvular disease as well as considering a stress test.  She reports  "\"does not want any needles\".  She does not want to undergo any testing that would involve needles.  She is in an assisted living facility with her brother.  She was standing while going to get lunch when she started feeling dizzy like she was going to pass out and she fell.  She has trouble recalling the events clearly.  She was found to be orthostatic and improved after fluids.      Coronary angiogram in 2020  Near syncopal episodes in patient with HFpEF, mild-moderate aortic valve stenosis and renal insufficiency. Troponin 0.4 and echo with stable EF 51% and inferior wall hypokinesis, which was also previously noted.    Mild coronary disease outside of a moderate proximal circumflex lesion. FFR of circumflex not flow limiting.    LV EDP normal.    She did develop an SVT triggered by ectopy from the pigtail catheter in the ventricle. Rate of SVT was in the 170s and broke with a cough. Her BP did drop with the SVT but she was asymptomatic.    Estimated blood loss was <20 ml.          Physical Examination  Review of Systems   Vitals: /72 (BP Location: Right arm, Patient Position: Sitting, Cuff Size: Adult Regular)   Pulse 90   Resp 16   Ht 1.613 m (5' 3.5\")   Wt 56.3 kg (124 lb 3.2 oz)   LMP  (LMP Unknown)   SpO2 97%   BMI 21.66 kg/m    BMI= Body mass index is 21.66 kg/m .  Wt Readings from Last 3 Encounters:   10/03/22 56.3 kg (124 lb 3.2 oz)   09/29/22 58 kg (127 lb 14.4 oz)   09/23/22 57.7 kg (127 lb 4.8 oz)       General Appearance:   no distress, normal body habitus   ENT/Mouth: membranes moist, no oral lesions or bleeding gums.      EYES:  no scleral icterus, normal conjunctivae   Neck: no carotid bruits or thyromegaly   Chest/Lungs:   lungs are clear to auscultation   Cardiovascular:   Regular. Normal first and second heart sounds with systolic murmur  no edema bilaterally    Abdomen:  no organomegaly, masses, bruits, or tenderness; bowel sounds are present   Extremities: no cyanosis or clubbing "   Skin: no xanthelasma, warm.    Neurologic: normal  bilateral, no tremors     Psychiatric: alert and oriented x3, calm        Please refer above for cardiac ROS details.        Medical History  Surgical History Family History Social History   Past Medical History:   Diagnosis Date     COVID-19 09/14/2020    positive test at [facility]     DNAR (do not attempt resuscitation)      Dyslipidemia, goal LDL below 70 09/15/2020     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 Nini Rasmussen     Metabolic encephalopathy      Moderate aortic valve stenosis 08/22/2017    stable on 2020 echo     Nonobstructive atherosclerosis of coronary artery 09/15/2020    with normal LVEDP     Paroxysmal SVT (supraventricular tachycardia) (H) 09/07/2017    said to have short episodes while hospitalized for UTI per Dr. Rhys Mensah     Syncope 08/22/2017     UTI (urinary tract infection) 08/21/2017    hospitalized with weakness     Past Surgical History:   Procedure Laterality Date     CATARACT EXTRACTION, BILATERAL       CV CORONARY ANGIOGRAM N/A 9/15/2020    Procedure: Coronary Angiogram;  Surgeon: Radhika Santa MD;  Location: [facility] Cath Lab;  Service: Cardiology     CV LEFT HEART CATHETERIZATION WITHOUT LEFT VENTRICULOGRAM Left 9/15/2020    Procedure: Left Heart Catheterization Without Left Ventriculogram;  Surgeon: Radhika Santa MD;  Location: [facility] Cath Lab;  Service: Cardiology     EP PACEMAKER INSERT N/A 4/13/2021    Procedure: EP Pacemaker Insertion;  Surgeon: Frederic Burgos MD;  Location: [facility] Cardiac Cath Lab;  Service: Cardiology     HYSTERECTOMY       PARTIAL GASTRECTOMY  1969    for ulcers     TONSILLECTOMY       Family History   Adopted: Yes        Social History     Socioeconomic History     Marital status: Single     Spouse name: Not on file     Number of children: 0     Years of  education: Not on file     Highest education level: Not on file   Occupational History     Not on file   Tobacco Use     Smoking status: Never Smoker     Smokeless tobacco: Never Used   Vaping Use     Vaping Use: Never used   Substance and Sexual Activity     Alcohol use: No     Drug use: No     Sexual activity: Not on file   Other Topics Concern     Not on file   Social History Narrative    Her adopted brother, Jeff, lives with her. He is five years younger than her.     Social Determinants of Health     Financial Resource Strain: Not on file   Food Insecurity: Not on file   Transportation Needs: Not on file   Physical Activity: Not on file   Stress: Not on file   Social Connections: Not on file   Intimate Partner Violence: Not on file   Housing Stability: Not on file           Medications  Allergies   Current Outpatient Medications   Medication Sig Dispense Refill     atorvastatin (LIPITOR) 10 MG tablet Take 1 tablet (10 mg) by mouth every morning 30 tablet 11     ELIQUIS ANTICOAGULANT 2.5 MG tablet TAKE 1 TABLET BY MOUTH TWICE DAILY 60 tablet 5     furosemide (LASIX) 20 MG tablet Take 0.5 tablets (10 mg) by mouth daily 45 tablet 0     metoprolol succinate ER (TOPROL XL) 25 MG 24 hr tablet Take 1 tablet (25 mg) by mouth daily 90 tablet 1       Allergies   Allergen Reactions     Sulfa Drugs Shortness Of Breath          Lab Results    Chemistry/lipid CBC Cardiac Enzymes/BNP/TSH/INR   Recent Labs   Lab Test 03/24/22  1050   CHOL 184   HDL 50      TRIG 160*     Recent Labs   Lab Test 03/24/22  1050 04/21/21  1101 02/27/20  1447    58 76     Recent Labs   Lab Test 09/15/22  0438      POTASSIUM 4.0   CHLORIDE 108*   CO2 25      BUN 23   CR 0.96   GFRESTIMATED 59*   CURT 8.6     Recent Labs   Lab Test 09/15/22  0438 09/14/22  0440 09/11/22  1733   CR 0.96 1.09 1.47*     Recent Labs   Lab Test 04/11/21  0524 04/24/17  0647   A1C 5.2 5.7          Recent Labs   Lab Test 09/15/22  0438   WBC 4.6    HGB 12.1   HCT 35.7   MCV 98        Recent Labs   Lab Test 09/15/22  0438 09/14/22  0440 09/11/22  1733   HGB 12.1 11.6* 14.6    Recent Labs   Lab Test 09/11/22  1733 07/22/22  1917 06/16/21  2208   TROPONINI 0.02 0.02 0.02     Recent Labs   Lab Test 06/10/21  1920 12/16/20  1257 09/14/20  1033   BNP 1,170* 1,646* 356*     Recent Labs   Lab Test 06/10/21  1920   TSH 1.93     Recent Labs   Lab Test 07/22/22  1917 03/14/21  1927 09/14/20  1914   INR 1.23* 0.93 1.04        Nichol Diallo MD

## 2022-10-03 NOTE — LETTER
10/3/2022    Rose Mcelroy, TANYA  [address]  [place] [address]    RE: Laney HERNANDEZ Jovani       Dear Colleague,     I had the pleasure of seeing Laney Hahn in the [facility].    HEART CARE ENCOUNTER CONSULTATON NOTE      [facility]  [phone]      Assessment/Recommendations   Assessment:  1.  Coronary artery disease: Wall motion abnormality and mildly reduced left ventricular ejection fraction on echocardiogram during her recent hospitalization.  In reviewing her prior echoes this is essentially unchanged.  She also underwent angiography in 2020 that showed mild to moderate nonobstructive disease.  No anginal complaints.  We did discuss considering stress testing.  Patient declined today.  2.  Moderate to severe aortic stenosis: May not be a good candidate for SAVR.  I discussed TAVR with her today and she also declined this.  3.  Heart failure with preserved ejection fraction: On a very low-dose of Lasix currently.  Dose was cut due to orthostatic hypotension which was symptomatic.  Currently appears well compensated.  4.  Paroxysmal atrial fibrillation  5.  Sinus node dysfunction status post pacemaker  6.  Dementia    Plan:  1.  Continue on Eliquis for anticoagulation dose adjusted for low weight and age  2.  Continue low-dose Lasix, metoprolol and Lipitor  3.  May follow-up with me again in a few months         History of Present Illness/Subjective    HPI: Laney Hahn is a 82 year old female with history of mild to moderate nonobstructive coronary artery disease on angiogram in 2020, mild cardiomyopathy, aortic stenosis, heart failure with preserved ejection fraction, chronic kidney disease, dementia, sinus node dysfunction status post pacemaker placement, paroxysmal atrial fibrillation who I am seeing today for initial consultation.  She was previously seen by my colleagues.  She is here today accompanied by her brother and a friend.   "She denies any problems with recurrent falls, dizziness, chest pain or breathing difficulty.  No complaints of lower extremity edema.  We discussed her valvular disease as well as considering a stress test.  She reports \"does not want any needles\".  She does not want to undergo any testing that would involve needles.  She is in an assisted living facility with her brother.  She was standing while going to get lunch when she started feeling dizzy like she was going to pass out and she fell.  She has trouble recalling the events clearly.  She was found to be orthostatic and improved after fluids.      Coronary angiogram in 2020  Near syncopal episodes in patient with HFpEF, mild-moderate aortic valve stenosis and renal insufficiency. Troponin 0.4 and echo with stable EF 51% and inferior wall hypokinesis, which was also previously noted.    Mild coronary disease outside of a moderate proximal circumflex lesion. FFR of circumflex not flow limiting.    LV EDP normal.    She did develop an SVT triggered by ectopy from the pigtail catheter in the ventricle. Rate of SVT was in the 170s and broke with a cough. Her BP did drop with the SVT but she was asymptomatic.    Estimated blood loss was <20 ml.          Physical Examination  Review of Systems   Vitals: /72 (BP Location: Right arm, Patient Position: Sitting, Cuff Size: Adult Regular)   Pulse 90   Resp 16   Ht 1.613 m (5' 3.5\")   Wt 56.3 kg (124 lb 3.2 oz)   LMP  (LMP Unknown)   SpO2 97%   BMI 21.66 kg/m    BMI= Body mass index is 21.66 kg/m .  Wt Readings from Last 3 Encounters:   10/03/22 56.3 kg (124 lb 3.2 oz)   09/29/22 58 kg (127 lb 14.4 oz)   09/23/22 57.7 kg (127 lb 4.8 oz)       General Appearance:   no distress, normal body habitus   ENT/Mouth: membranes moist, no oral lesions or bleeding gums.      EYES:  no scleral icterus, normal conjunctivae   Neck: no carotid bruits or thyromegaly   Chest/Lungs:   lungs are clear to auscultation "   Cardiovascular:   Regular. Normal first and second heart sounds with systolic murmur  no edema bilaterally    Abdomen:  no organomegaly, masses, bruits, or tenderness; bowel sounds are present   Extremities: no cyanosis or clubbing   Skin: no xanthelasma, warm.    Neurologic: normal  bilateral, no tremors     Psychiatric: alert and oriented x3, calm        Please refer above for cardiac ROS details.        Medical History  Surgical History Family History Social History   Past Medical History:   Diagnosis Date     COVID-19 09/14/2020    positive test at [facility]     DNAR (do not attempt resuscitation)      Dyslipidemia, goal LDL below 70 09/15/2020     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 Nini Rasmussen     Metabolic encephalopathy      Moderate aortic valve stenosis 08/22/2017    stable on 2020 echo     Nonobstructive atherosclerosis of coronary artery 09/15/2020    with normal LVEDP     Paroxysmal SVT (supraventricular tachycardia) (H) 09/07/2017    said to have short episodes while hospitalized for UTI per Dr. Rhys Mensah     Syncope 08/22/2017     UTI (urinary tract infection) 08/21/2017    hospitalized with weakness     Past Surgical History:   Procedure Laterality Date     CATARACT EXTRACTION, BILATERAL       CV CORONARY ANGIOGRAM N/A 9/15/2020    Procedure: Coronary Angiogram;  Surgeon: Radhika Santa MD;  Location: [facility] Cath Lab;  Service: Cardiology     CV LEFT HEART CATHETERIZATION WITHOUT LEFT VENTRICULOGRAM Left 9/15/2020    Procedure: Left Heart Catheterization Without Left Ventriculogram;  Surgeon: Radhika Santa MD;  Location: [facility] Cath Lab;  Service: Cardiology     EP PACEMAKER INSERT N/A 4/13/2021    Procedure: EP Pacemaker Insertion;  Surgeon: Frederic Burgos MD;  Location: [facility] Cardiac Cath Lab;  Service: Cardiology     HYSTERECTOMY        PARTIAL GASTRECTOMY  1969    for ulcers     TONSILLECTOMY       Family History   Adopted: Yes        Social History     Socioeconomic History     Marital status: Single     Spouse name: Not on file     Number of children: 0     Years of education: Not on file     Highest education level: Not on file   Occupational History     Not on file   Tobacco Use     Smoking status: Never Smoker     Smokeless tobacco: Never Used   Vaping Use     Vaping Use: Never used   Substance and Sexual Activity     Alcohol use: No     Drug use: No     Sexual activity: Not on file   Other Topics Concern     Not on file   Social History Narrative    Her adopted brother, Jeff, lives with her. He is five years younger than her.     Social Determinants of Health     Financial Resource Strain: Not on file   Food Insecurity: Not on file   Transportation Needs: Not on file   Physical Activity: Not on file   Stress: Not on file   Social Connections: Not on file   Intimate Partner Violence: Not on file   Housing Stability: Not on file           Medications  Allergies   Current Outpatient Medications   Medication Sig Dispense Refill     atorvastatin (LIPITOR) 10 MG tablet Take 1 tablet (10 mg) by mouth every morning 30 tablet 11     ELIQUIS ANTICOAGULANT 2.5 MG tablet TAKE 1 TABLET BY MOUTH TWICE DAILY 60 tablet 5     furosemide (LASIX) 20 MG tablet Take 0.5 tablets (10 mg) by mouth daily 45 tablet 0     metoprolol succinate ER (TOPROL XL) 25 MG 24 hr tablet Take 1 tablet (25 mg) by mouth daily 90 tablet 1       Allergies   Allergen Reactions     Sulfa Drugs Shortness Of Breath          Lab Results    Chemistry/lipid CBC Cardiac Enzymes/BNP/TSH/INR   Recent Labs   Lab Test 03/24/22  1050   CHOL 184   HDL 50      TRIG 160*     Recent Labs   Lab Test 03/24/22  1050 04/21/21  1101 02/27/20  1447    58 76     Recent Labs   Lab Test 09/15/22  0438      POTASSIUM 4.0   CHLORIDE 108*   CO2 25      BUN 23   CR 0.96    GFRESTIMATED 59*   CURT 8.6     Recent Labs   Lab Test 09/15/22  0438 09/14/22  0440 09/11/22  1733   CR 0.96 1.09 1.47*     Recent Labs   Lab Test 04/11/21  0524 04/24/17  0647   A1C 5.2 5.7          Recent Labs   Lab Test 09/15/22  0438   WBC 4.6   HGB 12.1   HCT 35.7   MCV 98        Recent Labs   Lab Test 09/15/22  0438 09/14/22  0440 09/11/22  1733   HGB 12.1 11.6* 14.6    Recent Labs   Lab Test 09/11/22  1733 07/22/22  1917 06/16/21  2208   TROPONINI 0.02 0.02 0.02     Recent Labs   Lab Test 06/10/21  1920 12/16/20  1257 09/14/20  1033   BNP 1,170* 1,646* 356*     Recent Labs   Lab Test 06/10/21  1920   TSH 1.93     Recent Labs   Lab Test 07/22/22  1917 03/14/21  1927 09/14/20  1914   INR 1.23* 0.93 1.04        Nichol Diallo MD                Thank you for allowing me to participate in the care of your patient.      Sincerely,     Nichol Diallo MD     [facility]  cc:   Bharti Bean MD  [address]  [place] [address]

## 2022-10-04 ENCOUNTER — TELEPHONE (OUTPATIENT)
Dept: INTERNAL MEDICINE | Facility: CLINIC | Age: 82
End: 2022-10-04

## 2022-10-04 NOTE — TELEPHONE ENCOUNTER
Gabriella calling to request call back from Rose Mcelroy in regards to patient cardiology appt that patient had yesterday 10/3/22 (notes in chart).  Gabriella has some concerns about what was discussed during the cardiology appt and she would like to discuss these with Rose.  Please call patient back at [phone].  Please do NOT leave VM on this number as VM does not always work.

## 2022-10-05 NOTE — TELEPHONE ENCOUNTER
Returned call. Reviewed card appt notes. Ms. Hahn has a follow up visit in January with cardiology

## 2022-10-18 ENCOUNTER — TELEPHONE (OUTPATIENT)
Dept: CARDIOLOGY | Facility: CLINIC | Age: 82
End: 2022-10-18

## 2022-10-18 ENCOUNTER — ANCILLARY PROCEDURE (OUTPATIENT)
Dept: CARDIOLOGY | Facility: CLINIC | Age: 82
End: 2022-10-18
Attending: INTERNAL MEDICINE
Payer: COMMERCIAL

## 2022-10-18 DIAGNOSIS — Z95.0 PACEMAKER: ICD-10-CM

## 2022-10-18 DIAGNOSIS — I49.5 SINUS NODE DYSFUNCTION (H): ICD-10-CM

## 2022-10-18 NOTE — TELEPHONE ENCOUNTER
----- Message from Peyton BLOOD Teodoroec sent at 10/18/2022  1:08 PM CDT -----  Regarding: device RN review  Encounter Type: Courtesy remote pacemaker transmission. Alert for VT episodes.   Device: Medtronic [place] PPM.  Presenting: A-V paced 80 bpm.  Findings: One monitored VT episode on 10/18/22 8:43am, appears to be VT 23bts rates avg. 180's, duration 8 seconds. Four other ventricular high rate episodes, one episode appears to be NSVT 19bts avg. 160's, the others appear to be AVNRT/PSVT.  Plan: Routed to device RN for review. KYREE Rodriguez, Device Specialist    Device transmission reviewed.  2 recent episodes of nonsustained VT noted.  Most recent and longest was this morning lasting approximately 8 seconds at around 9 AM.  Patient resides at [facility].  Call placed to nurses station to review findings and assess for any troublesome episodes this morning.  Spoke with Valerie PICKERING, she denies any troubles around breakfast time for patient.  States she does occasionally have some dizzy spells but does not recall any major episodes this morning.  Advised to have them call us should she have any troublesome episodes or near syncope/syncope so we can review a pacemaker check.  Nurse verbalized understanding.    Reviewed medications, states she currently takes Toprol XL 25 mg daily which was reduced in past (from 50 mg ) due to lower BP.  Recent BPs range from the 120s- 140s systolically per RN chart review over last 2 weeks.  Nurse states she is also still taking 10 mg of Lasix. States she just does not want to drink much for fluids throughout the day.     Will review device findings with Dr. Diallo.   Nurses Station (Valerie PICKERING) - [phone]    Negrita Girard RN

## 2022-10-19 ENCOUNTER — MEDICAL CORRESPONDENCE (OUTPATIENT)
Dept: HEALTH INFORMATION MANAGEMENT | Facility: CLINIC | Age: 82
End: 2022-10-19

## 2022-10-20 ENCOUNTER — ANCILLARY PROCEDURE (OUTPATIENT)
Dept: CARDIOLOGY | Facility: CLINIC | Age: 82
End: 2022-10-20
Payer: COMMERCIAL

## 2022-10-20 DIAGNOSIS — I49.5 SICK SINUS SYNDROME (H): Primary | ICD-10-CM

## 2022-10-20 DIAGNOSIS — Z95.0 PACEMAKER: ICD-10-CM

## 2022-10-20 LAB
MDC_IDC_EPISODE_DTM: NORMAL
MDC_IDC_EPISODE_DURATION: 1 S
MDC_IDC_EPISODE_DURATION: 1 S
MDC_IDC_EPISODE_DURATION: 2 S
MDC_IDC_EPISODE_DURATION: 4 S
MDC_IDC_EPISODE_DURATION: 8 S
MDC_IDC_EPISODE_ID: 672
MDC_IDC_EPISODE_ID: 673
MDC_IDC_EPISODE_ID: 674
MDC_IDC_EPISODE_ID: 675
MDC_IDC_EPISODE_ID: 676
MDC_IDC_EPISODE_TYPE: NORMAL
MDC_IDC_LEAD_IMPLANT_DT: NORMAL
MDC_IDC_LEAD_LOCATION: NORMAL
MDC_IDC_LEAD_LOCATION_DETAIL_1: NORMAL
MDC_IDC_LEAD_MFG: NORMAL
MDC_IDC_LEAD_MODEL: NORMAL
MDC_IDC_LEAD_POLARITY_TYPE: NORMAL
MDC_IDC_LEAD_SERIAL: NORMAL
MDC_IDC_LEAD_SPECIAL_FUNCTION: NORMAL
MDC_IDC_MSMT_BATTERY_DTM: NORMAL
MDC_IDC_MSMT_BATTERY_DTM: NORMAL
MDC_IDC_MSMT_BATTERY_REMAINING_LONGEVITY: 123 MO
MDC_IDC_MSMT_BATTERY_REMAINING_LONGEVITY: 124 MO
MDC_IDC_MSMT_BATTERY_RRT_TRIGGER: 2.62
MDC_IDC_MSMT_BATTERY_RRT_TRIGGER: 2.62
MDC_IDC_MSMT_BATTERY_STATUS: NORMAL
MDC_IDC_MSMT_BATTERY_STATUS: NORMAL
MDC_IDC_MSMT_BATTERY_VOLTAGE: 3.02 V
MDC_IDC_MSMT_BATTERY_VOLTAGE: 3.03 V
MDC_IDC_MSMT_LEADCHNL_RA_IMPEDANCE_VALUE: 323 OHM
MDC_IDC_MSMT_LEADCHNL_RA_IMPEDANCE_VALUE: 323 OHM
MDC_IDC_MSMT_LEADCHNL_RA_IMPEDANCE_VALUE: 494 OHM
MDC_IDC_MSMT_LEADCHNL_RA_IMPEDANCE_VALUE: 513 OHM
MDC_IDC_MSMT_LEADCHNL_RA_PACING_THRESHOLD_AMPLITUDE: 1 V
MDC_IDC_MSMT_LEADCHNL_RA_PACING_THRESHOLD_PULSEWIDTH: 0.4 MS
MDC_IDC_MSMT_LEADCHNL_RA_SENSING_INTR_AMPL: 1.3 MV
MDC_IDC_MSMT_LEADCHNL_RA_SENSING_INTR_AMPL: 1.62 MV
MDC_IDC_MSMT_LEADCHNL_RA_SENSING_INTR_AMPL: 1.62 MV
MDC_IDC_MSMT_LEADCHNL_RA_SENSING_INTR_AMPL: 1.75 MV
MDC_IDC_MSMT_LEADCHNL_RV_IMPEDANCE_VALUE: 418 OHM
MDC_IDC_MSMT_LEADCHNL_RV_IMPEDANCE_VALUE: 418 OHM
MDC_IDC_MSMT_LEADCHNL_RV_IMPEDANCE_VALUE: 551 OHM
MDC_IDC_MSMT_LEADCHNL_RV_IMPEDANCE_VALUE: 551 OHM
MDC_IDC_MSMT_LEADCHNL_RV_PACING_THRESHOLD_AMPLITUDE: 0.62 V
MDC_IDC_MSMT_LEADCHNL_RV_PACING_THRESHOLD_AMPLITUDE: 0.75 V
MDC_IDC_MSMT_LEADCHNL_RV_PACING_THRESHOLD_AMPLITUDE: 0.75 V
MDC_IDC_MSMT_LEADCHNL_RV_PACING_THRESHOLD_PULSEWIDTH: 0.4 MS
MDC_IDC_MSMT_LEADCHNL_RV_SENSING_INTR_AMPL: 20.62 MV
MDC_IDC_PG_IMPLANT_DTM: NORMAL
MDC_IDC_PG_IMPLANT_DTM: NORMAL
MDC_IDC_PG_MFG: NORMAL
MDC_IDC_PG_MFG: NORMAL
MDC_IDC_PG_MODEL: NORMAL
MDC_IDC_PG_MODEL: NORMAL
MDC_IDC_PG_SERIAL: NORMAL
MDC_IDC_PG_SERIAL: NORMAL
MDC_IDC_PG_TYPE: NORMAL
MDC_IDC_PG_TYPE: NORMAL
MDC_IDC_SESS_CLINIC_NAME: NORMAL
MDC_IDC_SESS_CLINIC_NAME: NORMAL
MDC_IDC_SESS_DTM: NORMAL
MDC_IDC_SESS_DTM: NORMAL
MDC_IDC_SESS_TYPE: NORMAL
MDC_IDC_SESS_TYPE: NORMAL
MDC_IDC_SET_BRADY_AT_MODE_SWITCH_RATE: 171 {BEATS}/MIN
MDC_IDC_SET_BRADY_AT_MODE_SWITCH_RATE: 171 {BEATS}/MIN
MDC_IDC_SET_BRADY_HYSTRATE: NORMAL
MDC_IDC_SET_BRADY_HYSTRATE: NORMAL
MDC_IDC_SET_BRADY_LOWRATE: 80 {BEATS}/MIN
MDC_IDC_SET_BRADY_LOWRATE: 80 {BEATS}/MIN
MDC_IDC_SET_BRADY_MAX_SENSOR_RATE: 120 {BEATS}/MIN
MDC_IDC_SET_BRADY_MAX_SENSOR_RATE: 120 {BEATS}/MIN
MDC_IDC_SET_BRADY_MAX_TRACKING_RATE: 120 {BEATS}/MIN
MDC_IDC_SET_BRADY_MAX_TRACKING_RATE: 120 {BEATS}/MIN
MDC_IDC_SET_BRADY_MODE: NORMAL
MDC_IDC_SET_BRADY_MODE: NORMAL
MDC_IDC_SET_BRADY_PAV_DELAY_LOW: 180 MS
MDC_IDC_SET_BRADY_PAV_DELAY_LOW: 180 MS
MDC_IDC_SET_BRADY_SAV_DELAY_LOW: 150 MS
MDC_IDC_SET_BRADY_SAV_DELAY_LOW: 150 MS
MDC_IDC_SET_LEADCHNL_RA_PACING_AMPLITUDE: 1.5 V
MDC_IDC_SET_LEADCHNL_RA_PACING_AMPLITUDE: NORMAL
MDC_IDC_SET_LEADCHNL_RA_PACING_ANODE_ELECTRODE_1: NORMAL
MDC_IDC_SET_LEADCHNL_RA_PACING_ANODE_ELECTRODE_1: NORMAL
MDC_IDC_SET_LEADCHNL_RA_PACING_ANODE_LOCATION_1: NORMAL
MDC_IDC_SET_LEADCHNL_RA_PACING_ANODE_LOCATION_1: NORMAL
MDC_IDC_SET_LEADCHNL_RA_PACING_CAPTURE_MODE: NORMAL
MDC_IDC_SET_LEADCHNL_RA_PACING_CAPTURE_MODE: NORMAL
MDC_IDC_SET_LEADCHNL_RA_PACING_CATHODE_ELECTRODE_1: NORMAL
MDC_IDC_SET_LEADCHNL_RA_PACING_CATHODE_ELECTRODE_1: NORMAL
MDC_IDC_SET_LEADCHNL_RA_PACING_CATHODE_LOCATION_1: NORMAL
MDC_IDC_SET_LEADCHNL_RA_PACING_CATHODE_LOCATION_1: NORMAL
MDC_IDC_SET_LEADCHNL_RA_PACING_POLARITY: NORMAL
MDC_IDC_SET_LEADCHNL_RA_PACING_POLARITY: NORMAL
MDC_IDC_SET_LEADCHNL_RA_PACING_PULSEWIDTH: 0.4 MS
MDC_IDC_SET_LEADCHNL_RA_PACING_PULSEWIDTH: 0.4 MS
MDC_IDC_SET_LEADCHNL_RA_SENSING_ANODE_ELECTRODE_1: NORMAL
MDC_IDC_SET_LEADCHNL_RA_SENSING_ANODE_ELECTRODE_1: NORMAL
MDC_IDC_SET_LEADCHNL_RA_SENSING_ANODE_LOCATION_1: NORMAL
MDC_IDC_SET_LEADCHNL_RA_SENSING_ANODE_LOCATION_1: NORMAL
MDC_IDC_SET_LEADCHNL_RA_SENSING_CATHODE_ELECTRODE_1: NORMAL
MDC_IDC_SET_LEADCHNL_RA_SENSING_CATHODE_ELECTRODE_1: NORMAL
MDC_IDC_SET_LEADCHNL_RA_SENSING_CATHODE_LOCATION_1: NORMAL
MDC_IDC_SET_LEADCHNL_RA_SENSING_CATHODE_LOCATION_1: NORMAL
MDC_IDC_SET_LEADCHNL_RA_SENSING_POLARITY: NORMAL
MDC_IDC_SET_LEADCHNL_RA_SENSING_POLARITY: NORMAL
MDC_IDC_SET_LEADCHNL_RA_SENSING_SENSITIVITY: 0.3 MV
MDC_IDC_SET_LEADCHNL_RA_SENSING_SENSITIVITY: 0.3 MV
MDC_IDC_SET_LEADCHNL_RV_PACING_AMPLITUDE: 2 V
MDC_IDC_SET_LEADCHNL_RV_PACING_AMPLITUDE: NORMAL
MDC_IDC_SET_LEADCHNL_RV_PACING_ANODE_ELECTRODE_1: NORMAL
MDC_IDC_SET_LEADCHNL_RV_PACING_ANODE_ELECTRODE_1: NORMAL
MDC_IDC_SET_LEADCHNL_RV_PACING_ANODE_LOCATION_1: NORMAL
MDC_IDC_SET_LEADCHNL_RV_PACING_ANODE_LOCATION_1: NORMAL
MDC_IDC_SET_LEADCHNL_RV_PACING_CAPTURE_MODE: NORMAL
MDC_IDC_SET_LEADCHNL_RV_PACING_CAPTURE_MODE: NORMAL
MDC_IDC_SET_LEADCHNL_RV_PACING_CATHODE_ELECTRODE_1: NORMAL
MDC_IDC_SET_LEADCHNL_RV_PACING_CATHODE_ELECTRODE_1: NORMAL
MDC_IDC_SET_LEADCHNL_RV_PACING_CATHODE_LOCATION_1: NORMAL
MDC_IDC_SET_LEADCHNL_RV_PACING_CATHODE_LOCATION_1: NORMAL
MDC_IDC_SET_LEADCHNL_RV_PACING_POLARITY: NORMAL
MDC_IDC_SET_LEADCHNL_RV_PACING_POLARITY: NORMAL
MDC_IDC_SET_LEADCHNL_RV_PACING_PULSEWIDTH: 0.4 MS
MDC_IDC_SET_LEADCHNL_RV_PACING_PULSEWIDTH: 0.4 MS
MDC_IDC_SET_LEADCHNL_RV_SENSING_ANODE_ELECTRODE_1: NORMAL
MDC_IDC_SET_LEADCHNL_RV_SENSING_ANODE_ELECTRODE_1: NORMAL
MDC_IDC_SET_LEADCHNL_RV_SENSING_ANODE_LOCATION_1: NORMAL
MDC_IDC_SET_LEADCHNL_RV_SENSING_ANODE_LOCATION_1: NORMAL
MDC_IDC_SET_LEADCHNL_RV_SENSING_CATHODE_ELECTRODE_1: NORMAL
MDC_IDC_SET_LEADCHNL_RV_SENSING_CATHODE_ELECTRODE_1: NORMAL
MDC_IDC_SET_LEADCHNL_RV_SENSING_CATHODE_LOCATION_1: NORMAL
MDC_IDC_SET_LEADCHNL_RV_SENSING_CATHODE_LOCATION_1: NORMAL
MDC_IDC_SET_LEADCHNL_RV_SENSING_POLARITY: NORMAL
MDC_IDC_SET_LEADCHNL_RV_SENSING_POLARITY: NORMAL
MDC_IDC_SET_LEADCHNL_RV_SENSING_SENSITIVITY: 0.9 MV
MDC_IDC_SET_LEADCHNL_RV_SENSING_SENSITIVITY: 0.9 MV
MDC_IDC_SET_ZONE_DETECTION_INTERVAL: 200 MS
MDC_IDC_SET_ZONE_DETECTION_INTERVAL: 200 MS
MDC_IDC_SET_ZONE_DETECTION_INTERVAL: 350 MS
MDC_IDC_SET_ZONE_DETECTION_INTERVAL: 350 MS
MDC_IDC_SET_ZONE_DETECTION_INTERVAL: 430 MS
MDC_IDC_SET_ZONE_DETECTION_INTERVAL: 430 MS
MDC_IDC_SET_ZONE_TYPE: NORMAL
MDC_IDC_STAT_AT_BURDEN_PERCENT: 0 %
MDC_IDC_STAT_AT_BURDEN_PERCENT: 0 %
MDC_IDC_STAT_AT_DTM_END: NORMAL
MDC_IDC_STAT_AT_DTM_END: NORMAL
MDC_IDC_STAT_AT_DTM_START: NORMAL
MDC_IDC_STAT_AT_DTM_START: NORMAL
MDC_IDC_STAT_AT_MODE_SW_COUNT: 0
MDC_IDC_STAT_BRADY_AP_VP_PERCENT: 98.84 %
MDC_IDC_STAT_BRADY_AP_VP_PERCENT: 98.89 %
MDC_IDC_STAT_BRADY_AP_VS_PERCENT: 0.08 %
MDC_IDC_STAT_BRADY_AP_VS_PERCENT: 0.08 %
MDC_IDC_STAT_BRADY_AS_VP_PERCENT: 0.85 %
MDC_IDC_STAT_BRADY_AS_VP_PERCENT: 0.9 %
MDC_IDC_STAT_BRADY_AS_VS_PERCENT: 0.18 %
MDC_IDC_STAT_BRADY_AS_VS_PERCENT: 0.18 %
MDC_IDC_STAT_BRADY_DTM_END: NORMAL
MDC_IDC_STAT_BRADY_DTM_END: NORMAL
MDC_IDC_STAT_BRADY_DTM_START: NORMAL
MDC_IDC_STAT_BRADY_DTM_START: NORMAL
MDC_IDC_STAT_BRADY_RA_PERCENT_PACED: 99.08 %
MDC_IDC_STAT_BRADY_RA_PERCENT_PACED: 99.13 %
MDC_IDC_STAT_BRADY_RV_PERCENT_PACED: 99.74 %
MDC_IDC_STAT_BRADY_RV_PERCENT_PACED: 99.74 %
MDC_IDC_STAT_EPISODE_RECENT_COUNT: 0
MDC_IDC_STAT_EPISODE_RECENT_COUNT: 1
MDC_IDC_STAT_EPISODE_RECENT_COUNT: 1
MDC_IDC_STAT_EPISODE_RECENT_COUNT: 4
MDC_IDC_STAT_EPISODE_RECENT_COUNT: 4
MDC_IDC_STAT_EPISODE_RECENT_COUNT_DTM_END: NORMAL
MDC_IDC_STAT_EPISODE_RECENT_COUNT_DTM_START: NORMAL
MDC_IDC_STAT_EPISODE_TOTAL_COUNT: 0
MDC_IDC_STAT_EPISODE_TOTAL_COUNT: 1
MDC_IDC_STAT_EPISODE_TOTAL_COUNT: 1
MDC_IDC_STAT_EPISODE_TOTAL_COUNT: 100
MDC_IDC_STAT_EPISODE_TOTAL_COUNT: 29
MDC_IDC_STAT_EPISODE_TOTAL_COUNT: 29
MDC_IDC_STAT_EPISODE_TOTAL_COUNT: 466
MDC_IDC_STAT_EPISODE_TOTAL_COUNT: 466
MDC_IDC_STAT_EPISODE_TOTAL_COUNT_DTM_END: NORMAL
MDC_IDC_STAT_EPISODE_TOTAL_COUNT_DTM_START: NORMAL
MDC_IDC_STAT_EPISODE_TYPE: NORMAL

## 2022-10-20 PROCEDURE — 93280 PM DEVICE PROGR EVAL DUAL: CPT | Performed by: INTERNAL MEDICINE

## 2022-10-21 ENCOUNTER — TELEPHONE (OUTPATIENT)
Dept: CARDIOLOGY | Facility: CLINIC | Age: 82
End: 2022-10-21

## 2022-10-21 DIAGNOSIS — I10 ESSENTIAL HYPERTENSION: ICD-10-CM

## 2022-10-21 DIAGNOSIS — I47.29 NSVT (NONSUSTAINED VENTRICULAR TACHYCARDIA) (H): Primary | ICD-10-CM

## 2022-10-21 RX ORDER — METOPROLOL SUCCINATE 25 MG/1
50 TABLET, EXTENDED RELEASE ORAL DAILY
Qty: 180 TABLET | Refills: 1
Start: 2022-10-21 | End: 2023-01-19 | Stop reason: DRUGHIGH

## 2022-10-21 NOTE — TELEPHONE ENCOUNTER
----- Message -----  From: Nichol Diallo MD  Sent: 10/21/2022   1:12 PM CDT  To: Negrita Girard, RN, Ya Luna RN    Patient is DNR and does not want any procedures.  She has known valve disease.  She had declined even stress testing in the past.  We will try increasing dose of Toprol-XL back up to 50 mg a day.      ==  Called [facility] and spoke to RAHEEL Padilla. Reviewed response and recommendations per Dr. Diallo.   Valerie verbalized understanding and requested orders faxed to [phone].   Med list updated.   Will print and fax electronically signed orders. -[email or web address]

## 2022-11-01 ENCOUNTER — TELEPHONE (OUTPATIENT)
Dept: CARDIOLOGY | Facility: CLINIC | Age: 82
End: 2022-11-01

## 2022-11-01 ENCOUNTER — ANCILLARY PROCEDURE (OUTPATIENT)
Dept: CARDIOLOGY | Facility: CLINIC | Age: 82
End: 2022-11-01
Attending: INTERNAL MEDICINE
Payer: COMMERCIAL

## 2022-11-01 DIAGNOSIS — I49.5 SINUS NODE DYSFUNCTION (H): ICD-10-CM

## 2022-11-01 DIAGNOSIS — Z95.0 PACEMAKER: ICD-10-CM

## 2022-11-01 NOTE — TELEPHONE ENCOUNTER
Encounter Type: Courtesy remote pacemaker transmission. Alert for VHR episode.  Device: Medtronic [place] PPM.  Presenting: A-V paced 80 bpm.  Findings: One ventricular high rate episode on 10/31/22 11:58pm, appears to be NSVT 150-170 bpm, duration 12 seconds.   Plan: Routed to device RN for review. KYREE Rodriguez, Device Specialist  ADD: Transmission reviewed and agree with above. Spoke with care facility nurse, Mili who stated Laney hasn't complained of any symptoms. Care facility nurse also stated that Laney hasn't received the increased dose of Toprol-XL and is still taking 25mg daily. Message routed to Dr. Diallo's nurse to re-fax order. Saumya Zeng RN

## 2022-12-02 ENCOUNTER — TELEPHONE (OUTPATIENT)
Dept: INTERNAL MEDICINE | Facility: CLINIC | Age: 82
End: 2022-12-02

## 2022-12-02 NOTE — LETTER
December 13, 2022      Laney Hahn  [address]   [place] [address]              Dear Laney,    We tried to contact you by phone and left several messages.  You are due for your annual wellness visit through Medicare.  Please contact the clinic and we can assist in scheduling.            Sincerely,    Rose Mcelroy's care team

## 2022-12-05 NOTE — TELEPHONE ENCOUNTER
CONTACTED PT- WAS SENT TO [facility] AND NURSE GAVE NUMBER [phone]- WHICH I LEFT VM TO CB TO [facility] AWV 12/5/22 TV

## 2022-12-08 LAB
MDC_IDC_EPISODE_DTM: NORMAL
MDC_IDC_EPISODE_DURATION: 10 S
MDC_IDC_EPISODE_ID: 677
MDC_IDC_EPISODE_TYPE: NORMAL
MDC_IDC_LEAD_IMPLANT_DT: NORMAL
MDC_IDC_LEAD_IMPLANT_DT: NORMAL
MDC_IDC_LEAD_LOCATION: NORMAL
MDC_IDC_LEAD_LOCATION: NORMAL
MDC_IDC_LEAD_LOCATION_DETAIL_1: NORMAL
MDC_IDC_LEAD_LOCATION_DETAIL_1: NORMAL
MDC_IDC_LEAD_MFG: NORMAL
MDC_IDC_LEAD_MFG: NORMAL
MDC_IDC_LEAD_MODEL: NORMAL
MDC_IDC_LEAD_MODEL: NORMAL
MDC_IDC_LEAD_POLARITY_TYPE: NORMAL
MDC_IDC_LEAD_POLARITY_TYPE: NORMAL
MDC_IDC_LEAD_SERIAL: NORMAL
MDC_IDC_LEAD_SERIAL: NORMAL
MDC_IDC_LEAD_SPECIAL_FUNCTION: NORMAL
MDC_IDC_LEAD_SPECIAL_FUNCTION: NORMAL
MDC_IDC_MSMT_BATTERY_DTM: NORMAL
MDC_IDC_MSMT_BATTERY_REMAINING_LONGEVITY: 120 MO
MDC_IDC_MSMT_BATTERY_RRT_TRIGGER: 2.62
MDC_IDC_MSMT_BATTERY_STATUS: NORMAL
MDC_IDC_MSMT_BATTERY_VOLTAGE: 3.02 V
MDC_IDC_MSMT_LEADCHNL_RA_IMPEDANCE_VALUE: 304 OHM
MDC_IDC_MSMT_LEADCHNL_RA_IMPEDANCE_VALUE: 456 OHM
MDC_IDC_MSMT_LEADCHNL_RA_PACING_THRESHOLD_AMPLITUDE: 1 V
MDC_IDC_MSMT_LEADCHNL_RA_PACING_THRESHOLD_PULSEWIDTH: 0.4 MS
MDC_IDC_MSMT_LEADCHNL_RA_SENSING_INTR_AMPL: 1.88 MV
MDC_IDC_MSMT_LEADCHNL_RA_SENSING_INTR_AMPL: 1.88 MV
MDC_IDC_MSMT_LEADCHNL_RV_IMPEDANCE_VALUE: 399 OHM
MDC_IDC_MSMT_LEADCHNL_RV_IMPEDANCE_VALUE: 532 OHM
MDC_IDC_MSMT_LEADCHNL_RV_PACING_THRESHOLD_AMPLITUDE: 0.75 V
MDC_IDC_MSMT_LEADCHNL_RV_PACING_THRESHOLD_PULSEWIDTH: 0.4 MS
MDC_IDC_MSMT_LEADCHNL_RV_SENSING_INTR_AMPL: 20.62 MV
MDC_IDC_MSMT_LEADCHNL_RV_SENSING_INTR_AMPL: 20.62 MV
MDC_IDC_PG_IMPLANT_DTM: NORMAL
MDC_IDC_PG_MFG: NORMAL
MDC_IDC_PG_MODEL: NORMAL
MDC_IDC_PG_SERIAL: NORMAL
MDC_IDC_PG_TYPE: NORMAL
MDC_IDC_SESS_CLINIC_NAME: NORMAL
MDC_IDC_SESS_DTM: NORMAL
MDC_IDC_SESS_TYPE: NORMAL
MDC_IDC_SET_BRADY_AT_MODE_SWITCH_RATE: 171 {BEATS}/MIN
MDC_IDC_SET_BRADY_HYSTRATE: NORMAL
MDC_IDC_SET_BRADY_LOWRATE: 80 {BEATS}/MIN
MDC_IDC_SET_BRADY_MAX_SENSOR_RATE: 120 {BEATS}/MIN
MDC_IDC_SET_BRADY_MAX_TRACKING_RATE: 120 {BEATS}/MIN
MDC_IDC_SET_BRADY_MODE: NORMAL
MDC_IDC_SET_BRADY_PAV_DELAY_LOW: 180 MS
MDC_IDC_SET_BRADY_SAV_DELAY_LOW: 150 MS
MDC_IDC_SET_LEADCHNL_RA_PACING_AMPLITUDE: 1.5 V
MDC_IDC_SET_LEADCHNL_RA_PACING_ANODE_ELECTRODE_1: NORMAL
MDC_IDC_SET_LEADCHNL_RA_PACING_ANODE_LOCATION_1: NORMAL
MDC_IDC_SET_LEADCHNL_RA_PACING_CAPTURE_MODE: NORMAL
MDC_IDC_SET_LEADCHNL_RA_PACING_CATHODE_ELECTRODE_1: NORMAL
MDC_IDC_SET_LEADCHNL_RA_PACING_CATHODE_LOCATION_1: NORMAL
MDC_IDC_SET_LEADCHNL_RA_PACING_POLARITY: NORMAL
MDC_IDC_SET_LEADCHNL_RA_PACING_PULSEWIDTH: 0.4 MS
MDC_IDC_SET_LEADCHNL_RA_SENSING_ANODE_ELECTRODE_1: NORMAL
MDC_IDC_SET_LEADCHNL_RA_SENSING_ANODE_LOCATION_1: NORMAL
MDC_IDC_SET_LEADCHNL_RA_SENSING_CATHODE_ELECTRODE_1: NORMAL
MDC_IDC_SET_LEADCHNL_RA_SENSING_CATHODE_LOCATION_1: NORMAL
MDC_IDC_SET_LEADCHNL_RA_SENSING_POLARITY: NORMAL
MDC_IDC_SET_LEADCHNL_RA_SENSING_SENSITIVITY: 0.3 MV
MDC_IDC_SET_LEADCHNL_RV_PACING_AMPLITUDE: 2 V
MDC_IDC_SET_LEADCHNL_RV_PACING_ANODE_ELECTRODE_1: NORMAL
MDC_IDC_SET_LEADCHNL_RV_PACING_ANODE_LOCATION_1: NORMAL
MDC_IDC_SET_LEADCHNL_RV_PACING_CAPTURE_MODE: NORMAL
MDC_IDC_SET_LEADCHNL_RV_PACING_CATHODE_ELECTRODE_1: NORMAL
MDC_IDC_SET_LEADCHNL_RV_PACING_CATHODE_LOCATION_1: NORMAL
MDC_IDC_SET_LEADCHNL_RV_PACING_POLARITY: NORMAL
MDC_IDC_SET_LEADCHNL_RV_PACING_PULSEWIDTH: 0.4 MS
MDC_IDC_SET_LEADCHNL_RV_SENSING_ANODE_ELECTRODE_1: NORMAL
MDC_IDC_SET_LEADCHNL_RV_SENSING_ANODE_LOCATION_1: NORMAL
MDC_IDC_SET_LEADCHNL_RV_SENSING_CATHODE_ELECTRODE_1: NORMAL
MDC_IDC_SET_LEADCHNL_RV_SENSING_CATHODE_LOCATION_1: NORMAL
MDC_IDC_SET_LEADCHNL_RV_SENSING_POLARITY: NORMAL
MDC_IDC_SET_LEADCHNL_RV_SENSING_SENSITIVITY: 0.9 MV
MDC_IDC_SET_ZONE_DETECTION_INTERVAL: 200 MS
MDC_IDC_SET_ZONE_DETECTION_INTERVAL: 350 MS
MDC_IDC_SET_ZONE_DETECTION_INTERVAL: 430 MS
MDC_IDC_SET_ZONE_TYPE: NORMAL
MDC_IDC_STAT_AT_BURDEN_PERCENT: 0 %
MDC_IDC_STAT_AT_DTM_END: NORMAL
MDC_IDC_STAT_AT_DTM_START: NORMAL
MDC_IDC_STAT_BRADY_AP_VP_PERCENT: 99.17 %
MDC_IDC_STAT_BRADY_AP_VS_PERCENT: 0.11 %
MDC_IDC_STAT_BRADY_AS_VP_PERCENT: 0.5 %
MDC_IDC_STAT_BRADY_AS_VS_PERCENT: 0.22 %
MDC_IDC_STAT_BRADY_DTM_END: NORMAL
MDC_IDC_STAT_BRADY_DTM_START: NORMAL
MDC_IDC_STAT_BRADY_RA_PERCENT_PACED: 99.48 %
MDC_IDC_STAT_BRADY_RV_PERCENT_PACED: 99.67 %
MDC_IDC_STAT_EPISODE_RECENT_COUNT: 0
MDC_IDC_STAT_EPISODE_RECENT_COUNT: 1
MDC_IDC_STAT_EPISODE_RECENT_COUNT_DTM_END: NORMAL
MDC_IDC_STAT_EPISODE_RECENT_COUNT_DTM_START: NORMAL
MDC_IDC_STAT_EPISODE_TOTAL_COUNT: 0
MDC_IDC_STAT_EPISODE_TOTAL_COUNT: 100
MDC_IDC_STAT_EPISODE_TOTAL_COUNT: 2
MDC_IDC_STAT_EPISODE_TOTAL_COUNT: 29
MDC_IDC_STAT_EPISODE_TOTAL_COUNT: 466
MDC_IDC_STAT_EPISODE_TOTAL_COUNT_DTM_END: NORMAL
MDC_IDC_STAT_EPISODE_TOTAL_COUNT_DTM_START: NORMAL
MDC_IDC_STAT_EPISODE_TYPE: NORMAL

## 2023-01-13 ENCOUNTER — ANCILLARY PROCEDURE (OUTPATIENT)
Dept: CARDIOLOGY | Facility: CLINIC | Age: 83
End: 2023-01-13
Attending: INTERNAL MEDICINE
Payer: COMMERCIAL

## 2023-01-13 DIAGNOSIS — I49.5 SINUS NODE DYSFUNCTION (H): ICD-10-CM

## 2023-01-13 DIAGNOSIS — Z95.0 PACEMAKER: ICD-10-CM

## 2023-01-16 LAB
MDC_IDC_LEAD_IMPLANT_DT: NORMAL
MDC_IDC_LEAD_IMPLANT_DT: NORMAL
MDC_IDC_LEAD_LOCATION: NORMAL
MDC_IDC_LEAD_LOCATION: NORMAL
MDC_IDC_LEAD_LOCATION_DETAIL_1: NORMAL
MDC_IDC_LEAD_LOCATION_DETAIL_1: NORMAL
MDC_IDC_LEAD_MFG: NORMAL
MDC_IDC_LEAD_MFG: NORMAL
MDC_IDC_LEAD_MODEL: NORMAL
MDC_IDC_LEAD_MODEL: NORMAL
MDC_IDC_LEAD_POLARITY_TYPE: NORMAL
MDC_IDC_LEAD_POLARITY_TYPE: NORMAL
MDC_IDC_LEAD_SERIAL: NORMAL
MDC_IDC_LEAD_SERIAL: NORMAL
MDC_IDC_LEAD_SPECIAL_FUNCTION: NORMAL
MDC_IDC_LEAD_SPECIAL_FUNCTION: NORMAL
MDC_IDC_MSMT_BATTERY_DTM: NORMAL
MDC_IDC_MSMT_BATTERY_REMAINING_LONGEVITY: 103 MO
MDC_IDC_MSMT_BATTERY_RRT_TRIGGER: 2.62
MDC_IDC_MSMT_BATTERY_STATUS: NORMAL
MDC_IDC_MSMT_BATTERY_VOLTAGE: 3.01 V
MDC_IDC_MSMT_LEADCHNL_RA_IMPEDANCE_VALUE: 285 OHM
MDC_IDC_MSMT_LEADCHNL_RA_IMPEDANCE_VALUE: 456 OHM
MDC_IDC_MSMT_LEADCHNL_RA_PACING_THRESHOLD_AMPLITUDE: 1 V
MDC_IDC_MSMT_LEADCHNL_RA_PACING_THRESHOLD_PULSEWIDTH: 0.4 MS
MDC_IDC_MSMT_LEADCHNL_RA_SENSING_INTR_AMPL: 1.25 MV
MDC_IDC_MSMT_LEADCHNL_RA_SENSING_INTR_AMPL: 1.25 MV
MDC_IDC_MSMT_LEADCHNL_RV_IMPEDANCE_VALUE: 380 OHM
MDC_IDC_MSMT_LEADCHNL_RV_IMPEDANCE_VALUE: 532 OHM
MDC_IDC_MSMT_LEADCHNL_RV_PACING_THRESHOLD_AMPLITUDE: 0.62 V
MDC_IDC_MSMT_LEADCHNL_RV_PACING_THRESHOLD_PULSEWIDTH: 0.4 MS
MDC_IDC_MSMT_LEADCHNL_RV_SENSING_INTR_AMPL: 20.62 MV
MDC_IDC_MSMT_LEADCHNL_RV_SENSING_INTR_AMPL: 20.62 MV
MDC_IDC_PG_IMPLANT_DTM: NORMAL
MDC_IDC_PG_MFG: NORMAL
MDC_IDC_PG_MODEL: NORMAL
MDC_IDC_PG_SERIAL: NORMAL
MDC_IDC_PG_TYPE: NORMAL
MDC_IDC_SESS_CLINIC_NAME: NORMAL
MDC_IDC_SESS_DTM: NORMAL
MDC_IDC_SESS_TYPE: NORMAL
MDC_IDC_SET_BRADY_AT_MODE_SWITCH_RATE: 171 {BEATS}/MIN
MDC_IDC_SET_BRADY_HYSTRATE: NORMAL
MDC_IDC_SET_BRADY_LOWRATE: 80 {BEATS}/MIN
MDC_IDC_SET_BRADY_MAX_SENSOR_RATE: 120 {BEATS}/MIN
MDC_IDC_SET_BRADY_MAX_TRACKING_RATE: 120 {BEATS}/MIN
MDC_IDC_SET_BRADY_MODE: NORMAL
MDC_IDC_SET_BRADY_PAV_DELAY_LOW: 180 MS
MDC_IDC_SET_BRADY_SAV_DELAY_LOW: 150 MS
MDC_IDC_SET_LEADCHNL_RA_PACING_AMPLITUDE: 2.25 V
MDC_IDC_SET_LEADCHNL_RA_PACING_ANODE_ELECTRODE_1: NORMAL
MDC_IDC_SET_LEADCHNL_RA_PACING_ANODE_LOCATION_1: NORMAL
MDC_IDC_SET_LEADCHNL_RA_PACING_CAPTURE_MODE: NORMAL
MDC_IDC_SET_LEADCHNL_RA_PACING_CATHODE_ELECTRODE_1: NORMAL
MDC_IDC_SET_LEADCHNL_RA_PACING_CATHODE_LOCATION_1: NORMAL
MDC_IDC_SET_LEADCHNL_RA_PACING_POLARITY: NORMAL
MDC_IDC_SET_LEADCHNL_RA_PACING_PULSEWIDTH: 0.4 MS
MDC_IDC_SET_LEADCHNL_RA_SENSING_ANODE_ELECTRODE_1: NORMAL
MDC_IDC_SET_LEADCHNL_RA_SENSING_ANODE_LOCATION_1: NORMAL
MDC_IDC_SET_LEADCHNL_RA_SENSING_CATHODE_ELECTRODE_1: NORMAL
MDC_IDC_SET_LEADCHNL_RA_SENSING_CATHODE_LOCATION_1: NORMAL
MDC_IDC_SET_LEADCHNL_RA_SENSING_POLARITY: NORMAL
MDC_IDC_SET_LEADCHNL_RA_SENSING_SENSITIVITY: 0.3 MV
MDC_IDC_SET_LEADCHNL_RV_PACING_AMPLITUDE: 2 V
MDC_IDC_SET_LEADCHNL_RV_PACING_ANODE_ELECTRODE_1: NORMAL
MDC_IDC_SET_LEADCHNL_RV_PACING_ANODE_LOCATION_1: NORMAL
MDC_IDC_SET_LEADCHNL_RV_PACING_CAPTURE_MODE: NORMAL
MDC_IDC_SET_LEADCHNL_RV_PACING_CATHODE_ELECTRODE_1: NORMAL
MDC_IDC_SET_LEADCHNL_RV_PACING_CATHODE_LOCATION_1: NORMAL
MDC_IDC_SET_LEADCHNL_RV_PACING_POLARITY: NORMAL
MDC_IDC_SET_LEADCHNL_RV_PACING_PULSEWIDTH: 0.4 MS
MDC_IDC_SET_LEADCHNL_RV_SENSING_ANODE_ELECTRODE_1: NORMAL
MDC_IDC_SET_LEADCHNL_RV_SENSING_ANODE_LOCATION_1: NORMAL
MDC_IDC_SET_LEADCHNL_RV_SENSING_CATHODE_ELECTRODE_1: NORMAL
MDC_IDC_SET_LEADCHNL_RV_SENSING_CATHODE_LOCATION_1: NORMAL
MDC_IDC_SET_LEADCHNL_RV_SENSING_POLARITY: NORMAL
MDC_IDC_SET_LEADCHNL_RV_SENSING_SENSITIVITY: 0.9 MV
MDC_IDC_SET_ZONE_DETECTION_INTERVAL: 200 MS
MDC_IDC_SET_ZONE_DETECTION_INTERVAL: 350 MS
MDC_IDC_SET_ZONE_DETECTION_INTERVAL: 430 MS
MDC_IDC_SET_ZONE_TYPE: NORMAL
MDC_IDC_STAT_AT_BURDEN_PERCENT: 0 %
MDC_IDC_STAT_AT_DTM_END: NORMAL
MDC_IDC_STAT_AT_DTM_START: NORMAL
MDC_IDC_STAT_BRADY_AP_VP_PERCENT: 97.05 %
MDC_IDC_STAT_BRADY_AP_VS_PERCENT: 0.05 %
MDC_IDC_STAT_BRADY_AS_VP_PERCENT: 2.68 %
MDC_IDC_STAT_BRADY_AS_VS_PERCENT: 0.22 %
MDC_IDC_STAT_BRADY_DTM_END: NORMAL
MDC_IDC_STAT_BRADY_DTM_START: NORMAL
MDC_IDC_STAT_BRADY_RA_PERCENT_PACED: 97.28 %
MDC_IDC_STAT_BRADY_RV_PERCENT_PACED: 99.73 %
MDC_IDC_STAT_EPISODE_RECENT_COUNT: 0
MDC_IDC_STAT_EPISODE_RECENT_COUNT_DTM_END: NORMAL
MDC_IDC_STAT_EPISODE_RECENT_COUNT_DTM_START: NORMAL
MDC_IDC_STAT_EPISODE_TOTAL_COUNT: 0
MDC_IDC_STAT_EPISODE_TOTAL_COUNT: 100
MDC_IDC_STAT_EPISODE_TOTAL_COUNT: 2
MDC_IDC_STAT_EPISODE_TOTAL_COUNT: 29
MDC_IDC_STAT_EPISODE_TOTAL_COUNT: 466
MDC_IDC_STAT_EPISODE_TOTAL_COUNT_DTM_END: NORMAL
MDC_IDC_STAT_EPISODE_TOTAL_COUNT_DTM_START: NORMAL
MDC_IDC_STAT_EPISODE_TYPE: NORMAL

## 2023-01-16 PROCEDURE — 93294 REM INTERROG EVL PM/LDLS PM: CPT | Performed by: INTERNAL MEDICINE

## 2023-01-16 PROCEDURE — 93296 REM INTERROG EVL PM/IDS: CPT | Performed by: INTERNAL MEDICINE

## 2023-01-19 ENCOUNTER — OFFICE VISIT (OUTPATIENT)
Dept: INTERNAL MEDICINE | Facility: CLINIC | Age: 83
End: 2023-01-19
Payer: COMMERCIAL

## 2023-01-19 VITALS
HEART RATE: 80 BPM | OXYGEN SATURATION: 98 % | SYSTOLIC BLOOD PRESSURE: 119 MMHG | TEMPERATURE: 97.6 F | BODY MASS INDEX: 21.94 KG/M2 | HEIGHT: 64 IN | WEIGHT: 128.5 LBS | DIASTOLIC BLOOD PRESSURE: 78 MMHG

## 2023-01-19 DIAGNOSIS — I49.5 SICK SINUS SYNDROME (H): ICD-10-CM

## 2023-01-19 DIAGNOSIS — I35.0 NONRHEUMATIC AORTIC VALVE STENOSIS: ICD-10-CM

## 2023-01-19 DIAGNOSIS — I50.20 HEART FAILURE WITH REDUCED EJECTION FRACTION, NYHA CLASS I (H): ICD-10-CM

## 2023-01-19 DIAGNOSIS — E78.2 MIXED HYPERLIPIDEMIA: ICD-10-CM

## 2023-01-19 DIAGNOSIS — I48.0 PAROXYSMAL ATRIAL FIBRILLATION (H): ICD-10-CM

## 2023-01-19 DIAGNOSIS — I25.10 NONOBSTRUCTIVE ATHEROSCLEROSIS OF CORONARY ARTERY: ICD-10-CM

## 2023-01-19 DIAGNOSIS — E78.5 DYSLIPIDEMIA, GOAL LDL BELOW 70: ICD-10-CM

## 2023-01-19 DIAGNOSIS — N18.31 STAGE 3A CHRONIC KIDNEY DISEASE (H): ICD-10-CM

## 2023-01-19 DIAGNOSIS — I47.10 SVT (SUPRAVENTRICULAR TACHYCARDIA) (H): ICD-10-CM

## 2023-01-19 DIAGNOSIS — I10 ESSENTIAL HYPERTENSION: ICD-10-CM

## 2023-01-19 DIAGNOSIS — F01.50 VASCULAR DEMENTIA WITHOUT BEHAVIORAL DISTURBANCE (H): ICD-10-CM

## 2023-01-19 PROCEDURE — 99214 OFFICE O/P EST MOD 30 MIN: CPT | Performed by: NURSE PRACTITIONER

## 2023-01-19 RX ORDER — ATORVASTATIN CALCIUM 10 MG/1
10 TABLET, FILM COATED ORAL EVERY MORNING
Qty: 30 TABLET | Refills: 11 | Status: SHIPPED | OUTPATIENT
Start: 2023-01-19 | End: 2023-12-04

## 2023-01-19 ASSESSMENT — PAIN SCALES - GENERAL: PAINLEVEL: NO PAIN (0)

## 2023-01-19 NOTE — PROGRESS NOTES
Assessment & Plan   Problem List Items Addressed This Visit        Nervous and Auditory    Vascular dementia without behavioral disturbance (H)     - Medications are dispensed at her assisted living facility  - She receives meals from the assisted living facility  - Her brother provides some oversight  - [employer] member friends also assist in their affairs.            Endocrine    Mixed hyperlipidemia     Continue atorvastatin         Relevant Medications    atorvastatin (LIPITOR) 10 MG tablet       Circulatory    SVT (supraventricular tachycardia) (H)     Heart rate controlled, she denies recent lightheadedness or dizziness.         Paroxysmal atrial fibrillation (H)     Tolerates apixaban anticoagulation         Relevant Medications    apixaban ANTICOAGULANT (ELIQUIS ANTICOAGULANT) 2.5 MG tablet    Nonrheumatic aortic valve stenosis     Patient does not interested in TAVR.  Asymptomatic.         Heart failure with reduced ejection fraction, NYHA class I (H)     Appears euvolemic         Essential hypertension     Stable         Nonobstructive atherosclerosis of coronary artery    Sick sinus syndrome (H)     S/p PPM placement             Urinary    Chronic kidney disease, stage III (moderate) (H)     CKD stable with recent lab work.  Repeat in 6 months.        Other Visit Diagnoses     Dyslipidemia, goal LDL below 70        Relevant Medications    atorvastatin (LIPITOR) 10 MG tablet                   Return in about 3 months (around 4/19/2023) for Routine preventive.    Rose Mcelroy NP  [facility]    Ian Davison is a 82 year old accompanied by her brother and , presenting for the following health issues:  RECHECK (3 month follow up)    She has CAD and wall motion abnormality and mildly reduced left ventricular ejection fraction was noted on echocardiogram during her recent hospitalization but unchanged from prior echoes. She declined stress testing at the cardiology  "appointment in October. She denies chest pains or shortness of breath, \"I feel actually very good\".     We reviewed her history of aortic valve stenosis.  She is not interested in having a valve replacement surgery.  She denies dyspnea.      History of Present Illness       Reason for visit:  3 month follow up    She eats 4 or more servings of fruits and vegetables daily.She consumes 1 sweetened beverage(s) daily.She exercises with enough effort to increase her heart rate 9 or less minutes per day.  She exercises with enough effort to increase her heart rate 3 or less days per week.   She is taking medications regularly.         Objective    /78 (BP Location: Right arm, Patient Position: Sitting, Cuff Size: Adult Regular)   Pulse 80   Temp 97.6  F (36.4  C) (Oral)   Ht 1.613 m (5' 3.5\")   Wt 58.3 kg (128 lb 8 oz)   LMP  (LMP Unknown)   SpO2 98%   BMI 22.41 kg/m    Body mass index is 22.41 kg/m .     Wt Readings from Last 5 Encounters:   01/19/23 58.3 kg (128 lb 8 oz)   10/03/22 56.3 kg (124 lb 3.2 oz)   09/29/22 58 kg (127 lb 14.4 oz)   09/23/22 57.7 kg (127 lb 4.8 oz)   09/16/22 54.4 kg (120 lb)       Physical Exam   GENERAL: alert and no distress  RESP: lungs clear to auscultation - no rales, rhonchi or wheezes  CV: regular rate and rhythm, normal S1 S2. Grade II/VI systolic murmur. No LE edema.                 "

## 2023-01-19 NOTE — PATIENT INSTRUCTIONS
Thanks for coming in today! Your blood pressure looks good, we didn't make any changes today. You have a heart murmur due to the stiff heart valve but it doesn't sound like this is causing you problems and you opted against a surgery to fix it. Let's see you back in 6 months.

## 2023-01-20 PROBLEM — E87.5 HYPERKALEMIA: Status: RESOLVED | Noted: 2022-07-22 | Resolved: 2023-01-20

## 2023-01-20 PROBLEM — I31.39 PERICARDIAL EFFUSION: Status: RESOLVED | Noted: 2021-06-17 | Resolved: 2023-01-20

## 2023-01-20 PROBLEM — D62 ANEMIA DUE TO BLOOD LOSS, ACUTE: Status: RESOLVED | Noted: 2022-09-16 | Resolved: 2023-01-20

## 2023-01-20 PROBLEM — R55 SYNCOPE, UNSPECIFIED SYNCOPE TYPE: Status: RESOLVED | Noted: 2022-09-11 | Resolved: 2023-01-20

## 2023-01-20 PROBLEM — I49.5 SICK SINUS SYNDROME (H): Status: ACTIVE | Noted: 2023-01-20

## 2023-01-20 PROBLEM — F01.50 VASCULAR DEMENTIA WITHOUT BEHAVIORAL DISTURBANCE (H): Status: ACTIVE | Noted: 2021-06-17
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
## 2023-01-20 PROBLEM — N17.9 AKI (ACUTE KIDNEY INJURY) (H): Status: RESOLVED | Noted: 2022-07-23 | Resolved: 2023-01-20

## 2023-01-20 NOTE — ASSESSMENT & PLAN NOTE
- Medications are dispensed at her assisted living facility  - She receives meals from the assisted living facility  - Her brother provides some oversight  - [employer] member friends also assist in their affairs.

## 2023-02-24 ENCOUNTER — HOSPITAL ENCOUNTER (EMERGENCY)
Facility: HOSPITAL | Age: 83
Discharge: HOME | End: 2023-02-24
Attending: EMERGENCY MEDICINE | Admitting: EMERGENCY MEDICINE
Payer: COMMERCIAL

## 2023-02-24 VITALS
DIASTOLIC BLOOD PRESSURE: 68 MMHG | RESPIRATION RATE: 28 BRPM | WEIGHT: 120 LBS | SYSTOLIC BLOOD PRESSURE: 158 MMHG | TEMPERATURE: 98.5 F | BODY MASS INDEX: 20.49 KG/M2 | HEIGHT: 64 IN | OXYGEN SATURATION: 97 % | HEART RATE: 82 BPM

## 2023-02-24 DIAGNOSIS — N30.00 ACUTE CYSTITIS WITHOUT HEMATURIA: ICD-10-CM

## 2023-02-24 DIAGNOSIS — R53.83 FATIGUE, UNSPECIFIED TYPE: ICD-10-CM

## 2023-02-24 LAB
ALBUMIN UR-MCNC: NEGATIVE MG/DL
ANION GAP SERPL CALCULATED.3IONS-SCNC: 8 MMOL/L (ref 7–15)
APPEARANCE UR: CLEAR
BACTERIA #/AREA URNS HPF: ABNORMAL /HPF
BILIRUB UR QL STRIP: NEGATIVE
BUN SERPL-MCNC: 23 MG/DL (ref 8–23)
CALCIUM SERPL-MCNC: 8.6 MG/DL (ref 8.8–10.2)
CHLORIDE SERPL-SCNC: 107 MMOL/L (ref 98–107)
COLOR UR AUTO: ABNORMAL
CREAT SERPL-MCNC: 1.2 MG/DL (ref 0.51–0.95)
DEPRECATED HCO3 PLAS-SCNC: 25 MMOL/L (ref 22–29)
ERYTHROCYTE [DISTWIDTH] IN BLOOD BY AUTOMATED COUNT: 12.6 % (ref 10–15)
FLUAV RNA SPEC QL NAA+PROBE: NEGATIVE
FLUBV RNA RESP QL NAA+PROBE: NEGATIVE
GFR SERPL CREATININE-BSD FRML MDRD: 45 ML/MIN/1.73M2
GLUCOSE SERPL-MCNC: 109 MG/DL (ref 70–99)
GLUCOSE UR STRIP-MCNC: NEGATIVE MG/DL
HCT VFR BLD AUTO: 36.3 % (ref 35–47)
HGB BLD-MCNC: 12.5 G/DL (ref 11.7–15.7)
HGB UR QL STRIP: NEGATIVE
KETONES UR STRIP-MCNC: NEGATIVE MG/DL
LEUKOCYTE ESTERASE UR QL STRIP: ABNORMAL
MAGNESIUM SERPL-MCNC: 2.1 MG/DL (ref 1.7–2.3)
MCH RBC QN AUTO: 33.8 PG (ref 26.5–33)
MCHC RBC AUTO-ENTMCNC: 34.4 G/DL (ref 31.5–36.5)
MCV RBC AUTO: 98 FL (ref 78–100)
NITRATE UR QL: NEGATIVE
PH UR STRIP: 5.5 [PH] (ref 5–7)
PLATELET # BLD AUTO: 152 10E3/UL (ref 150–450)
POTASSIUM SERPL-SCNC: 4.5 MMOL/L (ref 3.4–5.3)
RBC # BLD AUTO: 3.7 10E6/UL (ref 3.8–5.2)
RBC URINE: 1 /HPF
RSV RNA SPEC NAA+PROBE: NEGATIVE
SARS-COV-2 RNA RESP QL NAA+PROBE: NEGATIVE
SODIUM SERPL-SCNC: 140 MMOL/L (ref 136–145)
SP GR UR STRIP: 1.02 (ref 1–1.03)
SQUAMOUS EPITHELIAL: 1 /HPF
UROBILINOGEN UR STRIP-MCNC: <2 MG/DL
WBC # BLD AUTO: 4.4 10E3/UL (ref 4–11)
WBC URINE: 9 /HPF

## 2023-02-24 PROCEDURE — C9803 HOPD COVID-19 SPEC COLLECT: HCPCS

## 2023-02-24 PROCEDURE — 250N000013 HC RX MED GY IP 250 OP 250 PS 637: Performed by: EMERGENCY MEDICINE

## 2023-02-24 PROCEDURE — 82310 ASSAY OF CALCIUM: CPT | Performed by: EMERGENCY MEDICINE

## 2023-02-24 PROCEDURE — 85027 COMPLETE CBC AUTOMATED: CPT | Performed by: EMERGENCY MEDICINE

## 2023-02-24 PROCEDURE — 99285 EMERGENCY DEPT VISIT HI MDM: CPT | Mod: CS

## 2023-02-24 PROCEDURE — 81001 URINALYSIS AUTO W/SCOPE: CPT | Performed by: EMERGENCY MEDICINE

## 2023-02-24 PROCEDURE — 83735 ASSAY OF MAGNESIUM: CPT | Performed by: EMERGENCY MEDICINE

## 2023-02-24 PROCEDURE — 93005 ELECTROCARDIOGRAM TRACING: CPT | Performed by: EMERGENCY MEDICINE

## 2023-02-24 PROCEDURE — 87637 SARSCOV2&INF A&B&RSV AMP PRB: CPT | Performed by: EMERGENCY MEDICINE

## 2023-02-24 PROCEDURE — 36415 COLL VENOUS BLD VENIPUNCTURE: CPT | Performed by: EMERGENCY MEDICINE

## 2023-02-24 RX ORDER — CEFDINIR 300 MG/1
300 CAPSULE ORAL 2 TIMES DAILY
Qty: 14 CAPSULE | Refills: 0 | Status: SHIPPED | OUTPATIENT
Start: 2023-02-24 | End: 2023-03-03

## 2023-02-24 RX ORDER — CEFDINIR 300 MG/1
300 CAPSULE ORAL ONCE
Status: COMPLETED | OUTPATIENT
Start: 2023-02-24 | End: 2023-02-24

## 2023-02-24 RX ADMIN — CEFDINIR 300 MG: 300 CAPSULE ORAL at 06:56

## 2023-02-24 ASSESSMENT — ACTIVITIES OF DAILY LIVING (ADL)
ADLS_ACUITY_SCORE: 35
ADLS_ACUITY_SCORE: 35

## 2023-02-24 NOTE — ED PROVIDER NOTES
EMERGENCY DEPARTMENT ENCOUNTER      NAME: Laney Hahn  AGE: 82 year old female  YOB: 1940  MRN: [id number]  EVALUATION DATE & TIME: 2/24/2023  5:30 AM    PCP: Rose Mcelroy    ED PROVIDER: Sophia Jackson MD    Chief Complaint   Patient presents with     Generalized Weakness         FINAL IMPRESSION:  1. Acute cystitis without hematuria    2. Fatigue, unspecified type          ED COURSE & MEDICAL DECISION MAKING:    Pertinent Labs & Imaging studies reviewed. (See chart for details)  82 year old female with history of HTN, HLD, nonocclusive CAD, CHF, AV node dysfunction status post PPM, paroxysmal A-fib on Eliquis and vascular dementia, CKD who presents to the Emergency Department for evaluation of generalized weakness since she woke this morning.  Otherwise no focal symptoms.  Exam is notable for murmur.  Has known aortic valve stenosis and does not want to proceed with TAVR.  Patient states murmur is old.  Differential includes viral syndrome, dehydration, electrolyte abnormality, symptomatic anemia, arrhythmia, UTI.    Patient placed on monitor, IV established and blood obtained.  Twelve-lead EKG paced rhythm unchanged from previous.  CBC, BMP, magnesium creatinine of 1.2 up minimally from 1.0 at baseline.  Urinalysis with leukocyte Estrace, bacteria and white cells.  Given this Omnicef.  COVID/influenza/RSV swab negative.  Will discharge to home with ongoing Omnicef for UTI.      ED Course as of 02/24/23 0742   Fri Feb 24, 2023   0606 Hemoglobin: 12.5   0646 Patient ambulated to bathroom without difficulty.  Did not even need to use her cane, instead holding it like an accessory.   0649 UA with Microscopic reflex to Culture(!)  +LE,bacteria,wbc. Clean catch.    0742 Updated pt on plan for home.        Medical Decision Making    History:    Supplemental history from: EMS    External Record(s) reviewed: Documented in chart, if applicable.    Work Up:    Chart documentation includes differential  "considered and any EKGs or imaging independently interpreted by provider, where specified.    In additional to work up documented, I considered the following work up: Documented in chart, if applicable.    External consultation:    Discussion of management with another provider: Documented in chart, if applicable    Complicating factors:    Care impacted by chronic illness: Anticoagulated State, Chronic Kidney Disease, Dementia, Hyperlipidemia and Hypertension    Care affected by social determinants of health: N/A    Disposition considerations: Discharge. I prescribed additional prescription strength medication(s) as charted. N/A.        At the conclusion of the encounter I discussed the results of all of the tests and the disposition. The questions were answered. The patient or family acknowledged understanding and was agreeable with the care plan.    MEDICATIONS GIVEN IN THE EMERGENCY:  Medications   cefdinir (OMNICEF) capsule 300 mg (300 mg Oral $Given 2/24/23 0656)       NEW PRESCRIPTIONS STARTED AT TODAY'S ER VISIT  New Prescriptions    CEFDINIR (OMNICEF) 300 MG CAPSULE    Take 1 capsule (300 mg) by mouth 2 times daily for 7 days          =================================================================    HPI    Patient information was obtained from: EMS, patient    Use of Intrepreter: N/A      Laney Hahn is a 82 year old female with pertinent medical history of paroxysmal A-fib anticoagulated on Eliquis, vascular dementia, CHF, HTN, HLD, AV node dysfunction status post PPM and nonocclusive CAD who presents with complaint of generalized weakness.  Patient lives in assisted living facility.  States she went to bed normal last night.  Slept okay, but woke up early which is atypical for her.  When she woke up she noticed that she was feeling weak and fatigued.  She describes feeling \"drained\".  She denies any fevers, chills, recent URI symptoms.  No chest pain, shortness of breath, palpitations.  She " denies any nausea or vomiting.  Bowel and urinary habits have been normal.  She does not feel lightheaded or dizzy when she gets up and ambulates with her cane at baseline.  Denies any focal weakness.  Denies any dark tarry stools or blood in the stools.      REVIEW OF SYSTEMS  Constitutional:  Denies fever, chills, weight loss or weakness  Eyes:  No pain, discharge, redness  HENT:  Denies sore throat, ear pain, congestion  Respiratory: No SOB, wheeze or cough  Cardiovascular:  No CP, palpitations  GI:  Denies abdominal pain, nausea, vomiting, diarrhea  : Denies dysuria, denies hematuria  Neurologic:  Denies headache, focal weakness or sensory changes    PAST MEDICAL HISTORY:  Past Medical History:   Diagnosis Date     COVID-19 09/14/2020    positive test at [facility]     DNAR (do not attempt resuscitation)      Dyslipidemia, goal LDL below 70 09/15/2020     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 Nini Rasmussen     Metabolic encephalopathy      Moderate aortic valve stenosis 08/22/2017    stable on 2020 echo     Nonobstructive atherosclerosis of coronary artery 09/15/2020    with normal LVEDP     Paroxysmal SVT (supraventricular tachycardia) (H) 09/07/2017    said to have short episodes while hospitalized for UTI per Dr. Rhys Mensah     Syncope 08/22/2017     UTI (urinary tract infection) 08/21/2017    hospitalized with weakness       PAST SURGICAL HISTORY:  Past Surgical History:   Procedure Laterality Date     CATARACT EXTRACTION, BILATERAL       CV CORONARY ANGIOGRAM N/A 9/15/2020    Procedure: Coronary Angiogram;  Surgeon: Radhika Santa MD;  Location: [facility] Cath Lab;  Service: Cardiology     CV LEFT HEART CATHETERIZATION WITHOUT LEFT VENTRICULOGRAM Left 9/15/2020    Procedure: Left Heart Catheterization Without Left Ventriculogram;  Surgeon: Radhika Santa MD;  Location:   "[facility] Cath Lab;  Service: Cardiology     EP PACEMAKER INSERT N/A 4/13/2021    Procedure: EP Pacemaker Insertion;  Surgeon: Frederic Burgos MD;  Location: [facility] Cardiac Cath Lab;  Service: Cardiology     HYSTERECTOMY       PARTIAL GASTRECTOMY  1969    for ulcers     TONSILLECTOMY         CURRENT MEDICATIONS:    Prior to Admission Medications   Prescriptions Last Dose Informant Patient Reported? Taking?   apixaban ANTICOAGULANT (ELIQUIS ANTICOAGULANT) 2.5 MG tablet   No No   Sig: Take 1 tablet (2.5 mg) by mouth 2 times daily   atorvastatin (LIPITOR) 10 MG tablet   No No   Sig: Take 1 tablet (10 mg) by mouth every morning   furosemide (LASIX) 20 MG tablet   No No   Sig: Take 0.5 tablets (10 mg) by mouth daily   metoprolol succinate ER (TOPROL XL) 50 MG 24 hr tablet   No No   Sig: TAKE 1 TAB BY MOUTH ONCE DAILY      Facility-Administered Medications: None       ALLERGIES:  Allergies   Allergen Reactions     Sulfa Drugs Shortness Of Breath       FAMILY HISTORY:  Family History   Adopted: Yes       SOCIAL HISTORY:  Social History     Tobacco Use     Smoking status: Never     Smokeless tobacco: Never   Vaping Use     Vaping Use: Never used   Substance Use Topics     Alcohol use: No     Drug use: No        VITALS:  Patient Vitals for the past 24 hrs:   BP Temp Temp src Pulse Resp SpO2 Height Weight   02/24/23 0630 127/61 -- -- 80 23 98 % -- --   02/24/23 0600 126/71 -- -- 80 14 98 % -- --   02/24/23 0536 128/60 98.5  F (36.9  C) Oral 81 22 99 % 1.613 m (5' 3.5\") 54.4 kg (120 lb)       PHYSICAL EXAM    General Appearance: Pleasant elderly female slightly hard of hearing in no acute distress  Head:  Normocephalic, atraumatic  Eyes:  PERRL, conjunctiva/corneas clear, EOM's intact  ENT:  membranes are slightly dry  Neck:  Supple  Chest: PPM left upper chest wall  Cardio:  Regular rate and rhythm, 3 out of 6 systolic murmur  Pulm:  No respiratory distress, clear to auscultation bilaterally  Abdomen:  Soft, non-tender, " non distended,no rebound or guarding.  Extremities: Extremities unremarkable.  No peripheral edema  Skin:  Skin warm, dry, no rashes  Neuro:  Alert and oriented ×3, answers questions and follows commands appropriately, moving all extremities with 5 out of 5 upper lower extremity strength, no gross sensory defects, no aphasia or dysarthria     RADIOLOGY/LABS:  Reviewed all pertinent imaging. Please see official radiology report. All pertinent labs reviewed and interpreted.    Results for orders placed or performed during the hospital encounter of 02/24/23   Basic metabolic panel   Result Value Ref Range    Sodium 140 136 - 145 mmol/L    Potassium 4.5 3.4 - 5.3 mmol/L    Chloride 107 98 - 107 mmol/L    Carbon Dioxide (CO2) 25 22 - 29 mmol/L    Anion Gap 8 7 - 15 mmol/L    Urea Nitrogen 23.0 8.0 - 23.0 mg/dL    Creatinine 1.20 (H) 0.51 - 0.95 mg/dL    Calcium 8.6 (L) 8.8 - 10.2 mg/dL    Glucose 109 (H) 70 - 99 mg/dL    GFR Estimate 45 (L) >60 mL/min/1.73m2   Result Value Ref Range    Magnesium 2.1 1.7 - 2.3 mg/dL   CBC with platelets   Result Value Ref Range    WBC Count 4.4 4.0 - 11.0 10e3/uL    RBC Count 3.70 (L) 3.80 - 5.20 10e6/uL    Hemoglobin 12.5 11.7 - 15.7 g/dL    Hematocrit 36.3 35.0 - 47.0 %    MCV 98 78 - 100 fL    MCH 33.8 (H) 26.5 - 33.0 pg    MCHC 34.4 31.5 - 36.5 g/dL    RDW 12.6 10.0 - 15.0 %    Platelet Count 152 150 - 450 10e3/uL   UA with Microscopic reflex to Culture    Specimen: Urine, Clean Catch   Result Value Ref Range    Color Urine Light Yellow Colorless, Straw, Light Yellow, Yellow    Appearance Urine Clear Clear    Glucose Urine Negative Negative mg/dL    Bilirubin Urine Negative Negative    Ketones Urine Negative Negative mg/dL    Specific Gravity Urine 1.020 1.001 - 1.030    Blood Urine Negative Negative    pH Urine 5.5 5.0 - 7.0    Protein Albumin Urine Negative Negative mg/dL    Urobilinogen Urine <2.0 <2.0 mg/dL    Nitrite Urine Negative Negative    Leukocyte Esterase Urine 75 Patria/uL  (A) Negative    Bacteria Urine Few (A) None Seen /HPF    RBC Urine 1 <=2 /HPF    WBC Urine 9 (H) <=5 /HPF    Squamous Epithelials Urine 1 <=1 /HPF   Symptomatic Influenza A/B & SARS-CoV2 (COVID-19) Virus PCR Multiplex Nasopharyngeal    Specimen: Nasopharyngeal; Swab   Result Value Ref Range    Influenza A PCR Negative Negative    Influenza B PCR Negative Negative    RSV PCR Negative Negative    SARS CoV2 PCR Negative Negative       EKG:  Performed at: 24 Feb 2023 603    Impression: Electronic atrial pacemaker. Rightward axis. Nonspecific intraventricular block. Cannot rule out septal infarct, age undetermined. T wave abnormality, consider inferior ischemia. Abnormal ECG.     Rate: 80 bpm  Rhythm: Electronic atrial pacemaker  Axis: -35  AL Interval: 134 ms  QRS Interval: 132 ms  QTc Interval: 523 ms   ST Changes: None  Comparison: No changes when compared to EKG of 9/11/2022.  I have independently reviewed and interpreted the EKG(s) documented above.      The creation of this record is based on the scribe s observations of the work being performed by Sophia Jackson MD and the provider s statements to them. It was created on her behalf by Tre Ramirez, a trained medical scribe. This document has been checked and approved by the attending provider.    Sophia Jackson MD  Emergency Medicine  [facility] EMERGENCY DEPARTMENT  [facility] [facility] [address]  [phone]  Dept: [phone]     Sophia Jackson MD  02/24/23 1134

## 2023-02-24 NOTE — ED TRIAGE NOTES
[facility]. Pt states that she went to bed feeling fine last night but woke up feeling weak and fatigued this morning. Pt denies any chest pain, dizziness, SOB, nausea, fever/chills, or urinary symptoms. Pt states that her only symptom at this time is feeling weak. BG en route was 108, per EMS. No neurological deficits noted at this time.      Triage Assessment     Row Name 02/24/23 0538       Triage Assessment (Adult)    Airway WDL WDL       Respiratory WDL    Respiratory WDL WDL       Skin Circulation/Temperature WDL    Skin Circulation/Temperature WDL WDL       Cardiac WDL    Cardiac WDL WDL       Peripheral/Neurovascular WDL    Peripheral Neurovascular WDL WDL       Cognitive/Neuro/Behavioral WDL    Cognitive/Neuro/Behavioral WDL WDL

## 2023-04-20 ENCOUNTER — ANCILLARY PROCEDURE (OUTPATIENT)
Dept: CARDIOLOGY | Facility: CLINIC | Age: 83
End: 2023-04-20
Attending: INTERNAL MEDICINE
Payer: COMMERCIAL

## 2023-04-20 DIAGNOSIS — Z95.0 PACEMAKER: ICD-10-CM

## 2023-04-20 DIAGNOSIS — I49.5 SICK SINUS SYNDROME (H): ICD-10-CM

## 2023-04-20 LAB
MDC_IDC_EPISODE_DTM: NORMAL
MDC_IDC_EPISODE_DURATION: 1 S
MDC_IDC_EPISODE_DURATION: 3 S
MDC_IDC_EPISODE_ID: 678
MDC_IDC_EPISODE_ID: 679
MDC_IDC_EPISODE_ID: 680
MDC_IDC_EPISODE_ID: 681
MDC_IDC_EPISODE_TYPE: NORMAL
MDC_IDC_LEAD_IMPLANT_DT: NORMAL
MDC_IDC_LEAD_IMPLANT_DT: NORMAL
MDC_IDC_LEAD_LOCATION: NORMAL
MDC_IDC_LEAD_LOCATION: NORMAL
MDC_IDC_LEAD_LOCATION_DETAIL_1: NORMAL
MDC_IDC_LEAD_LOCATION_DETAIL_1: NORMAL
MDC_IDC_LEAD_MFG: NORMAL
MDC_IDC_LEAD_MFG: NORMAL
MDC_IDC_LEAD_MODEL: NORMAL
MDC_IDC_LEAD_MODEL: NORMAL
MDC_IDC_LEAD_POLARITY_TYPE: NORMAL
MDC_IDC_LEAD_POLARITY_TYPE: NORMAL
MDC_IDC_LEAD_SERIAL: NORMAL
MDC_IDC_LEAD_SERIAL: NORMAL
MDC_IDC_LEAD_SPECIAL_FUNCTION: NORMAL
MDC_IDC_LEAD_SPECIAL_FUNCTION: NORMAL
MDC_IDC_MSMT_BATTERY_DTM: NORMAL
MDC_IDC_MSMT_BATTERY_REMAINING_LONGEVITY: 100 MO
MDC_IDC_MSMT_BATTERY_RRT_TRIGGER: 2.62
MDC_IDC_MSMT_BATTERY_STATUS: NORMAL
MDC_IDC_MSMT_BATTERY_VOLTAGE: 3.01 V
MDC_IDC_MSMT_LEADCHNL_RA_IMPEDANCE_VALUE: 285 OHM
MDC_IDC_MSMT_LEADCHNL_RA_IMPEDANCE_VALUE: 456 OHM
MDC_IDC_MSMT_LEADCHNL_RA_PACING_THRESHOLD_AMPLITUDE: 1 V
MDC_IDC_MSMT_LEADCHNL_RA_PACING_THRESHOLD_PULSEWIDTH: 0.4 MS
MDC_IDC_MSMT_LEADCHNL_RA_SENSING_INTR_AMPL: 1.5 MV
MDC_IDC_MSMT_LEADCHNL_RA_SENSING_INTR_AMPL: 1.5 MV
MDC_IDC_MSMT_LEADCHNL_RV_IMPEDANCE_VALUE: 399 OHM
MDC_IDC_MSMT_LEADCHNL_RV_IMPEDANCE_VALUE: 532 OHM
MDC_IDC_MSMT_LEADCHNL_RV_PACING_THRESHOLD_AMPLITUDE: 0.62 V
MDC_IDC_MSMT_LEADCHNL_RV_PACING_THRESHOLD_PULSEWIDTH: 0.4 MS
MDC_IDC_MSMT_LEADCHNL_RV_SENSING_INTR_AMPL: 20.62 MV
MDC_IDC_MSMT_LEADCHNL_RV_SENSING_INTR_AMPL: 20.62 MV
MDC_IDC_PG_IMPLANT_DTM: NORMAL
MDC_IDC_PG_MFG: NORMAL
MDC_IDC_PG_MODEL: NORMAL
MDC_IDC_PG_SERIAL: NORMAL
MDC_IDC_PG_TYPE: NORMAL
MDC_IDC_SESS_CLINIC_NAME: NORMAL
MDC_IDC_SESS_DTM: NORMAL
MDC_IDC_SESS_TYPE: NORMAL
MDC_IDC_SET_BRADY_AT_MODE_SWITCH_RATE: 171 {BEATS}/MIN
MDC_IDC_SET_BRADY_HYSTRATE: NORMAL
MDC_IDC_SET_BRADY_LOWRATE: 80 {BEATS}/MIN
MDC_IDC_SET_BRADY_MAX_SENSOR_RATE: 120 {BEATS}/MIN
MDC_IDC_SET_BRADY_MAX_TRACKING_RATE: 120 {BEATS}/MIN
MDC_IDC_SET_BRADY_MODE: NORMAL
MDC_IDC_SET_BRADY_PAV_DELAY_LOW: 180 MS
MDC_IDC_SET_BRADY_SAV_DELAY_LOW: 150 MS
MDC_IDC_SET_LEADCHNL_RA_PACING_AMPLITUDE: 2.25 V
MDC_IDC_SET_LEADCHNL_RA_PACING_ANODE_ELECTRODE_1: NORMAL
MDC_IDC_SET_LEADCHNL_RA_PACING_ANODE_LOCATION_1: NORMAL
MDC_IDC_SET_LEADCHNL_RA_PACING_CAPTURE_MODE: NORMAL
MDC_IDC_SET_LEADCHNL_RA_PACING_CATHODE_ELECTRODE_1: NORMAL
MDC_IDC_SET_LEADCHNL_RA_PACING_CATHODE_LOCATION_1: NORMAL
MDC_IDC_SET_LEADCHNL_RA_PACING_POLARITY: NORMAL
MDC_IDC_SET_LEADCHNL_RA_PACING_PULSEWIDTH: 0.4 MS
MDC_IDC_SET_LEADCHNL_RA_SENSING_ANODE_ELECTRODE_1: NORMAL
MDC_IDC_SET_LEADCHNL_RA_SENSING_ANODE_LOCATION_1: NORMAL
MDC_IDC_SET_LEADCHNL_RA_SENSING_CATHODE_ELECTRODE_1: NORMAL
MDC_IDC_SET_LEADCHNL_RA_SENSING_CATHODE_LOCATION_1: NORMAL
MDC_IDC_SET_LEADCHNL_RA_SENSING_POLARITY: NORMAL
MDC_IDC_SET_LEADCHNL_RA_SENSING_SENSITIVITY: 0.3 MV
MDC_IDC_SET_LEADCHNL_RV_PACING_AMPLITUDE: 2 V
MDC_IDC_SET_LEADCHNL_RV_PACING_ANODE_ELECTRODE_1: NORMAL
MDC_IDC_SET_LEADCHNL_RV_PACING_ANODE_LOCATION_1: NORMAL
MDC_IDC_SET_LEADCHNL_RV_PACING_CAPTURE_MODE: NORMAL
MDC_IDC_SET_LEADCHNL_RV_PACING_CATHODE_ELECTRODE_1: NORMAL
MDC_IDC_SET_LEADCHNL_RV_PACING_CATHODE_LOCATION_1: NORMAL
MDC_IDC_SET_LEADCHNL_RV_PACING_POLARITY: NORMAL
MDC_IDC_SET_LEADCHNL_RV_PACING_PULSEWIDTH: 0.4 MS
MDC_IDC_SET_LEADCHNL_RV_SENSING_ANODE_ELECTRODE_1: NORMAL
MDC_IDC_SET_LEADCHNL_RV_SENSING_ANODE_LOCATION_1: NORMAL
MDC_IDC_SET_LEADCHNL_RV_SENSING_CATHODE_ELECTRODE_1: NORMAL
MDC_IDC_SET_LEADCHNL_RV_SENSING_CATHODE_LOCATION_1: NORMAL
MDC_IDC_SET_LEADCHNL_RV_SENSING_POLARITY: NORMAL
MDC_IDC_SET_LEADCHNL_RV_SENSING_SENSITIVITY: 0.9 MV
MDC_IDC_SET_ZONE_DETECTION_INTERVAL: 200 MS
MDC_IDC_SET_ZONE_DETECTION_INTERVAL: 350 MS
MDC_IDC_SET_ZONE_DETECTION_INTERVAL: 430 MS
MDC_IDC_SET_ZONE_TYPE: NORMAL
MDC_IDC_STAT_AT_BURDEN_PERCENT: 0 %
MDC_IDC_STAT_AT_DTM_END: NORMAL
MDC_IDC_STAT_AT_DTM_START: NORMAL
MDC_IDC_STAT_BRADY_AP_VP_PERCENT: 99.39 %
MDC_IDC_STAT_BRADY_AP_VS_PERCENT: 0.06 %
MDC_IDC_STAT_BRADY_AS_VP_PERCENT: 0.3 %
MDC_IDC_STAT_BRADY_AS_VS_PERCENT: 0.25 %
MDC_IDC_STAT_BRADY_DTM_END: NORMAL
MDC_IDC_STAT_BRADY_DTM_START: NORMAL
MDC_IDC_STAT_BRADY_RA_PERCENT_PACED: 99.68 %
MDC_IDC_STAT_BRADY_RV_PERCENT_PACED: 99.7 %
MDC_IDC_STAT_EPISODE_RECENT_COUNT: 0
MDC_IDC_STAT_EPISODE_RECENT_COUNT: 4
MDC_IDC_STAT_EPISODE_RECENT_COUNT_DTM_END: NORMAL
MDC_IDC_STAT_EPISODE_RECENT_COUNT_DTM_START: NORMAL
MDC_IDC_STAT_EPISODE_TOTAL_COUNT: 0
MDC_IDC_STAT_EPISODE_TOTAL_COUNT: 100
MDC_IDC_STAT_EPISODE_TOTAL_COUNT: 2
MDC_IDC_STAT_EPISODE_TOTAL_COUNT: 33
MDC_IDC_STAT_EPISODE_TOTAL_COUNT: 466
MDC_IDC_STAT_EPISODE_TOTAL_COUNT_DTM_END: NORMAL
MDC_IDC_STAT_EPISODE_TOTAL_COUNT_DTM_START: NORMAL
MDC_IDC_STAT_EPISODE_TYPE: NORMAL

## 2023-04-20 PROCEDURE — 93294 REM INTERROG EVL PM/LDLS PM: CPT | Performed by: INTERNAL MEDICINE

## 2023-04-20 PROCEDURE — 93296 REM INTERROG EVL PM/IDS: CPT | Performed by: INTERNAL MEDICINE

## 2023-04-27 ENCOUNTER — OFFICE VISIT (OUTPATIENT)
Dept: INTERNAL MEDICINE | Facility: CLINIC | Age: 83
End: 2023-04-27
Payer: COMMERCIAL

## 2023-04-27 VITALS
HEIGHT: 64 IN | SYSTOLIC BLOOD PRESSURE: 104 MMHG | BODY MASS INDEX: 21.56 KG/M2 | WEIGHT: 126.3 LBS | HEART RATE: 85 BPM | TEMPERATURE: 97.8 F | OXYGEN SATURATION: 98 % | DIASTOLIC BLOOD PRESSURE: 64 MMHG

## 2023-04-27 DIAGNOSIS — N18.31 STAGE 3A CHRONIC KIDNEY DISEASE (H): ICD-10-CM

## 2023-04-27 DIAGNOSIS — Z00.00 ENCOUNTER FOR MEDICARE ANNUAL WELLNESS EXAM: Primary | ICD-10-CM

## 2023-04-27 DIAGNOSIS — I49.5 SICK SINUS SYNDROME (H): ICD-10-CM

## 2023-04-27 DIAGNOSIS — I35.0 NONRHEUMATIC AORTIC VALVE STENOSIS: ICD-10-CM

## 2023-04-27 DIAGNOSIS — I47.29 NSVT (NONSUSTAINED VENTRICULAR TACHYCARDIA) (H): ICD-10-CM

## 2023-04-27 DIAGNOSIS — E78.2 MIXED HYPERLIPIDEMIA: ICD-10-CM

## 2023-04-27 DIAGNOSIS — I25.10 NONOBSTRUCTIVE ATHEROSCLEROSIS OF CORONARY ARTERY: ICD-10-CM

## 2023-04-27 DIAGNOSIS — I50.31 ACUTE DIASTOLIC CONGESTIVE HEART FAILURE (H): ICD-10-CM

## 2023-04-27 DIAGNOSIS — I10 ESSENTIAL HYPERTENSION: ICD-10-CM

## 2023-04-27 DIAGNOSIS — E78.5 DYSLIPIDEMIA, GOAL LDL BELOW 70: ICD-10-CM

## 2023-04-27 DIAGNOSIS — I50.20 HEART FAILURE WITH REDUCED EJECTION FRACTION, NYHA CLASS I (H): ICD-10-CM

## 2023-04-27 DIAGNOSIS — I49.5 SINUS NODE DYSFUNCTION (H): ICD-10-CM

## 2023-04-27 DIAGNOSIS — F01.50 VASCULAR DEMENTIA WITHOUT BEHAVIORAL DISTURBANCE (H): ICD-10-CM

## 2023-04-27 DIAGNOSIS — I47.10 SVT (SUPRAVENTRICULAR TACHYCARDIA) (H): ICD-10-CM

## 2023-04-27 DIAGNOSIS — I48.0 PAROXYSMAL ATRIAL FIBRILLATION (H): ICD-10-CM

## 2023-04-27 LAB
ALT SERPL W P-5'-P-CCNC: 13 U/L (ref 10–35)
ANION GAP SERPL CALCULATED.3IONS-SCNC: 11 MMOL/L (ref 7–15)
BUN SERPL-MCNC: 24.1 MG/DL (ref 8–23)
CALCIUM SERPL-MCNC: 9.2 MG/DL (ref 8.8–10.2)
CHLORIDE SERPL-SCNC: 108 MMOL/L (ref 98–107)
CHOLEST SERPL-MCNC: 137 MG/DL
CREAT SERPL-MCNC: 1.25 MG/DL (ref 0.51–0.95)
DEPRECATED HCO3 PLAS-SCNC: 24 MMOL/L (ref 22–29)
ERYTHROCYTE [DISTWIDTH] IN BLOOD BY AUTOMATED COUNT: 13.1 % (ref 10–15)
GFR SERPL CREATININE-BSD FRML MDRD: 43 ML/MIN/1.73M2
GLUCOSE SERPL-MCNC: 89 MG/DL (ref 70–99)
HCT VFR BLD AUTO: 37.6 % (ref 35–47)
HDLC SERPL-MCNC: 42 MG/DL
HGB BLD-MCNC: 13.1 G/DL (ref 11.7–15.7)
LDLC SERPL CALC-MCNC: 62 MG/DL
MCH RBC QN AUTO: 34.7 PG (ref 26.5–33)
MCHC RBC AUTO-ENTMCNC: 34.8 G/DL (ref 31.5–36.5)
MCV RBC AUTO: 100 FL (ref 78–100)
NONHDLC SERPL-MCNC: 95 MG/DL
PLATELET # BLD AUTO: 183 10E3/UL (ref 150–450)
POTASSIUM SERPL-SCNC: 4.8 MMOL/L (ref 3.4–5.3)
RBC # BLD AUTO: 3.77 10E6/UL (ref 3.8–5.2)
SODIUM SERPL-SCNC: 143 MMOL/L (ref 136–145)
TRIGL SERPL-MCNC: 164 MG/DL
TSH SERPL DL<=0.005 MIU/L-ACNC: 1.53 UIU/ML (ref 0.3–4.2)
WBC # BLD AUTO: 5.3 10E3/UL (ref 4–11)

## 2023-04-27 PROCEDURE — 84443 ASSAY THYROID STIM HORMONE: CPT | Performed by: NURSE PRACTITIONER

## 2023-04-27 PROCEDURE — 99214 OFFICE O/P EST MOD 30 MIN: CPT | Mod: 25 | Performed by: NURSE PRACTITIONER

## 2023-04-27 PROCEDURE — 80048 BASIC METABOLIC PNL TOTAL CA: CPT | Performed by: NURSE PRACTITIONER

## 2023-04-27 PROCEDURE — 85027 COMPLETE CBC AUTOMATED: CPT | Performed by: NURSE PRACTITIONER

## 2023-04-27 PROCEDURE — 80061 LIPID PANEL: CPT | Performed by: NURSE PRACTITIONER

## 2023-04-27 PROCEDURE — 84460 ALANINE AMINO (ALT) (SGPT): CPT | Performed by: NURSE PRACTITIONER

## 2023-04-27 PROCEDURE — G0438 PPPS, INITIAL VISIT: HCPCS | Performed by: NURSE PRACTITIONER

## 2023-04-27 PROCEDURE — 36415 COLL VENOUS BLD VENIPUNCTURE: CPT | Performed by: NURSE PRACTITIONER

## 2023-04-27 RX ORDER — METOPROLOL SUCCINATE 50 MG/1
50 TABLET, EXTENDED RELEASE ORAL DAILY
Qty: 30 TABLET | Refills: 11 | Status: SHIPPED | OUTPATIENT
Start: 2023-04-27 | End: 2023-12-04

## 2023-04-27 RX ORDER — FUROSEMIDE 20 MG
10 TABLET ORAL DAILY
Qty: 15 TABLET | Refills: 11 | Status: SHIPPED | OUTPATIENT
Start: 2023-04-27 | End: 2023-12-04

## 2023-04-27 ASSESSMENT — ENCOUNTER SYMPTOMS
NAUSEA: 0
HEMATOCHEZIA: 0
WEAKNESS: 0
JOINT SWELLING: 0
MYALGIAS: 0
HEADACHES: 0
ABDOMINAL PAIN: 0
BREAST MASS: 0
CHILLS: 0
EYE PAIN: 0
HEMATURIA: 0
FREQUENCY: 0
SORE THROAT: 0
PARESTHESIAS: 0
HEARTBURN: 0
PALPITATIONS: 0
FEVER: 0
DIZZINESS: 0
NERVOUS/ANXIOUS: 0
COUGH: 0
ARTHRALGIAS: 0
CONSTIPATION: 0
DYSURIA: 0
DIARRHEA: 0
SHORTNESS OF BREATH: 0

## 2023-04-27 ASSESSMENT — ACTIVITIES OF DAILY LIVING (ADL): CURRENT_FUNCTION: NO ASSISTANCE NEEDED

## 2023-04-27 ASSESSMENT — PAIN SCALES - GENERAL: PAINLEVEL: NO PAIN (0)

## 2023-04-27 NOTE — LETTER
April 27, 2023      Laney HERNANDEZ Jovani  [address]   [place] [id number]        Dear ,    We are writing to inform you of your test results.    Your labs all very good.  Please continue with the same medication regimen that you are currently taking.    Resulted Orders   Lipid panel reflex to direct LDL Fasting   Result Value Ref Range    Cholesterol 137 <200 mg/dL    Triglycerides 164 (H) <150 mg/dL    Direct Measure HDL 42 (L) >=50 mg/dL    LDL Cholesterol Calculated 62 <=100 mg/dL    Non HDL Cholesterol 95 <130 mg/dL    Narrative    Cholesterol  Desirable:  <200 mg/dL    Triglycerides  Normal:  Less than 150 mg/dL  Borderline High:  150-199 mg/dL  High:  200-499 mg/dL  Very High:  Greater than or equal to 500 mg/dL    Direct Measure HDL  Female:  Greater than or equal to 50 mg/dL   Male:  Greater than or equal to 40 mg/dL    LDL Cholesterol  Desirable:  <100mg/dL  Above Desirable:  100-129 mg/dL   Borderline High:  130-159 mg/dL   High:  160-189 mg/dL   Very High:  >= 190 mg/dL    Non HDL Cholesterol  Desirable:  130 mg/dL  Above Desirable:  130-159 mg/dL  Borderline High:  160-189 mg/dL  High:  190-219 mg/dL  Very High:  Greater than or equal to 220 mg/dL   Basic metabolic panel   Result Value Ref Range    Sodium 143 136 - 145 mmol/L    Potassium 4.8 3.4 - 5.3 mmol/L    Chloride 108 (H) 98 - 107 mmol/L    Carbon Dioxide (CO2) 24 22 - 29 mmol/L    Anion Gap 11 7 - 15 mmol/L    Urea Nitrogen 24.1 (H) 8.0 - 23.0 mg/dL    Creatinine 1.25 (H) 0.51 - 0.95 mg/dL    Calcium 9.2 8.8 - 10.2 mg/dL    Glucose 89 70 - 99 mg/dL    GFR Estimate 43 (L) >60 mL/min/1.73m2      Comment:      eGFR calculated using 2021 CKD-EPI equation.   CBC with platelets   Result Value Ref Range    WBC Count 5.3 4.0 - 11.0 10e3/uL    RBC Count 3.77 (L) 3.80 - 5.20 10e6/uL    Hemoglobin 13.1 11.7 - 15.7 g/dL    Hematocrit 37.6 35.0 - 47.0 %     78 - 100 fL    MCH 34.7 (H) 26.5 - 33.0 pg    MCHC 34.8 31.5 - 36.5  g/dL    RDW 13.1 10.0 - 15.0 %    Platelet Count 183 150 - 450 10e3/uL   ALT   Result Value Ref Range    ALT 13 10 - 35 U/L   TSH with free T4 reflex   Result Value Ref Range    TSH 1.53 0.30 - 4.20 uIU/mL       If you have any questions or concerns, please call the clinic at the number listed above.       Sincerely,      Rose Mcelroy NP

## 2023-04-27 NOTE — PROGRESS NOTES
"SUBJECTIVE:   Laney is a 82 year old who presents for Preventive Visit.      4/27/2023     9:39 AM   Additional Questions   Roomed by Yakov WHITE MA   Accompanied by BENNY     Janette does not have any concerns today as is typical for her.  She is accompanied by her brother and a friend from her [employer].    She denies any lightheadedness or dizziness associated with her current medications.  She denies any recent falls.    Patient has been advised of split billing requirements and indicates understanding: Yes  Are you in the first 12 months of your Medicare coverage?  No    Healthy Habits:     In general, how would you rate your overall health?  Excellent    Frequency of exercise:  None    Do you usually eat at least 4 servings of fruit and vegetables a day, include whole grains    & fiber and avoid regularly eating high fat or \"junk\" foods?  No    Taking medications regularly:  Yes    Medication side effects:  None    Ability to successfully perform activities of daily living:  No assistance needed    Home Safety:  No safety concerns identified    Hearing Impairment:  No hearing concerns    In the past 6 months, have you been bothered by leaking of urine?  No    In general, how would you rate your overall mental or emotional health?  Excellent      PHQ-2 Total Score: 0    Additional concerns today:  No      Have you ever done Advance Care Planning? (For example, a Health Directive, POLST, or a discussion with a medical provider or your loved ones about your wishes): Yes, advance care planning is on file.       Fall risk  Fallen 2 or more times in the past year?: No  Any fall with injury in the past year?: No    Cognitive Screening -patient has a known learning disability.  At baseline she is not a reliable historian regarding her medications or recent events.  However, she is not overtly confused and is oriented to person place and time.    Do you have sleep apnea, excessive snoring or daytime drowsiness?: " no    Reviewed and updated as needed this visit by clinical staff   Tobacco  Allergies  Meds  Problems  Med Hx  Surg Hx  Fam Hx          Reviewed and updated as needed this visit by Provider   Tobacco  Allergies  Meds  Problems  Med Hx  Surg Hx  Fam Hx         Social History     Tobacco Use     Smoking status: Never     Smokeless tobacco: Never   Vaping Use     Vaping status: Never Used     Passive vaping exposure: Yes   Substance Use Topics     Alcohol use: No           4/27/2023     9:55 AM   Alcohol Use   Prescreen: >3 drinks/day or >7 drinks/week? No     Do you have a current opioid prescription? No  Do you use any other controlled substances or medications that are not prescribed by a provider? None        Current providers sharing in care for this patient include:   Patient Care Team:  Rose Mcelroy NP as PCP - General  Rose Mcelroy NP as Assigned PCP  Nichol Diallo MD as MD (Cardiovascular Disease)  Nichol Diallo MD as Assigned Heart and Vascular Provider    The following health maintenance items are reviewed in Epic and correct as of today:  Health Maintenance   Topic Date Due     DEXA  Never done     HF ACTION PLAN  Never done     ANNUAL REVIEW OF HM ORDERS  Never done     ZOSTER IMMUNIZATION (1 of 2) Never done     DTAP/TDAP/TD IMMUNIZATION (1 - Tdap) Never done     Pneumococcal Vaccine: 65+ Years (2 - PPSV23 if available, else PCV20) 01/11/2017     INFLUENZA VACCINE (1) 09/01/2022     MICROALBUMIN  03/24/2023     BMP  10/27/2023     MEDICARE ANNUAL WELLNESS VISIT  04/27/2024     ALT  04/27/2024     LIPID  04/27/2024     TSH W/FREE T4 REFLEX  04/27/2024     FALL RISK ASSESSMENT  04/27/2024     CBC  04/27/2024     HEMOGLOBIN  04/27/2024     ADVANCE CARE PLANNING  04/27/2028     PHQ-2 (once per calendar year)  Completed     URINALYSIS  Completed     COVID-19 Vaccine  Completed     IPV IMMUNIZATION  Aged Out     MENINGITIS IMMUNIZATION  Aged Out         OBJECTIVE:   /64   Pulse 85   " Temp 97.8  F (36.6  C) (Oral)   Ht 1.613 m (5' 3.5\")   Wt 57.3 kg (126 lb 4.8 oz)   LMP  (LMP Unknown)   SpO2 98%   BMI 22.02 kg/m   Estimated body mass index is 22.02 kg/m  as calculated from the following:    Height as of this encounter: 1.613 m (5' 3.5\").    Weight as of this encounter: 57.3 kg (126 lb 4.8 oz).  Physical Exam  GENERAL: healthy, alert and no distress  RESP: lungs clear to auscultation - no rales, rhonchi or wheezes  CV: regular rate and rhythm, normal S1 S2. + systolic grade III/VI murmur. No LE edema   NEURO: Oriented x 3          ASSESSMENT / PLAN:     Problem List Items Addressed This Visit        Nervous and Auditory    Vascular dementia without behavioral disturbance (H)     She has appropriate resources in place with medication administration and meals provided by her facility            Endocrine    Mixed hyperlipidemia     Continue statin            Circulatory    SVT (supraventricular tachycardia) (H)     Rate controlled and no recent episodes of lightheadedness or dizziness         Paroxysmal atrial fibrillation (H)     Rate controlled and anticoagulated with apixaban         Nonrheumatic aortic valve stenosis     She has been evaluated by cardiology and declined TAVR.  She is asymptomatic, she denies shortness of breath.         Heart failure with reduced ejection fraction, NYHA class I (H)     Euvolemic         Essential hypertension     Stable         Nonobstructive atherosclerosis of coronary artery     She denies anginal symptoms today.  Continue statin.         Sick sinus syndrome (H)     Status post permanent pacemaker placement            Urinary    Chronic kidney disease, stage III (moderate) (H)     Last creatinine 1.25, today's creatinine is 1.20.        Other Visit Diagnoses     Encounter for Medicare annual wellness exam    -  Primary    NSVT (nonsustained ventricular tachycardia) (H)        Relevant Medications    metoprolol succinate ER (TOPROL XL) 50 MG 24 hr " tablet    Sinus node dysfunction (H)        Relevant Medications    metoprolol succinate ER (TOPROL XL) 50 MG 24 hr tablet    Acute diastolic congestive heart failure (H)        Relevant Medications    furosemide (LASIX) 20 MG tablet    Other Relevant Orders    TSH with free T4 reflex (Completed)    Dyslipidemia, goal LDL below 70        Relevant Orders    Lipid panel reflex to direct LDL Fasting (Completed)    Basic metabolic panel (Completed)    CBC with platelets (Completed)    ALT (Completed)          Patient has been advised of split billing requirements and indicates understanding: Yes      COUNSELING:  Reviewed preventive health counseling, as reflected in patient instructions        She reports that she has never smoked. She has never used smokeless tobacco.      Appropriate preventive services were discussed with this patient, including applicable screening as appropriate for cardiovascular disease, diabetes, osteopenia/osteoporosis, and glaucoma.  As appropriate for age/gender, discussed screening for colorectal cancer, prostate cancer, breast cancer, and cervical cancer. Checklist reviewing preventive services available has been given to the patient.    Reviewed patients plan of care and provided an AVS. The Intermediate Care Plan ( asthma action plan, low back pain action plan, and migraine action plan) for Laney meets the Care Plan requirement. This Care Plan has been established and reviewed with the Patient and brother and other:Friend from [facility].      Rose Mcelroy NP  [facility]    Identified Health Risks:    I have reviewed Opioid Use Disorder and Substance Use Disorder risk factors and made any needed referrals.

## 2023-04-27 NOTE — PATIENT INSTRUCTIONS
Patient Education   Personalized Prevention Plan  You are due for the preventive services outlined below.  Your care team is available to assist you in scheduling these services.  If you have already completed any of these items, please share that information with your care team to update in your medical record.  Health Maintenance Due   Topic Date Due     Osteoporosis Screening  Never done     Heart Failure Action Plan  Never done     ANNUAL REVIEW OF HM ORDERS  Never done     Diptheria Tetanus Pertussis (DTAP/TDAP/TD) Vaccine (1 - Tdap) Never done     Zoster (Shingles) Vaccine (1 of 2) Never done     Pneumococcal Vaccine (2 - PPSV23 if available, else PCV20) 01/11/2017     Annual Wellness Visit  11/16/2017     Thyroid Function Lab  06/10/2022     Flu Vaccine (1) 09/01/2022     Cholesterol Lab  03/24/2023     Kidney Microalbumin Urine Test  03/24/2023

## 2023-05-03 PROBLEM — D64.9 ANEMIA: Status: RESOLVED | Noted: 2021-04-11 | Resolved: 2023-05-03

## 2023-05-03 NOTE — ASSESSMENT & PLAN NOTE
She has been evaluated by cardiology and declined TAVR.  She is asymptomatic, she denies shortness of breath.

## 2023-05-03 NOTE — ASSESSMENT & PLAN NOTE
She has appropriate resources in place with medication administration and meals provided by her facility

## 2023-06-15 ENCOUNTER — OFFICE VISIT (OUTPATIENT)
Dept: PODIATRY | Facility: CLINIC | Age: 83
End: 2023-06-15
Payer: COMMERCIAL

## 2023-06-15 VITALS
BODY MASS INDEX: 21.51 KG/M2 | DIASTOLIC BLOOD PRESSURE: 87 MMHG | OXYGEN SATURATION: 98 % | HEIGHT: 64 IN | SYSTOLIC BLOOD PRESSURE: 129 MMHG | WEIGHT: 126 LBS | HEART RATE: 96 BPM

## 2023-06-15 DIAGNOSIS — M72.2 PLANTAR FASCIITIS: Primary | ICD-10-CM

## 2023-06-15 PROCEDURE — 99203 OFFICE O/P NEW LOW 30 MIN: CPT | Mod: 25 | Performed by: PODIATRIST

## 2023-06-15 PROCEDURE — 20550 NJX 1 TENDON SHEATH/LIGAMENT: CPT | Mod: LT | Performed by: PODIATRIST

## 2023-06-15 RX ORDER — DEXAMETHASONE SODIUM PHOSPHATE 4 MG/ML
4 INJECTION, SOLUTION INTRA-ARTICULAR; INTRALESIONAL; INTRAMUSCULAR; INTRAVENOUS; SOFT TISSUE ONCE
Status: COMPLETED | OUTPATIENT
Start: 2023-06-15 | End: 2023-06-15

## 2023-06-15 RX ORDER — LIDOCAINE HYDROCHLORIDE 20 MG/ML
1 INJECTION, SOLUTION INFILTRATION; PERINEURAL ONCE
Status: COMPLETED | OUTPATIENT
Start: 2023-06-15 | End: 2023-06-15

## 2023-06-15 RX ADMIN — DEXAMETHASONE SODIUM PHOSPHATE 4 MG: 4 INJECTION, SOLUTION INTRA-ARTICULAR; INTRALESIONAL; INTRAMUSCULAR; INTRAVENOUS; SOFT TISSUE at 08:15

## 2023-06-15 RX ADMIN — LIDOCAINE HYDROCHLORIDE 1 ML: 20 INJECTION, SOLUTION INFILTRATION; PERINEURAL at 08:15

## 2023-06-15 NOTE — PROGRESS NOTES
FOOT AND ANKLE SURGERY/PODIATRY CONSULT NOTE         ASSESSMENT:   Plantar fasciitis left foot      TREATMENT:  The patient was given a cortisone injection in the left heel today consisting of 1 cc of dexamethasone sodium phosphate and 1 cc of 2% lidocaine plain.  I have also recommended orthotics.  The patient is to return to the clinic in 1 week if her pain persisted which time I will recommend a second cortisone injection.        HPI: Laney Hhan presented to the clinic today complaining of severe pain on the bottom of the left heel.  She has had this pain for approximately 1 month.  The pain is aggravated with weightbearing and ambulation.  The pain is quite pronounced when she first gets out of bed in the mornings.  She denies any trauma to her left foot.  She did recently take a fall in injured her right knee.  She has not had any redness or swelling surrounding the left heel.  The pain is relieved with nonweightbearing.  She denies any other previous treatment.       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 Nini Rasmussen     Metabolic encephalopathy      Moderate aortic valve stenosis 08/22/2017    stable on 2020 echo     Nonobstructive atherosclerosis of coronary artery 09/15/2020    with normal LVEDP     Paroxysmal SVT (supraventricular tachycardia) (H) 09/07/2017    said to have short episodes while hospitalized for UTI per Dr. Rhys Mensah     Syncope 08/22/2017     UTI (urinary tract infection) 08/21/2017    hospitalized with weakness       Social History     Socioeconomic History     Marital status: Single     Spouse name: Not on file     Number of children: 0     Years of education: Not on file      Highest education level: Not on file   Occupational History     Not on file   Tobacco Use     Smoking status: Never     Smokeless tobacco: Never   Vaping Use     Vaping status: Never Used     Passive vaping exposure: Yes   Substance and Sexual Activity     Alcohol use: No     Drug use: No     Sexual activity: Not on file   Other Topics Concern     Not on file   Social History Narrative    Her adopted brother, Jeff, lives with her. He is five years younger than her.     Social Determinants of Health     Financial Resource Strain: Not on file   Food Insecurity: Not on file   Transportation Needs: Not on file   Physical Activity: Not on file   Stress: Not on file   Social Connections: Not on file   Intimate Partner Violence: Not on file   Housing Stability: Not on file          Allergies   Allergen Reactions     Sulfa Antibiotics Shortness Of Breath          Current Outpatient Medications:      apixaban ANTICOAGULANT (ELIQUIS ANTICOAGULANT) 2.5 MG tablet, Take 1 tablet (2.5 mg) by mouth 2 times daily, Disp: 60 tablet, Rfl: 11     atorvastatin (LIPITOR) 10 MG tablet, Take 1 tablet (10 mg) by mouth every morning, Disp: 30 tablet, Rfl: 11     furosemide (LASIX) 20 MG tablet, Take 0.5 tablets (10 mg) by mouth daily, Disp: 15 tablet, Rfl: 11     metoprolol succinate ER (TOPROL XL) 50 MG 24 hr tablet, Take 1 tablet (50 mg) by mouth daily, Disp: 30 tablet, Rfl: 11     Family History   Adopted: Yes        Social History     Socioeconomic History     Marital status: Single     Spouse name: Not on file     Number of children: 0     Years of education: Not on file     Highest education level: Not on file   Occupational History     Not on file   Tobacco Use     Smoking status: Never     Smokeless tobacco: Never   Vaping Use     Vaping status: Never Used     Passive vaping exposure: Yes   Substance and Sexual Activity     Alcohol use: No     Drug use: No     Sexual activity: Not on file   Other Topics Concern     Not on file    Social History Narrative    Her adopted brother, Jeff, lives with her. He is five years younger than her.     Social Determinants of Health     Financial Resource Strain: Not on file   Food Insecurity: Not on file   Transportation Needs: Not on file   Physical Activity: Not on file   Stress: Not on file   Social Connections: Not on file   Intimate Partner Violence: Not on file   Housing Stability: Not on file        Review of Systems - Patient denies fever, chills, rash, wound, stiffness, limping, numbness, weakness, heart burn, blood in stool, chest pain with activity, calf pain when walking, shortness of breath with activity, chronic cough, easy bleeding/bruising, swelling of ankles, excessive thirst, fatigue, depression, anxiety.  Patient admits to left heel pain.      OBJECTIVE:  Appearance: alert, well appearing, and in no distress.    LMP  (LMP Unknown)      There is no height or weight on file to calculate BMI.     General appearance: Patient is alert and fully cooperative with history & exam.  No sign of distress is noted during the visit.  Psychiatric: Affect is pleasant & appropriate.  Patient appears motivated to improve health.  Respiratory: Breathing is regular & unlabored while sitting.  HEENT: Hearing is intact to spoken word.  Speech is clear.  No gross evidence of visual impairment that would impact ambulation.    Vascular: Dorsalis pedis and posterior tibial pulses are palpable. There is no pedal hair growth bilaterally.  CFT < 3 sec from anterior tibial surface to distal digits bilaterally. There is no appreciable edema noted.  Dermatologic: Turgor and texture are within normal limits. No coloration or temperature changes. No primary or secondary lesions noted.  Neurologic: All epicritic and proprioceptive sensations are grossly intact bilaterally.  Musculoskeletal: All active and passive ankle, subtalar, midtarsal, and 1st MPJ range of motion are grossly intact without pain or crepitus, with  the exception of none. Manual muscle strength is within normal limits bilaterally. All dorsiflexors, plantarflexors, invertors, evertors are intact bilaterally. Tenderness present to the plantar medial aspect of the left heel on palpation.  No tenderness to the left foot or ankle with range of motion. Calf is soft/non-tender without warmth/induration    Imaging:       No images are attached to the encounter or orders placed in the encounter.     No results found.   No results found.     Edward Marques DPM  [facility]Podiatry

## 2023-06-15 NOTE — PATIENT INSTRUCTIONS
What are Prescription Custom Orthotics?  Custom orthotics are specially-made devices designed to support and comfort your feet. Prescription orthotics are crafted for you and no one else. They match the contours of your feet precisely and are designed for the way you move. Orthotics are only manufactured after a podiatrist has conducted a complete evaluation of your feet, ankles, and legs, so the orthotic can accommodate your unique foot structure and pathology.  Prescription orthotics are divided into two categories:  Functional orthotics are designed to control abnormal motion. They may be used to treat foot pain caused by abnormal motion; they can also be used to treat injuries such as shin splints or tendinitis. Functional orthotics are usually crafted of a semi-rigid material such as plastic or graphite.  Accommodative orthotics are softer and meant to provide additional cushioning and support. They can be used to treat diabetic foot ulcers, painful calluses on the bottom of the foot, and other uncomfortable conditions.  Podiatrists use orthotics to treat foot problems such as plantar fasciitis, bursitis, tendinitis, diabetic foot ulcers, and foot, ankle, and heel pain. Clinical research studies have shown that podiatrist-prescribed foot orthotics decrease foot pain and improve function.  Orthotics typically cost more than shoe inserts purchased in a retail store, but the additional cost is usually well worth it. Unlike shoe inserts, orthotics are molded to fit each individual foot, so you can be sure that your orthotics fit and do what they're supposed to do. Prescription orthotics are also made of top-notch materials and last many years when cared for properly. Insurance often helps pay for prescription orthotics.  What are Shoe Inserts?   You've seen them at the grocery store and at the mall. You've probably even seen them on TV and online. Shoe inserts are any kind of non-prescription foot support designed  to be worn inside a shoe. Pre-packaged, mass produced, arch supports are shoe inserts. So are the  custom-made  insoles and foot supports that you can order online or at retail stores. Unless the device has been prescribed by a doctor and crafted for your specific foot, it's a shoe insert, not a custom orthotic device--despite what the ads might say.  Shoe inserts can be very helpful for a variety of foot ailments, including flat arches and foot and leg pain. They can cushion your feet, provide comfort, and support your arches, but they can't correct biomechanical foot problems or cure long-standing foot issues.  The most common types of shoe inserts are:  Arch supports: Some people have high arches. Others have low arches or flat feet. Arch supports generally have a  bumped-up  appearance and are designed to support the foot's natural arch.   Insoles: Insoles slip into your shoe to provide extra cushioning and support. Insoles are often made of gel, foam, or plastic.   Heel liners: Heel liners, sometimes called heel pads or heel cups, provide extra cushioning in the heel region. They may be especially useful for patients who have foot pain caused by age-related thinning of the heels' natural fat pads.   Foot cushions: Do your shoes rub against your heel or your toes? Foot cushions come in many different shapes and sizes and can be used as a barrier between you and your shoe.  Choosing an Over-the-Counter Shoe Insert  Selecting a shoe insert from the wide variety of devices on the market can be overwhelming. Here are some podiatrist-tested tips to help you find the insert that best meets your needs:  Consider your health. Do you have diabetes? Problems with circulation? An over-the-counter insert may not be your best bet. Diabetes and poor circulation increase your risk of foot ulcers and infections, so schedule an appointment with a podiatrist. He or she can help you select a solution that won't cause additional  health problems.   Think about the purpose. Are you planning to run a marathon, or do you just need a little arch support in your work shoes? Look for a product that fits your planned level of activity.   Bring your shoes. For the insert to be effective, it has to fit into your shoes. So bring your sneakers, dress shoes, or work boots--whatever you plan to wear with your insert. Look for an insert that will fit the contours of your shoe.   Try them on. If all possible, slip the insert into your shoe and try it out. Walk around a little. How does it feel? Don't assume that feelings of pressure will go away with continued wear. (If you can't try the inserts at the store, ask about the store's return policy and hold on to your receipt.)    Please call one of the [place] locations below to schedule an appointment. If you received a prescription please bring it with you to your appointment. Some locations are limited to what they carry.    Office Locations    [facility]  [address] [place] [address]  Home Medical Equipment, [address]   Phone: [phone]   Orthotics and Prosthetics, [address]   Phone: [phone]    [facility]   Home Medical Equipment   [address], [place] [address]  Phone :[phone]  Orthotics and Prosthetics   [address], [address], [facility] [address]  Phone:[phone]          [facility]  [address]   [place] [address]   Phone: [phone]    [facility]  [address]  [place] [address]  Phone: [phone]    [facility]   [address]  [place] [address]  Phone: [phone]    [facility]  [address]  [place] [address]  Phone:  [phone]    [facility]  [address]  [place] [address]  Phone: [phone]    [place]   [address] [place] [address]   Phone: [phone]    WEARING YOUR CUSTOM FOOT ORTHOTICS   Most insurance plans cover one pair of orthotics per year. You must check with your   insurance plan to see what your payment responsibility will be. Please call your   insurance company by calling the number on the back of your insurance card.   Orthotic's are non-refundable and non-returnable.   Orthotics are made of various designs. Some orthotics are covered with material that extends beyond your toes. If your orthotic is of this design, you will likely need to trim the toe end to get a proper fit. The insole from your shoe can be used as a template. Simply overlay the shoe insert on top of the custom orthotic. Align the heel end while tracing the length of the insert onto the custom orthotic. Use a large scissor to trim the toe end until you get a proper fit in the shoe.   The orthotic needs to be pushed as far back in the shoe as possible. The heel portion should not ride forward so as not to irritate your heel.   Orthotics are designed to work with socks. Excessive perspiration will shorten the life span of the orthotics. Remove the orthotic from the shoe frequently for proper drying.   The break-in period lasts for weeks. People new to orthotics will likely experience new aches and pains. The orthotic is forcing your foot into a new position. Arch, foot and leg muscle aches and fatigue are common during these weeks. Minor discomfort can be considered normal break in phenomenon. Start wearing your orthotic around your home your first day. Limited activity for one to two hours is recommended. You can increase one or two additional hours each day provided the aches and pains are subsiding. The degree of discomfort, fatigue and problems will dictate the speed of break  in. You may require multiple weeks to work up to full time use.   Do not continue wearing your orthotics if they are creating problems such as blisters or sores. Do not hesitate to call the clinic to speak with a nurse regarding orthotic   break in, fit, trimming, etc. You may also need to see the doctor if the orthotics are   simply not working out. Adjustments are sometimes made to improve orthotic   function.     Orthotics will only work in certain styles and types of shoes. Orthotics rarely work in dress shoes. Slip-ons, clogs, sandals and heels are particularly troublesome. Specially designed orthotics may be necessary for these types of shoes. Your custom orthotic was designed for activities that require appropriate walking or running shoes. Lace up athletic shoes, walking shoes or work boots should work appropriately. You may need a wider or longer shoe. Shoes with a removable  or insert work best. In general, you want to remove an insert from the shoe before placing the orthotic into the shoe. Shoes without a removable liner may not work as well.     When purchasing new shoes, bring your orthotics along to get a proper fit. Shop at stores that are familiar with orthotics.   Frequent washing of the orthotic may shorten the life span of the top cover. The top cover can be replaced but will generally last one to five years depending on use and foot perspiration.

## 2023-06-15 NOTE — Clinical Note
"    6/15/2023         RE: Laney Hahn  [address]  [facility] [address]        Dear Colleague,    Thank you for referring your patient, Laney Hahn, to the [facility]. Please see a copy of my visit note below.    FOOT AND ANKLE SURGERY/PODIATRY CONSULT NOTE         ASSESSMENT:   ***      TREATMENT:  ***        HPI: Laney Hahn presented to the clinic today  ***.       {PAST MEDICAL HISTORY:928104592::\"***\"}    {SOCIAL HISTORY:867877569::\"***\"}       Allergies   Allergen Reactions     Sulfa Antibiotics Shortness Of Breath          Current Outpatient Medications:      apixaban ANTICOAGULANT (ELIQUIS ANTICOAGULANT) 2.5 MG tablet, Take 1 tablet (2.5 mg) by mouth 2 times daily, Disp: 60 tablet, Rfl: 11     atorvastatin (LIPITOR) 10 MG tablet, Take 1 tablet (10 mg) by mouth every morning, Disp: 30 tablet, Rfl: 11     furosemide (LASIX) 20 MG tablet, Take 0.5 tablets (10 mg) by mouth daily, Disp: 15 tablet, Rfl: 11     metoprolol succinate ER (TOPROL XL) 50 MG 24 hr tablet, Take 1 tablet (50 mg) by mouth daily, Disp: 30 tablet, Rfl: 11     Family History   Adopted: Yes        Social History     Socioeconomic History     Marital status: Single     Spouse name: Not on file     Number of children: 0     Years of education: Not on file     Highest education level: Not on file   Occupational History     Not on file   Tobacco Use     Smoking status: Never     Smokeless tobacco: Never   Vaping Use     Vaping status: Never Used     Passive vaping exposure: Yes   Substance and Sexual Activity     Alcohol use: No     Drug use: No     Sexual activity: Not on file   Other Topics Concern     Not on file   Social History Narrative    Her adopted brother, Jeff, lives with her. He is five years younger than her.     Social Determinants of Health     Financial Resource Strain: Not on file   Food Insecurity: Not on file   Transportation Needs: Not on file   Physical Activity: " "Not on file   Stress: Not on file   Social Connections: Not on file   Intimate Partner Violence: Not on file   Housing Stability: Not on file        Review of Systems - Patient denies fever, chills, rash, wound, stiffness, limping, numbness, weakness, heart burn, blood in stool, chest pain with activity, calf pain when walking, shortness of breath with activity, chronic cough, easy bleeding/bruising, swelling of ankles, excessive thirst, fatigue, depression, anxiety.  Patient admits to ***.      OBJECTIVE:  Appearance: {appearance:266433::\"alert, well appearing, and in no distress\"}.    LMP  (LMP Unknown)      There is no height or weight on file to calculate BMI.     General appearance: Patient is alert and fully cooperative with history & exam.  No sign of distress is noted during the visit.  Psychiatric: Affect is pleasant & appropriate.  Patient appears motivated to improve health.  Respiratory: Breathing is regular & unlabored while sitting.  HEENT: Hearing is intact to spoken word.  Speech is clear.  No gross evidence of visual impairment that would impact ambulation.    Vascular: Dorsalis pedis and posterior tibial pulses are palpable. There is pedal hair growth ***.  CFT < 3 sec from anterior tibial surface to distal digits ***. There is no appreciable edema noted.  Dermatologic: Turgor and texture are within normal limits. No coloration or temperature changes. No primary or secondary lesions noted.  Neurologic: All epicritic and proprioceptive sensations are grossly intact ***.  Musculoskeletal: All active and passive ankle, subtalar, midtarsal, and 1st MPJ range of motion are grossly intact without pain or crepitus, with the exception of ***. Manual muscle strength is ***. All dorsiflexors, plantarflexors, invertors, evertors are intact ***. Tenderness present to *** on palpation. Tenderness to *** with range of motion. Calf is soft/non-tender with/without warmth/induration    Imaging:     {Imaging " order/result:82560}  No images are attached to the encounter or orders placed in the encounter.     No results found.   No results found.       TREATMENT:  ***    Edward Marques DPM  [facility] Foot & Ankle Surgery/Podiatry         Again, thank you for allowing me to participate in the care of your patient.        Sincerely,        Edward Sierra DPM

## 2023-06-20 ENCOUNTER — OFFICE VISIT (OUTPATIENT)
Dept: INTERNAL MEDICINE | Facility: CLINIC | Age: 83
End: 2023-06-20
Payer: COMMERCIAL

## 2023-06-20 ENCOUNTER — MEDICAL CORRESPONDENCE (OUTPATIENT)
Dept: HEALTH INFORMATION MANAGEMENT | Facility: CLINIC | Age: 83
End: 2023-06-20

## 2023-06-20 VITALS
OXYGEN SATURATION: 100 % | HEART RATE: 81 BPM | TEMPERATURE: 98.1 F | DIASTOLIC BLOOD PRESSURE: 68 MMHG | SYSTOLIC BLOOD PRESSURE: 120 MMHG | HEIGHT: 64 IN | BODY MASS INDEX: 21.48 KG/M2 | WEIGHT: 125.8 LBS | RESPIRATION RATE: 20 BRPM

## 2023-06-20 DIAGNOSIS — M25.561 ACUTE PAIN OF BOTH KNEES: ICD-10-CM

## 2023-06-20 DIAGNOSIS — R26.89 IMBALANCE: Primary | ICD-10-CM

## 2023-06-20 DIAGNOSIS — R26.81 UNSTEADY GAIT: ICD-10-CM

## 2023-06-20 DIAGNOSIS — M25.562 ACUTE PAIN OF BOTH KNEES: ICD-10-CM

## 2023-06-20 PROCEDURE — 99214 OFFICE O/P EST MOD 30 MIN: CPT | Performed by: NURSE PRACTITIONER

## 2023-06-20 RX ORDER — ACETAMINOPHEN 500 MG
500-1000 TABLET ORAL EVERY 6 HOURS PRN
Qty: 30 TABLET | Refills: 3 | Status: SHIPPED | OUTPATIENT
Start: 2023-06-20

## 2023-06-20 ASSESSMENT — PAIN SCALES - GENERAL: PAINLEVEL: MILD PAIN (2)

## 2023-06-20 NOTE — PROGRESS NOTES
Assessment & Plan   Problem List Items Addressed This Visit        Nervous and Auditory    Acute pain of both knees    Relevant Medications    acetaminophen (TYLENOL) 500 MG tablet       Other    Unsteady gait   Other Visit Diagnoses     Imbalance    -  Primary    Relevant Orders    Walker Order for DME - ONLY FOR DME         - Order for wheeled walker with a seat  - Advised use of acetaminophen for pain, avoid NSAID due to anticoagulation medication  - She declines referral to PT         Rose Mcelroy NP  [facility] PORFIRIO Davison is a 82 year old, presenting with a friend from [place], [place], and her brother Adam for the following health issues:  Fall (Fell and injured right knee 6/16/23 )        6/20/2023     9:14 AM   Additional Questions   Roomed by Edwige   Accompanied by brother and friend     History of Present Illness       Reason for visit:  Fell and injured right knee  Symptom onset:  3-7 days ago  Symptoms include:  Painful, swollen  Symptom intensity:  Moderate  Symptom progression:  Staying the same  Had these symptoms before:  No  What makes it worse:  Walking  What makes it better:  Not sure    She eats 0-1 servings of fruits and vegetables daily.She consumes 0 sweetened beverage(s) daily.She exercises with enough effort to increase her heart rate 9 or less minutes per day.  She exercises with enough effort to increase her heart rate 3 or less days per week.   She is taking medications regularly.     She fell about 2 weeks ago when she was at [employer]. She fell forward and landed on the right knee. She did not lose consciousness or feel dizzy or lightheaded. She states that the right knee is painful now. She has been using ice. It does not bother her at night. She typically uses a cane but since the fall she borrowed a walker at her facility and this has helped her with balance.         Objective    /68 (BP Location: Right arm, Patient Position: Sitting, Cuff  "Size: Adult Regular)   Pulse 81   Temp 98.1  F (36.7  C) (Oral)   Resp 20   Ht 1.626 m (5' 4\")   Wt 57.1 kg (125 lb 12.8 oz)   LMP  (LMP Unknown)   SpO2 100%   BMI 21.59 kg/m    Body mass index is 21.59 kg/m .  Physical Exam   GENERAL: healthy, alert and no distress  RESP: lungs clear to auscultation - no rales, rhonchi or wheezes  CV: regular rate and rhythm  MS: knees without obvious deformity. No joint line tenderness, joint laxity on exam. Gait is normal with a wheeled walker for support   SKIN: mild ecchymosis is noted over both anterior knees.                  ,  " none

## 2023-07-31 ENCOUNTER — ANCILLARY PROCEDURE (OUTPATIENT)
Dept: CARDIOLOGY | Facility: CLINIC | Age: 83
End: 2023-07-31
Attending: INTERNAL MEDICINE
Payer: COMMERCIAL

## 2023-07-31 DIAGNOSIS — Z95.0 PACEMAKER: ICD-10-CM

## 2023-07-31 DIAGNOSIS — I49.5 SICK SINUS SYNDROME (H): ICD-10-CM

## 2023-07-31 LAB
MDC_IDC_EPISODE_DTM: NORMAL
MDC_IDC_EPISODE_DURATION: 1 S
MDC_IDC_EPISODE_DURATION: 2 S
MDC_IDC_EPISODE_ID: 682
MDC_IDC_EPISODE_ID: 683
MDC_IDC_EPISODE_ID: 684
MDC_IDC_EPISODE_ID: 685
MDC_IDC_EPISODE_ID: 686
MDC_IDC_EPISODE_TYPE: NORMAL
MDC_IDC_LEAD_IMPLANT_DT: NORMAL
MDC_IDC_LEAD_IMPLANT_DT: NORMAL
MDC_IDC_LEAD_LOCATION: NORMAL
MDC_IDC_LEAD_LOCATION: NORMAL
MDC_IDC_LEAD_LOCATION_DETAIL_1: NORMAL
MDC_IDC_LEAD_LOCATION_DETAIL_1: NORMAL
MDC_IDC_LEAD_MFG: NORMAL
MDC_IDC_LEAD_MFG: NORMAL
MDC_IDC_LEAD_MODEL: NORMAL
MDC_IDC_LEAD_MODEL: NORMAL
MDC_IDC_LEAD_POLARITY_TYPE: NORMAL
MDC_IDC_LEAD_POLARITY_TYPE: NORMAL
MDC_IDC_LEAD_SERIAL: NORMAL
MDC_IDC_LEAD_SERIAL: NORMAL
MDC_IDC_LEAD_SPECIAL_FUNCTION: NORMAL
MDC_IDC_LEAD_SPECIAL_FUNCTION: NORMAL
MDC_IDC_MSMT_BATTERY_DTM: NORMAL
MDC_IDC_MSMT_BATTERY_REMAINING_LONGEVITY: 94 MO
MDC_IDC_MSMT_BATTERY_RRT_TRIGGER: 2.62
MDC_IDC_MSMT_BATTERY_STATUS: NORMAL
MDC_IDC_MSMT_BATTERY_VOLTAGE: 3 V
MDC_IDC_MSMT_LEADCHNL_RA_IMPEDANCE_VALUE: 304 OHM
MDC_IDC_MSMT_LEADCHNL_RA_IMPEDANCE_VALUE: 418 OHM
MDC_IDC_MSMT_LEADCHNL_RA_PACING_THRESHOLD_AMPLITUDE: 1 V
MDC_IDC_MSMT_LEADCHNL_RA_PACING_THRESHOLD_PULSEWIDTH: 0.4 MS
MDC_IDC_MSMT_LEADCHNL_RA_SENSING_INTR_AMPL: 1.62 MV
MDC_IDC_MSMT_LEADCHNL_RA_SENSING_INTR_AMPL: 1.62 MV
MDC_IDC_MSMT_LEADCHNL_RV_IMPEDANCE_VALUE: 418 OHM
MDC_IDC_MSMT_LEADCHNL_RV_IMPEDANCE_VALUE: 532 OHM
MDC_IDC_MSMT_LEADCHNL_RV_PACING_THRESHOLD_AMPLITUDE: 0.62 V
MDC_IDC_MSMT_LEADCHNL_RV_PACING_THRESHOLD_PULSEWIDTH: 0.4 MS
MDC_IDC_MSMT_LEADCHNL_RV_SENSING_INTR_AMPL: 20.62 MV
MDC_IDC_MSMT_LEADCHNL_RV_SENSING_INTR_AMPL: 20.62 MV
MDC_IDC_PG_IMPLANT_DTM: NORMAL
MDC_IDC_PG_MFG: NORMAL
MDC_IDC_PG_MODEL: NORMAL
MDC_IDC_PG_SERIAL: NORMAL
MDC_IDC_PG_TYPE: NORMAL
MDC_IDC_SESS_CLINIC_NAME: NORMAL
MDC_IDC_SESS_DTM: NORMAL
MDC_IDC_SESS_TYPE: NORMAL
MDC_IDC_SET_BRADY_AT_MODE_SWITCH_RATE: 171 {BEATS}/MIN
MDC_IDC_SET_BRADY_HYSTRATE: NORMAL
MDC_IDC_SET_BRADY_LOWRATE: 80 {BEATS}/MIN
MDC_IDC_SET_BRADY_MAX_SENSOR_RATE: 120 {BEATS}/MIN
MDC_IDC_SET_BRADY_MAX_TRACKING_RATE: 120 {BEATS}/MIN
MDC_IDC_SET_BRADY_MODE: NORMAL
MDC_IDC_SET_BRADY_PAV_DELAY_LOW: 180 MS
MDC_IDC_SET_BRADY_SAV_DELAY_LOW: 150 MS
MDC_IDC_SET_LEADCHNL_RA_PACING_AMPLITUDE: 2.25 V
MDC_IDC_SET_LEADCHNL_RA_PACING_ANODE_ELECTRODE_1: NORMAL
MDC_IDC_SET_LEADCHNL_RA_PACING_ANODE_LOCATION_1: NORMAL
MDC_IDC_SET_LEADCHNL_RA_PACING_CAPTURE_MODE: NORMAL
MDC_IDC_SET_LEADCHNL_RA_PACING_CATHODE_ELECTRODE_1: NORMAL
MDC_IDC_SET_LEADCHNL_RA_PACING_CATHODE_LOCATION_1: NORMAL
MDC_IDC_SET_LEADCHNL_RA_PACING_POLARITY: NORMAL
MDC_IDC_SET_LEADCHNL_RA_PACING_PULSEWIDTH: 0.4 MS
MDC_IDC_SET_LEADCHNL_RA_SENSING_ANODE_ELECTRODE_1: NORMAL
MDC_IDC_SET_LEADCHNL_RA_SENSING_ANODE_LOCATION_1: NORMAL
MDC_IDC_SET_LEADCHNL_RA_SENSING_CATHODE_ELECTRODE_1: NORMAL
MDC_IDC_SET_LEADCHNL_RA_SENSING_CATHODE_LOCATION_1: NORMAL
MDC_IDC_SET_LEADCHNL_RA_SENSING_POLARITY: NORMAL
MDC_IDC_SET_LEADCHNL_RA_SENSING_SENSITIVITY: 0.3 MV
MDC_IDC_SET_LEADCHNL_RV_PACING_AMPLITUDE: 2 V
MDC_IDC_SET_LEADCHNL_RV_PACING_ANODE_ELECTRODE_1: NORMAL
MDC_IDC_SET_LEADCHNL_RV_PACING_ANODE_LOCATION_1: NORMAL
MDC_IDC_SET_LEADCHNL_RV_PACING_CAPTURE_MODE: NORMAL
MDC_IDC_SET_LEADCHNL_RV_PACING_CATHODE_ELECTRODE_1: NORMAL
MDC_IDC_SET_LEADCHNL_RV_PACING_CATHODE_LOCATION_1: NORMAL
MDC_IDC_SET_LEADCHNL_RV_PACING_POLARITY: NORMAL
MDC_IDC_SET_LEADCHNL_RV_PACING_PULSEWIDTH: 0.4 MS
MDC_IDC_SET_LEADCHNL_RV_SENSING_ANODE_ELECTRODE_1: NORMAL
MDC_IDC_SET_LEADCHNL_RV_SENSING_ANODE_LOCATION_1: NORMAL
MDC_IDC_SET_LEADCHNL_RV_SENSING_CATHODE_ELECTRODE_1: NORMAL
MDC_IDC_SET_LEADCHNL_RV_SENSING_CATHODE_LOCATION_1: NORMAL
MDC_IDC_SET_LEADCHNL_RV_SENSING_POLARITY: NORMAL
MDC_IDC_SET_LEADCHNL_RV_SENSING_SENSITIVITY: 0.9 MV
MDC_IDC_SET_ZONE_DETECTION_INTERVAL: 200 MS
MDC_IDC_SET_ZONE_DETECTION_INTERVAL: 350 MS
MDC_IDC_SET_ZONE_DETECTION_INTERVAL: 430 MS
MDC_IDC_SET_ZONE_TYPE: NORMAL
MDC_IDC_STAT_AT_BURDEN_PERCENT: 0 %
MDC_IDC_STAT_AT_DTM_END: NORMAL
MDC_IDC_STAT_AT_DTM_START: NORMAL
MDC_IDC_STAT_BRADY_AP_VP_PERCENT: 98.23 %
MDC_IDC_STAT_BRADY_AP_VS_PERCENT: 0.08 %
MDC_IDC_STAT_BRADY_AS_VP_PERCENT: 0.38 %
MDC_IDC_STAT_BRADY_AS_VS_PERCENT: 1.31 %
MDC_IDC_STAT_BRADY_DTM_END: NORMAL
MDC_IDC_STAT_BRADY_DTM_START: NORMAL
MDC_IDC_STAT_BRADY_RA_PERCENT_PACED: 99.6 %
MDC_IDC_STAT_BRADY_RV_PERCENT_PACED: 98.6 %
MDC_IDC_STAT_EPISODE_RECENT_COUNT: 0
MDC_IDC_STAT_EPISODE_RECENT_COUNT: 5
MDC_IDC_STAT_EPISODE_RECENT_COUNT_DTM_END: NORMAL
MDC_IDC_STAT_EPISODE_RECENT_COUNT_DTM_START: NORMAL
MDC_IDC_STAT_EPISODE_TOTAL_COUNT: 0
MDC_IDC_STAT_EPISODE_TOTAL_COUNT: 100
MDC_IDC_STAT_EPISODE_TOTAL_COUNT: 2
MDC_IDC_STAT_EPISODE_TOTAL_COUNT: 38
MDC_IDC_STAT_EPISODE_TOTAL_COUNT: 466
MDC_IDC_STAT_EPISODE_TOTAL_COUNT_DTM_END: NORMAL
MDC_IDC_STAT_EPISODE_TOTAL_COUNT_DTM_START: NORMAL
MDC_IDC_STAT_EPISODE_TYPE: NORMAL

## 2023-07-31 PROCEDURE — 93296 REM INTERROG EVL PM/IDS: CPT | Performed by: INTERNAL MEDICINE

## 2023-07-31 PROCEDURE — 93294 REM INTERROG EVL PM/LDLS PM: CPT | Performed by: INTERNAL MEDICINE

## 2023-10-30 ENCOUNTER — ANCILLARY PROCEDURE (OUTPATIENT)
Dept: CARDIOLOGY | Facility: CLINIC | Age: 83
End: 2023-10-30
Attending: INTERNAL MEDICINE
Payer: COMMERCIAL

## 2023-10-30 DIAGNOSIS — Z95.0 CARDIAC PACEMAKER IN SITU: ICD-10-CM

## 2023-10-30 DIAGNOSIS — I49.5 SINUS NODE DYSFUNCTION (H): ICD-10-CM

## 2023-10-30 PROCEDURE — 93296 REM INTERROG EVL PM/IDS: CPT | Performed by: INTERNAL MEDICINE

## 2023-10-30 PROCEDURE — 93294 REM INTERROG EVL PM/LDLS PM: CPT | Performed by: INTERNAL MEDICINE

## 2023-10-31 LAB
MDC_IDC_EPISODE_DTM: NORMAL
MDC_IDC_EPISODE_DURATION: 0 S
MDC_IDC_EPISODE_DURATION: 1 S
MDC_IDC_EPISODE_DURATION: 1 S
MDC_IDC_EPISODE_DURATION: 2 S
MDC_IDC_EPISODE_DURATION: 3 S
MDC_IDC_EPISODE_DURATION: 4 S
MDC_IDC_EPISODE_DURATION: 4 S
MDC_IDC_EPISODE_ID: 687
MDC_IDC_EPISODE_ID: 688
MDC_IDC_EPISODE_ID: 689
MDC_IDC_EPISODE_ID: 690
MDC_IDC_EPISODE_ID: 691
MDC_IDC_EPISODE_ID: 692
MDC_IDC_EPISODE_ID: 693
MDC_IDC_EPISODE_TYPE: NORMAL
MDC_IDC_LEAD_CONNECTION_STATUS: NORMAL
MDC_IDC_LEAD_CONNECTION_STATUS: NORMAL
MDC_IDC_LEAD_IMPLANT_DT: NORMAL
MDC_IDC_LEAD_IMPLANT_DT: NORMAL
MDC_IDC_LEAD_LOCATION: NORMAL
MDC_IDC_LEAD_LOCATION: NORMAL
MDC_IDC_LEAD_LOCATION_DETAIL_1: NORMAL
MDC_IDC_LEAD_LOCATION_DETAIL_1: NORMAL
MDC_IDC_LEAD_MFG: NORMAL
MDC_IDC_LEAD_MFG: NORMAL
MDC_IDC_LEAD_MODEL: NORMAL
MDC_IDC_LEAD_MODEL: NORMAL
MDC_IDC_LEAD_POLARITY_TYPE: NORMAL
MDC_IDC_LEAD_POLARITY_TYPE: NORMAL
MDC_IDC_LEAD_SERIAL: NORMAL
MDC_IDC_LEAD_SERIAL: NORMAL
MDC_IDC_LEAD_SPECIAL_FUNCTION: NORMAL
MDC_IDC_LEAD_SPECIAL_FUNCTION: NORMAL
MDC_IDC_MSMT_BATTERY_DTM: NORMAL
MDC_IDC_MSMT_BATTERY_REMAINING_LONGEVITY: 97 MO
MDC_IDC_MSMT_BATTERY_RRT_TRIGGER: 2.62
MDC_IDC_MSMT_BATTERY_STATUS: NORMAL
MDC_IDC_MSMT_BATTERY_VOLTAGE: 3.01 V
MDC_IDC_MSMT_LEADCHNL_RA_IMPEDANCE_VALUE: 304 OHM
MDC_IDC_MSMT_LEADCHNL_RA_IMPEDANCE_VALUE: 380 OHM
MDC_IDC_MSMT_LEADCHNL_RA_PACING_THRESHOLD_AMPLITUDE: 0.88 V
MDC_IDC_MSMT_LEADCHNL_RA_PACING_THRESHOLD_PULSEWIDTH: 0.4 MS
MDC_IDC_MSMT_LEADCHNL_RA_SENSING_INTR_AMPL: 1.62 MV
MDC_IDC_MSMT_LEADCHNL_RA_SENSING_INTR_AMPL: 1.62 MV
MDC_IDC_MSMT_LEADCHNL_RV_IMPEDANCE_VALUE: 380 OHM
MDC_IDC_MSMT_LEADCHNL_RV_IMPEDANCE_VALUE: 532 OHM
MDC_IDC_MSMT_LEADCHNL_RV_PACING_THRESHOLD_AMPLITUDE: 0.62 V
MDC_IDC_MSMT_LEADCHNL_RV_PACING_THRESHOLD_PULSEWIDTH: 0.4 MS
MDC_IDC_MSMT_LEADCHNL_RV_SENSING_INTR_AMPL: 19.38 MV
MDC_IDC_MSMT_LEADCHNL_RV_SENSING_INTR_AMPL: 19.38 MV
MDC_IDC_PG_IMPLANT_DTM: NORMAL
MDC_IDC_PG_MFG: NORMAL
MDC_IDC_PG_MODEL: NORMAL
MDC_IDC_PG_SERIAL: NORMAL
MDC_IDC_PG_TYPE: NORMAL
MDC_IDC_SESS_CLINIC_NAME: NORMAL
MDC_IDC_SESS_DTM: NORMAL
MDC_IDC_SESS_TYPE: NORMAL
MDC_IDC_SET_BRADY_AT_MODE_SWITCH_RATE: 171 {BEATS}/MIN
MDC_IDC_SET_BRADY_HYSTRATE: NORMAL
MDC_IDC_SET_BRADY_LOWRATE: 80 {BEATS}/MIN
MDC_IDC_SET_BRADY_MAX_SENSOR_RATE: 120 {BEATS}/MIN
MDC_IDC_SET_BRADY_MAX_TRACKING_RATE: 120 {BEATS}/MIN
MDC_IDC_SET_BRADY_MODE: NORMAL
MDC_IDC_SET_BRADY_PAV_DELAY_LOW: 180 MS
MDC_IDC_SET_BRADY_SAV_DELAY_LOW: 150 MS
MDC_IDC_SET_LEADCHNL_RA_PACING_AMPLITUDE: 2 V
MDC_IDC_SET_LEADCHNL_RA_PACING_ANODE_ELECTRODE_1: NORMAL
MDC_IDC_SET_LEADCHNL_RA_PACING_ANODE_LOCATION_1: NORMAL
MDC_IDC_SET_LEADCHNL_RA_PACING_CAPTURE_MODE: NORMAL
MDC_IDC_SET_LEADCHNL_RA_PACING_CATHODE_ELECTRODE_1: NORMAL
MDC_IDC_SET_LEADCHNL_RA_PACING_CATHODE_LOCATION_1: NORMAL
MDC_IDC_SET_LEADCHNL_RA_PACING_POLARITY: NORMAL
MDC_IDC_SET_LEADCHNL_RA_PACING_PULSEWIDTH: 0.4 MS
MDC_IDC_SET_LEADCHNL_RA_SENSING_ANODE_ELECTRODE_1: NORMAL
MDC_IDC_SET_LEADCHNL_RA_SENSING_ANODE_LOCATION_1: NORMAL
MDC_IDC_SET_LEADCHNL_RA_SENSING_CATHODE_ELECTRODE_1: NORMAL
MDC_IDC_SET_LEADCHNL_RA_SENSING_CATHODE_LOCATION_1: NORMAL
MDC_IDC_SET_LEADCHNL_RA_SENSING_POLARITY: NORMAL
MDC_IDC_SET_LEADCHNL_RA_SENSING_SENSITIVITY: 0.3 MV
MDC_IDC_SET_LEADCHNL_RV_PACING_AMPLITUDE: 2 V
MDC_IDC_SET_LEADCHNL_RV_PACING_ANODE_ELECTRODE_1: NORMAL
MDC_IDC_SET_LEADCHNL_RV_PACING_ANODE_LOCATION_1: NORMAL
MDC_IDC_SET_LEADCHNL_RV_PACING_CAPTURE_MODE: NORMAL
MDC_IDC_SET_LEADCHNL_RV_PACING_CATHODE_ELECTRODE_1: NORMAL
MDC_IDC_SET_LEADCHNL_RV_PACING_CATHODE_LOCATION_1: NORMAL
MDC_IDC_SET_LEADCHNL_RV_PACING_POLARITY: NORMAL
MDC_IDC_SET_LEADCHNL_RV_PACING_PULSEWIDTH: 0.4 MS
MDC_IDC_SET_LEADCHNL_RV_SENSING_ANODE_ELECTRODE_1: NORMAL
MDC_IDC_SET_LEADCHNL_RV_SENSING_ANODE_LOCATION_1: NORMAL
MDC_IDC_SET_LEADCHNL_RV_SENSING_CATHODE_ELECTRODE_1: NORMAL
MDC_IDC_SET_LEADCHNL_RV_SENSING_CATHODE_LOCATION_1: NORMAL
MDC_IDC_SET_LEADCHNL_RV_SENSING_POLARITY: NORMAL
MDC_IDC_SET_LEADCHNL_RV_SENSING_SENSITIVITY: 0.9 MV
MDC_IDC_SET_ZONE_DETECTION_INTERVAL: 200 MS
MDC_IDC_SET_ZONE_DETECTION_INTERVAL: 350 MS
MDC_IDC_SET_ZONE_DETECTION_INTERVAL: 430 MS
MDC_IDC_SET_ZONE_STATUS: NORMAL
MDC_IDC_SET_ZONE_TYPE: NORMAL
MDC_IDC_SET_ZONE_VENDOR_TYPE: NORMAL
MDC_IDC_STAT_AT_BURDEN_PERCENT: 0 %
MDC_IDC_STAT_AT_DTM_END: NORMAL
MDC_IDC_STAT_AT_DTM_START: NORMAL
MDC_IDC_STAT_BRADY_AP_VP_PERCENT: 98.41 %
MDC_IDC_STAT_BRADY_AP_VS_PERCENT: 0.16 %
MDC_IDC_STAT_BRADY_AS_VP_PERCENT: 1.14 %
MDC_IDC_STAT_BRADY_AS_VS_PERCENT: 0.29 %
MDC_IDC_STAT_BRADY_DTM_END: NORMAL
MDC_IDC_STAT_BRADY_DTM_START: NORMAL
MDC_IDC_STAT_BRADY_RA_PERCENT_PACED: 98.79 %
MDC_IDC_STAT_BRADY_RV_PERCENT_PACED: 99.55 %
MDC_IDC_STAT_EPISODE_RECENT_COUNT: 0
MDC_IDC_STAT_EPISODE_RECENT_COUNT: 7
MDC_IDC_STAT_EPISODE_RECENT_COUNT_DTM_END: NORMAL
MDC_IDC_STAT_EPISODE_RECENT_COUNT_DTM_START: NORMAL
MDC_IDC_STAT_EPISODE_TOTAL_COUNT: 0
MDC_IDC_STAT_EPISODE_TOTAL_COUNT: 100
MDC_IDC_STAT_EPISODE_TOTAL_COUNT: 2
MDC_IDC_STAT_EPISODE_TOTAL_COUNT: 45
MDC_IDC_STAT_EPISODE_TOTAL_COUNT: 466
MDC_IDC_STAT_EPISODE_TOTAL_COUNT_DTM_END: NORMAL
MDC_IDC_STAT_EPISODE_TOTAL_COUNT_DTM_START: NORMAL
MDC_IDC_STAT_EPISODE_TYPE: NORMAL
MDC_IDC_STAT_TACHYTHERAPY_RECENT_DTM_END: NORMAL
MDC_IDC_STAT_TACHYTHERAPY_RECENT_DTM_START: NORMAL
MDC_IDC_STAT_TACHYTHERAPY_TOTAL_DTM_END: NORMAL
MDC_IDC_STAT_TACHYTHERAPY_TOTAL_DTM_START: NORMAL

## 2023-11-20 ENCOUNTER — TELEPHONE (OUTPATIENT)
Dept: INTERNAL MEDICINE | Facility: CLINIC | Age: 83
End: 2023-11-20
Payer: COMMERCIAL

## 2023-11-20 DIAGNOSIS — F01.50 VASCULAR DEMENTIA WITHOUT BEHAVIORAL DISTURBANCE (H): ICD-10-CM

## 2023-11-20 DIAGNOSIS — F42.3 HOARDING BEHAVIOR: Primary | ICD-10-CM

## 2023-11-20 NOTE — TELEPHONE ENCOUNTER
Ana calling from [facility] stating:      The patient is displaying a lot of behaviors in the facility of hoarding in her room and unsanitary practices with hoarding     Looking for : Home Health OT Order to work with her regarding those behaviors    Fax Orders to:    [facility]  Ana   [phone]  option 3  Fax: [phone]

## 2023-12-04 DIAGNOSIS — I48.0 PAROXYSMAL ATRIAL FIBRILLATION (H): ICD-10-CM

## 2023-12-04 DIAGNOSIS — I49.5 SINUS NODE DYSFUNCTION (H): ICD-10-CM

## 2023-12-04 DIAGNOSIS — I50.31 ACUTE DIASTOLIC CONGESTIVE HEART FAILURE (H): ICD-10-CM

## 2023-12-04 DIAGNOSIS — I47.29 NSVT (NONSUSTAINED VENTRICULAR TACHYCARDIA) (H): ICD-10-CM

## 2023-12-04 DIAGNOSIS — E78.5 DYSLIPIDEMIA, GOAL LDL BELOW 70: ICD-10-CM

## 2023-12-04 RX ORDER — ATORVASTATIN CALCIUM 10 MG/1
10 TABLET, FILM COATED ORAL EVERY MORNING
Qty: 30 TABLET | Refills: 3 | Status: SHIPPED | OUTPATIENT
Start: 2023-12-04 | End: 2024-04-01

## 2023-12-04 RX ORDER — FUROSEMIDE 20 MG
10 TABLET ORAL DAILY
Qty: 15 TABLET | Refills: 3 | Status: SHIPPED | OUTPATIENT
Start: 2023-12-04 | End: 2024-04-01

## 2023-12-04 RX ORDER — METOPROLOL SUCCINATE 50 MG/1
50 TABLET, EXTENDED RELEASE ORAL DAILY
Qty: 30 TABLET | Refills: 3 | Status: SHIPPED | OUTPATIENT
Start: 2023-12-04 | End: 2024-04-01

## 2023-12-04 NOTE — TELEPHONE ENCOUNTER
Refill Request: Atorvastatin     Refill Requst: Eliquis     Refill Request: Lasix    Refill Request: Metoprolol

## 2024-01-18 ENCOUNTER — TELEPHONE (OUTPATIENT)
Dept: INTERNAL MEDICINE | Facility: CLINIC | Age: 84
End: 2024-01-18
Payer: COMMERCIAL

## 2024-02-06 ENCOUNTER — ANCILLARY PROCEDURE (OUTPATIENT)
Dept: CARDIOLOGY | Facility: CLINIC | Age: 84
End: 2024-02-06
Attending: INTERNAL MEDICINE
Payer: COMMERCIAL

## 2024-02-06 DIAGNOSIS — I49.5 SINUS NODE DYSFUNCTION (H): Primary | ICD-10-CM

## 2024-02-06 DIAGNOSIS — Z95.0 CARDIAC PACEMAKER IN SITU: ICD-10-CM

## 2024-02-06 LAB
MDC_IDC_EPISODE_DTM: NORMAL
MDC_IDC_EPISODE_DURATION: 1 S
MDC_IDC_EPISODE_DURATION: 2 S
MDC_IDC_EPISODE_DURATION: 3 S
MDC_IDC_EPISODE_ID: 694
MDC_IDC_EPISODE_ID: 695
MDC_IDC_EPISODE_ID: 696
MDC_IDC_EPISODE_ID: 697
MDC_IDC_EPISODE_ID: 698
MDC_IDC_EPISODE_ID: 699
MDC_IDC_EPISODE_ID: 700
MDC_IDC_EPISODE_TYPE: NORMAL
MDC_IDC_LEAD_CONNECTION_STATUS: NORMAL
MDC_IDC_LEAD_CONNECTION_STATUS: NORMAL
MDC_IDC_LEAD_IMPLANT_DT: NORMAL
MDC_IDC_LEAD_IMPLANT_DT: NORMAL
MDC_IDC_LEAD_LOCATION: NORMAL
MDC_IDC_LEAD_LOCATION: NORMAL
MDC_IDC_LEAD_LOCATION_DETAIL_1: NORMAL
MDC_IDC_LEAD_LOCATION_DETAIL_1: NORMAL
MDC_IDC_LEAD_MFG: NORMAL
MDC_IDC_LEAD_MFG: NORMAL
MDC_IDC_LEAD_MODEL: NORMAL
MDC_IDC_LEAD_MODEL: NORMAL
MDC_IDC_LEAD_POLARITY_TYPE: NORMAL
MDC_IDC_LEAD_POLARITY_TYPE: NORMAL
MDC_IDC_LEAD_SERIAL: NORMAL
MDC_IDC_LEAD_SERIAL: NORMAL
MDC_IDC_LEAD_SPECIAL_FUNCTION: NORMAL
MDC_IDC_LEAD_SPECIAL_FUNCTION: NORMAL
MDC_IDC_MSMT_BATTERY_DTM: NORMAL
MDC_IDC_MSMT_BATTERY_REMAINING_LONGEVITY: 95 MO
MDC_IDC_MSMT_BATTERY_RRT_TRIGGER: 2.62
MDC_IDC_MSMT_BATTERY_STATUS: NORMAL
MDC_IDC_MSMT_BATTERY_VOLTAGE: 3.01 V
MDC_IDC_MSMT_LEADCHNL_RA_IMPEDANCE_VALUE: 285 OHM
MDC_IDC_MSMT_LEADCHNL_RA_IMPEDANCE_VALUE: 399 OHM
MDC_IDC_MSMT_LEADCHNL_RA_PACING_THRESHOLD_AMPLITUDE: 0.88 V
MDC_IDC_MSMT_LEADCHNL_RA_PACING_THRESHOLD_PULSEWIDTH: 0.4 MS
MDC_IDC_MSMT_LEADCHNL_RA_SENSING_INTR_AMPL: 1.75 MV
MDC_IDC_MSMT_LEADCHNL_RA_SENSING_INTR_AMPL: 1.75 MV
MDC_IDC_MSMT_LEADCHNL_RV_IMPEDANCE_VALUE: 399 OHM
MDC_IDC_MSMT_LEADCHNL_RV_IMPEDANCE_VALUE: 532 OHM
MDC_IDC_MSMT_LEADCHNL_RV_PACING_THRESHOLD_AMPLITUDE: 0.62 V
MDC_IDC_MSMT_LEADCHNL_RV_PACING_THRESHOLD_PULSEWIDTH: 0.4 MS
MDC_IDC_MSMT_LEADCHNL_RV_SENSING_INTR_AMPL: 17.5 MV
MDC_IDC_MSMT_LEADCHNL_RV_SENSING_INTR_AMPL: 17.5 MV
MDC_IDC_PG_IMPLANT_DTM: NORMAL
MDC_IDC_PG_MFG: NORMAL
MDC_IDC_PG_MODEL: NORMAL
MDC_IDC_PG_SERIAL: NORMAL
MDC_IDC_PG_TYPE: NORMAL
MDC_IDC_SESS_CLINIC_NAME: NORMAL
MDC_IDC_SESS_DTM: NORMAL
MDC_IDC_SESS_TYPE: NORMAL
MDC_IDC_SET_BRADY_AT_MODE_SWITCH_RATE: 171 {BEATS}/MIN
MDC_IDC_SET_BRADY_HYSTRATE: NORMAL
MDC_IDC_SET_BRADY_LOWRATE: 80 {BEATS}/MIN
MDC_IDC_SET_BRADY_MAX_SENSOR_RATE: 120 {BEATS}/MIN
MDC_IDC_SET_BRADY_MAX_TRACKING_RATE: 120 {BEATS}/MIN
MDC_IDC_SET_BRADY_MODE: NORMAL
MDC_IDC_SET_BRADY_PAV_DELAY_LOW: 180 MS
MDC_IDC_SET_BRADY_SAV_DELAY_LOW: 150 MS
MDC_IDC_SET_LEADCHNL_RA_PACING_AMPLITUDE: 2 V
MDC_IDC_SET_LEADCHNL_RA_PACING_ANODE_ELECTRODE_1: NORMAL
MDC_IDC_SET_LEADCHNL_RA_PACING_ANODE_LOCATION_1: NORMAL
MDC_IDC_SET_LEADCHNL_RA_PACING_CAPTURE_MODE: NORMAL
MDC_IDC_SET_LEADCHNL_RA_PACING_CATHODE_ELECTRODE_1: NORMAL
MDC_IDC_SET_LEADCHNL_RA_PACING_CATHODE_LOCATION_1: NORMAL
MDC_IDC_SET_LEADCHNL_RA_PACING_POLARITY: NORMAL
MDC_IDC_SET_LEADCHNL_RA_PACING_PULSEWIDTH: 0.4 MS
MDC_IDC_SET_LEADCHNL_RA_SENSING_ANODE_ELECTRODE_1: NORMAL
MDC_IDC_SET_LEADCHNL_RA_SENSING_ANODE_LOCATION_1: NORMAL
MDC_IDC_SET_LEADCHNL_RA_SENSING_CATHODE_ELECTRODE_1: NORMAL
MDC_IDC_SET_LEADCHNL_RA_SENSING_CATHODE_LOCATION_1: NORMAL
MDC_IDC_SET_LEADCHNL_RA_SENSING_POLARITY: NORMAL
MDC_IDC_SET_LEADCHNL_RA_SENSING_SENSITIVITY: 0.3 MV
MDC_IDC_SET_LEADCHNL_RV_PACING_AMPLITUDE: 2 V
MDC_IDC_SET_LEADCHNL_RV_PACING_ANODE_ELECTRODE_1: NORMAL
MDC_IDC_SET_LEADCHNL_RV_PACING_ANODE_LOCATION_1: NORMAL
MDC_IDC_SET_LEADCHNL_RV_PACING_CAPTURE_MODE: NORMAL
MDC_IDC_SET_LEADCHNL_RV_PACING_CATHODE_ELECTRODE_1: NORMAL
MDC_IDC_SET_LEADCHNL_RV_PACING_CATHODE_LOCATION_1: NORMAL
MDC_IDC_SET_LEADCHNL_RV_PACING_POLARITY: NORMAL
MDC_IDC_SET_LEADCHNL_RV_PACING_PULSEWIDTH: 0.4 MS
MDC_IDC_SET_LEADCHNL_RV_SENSING_ANODE_ELECTRODE_1: NORMAL
MDC_IDC_SET_LEADCHNL_RV_SENSING_ANODE_LOCATION_1: NORMAL
MDC_IDC_SET_LEADCHNL_RV_SENSING_CATHODE_ELECTRODE_1: NORMAL
MDC_IDC_SET_LEADCHNL_RV_SENSING_CATHODE_LOCATION_1: NORMAL
MDC_IDC_SET_LEADCHNL_RV_SENSING_POLARITY: NORMAL
MDC_IDC_SET_LEADCHNL_RV_SENSING_SENSITIVITY: 0.9 MV
MDC_IDC_SET_ZONE_DETECTION_INTERVAL: 200 MS
MDC_IDC_SET_ZONE_DETECTION_INTERVAL: 350 MS
MDC_IDC_SET_ZONE_DETECTION_INTERVAL: 430 MS
MDC_IDC_SET_ZONE_STATUS: NORMAL
MDC_IDC_SET_ZONE_TYPE: NORMAL
MDC_IDC_SET_ZONE_VENDOR_TYPE: NORMAL
MDC_IDC_STAT_AT_BURDEN_PERCENT: 0 %
MDC_IDC_STAT_AT_DTM_END: NORMAL
MDC_IDC_STAT_AT_DTM_START: NORMAL
MDC_IDC_STAT_BRADY_AP_VP_PERCENT: 98.73 %
MDC_IDC_STAT_BRADY_AP_VS_PERCENT: 0.59 %
MDC_IDC_STAT_BRADY_AS_VP_PERCENT: 0.44 %
MDC_IDC_STAT_BRADY_AS_VS_PERCENT: 0.25 %
MDC_IDC_STAT_BRADY_DTM_END: NORMAL
MDC_IDC_STAT_BRADY_DTM_START: NORMAL
MDC_IDC_STAT_BRADY_RA_PERCENT_PACED: 99.51 %
MDC_IDC_STAT_BRADY_RV_PERCENT_PACED: 99.16 %
MDC_IDC_STAT_EPISODE_RECENT_COUNT: 0
MDC_IDC_STAT_EPISODE_RECENT_COUNT: 7
MDC_IDC_STAT_EPISODE_RECENT_COUNT_DTM_END: NORMAL
MDC_IDC_STAT_EPISODE_RECENT_COUNT_DTM_START: NORMAL
MDC_IDC_STAT_EPISODE_TOTAL_COUNT: 0
MDC_IDC_STAT_EPISODE_TOTAL_COUNT: 100
MDC_IDC_STAT_EPISODE_TOTAL_COUNT: 2
MDC_IDC_STAT_EPISODE_TOTAL_COUNT: 466
MDC_IDC_STAT_EPISODE_TOTAL_COUNT: 52
MDC_IDC_STAT_EPISODE_TOTAL_COUNT_DTM_END: NORMAL
MDC_IDC_STAT_EPISODE_TOTAL_COUNT_DTM_START: NORMAL
MDC_IDC_STAT_EPISODE_TYPE: NORMAL
MDC_IDC_STAT_TACHYTHERAPY_RECENT_DTM_END: NORMAL
MDC_IDC_STAT_TACHYTHERAPY_RECENT_DTM_START: NORMAL
MDC_IDC_STAT_TACHYTHERAPY_TOTAL_DTM_END: NORMAL
MDC_IDC_STAT_TACHYTHERAPY_TOTAL_DTM_START: NORMAL

## 2024-02-06 PROCEDURE — 93294 REM INTERROG EVL PM/LDLS PM: CPT | Performed by: INTERNAL MEDICINE

## 2024-02-06 PROCEDURE — 93296 REM INTERROG EVL PM/IDS: CPT | Performed by: INTERNAL MEDICINE

## 2024-03-01 NOTE — TELEPHONE ENCOUNTER
Last Office Visit  01/05/21      Last Filled: not prescribed in past    Next OV:  Visit date not found    Would you like appt to see redness (rash)?     No

## 2024-04-01 DIAGNOSIS — I48.0 PAROXYSMAL ATRIAL FIBRILLATION (H): ICD-10-CM

## 2024-04-01 DIAGNOSIS — E78.5 DYSLIPIDEMIA, GOAL LDL BELOW 70: ICD-10-CM

## 2024-04-01 DIAGNOSIS — I50.31 ACUTE DIASTOLIC CONGESTIVE HEART FAILURE (H): ICD-10-CM

## 2024-04-01 DIAGNOSIS — I47.29 NSVT (NONSUSTAINED VENTRICULAR TACHYCARDIA) (H): ICD-10-CM

## 2024-04-01 DIAGNOSIS — I49.5 SINUS NODE DYSFUNCTION (H): ICD-10-CM

## 2024-04-01 RX ORDER — FUROSEMIDE 20 MG
10 TABLET ORAL DAILY
Qty: 15 TABLET | Refills: 1 | Status: SHIPPED | OUTPATIENT
Start: 2024-04-01 | End: 2024-04-10

## 2024-04-01 RX ORDER — ATORVASTATIN CALCIUM 10 MG/1
10 TABLET, FILM COATED ORAL EVERY MORNING
Qty: 30 TABLET | Refills: 2 | Status: SHIPPED | OUTPATIENT
Start: 2024-04-01 | End: 2024-04-10

## 2024-04-01 RX ORDER — METOPROLOL SUCCINATE 50 MG/1
50 TABLET, EXTENDED RELEASE ORAL DAILY
Qty: 30 TABLET | Refills: 2 | Status: SHIPPED | OUTPATIENT
Start: 2024-04-01 | End: 2024-04-10

## 2024-04-09 ENCOUNTER — DOCUMENTATION ONLY (OUTPATIENT)
Dept: CARDIOLOGY | Facility: CLINIC | Age: 84
End: 2024-04-09
Payer: COMMERCIAL

## 2024-04-10 ENCOUNTER — ANCILLARY PROCEDURE (OUTPATIENT)
Dept: CARDIOLOGY | Facility: CLINIC | Age: 84
End: 2024-04-10
Attending: INTERNAL MEDICINE
Payer: COMMERCIAL

## 2024-04-10 ENCOUNTER — OFFICE VISIT (OUTPATIENT)
Dept: CARDIOLOGY | Facility: CLINIC | Age: 84
End: 2024-04-10
Payer: COMMERCIAL

## 2024-04-10 VITALS
HEART RATE: 70 BPM | SYSTOLIC BLOOD PRESSURE: 116 MMHG | BODY MASS INDEX: 22.04 KG/M2 | DIASTOLIC BLOOD PRESSURE: 60 MMHG | RESPIRATION RATE: 16 BRPM | WEIGHT: 128.4 LBS

## 2024-04-10 DIAGNOSIS — Z95.0 PACEMAKER: ICD-10-CM

## 2024-04-10 DIAGNOSIS — I25.10 NONOBSTRUCTIVE ATHEROSCLEROSIS OF CORONARY ARTERY: ICD-10-CM

## 2024-04-10 DIAGNOSIS — I48.0 PAROXYSMAL ATRIAL FIBRILLATION (H): ICD-10-CM

## 2024-04-10 DIAGNOSIS — E78.5 DYSLIPIDEMIA, GOAL LDL BELOW 70: ICD-10-CM

## 2024-04-10 DIAGNOSIS — I50.20 HEART FAILURE WITH REDUCED EJECTION FRACTION, NYHA CLASS I (H): ICD-10-CM

## 2024-04-10 DIAGNOSIS — I50.31 ACUTE DIASTOLIC CONGESTIVE HEART FAILURE (H): ICD-10-CM

## 2024-04-10 DIAGNOSIS — I47.29 NSVT (NONSUSTAINED VENTRICULAR TACHYCARDIA) (H): ICD-10-CM

## 2024-04-10 DIAGNOSIS — I45.89 AV NODE DYSFUNCTION: ICD-10-CM

## 2024-04-10 DIAGNOSIS — Z95.0 CARDIAC PACEMAKER IN SITU: ICD-10-CM

## 2024-04-10 DIAGNOSIS — I49.5 SICK SINUS SYNDROME (H): ICD-10-CM

## 2024-04-10 DIAGNOSIS — I10 ESSENTIAL HYPERTENSION: ICD-10-CM

## 2024-04-10 DIAGNOSIS — E78.2 MIXED HYPERLIPIDEMIA: Primary | ICD-10-CM

## 2024-04-10 DIAGNOSIS — I35.0 NONRHEUMATIC AORTIC VALVE STENOSIS: ICD-10-CM

## 2024-04-10 DIAGNOSIS — I49.5 SINUS NODE DYSFUNCTION (H): ICD-10-CM

## 2024-04-10 LAB
MDC_IDC_EPISODE_DTM: NORMAL
MDC_IDC_EPISODE_DURATION: 1 S
MDC_IDC_EPISODE_DURATION: 2 S
MDC_IDC_EPISODE_ID: 701
MDC_IDC_EPISODE_ID: 702
MDC_IDC_EPISODE_ID: 703
MDC_IDC_EPISODE_ID: 704
MDC_IDC_EPISODE_TYPE: NORMAL
MDC_IDC_EPISODE_TYPE_INDUCED: NO
MDC_IDC_LEAD_CONNECTION_STATUS: NORMAL
MDC_IDC_LEAD_CONNECTION_STATUS: NORMAL
MDC_IDC_LEAD_IMPLANT_DT: NORMAL
MDC_IDC_LEAD_IMPLANT_DT: NORMAL
MDC_IDC_LEAD_LOCATION: NORMAL
MDC_IDC_LEAD_LOCATION: NORMAL
MDC_IDC_LEAD_LOCATION_DETAIL_1: NORMAL
MDC_IDC_LEAD_LOCATION_DETAIL_1: NORMAL
MDC_IDC_LEAD_MFG: NORMAL
MDC_IDC_LEAD_MFG: NORMAL
MDC_IDC_LEAD_MODEL: NORMAL
MDC_IDC_LEAD_MODEL: NORMAL
MDC_IDC_LEAD_POLARITY_TYPE: NORMAL
MDC_IDC_LEAD_POLARITY_TYPE: NORMAL
MDC_IDC_LEAD_SERIAL: NORMAL
MDC_IDC_LEAD_SERIAL: NORMAL
MDC_IDC_LEAD_SPECIAL_FUNCTION: NORMAL
MDC_IDC_LEAD_SPECIAL_FUNCTION: NORMAL
MDC_IDC_MSMT_BATTERY_DTM: NORMAL
MDC_IDC_MSMT_BATTERY_REMAINING_LONGEVITY: 98 MO
MDC_IDC_MSMT_BATTERY_RRT_TRIGGER: 2.62
MDC_IDC_MSMT_BATTERY_STATUS: NORMAL
MDC_IDC_MSMT_BATTERY_VOLTAGE: 3 V
MDC_IDC_MSMT_LEADCHNL_RA_IMPEDANCE_VALUE: 323 OHM
MDC_IDC_MSMT_LEADCHNL_RA_IMPEDANCE_VALUE: 418 OHM
MDC_IDC_MSMT_LEADCHNL_RA_PACING_THRESHOLD_AMPLITUDE: 0.75 V
MDC_IDC_MSMT_LEADCHNL_RA_PACING_THRESHOLD_AMPLITUDE: 0.88 V
MDC_IDC_MSMT_LEADCHNL_RA_PACING_THRESHOLD_PULSEWIDTH: 0.4 MS
MDC_IDC_MSMT_LEADCHNL_RA_PACING_THRESHOLD_PULSEWIDTH: 0.4 MS
MDC_IDC_MSMT_LEADCHNL_RA_SENSING_INTR_AMPL: 1.8 MV
MDC_IDC_MSMT_LEADCHNL_RA_SENSING_INTR_AMPL: 1.88 MV
MDC_IDC_MSMT_LEADCHNL_RV_IMPEDANCE_VALUE: 418 OHM
MDC_IDC_MSMT_LEADCHNL_RV_IMPEDANCE_VALUE: 551 OHM
MDC_IDC_MSMT_LEADCHNL_RV_PACING_THRESHOLD_AMPLITUDE: 0.62 V
MDC_IDC_MSMT_LEADCHNL_RV_PACING_THRESHOLD_AMPLITUDE: 0.75 V
MDC_IDC_MSMT_LEADCHNL_RV_PACING_THRESHOLD_PULSEWIDTH: 0.4 MS
MDC_IDC_MSMT_LEADCHNL_RV_PACING_THRESHOLD_PULSEWIDTH: 0.4 MS
MDC_IDC_PG_IMPLANT_DTM: NORMAL
MDC_IDC_PG_MFG: NORMAL
MDC_IDC_PG_MODEL: NORMAL
MDC_IDC_PG_SERIAL: NORMAL
MDC_IDC_PG_TYPE: NORMAL
MDC_IDC_SESS_CLINIC_NAME: NORMAL
MDC_IDC_SESS_DTM: NORMAL
MDC_IDC_SESS_TYPE: NORMAL
MDC_IDC_SET_BRADY_AT_MODE_SWITCH_RATE: 171 {BEATS}/MIN
MDC_IDC_SET_BRADY_HYSTRATE: NORMAL
MDC_IDC_SET_BRADY_LOWRATE: 70 {BEATS}/MIN
MDC_IDC_SET_BRADY_MAX_SENSOR_RATE: 120 {BEATS}/MIN
MDC_IDC_SET_BRADY_MAX_TRACKING_RATE: 120 {BEATS}/MIN
MDC_IDC_SET_BRADY_MODE: NORMAL
MDC_IDC_SET_BRADY_PAV_DELAY_LOW: 180 MS
MDC_IDC_SET_BRADY_SAV_DELAY_LOW: 150 MS
MDC_IDC_SET_LEADCHNL_RA_PACING_AMPLITUDE: NORMAL
MDC_IDC_SET_LEADCHNL_RA_PACING_ANODE_ELECTRODE_1: NORMAL
MDC_IDC_SET_LEADCHNL_RA_PACING_ANODE_LOCATION_1: NORMAL
MDC_IDC_SET_LEADCHNL_RA_PACING_CAPTURE_MODE: NORMAL
MDC_IDC_SET_LEADCHNL_RA_PACING_CATHODE_ELECTRODE_1: NORMAL
MDC_IDC_SET_LEADCHNL_RA_PACING_CATHODE_LOCATION_1: NORMAL
MDC_IDC_SET_LEADCHNL_RA_PACING_POLARITY: NORMAL
MDC_IDC_SET_LEADCHNL_RA_PACING_PULSEWIDTH: 0.4 MS
MDC_IDC_SET_LEADCHNL_RA_SENSING_ANODE_ELECTRODE_1: NORMAL
MDC_IDC_SET_LEADCHNL_RA_SENSING_ANODE_LOCATION_1: NORMAL
MDC_IDC_SET_LEADCHNL_RA_SENSING_CATHODE_ELECTRODE_1: NORMAL
MDC_IDC_SET_LEADCHNL_RA_SENSING_CATHODE_LOCATION_1: NORMAL
MDC_IDC_SET_LEADCHNL_RA_SENSING_POLARITY: NORMAL
MDC_IDC_SET_LEADCHNL_RA_SENSING_SENSITIVITY: 0.3 MV
MDC_IDC_SET_LEADCHNL_RV_PACING_AMPLITUDE: NORMAL
MDC_IDC_SET_LEADCHNL_RV_PACING_ANODE_ELECTRODE_1: NORMAL
MDC_IDC_SET_LEADCHNL_RV_PACING_ANODE_LOCATION_1: NORMAL
MDC_IDC_SET_LEADCHNL_RV_PACING_CAPTURE_MODE: NORMAL
MDC_IDC_SET_LEADCHNL_RV_PACING_CATHODE_ELECTRODE_1: NORMAL
MDC_IDC_SET_LEADCHNL_RV_PACING_CATHODE_LOCATION_1: NORMAL
MDC_IDC_SET_LEADCHNL_RV_PACING_POLARITY: NORMAL
MDC_IDC_SET_LEADCHNL_RV_PACING_PULSEWIDTH: 0.4 MS
MDC_IDC_SET_LEADCHNL_RV_SENSING_ANODE_ELECTRODE_1: NORMAL
MDC_IDC_SET_LEADCHNL_RV_SENSING_ANODE_LOCATION_1: NORMAL
MDC_IDC_SET_LEADCHNL_RV_SENSING_CATHODE_ELECTRODE_1: NORMAL
MDC_IDC_SET_LEADCHNL_RV_SENSING_CATHODE_LOCATION_1: NORMAL
MDC_IDC_SET_LEADCHNL_RV_SENSING_POLARITY: NORMAL
MDC_IDC_SET_LEADCHNL_RV_SENSING_SENSITIVITY: 0.9 MV
MDC_IDC_SET_ZONE_DETECTION_INTERVAL: 200 MS
MDC_IDC_SET_ZONE_DETECTION_INTERVAL: 350 MS
MDC_IDC_SET_ZONE_DETECTION_INTERVAL: 430 MS
MDC_IDC_SET_ZONE_STATUS: NORMAL
MDC_IDC_SET_ZONE_TYPE: NORMAL
MDC_IDC_SET_ZONE_VENDOR_TYPE: NORMAL
MDC_IDC_STAT_AT_BURDEN_PERCENT: 0 %
MDC_IDC_STAT_AT_DTM_END: NORMAL
MDC_IDC_STAT_AT_DTM_START: NORMAL
MDC_IDC_STAT_AT_MODE_SW_COUNT: 0
MDC_IDC_STAT_BRADY_AP_VP_PERCENT: 98.25 %
MDC_IDC_STAT_BRADY_AP_VS_PERCENT: 0.36 %
MDC_IDC_STAT_BRADY_AS_VP_PERCENT: 0.93 %
MDC_IDC_STAT_BRADY_AS_VS_PERCENT: 0.46 %
MDC_IDC_STAT_BRADY_DTM_END: NORMAL
MDC_IDC_STAT_BRADY_DTM_START: NORMAL
MDC_IDC_STAT_BRADY_RA_PERCENT_PACED: 99.03 %
MDC_IDC_STAT_BRADY_RV_PERCENT_PACED: 99.18 %
MDC_IDC_STAT_EPISODE_RECENT_COUNT: 0
MDC_IDC_STAT_EPISODE_RECENT_COUNT: 1
MDC_IDC_STAT_EPISODE_RECENT_COUNT: 27
MDC_IDC_STAT_EPISODE_RECENT_COUNT_DTM_END: NORMAL
MDC_IDC_STAT_EPISODE_RECENT_COUNT_DTM_START: NORMAL
MDC_IDC_STAT_EPISODE_TOTAL_COUNT: 0
MDC_IDC_STAT_EPISODE_TOTAL_COUNT: 100
MDC_IDC_STAT_EPISODE_TOTAL_COUNT: 2
MDC_IDC_STAT_EPISODE_TOTAL_COUNT: 466
MDC_IDC_STAT_EPISODE_TOTAL_COUNT: 56
MDC_IDC_STAT_EPISODE_TOTAL_COUNT_DTM_END: NORMAL
MDC_IDC_STAT_EPISODE_TOTAL_COUNT_DTM_START: NORMAL
MDC_IDC_STAT_EPISODE_TYPE: NORMAL
MDC_IDC_STAT_TACHYTHERAPY_RECENT_DTM_END: NORMAL
MDC_IDC_STAT_TACHYTHERAPY_RECENT_DTM_START: NORMAL
MDC_IDC_STAT_TACHYTHERAPY_TOTAL_DTM_END: NORMAL
MDC_IDC_STAT_TACHYTHERAPY_TOTAL_DTM_START: NORMAL

## 2024-04-10 PROCEDURE — 93280 PM DEVICE PROGR EVAL DUAL: CPT | Performed by: INTERNAL MEDICINE

## 2024-04-10 PROCEDURE — 99214 OFFICE O/P EST MOD 30 MIN: CPT | Performed by: STUDENT IN AN ORGANIZED HEALTH CARE EDUCATION/TRAINING PROGRAM

## 2024-04-10 RX ORDER — ATORVASTATIN CALCIUM 10 MG/1
10 TABLET, FILM COATED ORAL EVERY MORNING
Qty: 30 TABLET | Refills: 2 | Status: SHIPPED | OUTPATIENT
Start: 2024-04-10

## 2024-04-10 RX ORDER — FUROSEMIDE 20 MG
10 TABLET ORAL DAILY
Qty: 15 TABLET | Refills: 1 | Status: SHIPPED | OUTPATIENT
Start: 2024-04-10

## 2024-04-10 RX ORDER — METOPROLOL SUCCINATE 50 MG/1
50 TABLET, EXTENDED RELEASE ORAL DAILY
Qty: 30 TABLET | Refills: 2 | Status: SHIPPED | OUTPATIENT
Start: 2024-04-10

## 2024-04-10 NOTE — PATIENT INSTRUCTIONS
Laney Hahn,    It was a pleasure to see you today at the [facility].     My recommendations after this visit include:    - Continue current medications .   - Continue device checks  - Recommend following up with PCP for labs and med refills  - Follow up with Dr. Diallo in 1 year, sooner if needed    - Please call [phone], if you have any questions or concerns      Aleisha Fernandez PA-C

## 2024-04-10 NOTE — PROGRESS NOTES
HEART CARE ENCOUNTER NOTE      [facility]  [phone]      Assessment/Recommendations   Assessment:   Coronary artery disease: Coronary angiogram 9/15/2020 showed moderate proximal circumflex lesion not flow-limiting with mild disease elsewhere.  Echocardiogram in 2022 showed mild to moderate global hypokinesis patient denies anginal concerns.  Stress testing was considered in the past and patient continues to decline.  Moderate to severe aortic stenosis: Patient has previously declined SAVR and TAVR.  Echocardiogram 9/12/2022 also showed moderate mitral valve stenosis and mild mitral regurgitation.  Heart failure with preserved ejection fraction: Patient is on 10 mg Lasix and metoprolol 50 mg daily.  Denies any new shortness of breath, orthopnea, lower extremity edema.  Appears euvolemic on exam.  Dyslipidemia: Controlled on atorvastatin 10 mg daily.  Last lipids 4/27/2023 showed LDL of 62  Paroxysmal atrial fibrillation: On Eliquis 2.5 mg twice daily for anticoagulation given 57 kg and age of 83.  Denies any bleeding issues.  Sinus node dysfunction: Status post permanent pacemaker placement 4/13/2021  NSVT: Seen on previous device checks.  Patient has refused further testing in the past and continues to decline.  Device check today showed 28 ventricular high rates with EGM showing 7-21 beats of NSVT.  Patient denies symptoms.    Plan:   Discussed BMP and lipids today for medication maintenance.  Both Lasix and atorvastatin are filled through her primary care provider.  Patient declined lab work today but looks like she is due for follow-up with PCP.  Patient to follow-up with PCP for annual, med refills, lab work  Continue device checks  Follow-up in 1 year with Dr. Diallo       History of Present Illness/Subjective    HPI: Laney Hahn is a 83 year old female with PMHx of mild to moderate CAD, moderate to severe aortic stenosis, HFpEF, dyslipidemia, paroxysmal atrial fibrillation on  anticoagulation, sinus node dysfunction status post permanent pacemaker placement in 2021 presents for follow-up.  Patient last saw Dr. Diallo 10/3/2022 where they had discussed her valvular disease as well as considering a stress test with patient declining both.  Patient had an episode of feeling dizzy when standing going to lunch like she was going to pass out.  Seem to improve after fluids.    Patient denies any cardiac concerns.  Patient notes that she sleeps a lot throughout the day but does walk down the halls of her living facility to go to the cafeteria.  Denies any exertional symptoms with normal walking.  Patient denies lightheadedness, palpitations, lower extremity edema, orthopnea, angina, dyspnea.  We discussed valve issue and NSVT today and patient declines any further evaluation or valve repair.  We discussed the lab work as well today which patient declined.  Medications are filled through PCP and patient is due for follow-up through her.      Device check today:  Encounter Type: Patient seen in clinic for annual device evaluation and iterative programming, followed by Aleisha Fernandez NP (patient accompanied by brother and female friend)   Device: Medtronic [place] (D) pacemaker  Pacing %/Programmed: AP 99%,  99%, DDDR 80-120bpm changed to 70bpm  Lead(s): Stable  Battery longevity: Estimating 8 years remaining  Presenting rhythm: APVP 85bpm  Underlying rhythm: SB w/CHB, no R's @ VVI 30bpm  Heart rates: Stable, HR's per histogram range 80-130bpm and primarily 80-100bpm  Atrial High rates: None  Anticoagulant: Eliquis  Ventricular High rates: 28 VHR episodes logged, available EGM's suggest 7-21 beats NSVT, patient denies symptoms. EF 40-45% per echo 9/12/22.  Comments: Normal device function. LRL changed from 80bpm to 70bpm (AVNA 6/18/21 and LRL never turned back down.) Cross walk performed and heart rate increased from 70bpm to 95bpm.    Plan: Remote device check scheduled for 6/19/24. Saumya Zeng  RN    Echocardiogram 9/12/2022 results:  The left ventricle is normal in size.  There is normal left ventricular wall thickness.  Diastolic Doppler findings (E/E' ratio and/or other parameters) suggest left  ventricular filling pressures are increased.  The visual ejection fraction is 40-45%.  There is mild-moderate global hypokinesia of the left ventricle.  Normal right ventricle size and systolic function.  The left atrium is moderately dilated.  Trileaflet aortic valve with calcification and reduced excursion.  It appears moderate to severe aortic stenosis. The mean gradient is 23 mmHg  and area aortic valve 0.7-0.8 cm2. SVi 32ml/m2.  Moderate mitral valve stenosis 8 mmHg at 80 BPM.  Mild mitral valve regurgitation.    Cardiac device check 2/16/2024:  Encounter Type: Routine remote evaluation   Device: Medtronic [place] (D) PPM  Pacing % /Programmed: 99.5% AP 99.2%  in DDDR 80-120bpm  Lead(s): Stable  Battery longevity: 7.9 years  Presenting: APVP 80bpm  Atrial high rates: Since 10/30/2023, 13 seconds total of AT/AF, no EGMs  Anticoagulant: Eliquis  Ventricular High rates: 7 VHR; EGMs show 6-14 beats of NSVT at 150-180sbpm. Patient has a known history of asymtpomatic NSVT and has refused further testing in the past  Comments: Normal device function.   Plan: Patient is overdue to see Dr. Diallo as well as annual in-clinic device check. Order placed for MD follow-up. Letter mailed to patient. SARAH BETH Augustin RN     Coronary angiogram 9/15/2020:    Near syncopal episodes in patient with HFpEF, mild-moderate aortic valve stenosis and renal insufficiency. Troponin 0.4 and echo with stable EF 51% and inferior wall hypokinesis, which was also previously noted.    Mild coronary disease outside of a moderate proximal circumflex lesion. FFR of circumflex not flow limiting.    LV EDP normal.    She did develop an SVT triggered by ectopy from the pigtail catheter in the ventricle. Rate of SVT was in the 170s and broke with a cough.  Her BP did drop with the SVT but she was asymptomatic.    Estimated blood loss was <20 ml.        Physical Examination  Review of Systems   Vitals: /60 (BP Location: Left arm, Patient Position: Sitting, Cuff Size: Adult Regular)   Pulse 70   Resp 16   Wt 58.2 kg (128 lb 6.4 oz)   BMI 22.04 kg/m    BMI= Body mass index is 22.04 kg/m .  Wt Readings from Last 3 Encounters:   04/10/24 58.2 kg (128 lb 6.4 oz)   06/20/23 57.1 kg (125 lb 12.8 oz)   06/15/23 57.2 kg (126 lb)           ENT/Mouth: membranes moist, no oral lesions or bleeding gums.      EYES:  no scleral icterus, normal conjunctivae       Chest/Lungs:   lungs are clear to auscultation, no rales or wheezing, equal chest wall expansion    Cardiovascular:   Regular. Normal first and second heart sounds with 2 out of 6 systolic murmur, no rubs, or gallops; the carotid, radial and posterior tibial pulses are intact, no edema bilaterally        Extremities: no cyanosis or clubbing   Skin: no xanthelasma, warm.    Neurologic: no tremors     Psychiatric: alert and oriented x3, calm        Please refer above for cardiac ROS details.        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 Nini Rasmussen    Metabolic encephalopathy     Moderate aortic valve stenosis 08/22/2017    stable on 2020 echo    Nonobstructive atherosclerosis of coronary artery 09/15/2020    with normal LVEDP    Paroxysmal SVT (supraventricular tachycardia) (H) 09/07/2017    said to have short episodes while hospitalized for UTI per Dr. Rhys Mensah    Syncope 08/22/2017    UTI (urinary tract infection) 08/21/2017    hospitalized with  weakness     Past Surgical History:   Procedure Laterality Date    CATARACT EXTRACTION, BILATERAL      CV CORONARY ANGIOGRAM N/A 9/15/2020    Procedure: Coronary Angiogram;  Surgeon: Radhika Santa MD;  Location: [facility] Cath Lab;  Service: Cardiology    CV LEFT HEART CATHETERIZATION WITHOUT LEFT VENTRICULOGRAM Left 9/15/2020    Procedure: Left Heart Catheterization Without Left Ventriculogram;  Surgeon: Radhika Santa MD;  Location: [facility] Cath Lab;  Service: Cardiology    EP PACEMAKER INSERT N/A 4/13/2021    Procedure: EP Pacemaker Insertion;  Surgeon: Frederic Burgos MD;  Location: [facility] Cardiac Cath Lab;  Service: Cardiology    HYSTERECTOMY      PARTIAL GASTRECTOMY  1969    for ulcers    TONSILLECTOMY       Family History   Adopted: Yes        Social History     Socioeconomic History    Marital status: Single     Spouse name: Not on file    Number of children: 0    Years of education: Not on file    Highest education level: Not on file   Occupational History    Not on file   Tobacco Use    Smoking status: Never    Smokeless tobacco: Never   Vaping Use    Vaping Use: Never used   Substance and Sexual Activity    Alcohol use: No    Drug use: No    Sexual activity: Not on file   Other Topics Concern    Not on file   Social History Narrative    Her adopted brother, Jeff, lives with her. He is five years younger than her.     Social Determinants of Health     Financial Resource Strain: Not on file   Food Insecurity: Not on file   Transportation Needs: Not on file   Physical Activity: Not on file   Stress: Not on file   Social Connections: Not on file   Interpersonal Safety: Not on file   Housing Stability: Not on file           Medications  Allergies   Current Outpatient Medications   Medication Sig Dispense Refill    acetaminophen (TYLENOL) 500 MG tablet Take 1-2 tablets (500-1,000 mg) by mouth every 6 hours as needed for pain 30 tablet 3    apixaban ANTICOAGULANT (ELIQUIS ANTICOAGULANT) 2.5  MG tablet Take 1 tablet (2.5 mg) by mouth 2 times daily Due for appointment with Rose Mcelroy 60 tablet 1    atorvastatin (LIPITOR) 10 MG tablet TAKE 1 TABLET BY MOUTH EVERY MORNING 30 tablet 2    furosemide (LASIX) 20 MG tablet Take 0.5 tablets (10 mg) by mouth daily Due for appointment with Rose Mcelroy 15 tablet 1    metoprolol succinate ER (TOPROL XL) 50 MG 24 hr tablet TAKE 1 TABLET (50 MG) BY MOUTH DAILY 30 tablet 2       Allergies   Allergen Reactions    Sulfa Antibiotics Shortness Of Breath          Lab Results    Chemistry/lipid CBC Cardiac Enzymes/BNP/TSH/INR   Recent Labs   Lab Test 04/27/23  1109   CHOL 137   HDL 42*   LDL 62   TRIG 164*     Recent Labs   Lab Test 04/27/23  1109 03/24/22  1050 04/21/21  1101   LDL 62 102 58     Recent Labs   Lab Test 04/27/23  1109      POTASSIUM 4.8   CHLORIDE 108*   CO2 24   GLC 89   BUN 24.1*   CR 1.25*   GFRESTIMATED 43*   CURT 9.2     Recent Labs   Lab Test 04/27/23  1109 02/24/23  0556 09/15/22  0438   CR 1.25* 1.20* 0.96     Recent Labs   Lab Test 04/11/21  0524 04/24/17  0647   A1C 5.2 5.7          Recent Labs   Lab Test 04/27/23  1109   WBC 5.3   HGB 13.1   HCT 37.6           Recent Labs   Lab Test 04/27/23  1109 02/24/23  0556 09/15/22  0438   HGB 13.1 12.5 12.1    Recent Labs   Lab Test 09/11/22  1733 07/22/22 1917 06/16/21  2208   TROPONINI 0.02 0.02 0.02     Recent Labs   Lab Test 06/10/21  1920 12/16/20  1257 09/14/20  1033   BNP 1,170* 1,646* 356*     Recent Labs   Lab Test 04/27/23  1109   TSH 1.53     Recent Labs   Lab Test 07/22/22 1917 03/14/21 1927 09/14/20  1914   INR 1.23* 0.93 1.04          Aleisha Fernandez PA-C

## 2024-04-10 NOTE — TELEPHONE ENCOUNTER
Received fax from patient's assisted living with be low message:    Effective 04/12/2024 - facility and resident's primary pharmacy is now     [facility]  [address]  [place] [address]    Phone: [phone]  Fax: [phone]    Please update patient's records and send any new orders or update scripts to [facility] in order to avoid any medication delivery delays.

## 2024-04-10 NOTE — LETTER
4/10/2024    Rose Mcelroy NP  [address]  [place] [address]    RE: Laney Hahn       Dear Colleague,     I had the pleasure of seeing Laney Hahn in the [facility].    HEART CARE ENCOUNTER NOTE      [facility]  [phone]      Assessment/Recommendations   Assessment:   Coronary artery disease: Coronary angiogram 9/15/2020 showed moderate proximal circumflex lesion not flow-limiting with mild disease elsewhere.  Echocardiogram in 2022 showed mild to moderate global hypokinesis patient denies anginal concerns.  Stress testing was considered in the past and patient continues to decline.  Moderate to severe aortic stenosis: Patient has previously declined SAVR and TAVR.  Echocardiogram 9/12/2022 also showed moderate mitral valve stenosis and mild mitral regurgitation.  Heart failure with preserved ejection fraction: Patient is on 10 mg Lasix and metoprolol 50 mg daily.  Denies any new shortness of breath, orthopnea, lower extremity edema.  Appears euvolemic on exam.  Dyslipidemia: Controlled on atorvastatin 10 mg daily.  Last lipids 4/27/2023 showed LDL of 62  Paroxysmal atrial fibrillation: On Eliquis 2.5 mg twice daily for anticoagulation given 57 kg and age of 83.  Denies any bleeding issues.  Sinus node dysfunction: Status post permanent pacemaker placement 4/13/2021  NSVT: Seen on previous device checks.  Patient has refused further testing in the past and continues to decline.  Device check today showed 28 ventricular high rates with EGM showing 7-21 beats of NSVT.  Patient denies symptoms.    Plan:   Discussed BMP and lipids today for medication maintenance.  Both Lasix and atorvastatin are filled through her primary care provider.  Patient declined lab work today but looks like she is due for follow-up with PCP.  Patient to follow-up with PCP for annual, med refills, lab work  Continue device checks  Follow-up in 1 year with Dr. Diallo        History of Present Illness/Subjective    HPI: Laney Hahn is a 83 year old female with PMHx of mild to moderate CAD, moderate to severe aortic stenosis, HFpEF, dyslipidemia, paroxysmal atrial fibrillation on anticoagulation, sinus node dysfunction status post permanent pacemaker placement in 2021 presents for follow-up.  Patient last saw Dr. Diallo 10/3/2022 where they had discussed her valvular disease as well as considering a stress test with patient declining both.  Patient had an episode of feeling dizzy when standing going to lunch like she was going to pass out.  Seem to improve after fluids.    Patient denies any cardiac concerns.  Patient notes that she sleeps a lot throughout the day but does walk down the halls of her living facility to go to the cafeteria.  Denies any exertional symptoms with normal walking.  Patient denies lightheadedness, palpitations, lower extremity edema, orthopnea, angina, dyspnea.  We discussed valve issue and NSVT today and patient declines any further evaluation or valve repair.  We discussed the lab work as well today which patient declined.  Medications are filled through PCP and patient is due for follow-up through her.      Device check today:  Encounter Type: Patient seen in clinic for annual device evaluation and iterative programming, followed by Aleisha Fernandez NP (patient accompanied by brother and female friend)   Device: Medtronic Gema (D) pacemaker  Pacing %/Programmed: AP 99%,  99%, DDDR 80-120bpm changed to 70bpm  Lead(s): Stable  Battery longevity: Estimating 8 years remaining  Presenting rhythm: APVP 85bpm  Underlying rhythm: SB w/CHB, no R's @ VVI 30bpm  Heart rates: Stable, HR's per histogram range 80-130bpm and primarily 80-100bpm  Atrial High rates: None  Anticoagulant: Eliquis  Ventricular High rates: 28 VHR episodes logged, available EGM's suggest 7-21 beats NSVT, patient denies symptoms. EF 40-45% per echo 9/12/22.  Comments: Normal device  function. LRL changed from 80bpm to 70bpm (AVNA 6/18/21 and LRL never turned back down.) Cross walk performed and heart rate increased from 70bpm to 95bpm.    Plan: Remote device check scheduled for 6/19/24. Saumya Zeng RN    Echocardiogram 9/12/2022 results:  The left ventricle is normal in size.  There is normal left ventricular wall thickness.  Diastolic Doppler findings (E/E' ratio and/or other parameters) suggest left  ventricular filling pressures are increased.  The visual ejection fraction is 40-45%.  There is mild-moderate global hypokinesia of the left ventricle.  Normal right ventricle size and systolic function.  The left atrium is moderately dilated.  Trileaflet aortic valve with calcification and reduced excursion.  It appears moderate to severe aortic stenosis. The mean gradient is 23 mmHg  and area aortic valve 0.7-0.8 cm2. SVi 32ml/m2.  Moderate mitral valve stenosis 8 mmHg at 80 BPM.  Mild mitral valve regurgitation.    Cardiac device check 2/16/2024:  Encounter Type: Routine remote evaluation   Device: Medtronic [place] (D) PPM  Pacing % /Programmed: 99.5% AP 99.2%  in DDDR 80-120bpm  Lead(s): Stable  Battery longevity: 7.9 years  Presenting: APVP 80bpm  Atrial high rates: Since 10/30/2023, 13 seconds total of AT/AF, no EGMs  Anticoagulant: Eliquis  Ventricular High rates: 7 VHR; EGMs show 6-14 beats of NSVT at 150-180sbpm. Patient has a known history of asymtpomatic NSVT and has refused further testing in the past  Comments: Normal device function.   Plan: Patient is overdue to see Dr. Diallo as well as annual in-clinic device check. Order placed for MD follow-up. Letter mailed to patient. SARAH BETH Augustin RN     Coronary angiogram 9/15/2020:    Near syncopal episodes in patient with HFpEF, mild-moderate aortic valve stenosis and renal insufficiency. Troponin 0.4 and echo with stable EF 51% and inferior wall hypokinesis, which was also previously noted.    Mild coronary disease outside of a moderate  proximal circumflex lesion. FFR of circumflex not flow limiting.    LV EDP normal.    She did develop an SVT triggered by ectopy from the pigtail catheter in the ventricle. Rate of SVT was in the 170s and broke with a cough. Her BP did drop with the SVT but she was asymptomatic.    Estimated blood loss was <20 ml.        Physical Examination  Review of Systems   Vitals: /60 (BP Location: Left arm, Patient Position: Sitting, Cuff Size: Adult Regular)   Pulse 70   Resp 16   Wt 58.2 kg (128 lb 6.4 oz)   BMI 22.04 kg/m    BMI= Body mass index is 22.04 kg/m .  Wt Readings from Last 3 Encounters:   04/10/24 58.2 kg (128 lb 6.4 oz)   06/20/23 57.1 kg (125 lb 12.8 oz)   06/15/23 57.2 kg (126 lb)           ENT/Mouth: membranes moist, no oral lesions or bleeding gums.      EYES:  no scleral icterus, normal conjunctivae       Chest/Lungs:   lungs are clear to auscultation, no rales or wheezing, equal chest wall expansion    Cardiovascular:   Regular. Normal first and second heart sounds with 2 out of 6 systolic murmur, no rubs, or gallops; the carotid, radial and posterior tibial pulses are intact, no edema bilaterally        Extremities: no cyanosis or clubbing   Skin: no xanthelasma, warm.    Neurologic: no tremors     Psychiatric: alert and oriented x3, calm        Please refer above for cardiac ROS details.        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 Nini Rasmussen    Metabolic encephalopathy     Moderate aortic valve stenosis 08/22/2017    stable on 2020 echo    Nonobstructive atherosclerosis of coronary artery 09/15/2020    with normal LVEDP     Paroxysmal SVT (supraventricular tachycardia) (H) 09/07/2017    said to have short episodes while hospitalized for UTI per Dr. Rhys Mensah    Syncope 08/22/2017    UTI (urinary tract infection) 08/21/2017    hospitalized with weakness     Past Surgical History:   Procedure Laterality Date    CATARACT EXTRACTION, BILATERAL      CV CORONARY ANGIOGRAM N/A 9/15/2020    Procedure: Coronary Angiogram;  Surgeon: Radhika Santa MD;  Location: [facility] Cath Lab;  Service: Cardiology    CV LEFT HEART CATHETERIZATION WITHOUT LEFT VENTRICULOGRAM Left 9/15/2020    Procedure: Left Heart Catheterization Without Left Ventriculogram;  Surgeon: Radhika Santa MD;  Location: [facility] Cath Lab;  Service: Cardiology    EP PACEMAKER INSERT N/A 4/13/2021    Procedure: EP Pacemaker Insertion;  Surgeon: Frederic Burgos MD;  Location: [facility] Cardiac Cath Lab;  Service: Cardiology    HYSTERECTOMY      PARTIAL GASTRECTOMY  1969    for ulcers    TONSILLECTOMY       Family History   Adopted: Yes        Social History     Socioeconomic History    Marital status: Single     Spouse name: Not on file    Number of children: 0    Years of education: Not on file    Highest education level: Not on file   Occupational History    Not on file   Tobacco Use    Smoking status: Never    Smokeless tobacco: Never   Vaping Use    Vaping Use: Never used   Substance and Sexual Activity    Alcohol use: No    Drug use: No    Sexual activity: Not on file   Other Topics Concern    Not on file   Social History Narrative    Her adopted brother, Jeff, lives with her. He is five years younger than her.     Social Determinants of Health     Financial Resource Strain: Not on file   Food Insecurity: Not on file   Transportation Needs: Not on file   Physical Activity: Not on file   Stress: Not on file   Social Connections: Not on file   Interpersonal Safety: Not on file   Housing Stability: Not on file           Medications  Allergies   Current  Outpatient Medications   Medication Sig Dispense Refill    acetaminophen (TYLENOL) 500 MG tablet Take 1-2 tablets (500-1,000 mg) by mouth every 6 hours as needed for pain 30 tablet 3    apixaban ANTICOAGULANT (ELIQUIS ANTICOAGULANT) 2.5 MG tablet Take 1 tablet (2.5 mg) by mouth 2 times daily Due for appointment with Rose Mcelroy 60 tablet 1    atorvastatin (LIPITOR) 10 MG tablet TAKE 1 TABLET BY MOUTH EVERY MORNING 30 tablet 2    furosemide (LASIX) 20 MG tablet Take 0.5 tablets (10 mg) by mouth daily Due for appointment with Rose cMelroy 15 tablet 1    metoprolol succinate ER (TOPROL XL) 50 MG 24 hr tablet TAKE 1 TABLET (50 MG) BY MOUTH DAILY 30 tablet 2       Allergies   Allergen Reactions    Sulfa Antibiotics Shortness Of Breath          Lab Results    Chemistry/lipid CBC Cardiac Enzymes/BNP/TSH/INR   Recent Labs   Lab Test 04/27/23  1109   CHOL 137   HDL 42*   LDL 62   TRIG 164*     Recent Labs   Lab Test 04/27/23  1109 03/24/22  1050 04/21/21  1101   LDL 62 102 58     Recent Labs   Lab Test 04/27/23  1109      POTASSIUM 4.8   CHLORIDE 108*   CO2 24   GLC 89   BUN 24.1*   CR 1.25*   GFRESTIMATED 43*   CURT 9.2     Recent Labs   Lab Test 04/27/23  1109 02/24/23  0556 09/15/22  0438   CR 1.25* 1.20* 0.96     Recent Labs   Lab Test 04/11/21  0524 04/24/17  0647   A1C 5.2 5.7          Recent Labs   Lab Test 04/27/23  1109   WBC 5.3   HGB 13.1   HCT 37.6           Recent Labs   Lab Test 04/27/23  1109 02/24/23  0556 09/15/22  0438   HGB 13.1 12.5 12.1    Recent Labs   Lab Test 09/11/22  1733 07/22/22 1917 06/16/21  2208   TROPONINI 0.02 0.02 0.02     Recent Labs   Lab Test 06/10/21  1920 12/16/20  1257 09/14/20  1033   BNP 1,170* 1,646* 356*     Recent Labs   Lab Test 04/27/23  1109   TSH 1.53     Recent Labs   Lab Test 07/22/22  1917 03/14/21 1927 09/14/20 1914   INR 1.23* 0.93 1.04          Aleisha Fernandez PA-C      Thank you for allowing me to participate in the care of your  patient.      Sincerely,     PRUDENCE [facility]  cc:   Toña Baltazar MD  [address]  [place] [address]

## 2024-04-12 ENCOUNTER — TELEPHONE (OUTPATIENT)
Dept: INTERNAL MEDICINE | Facility: CLINIC | Age: 84
End: 2024-04-12
Payer: COMMERCIAL

## 2024-04-12 NOTE — TELEPHONE ENCOUNTER
Call patient She is due for an annual wellness visit any time after 4/27 and labs will be due at that time as well. Please assist with scheduling.

## 2024-04-15 ENCOUNTER — TELEPHONE (OUTPATIENT)
Dept: CARDIOLOGY | Facility: CLINIC | Age: 84
End: 2024-04-15

## 2024-04-15 ENCOUNTER — ANCILLARY PROCEDURE (OUTPATIENT)
Dept: CARDIOLOGY | Facility: CLINIC | Age: 84
End: 2024-04-15
Attending: INTERNAL MEDICINE
Payer: COMMERCIAL

## 2024-04-15 DIAGNOSIS — I49.5 SICK SINUS SYNDROME (H): ICD-10-CM

## 2024-04-15 DIAGNOSIS — Z95.0 PACEMAKER: ICD-10-CM

## 2024-04-15 NOTE — TELEPHONE ENCOUNTER
Encounter Type: Alert remote pacemaker transmission for VT detected. Courtesy check.  Device: [employer]onic Gema (D)  Pacing % /Programmed: AP 96%,  99% @ DDDR 70/120 bpm  Lead(s): Stable measurements and trends.  Battery longevity: 7 years, 11 months estimated   Presenting: APVP 72 bpm  Atrial high rates: Since 4/10/2024 none.  Anticoagulant: Eliquis  Ventricular High rates: Since 4/10/2024 1 VHR episode on 4/14/ @ 15:55, EGM appears to be NSVT 19 beats, duration 8 seconds, avg rate 154 bpm. EF 40-45% per echo 9/12/22  Comments: Normal device function.   Plan: Routed to device RN for VHR review. Remote already scheduled for 6/19/2024. Yari, Device Specialist  ADD: Transmission reviewed, see EPIC for details. Saumya Zeng RN    Per Aleisha Fernandez NP progress note 4/10/24:  NSVT: Seen on previous device checks.  Patient has refused further testing in the past and continues to decline.  Device check today showed 28 ventricular high rates with EGM showing 7-21 beats of NSVT.  Patient denies symptoms.    Reviewed transmission with patient who denies symptoms. History of NSVT episodes and declines further testing. Will continue to monitor. Saumya Zeng RN

## 2024-04-18 LAB
MDC_IDC_EPISODE_DTM: NORMAL
MDC_IDC_EPISODE_DURATION: 7 S
MDC_IDC_EPISODE_ID: 705
MDC_IDC_EPISODE_TYPE: NORMAL
MDC_IDC_LEAD_CONNECTION_STATUS: NORMAL
MDC_IDC_LEAD_CONNECTION_STATUS: NORMAL
MDC_IDC_LEAD_IMPLANT_DT: NORMAL
MDC_IDC_LEAD_IMPLANT_DT: NORMAL
MDC_IDC_LEAD_LOCATION: NORMAL
MDC_IDC_LEAD_LOCATION: NORMAL
MDC_IDC_LEAD_LOCATION_DETAIL_1: NORMAL
MDC_IDC_LEAD_LOCATION_DETAIL_1: NORMAL
MDC_IDC_LEAD_MFG: NORMAL
MDC_IDC_LEAD_MFG: NORMAL
MDC_IDC_LEAD_MODEL: NORMAL
MDC_IDC_LEAD_MODEL: NORMAL
MDC_IDC_LEAD_POLARITY_TYPE: NORMAL
MDC_IDC_LEAD_POLARITY_TYPE: NORMAL
MDC_IDC_LEAD_SERIAL: NORMAL
MDC_IDC_LEAD_SERIAL: NORMAL
MDC_IDC_LEAD_SPECIAL_FUNCTION: NORMAL
MDC_IDC_LEAD_SPECIAL_FUNCTION: NORMAL
MDC_IDC_MSMT_BATTERY_DTM: NORMAL
MDC_IDC_MSMT_BATTERY_REMAINING_LONGEVITY: 95 MO
MDC_IDC_MSMT_BATTERY_RRT_TRIGGER: 2.62
MDC_IDC_MSMT_BATTERY_STATUS: NORMAL
MDC_IDC_MSMT_BATTERY_VOLTAGE: 3 V
MDC_IDC_MSMT_LEADCHNL_RA_IMPEDANCE_VALUE: 285 OHM
MDC_IDC_MSMT_LEADCHNL_RA_IMPEDANCE_VALUE: 380 OHM
MDC_IDC_MSMT_LEADCHNL_RA_PACING_THRESHOLD_AMPLITUDE: 0.75 V
MDC_IDC_MSMT_LEADCHNL_RA_PACING_THRESHOLD_PULSEWIDTH: 0.4 MS
MDC_IDC_MSMT_LEADCHNL_RA_SENSING_INTR_AMPL: 1.38 MV
MDC_IDC_MSMT_LEADCHNL_RA_SENSING_INTR_AMPL: 1.38 MV
MDC_IDC_MSMT_LEADCHNL_RV_IMPEDANCE_VALUE: 380 OHM
MDC_IDC_MSMT_LEADCHNL_RV_IMPEDANCE_VALUE: 513 OHM
MDC_IDC_MSMT_LEADCHNL_RV_PACING_THRESHOLD_AMPLITUDE: 0.62 V
MDC_IDC_MSMT_LEADCHNL_RV_PACING_THRESHOLD_PULSEWIDTH: 0.4 MS
MDC_IDC_MSMT_LEADCHNL_RV_SENSING_INTR_AMPL: 17.38 MV
MDC_IDC_MSMT_LEADCHNL_RV_SENSING_INTR_AMPL: 8.88 MV
MDC_IDC_PG_IMPLANT_DTM: NORMAL
MDC_IDC_PG_MFG: NORMAL
MDC_IDC_PG_MODEL: NORMAL
MDC_IDC_PG_SERIAL: NORMAL
MDC_IDC_PG_TYPE: NORMAL
MDC_IDC_SESS_CLINIC_NAME: NORMAL
MDC_IDC_SESS_DTM: NORMAL
MDC_IDC_SESS_TYPE: NORMAL
MDC_IDC_SET_BRADY_AT_MODE_SWITCH_RATE: 171 {BEATS}/MIN
MDC_IDC_SET_BRADY_HYSTRATE: NORMAL
MDC_IDC_SET_BRADY_LOWRATE: 70 {BEATS}/MIN
MDC_IDC_SET_BRADY_MAX_SENSOR_RATE: 120 {BEATS}/MIN
MDC_IDC_SET_BRADY_MAX_TRACKING_RATE: 120 {BEATS}/MIN
MDC_IDC_SET_BRADY_MODE: NORMAL
MDC_IDC_SET_BRADY_PAV_DELAY_LOW: 180 MS
MDC_IDC_SET_BRADY_SAV_DELAY_LOW: 150 MS
MDC_IDC_SET_LEADCHNL_RA_PACING_AMPLITUDE: 1.75 V
MDC_IDC_SET_LEADCHNL_RA_PACING_ANODE_ELECTRODE_1: NORMAL
MDC_IDC_SET_LEADCHNL_RA_PACING_ANODE_LOCATION_1: NORMAL
MDC_IDC_SET_LEADCHNL_RA_PACING_CAPTURE_MODE: NORMAL
MDC_IDC_SET_LEADCHNL_RA_PACING_CATHODE_ELECTRODE_1: NORMAL
MDC_IDC_SET_LEADCHNL_RA_PACING_CATHODE_LOCATION_1: NORMAL
MDC_IDC_SET_LEADCHNL_RA_PACING_POLARITY: NORMAL
MDC_IDC_SET_LEADCHNL_RA_PACING_PULSEWIDTH: 0.4 MS
MDC_IDC_SET_LEADCHNL_RA_SENSING_ANODE_ELECTRODE_1: NORMAL
MDC_IDC_SET_LEADCHNL_RA_SENSING_ANODE_LOCATION_1: NORMAL
MDC_IDC_SET_LEADCHNL_RA_SENSING_CATHODE_ELECTRODE_1: NORMAL
MDC_IDC_SET_LEADCHNL_RA_SENSING_CATHODE_LOCATION_1: NORMAL
MDC_IDC_SET_LEADCHNL_RA_SENSING_POLARITY: NORMAL
MDC_IDC_SET_LEADCHNL_RA_SENSING_SENSITIVITY: 0.3 MV
MDC_IDC_SET_LEADCHNL_RV_PACING_AMPLITUDE: 2 V
MDC_IDC_SET_LEADCHNL_RV_PACING_ANODE_ELECTRODE_1: NORMAL
MDC_IDC_SET_LEADCHNL_RV_PACING_ANODE_LOCATION_1: NORMAL
MDC_IDC_SET_LEADCHNL_RV_PACING_CAPTURE_MODE: NORMAL
MDC_IDC_SET_LEADCHNL_RV_PACING_CATHODE_ELECTRODE_1: NORMAL
MDC_IDC_SET_LEADCHNL_RV_PACING_CATHODE_LOCATION_1: NORMAL
MDC_IDC_SET_LEADCHNL_RV_PACING_POLARITY: NORMAL
MDC_IDC_SET_LEADCHNL_RV_PACING_PULSEWIDTH: 0.4 MS
MDC_IDC_SET_LEADCHNL_RV_SENSING_ANODE_ELECTRODE_1: NORMAL
MDC_IDC_SET_LEADCHNL_RV_SENSING_ANODE_LOCATION_1: NORMAL
MDC_IDC_SET_LEADCHNL_RV_SENSING_CATHODE_ELECTRODE_1: NORMAL
MDC_IDC_SET_LEADCHNL_RV_SENSING_CATHODE_LOCATION_1: NORMAL
MDC_IDC_SET_LEADCHNL_RV_SENSING_POLARITY: NORMAL
MDC_IDC_SET_LEADCHNL_RV_SENSING_SENSITIVITY: 0.9 MV
MDC_IDC_SET_ZONE_DETECTION_INTERVAL: 200 MS
MDC_IDC_SET_ZONE_DETECTION_INTERVAL: 350 MS
MDC_IDC_SET_ZONE_DETECTION_INTERVAL: 430 MS
MDC_IDC_SET_ZONE_STATUS: NORMAL
MDC_IDC_SET_ZONE_TYPE: NORMAL
MDC_IDC_SET_ZONE_VENDOR_TYPE: NORMAL
MDC_IDC_STAT_AT_BURDEN_PERCENT: 0 %
MDC_IDC_STAT_AT_DTM_END: NORMAL
MDC_IDC_STAT_AT_DTM_START: NORMAL
MDC_IDC_STAT_BRADY_AP_VP_PERCENT: 95 %
MDC_IDC_STAT_BRADY_AP_VS_PERCENT: 0.04 %
MDC_IDC_STAT_BRADY_AS_VP_PERCENT: 4.18 %
MDC_IDC_STAT_BRADY_AS_VS_PERCENT: 0.78 %
MDC_IDC_STAT_BRADY_DTM_END: NORMAL
MDC_IDC_STAT_BRADY_DTM_START: NORMAL
MDC_IDC_STAT_BRADY_RA_PERCENT_PACED: 95.51 %
MDC_IDC_STAT_BRADY_RV_PERCENT_PACED: 99.18 %
MDC_IDC_STAT_EPISODE_RECENT_COUNT: 0
MDC_IDC_STAT_EPISODE_RECENT_COUNT: 1
MDC_IDC_STAT_EPISODE_RECENT_COUNT_DTM_END: NORMAL
MDC_IDC_STAT_EPISODE_RECENT_COUNT_DTM_START: NORMAL
MDC_IDC_STAT_EPISODE_TOTAL_COUNT: 0
MDC_IDC_STAT_EPISODE_TOTAL_COUNT: 100
MDC_IDC_STAT_EPISODE_TOTAL_COUNT: 3
MDC_IDC_STAT_EPISODE_TOTAL_COUNT: 466
MDC_IDC_STAT_EPISODE_TOTAL_COUNT: 56
MDC_IDC_STAT_EPISODE_TOTAL_COUNT_DTM_END: NORMAL
MDC_IDC_STAT_EPISODE_TOTAL_COUNT_DTM_START: NORMAL
MDC_IDC_STAT_EPISODE_TYPE: NORMAL
MDC_IDC_STAT_TACHYTHERAPY_RECENT_DTM_END: NORMAL
MDC_IDC_STAT_TACHYTHERAPY_RECENT_DTM_START: NORMAL
MDC_IDC_STAT_TACHYTHERAPY_TOTAL_DTM_END: NORMAL
MDC_IDC_STAT_TACHYTHERAPY_TOTAL_DTM_START: NORMAL

## 2024-05-09 ENCOUNTER — MEDICAL CORRESPONDENCE (OUTPATIENT)
Dept: HEALTH INFORMATION MANAGEMENT | Facility: CLINIC | Age: 84
End: 2024-05-09
Payer: COMMERCIAL

## 2024-06-19 ENCOUNTER — ANCILLARY PROCEDURE (OUTPATIENT)
Dept: CARDIOLOGY | Facility: CLINIC | Age: 84
End: 2024-06-19
Attending: INTERNAL MEDICINE
Payer: COMMERCIAL

## 2024-06-19 DIAGNOSIS — Z95.0 CARDIAC PACEMAKER IN SITU: ICD-10-CM

## 2024-06-19 DIAGNOSIS — I49.5 SINUS NODE DYSFUNCTION (H): ICD-10-CM

## 2024-06-19 LAB
MDC_IDC_EPISODE_DTM: NORMAL
MDC_IDC_EPISODE_DURATION: 0 S
MDC_IDC_EPISODE_DURATION: 1 S
MDC_IDC_EPISODE_DURATION: 2 S
MDC_IDC_EPISODE_ID: 706
MDC_IDC_EPISODE_ID: 707
MDC_IDC_EPISODE_ID: 708
MDC_IDC_EPISODE_TYPE: NORMAL
MDC_IDC_LEAD_CONNECTION_STATUS: NORMAL
MDC_IDC_LEAD_CONNECTION_STATUS: NORMAL
MDC_IDC_LEAD_IMPLANT_DT: NORMAL
MDC_IDC_LEAD_IMPLANT_DT: NORMAL
MDC_IDC_LEAD_LOCATION: NORMAL
MDC_IDC_LEAD_LOCATION: NORMAL
MDC_IDC_LEAD_LOCATION_DETAIL_1: NORMAL
MDC_IDC_LEAD_LOCATION_DETAIL_1: NORMAL
MDC_IDC_LEAD_MFG: NORMAL
MDC_IDC_LEAD_MFG: NORMAL
MDC_IDC_LEAD_MODEL: NORMAL
MDC_IDC_LEAD_MODEL: NORMAL
MDC_IDC_LEAD_POLARITY_TYPE: NORMAL
MDC_IDC_LEAD_POLARITY_TYPE: NORMAL
MDC_IDC_LEAD_SERIAL: NORMAL
MDC_IDC_LEAD_SERIAL: NORMAL
MDC_IDC_LEAD_SPECIAL_FUNCTION: NORMAL
MDC_IDC_LEAD_SPECIAL_FUNCTION: NORMAL
MDC_IDC_MSMT_BATTERY_DTM: NORMAL
MDC_IDC_MSMT_BATTERY_REMAINING_LONGEVITY: 96 MO
MDC_IDC_MSMT_BATTERY_RRT_TRIGGER: 2.62
MDC_IDC_MSMT_BATTERY_STATUS: NORMAL
MDC_IDC_MSMT_BATTERY_VOLTAGE: 3 V
MDC_IDC_MSMT_LEADCHNL_RA_IMPEDANCE_VALUE: 285 OHM
MDC_IDC_MSMT_LEADCHNL_RA_IMPEDANCE_VALUE: 380 OHM
MDC_IDC_MSMT_LEADCHNL_RA_PACING_THRESHOLD_AMPLITUDE: 0.62 V
MDC_IDC_MSMT_LEADCHNL_RA_PACING_THRESHOLD_PULSEWIDTH: 0.4 MS
MDC_IDC_MSMT_LEADCHNL_RA_SENSING_INTR_AMPL: 2 MV
MDC_IDC_MSMT_LEADCHNL_RA_SENSING_INTR_AMPL: 2 MV
MDC_IDC_MSMT_LEADCHNL_RV_IMPEDANCE_VALUE: 380 OHM
MDC_IDC_MSMT_LEADCHNL_RV_IMPEDANCE_VALUE: 513 OHM
MDC_IDC_MSMT_LEADCHNL_RV_PACING_THRESHOLD_AMPLITUDE: 0.62 V
MDC_IDC_MSMT_LEADCHNL_RV_PACING_THRESHOLD_PULSEWIDTH: 0.4 MS
MDC_IDC_MSMT_LEADCHNL_RV_SENSING_INTR_AMPL: 20.62 MV
MDC_IDC_MSMT_LEADCHNL_RV_SENSING_INTR_AMPL: 20.62 MV
MDC_IDC_PG_IMPLANT_DTM: NORMAL
MDC_IDC_PG_MFG: NORMAL
MDC_IDC_PG_MODEL: NORMAL
MDC_IDC_PG_SERIAL: NORMAL
MDC_IDC_PG_TYPE: NORMAL
MDC_IDC_SESS_CLINIC_NAME: NORMAL
MDC_IDC_SESS_DTM: NORMAL
MDC_IDC_SESS_TYPE: NORMAL
MDC_IDC_SET_BRADY_AT_MODE_SWITCH_RATE: 171 {BEATS}/MIN
MDC_IDC_SET_BRADY_HYSTRATE: NORMAL
MDC_IDC_SET_BRADY_LOWRATE: 70 {BEATS}/MIN
MDC_IDC_SET_BRADY_MAX_SENSOR_RATE: 120 {BEATS}/MIN
MDC_IDC_SET_BRADY_MAX_TRACKING_RATE: 120 {BEATS}/MIN
MDC_IDC_SET_BRADY_MODE: NORMAL
MDC_IDC_SET_BRADY_PAV_DELAY_LOW: 180 MS
MDC_IDC_SET_BRADY_SAV_DELAY_LOW: 150 MS
MDC_IDC_SET_LEADCHNL_RA_PACING_AMPLITUDE: 1.5 V
MDC_IDC_SET_LEADCHNL_RA_PACING_ANODE_ELECTRODE_1: NORMAL
MDC_IDC_SET_LEADCHNL_RA_PACING_ANODE_LOCATION_1: NORMAL
MDC_IDC_SET_LEADCHNL_RA_PACING_CAPTURE_MODE: NORMAL
MDC_IDC_SET_LEADCHNL_RA_PACING_CATHODE_ELECTRODE_1: NORMAL
MDC_IDC_SET_LEADCHNL_RA_PACING_CATHODE_LOCATION_1: NORMAL
MDC_IDC_SET_LEADCHNL_RA_PACING_POLARITY: NORMAL
MDC_IDC_SET_LEADCHNL_RA_PACING_PULSEWIDTH: 0.4 MS
MDC_IDC_SET_LEADCHNL_RA_SENSING_ANODE_ELECTRODE_1: NORMAL
MDC_IDC_SET_LEADCHNL_RA_SENSING_ANODE_LOCATION_1: NORMAL
MDC_IDC_SET_LEADCHNL_RA_SENSING_CATHODE_ELECTRODE_1: NORMAL
MDC_IDC_SET_LEADCHNL_RA_SENSING_CATHODE_LOCATION_1: NORMAL
MDC_IDC_SET_LEADCHNL_RA_SENSING_POLARITY: NORMAL
MDC_IDC_SET_LEADCHNL_RA_SENSING_SENSITIVITY: 0.3 MV
MDC_IDC_SET_LEADCHNL_RV_PACING_AMPLITUDE: 2 V
MDC_IDC_SET_LEADCHNL_RV_PACING_ANODE_ELECTRODE_1: NORMAL
MDC_IDC_SET_LEADCHNL_RV_PACING_ANODE_LOCATION_1: NORMAL
MDC_IDC_SET_LEADCHNL_RV_PACING_CAPTURE_MODE: NORMAL
MDC_IDC_SET_LEADCHNL_RV_PACING_CATHODE_ELECTRODE_1: NORMAL
MDC_IDC_SET_LEADCHNL_RV_PACING_CATHODE_LOCATION_1: NORMAL
MDC_IDC_SET_LEADCHNL_RV_PACING_POLARITY: NORMAL
MDC_IDC_SET_LEADCHNL_RV_PACING_PULSEWIDTH: 0.4 MS
MDC_IDC_SET_LEADCHNL_RV_SENSING_ANODE_ELECTRODE_1: NORMAL
MDC_IDC_SET_LEADCHNL_RV_SENSING_ANODE_LOCATION_1: NORMAL
MDC_IDC_SET_LEADCHNL_RV_SENSING_CATHODE_ELECTRODE_1: NORMAL
MDC_IDC_SET_LEADCHNL_RV_SENSING_CATHODE_LOCATION_1: NORMAL
MDC_IDC_SET_LEADCHNL_RV_SENSING_POLARITY: NORMAL
MDC_IDC_SET_LEADCHNL_RV_SENSING_SENSITIVITY: 0.9 MV
MDC_IDC_SET_ZONE_DETECTION_INTERVAL: 200 MS
MDC_IDC_SET_ZONE_DETECTION_INTERVAL: 350 MS
MDC_IDC_SET_ZONE_DETECTION_INTERVAL: 430 MS
MDC_IDC_SET_ZONE_STATUS: NORMAL
MDC_IDC_SET_ZONE_TYPE: NORMAL
MDC_IDC_SET_ZONE_VENDOR_TYPE: NORMAL
MDC_IDC_STAT_AT_BURDEN_PERCENT: 0 %
MDC_IDC_STAT_AT_DTM_END: NORMAL
MDC_IDC_STAT_AT_DTM_START: NORMAL
MDC_IDC_STAT_BRADY_AP_VP_PERCENT: 87.12 %
MDC_IDC_STAT_BRADY_AP_VS_PERCENT: 0.3 %
MDC_IDC_STAT_BRADY_AS_VP_PERCENT: 11 %
MDC_IDC_STAT_BRADY_AS_VS_PERCENT: 1.58 %
MDC_IDC_STAT_BRADY_DTM_END: NORMAL
MDC_IDC_STAT_BRADY_DTM_START: NORMAL
MDC_IDC_STAT_BRADY_RA_PERCENT_PACED: 88.39 %
MDC_IDC_STAT_BRADY_RV_PERCENT_PACED: 98.12 %
MDC_IDC_STAT_EPISODE_RECENT_COUNT: 0
MDC_IDC_STAT_EPISODE_RECENT_COUNT: 3
MDC_IDC_STAT_EPISODE_RECENT_COUNT_DTM_END: NORMAL
MDC_IDC_STAT_EPISODE_RECENT_COUNT_DTM_START: NORMAL
MDC_IDC_STAT_EPISODE_TOTAL_COUNT: 0
MDC_IDC_STAT_EPISODE_TOTAL_COUNT: 100
MDC_IDC_STAT_EPISODE_TOTAL_COUNT: 3
MDC_IDC_STAT_EPISODE_TOTAL_COUNT: 466
MDC_IDC_STAT_EPISODE_TOTAL_COUNT: 59
MDC_IDC_STAT_EPISODE_TOTAL_COUNT_DTM_END: NORMAL
MDC_IDC_STAT_EPISODE_TOTAL_COUNT_DTM_START: NORMAL
MDC_IDC_STAT_EPISODE_TYPE: NORMAL
MDC_IDC_STAT_TACHYTHERAPY_RECENT_DTM_END: NORMAL
MDC_IDC_STAT_TACHYTHERAPY_RECENT_DTM_START: NORMAL
MDC_IDC_STAT_TACHYTHERAPY_TOTAL_DTM_END: NORMAL
MDC_IDC_STAT_TACHYTHERAPY_TOTAL_DTM_START: NORMAL

## 2024-06-19 PROCEDURE — 93296 REM INTERROG EVL PM/IDS: CPT | Performed by: INTERNAL MEDICINE

## 2024-06-19 PROCEDURE — 93294 REM INTERROG EVL PM/LDLS PM: CPT | Performed by: INTERNAL MEDICINE

## 2024-10-03 ENCOUNTER — ANCILLARY PROCEDURE (OUTPATIENT)
Dept: CARDIOLOGY | Facility: CLINIC | Age: 84
End: 2024-10-03
Attending: INTERNAL MEDICINE
Payer: COMMERCIAL

## 2024-10-03 DIAGNOSIS — Z95.0 PACEMAKER: ICD-10-CM

## 2024-10-03 DIAGNOSIS — I49.5 SICK SINUS SYNDROME (H): ICD-10-CM

## 2024-10-07 LAB
MDC_IDC_EPISODE_DTM: NORMAL
MDC_IDC_EPISODE_DURATION: 1 S
MDC_IDC_EPISODE_ID: 709
MDC_IDC_EPISODE_ID: 710
MDC_IDC_EPISODE_ID: 711
MDC_IDC_EPISODE_TYPE: NORMAL
MDC_IDC_LEAD_CONNECTION_STATUS: NORMAL
MDC_IDC_LEAD_CONNECTION_STATUS: NORMAL
MDC_IDC_LEAD_IMPLANT_DT: NORMAL
MDC_IDC_LEAD_IMPLANT_DT: NORMAL
MDC_IDC_LEAD_LOCATION: NORMAL
MDC_IDC_LEAD_LOCATION: NORMAL
MDC_IDC_LEAD_LOCATION_DETAIL_1: NORMAL
MDC_IDC_LEAD_LOCATION_DETAIL_1: NORMAL
MDC_IDC_LEAD_MFG: NORMAL
MDC_IDC_LEAD_MFG: NORMAL
MDC_IDC_LEAD_MODEL: NORMAL
MDC_IDC_LEAD_MODEL: NORMAL
MDC_IDC_LEAD_POLARITY_TYPE: NORMAL
MDC_IDC_LEAD_POLARITY_TYPE: NORMAL
MDC_IDC_LEAD_SERIAL: NORMAL
MDC_IDC_LEAD_SERIAL: NORMAL
MDC_IDC_LEAD_SPECIAL_FUNCTION: NORMAL
MDC_IDC_LEAD_SPECIAL_FUNCTION: NORMAL
MDC_IDC_MSMT_BATTERY_DTM: NORMAL
MDC_IDC_MSMT_BATTERY_REMAINING_LONGEVITY: 95 MO
MDC_IDC_MSMT_BATTERY_RRT_TRIGGER: 2.62
MDC_IDC_MSMT_BATTERY_STATUS: NORMAL
MDC_IDC_MSMT_BATTERY_VOLTAGE: 3 V
MDC_IDC_MSMT_LEADCHNL_RA_IMPEDANCE_VALUE: 323 OHM
MDC_IDC_MSMT_LEADCHNL_RA_IMPEDANCE_VALUE: 399 OHM
MDC_IDC_MSMT_LEADCHNL_RA_PACING_THRESHOLD_AMPLITUDE: 0.75 V
MDC_IDC_MSMT_LEADCHNL_RA_PACING_THRESHOLD_PULSEWIDTH: 0.4 MS
MDC_IDC_MSMT_LEADCHNL_RA_SENSING_INTR_AMPL: 2.12 MV
MDC_IDC_MSMT_LEADCHNL_RA_SENSING_INTR_AMPL: 2.12 MV
MDC_IDC_MSMT_LEADCHNL_RV_IMPEDANCE_VALUE: 418 OHM
MDC_IDC_MSMT_LEADCHNL_RV_IMPEDANCE_VALUE: 551 OHM
MDC_IDC_MSMT_LEADCHNL_RV_PACING_THRESHOLD_AMPLITUDE: 0.62 V
MDC_IDC_MSMT_LEADCHNL_RV_PACING_THRESHOLD_PULSEWIDTH: 0.4 MS
MDC_IDC_MSMT_LEADCHNL_RV_SENSING_INTR_AMPL: 20.62 MV
MDC_IDC_MSMT_LEADCHNL_RV_SENSING_INTR_AMPL: 20.62 MV
MDC_IDC_PG_IMPLANT_DTM: NORMAL
MDC_IDC_PG_MFG: NORMAL
MDC_IDC_PG_MODEL: NORMAL
MDC_IDC_PG_SERIAL: NORMAL
MDC_IDC_PG_TYPE: NORMAL
MDC_IDC_SESS_CLINIC_NAME: NORMAL
MDC_IDC_SESS_DTM: NORMAL
MDC_IDC_SESS_TYPE: NORMAL
MDC_IDC_SET_BRADY_AT_MODE_SWITCH_RATE: 171 {BEATS}/MIN
MDC_IDC_SET_BRADY_HYSTRATE: NORMAL
MDC_IDC_SET_BRADY_LOWRATE: 70 {BEATS}/MIN
MDC_IDC_SET_BRADY_MAX_SENSOR_RATE: 120 {BEATS}/MIN
MDC_IDC_SET_BRADY_MAX_TRACKING_RATE: 120 {BEATS}/MIN
MDC_IDC_SET_BRADY_MODE: NORMAL
MDC_IDC_SET_BRADY_PAV_DELAY_LOW: 180 MS
MDC_IDC_SET_BRADY_SAV_DELAY_LOW: 150 MS
MDC_IDC_SET_LEADCHNL_RA_PACING_AMPLITUDE: 1.5 V
MDC_IDC_SET_LEADCHNL_RA_PACING_ANODE_ELECTRODE_1: NORMAL
MDC_IDC_SET_LEADCHNL_RA_PACING_ANODE_LOCATION_1: NORMAL
MDC_IDC_SET_LEADCHNL_RA_PACING_CAPTURE_MODE: NORMAL
MDC_IDC_SET_LEADCHNL_RA_PACING_CATHODE_ELECTRODE_1: NORMAL
MDC_IDC_SET_LEADCHNL_RA_PACING_CATHODE_LOCATION_1: NORMAL
MDC_IDC_SET_LEADCHNL_RA_PACING_POLARITY: NORMAL
MDC_IDC_SET_LEADCHNL_RA_PACING_PULSEWIDTH: 0.4 MS
MDC_IDC_SET_LEADCHNL_RA_SENSING_ANODE_ELECTRODE_1: NORMAL
MDC_IDC_SET_LEADCHNL_RA_SENSING_ANODE_LOCATION_1: NORMAL
MDC_IDC_SET_LEADCHNL_RA_SENSING_CATHODE_ELECTRODE_1: NORMAL
MDC_IDC_SET_LEADCHNL_RA_SENSING_CATHODE_LOCATION_1: NORMAL
MDC_IDC_SET_LEADCHNL_RA_SENSING_POLARITY: NORMAL
MDC_IDC_SET_LEADCHNL_RA_SENSING_SENSITIVITY: 0.3 MV
MDC_IDC_SET_LEADCHNL_RV_PACING_AMPLITUDE: 2 V
MDC_IDC_SET_LEADCHNL_RV_PACING_ANODE_ELECTRODE_1: NORMAL
MDC_IDC_SET_LEADCHNL_RV_PACING_ANODE_LOCATION_1: NORMAL
MDC_IDC_SET_LEADCHNL_RV_PACING_CAPTURE_MODE: NORMAL
MDC_IDC_SET_LEADCHNL_RV_PACING_CATHODE_ELECTRODE_1: NORMAL
MDC_IDC_SET_LEADCHNL_RV_PACING_CATHODE_LOCATION_1: NORMAL
MDC_IDC_SET_LEADCHNL_RV_PACING_POLARITY: NORMAL
MDC_IDC_SET_LEADCHNL_RV_PACING_PULSEWIDTH: 0.4 MS
MDC_IDC_SET_LEADCHNL_RV_SENSING_ANODE_ELECTRODE_1: NORMAL
MDC_IDC_SET_LEADCHNL_RV_SENSING_ANODE_LOCATION_1: NORMAL
MDC_IDC_SET_LEADCHNL_RV_SENSING_CATHODE_ELECTRODE_1: NORMAL
MDC_IDC_SET_LEADCHNL_RV_SENSING_CATHODE_LOCATION_1: NORMAL
MDC_IDC_SET_LEADCHNL_RV_SENSING_POLARITY: NORMAL
MDC_IDC_SET_LEADCHNL_RV_SENSING_SENSITIVITY: 0.9 MV
MDC_IDC_SET_ZONE_DETECTION_INTERVAL: 200 MS
MDC_IDC_SET_ZONE_DETECTION_INTERVAL: 350 MS
MDC_IDC_SET_ZONE_DETECTION_INTERVAL: 430 MS
MDC_IDC_SET_ZONE_STATUS: NORMAL
MDC_IDC_SET_ZONE_TYPE: NORMAL
MDC_IDC_SET_ZONE_VENDOR_TYPE: NORMAL
MDC_IDC_STAT_AT_BURDEN_PERCENT: 0 %
MDC_IDC_STAT_AT_DTM_END: NORMAL
MDC_IDC_STAT_AT_DTM_START: NORMAL
MDC_IDC_STAT_BRADY_AP_VP_PERCENT: 87.43 %
MDC_IDC_STAT_BRADY_AP_VS_PERCENT: 0.03 %
MDC_IDC_STAT_BRADY_AS_VP_PERCENT: 11.77 %
MDC_IDC_STAT_BRADY_AS_VS_PERCENT: 0.77 %
MDC_IDC_STAT_BRADY_DTM_END: NORMAL
MDC_IDC_STAT_BRADY_DTM_START: NORMAL
MDC_IDC_STAT_BRADY_RA_PERCENT_PACED: 87.92 %
MDC_IDC_STAT_BRADY_RV_PERCENT_PACED: 99.2 %
MDC_IDC_STAT_EPISODE_RECENT_COUNT: 0
MDC_IDC_STAT_EPISODE_RECENT_COUNT: 3
MDC_IDC_STAT_EPISODE_RECENT_COUNT_DTM_END: NORMAL
MDC_IDC_STAT_EPISODE_RECENT_COUNT_DTM_START: NORMAL
MDC_IDC_STAT_EPISODE_TOTAL_COUNT: 0
MDC_IDC_STAT_EPISODE_TOTAL_COUNT: 100
MDC_IDC_STAT_EPISODE_TOTAL_COUNT: 3
MDC_IDC_STAT_EPISODE_TOTAL_COUNT: 466
MDC_IDC_STAT_EPISODE_TOTAL_COUNT: 62
MDC_IDC_STAT_EPISODE_TOTAL_COUNT_DTM_END: NORMAL
MDC_IDC_STAT_EPISODE_TOTAL_COUNT_DTM_START: NORMAL
MDC_IDC_STAT_EPISODE_TYPE: NORMAL
MDC_IDC_STAT_TACHYTHERAPY_RECENT_DTM_END: NORMAL
MDC_IDC_STAT_TACHYTHERAPY_RECENT_DTM_START: NORMAL
MDC_IDC_STAT_TACHYTHERAPY_TOTAL_DTM_END: NORMAL
MDC_IDC_STAT_TACHYTHERAPY_TOTAL_DTM_START: NORMAL

## 2024-10-07 PROCEDURE — 93296 REM INTERROG EVL PM/IDS: CPT | Performed by: INTERNAL MEDICINE

## 2024-10-07 PROCEDURE — 93294 REM INTERROG EVL PM/LDLS PM: CPT | Performed by: INTERNAL MEDICINE

## 2024-11-13 ENCOUNTER — TELEPHONE (OUTPATIENT)
Dept: INTERNAL MEDICINE | Facility: CLINIC | Age: 84
End: 2024-11-13
Payer: COMMERCIAL

## 2024-12-22 ENCOUNTER — APPOINTMENT (OUTPATIENT)
Dept: RADIOLOGY | Facility: HOSPITAL | Age: 84
End: 2024-12-22
Attending: PHYSICIAN ASSISTANT
Payer: COMMERCIAL

## 2024-12-22 ENCOUNTER — HOSPITAL ENCOUNTER (EMERGENCY)
Facility: HOSPITAL | Age: 84
Discharge: HOME | End: 2024-12-22
Attending: EMERGENCY MEDICINE
Payer: COMMERCIAL

## 2024-12-22 ENCOUNTER — APPOINTMENT (OUTPATIENT)
Dept: CT IMAGING | Facility: HOSPITAL | Age: 84
End: 2024-12-22
Attending: PHYSICIAN ASSISTANT
Payer: COMMERCIAL

## 2024-12-22 VITALS
HEART RATE: 97 BPM | OXYGEN SATURATION: 96 % | SYSTOLIC BLOOD PRESSURE: 123 MMHG | DIASTOLIC BLOOD PRESSURE: 90 MMHG | TEMPERATURE: 98.1 F | RESPIRATION RATE: 29 BRPM
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
## 2024-12-22 DIAGNOSIS — Z79.01 ANTICOAGULATED: ICD-10-CM

## 2024-12-22 LAB
ALBUMIN UR-MCNC: 20 MG/DL
ANION GAP SERPL CALCULATED.3IONS-SCNC: 13 MMOL/L (ref 7–15)
APPEARANCE UR: CLEAR
BACTERIA #/AREA URNS HPF: ABNORMAL /HPF
BASOPHILS # BLD AUTO: 0 10E3/UL (ref 0–0.2)
BASOPHILS NFR BLD AUTO: 0 %
BILIRUB UR QL STRIP: NEGATIVE
BUN SERPL-MCNC: 23.5 MG/DL (ref 8–23)
CALCIUM SERPL-MCNC: 8.7 MG/DL (ref 8.8–10.4)
CHLORIDE SERPL-SCNC: 103 MMOL/L (ref 98–107)
COLOR UR AUTO: YELLOW
CREAT SERPL-MCNC: 1.32 MG/DL (ref 0.51–0.95)
EGFRCR SERPLBLD CKD-EPI 2021: 40 ML/MIN/1.73M2
EOSINOPHIL # BLD AUTO: 0.2 10E3/UL (ref 0–0.7)
EOSINOPHIL NFR BLD AUTO: 3 %
ERYTHROCYTE [DISTWIDTH] IN BLOOD BY AUTOMATED COUNT: 12.7 % (ref 10–15)
GLUCOSE SERPL-MCNC: 131 MG/DL (ref 70–99)
GLUCOSE UR STRIP-MCNC: NEGATIVE MG/DL
HCO3 SERPL-SCNC: 22 MMOL/L (ref 22–29)
HCT VFR BLD AUTO: 38.9 % (ref 35–47)
HGB BLD-MCNC: 13.4 G/DL (ref 11.7–15.7)
HGB UR QL STRIP: ABNORMAL
IMM GRANULOCYTES # BLD: 0 10E3/UL
IMM GRANULOCYTES NFR BLD: 0 %
KETONES UR STRIP-MCNC: 20 MG/DL
LEUKOCYTE ESTERASE UR QL STRIP: NEGATIVE
LYMPHOCYTES # BLD AUTO: 1.1 10E3/UL (ref 0.8–5.3)
LYMPHOCYTES NFR BLD AUTO: 17 %
MCH RBC QN AUTO: 33.6 PG (ref 26.5–33)
MCHC RBC AUTO-ENTMCNC: 34.4 G/DL (ref 31.5–36.5)
MCV RBC AUTO: 98 FL (ref 78–100)
MONOCYTES # BLD AUTO: 0.7 10E3/UL (ref 0–1.3)
MONOCYTES NFR BLD AUTO: 12 %
MUCOUS THREADS #/AREA URNS LPF: PRESENT /LPF
NEUTROPHILS # BLD AUTO: 4.4 10E3/UL (ref 1.6–8.3)
NEUTROPHILS NFR BLD AUTO: 69 %
NITRATE UR QL: NEGATIVE
NRBC # BLD AUTO: 0 10E3/UL
NRBC BLD AUTO-RTO: 0 /100
PH UR STRIP: 5.5 [PH] (ref 5–7)
PLATELET # BLD AUTO: 158 10E3/UL (ref 150–450)
POTASSIUM SERPL-SCNC: 4.3 MMOL/L (ref 3.4–5.3)
RBC # BLD AUTO: 3.99 10E6/UL (ref 3.8–5.2)
RBC URINE: 27 /HPF
SODIUM SERPL-SCNC: 138 MMOL/L (ref 135–145)
SP GR UR STRIP: 1.02 (ref 1–1.03)
UROBILINOGEN UR STRIP-MCNC: <2 MG/DL
WBC # BLD AUTO: 6.4 10E3/UL (ref 4–11)
WBC URINE: 1 /HPF

## 2024-12-22 PROCEDURE — 93005 ELECTROCARDIOGRAM TRACING: CPT | Performed by: PHYSICIAN ASSISTANT

## 2024-12-22 PROCEDURE — 36415 COLL VENOUS BLD VENIPUNCTURE: CPT | Performed by: PHYSICIAN ASSISTANT

## 2024-12-22 PROCEDURE — 85025 COMPLETE CBC W/AUTO DIFF WBC: CPT | Performed by: PHYSICIAN ASSISTANT

## 2024-12-22 PROCEDURE — 81003 URINALYSIS AUTO W/O SCOPE: CPT | Performed by: PHYSICIAN ASSISTANT

## 2024-12-22 PROCEDURE — 99285 EMERGENCY DEPT VISIT HI MDM: CPT | Mod: 25

## 2024-12-22 PROCEDURE — 72125 CT NECK SPINE W/O DYE: CPT

## 2024-12-22 PROCEDURE — 80048 BASIC METABOLIC PNL TOTAL CA: CPT | Performed by: PHYSICIAN ASSISTANT

## 2024-12-22 PROCEDURE — 73502 X-RAY EXAM HIP UNI 2-3 VIEWS: CPT

## 2024-12-22 PROCEDURE — 70450 CT HEAD/BRAIN W/O DYE: CPT

## 2024-12-22 ASSESSMENT — ACTIVITIES OF DAILY LIVING (ADL)
ADLS_ACUITY_SCORE: 55

## 2024-12-22 ASSESSMENT — COLUMBIA-SUICIDE SEVERITY RATING SCALE - C-SSRS
1. IN THE PAST MONTH, HAVE YOU WISHED YOU WERE DEAD OR WISHED YOU COULD GO TO SLEEP AND NOT WAKE UP?: NO
6. HAVE YOU EVER DONE ANYTHING, STARTED TO DO ANYTHING, OR PREPARED TO DO ANYTHING TO END YOUR LIFE?: NO
2. HAVE YOU ACTUALLY HAD ANY THOUGHTS OF KILLING YOURSELF IN THE PAST MONTH?: NO

## 2024-12-22 NOTE — ED PROVIDER NOTES
EMERGENCY DEPARTMENT ENCOUNTER      NAME: Laney Hahn  AGE: 84 year old female  YOB: 1940  MRN: [id number]  EVALUATION DATE & TIME: 12/22/2024  1:05 PM    PCP: Rose Mcelroy    ED PROVIDER: Barrera James PA-C      Chief Complaint   Patient presents with    Fall       FINAL IMPRESSION:  1. Fall, initial encounter    2. Anticoagulated        ED COURSE & MEDICAL DECISION MAKING:    Pertinent Labs & Imaging studies reviewed. (See chart for details)  1:14 PM I met the patient and performed my initial interview and exam. Staffed with Dr. Arceo  2:34 PM Patient updated.   4:23 PM Patient updated.     84 year old female presents to the Emergency Department for evaluation of unwitnessed fall, on thinners.     ED Course as of 12/22/24 1624   Sun Dec 22, 2024   1312 Patient is an 84-year-old female, past medical history of anticoagulation, SVT, chronic kidney disease, DNR, bradycardia, who presents emergency department for evaluation of mechanical fall, unwitnessed.  Patient was in the bathroom, found by brother who was in the apartment, did call for assistance and staff responded, patient does not recall the fall.  Is not sure if she hit her head.  She is demented here in the emergency department, is oriented to self, however not to date.  This appears to be baseline given her history of dementia.  She is on anticoagulation secondary to paroxysmal A-fib.  Given unwitnessed fall, will obtain CT of the head, CT C-spine, patient with mild tenderness of the left hip, will additionally obtain x-ray here.  She otherwise is unremarkable, does not be belly pain, no midline cervical, thoracic, or lumbar tenderness.  No tenderness over the rib cage.  Respirations here are normal.  She does not have any abdominal pain.  No pain or bruising throughout the rest of the abdomen, or lower extremities.  Will obtain imaging here of the head, C-spine, x-ray of the hip, and likely discharge.   1359 Head CT w/o  contrast  CT of the head shows no acute abnormalities, CT C-spine shows no acute abnormalities.   1359   Family here reporting fatigue, generalized weakness, will obtain UA here.   1412 X-ray of the pelvis here does not show acute abnormalities.   1433     Patient updated on imaging results here in the emergency department, pending urinalysis.  Otherwise well-appearing here.   1453     BMP appears to be at patient's baseline, no acute abnormalities.   1615   Patient with minimal bacteria here in the emergency department, some blood which I suspect is likely secondary to catheter sample.  Negative for leukocyte esterase and nitrites.  At this point would not treat for UTI.   1624     Patient seen and reevaluated, updated on laboratory results in the emergency department here, no evidence of acute changes or signs of infection.  Patient otherwise at neurologic baseline and has no other significant changes.  Patient will be discharged at this time.  She is agreeable with this plan.       Medical Decision Making  Obtained supplemental history:Supplemental history obtained?: Documented in chart  Reviewed external records: External records reviewed?: Outpatient Record: Outpatient cardiology visit on 10/3/2024, outpatient telephone call on 11/13/2024  Care impacted by chronic illness:Other: Vascular dementia, anticoagulation, pacemaker  Did you consider but not order tests?: Work up considered but not performed and documented in chart, if applicable  Did you interpret images independently?: Independent interpretation of ECG and images noted in documentation, when applicable.  Consultation discussion with other provider:Did you involve another provider (consultant, , pharmacy, etc.)?: No  Discharge. No recommendations on prescription strength medication(s). N/A.    MIPS: Not Applicable      At the conclusion of the encounter I discussed the results of all of the tests and the disposition. The questions were answered. The  patient or family acknowledged understanding and was agreeable with the care plan.       0 minutes of critical care time     MEDICATIONS GIVEN IN THE EMERGENCY:  Medications - No data to display    NEW PRESCRIPTIONS STARTED AT TODAY'S ER VISIT  New Prescriptions    No medications on file          =================================================================    HPI    Patient information was obtained from: Patient, EMS    Use of : N/A        Laney Hahn is a 84 year old female with a pertinent history of vascular dementia, anticoagulation, pacemaker who presents to this ED  for evaluation of unwitnessed fall.  Patient pleasantly demented here on arrival, is unable to recall if she hit her head or not.  Reportedly family member was in the apartment, heard her fall, was alert when they found her, was able to help her up and EMS was called.  She does note a little bit of left-sided hip pain, however no shortening or rotation.  Otherwise denies any acute complaints.  Is oriented to self, however not to year or place.    PAST MEDICAL HISTORY:  Past Medical History:   Diagnosis Date    COVID-19 09/14/2020    positive test at 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 Nini Rasmussen    Metabolic encephalopathy     Moderate aortic valve stenosis 08/22/2017    stable on 2020 echo    Nonobstructive atherosclerosis of coronary artery 09/15/2020    with normal LVEDP    Paroxysmal SVT (supraventricular tachycardia) (H) 09/07/2017    said to have short episodes while hospitalized for UTI per Dr. Rhys Mensah    Syncope 08/22/2017    UTI (urinary tract infection) 08/21/2017    hospitalized with weakness       PAST SURGICAL HISTORY:  Past Surgical History:   Procedure Laterality Date    CATARACT  EXTRACTION, BILATERAL      CV CORONARY ANGIOGRAM N/A 9/15/2020    Procedure: Coronary Angiogram;  Surgeon: Radhika Santa MD;  Location: [facility] Cath Lab;  Service: Cardiology    CV LEFT HEART CATHETERIZATION WITHOUT LEFT VENTRICULOGRAM Left 9/15/2020    Procedure: Left Heart Catheterization Without Left Ventriculogram;  Surgeon: Radhika Santa MD;  Location: [facility] Cath Lab;  Service: Cardiology    EP PACEMAKER INSERT N/A 4/13/2021    Procedure: EP Pacemaker Insertion;  Surgeon: Frederic Burgos MD;  Location: [facility] Cardiac Cath Lab;  Service: Cardiology    HYSTERECTOMY      PARTIAL GASTRECTOMY  1969    for ulcers    TONSILLECTOMY             CURRENT MEDICATIONS:    acetaminophen (TYLENOL) 500 MG tablet  apixaban ANTICOAGULANT (ELIQUIS ANTICOAGULANT) 2.5 MG tablet  atorvastatin (LIPITOR) 10 MG tablet  furosemide (LASIX) 20 MG tablet  metoprolol succinate ER (TOPROL XL) 50 MG 24 hr tablet         ALLERGIES:  Allergies   Allergen Reactions    Sulfa Antibiotics Shortness Of Breath       FAMILY HISTORY:  Family History   Adopted: Yes       SOCIAL HISTORY:   Social History     Socioeconomic History    Marital status: Single    Number of children: 0   Tobacco Use    Smoking status: Never    Smokeless tobacco: Never   Vaping Use    Vaping status: Never Used   Substance and Sexual Activity    Alcohol use: No    Drug use: No   Social History Narrative    Her adopted brother, Jeff, lives with her. He is five years younger than her.       VITALS:  BP (!) 140/65   Pulse 96   Temp 98.1  F (36.7  C) (Oral)   Resp 19   SpO2 96%     PHYSICAL EXAM    Physical Exam  Vitals and nursing note reviewed.   Constitutional:       General: She is not in acute distress.     Appearance: Normal appearance. She is normal weight. She is not toxic-appearing or diaphoretic.   HENT:      Head: Normocephalic.      Right Ear: External ear normal.      Left Ear: External ear normal.   Neck:      Comments: No midline  cervical, thoracic, or lumbar tenderness, crepitus or deformity.  Cardiovascular:      Rate and Rhythm: Normal rate and regular rhythm.      Heart sounds: Normal heart sounds. No murmur heard.     No friction rub. No gallop.   Pulmonary:      Effort: Pulmonary effort is normal. No respiratory distress.      Breath sounds: No wheezing.   Abdominal:      General: Abdomen is flat. Bowel sounds are normal. There is no distension.      Palpations: Abdomen is soft.      Tenderness: There is no abdominal tenderness. There is no right CVA tenderness, left CVA tenderness, guarding or rebound.   Musculoskeletal:         General: Tenderness present.      Cervical back: No tenderness.      Comments: Tenderness to palpation to the left-sided hip without crepitus, deformity or shortening.   Skin:     General: Skin is warm.   Neurological:      Mental Status: She is alert. Mental status is at baseline.      Motor: No weakness.      Comments: Patient pleasantly demented, confused to date, and location, however oriented to self.           LAB:  All pertinent labs reviewed and interpreted.  Labs Ordered and Resulted from Time of ED Arrival to Time of ED Departure   ROUTINE UA WITH MICROSCOPIC REFLEX TO CULTURE - Abnormal       Result Value    Color Urine Yellow      Appearance Urine Clear      Glucose Urine Negative      Bilirubin Urine Negative      Ketones Urine 20 (*)     Specific Gravity Urine 1.021      Blood Urine 0.5 mg/dL (*)     pH Urine 5.5      Protein Albumin Urine 20 (*)     Urobilinogen Urine <2.0      Nitrite Urine Negative      Leukocyte Esterase Urine Negative      Bacteria Urine Few (*)     Mucus Urine Present (*)     RBC Urine 27 (*)     WBC Urine 1     BASIC METABOLIC PANEL - Abnormal    Sodium 138      Potassium 4.3      Chloride 103      Carbon Dioxide (CO2) 22      Anion Gap 13      Urea Nitrogen 23.5 (*)     Creatinine 1.32 (*)     GFR Estimate 40 (*)     Calcium 8.7 (*)     Glucose 131 (*)    CBC WITH  PLATELETS AND DIFFERENTIAL - Abnormal    WBC Count 6.4      RBC Count 3.99      Hemoglobin 13.4      Hematocrit 38.9      MCV 98      MCH 33.6 (*)     MCHC 34.4      RDW 12.7      Platelet Count 158      % Neutrophils 69      % Lymphocytes 17      % Monocytes 12      % Eosinophils 3      % Basophils 0      % Immature Granulocytes 0      NRBCs per 100 WBC 0      Absolute Neutrophils 4.4      Absolute Lymphocytes 1.1      Absolute Monocytes 0.7      Absolute Eosinophils 0.2      Absolute Basophils 0.0      Absolute Immature Granulocytes 0.0      Absolute NRBCs 0.0         RADIOLOGY:  Reviewed all pertinent imaging. Please see official radiology report.  XR Pelvis and Hip Left 2 Views   Final Result   IMPRESSION:       Diffuse osseous demineralization. No displaced left hip or pelvic fracture. Normal hip joint spacing. There are degenerative changes in the lower lumbar spine and at the sacroiliac joints.      CT Cervical Spine w/o Contrast   Final Result   IMPRESSION:   HEAD CT:   1.  No CT evidence for acute intracranial process.   2.  Brain atrophy and presumed chronic microvascular ischemic changes as above.      CERVICAL SPINE CT:   1.  No CT evidence for acute fracture or post traumatic subluxation.   2.  Moderate diffuse cervical spondylosis as above.      Head CT w/o contrast   Final Result   IMPRESSION:   HEAD CT:   1.  No CT evidence for acute intracranial process.   2.  Brain atrophy and presumed chronic microvascular ischemic changes as above.      CERVICAL SPINE CT:   1.  No CT evidence for acute fracture or post traumatic subluxation.   2.  Moderate diffuse cervical spondylosis as above.          EKG:    Performed at: 1317    Impression: Sinus rhythm, rightward axis    Rate: 96  Rhythm: Paced   Axis: 78 94 -62  RI Interval: 194  QRS Interval: 134  QTc Interval: 515  ST Changes: No ST elevation or depression  Comparison: When compared to previous from 02/24/2023 no significant change    I have reviewed and  interpreted the EKG(s) documented above along with Dr. Roach, ED MD.    PROCEDURES:   None.     Barrera James PA-C  Emergency Medicine  [facility] EMERGENCY DEPARTMENT  [address] [place] [id number] [phone]  Dept: [phone]       Barrera James PA-C  12/22/24 3480

## 2024-12-22 NOTE — ED PROVIDER NOTES
Emergency Department Midlevel Supervisory Note     I had a face to face encounter with this patient seen by the Advanced Practice Provider (JOSE). I personally made/approved the management plan and take responsibility for the patient management. I personally saw patient and performed a substantive portion of the visit including all aspects of the medical decision making.     ED Course:  1:09 PM Jose Antonio James PA-C staffed patient with me. I agree with their assessment and plan of management, and I will see the patient.  1:09 PM I met with the patient to introduce myself, gather additional history, perform my initial exam, and discuss the plan.     Brief HPI:     Laney Hahn is a 84 year old female who presents for evaluation of a unwitnessed fall.     Patient pleasantly demented here on arrival, is unable to recall if she hit her head or not. Reportedly family member was in the apartment, heard her fall, was alert when they found her, was able to help her up and EMS was called. She does note a little bit of left-sided hip pain, however no shortening or rotation. Otherwise denies any acute complaints. Is oriented to self, however not to year or place.       I, Nu Judge, am serving as a scribe to document services personally performed by Dr. Radhika Arceo based on my observation and the provider's statements to me. I, Radhika Arceo MD attest that Nu Judge is acting in a scribe capacity, has observed my performance of the services and has documented them in accordance with my direction.    Brief Physical Exam: BP (!) 156/69   Pulse 98   Temp 98.1  F (36.7  C) (Oral)   Resp 20   SpO2 94%   Constitutional:  Alert, in no acute distress  EYES: Conjunctivae clear  HENT: left forehead slight redness.   Respiratory:  Respirations even, unlabored, in no acute respiratory distress  Cardiovascular:  Regular rate and rhythm, good peripheral perfusion  GI: Soft, non-distended, non-tender  Musculoskeletal:  Moves all 4  extremities equally, grossly symmetrical strength. Left hip slightly tender.   Integument: Warm & dry. No appreciable rash, erythema.  Neurologic:  Alert & oriented, speech clear and fluent, no focal deficits noted  Psych: Normal mood and affect       MDM:  Pt with trauma alert due to fall with CT head and c-spine negative for acute traumatic pathology, familly noted she is fatigued overall, pending UA and electrolytes and will reassess here in the ED shortly, VS WNL and patient without pain at this time.      1. Fall, initial encounter    2. Anticoagulated        Labs and Imaging:  Results for orders placed or performed during the hospital encounter of 12/22/24   Head CT w/o contrast    Impression    IMPRESSION:  HEAD CT:  1.  No CT evidence for acute intracranial process.  2.  Brain atrophy and presumed chronic microvascular ischemic changes as above.    CERVICAL SPINE CT:  1.  No CT evidence for acute fracture or post traumatic subluxation.  2.  Moderate diffuse cervical spondylosis as above.   CT Cervical Spine w/o Contrast    Impression    IMPRESSION:  HEAD CT:  1.  No CT evidence for acute intracranial process.  2.  Brain atrophy and presumed chronic microvascular ischemic changes as above.    CERVICAL SPINE CT:  1.  No CT evidence for acute fracture or post traumatic subluxation.  2.  Moderate diffuse cervical spondylosis as above.   XR Pelvis and Hip Left 2 Views    Impression    IMPRESSION:     Diffuse osseous demineralization. No displaced left hip or pelvic fracture. Normal hip joint spacing. There are degenerative changes in the lower lumbar spine and at the sacroiliac joints.       I have reviewed the relevant laboratory studies above.        Radhika Arceo MD  [facility] EMERGENCY DEPARTMENT  [address] [place] [id number] [phone]       Radhika Arceo MD  12/22/24 5544

## 2024-12-22 NOTE — ED NOTES
Bed: [place] [address]  Expected date:   Expected time:   Means of arrival: Ambulance  Comments:  MPLW: Fall, 84F, on thinners, weakness.

## 2024-12-22 NOTE — ED NOTES
Pt was assisted via w/c to use bathroom, pt unable to void. Pt does not feel she has to urinate. Pericare provided and clean brief applied. Pt assisted back to bed.

## 2024-12-22 NOTE — DISCHARGE INSTRUCTIONS
You were seen here in the emergency department for evaluation of fall.  CT imaging of the head and C-spine does not show any acute abnormalities, x-ray of the pelvis does not show any fractures.  Blood work here is stable, no anemia, electrolyte abnormality, or evidence of urinary tract infection.  Follow-up with primary care doctor as needed.  Return to the emergency department new or worsening symptoms.    For pain or fever you may use:  -Tylenol 650 mg every 6 hours.  Max 4000 mg in 24 hours  Do not use thismedication with alcohol as it can cause liver problems.  -Ibuprofen 600 mg every 6 hours.  Max 3500 mg in 24 hours  Do not take this medication if you have a history of a GI bleed or have kidney problems.  You may use both of these medications at the same time or you can alternate them every 3 hours.  For example, Tylenol at 6 AM, ibuprofen at 9 AM, Tylenol at noon, etc.'

## 2024-12-22 NOTE — ED TRIAGE NOTES
Pt brought to ED by [facility] after an unwitnessed fall at her assisted living facility about 30-45 minutes ago. Pt's brother was in her apartment but did not witness fall, he did call for assistance and staff responded immediately to help her up.   Pt does not recall the fall at all, is not sure if she hit her head but is on eliquis. Pt herself is oriented to self and place but reported the year as 1978.   Pt denies pain or injuries.

## 2024-12-23 ENCOUNTER — TELEPHONE (OUTPATIENT)
Dept: INTERNAL MEDICINE | Facility: CLINIC | Age: 84
End: 2024-12-23
Payer: COMMERCIAL

## 2024-12-23 ENCOUNTER — APPOINTMENT (OUTPATIENT)
Dept: CT IMAGING | Facility: HOSPITAL | Age: 84
End: 2024-12-23
Attending: EMERGENCY MEDICINE
Payer: COMMERCIAL

## 2024-12-23 ENCOUNTER — APPOINTMENT (OUTPATIENT)
Dept: RADIOLOGY | Facility: HOSPITAL | Age: 84
End: 2024-12-23
Attending: EMERGENCY MEDICINE
Payer: COMMERCIAL

## 2024-12-23 ENCOUNTER — HOSPITAL ENCOUNTER (OUTPATIENT)
Facility: HOSPITAL | Age: 84
Setting detail: OBSERVATION
Discharge: NURSING, REHAB OR LONG-TERM CARE | End: 2024-12-25
Attending: EMERGENCY MEDICINE
Payer: COMMERCIAL

## 2024-12-23 DIAGNOSIS — I35.0 AORTIC VALVE STENOSIS, ETIOLOGY OF CARDIAC VALVE DISEASE UNSPECIFIED: ICD-10-CM

## 2024-12-23 DIAGNOSIS — R26.81 UNSTEADY GAIT: Primary | ICD-10-CM

## 2024-12-23 DIAGNOSIS — I48.0 PAROXYSMAL ATRIAL FIBRILLATION (H): ICD-10-CM

## 2024-12-23 DIAGNOSIS — U07.1 COVID-19 VIRUS INFECTION: ICD-10-CM

## 2024-12-23 DIAGNOSIS — E78.5 DYSLIPIDEMIA, GOAL LDL BELOW 70: ICD-10-CM

## 2024-12-23 DIAGNOSIS — R29.6 FALLS FREQUENTLY: ICD-10-CM

## 2024-12-23 DIAGNOSIS — M62.81 GENERALIZED MUSCLE WEAKNESS: ICD-10-CM

## 2024-12-23 DIAGNOSIS — R53.1 GENERALIZED WEAKNESS: ICD-10-CM

## 2024-12-23 LAB
ANION GAP SERPL CALCULATED.3IONS-SCNC: 14 MMOL/L (ref 7–15)
ATRIAL RATE - MUSE: 96 BPM
BASOPHILS # BLD AUTO: 0 10E3/UL (ref 0–0.2)
BASOPHILS NFR BLD AUTO: 1 %
BUN SERPL-MCNC: 30 MG/DL (ref 8–23)
CALCIUM SERPL-MCNC: 9 MG/DL (ref 8.8–10.4)
CHLORIDE SERPL-SCNC: 103 MMOL/L (ref 98–107)
CREAT SERPL-MCNC: 1.48 MG/DL (ref 0.51–0.95)
DIASTOLIC BLOOD PRESSURE - MUSE: 69 MMHG
EGFRCR SERPLBLD CKD-EPI 2021: 35 ML/MIN/1.73M2
EOSINOPHIL # BLD AUTO: 0 10E3/UL (ref 0–0.7)
EOSINOPHIL NFR BLD AUTO: 1 %
ERYTHROCYTE [DISTWIDTH] IN BLOOD BY AUTOMATED COUNT: 13 % (ref 10–15)
GLUCOSE SERPL-MCNC: 137 MG/DL (ref 70–99)
HCO3 SERPL-SCNC: 21 MMOL/L (ref 22–29)
HCT VFR BLD AUTO: 40 % (ref 35–47)
HGB BLD-MCNC: 13.9 G/DL (ref 11.7–15.7)
IMM GRANULOCYTES # BLD: 0 10E3/UL
IMM GRANULOCYTES NFR BLD: 0 %
INTERPRETATION ECG - MUSE: NORMAL
LYMPHOCYTES # BLD AUTO: 1.7 10E3/UL (ref 0.8–5.3)
LYMPHOCYTES NFR BLD AUTO: 30 %
MAGNESIUM SERPL-MCNC: 2.1 MG/DL (ref 1.7–2.3)
MCH RBC QN AUTO: 33.8 PG (ref 26.5–33)
MCHC RBC AUTO-ENTMCNC: 34.8 G/DL (ref 31.5–36.5)
MCV RBC AUTO: 97 FL (ref 78–100)
MONOCYTES # BLD AUTO: 0.6 10E3/UL (ref 0–1.3)
MONOCYTES NFR BLD AUTO: 11 %
NEUTROPHILS # BLD AUTO: 3.4 10E3/UL (ref 1.6–8.3)
NEUTROPHILS NFR BLD AUTO: 58 %
NRBC # BLD AUTO: 0 10E3/UL
NRBC BLD AUTO-RTO: 0 /100
P AXIS - MUSE: 78 DEGREES
PLATELET # BLD AUTO: 183 10E3/UL (ref 150–450)
POTASSIUM SERPL-SCNC: 4.9 MMOL/L (ref 3.4–5.3)
PR INTERVAL - MUSE: 194 MS
QRS DURATION - MUSE: 134 MS
QT - MUSE: 408 MS
QTC - MUSE: 515 MS
R AXIS - MUSE: 94 DEGREES
RBC # BLD AUTO: 4.11 10E6/UL (ref 3.8–5.2)
SARS-COV-2 RNA RESP QL NAA+PROBE: POSITIVE
SODIUM SERPL-SCNC: 138 MMOL/L (ref 135–145)
SYSTOLIC BLOOD PRESSURE - MUSE: 156 MMHG
T AXIS - MUSE: -62 DEGREES
VENTRICULAR RATE- MUSE: 96 BPM
WBC # BLD AUTO: 5.8 10E3/UL (ref 4–11)

## 2024-12-23 PROCEDURE — 72220 X-RAY EXAM SACRUM TAILBONE: CPT

## 2024-12-23 PROCEDURE — 72125 CT NECK SPINE W/O DYE: CPT

## 2024-12-23 PROCEDURE — 80048 BASIC METABOLIC PNL TOTAL CA: CPT | Performed by: EMERGENCY MEDICINE

## 2024-12-23 PROCEDURE — 258N000003 HC RX IP 258 OP 636: Performed by: EMERGENCY MEDICINE

## 2024-12-23 PROCEDURE — 83735 ASSAY OF MAGNESIUM: CPT | Performed by: EMERGENCY MEDICINE

## 2024-12-23 PROCEDURE — 36415 COLL VENOUS BLD VENIPUNCTURE: CPT | Performed by: EMERGENCY MEDICINE

## 2024-12-23 PROCEDURE — 250N000013 HC RX MED GY IP 250 OP 250 PS 637: Performed by: INTERNAL MEDICINE

## 2024-12-23 PROCEDURE — 96361 HYDRATE IV INFUSION ADD-ON: CPT

## 2024-12-23 PROCEDURE — 85004 AUTOMATED DIFF WBC COUNT: CPT | Performed by: EMERGENCY MEDICINE

## 2024-12-23 PROCEDURE — 96360 HYDRATION IV INFUSION INIT: CPT

## 2024-12-23 PROCEDURE — 258N000003 HC RX IP 258 OP 636: Performed by: INTERNAL MEDICINE

## 2024-12-23 PROCEDURE — G0378 HOSPITAL OBSERVATION PER HR: HCPCS

## 2024-12-23 PROCEDURE — 70450 CT HEAD/BRAIN W/O DYE: CPT

## 2024-12-23 PROCEDURE — 87635 SARS-COV-2 COVID-19 AMP PRB: CPT | Performed by: INTERNAL MEDICINE

## 2024-12-23 PROCEDURE — 99285 EMERGENCY DEPT VISIT HI MDM: CPT | Mod: 25

## 2024-12-23 PROCEDURE — 99222 1ST HOSP IP/OBS MODERATE 55: CPT | Performed by: INTERNAL MEDICINE

## 2024-12-23 PROCEDURE — 85048 AUTOMATED LEUKOCYTE COUNT: CPT | Performed by: EMERGENCY MEDICINE

## 2024-12-23 RX ORDER — PROCHLORPERAZINE MALEATE 5 MG/1
5 TABLET ORAL EVERY 6 HOURS PRN
Status: DISCONTINUED | OUTPATIENT
Start: 2024-12-23 | End: 2024-12-25 | Stop reason: HOSPADM

## 2024-12-23 RX ORDER — METOPROLOL SUCCINATE 50 MG/1
50 TABLET, EXTENDED RELEASE ORAL DAILY
Status: DISCONTINUED | OUTPATIENT
Start: 2024-12-24 | End: 2024-12-25 | Stop reason: HOSPADM

## 2024-12-23 RX ORDER — GUAIFENESIN 600 MG/1
600 TABLET, EXTENDED RELEASE ORAL 2 TIMES DAILY
Status: DISCONTINUED | OUTPATIENT
Start: 2024-12-23 | End: 2024-12-25 | Stop reason: HOSPADM

## 2024-12-23 RX ORDER — ATORVASTATIN CALCIUM 10 MG/1
10 TABLET, FILM COATED ORAL EVERY MORNING
Status: DISCONTINUED | OUTPATIENT
Start: 2024-12-24 | End: 2024-12-25 | Stop reason: HOSPADM

## 2024-12-23 RX ORDER — LIDOCAINE 40 MG/G
CREAM TOPICAL
Status: DISCONTINUED | OUTPATIENT
Start: 2024-12-23 | End: 2024-12-25 | Stop reason: HOSPADM

## 2024-12-23 RX ORDER — ACETAMINOPHEN 500 MG
1000 TABLET ORAL EVERY 8 HOURS PRN
COMMUNITY

## 2024-12-23 RX ORDER — AMOXICILLIN 250 MG
2 CAPSULE ORAL 2 TIMES DAILY PRN
Status: DISCONTINUED | OUTPATIENT
Start: 2024-12-23 | End: 2024-12-25 | Stop reason: HOSPADM

## 2024-12-23 RX ORDER — ACETAMINOPHEN 500 MG
1000 TABLET ORAL EVERY 8 HOURS PRN
Status: DISCONTINUED | OUTPATIENT
Start: 2024-12-23 | End: 2024-12-25 | Stop reason: HOSPADM

## 2024-12-23 RX ORDER — CALCIUM CARBONATE 500 MG/1
1000 TABLET, CHEWABLE ORAL 4 TIMES DAILY PRN
Status: DISCONTINUED | OUTPATIENT
Start: 2024-12-23 | End: 2024-12-25 | Stop reason: HOSPADM

## 2024-12-23 RX ORDER — BENZONATATE 100 MG/1
100 CAPSULE ORAL 3 TIMES DAILY PRN
Status: DISCONTINUED | OUTPATIENT
Start: 2024-12-23 | End: 2024-12-25 | Stop reason: HOSPADM

## 2024-12-23 RX ORDER — AMOXICILLIN 250 MG
1 CAPSULE ORAL 2 TIMES DAILY PRN
Status: DISCONTINUED | OUTPATIENT
Start: 2024-12-23 | End: 2024-12-25 | Stop reason: HOSPADM

## 2024-12-23 RX ADMIN — NIRMATRELVIR AND RITONAVIR 2 TABLET: KIT at 20:08

## 2024-12-23 RX ADMIN — SODIUM CHLORIDE 500 ML: 9 INJECTION, SOLUTION INTRAVENOUS at 10:44

## 2024-12-23 RX ADMIN — APIXABAN 2.5 MG: 2.5 TABLET, FILM COATED ORAL at 20:08

## 2024-12-23 RX ADMIN — GUAIFENESIN 600 MG: 600 TABLET ORAL at 20:08

## 2024-12-23 RX ADMIN — SODIUM CHLORIDE 500 ML: 9 INJECTION, SOLUTION INTRAVENOUS at 18:03

## 2024-12-23 ASSESSMENT — ACTIVITIES OF DAILY LIVING (ADL)
ADLS_ACUITY_SCORE: 46
ADLS_ACUITY_SCORE: 55
ADLS_ACUITY_SCORE: 46
ADLS_ACUITY_SCORE: 55
ADLS_ACUITY_SCORE: 44
ADLS_ACUITY_SCORE: 55
ADLS_ACUITY_SCORE: 39
ADLS_ACUITY_SCORE: 44
ADLS_ACUITY_SCORE: 55
ADLS_ACUITY_SCORE: 46
ADLS_ACUITY_SCORE: 46

## 2024-12-23 NOTE — PHARMACY-ADMISSION MEDICATION HISTORY
Pharmacist Admission Medication History    Admission medication history is complete. The information provided in this note is only as accurate as the sources available at the time of the update.    Information Source(s): Facility (U/NH/) medication list/MAR and CareEverywhere/SureScripts via phone    Pertinent Information: patient currently resides at the [facility] in [place], phone number, [phone]. Spoke with nursing staff and patient took all her AM meds this morning.     Changes made to PTA medication list:  Added: None  Deleted: None  Changed: None    Allergies reviewed with patient and updates made in EHR: yes    Medication History Completed By: GIAN NIX RPH 12/23/2024 11:00 AM    PTA Med List   Medication Sig Last Dose/Taking    acetaminophen (TYLENOL) 500 MG tablet Take 1,000 mg by mouth every 8 hours as needed for pain. Taking As Needed    apixaban ANTICOAGULANT (ELIQUIS ANTICOAGULANT) 2.5 MG tablet Take 1 tablet (2.5 mg) by mouth 2 times daily Due for appointment with Rose Mcelroy 12/23/2024 Morning    atorvastatin (LIPITOR) 10 MG tablet Take 1 tablet (10 mg) by mouth every morning 12/23/2024 Morning    furosemide (LASIX) 20 MG tablet Take 0.5 tablets (10 mg) by mouth daily Due for appointment with Rose Mcelroy 12/23/2024 Morning    metoprolol succinate ER (TOPROL XL) 50 MG 24 hr tablet Take 1 tablet (50 mg) by mouth daily 12/23/2024 Morning

## 2024-12-23 NOTE — PROGRESS NOTES
Patient admitted to room 2 at approximately 1410 via cart from emergency room.  Reason for Admission: Generalized weakness  Report received from: RAHEEL Del Real   Patient was accompanied by Self.  Discharge transportation provided by: Ed staff.  Patient ambulated/transferred:  with stand-by assist.  Patient is alert and orientated x 3.  Outpatient Observation education provided to: patient.  MDRO Education done if applicable (MRSA, VRE, etc)  Safety risks were identified during admission:  fall.   Yellow risk/fall band applied:  Yes  Home meds sent home: N/A  Home meds sent to pharmacy: N/A IF YES add 1/2 sheet laminated page reminder to chart/clipboard   Detailed Belongings: eye glasses, red pants, pair of shoes, black flower sweater, under shirt, brown watch.

## 2024-12-23 NOTE — H&P
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    COVID-19 infection  Generalized weakness  Vaccinated in 2023 for COVID.  Symptoms started on 12/20/2024.    Breathing comfortably on room air  Start Paxlovid given risk of severe infection  Resume PTA apixaban  Incentive spirometry  Guaifenesin/Tessalon as needed  Cepacol as needed  COVID-19 labs  Supplemental oxygen as needed      Hyperlipidemia  Hold PTA atorvastatin 10 mg daily while on Paxlovid    HFrEF-not decompensated  Aortic stenosis  Hold PTA furosemide 10 mg daily for now due to JESUS-likely secondary to fluid depletion from diuretic therapy and COVID-19 infection  Resume PTA metoprolol succinate    Suspected JESUS on stage III CKD  Possibly secondary to poor oral intake in the setting of diuretic therapy  Received normal saline 500 mL bolus in the ER  Hold PTA furosemide for now  Oral hydration as tolerated  Monitor BMP  Avoid nephrotoxin    CAD  Resume PTA metoprolol succinate  Hold PTA atorvastatin while on Paxlovid      SVT  Paroxysmal atrial fibrillation  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  Indication: QTc prolonging medication (48 hours)  Code Status: Full Code    Clinically Significant Risk Factors Present on Admission                # Drug Induced Coagulation Defect: home medication list includes an anticoagulant medication    # Hypertension: Noted on problem list     # Dementia: noted on problem list            # Pacemaker present     Disposition Plan     Medically Ready for Discharge: Anticipated in 2-4 Days           Laurie Rob MD  Hospitalist Service  [facility]  Securely message with [employer]oneyme[employer] (more info)  Text page via [employer] Paging/Directory     ______________________________________________________________________    Chief Complaint   Falls, cough and generalized weakness.    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    She was in her usual state of health until 3 days ago when she developed nonproductive cough and generalized weakness.  Per report, patient fell yesterday at her assisted living facility and also fell again today.  She is independent for ADLs at baseline.  She lives with her brother in the assisted living facility.  She was seen in the ER yesterday for generalized weakness and was subsequently discharged back to her [facility].  However, patient presented again today due to sudden episode of fall.  She denies fever, chills, nausea or vomiting.  Patient states she is hungry and request for food.  Patient's friend/caregiver is at bedside.    Initial blood pressure was 114/58, pulse 106, respiratory 20, temperature 98.6  F and oxygen saturation 97% on room air.  Notable laboratory finding include creatinine 1.48  compared to 1.32 on 12/22/2024 and 1.45 on 4/27/2023, magnesium 2.1, unremarkable CBC, and urinalysis with negative nitrite and leukocyte esterase.  Pelvic x-ray was negative for fracture.  Cervical spine CT showed degenerative changes and no evidence of acute fracture/displacement.  Brain CT was negative for acute intracranial process.  EKG showed EKG showed sinus rhythm with nonspecific intraventricular conduction block, inverted T waves in the lateral leads and QTc of 515.     She received normal saline 500 mL bolus.  She wants to be full code.      Past Medical History    Past Medical History:   Diagnosis Date    COVID-19 09/14/2020    [facility] at [facility] (do not attempt resuscitation)     Dyslipidemia, goal LDL below 70 09/15/2020    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 Nini Rasmussen    Metabolic encephalopathy     Moderate aortic valve stenosis 08/22/2017    stable on 2020 echo    Nonobstructive atherosclerosis of coronary artery 09/15/2020    with normal LVEDP    Paroxysmal SVT (supraventricular tachycardia) (H) 09/07/2017    said to have short episodes while hospitalized for UTI per Dr. Rhys Mensah    Syncope 08/22/2017    UTI (urinary tract infection) 08/21/2017    hospitalized with weakness       Past Surgical History   Past Surgical History:   Procedure Laterality Date    CATARACT EXTRACTION, BILATERAL      CV CORONARY ANGIOGRAM N/A 9/15/2020    Procedure: Coronary Angiogram;  Surgeon: Radhika Santa MD;  Location: [facility] Cath Lab;  Service: Cardiology    CV LEFT HEART CATHETERIZATION WITHOUT LEFT VENTRICULOGRAM Left 9/15/2020    Procedure: Left Heart Catheterization Without Left Ventriculogram;  Surgeon: Radhika Santa MD;  Location: [facility] Cath Lab;  Service: Cardiology    EP PACEMAKER INSERT N/A 4/13/2021    Procedure: EP Pacemaker Insertion;  Surgeon:  Frederic Burgos MD;  Location: [facility] Cardiac Cath Lab;  Service: Cardiology    HYSTERECTOMY      PARTIAL GASTRECTOMY  1969    for ulcers    TONSILLECTOMY         Prior to Admission Medications   Prior to Admission Medications   Prescriptions Last Dose Informant Patient Reported? Taking?   acetaminophen (TYLENOL) 500 MG tablet   Yes Yes   Sig: Take 1,000 mg by mouth every 8 hours as needed for pain.   apixaban ANTICOAGULANT (ELIQUIS ANTICOAGULANT) 2.5 MG tablet 12/23/2024 Morning  No Yes   Sig: Take 1 tablet (2.5 mg) by mouth 2 times daily Due for appointment with Rose Mcelroy   atorvastatin (LIPITOR) 10 MG tablet 12/23/2024 Morning  No Yes   Sig: Take 1 tablet (10 mg) by mouth every morning   furosemide (LASIX) 20 MG tablet 12/23/2024 Morning  No Yes   Sig: Take 0.5 tablets (10 mg) by mouth daily Due for appointment with Rose Mcelroy   metoprolol succinate ER (TOPROL XL) 50 MG 24 hr tablet 12/23/2024 Morning  No Yes   Sig: Take 1 tablet (50 mg) by mouth daily      Facility-Administered Medications: None        Review of Systems    The 10 point Review of Systems is negative other than noted in the HPI or here.     Physical Exam   Vital Signs: Temp: 98.5  F (36.9  C) Temp src: Temporal BP: 126/65 Pulse: 82   Resp: 16 SpO2: 96 % O2 Device: None (Room air)    Weight: 0 lbs 0 oz      General appearance: Awake, Alert, Cooperative, not in any obvious distress and appears stated age   HEENT: Normocephalic, atraumatic, conjunctiva clear without icterus and ears without discharge  Lungs: Clear to auscultation bilaterally, no wheezing, diminished air exchange, normal work of breathing  Cardiovascular: Regular Rate and Rythm, normal apical impulse, ? TROY+, normal S1 and S2, no lower extremity edema bilaterally  Abdomen: Soft, non-tender and Non-distended, active bowel sounds  Skin: Skin color, texture normal and bruising or bleeding. No rashes or lesions over face, neck, arms and legs, turgor normal.  Musculoskeletal: No  bony deformities or joint tenderness. Normal ROM upon flexion & extension.   Neurologic: Alert & Oriented X 3, Facial symmetry preserved and upper & lower extremities moving well with symmetry  Psychiatric: Unable to assess      Medical Decision Making       60 MINUTES SPENT BY ME on the date of service doing chart review, history, exam, documentation & further activities per the note.      Data     I have personally reviewed the following data over the past 24 hrs:    5.8  \   13.9   / 183     138 103 30.0 (H) /  137 (H)   4.9 21 (L) 1.48 (H) \       Imaging results reviewed over the past 24 hrs:   Recent Results (from the past 24 hours)   XR Sacrum and Coccyx 2 Views    Narrative    EXAM: XR SACRUM AND COCCYX 2 VIEWS  LOCATION: [facility]  DATE: 12/23/2024    INDICATION: pain  COMPARISON: None.      Impression    IMPRESSION: Osteopenia. No fracture.   Head CT w/o contrast    Narrative    EXAM: CT HEAD W/O CONTRAST  LOCATION: [facility]  DATE: 12/23/2024    INDICATION: Fall, on thinners.  COMPARISON: Head CT 12/22/2024  TECHNIQUE: Routine CT Head without IV contrast. Multiplanar reformats. Dose reduction techniques were used.    FINDINGS:  INTRACRANIAL CONTENTS: No intracranial hemorrhage, extraaxial collection, or mass effect.  No CT evidence of acute infarct. Moderate presumed chronic small vessel ischemic changes. Moderate generalized volume loss. No hydrocephalus.     VISUALIZED ORBITS/SINUSES/MASTOIDS: No intraorbital abnormality. No paranasal sinus mucosal disease. No middle ear or mastoid effusion.    BONES/SOFT TISSUES: No acute abnormality.      Impression    IMPRESSION:  1.  No CT evidence for acute intracranial process.  2.  Brain atrophy and presumed chronic microvascular ischemic changes as above.   CT Cervical Spine w/o Contrast    Narrative    EXAM: CT CERVICAL SPINE W/O CONTRAST  LOCATION: [facility]  DATE:  12/23/2024    INDICATION: Fall, on thinners.  COMPARISON: CT cervical spine one-day prior.  TECHNIQUE: Routine CT Cervical Spine without IV contrast. Multiplanar reformats. Dose reduction techniques were used.      Impression    IMPRESSION: Mild degenerative anterolisthesis of C3 upon C4 again noted. Alignment is otherwise normal; however, there is straightening of normal cervical lordosis. Vertebral body heights normal. No fractures. There is loss of disc space height and   degenerative endplate spurring at C4-C5, C5-C6, C6-C7 and C7-T1. Facet arthropathy throughout the cervical spine. Mild, diffuse degenerative spinal canal narrowing throughout the cervical spine. No high-grade spinal canal stenosis. No prevertebral soft   tissue swelling.

## 2024-12-23 NOTE — ED PROVIDER NOTES
EMERGENCY DEPARTMENT ENCOUNTER     NAME: Laney Hahn   AGE: 84 year old female   YOB: 1940   MRN: [id number]   EVALUATION DATE & TIME: 12/23/2024 10:13 AM   PCP: Rose Mcelroy     Chief Complaint   Patient presents with    Fatigue   :    FINAL IMPRESSION       1. COVID-19 virus infection    2. Generalized weakness    3. Falls frequently           ED COURSE & MEDICAL DECISION MAKING      Pertinent Labs & Imaging studies reviewed. (See chart for details)   84 year old female  presents to the Emergency Department for evaluation of worsening generalized weakness and another fall.  On chart review, patient was seen in the ER yesterday after a fall, had a reassuring workup and was discharged back home.  She lives in assisted living with her brother but is typically independent without day-to-day care, and her friend noticed that she is very weak, was having difficulty getting up and walking, was unable to eat or drink anything feeling weak with decreased appetite, and then had another fall on the way to the bathroom this morning.  The facility did a COVID test this morning which came back positive for COVID-19. Initial Vitals Reviewed. Initial exam notable for patient who does not have any external signs of trauma but she does complain of some buttock pain after this morning's fall.  She is anticoagulated, so I did repeat a CT of the head and cervical spine to rule out intracranial hemorrhage, skull fracture, cervical spine fracture and fortunately these are negative.  X-ray of the sacrum and coccyx does not show fracture.  I suspect that she is having worsening generalized weakness and decreased appetite secondary to known COVID-19 infection and no the constellation of what been happening make sense, but clearly she is not safe in her home environment and has this is led to 2 ED visits within 24 hours with falls.  Labs are reassuring except that her creatinine which was 1.3 yesterday is 1.4 today.  I  have started a 500 cc bolus of IV fluids but I do think she needs to be admitted for ongoing management of generalized weakness due to COVID-19.  Case discussed with hospitalist who accepted the patient for admission.        10:16 AM I met with the patient for an initial encounter and evaluation.  12:58 PM I spoke with the hospitalist, Dr. Rob, who accepts the patient for admission.    At the conclusion of the encounter I discussed the results of all of the tests and the disposition. The questions were answered. The patient or family acknowledged understanding and was agreeable with the care plan.     0 minutes critical care time, see procedure note below for details if relevant    Medical Decision Making    History:  Supplemental history from: Documented in chart and Friend  External Record(s) reviewed: ED visit 12/22/24    Work Up:  Chart documentation includes differential considered and any EKGs or imaging independently interpreted by provider, where specified.  In additional to work up documented, I considered the following work up: Documented in chart, if applicable.    External consultation:  Discussion of management with another provider: Documented in chart, if applicable, hospitalist    Complicating factors:  Care impacted by chronic illness: Documented in Chart, anticoagulated state  Care affected by social determinants of health: No Support for Care at Home    Disposition considerations: Admit.    Adult Minor Head Trauma:Age 65 years or older and Currently taking anticoagulant medications: warfarin or other novel anticoagulant medications             MEDICATIONS GIVEN IN THE EMERGENCY:   Medications   sodium chloride 0.9% BOLUS 500 mL (0 mLs Intravenous Stopped 12/23/24 1219)      NEW PRESCRIPTIONS STARTED AT TODAY'S ER VISIT   New Prescriptions    No medications on file     ================================================================   HISTORY OF PRESENT ILLNESS       Patient information was  obtained from: Patient's Friend   Use of Intrepreter: N/A  Laney DAVID Hahn is a 84 year old female with history of strokes, atherosclerosis, SVT/a-fib, aortic valve stenosis, CKD, SSS, and dementia, who presents to the ED with a concern of weakness which occurred after her recent ED discharge, yesterday. She endorses difficulty walking. Her friend has been helping her eat but is unable to, attempted to chew and spit out her food. Her friend spoke with her brother, who currently lives with her at their Assisted Living, stated the patient fell this morning while trying to use the restroom. She tested positive for COVID-19, at home this morning. No other medical concerns are expressed at this time.     Per chart review, patient visited [facility] Emergency Department on 12/22/24, with a concern of a fall. CT of the head showed no acute abnormalities, CT C-spine showed no acute abnormalities. X-ray of the pelvis does not show acute abnormalities. Negative for leukocyte esterase and nitrites, on UA.     ================================================================        PAST HISTORY     PAST MEDICAL HISTORY:   Past Medical History:   Diagnosis Date    COVID-19 09/14/2020    positive test at [facility]    DNAR (do not attempt resuscitation)     Dyslipidemia, goal LDL below 70 09/15/2020    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 Nini Rasmussen    Metabolic encephalopathy     Moderate aortic valve stenosis 08/22/2017    stable on 2020 echo    Nonobstructive atherosclerosis of coronary artery 09/15/2020    with normal LVEDP    Paroxysmal SVT (supraventricular tachycardia) (H) 09/07/2017    said to have short episodes while hospitalized for UTI per Dr. Rhys Mensah    Syncope 08/22/2017    UTI (urinary tract infection) 08/21/2017    hospitalized with weakness      PAST  SURGICAL HISTORY:   Past Surgical History:   Procedure Laterality Date    CATARACT EXTRACTION, BILATERAL      CV CORONARY ANGIOGRAM N/A 9/15/2020    Procedure: Coronary Angiogram;  Surgeon: Radhika Santa MD;  Location: [facility] Cath Lab;  Service: Cardiology    CV LEFT HEART CATHETERIZATION WITHOUT LEFT VENTRICULOGRAM Left 9/15/2020    Procedure: Left Heart Catheterization Without Left Ventriculogram;  Surgeon: Radhika Santa MD;  Location: [facility] Cath Lab;  Service: Cardiology    EP PACEMAKER INSERT N/A 4/13/2021    Procedure: EP Pacemaker Insertion;  Surgeon: Frederic Burgos MD;  Location: [facility] Cardiac Cath Lab;  Service: Cardiology    HYSTERECTOMY      PARTIAL GASTRECTOMY  1969    for ulcers    TONSILLECTOMY        CURRENT MEDICATIONS:   acetaminophen (TYLENOL) 500 MG tablet  apixaban ANTICOAGULANT (ELIQUIS ANTICOAGULANT) 2.5 MG tablet  atorvastatin (LIPITOR) 10 MG tablet  furosemide (LASIX) 20 MG tablet  metoprolol succinate ER (TOPROL XL) 50 MG 24 hr tablet      ALLERGIES:   Allergies   Allergen Reactions    Sulfa Antibiotics Shortness Of Breath      FAMILY HISTORY:   Family History   Adopted: Yes      SOCIAL HISTORY:   Social History     Socioeconomic History    Marital status: Single    Number of children: 0   Tobacco Use    Smoking status: Never    Smokeless tobacco: Never   Vaping Use    Vaping status: Never Used   Substance and Sexual Activity    Alcohol use: No    Drug use: No   Social History Narrative    Her adopted brother, Jeff, lives with her. He is five years younger than her.        VITALS  Patient Vitals for the past 24 hrs:   BP Temp Temp src Pulse Resp SpO2   12/23/24 1231 -- -- -- 89 -- 98 %   12/23/24 1230 -- -- -- 84 -- 94 %   12/23/24 1229 -- -- -- 89 -- 97 %   12/23/24 1228 -- -- -- 89 -- 98 %   12/23/24 1227 -- -- -- 87 -- 96 %   12/23/24 1226 -- -- -- 86 -- 97 %   12/23/24 1225 -- -- -- 88 -- 97 %   12/23/24 1224 -- -- -- 95 -- 97 %   12/23/24 1223 -- -- -- 88 --  95 %   12/23/24 1206 -- -- -- 85 -- 97 %   12/23/24 1205 -- -- -- 88 -- 96 %   12/23/24 1204 -- -- -- 85 -- 97 %   12/23/24 1203 -- -- -- 85 -- 97 %   12/23/24 1202 -- -- -- 85 -- 95 %   12/23/24 1201 134/80 -- -- 84 -- 97 %   12/23/24 1009 114/58 98.6  F (37  C) Oral 106 20 --        ================================================================    PHYSICAL EXAM     VITAL SIGNS: /80   Pulse 89   Temp 98.6  F (37  C) (Oral)   Resp 20   SpO2 98%    Constitutional:  Awake, no acute distress. Generally weak appearing. Needs assistance from transporting from a wheelchair to bedside.   HENT:  Atraumatic, oropharynx without exudate or erythema, membranes moist  Lymph:  No adenopathy  Eyes: EOM intact, PERRL, no injection  Neck: Supple  Respiratory:  Clear to auscultation bilaterally, no wheezes or crackles   Cardiovascular:  Regular rate and rhythm, single S1 and S2   GI:  Soft, nontender, nondistended, no rebound or guarding   Musculoskeletal:  Moves all extremities, no lower extremity edema, no deformities    Skin:  Warm, dry  Neurologic:  Alert and oriented x3, no focal deficits noted       ================================================================  LAB       All pertinent labs reviewed and interpreted.   Labs Ordered and Resulted from Time of ED Arrival to Time of ED Departure   BASIC METABOLIC PANEL - Abnormal       Result Value    Sodium 138      Potassium 4.9      Chloride 103      Carbon Dioxide (CO2) 21 (*)     Anion Gap 14      Urea Nitrogen 30.0 (*)     Creatinine 1.48 (*)     GFR Estimate 35 (*)     Calcium 9.0      Glucose 137 (*)    CBC WITH PLATELETS AND DIFFERENTIAL - Abnormal    WBC Count 5.8      RBC Count 4.11      Hemoglobin 13.9      Hematocrit 40.0      MCV 97      MCH 33.8 (*)     MCHC 34.8      RDW 13.0      Platelet Count 183      % Neutrophils 58      % Lymphocytes 30      % Monocytes 11      % Eosinophils 1      % Basophils 1      % Immature Granulocytes 0      NRBCs per 100  WBC 0      Absolute Neutrophils 3.4      Absolute Lymphocytes 1.7      Absolute Monocytes 0.6      Absolute Eosinophils 0.0      Absolute Basophils 0.0      Absolute Immature Granulocytes 0.0      Absolute NRBCs 0.0     MAGNESIUM - Normal    Magnesium 2.1          ===============================================================  RADIOLOGY       Reviewed all pertinent imaging. Please see official radiology report.   CT Cervical Spine w/o Contrast   Final Result   IMPRESSION: Mild degenerative anterolisthesis of C3 upon C4 again noted. Alignment is otherwise normal; however, there is straightening of normal cervical lordosis. Vertebral body heights normal. No fractures. There is loss of disc space height and    degenerative endplate spurring at C4-C5, C5-C6, C6-C7 and C7-T1. Facet arthropathy throughout the cervical spine. Mild, diffuse degenerative spinal canal narrowing throughout the cervical spine. No high-grade spinal canal stenosis. No prevertebral soft    tissue swelling.      Head CT w/o contrast   Final Result   IMPRESSION:   1.  No CT evidence for acute intracranial process.   2.  Brain atrophy and presumed chronic microvascular ischemic changes as above.      XR Sacrum and Coccyx 2 Views   Final Result   IMPRESSION: Osteopenia. No fracture.            ================================================================  EKG         I have independently reviewed and interpreted the EKG(s) documented above.     ================================================================  PROCEDURES         I, Saida Martino, am serving as a scribe to document services personally performed by Dr. Kapadia based on my observation and the provider's statements to me. I, Anisa Kapadia MD attest that Saida Martino is acting in a scribe capacity, has observed my performance of the services and has documented them in accordance with my direction.   Anisa Kapadia M.D.   Emergency Medicine   [facility]  [facility] EMERGENCY DEPARTMENT  [facility] [facility] [id number]  [phone]  Dept: [phone]      Anisa Kapadia MD  12/23/24 0070

## 2024-12-23 NOTE — ED TRIAGE NOTES
Pt arrived via triage. Pt was seen yesterday after a fall. Pt then went home and was unable to eat. Fell again during night. Pt then tested positive for covid this am at [facility].     Triage Assessment (Adult)       Row Name 12/23/24 1010          Triage Assessment    Airway WDL WDL        Respiratory WDL    Respiratory WDL WDL        Skin Circulation/Temperature WDL    Skin Circulation/Temperature WDL WDL        Cardiac WDL    Cardiac WDL WDL        Peripheral/Neurovascular WDL    Peripheral Neurovascular WDL WDL        Cognitive/Neuro/Behavioral WDL    Cognitive/Neuro/Behavioral WDL WDL

## 2024-12-23 NOTE — TELEPHONE ENCOUNTER
Patient's friend Gabriella calling into clinic. Not on current consent to communicate, so no protected information shared. Not currently with patient, so unable to triage.    Gabriella reports the patient was seen in the Emergency Room at [facility] yesterday (was brought via ambulance from assisted living after a fall). Was in ER for 4 hours before being sent home. Patient's brother told Gabriella that patient had not been doing well for couple days before fall, had a cold last week and wasn't eating, and was sleeping all day.    Gabriella and patient's brother brought the patient home from ER yesterday, and Gabriella reports the patient was so weak that she couldn't walk without supporting herself with arm and wall. Gabriella reports the assisted living brought patient supper, and the patient seemed very confused. Gabriella reports the patient struggled with chewing chicken constantly without swallowing, and did the same thing with mashed potato. Gabriella reports patient typically feeds herself independently, and was requiring spoon feeding. Patient was also falling asleep while sitting up.  Gabriella reports the patient did fall again overnight at 0100 when trying to go to the bathroom. States assisted living staff was able to get her up and get her back to bed.  Gabriella is wondering if something was missed during ER evaluation.    Writer recommended the patient return to the ER for evaluation, due to significant new weakness, new confusion and difficulty swallowing. Gabriella endorses understanding and agrees with plan.    Routing to provider for awareness, or to advise if different action needed.    Diane Miller RN  [facility]

## 2024-12-23 NOTE — SUMMARY OF CARE
Patient admitted to room 02 at approximately 14:15PM   via cart from emergency room.    Patient ambulated/transferred:  with one assist. air juarez.    Detailed List of Belongings (be very specific listing out each item):     Black shoes, black tank top, flower sweater, glasses and watch.

## 2024-12-23 NOTE — ED NOTES
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 Nini Rasmussen    Metabolic encephalopathy     Moderate aortic valve stenosis 08/22/2017    stable on 2020 echo    Nonobstructive atherosclerosis of coronary artery 09/15/2020    with normal LVEDP    Paroxysmal SVT (supraventricular tachycardia) (H) 09/07/2017    said to have short episodes while hospitalized for UTI per Dr. Rhys Mensah    Syncope 08/22/2017    UTI (urinary tract infection) 08/21/2017    hospitalized with weakness        Code Status:  Prior     Falls Risk: Yes Band: Applied    Current Living Situation/Residence:lives with brother. Pts friend madina is supportive with pt.    Elimination Status: Continent: Yes     Activity Level: 2 assist    Patients Preferred Language:  English     Needed: No    Vital Signs:  /80   Pulse 89   Temp 98.6  F (37  C) (Oral)   Resp 20   SpO2 98%      Cardiac Rhythm: nsr    Pain Score: 0/10    Is the Patient Confused:  No    Last Food or Drink not eating  or drinking well the last 2 days.  500cc iv bolus given    Focused Assessment:  pt is alert and oriented. Lives with brother.  Feeling weak.    Tests Performed: Done: Labs    Treatments Provided:   iv infusion    Family Dynamics/Concerns:  brother is disabled    Family Updated On Visitor Policy: No friend madina here with  pt but then went home    Plan of Care Communicated to Family: No      Belongings Checklist Done and Signed by Patient: Yes    Medications sent with patient: no meds    Covid: symptomatic, positive    Additional Information:     Malika Lucas RN 12/23/2024 1:42 PM

## 2024-12-24 ENCOUNTER — APPOINTMENT (OUTPATIENT)
Dept: SPEECH THERAPY | Facility: HOSPITAL | Age: 84
End: 2024-12-24
Payer: COMMERCIAL

## 2024-12-24 ENCOUNTER — APPOINTMENT (OUTPATIENT)
Dept: CARDIOLOGY | Facility: HOSPITAL | Age: 84
End: 2024-12-24
Payer: COMMERCIAL

## 2024-12-24 ENCOUNTER — APPOINTMENT (OUTPATIENT)
Dept: OCCUPATIONAL THERAPY | Facility: HOSPITAL | Age: 84
End: 2024-12-24
Attending: INTERNAL MEDICINE
Payer: COMMERCIAL

## 2024-12-24 ENCOUNTER — APPOINTMENT (OUTPATIENT)
Dept: PHYSICAL THERAPY | Facility: HOSPITAL | Age: 84
End: 2024-12-24
Attending: INTERNAL MEDICINE
Payer: COMMERCIAL

## 2024-12-24 ENCOUNTER — APPOINTMENT (OUTPATIENT)
Dept: RADIOLOGY | Facility: HOSPITAL | Age: 84
End: 2024-12-24
Payer: COMMERCIAL

## 2024-12-24 LAB
ANION GAP SERPL CALCULATED.3IONS-SCNC: 10 MMOL/L (ref 7–15)
BUN SERPL-MCNC: 25.9 MG/DL (ref 8–23)
CALCIUM SERPL-MCNC: 8.3 MG/DL (ref 8.8–10.4)
CHLORIDE SERPL-SCNC: 109 MMOL/L (ref 98–107)
CREAT SERPL-MCNC: 1.17 MG/DL (ref 0.51–0.95)
CRP SERPL-MCNC: 33.8 MG/L
D DIMER PPP FEU-MCNC: 0.92 UG/ML FEU (ref 0–0.5)
EGFRCR SERPLBLD CKD-EPI 2021: 46 ML/MIN/1.73M2
ERYTHROCYTE [DISTWIDTH] IN BLOOD BY AUTOMATED COUNT: 13 % (ref 10–15)
FIBRINOGEN PPP-MCNC: 400 MG/DL (ref 170–510)
GLUCOSE SERPL-MCNC: 145 MG/DL (ref 70–99)
HCO3 SERPL-SCNC: 22 MMOL/L (ref 22–29)
HCT VFR BLD AUTO: 36.7 % (ref 35–47)
HGB BLD-MCNC: 12.1 G/DL (ref 11.7–15.7)
LVEF ECHO: NORMAL
MAGNESIUM SERPL-MCNC: 2 MG/DL (ref 1.7–2.3)
MCH RBC QN AUTO: 32.4 PG (ref 26.5–33)
MCHC RBC AUTO-ENTMCNC: 33 G/DL (ref 31.5–36.5)
MCV RBC AUTO: 98 FL (ref 78–100)
PLATELET # BLD AUTO: 134 10E3/UL (ref 150–450)
POTASSIUM SERPL-SCNC: 4.3 MMOL/L (ref 3.4–5.3)
PROCALCITONIN SERPL IA-MCNC: 0.18 NG/ML
RBC # BLD AUTO: 3.74 10E6/UL (ref 3.8–5.2)
SODIUM SERPL-SCNC: 141 MMOL/L (ref 135–145)
WBC # BLD AUTO: 4.2 10E3/UL (ref 4–11)

## 2024-12-24 PROCEDURE — 86140 C-REACTIVE PROTEIN: CPT | Performed by: INTERNAL MEDICINE

## 2024-12-24 PROCEDURE — 83735 ASSAY OF MAGNESIUM: CPT | Performed by: INTERNAL MEDICINE

## 2024-12-24 PROCEDURE — 84145 PROCALCITONIN (PCT): CPT

## 2024-12-24 PROCEDURE — 85379 FIBRIN DEGRADATION QUANT: CPT | Performed by: INTERNAL MEDICINE

## 2024-12-24 PROCEDURE — 93306 TTE W/DOPPLER COMPLETE: CPT | Mod: 26 | Performed by: INTERNAL MEDICINE

## 2024-12-24 PROCEDURE — 36415 COLL VENOUS BLD VENIPUNCTURE: CPT | Performed by: INTERNAL MEDICINE

## 2024-12-24 PROCEDURE — 97165 OT EVAL LOW COMPLEX 30 MIN: CPT | Mod: GO

## 2024-12-24 PROCEDURE — 97161 PT EVAL LOW COMPLEX 20 MIN: CPT | Mod: GP

## 2024-12-24 PROCEDURE — 71046 X-RAY EXAM CHEST 2 VIEWS: CPT

## 2024-12-24 PROCEDURE — 85384 FIBRINOGEN ACTIVITY: CPT | Performed by: INTERNAL MEDICINE

## 2024-12-24 PROCEDURE — 250N000013 HC RX MED GY IP 250 OP 250 PS 637: Performed by: INTERNAL MEDICINE

## 2024-12-24 PROCEDURE — 85018 HEMOGLOBIN: CPT | Performed by: INTERNAL MEDICINE

## 2024-12-24 PROCEDURE — 99207 PR APP CREDIT; MD BILLING SHARED VISIT: CPT

## 2024-12-24 PROCEDURE — 92610 EVALUATE SWALLOWING FUNCTION: CPT | Mod: GN

## 2024-12-24 PROCEDURE — 85048 AUTOMATED LEUKOCYTE COUNT: CPT | Performed by: INTERNAL MEDICINE

## 2024-12-24 PROCEDURE — 93306 TTE W/DOPPLER COMPLETE: CPT

## 2024-12-24 PROCEDURE — G0378 HOSPITAL OBSERVATION PER HR: HCPCS

## 2024-12-24 PROCEDURE — 97535 SELF CARE MNGMENT TRAINING: CPT | Mod: GO

## 2024-12-24 PROCEDURE — 80048 BASIC METABOLIC PNL TOTAL CA: CPT | Performed by: INTERNAL MEDICINE

## 2024-12-24 PROCEDURE — 85027 COMPLETE CBC AUTOMATED: CPT | Performed by: INTERNAL MEDICINE

## 2024-12-24 PROCEDURE — 99232 SBSQ HOSP IP/OBS MODERATE 35: CPT | Mod: FS | Performed by: HOSPITALIST

## 2024-12-24 PROCEDURE — 97530 THERAPEUTIC ACTIVITIES: CPT | Mod: GP

## 2024-12-24 RX ADMIN — GUAIFENESIN 600 MG: 600 TABLET ORAL at 20:30

## 2024-12-24 RX ADMIN — NIRMATRELVIR AND RITONAVIR 2 TABLET: KIT at 20:30

## 2024-12-24 RX ADMIN — GUAIFENESIN 600 MG: 600 TABLET ORAL at 10:10

## 2024-12-24 RX ADMIN — METOPROLOL SUCCINATE 50 MG: 50 TABLET, EXTENDED RELEASE ORAL at 10:10

## 2024-12-24 RX ADMIN — APIXABAN 2.5 MG: 2.5 TABLET, FILM COATED ORAL at 10:10

## 2024-12-24 RX ADMIN — ACETAMINOPHEN 1000 MG: 500 TABLET ORAL at 10:15

## 2024-12-24 RX ADMIN — NIRMATRELVIR AND RITONAVIR 2 TABLET: KIT at 10:11

## 2024-12-24 ASSESSMENT — ACTIVITIES OF DAILY LIVING (ADL)
ADLS_ACUITY_SCORE: 44
ADLS_ACUITY_SCORE: 45
ADLS_ACUITY_SCORE: 44
ADLS_ACUITY_SCORE: 45
ADLS_ACUITY_SCORE: 44
ADLS_ACUITY_SCORE: 45
ADLS_ACUITY_SCORE: 44
ADLS_ACUITY_SCORE: 45
ADLS_ACUITY_SCORE: 44
ADLS_ACUITY_SCORE: 45
ADLS_ACUITY_SCORE: 44
ADLS_ACUITY_SCORE: 45
DEPENDENT_IADLS:: CLEANING;COOKING;LAUNDRY;SHOPPING;MEAL PREPARATION;MEDICATION MANAGEMENT;MONEY MANAGEMENT;TRANSPORTATION
ADLS_ACUITY_SCORE: 44
ADLS_ACUITY_SCORE: 44

## 2024-12-24 NOTE — PLAN OF CARE
PRIMARY DIAGNOSIS: GENERALIZED WEAKNESS    OUTPATIENT/OBSERVATION GOALS TO BE MET BEFORE DISCHARGE  1. Orthostatic performed: N/A    2. Tolerating PO medications: Yes    3. Return to near baseline physical activity: No    4. Cleared for discharge by consultants (if involved): No    Discharge Planner Nurse   Safe discharge environment identified: Yes  Barriers to discharge: Yes       Entered by: Sandra Church RN 12/24/2024 4:18 AM     Please review provider order for any additional goals.   Nurse to notify provider when observation goals have been met and patient is ready for discharge.Goal Outcome Evaluation:

## 2024-12-24 NOTE — PROGRESS NOTES
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   Type of Evaluation   Type of Evaluation Swallow Evaluation   Oral Motor   Oral Musculature generally intact   Mucosal Quality adequate   Dentition (Oral Motor)   Comment, Dentition (Oral Motor) lower appliance appears loose.   Dentition (Oral Motor) dental appliance/dentures   Facial Symmetry (Oral Motor)   Facial Symmetry (Oral Motor) WNL   Lip Function (Oral Motor)   Lip Range of Motion (Oral Motor) WNL   Lip Strength (Oral Motor) WFL   Tongue Function (Oral Motor)   Tongue Strength (Oral Motor) WFL   Tongue Coordination/Speed (Oral Motor) reduced rate   Tongue ROM (Oral Motor) WNL   Jaw Function (Oral Motor)   Jaw Function (Oral Motor) WNL   Vocal Quality/Secretion Management (Oral Motor)   Vocal Quality (Oral Motor) WFL   Secretion Management (Oral Motor) WNL   General Swallowing Observations   Past History of Dysphagia hx mild esophageal dysmotility 2021   Respiratory Support room air   Current Diet/Method of Nutritional Intake (General Swallowing Observations, NIS) regular diet;thin liquids (level 0)   Swallowing Evaluation Clinical swallow evaluation   Clinical Swallow Evaluation   Feeding Assistance no  assistance needed   Clinical Swallow Evaluation Textures Trialed thin liquids;solid foods   Clinical Swallow Eval: Thin Liquid Texture Trial   Mode of Presentation, Thin Liquids cup;straw   Volume of Liquid or Food Presented 6 oz   Oral Phase of Swallow WFL   Pharyngeal Phase of Swallow intact   Diagnostic Statement single sips and consecutive swallows   Clinical Swallow Evaluation: Solid Food Texture Trial   Mode of Presentation self-fed   Volume Presented summer crackers   Oral Phase WFL   Pharyngeal Phase intact   Diagnostic Statement slow but functional mastication, takes sips appropriately as needed.   Swallowing Recommendations   Diet Consistency Recommendations thin liquids (level 0);regular diet   Comment, Swallowing Recommendations No overt s/s aspiration, no SOB, appears low risk of aspiration. Rec regular diet w/thin liquids.   Clinical Impression   Criteria for Skilled Therapeutic Interventions Met (SLP Eval) Evaluation only   Clinical Impression Comments Pt is alert confused. She is able to feed herself and takes appropriate bolus volumes and good rate. She shows slow but functional mastication and control, possibly due to loose lower denture. She takes sips of liquid appropriately to clear oral cavity. No overt s/s aspiration with any consistency, even with rapid consecutive swallows of thin liquid.Appears low risk of aspiration.   SLP Total Evaluation Time   Eval: oral/pharyngeal swallow function, clinical swallow Minutes (42806) 16   SLP Discharge Planning   SLP Plan eval only   SLP Brief overview of current status  No overt s/s aspiration, no SOB, appears low risk of aspiration. Rec regular diet w/thin liquids   SLP Time and Intention   Total Session Time (sum of timed and untimed services) 16

## 2024-12-24 NOTE — CONSULTS
Care Management Initial Consult    General Information  Assessment completed with: Patient, Caregiver, Pt and Saumya at [facility]  Type of CM/SW Visit: Initial Assessment    Primary Care Provider verified and updated as needed: Yes   Readmission within the last 30 days: no previous admission in last 30 days      Reason for Consult: discharge planning, length of stay  Advance Care Planning: Advance Care Planning Reviewed: verified with patient, no concerns identified, present on chart     General Information Comments: lives w/brother and independent at baseline    Communication Assessment  Patient's communication style: spoken language (English or Bilingual)    Hearing Difficulty or Deaf: no   Wear Glasses or Blind: yes    Cognitive  Cognitive/Neuro/Behavioral: .WDL except, orientation  Level of Consciousness: confused  Arousal Level: opens eyes spontaneously  Orientation: disoriented to, time, situation  Mood/Behavior: calm, cooperative  Best Language: 0 - No aphasia  Speech: clear    Living Environment:   People in home: sibling(s), facility resident     Current living Arrangements: assisted living  Name of Facility: [facility]   Able to return to prior arrangements: yes       Family/Social Support:  Care provided by: self, other (see comments)  Provides care for: no one  Marital Status: Single  Support system: Sibling(s), Friend, Facility resident(s)/Staff          Description of Support System: Involved, Supportive    Support Assessment: Adequate family and caregiver support, Adequate social supports    Current Resources:   Patient receiving home care services: No        Community Resources: None  Equipment currently used at home: walker, rolling  Supplies currently used at home: None    Employment/Financial:  Employment Status:          Financial Concerns: none   Referral to Financial Worker: No       Does the patient's insurance plan have a 3 day qualifying hospital stay waiver?  No    Lifestyle &  Psychosocial Needs:  Social Drivers of Health     Food Insecurity: Unknown (12/23/2024)    Food Insecurity     Within the past 12 months, did you worry that your food would run out before you got money to buy more?: Patient unable to answer     Within the past 12 months, did the food you bought just not last and you didn t have money to get more?: Patient unable to answer   Depression: Not at risk (4/27/2023)    PHQ-2     PHQ-2 Score: 0   Housing Stability: Unknown (12/23/2024)    Housing Stability     Do you have housing? : Patient unable to answer     Are you worried about losing your housing?: Patient unable to answer   Tobacco Use: Low Risk  (12/23/2024)    Patient History     Smoking Tobacco Use: Never     Smokeless Tobacco Use: Never     Passive Exposure: Not on file   Financial Resource Strain: Unknown (12/23/2024)    Financial Resource Strain     Within the past 12 months, have you or your family members you live with been unable to get utilities (heat, electricity) when it was really needed?: Patient unable to answer   Alcohol Use: Not on file   Transportation Needs: Unknown (12/23/2024)    Transportation Needs     Within the past 12 months, has lack of transportation kept you from medical appointments, getting your medicines, non-medical meetings or appointments, work, or from getting things that you need?: Patient unable to answer   Physical Activity: Not on file   Interpersonal Safety: Not on file   Stress: Not on file   Social Connections: Not on file   Health Literacy: Not on file       Functional Status:  Prior to admission patient needed assistance:      Dependent IADLs:: Cleaning, Cooking, Laundry, Shopping, Meal Preparation, Medication Management, Money Management, Transportation  Assesssment of Functional Status: Not at baseline with ADL Functioning    Mental Health Status:  Mental Health Status: No Current Concerns       Chemical Dependency Status:                Values/Beliefs:  Spiritual,  Cultural Beliefs, [employer] Practices, Values that affect care:                 Discussed  Partnership in Safe Discharge Planning  document with patient/family: Yes:     Additional Information:  Lives w/brother at [facility]. [phone] option 5 for RN, option 6 for LPN and option 3 for DON. Saumya RN can be reached at [phone] or her cell [phone], Please fax discharged orders to [phone] and the pharmacy should be on file, [facility].    Transport: Family:  friend Gabriella [phone] (no VM capability/Gabriella's spouse #[phone]) incase Gabriella doesn't answer.    Updated pt on MOON for observation status. She will be staying one more night.    CM made follow up PCP appt for Monday 12/30/24 at 5:15pm so she can start homecare svcs w/[facility], they've accepted svcs for RN/PT/OT.    Next Steps: ECHO and [facility], then return to [facility]        Babar Martino RN

## 2024-12-24 NOTE — PROGRESS NOTES
12/24/24 0773   Appointment Info   Signing Clinician's Name / Credentials (OT) Petra Boyer OTR/L   Quick Adds   Quick Adds Certification   Living Environment   People in Home sibling(s)  (brother)   Current Living Arrangements assisted living   Transportation Anticipated family or friend will provide   Living Environment Comments pt unsure where she lives, information obtained from chart   Self-Care   Usual Activity Tolerance good   Current Activity Tolerance good   Equipment Currently Used at Home walker, rolling   Instrumental Activities of Daily Living (IADL)   IADL Comments per pt report, someone helps with her laundry.  Receives meals in dining room   General Information   Onset of Illness/Injury or Date of Surgery 12/23/24   Referring Physician Dr Schofield   Patient/Family Therapy Goal Statement (OT) go home   Existing Precautions/Restrictions fall   Cognitive Status Examination   Orientation Status person  (knew hospital and then got Gaby's after a minute, knew month but not date or year)   Follows Commands follows multi-step commands   Memory Deficit moderate deficit   Cognitive Status Comments pt appears forgetful but able to easily follow commands/instructions.  Pt couldn't remember many home details or how she manages at home   Pain Assessment   Patient Currently in Pain No   Range of Motion Comprehensive   General Range of Motion no range of motion deficits identified   Strength Comprehensive (MMT)   General Manual Muscle Testing (MMT) Assessment no strength deficits identified   Bed Mobility   Bed Mobility supine-sit   Supine-Sit [place] (Bed Mobility) supervision   Transfers   Transfers bed-chair transfer;sit-stand transfer;toilet transfer   Transfer Skill: Bed to Chair/Chair to Bed   Bed-Chair [place]wood (Transfers) supervision   Sit-Stand Transfer   Sit-Stand [place] (Transfers) supervision   Toilet Transfer   Type (Toilet Transfer) stand-sit;sit-stand   Elwood Level  (Toilet Transfer) supervision   Assistive Device (Toilet Transfer) commode chair   Activities of Daily Living   BADL Assessment/Intervention lower body dressing   Lower Body Dressing Assessment/Training   Position (Lower Body Dressing) supported sitting   Terry Level (Lower Body Dressing) doff;don;socks;supervision   Clinical Impression   Criteria for Skilled Therapeutic Interventions Met (OT) Yes, treatment indicated   OT Diagnosis decreased ADL independence   Influenced by the following impairments cognition, fatigue   OT Problem List-Impairments impacting ADL activity tolerance impaired;cognition;mobility   Assessment of Occupational Performance 3-5 Performance Deficits   Identified Performance Deficits trsfs, mobility, toileting   Planned Therapy Interventions (OT) ADL retraining;cognition   Clinical Decision Making Complexity (OT) problem focused assessment/low complexity   Risk & Benefits of therapy have been explained evaluation/treatment results reviewed;patient   OT Total Evaluation Time   OT Eval, Low Complexity Minutes (31884) 14   Therapy Certification   Medical Diagnosis generalized weakness   Start of Care Date 12/24/24   Certification date from 12/24/24   Certification date to 12/31/24   OT Goals   Therapy Frequency (OT) 5 times/week   OT Predicted Duration/Target Date for Goal Attainment 12/31/24   OT: Hygiene/Grooming supervision/stand-by assist;while standing   OT: Transfer Supervision/stand-by assist   OT: Cognitive Patient/caregiver will verbalize understanding of cognitive assessment results/recommendations as needed for safe discharge planning   Self-Care/Home Management   Self-Care/Home Mgmt/ADL, Compensatory, Meal Prep Minutes (64146) 10   Treatment Detail/Skilled Intervention Eval completed, treatment initiated.  Pt pleasant but easily forgetful, unsure about what helps she may get at home.  Supine to sit with SBA, able to ambulate in room with HHA and CGA due to no FWW available, pt  states she usually uses one.  Commode trsf with SBA, cues for safey with trsfs, safe hand placement.  Pt able to manage gown with SBA, cues for thorough completion of toileting hygiene but able to complete with SBA. Pt struggled to use phone at end of session in order to place breakfast order.  Pt left sitting in chair with call light and alarm activated.   OT Discharge Planning   OT Plan SLUMS. g/h sink, safety with trsfs   OT Discharge Recommendation (DC Rec) home with home care occupational therapy;home with assist   OT Rationale for DC Rec Pt is moving likely close to her baseline however presents with decreased cognition, easily forgetful.  Unsure level of supervision that pt gets at her AL, however if she has daily checks, assist with medications, pt likely able to discharge back to [facility]   OT Brief overview of current status SBA trsfs, HHA mobility in room   Total Session Time   Timed Code Treatment Minutes 10   Total Session Time (sum of timed and untimed services) 24   [facility]                                                                                   OUTPATIENT OCCUPATIONAL THERAPY    PLAN OF TREATMENT FOR OUTPATIENT REHABILITATION   Patient's Last Name, First Name, Laney Kinsey YOB: 1940   Provider's Name   [facility]   Medical Record No.  [id number]     Onset Date: 12/23/24 Start of Care Date: (P) 12/24/24     Medical Diagnosis:  (P) generalized weakness               OT Diagnosis:  decreased ADL independence Certification Dates:  From: (P) 12/24/24  To: (P) 12/31/24     See note for plan of treatment, functional goals, and certification details.    I CERTIFY THE NEED FOR THESE SERVICES FURNISHED UNDER        THIS PLAN OF TREATMENT AND WHILE UNDER MY CARE (Physician co-signature of this document indicates review and certification of the therapy plan).

## 2024-12-24 NOTE — PLAN OF CARE
PRIMARY DIAGNOSIS: GENERALIZED WEAKNESS    OUTPATIENT/OBSERVATION GOALS TO BE MET BEFORE DISCHARGE  1. Orthostatic performed: N/A    2. Tolerating PO medications: Yes    3. Return to near baseline physical activity: No    4. Cleared for discharge by consultants (if involved): No    Discharge Planner Nurse   Safe discharge environment identified: Yes  Barriers to discharge: Yes       Entered by: Isa Díaz RN 12/23/2024 9:05 PM     Please review provider order for any additional goals.   Nurse to notify provider when observation goals have been met and patient is ready for discharge.Goal Outcome Evaluation:

## 2024-12-24 NOTE — PLAN OF CARE
Problem: Adult Inpatient Plan of Care  Goal: Absence of Hospital-Acquired Illness or Injury  Outcome: Progressing  Intervention: Identify and Manage Fall Risk  Recent Flowsheet Documentation  Taken 12/24/2024 0400 by Sandra Church RN  Safety Promotion/Fall Prevention: clutter free environment maintained  Taken 12/24/2024 0008 by Sandra Church RN  Safety Promotion/Fall Prevention: clutter free environment maintained  Intervention: Prevent Skin Injury  Recent Flowsheet Documentation  Taken 12/24/2024 0400 by Sandra Church RN  Body Position: position changed independently  Taken 12/24/2024 0008 by Sandra Church RN  Body Position: position changed independently  Intervention: Prevent and Manage VTE (Venous Thromboembolism) Risk  Recent Flowsheet Documentation  Taken 12/24/2024 0400 by Sandra Church RN  VTE Prevention/Management: patient refused intervention  Goal: Optimal Comfort and Wellbeing  Outcome: Progressing  Intervention: Monitor Pain and Promote Comfort  Recent Flowsheet Documentation  Taken 12/24/2024 0402 by Sandra Church RN  Pain Management Interventions: medication (see MAR)     Problem: Fall Injury Risk  Goal: Absence of Fall and Fall-Related Injury  Outcome: Progressing  Intervention: Identify and Manage Contributors  Recent Flowsheet Documentation  Taken 12/24/2024 0400 by Sandra Church RN  Medication Review/Management: medications reviewed  Taken 12/24/2024 0008 by Sandra Church RN  Medication Review/Management: medications reviewed  Intervention: Promote Injury-Free Environment  Recent Flowsheet Documentation  Taken 12/24/2024 0400 by Sandra Church RN  Safety Promotion/Fall Prevention: clutter free environment maintained  Taken 12/24/2024 0008 by Sandra Church RN  Safety Promotion/Fall Prevention: clutter free environment maintained     Problem: Fatigue  Goal: Improved Activity Tolerance  Outcome:  Progressing  Intervention: Promote Improved Energy  Recent Flowsheet Documentation  Taken 12/24/2024 0400 by Sandra Church, RN  Activity Management: activity adjusted per tolerance  Taken 12/24/2024 0008 by Sandra Church, RN  Activity Management: activity adjusted per tolerance     Assumed care at 2300  Admitted: Generalized weakness, fall, special precautions for COVID  Vitals: VSS, afebrile, except temp is 99.2, offered tylenol, pt refused   Neuro: A/O x self, has dementia    Cardiac: Has pacemaker, AV BBB block          Respiratory:  O2 saturation > 92% on RA.   GI/:  Adequate UO via commode  LBM per pt.   Diet/appetite: cardiac diet, no caffeine  Activity:  Assist+1  Pain:  Addressed with PRN Medication, pt denies pain  Skin: buttock tenderness from fall, no adjustment needed  LDA's: R-PIV, SL ,  mag, K+ protocol  Plan:  Pt/OT consult

## 2024-12-24 NOTE — PLAN OF CARE
PRIMARY DIAGNOSIS: GENERALIZED WEAKNESS    OUTPATIENT/OBSERVATION GOALS TO BE MET BEFORE DISCHARGE  1. Orthostatic performed: N/A    2. Tolerating PO medications: Yes    3. Return to near baseline physical activity: No    4. Cleared for discharge by consultants (if involved): No    Discharge Planner Nurse   Safe discharge environment identified: Yes  Barriers to discharge: Yes       Entered by: Isa Díaz RN 12/23/2024 9:35 PM     Please review provider order for any additional goals.   Nurse to notify provider when observation goals have been met and patient is ready for discharge.Goal Outcome Evaluation:       Pt alert to self and location, VSS, RA, denies pain. Pt tolerating low fat/low sodium/no caffeine diet, assist of one to commode. 500mL NS bolus infusing. Tele AV block with BBB.

## 2024-12-24 NOTE — PROGRESS NOTES
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  Symptoms started on 12/20/2024.  -- Breathing comfortably on room air  -- CXR w some streaking opacity in R lung base. Afebrile and normal Procal, on RA - no role for abx at this time   -- Start Paxlovid given risk of severe infection  -- Incentive spirometry  -- Guaifenesin/Tessalon as needed. Cepacol as needed  -- Monitor for hypoxia     Suspected JESUS on stage III CKD  Possibly secondary to poor oral intake in the setting of diuretic therapy  -- Cr 1.32 --> 1.48 --> 1.17   -- Received normal saline 500 mL bolus x 2 in the ER  -- Hold PTA furosemide for now  -- Encourage oral hydration   -- Monitor BMP  -- Avoid nephrotoxin    S/p Fall   ? Syncope   S/p fall at her [facility] x 2 prior to admission . She was seen in the ED on 12/22/24 s/p fall and discharged back to her [facility] . Patient poor historian, not sure if she had LOC or not   -- CT Head, C spine negative for acute findings   -- XR pelvis negative (12/22) and XR sacrum/coccyx negative (12/23)  -- Telemetry so far unrevealing   -- Age adjusted Ddimer within normal limits, no CP or hypoxia   -- Cardiac device check ordered  -- Echo findings as below - ? 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  SVT  -- HOLDing apixaban while on Paxlovid per pharmacy recommendations   -- Resume PTA metoprolol succinate  -- Telemetry    Generalized weakness  -- PT/OT/CM ;   -- SLP ; no s/s of aspiration     Dementia   Patient is really quite confused   -- OT to complete SLUMS     Essential Hypertension  Blood pressure is controlled  -- Resume PTA metoprolol succinate    Hyperlipidemia  Hold PTA atorvastatin 10 mg daily while on Paxlovid          Diet: Combination Diet Low Saturated Fat Na <2400mg Diet, No Caffeine Diet    DVT Prophylaxis: Pneumatic Compression Devices  Tenorio Catheter: Not present  Lines: None     Cardiac Monitoring: ACTIVE order. Indication: QTc prolonging medication (48 hours)  Code Status: Full Code      Clinically Significant Risk Factors Present on Admission          # Hyperchloremia: Highest Cl = 109 mmol/L in last 2 days, will monitor as appropriate           # Drug Induced Coagulation Defect: home medication list includes an anticoagulant medication    # Hypertension: Noted on problem list     # Dementia: noted on problem list            # Pacemaker present       Social Drivers of Health    Food Insecurity: Unknown (12/23/2024)    Food Insecurity     Within the past 12 months, did you worry that your food would run out before you got money to buy more?: Patient unable to answer     Within the past 12 months, did the food you bought just not last and you didn t have money to get more?: Patient unable to answer   Housing Stability: Unknown (12/23/2024)    Housing Stability     Do you have housing? : Patient unable to answer     Are you worried about losing your housing?: Patient unable to answer   Financial Resource Strain: Unknown (12/23/2024)     Financial Resource Strain     Within the past 12 months, have you or your family members you live with been unable to get utilities (heat, electricity) when it was really needed?: Patient unable to answer   Transportation Needs: Unknown (12/23/2024)    Transportation Needs     Within the past 12 months, has lack of transportation kept you from medical appointments, getting your medicines, non-medical meetings or appointments, work, or from getting things that you need?: Patient unable to answer          Disposition Plan     Medically Ready for Discharge: Anticipated Tomorrow        The patient's care was discussed with the Attending Physician, Dr. Jeannie Schofield who independently met with and assessed the patient and is in agreement with the assessment and plan     Erinn Celaya PA-C  Hospitalist [facility]  Securely message with [employer] (more info)  Text page via [facility] Paging/Directory   ______________________________________________________________________    Interval History   Patient new to me today ; quite limited historian 2/2 baseline dementia. Unsure how or why she fell, not sure if she had LOC. Continues with some sacral discomfort after fall. Denies any resp symptoms. Otherwise feeling well     Physical Exam   Vital Signs: Temp: 99.1  F (37.3  C) Temp src: Oral BP: 114/55 Pulse: 102   Resp: 18 SpO2: 97 % O2 Device: None (Room air)    Weight: 121 lbs 14.4 oz    Constitutional: awake, alert, no apparent distress, and appears stated age  Respiratory: No increased work of breathing, no accessory muscle use, clear to auscultation bilaterally, no crackles or wheezing  Cardiovascular: Regular rate and rhythm, normal S1 and S2, TROY  Skin: Normal skin color, texture, turgor. No rashes and no jaundice  Musculoskeletal: No lower extremity pitting edema present.   Neurologic: Awake, alert.   Neuropsychiatric: Confused. Appropriate mood, affect and eye contact. Cooperative.    Medical  Decision Making             Data     I have personally reviewed the following data over the past 24 hrs:    4.2  \   12.1   / 134 (L)     141 109 (H) 25.9 (H) /  145 (H)   4.3 22 1.17 (H) \     Procal: 0.18 CRP: 33.80 (H) Lactic Acid: N/A       INR:  N/A PTT:  N/A   D-dimer:  0.92 (H) Fibrinogen:  400       Imaging results reviewed over the past 24 hrs:   Recent Results (from the past 24 hours)   XR Chest 2 Views    Narrative    EXAM: XR CHEST 2 VIEWS  LOCATION: [facility]  DATE: 2024    INDICATION: COVID.    COMPARISON: 6/10/2021.      Impression    IMPRESSION: Heart and mediastinal size normal. No change in dual-lead cardiac pacer. There is some streaky opacity involving the right lung base, representing either atelectasis or an infectious process. No pleural effusion or pneumothorax.     Echocardiogram Complete   Result Value    LVEF  30-35% (moderately reduced)    Narrative    [id number]  [id number]  [id number]  [id number] [address]     Name: NEPTALI PRAKASH  MRN: [id number]  : 1940  Study Date: 2024 01:19 PM  Age: 84 yrs  Gender: Female  Patient Location: [facility]  Reason For Study: Syncope  Ordering Physician: VILMA ARELLANO  Referring Physician: VILMA ARELLANO  Performed By: LOC     BSA: 1.6 m2  Height: 63 in  Weight: 121 lb  HR: 74  BP: 105/60 mmHg  ______________________________________________________________________________  Procedure  Echocardiogram with two-dimensional, color and spectral Doppler. Adequate  quality two-dimensional was performed and interpreted.  ______________________________________________________________________________  Interpretation Summary     The left ventricle is normal in size with normal left ventricular wall  thickness.  Left ventricular function is decreased. The ejection fraction is 30-35%  (moderately reduced).  The right ventricle is normal size with mildly decreased right  ventricular  systolic function  The left atrium is mildly dilated.  Severe low flow, low gradient aortic stenosis is present with a peak velocity  of 2.9 m/s, mean gradient 21 mmHg, SVi 28 ml/m2, LANNY 0.6 cm2, DI 0.22.  Mild calcific mitral stenosis is present.  Compared to the prior study of 9/12/22, the degree of aortic stenosis has  advanced.     Hemodynamically significant valve disease is identified. Recommend referral to  valve clinic for further evaluation. Valve clinic phone number: [phone].  [facility] Epic: [facility] [place] [facility].  ______________________________________________________________________________  Left Ventricle  The left ventricle is normal in size. Left ventricular function is decreased.  The ejection fraction is 30-35% (moderately reduced). There is normal left  ventricular wall thickness. Left ventricular diastolic function is abnormal.  There is moderate global hypokinesia of the left ventricle.     Right Ventricle  The right ventricle is normal size. Mildly decreased right ventricular  systolic function.     Atria  The left atrium is mildly dilated. Right atrial size is normal. There is no  color Doppler evidence of an atrial shunt.     Mitral Valve  There is mild to moderate mitral annular calcification. There is mild (1+)  mitral regurgitation. There is mild mitral stenosis.     Tricuspid Valve  Tricuspid valve leaflets appear normal. There is no evidence of tricuspid  stenosis or clinically significant tricuspid regurgitation. Right ventricle  systolic pressure estimate normal. The right ventricular systolic pressure is  approximated at 16.5 mmHg plus the right atrial pressure.     Aortic Valve  There is trace aortic regurgitation. Severe low flow, low gradient aortic  stenosis is present with a peak velocity of 2.9 m/s, mean gradient 21 mmHg,  SVi 28 ml/m2, LANNY 0.6 cm2, DI 0.22.     Pulmonic Valve  The pulmonic valve is not well seen, but is grossly normal. This  degree of  valvular regurgitation is within normal limits. There is trace pulmonic  valvular regurgitation.     Vessels  The aorta root is normal. Normal size ascending aorta. IVC diameter <2.1 cm  collapsing >50% with sniff suggests a normal RA pressure of 3 mmHg.     Pericardium  There is no pericardial effusion.     Rhythm  Sinus rhythm was noted.  ______________________________________________________________________________  MMode/2D Measurements & Calculations  IVSd: 0.60 cm     LVIDd: 5.4 cm  LVIDs: 4.4 cm  LVPWd: 0.76 cm  FS: 19.9 %  LV mass(C)d: 127.5 grams  LV mass(C)dI: 81.6 grams/m2  Ao root diam: 2.7 cm  LA dimension: 3.8 cm  asc Aorta Diam: 2.9 cm  LA/Ao: 1.4  LVOT diam: 1.9 cm  LVOT area: 2.8 cm2  Ao root diam index Ht(cm/m): 1.7  Ao root diam index BSA (cm/m2): 1.7  Asc Ao diam index BSA (cm/m2): 1.9  Asc Ao diam index Ht(cm/m): 1.8  EF Biplane: 31.0 %  LA Volume (BP): 53.6 ml     LA Volume Indexed (AL/bp): 36.6 ml/m2  RV Base: 2.7 cm  RWT: 0.28  TAPSE: 1.6 cm     Time Measurements  MM HR: 77.0 BPM     Doppler Measurements & Calculations  MV E max bandar: 140.5 cm/sec  MV A max bandar: 142.5 cm/sec  MV E/A: 0.99  MV max PG: 10.9 mmHg  MV mean P.0 mmHg  MV V2 VTI: 57.4 cm  MVA(VTI): 0.78 cm2  MV dec slope: 331.5 cm/sec2  MV dec time: 0.44 sec  Ao V2 max: 291.8 cm/sec  Ao max P.0 mmHg  Ao V2 mean: 217.0 cm/sec  Ao mean P.0 mmHg  Ao V2 VTI: 79.0 cm  LANNY(I,D): 0.56 cm2  LANNY(V,D): 0.62 cm2  LV V1 max P.6 mmHg  LV V1 max: 63.8 cm/sec  LV V1 VTI: 15.7 cm     SV(LVOT): 44.5 ml  SI(LVOT): 28.5 ml/m2  PA acc time: 0.08 sec  TR max bandar: 203.0 cm/sec  TR max P.5 mmHg  AV Bandar Ratio (DI): 0.22  LANNY Index (cm2/m2): 0.36  E/E' av.0  Lateral E/e': 22.7  Medial E/e': 27.3  RV S Bandar: 8.1 cm/sec     ______________________________________________________________________________  Report approved by: Roly Feldman MD on 2024 02:20 PM

## 2024-12-24 NOTE — PLAN OF CARE
"  Problem: Adult Inpatient Plan of Care  Goal: Patient-Specific Goal (Individualized)  Description: You can add care plan individualizations to a care plan. Examples of Individualization might be:  \"Parent requests to be called daily at 9am for status\", \"I have a hard time hearing out of my right ear\", or \"Do not touch me to wake me up as it startles  me\".  Outcome: Progressing     Problem: Adult Inpatient Plan of Care  Goal: Optimal Comfort and Wellbeing  Outcome: Progressing  Intervention: Monitor Pain and Promote Comfort  Recent Flowsheet Documentation  Taken 12/24/2024 1015 by Carolee Franks RN  Pain Management Interventions: medication (see MAR)     Problem: Fall Injury Risk  Goal: Absence of Fall and Fall-Related Injury  Outcome: Progressing  Intervention: Identify and Manage Contributors  Recent Flowsheet Documentation  Taken 12/24/2024 1200 by Carolee Franks RN  Medication Review/Management: medications reviewed  Taken 12/24/2024 0945 by Carolee Franks RN  Medication Review/Management: medications reviewed  Intervention: Promote Injury-Free Environment  Recent Flowsheet Documentation  Taken 12/24/2024 1200 by Carolee Franks RN  Safety Promotion/Fall Prevention:   clutter free environment maintained   activity supervised  Taken 12/24/2024 0945 by Carolee Franks RN  Safety Promotion/Fall Prevention:   clutter free environment maintained   activity supervised   Goal Outcome Evaluation:      Plan of Care Reviewed With: patient  VSS. Patient alert and orientated x3, forgetful. Medicated once with Tylenol for low back pain, medication effective. Patient up to bedside commode with standby assist, voiding with no incident. Bed alarm in place, call light in reach. Patient needs setup for meals, patient takes pills crushed in applesauce. Continue to monitor               "

## 2024-12-24 NOTE — PROGRESS NOTES
12/24/24 1116   Appointment Info   Signing Clinician's Name / Credentials (PT) Marylin Dominique DPT   Quick Adds   Quick Adds Certification   Living Environment   People in Home sibling(s)   Current Living Arrangements assisted living   Home Accessibility no concerns   Living Environment Comments unsure where she lives. says her brother lives with her but is unable to help her if she falls.   Self-Care   Usual Activity Tolerance good   Current Activity Tolerance moderate   Equipment Currently Used at Home walker, rolling   Fall history within last six months yes   Number of times patient has fallen within last six months 2   General Information   Onset of Illness/Injury or Date of Surgery 12/23/24   Referring Physician Laurie Rob MD   Patient/Family Therapy Goals Statement (PT) none stated   Pertinent History of Current Problem (include personal factors and/or comorbidities that impact the POC) Laney Hahn is 84 year old female with history of SVT, stage III CKD, vascular dementia, paroxysmal atrial fibrillation, aortic stenosis, HFrEF, hypertension, hyperlipidemia, 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 and suspected JESUS on stage III CKD   Existing Precautions/Restrictions fall   Cognition   Affect/Mental Status (Cognition) confused   Pain Assessment   Patient Currently in Pain No   Range of Motion (ROM)   Range of Motion ROM is WFL   Strength (Manual Muscle Testing)   Strength (Manual Muscle Testing) Deficits observed during functional mobility   Bed Mobility   Bed Mobility supine-sit;sit-supine   Supine-Sit [place] (Bed Mobility) contact guard   Sit-Supine [place] (Bed Mobility) contact guard   Comment, (Bed Mobility) bed rail, HOB slightly elevated   Transfers   Transfers sit-stand transfer   Sit-Stand Transfer   Sit-Stand [place]  (Transfers) minimum assist (75% patient effort)   Assistive Device (Sit-Stand Transfers) walker, front-wheeled   Comment, (Sit-Stand Transfer) pulls to stand with walker   Gait/Stairs (Locomotion)   Coosa Level (Gait) contact guard   Assistive Device (Gait) walker, front-wheeled   Distance in Feet (Gait) 12'   Pattern (Gait) step-through   Comment, (Gait/Stairs) unsteady at times   Balance   Balance Comments sitting EOB SBA   Clinical Impression   Criteria for Skilled Therapeutic Intervention Yes, treatment indicated   PT Diagnosis (PT) impaired functional mobility, gait abnormality   Influenced by the following impairments decreased strength, decreased endurance   Functional limitations due to impairments gait, transfers, bed mob   Clinical Presentation (PT Evaluation Complexity) stable   Clinical Presentation Rationale pt presents as medically diagnosed   Clinical Decision Making (Complexity) low complexity   Planned Therapy Interventions (PT) balance training;bed mobility training;gait training;home exercise program;neuromuscular re-education;patient/family education;strengthening;transfer training   Risk & Benefits of therapy have been explained evaluation/treatment results reviewed;patient   PT Total Evaluation Time   PT Eval, Low Complexity Minutes (09746) 15   Therapy Certification   Start of care date 12/24/24   Certification date from 12/24/24   Certification date to 12/31/24   Medical Diagnosis generalized weakness   Physical Therapy Goals   PT Frequency 5x/week   PT Predicted Duration/Target Date for Goal Attainment 12/31/24   PT Goals Bed Mobility;Transfers;Gait   PT: Bed Mobility Supervision/stand-by assist;Supine to/from sit   PT: Transfers Supervision/stand-by assist;Sit to/from stand;Bed to/from chair;Assistive device   PT: Gait Supervision/stand-by assist;Assistive device;Rolling walker;50 feet   Interventions   Interventions Quick Adds Therapeutic Activity   Therapeutic Activity    Therapeutic Activities: dynamic activities to improve functional performance Minutes (55664) 25   Symptoms Noted During/After Treatment Fatigue   Treatment Detail/Skilled Intervention supine <> sit again at EOB CGA x 1. Cues for technique and safety, no SOB but reports mild dizziness upon sitting again, very fatigued. Static sitting EOB x 10 minutes with SBA, posterior and R lean noted, unable to get to midline at this time. Sit <> stand w/o AD (pt normally forgets to use at home), Titus. Amb x 6' x 2 commode <> bed, with HHA x 1 Titus, unsteady, hunched posture. Cues for upright and safety. Pt impulsive. Static sitting on commode with CGA x 1 d/t impulsivity. Sit <> stand commode without AD CGA, cues for safety. In bed at end of session with call light in hand and alarm on.   PT Discharge Planning   PT Plan sit <> stand, encourage FWW use, higher level balance   PT Discharge Recommendation (DC Rec) home with assist;home with home care physical therapy   PT Rationale for DC Rec if brother able to assist pt at home, likely home with home care PT. may need extra checks during day for safety. home PT recommended for pt safety. if unable to have further checks, may need TCU pending strength   PT Brief overview of current status amb in room with FWW CGA, w/o AD CGA-Titus but unsteady   PT Equipment Needed at Discharge walker, rolling   Physical Therapy Time and Intention   Timed Code Treatment Minutes 25   Total Session Time (sum of timed and untimed services) 40     [facility]                                                                                   OUTPATIENT PHYSICAL THERAPY    PLAN OF TREATMENT FOR OUTPATIENT REHABILITATION   Patient's Last Name, First Name, Laney Kinsey YOB: 1940   Provider's Name   [facility]   Medical Record No.  [id number]     Onset Date: 12/23/24 Start of Care Date: 12/24/24     Medical Diagnosis:   generalized weakness               PT Diagnosis:  impaired functional mobility, gait abnormality Certification Dates:  From: 12/24/24  To: 12/31/24       See note for plan of treatment, functional goals, and certification details.    I CERTIFY THE NEED FOR THESE SERVICES FURNISHED UNDER        THIS PLAN OF TREATMENT AND WHILE UNDER MY CARE (Physician co-signature of this document indicates review and certification of the therapy plan).

## 2024-12-24 NOTE — PLAN OF CARE
PRIMARY DIAGNOSIS: ACUTE PAIN  OUTPATIENT/OBSERVATION GOALS TO BE MET BEFORE DISCHARGE:  1. Pain Status: Improved-controlled with oral pain medications.    2. Return to near baseline physical activity: No    3. Cleared for discharge by consultants (if involved): No    Discharge Planner Nurse   Safe discharge environment identified: Yes  Barriers to discharge: Yes       Entered by: Carolee Franks RN 12/24/2024 2:57 PM     Please review provider order for any additional goals.   Nurse to notify provider when observation goals have been met and patient is ready for discharge.Goal Outcome Evaluation:

## 2024-12-24 NOTE — PLAN OF CARE
PRIMARY DIAGNOSIS: ACUTE PAIN  OUTPATIENT/OBSERVATION GOALS TO BE MET BEFORE DISCHARGE:  1. Pain Status: Improved-controlled with oral pain medications.    2. Return to near baseline physical activity: Yes    3. Cleared for discharge by consultants (if involved): No    Discharge Planner Nurse   Safe discharge environment identified: Yes  Barriers to discharge: Yes       Entered by: Carolee Franks RN 12/24/2024 12:43 PM     Please review provider order for any additional goals.   Nurse to notify provider when observation goals have been met and patient is ready for discharge.Goal Outcome Evaluation:

## 2024-12-25 ENCOUNTER — APPOINTMENT (OUTPATIENT)
Dept: OCCUPATIONAL THERAPY | Facility: HOSPITAL | Age: 84
End: 2024-12-25
Payer: COMMERCIAL

## 2024-12-25 VITALS
RESPIRATION RATE: 16 BRPM | OXYGEN SATURATION: 97 % | HEIGHT: 64 IN | SYSTOLIC BLOOD PRESSURE: 106 MMHG | BODY MASS INDEX: 20.81 KG/M2 | HEART RATE: 70 BPM | TEMPERATURE: 97.7 F | DIASTOLIC BLOOD PRESSURE: 71 MMHG | WEIGHT: 121.9 LBS

## 2024-12-25 LAB
ANION GAP SERPL CALCULATED.3IONS-SCNC: 11 MMOL/L (ref 7–15)
BUN SERPL-MCNC: 25.8 MG/DL (ref 8–23)
CALCIUM SERPL-MCNC: 8.1 MG/DL (ref 8.8–10.4)
CHLORIDE SERPL-SCNC: 109 MMOL/L (ref 98–107)
CREAT SERPL-MCNC: 1.11 MG/DL (ref 0.51–0.95)
CRP SERPL-MCNC: 20.4 MG/L
EGFRCR SERPLBLD CKD-EPI 2021: 49 ML/MIN/1.73M2
GLUCOSE SERPL-MCNC: 106 MG/DL (ref 70–99)
HCO3 SERPL-SCNC: 20 MMOL/L (ref 22–29)
HOLD SPECIMEN: NORMAL
MAGNESIUM SERPL-MCNC: 1.9 MG/DL (ref 1.7–2.3)
POTASSIUM SERPL-SCNC: 4.2 MMOL/L (ref 3.4–5.3)
SODIUM SERPL-SCNC: 140 MMOL/L (ref 135–145)

## 2024-12-25 PROCEDURE — 36415 COLL VENOUS BLD VENIPUNCTURE: CPT | Performed by: INTERNAL MEDICINE

## 2024-12-25 PROCEDURE — 97129 THER IVNTJ 1ST 15 MIN: CPT | Mod: GO

## 2024-12-25 PROCEDURE — 250N000013 HC RX MED GY IP 250 OP 250 PS 637: Performed by: INTERNAL MEDICINE

## 2024-12-25 PROCEDURE — 80048 BASIC METABOLIC PNL TOTAL CA: CPT | Performed by: INTERNAL MEDICINE

## 2024-12-25 PROCEDURE — G0378 HOSPITAL OBSERVATION PER HR: HCPCS

## 2024-12-25 PROCEDURE — 86140 C-REACTIVE PROTEIN: CPT | Performed by: INTERNAL MEDICINE

## 2024-12-25 PROCEDURE — 250N000013 HC RX MED GY IP 250 OP 250 PS 637

## 2024-12-25 PROCEDURE — 97535 SELF CARE MNGMENT TRAINING: CPT | Mod: GO

## 2024-12-25 PROCEDURE — 99239 HOSP IP/OBS DSCHRG MGMT >30: CPT | Mod: FS

## 2024-12-25 PROCEDURE — 99207 PR APP CREDIT; MD BILLING SHARED VISIT: CPT | Performed by: HOSPITALIST

## 2024-12-25 PROCEDURE — 83735 ASSAY OF MAGNESIUM: CPT

## 2024-12-25 PROCEDURE — 82374 ASSAY BLOOD CARBON DIOXIDE: CPT | Performed by: INTERNAL MEDICINE

## 2024-12-25 RX ORDER — ATORVASTATIN CALCIUM 10 MG/1
10 TABLET, FILM COATED ORAL EVERY MORNING
Qty: 30 TABLET | Refills: 2 | Status: SHIPPED | OUTPATIENT
Start: 2024-12-28

## 2024-12-25 RX ADMIN — NIRMATRELVIR AND RITONAVIR 2 TABLET: KIT at 09:35

## 2024-12-25 RX ADMIN — GUAIFENESIN 600 MG: 600 TABLET ORAL at 09:35

## 2024-12-25 RX ADMIN — METOPROLOL SUCCINATE 50 MG: 50 TABLET, EXTENDED RELEASE ORAL at 09:35

## 2024-12-25 ASSESSMENT — ACTIVITIES OF DAILY LIVING (ADL)
ADLS_ACUITY_SCORE: 42
ADLS_ACUITY_SCORE: 42
ADLS_ACUITY_SCORE: 45
ADLS_ACUITY_SCORE: 42
ADLS_ACUITY_SCORE: 42
ADLS_ACUITY_SCORE: 45
ADLS_ACUITY_SCORE: 45
ADLS_ACUITY_SCORE: 42
ADLS_ACUITY_SCORE: 42
ADLS_ACUITY_SCORE: 45
ADLS_ACUITY_SCORE: 42
ADLS_ACUITY_SCORE: 45
ADLS_ACUITY_SCORE: 45

## 2024-12-25 NOTE — PLAN OF CARE
"PRIMARY DIAGNOSIS: \"GENERIC\" NURSING  OUTPATIENT/OBSERVATION GOALS TO BE MET BEFORE DISCHARGE:  ADLs back to baseline: Yes    Activity and level of assistance: Up with standby assistance.    Pain status: Pain free.    Return to near baseline physical activity: Yes     Discharge Planner Nurse   Safe discharge environment identified: Yes  Barriers to discharge: No       Entered by: Zakia Loaiza RN 12/25/2024 11:51 AM     Please review provider order for any additional goals.   Nurse to notify provider when observation goals have been met and patient is ready for discharge.Goal Outcome Evaluation:                        "

## 2024-12-25 NOTE — PLAN OF CARE
"PRIMARY DIAGNOSIS: \"GENERIC\" NURSING  OUTPATIENT/OBSERVATION GOALS TO BE MET BEFORE DISCHARGE:  ADLs back to baseline: Yes    Activity and level of assistance: Up with standby assistance.    Pain status: Pain free.    Return to near baseline physical activity: Yes     Discharge Planner Nurse   Safe discharge environment identified: Yes  Barriers to discharge: No       Entered by: Zakia Loaiza RN 12/25/2024 2:03 PM     Please review provider order for any additional goals.   Nurse to notify provider when observation goals have been met and patient is ready for discharge.Goal Outcome Evaluation:                        "

## 2024-12-25 NOTE — PROGRESS NOTES
Patient discharged to AL at 1526 via Private Car.  Accompanied by friend and staff.  Discharge instructions were reviewed with patient, opportunity offered to ask questions.    Prescriptions printed and given to patient/family.  Access discontinued: Yes  Care plan and education discontinued: Yes  Home meds retrieved from pharmacy: N/A  Belongings were sent home with patient/family: Eye glasses, red pants, shoes, black flower sweater, under shirt, and brown watch.

## 2024-12-25 NOTE — PLAN OF CARE
"PRIMARY DIAGNOSIS: \"GENERIC\" NURSING  OUTPATIENT/OBSERVATION GOALS TO BE MET BEFORE DISCHARGE:  ADLs back to baseline: No    Activity and level of assistance: A1    Pain status: Pain free.    Return to near baseline physical activity: Yes     Discharge Planner Nurse   Safe discharge environment identified: Yes  Barriers to discharge: Yes       Entered by: Georgiana Barahona RN 12/25/2024 2:22 AM     Please review provider order for any additional goals.   Nurse to notify provider when observation goals have been met and patient is ready for discharge.Goal Outcome Evaluation:                        "

## 2024-12-25 NOTE — PROGRESS NOTES
Care Management Discharge Note    Discharge Date: 12/25/2024       Discharge Disposition: Assisted Living    Discharge Services:  (home care RN/PT/OT)    Discharge DME: None    Discharge Transportation:  friend will provide    Private pay costs discussed: Not applicable    Does the patient's insurance plan have a 3 day qualifying hospital stay waiver?  No    Education Provided on the Discharge Plan:  per care team    Persons Notified of Discharge Plans:  yes-[facility] nurse, patient      Patient/Family in Agreement with the Plan: yes    Handoff Referral Completed: No, handoff not indicated or clinically appropriate    Additional Information:    Patient to discharge back to [facility] with support from family and accepted for home care RN/PT/OT with [facility].   Orders faxed to [facility] [phone].    Alisson Steen RN

## 2024-12-25 NOTE — DISCHARGE SUMMARY
[facility]  Hospitalist Discharge Summary      Date of Admission:  12/23/2024  Date of Discharge:  12/25/2024  Discharging Provider: Erinn Celaya PA-C  Discharge Service: Hospitalist Service    Discharge Diagnoses   COVID-19 Infection   Generalized Weakness  JESUS on CKD3  S/p Fall   HFrEF with severe aortic stenosis     Clinically Significant Risk Factors          Follow-ups Needed After Discharge   Follow-up Appointments       Follow Up      Follow up with cardiology for progression of aortic stenosis. 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 Also found to have JESUS on CKD in the ED at admission. Patient admitted and started on Paxlovid, given IVF in the ED which improved her JESUS. PT/OT was consulted who recommend return to [facility] with increased cares. Patient stable for discharge, to complete her course of Paxlovid following discharge. Needs follow-up with cardiology for progression of aortic stenosis.      COVID-19 infection  Vaccinated in 2023 for COVID.  Symptoms started on 12/20/2024.  -- Breathing comfortably on room air, no hypoxia   -- CXR w some streaking opacity in R lung base. Afebrile and normal Procal, on RA - no role for abx at this time   -- Started Paxlovid given risk of severe infection, to complete therapy at discharge   -- Holding statin and Eliquis until  completes Paxlovid course, then to resume     JESUS on stage III CKD (resolved)  Possibly secondary to poor oral intake in the setting of diuretic therapy  -- Cr 1.32 --> 1.48 --> 1.17 --> 1.11  -- Received normal saline 500 mL bolus x 2 in the ER  -- Held PTA furosemide during hospital course, resume at discharge      S/p Fall   ? Syncope   S/p fall at her [facility] x 2 prior to admission . She was seen in the ED on 12/22/24 s/p fall and discharged back to her [facility] . Patient poor historian, not sure if she had LOC or not   -- CT Head, C spine negative for acute findings   -- XR pelvis negative (12/22) and XR sacrum/coccyx negative (12/23)  -- Telemetry has been unrevealing   -- Cardiac device check ordered, no significant findings   -- Age adjusted Ddimer within normal limits, no CP or hypoxia   -- Echo findings as below - ? 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 SLUMS done 12/20 ; likely could benefit from memory care in the future. Home RN/PT/OT ordered   -- SLP ; no s/s of aspiration     Essential Hypertension  Blood pressure is controlled  -- Resume PTA metoprolol succinate     Hyperlipidemia  Hold PTA atorvastatin 10 mg daily while on Paxlovid, resume once complete    Consultations This Hospital Stay   OCCUPATIONAL THERAPY  ADULT IP CONSULT  PHYSICAL THERAPY ADULT IP CONSULT  CARE MANAGEMENT / SOCIAL WORK IP CONSULT  SPEECH LANGUAGE PATH ADULT IP CONSULT    Code Status   Full Code    Time Spent on this Encounter   I, Erinn Celaya PA-C, personally saw the patient today and spent greater than 30 minutes discharging this patient.     This case was discussed with the Attending Physician, Dr. Jeannie Schofield, who independently met with and assessed the patient and who is in agreement with the disposition and discharge.    Erinn Celaya PA-C  [facility] EXTENDED RECOVERY AND SHORT STAY  [address] [place] [id number]  Phone: [phone]  Fax: [phone]  ______________________________________________________________________    Physical Exam   Vital Signs: Temp: 97.8  F (36.6  C) Temp src: Oral BP: 115/63 Pulse: 70   Resp: 18 SpO2: 94 % O2 Device: None (Room air)    Weight: 121 lbs 14.4 oz    Constitutional: awake, alert, no apparent distress, and appears stated age  Respiratory: No increased work of breathing, no accessory muscle use, clear to auscultation bilaterally, no crackles or wheezing  Cardiovascular: Regular rate and rhythm, normal S1 and S2, no S3 or S4, and no murmur noted  Skin: Normal skin color, texture, turgor. No rashes and no jaundice  Musculoskeletal: no lower extremity pitting edema present. 2+ DP pulses  Neurologic: Awake, alert.   Neuropsychiatric: Appropriate mood, affect and eye contact. Cooperative.       Primary Care Physician   Rose Mcelroy    Discharge Orders      Primary Care - Care Coordination Referral      Home Care Referral      Adult Cardiology Errol Posey Referral      Reason for your hospital stay    COVID-19 Infection   Generalized Weakness  JESUS on CKDIII  Fall due to Weakness  HFrEF  Paroxysmal atrial fibrillation     Activity    Your activity upon discharge: activity as tolerated     Follow Up    Follow up with cardiology for progression of aortic stenosis. A referral  to the valve clinic was placed     Diet    Follow this diet upon discharge: Regular adult diet     Hospital Follow-up with Existing Primary Care Provider (PCP)    Please see details below            Significant Results and Procedures   Most Recent 3 CBC's:  Recent Labs   Lab Test 12/24/24  0457 12/23/24  1038 12/22/24  1425   WBC 4.2 5.8 6.4   HGB 12.1 13.9 13.4   MCV 98 97 98   * 183 158     Most Recent 3 BMP's:  Recent Labs   Lab Test 12/25/24  0627 12/24/24  0457 12/23/24  1038    141 138   POTASSIUM 4.2 4.3 4.9   CHLORIDE 109* 109* 103   CO2 20* 22 21*   BUN 25.8* 25.9* 30.0*   CR 1.11* 1.17* 1.48*   ANIONGAP 11 10 14   CURT 8.1* 8.3* 9.0   * 145* 137*   ,   Results for orders placed or performed during the hospital encounter of 12/23/24   XR Sacrum and Coccyx 2 Views    Narrative    EXAM: XR SACRUM AND COCCYX 2 VIEWS  LOCATION: [facility]  DATE: 12/23/2024    INDICATION: pain  COMPARISON: None.      Impression    IMPRESSION: Osteopenia. No fracture.   Head CT w/o contrast    Narrative    EXAM: CT HEAD W/O CONTRAST  LOCATION: [facility]  DATE: 12/23/2024    INDICATION: Fall, on thinners.  COMPARISON: Head CT 12/22/2024  TECHNIQUE: Routine CT Head without IV contrast. Multiplanar reformats. Dose reduction techniques were used.    FINDINGS:  INTRACRANIAL CONTENTS: No intracranial hemorrhage, extraaxial collection, or mass effect.  No CT evidence of acute infarct. Moderate presumed chronic small vessel ischemic changes. Moderate generalized volume loss. No hydrocephalus.     VISUALIZED ORBITS/SINUSES/MASTOIDS: No intraorbital abnormality. No paranasal sinus mucosal disease. No middle ear or mastoid effusion.    BONES/SOFT TISSUES: No acute abnormality.      Impression    IMPRESSION:  1.  No CT evidence for acute intracranial process.  2.  Brain atrophy and presumed chronic microvascular ischemic changes as above.   CT Cervical Spine w/o Contrast     Narrative    EXAM: CT CERVICAL SPINE W/O CONTRAST  LOCATION: [facility]  DATE: 2024    INDICATION: Fall, on thinners.  COMPARISON: CT cervical spine one-day prior.  TECHNIQUE: Routine CT Cervical Spine without IV contrast. Multiplanar reformats. Dose reduction techniques were used.      Impression    IMPRESSION: Mild degenerative anterolisthesis of C3 upon C4 again noted. Alignment is otherwise normal; however, there is straightening of normal cervical lordosis. Vertebral body heights normal. No fractures. There is loss of disc space height and   degenerative endplate spurring at C4-C5, C5-C6, C6-C7 and C7-T1. Facet arthropathy throughout the cervical spine. Mild, diffuse degenerative spinal canal narrowing throughout the cervical spine. No high-grade spinal canal stenosis. No prevertebral soft   tissue swelling.   XR Chest 2 Views    Narrative    EXAM: XR CHEST 2 VIEWS  LOCATION: [facility]  DATE: 2024    INDICATION: COVID.    COMPARISON: 6/10/2021.      Impression    IMPRESSION: Heart and mediastinal size normal. No change in dual-lead cardiac pacer. There is some streaky opacity involving the right lung base, representing either atelectasis or an infectious process. No pleural effusion or pneumothorax.     Echocardiogram Complete     Value    LVEF  30-35% (moderately reduced)    Narrative    [id number]  [id number]  [id number]  [id number]^EILEEN^[place] [address]     Name: NEPTALI PRAKASH  MRN: [id number]  : 1940  Study Date: 2024 01:19 PM  Age: 84 yrs  Gender: Female  Patient Location: [facility]  Reason For Study: Syncope  Ordering Physician: VILMA ARELLANO  Referring Physician: VILMA ARELLANO  Performed By: LOC     BSA: 1.6 m2  Height: 63 in  Weight: 121 lb  HR: 74  BP: 105/60 mmHg  ______________________________________________________________________________  Procedure  Echocardiogram with  two-dimensional, color and spectral Doppler. Adequate  quality two-dimensional was performed and interpreted.  ______________________________________________________________________________  Interpretation Summary     The left ventricle is normal in size with normal left ventricular wall  thickness.  Left ventricular function is decreased. The ejection fraction is 30-35%  (moderately reduced).  The right ventricle is normal size with mildly decreased right ventricular  systolic function  The left atrium is mildly dilated.  Severe low flow, low gradient aortic stenosis is present with a peak velocity  of 2.9 m/s, mean gradient 21 mmHg, SVi 28 ml/m2, LANNY 0.6 cm2, DI 0.22.  Mild calcific mitral stenosis is present.  Compared to the prior study of 9/12/22, the degree of aortic stenosis has  advanced.     Hemodynamically significant valve disease is identified. Recommend referral to  valve clinic for further evaluation. Valve clinic phone number: [phone].  [facility] Epic: [facility].  ______________________________________________________________________________  Left Ventricle  The left ventricle is normal in size. Left ventricular function is decreased.  The ejection fraction is 30-35% (moderately reduced). There is normal left  ventricular wall thickness. Left ventricular diastolic function is abnormal.  There is moderate global hypokinesia of the left ventricle.     Right Ventricle  The right ventricle is normal size. Mildly decreased right ventricular  systolic function.     Atria  The left atrium is mildly dilated. Right atrial size is normal. There is no  color Doppler evidence of an atrial shunt.     Mitral Valve  There is mild to moderate mitral annular calcification. There is mild (1+)  mitral regurgitation. There is mild mitral stenosis.     Tricuspid Valve  Tricuspid valve leaflets appear normal. There is no evidence of tricuspid  stenosis or clinically significant tricuspid  regurgitation. Right ventricle  systolic pressure estimate normal. The right ventricular systolic pressure is  approximated at 16.5 mmHg plus the right atrial pressure.     Aortic Valve  There is trace aortic regurgitation. Severe low flow, low gradient aortic  stenosis is present with a peak velocity of 2.9 m/s, mean gradient 21 mmHg,  SVi 28 ml/m2, LANNY 0.6 cm2, DI 0.22.     Pulmonic Valve  The pulmonic valve is not well seen, but is grossly normal. This degree of  valvular regurgitation is within normal limits. There is trace pulmonic  valvular regurgitation.     Vessels  The aorta root is normal. Normal size ascending aorta. IVC diameter <2.1 cm  collapsing >50% with sniff suggests a normal RA pressure of 3 mmHg.     Pericardium  There is no pericardial effusion.     Rhythm  Sinus rhythm was noted.  ______________________________________________________________________________  MMode/2D Measurements & Calculations  IVSd: 0.60 cm     LVIDd: 5.4 cm  LVIDs: 4.4 cm  LVPWd: 0.76 cm  FS: 19.9 %  LV mass(C)d: 127.5 grams  LV mass(C)dI: 81.6 grams/m2  Ao root diam: 2.7 cm  LA dimension: 3.8 cm  asc Aorta Diam: 2.9 cm  LA/Ao: 1.4  LVOT diam: 1.9 cm  LVOT area: 2.8 cm2  Ao root diam index Ht(cm/m): 1.7  Ao root diam index BSA (cm/m2): 1.7  Asc Ao diam index BSA (cm/m2): 1.9  Asc Ao diam index Ht(cm/m): 1.8  EF Biplane: 31.0 %  LA Volume (BP): 53.6 ml     LA Volume Indexed (AL/bp): 36.6 ml/m2  RV Base: 2.7 cm  RWT: 0.28  TAPSE: 1.6 cm     Time Measurements  MM HR: 77.0 BPM     Doppler Measurements & Calculations  MV E max vika: 140.5 cm/sec  MV A max vika: 142.5 cm/sec  MV E/A: 0.99  MV max PG: 10.9 mmHg  MV mean P.0 mmHg  MV V2 VTI: 57.4 cm  MVA(VTI): 0.78 cm2  MV dec slope: 331.5 cm/sec2  MV dec time: 0.44 sec  Ao V2 max: 291.8 cm/sec  Ao max P.0 mmHg  Ao V2 mean: 217.0 cm/sec  Ao mean P.0 mmHg  Ao V2 VTI: 79.0 cm  LANNY(I,D): 0.56 cm2  LANNY(V,D): 0.62 cm2  LV V1 max P.6 mmHg  LV V1 max: 63.8 cm/sec  LV V1  VTI: 15.7 cm     SV(LVOT): 44.5 ml  SI(LVOT): 28.5 ml/m2  PA acc time: 0.08 sec  TR max bandar: 203.0 cm/sec  TR max P.5 mmHg  AV Bandar Ratio (DI): 0.22  LANNY Index (cm2/m2): 0.36  E/E' av.0  Lateral E/e': 22.7  Medial E/e': 27.3  RV S Bandar: 8.1 cm/sec     ______________________________________________________________________________  Report approved by: Roly Feldman MD on 2024 02:20 PM             Discharge Medications   Current Discharge Medication List        START taking these medications    Details   nirmatrelvir and ritonavir (PAXLOVID) 150 mg/100 mg therapy pack Take 2 tablets by mouth 2 times daily for 5 days. . Finish remainder of Paxlovid therapy pack that was started in the hospital.  Follow instructions on that pack.    Comments: This order will not be sent to a retail pharmacy. This order is intended for the AVS summary and for continuation of the remainder of the Paxlovid pack dispensed by the inpatient pharmacy at home. Confirm that the patient has received the inpatient pharmacy supplied Paxlovid to take home.  Associated Diagnoses: COVID-19 virus infection           CONTINUE these medications which have CHANGED    Details   apixaban ANTICOAGULANT (ELIQUIS ANTICOAGULANT) 2.5 MG tablet Take 1 tablet (2.5 mg) by mouth 2 times daily. Due for appointment with Rose Mcelroy  Qty: 60 tablet, Refills: 1    Associated Diagnoses: Paroxysmal atrial fibrillation (H)      atorvastatin (LIPITOR) 10 MG tablet Take 1 tablet (10 mg) by mouth every morning.  Qty: 30 tablet, Refills: 2    Associated Diagnoses: Dyslipidemia, goal LDL below 70           CONTINUE these medications which have NOT CHANGED    Details   acetaminophen (TYLENOL) 500 MG tablet Take 1,000 mg by mouth every 8 hours as needed for pain.      furosemide (LASIX) 20 MG tablet Take 0.5 tablets (10 mg) by mouth daily Due for appointment with Rose Mcelroy  Qty: 15 tablet, Refills: 1    Associated Diagnoses: Acute diastolic congestive heart failure  (H)      metoprolol succinate ER (TOPROL XL) 50 MG 24 hr tablet Take 1 tablet (50 mg) by mouth daily  Qty: 30 tablet, Refills: 2    Associated Diagnoses: NSVT (nonsustained ventricular tachycardia) (H); Sinus node dysfunction (H)           Allergies   Allergies   Allergen Reactions    Sulfa Antibiotics Shortness Of Breath

## 2024-12-25 NOTE — PROGRESS NOTES
PRIMARY DIAGNOSIS: GENERALIZED WEAKNESS    OUTPATIENT/OBSERVATION GOALS TO BE MET BEFORE DISCHARGE  1. Orthostatic performed: N/A    2. Tolerating PO medications: Yes    3. Return to near baseline physical activity: No    4. Cleared for discharge by consultants (if involved): No    Discharge Planner Nurse   Safe discharge environment identified: no  Barriers to discharge: Yes      Pt alert and oriented times 3. She is forgetful. Vitals stable. Denied any pain and SOB. Pt's oxygenation was 93-95% on RA. Pt on K and mg protocol. Recheck lab in am per protocol. Pt high fall risk. She tried getting out of bed and triggered bed alarm multiple times this evening. Bed alarm on. Tele occasional a-paced with underlying sinus rhythm. Pt was up to commode multiple times this shift. Pacemaker interrogation was done this shift.All cares explained to pt.          Entered by: Zeinab Ty RN 12/24/2024 9:49 PM

## 2024-12-25 NOTE — PROGRESS NOTES
PRIMARY DIAGNOSIS: GENERALIZED WEAKNESS    OUTPATIENT/OBSERVATION GOALS TO BE MET BEFORE DISCHARGE  1. Orthostatic performed: N/A    2. Tolerating PO medications: Yes    3. Return to near baseline physical activity: No    4. Cleared for discharge by consultants (if involved): No    Discharge Planner Nurse   Safe discharge environment identified: No  Barriers to discharge: Yes       Entered by: Zeinab Ty RN 12/24/2024 7:37 PM     Please review provider order for any additional goals.   Nurse to notify provider when observation goals have been met and patient is ready for discharge.

## 2024-12-25 NOTE — PLAN OF CARE
Problem: Adult Inpatient Plan of Care  Goal: Optimal Comfort and Wellbeing  Outcome: Progressing  Intervention: Monitor Pain and Promote Comfort  Recent Flowsheet Documentation  Taken 12/25/2024 0049 by Georgiana Barahona RN  Pain Management Interventions: rest     Problem: Pain Acute  Goal: Optimal Pain Control and Function  Outcome: Progressing  Intervention: Develop Pain Management Plan  Recent Flowsheet Documentation  Taken 12/25/2024 0049 by Georgiana Barahona RN  Pain Management Interventions: rest  Intervention: Prevent or Manage Pain  Recent Flowsheet Documentation  Taken 12/25/2024 0050 by Georgiana Barahona RN  Medication Review/Management: medications reviewed     Problem: Adult Inpatient Plan of Care  Goal: Absence of Hospital-Acquired Illness or Injury  Intervention: Prevent Skin Injury  Recent Flowsheet Documentation  Taken 12/25/2024 0050 by Georgiana Barahona RN  Body Position: position changed independently     Problem: Fall Injury Risk  Goal: Absence of Fall and Fall-Related Injury  Intervention: Identify and Manage Contributors  Recent Flowsheet Documentation  Taken 12/25/2024 0050 by Georgiana Barahona RN  Medication Review/Management: medications reviewed     Problem: Fall Injury Risk  Goal: Absence of Fall and Fall-Related Injury  Intervention: Identify and Manage Contributors  Recent Flowsheet Documentation  Taken 12/25/2024 0050 by Georgiana Barahona RN  Medication Review/Management: medications reviewed     Problem: Fatigue  Goal: Improved Activity Tolerance  Intervention: Promote Improved Energy  Recent Flowsheet Documentation  Taken 12/25/2024 0050 by Georgiana Barahona RN  Activity Management: activity adjusted per tolerance   Goal Outcome Evaluation:       Patient admitted for generalized weakness and fall. Found to be Covid-19 positive. On special Prec. On tele with A- Paced. Little forgetful. Denies pain throughout the night. VSS. RA. On Mg and K protocols. Last Mg 2.0 and K 4.3. Right PIV  saline locked. A1 to the commode. Cardiac diet. PT/OT consulted. Slept well.

## 2024-12-26 ENCOUNTER — PATIENT OUTREACH (OUTPATIENT)
Dept: CARE COORDINATION | Facility: CLINIC | Age: 84
End: 2024-12-26
Payer: COMMERCIAL

## 2024-12-26 NOTE — PROGRESS NOTES
Clinic Care Coordination Contact    Situation: Patient chart reviewed by care coordinator.    Background: Clinic Care Coordination Referral received from inpatient care team for transition handoff communication following hospital admission.    Assessment: Upon chart review, patient is not a candidate for Primary Care Clinic Care Coordination enrollment due to reason stated below:  Patient is receiving duplicative services from assisted living.    Plan/Recommendations: Clinic Care Coordination Referral/order cancelled. RN/SW CC will perform no further monitoring/outreaches at this time and will remain available as needed. If new needs arise, a new Care Coordination Referral may be placed.    Milagro Spring,   [facility]  [phone]

## 2024-12-26 NOTE — PLAN OF CARE
Physical Therapy Discharge Summary    Reason for therapy discharge:    Discharged to home with home therapy.    Progress towards therapy goal(s). See goals on Care Plan in [facility] electronic health record for goal details.  Goals not met.  Barriers to achieving goals:   discharge from facility.    Therapy recommendation(s):    Continued therapy is recommended.  Rationale/Recommendations:  The pt was discharged to her [facility]. PT does recommend increased assist from a family member and more pt checks at her home setting. .

## 2024-12-26 NOTE — PLAN OF CARE
Occupational Therapy Discharge Summary    Reason for therapy discharge:    Discharged to home with home therapy.    Progress towards therapy goal(s). See goals on Care Plan in [facility] electronic health record for goal details.  Goals partially met.  Barriers to achieving goals:   discharge from facility.    Therapy recommendation(s):    Continued therapy is recommended.  Rationale/Recommendations:  recommend home care OT services.    Peyton Maki OTR/L 12/26/24

## 2024-12-30 ENCOUNTER — DOCUMENTATION ONLY (OUTPATIENT)
Dept: INTERNAL MEDICINE | Facility: CLINIC | Age: 84
End: 2024-12-30

## 2024-12-30 ENCOUNTER — TELEPHONE (OUTPATIENT)
Dept: INTERNAL MEDICINE | Facility: CLINIC | Age: 84
End: 2024-12-30

## 2024-12-30 NOTE — TELEPHONE ENCOUNTER
Haroldo Hurtado - Incident: Falls 12/22/24 & 12/23/24    Faxes (2) received and placed in green folder at PCP's desk.

## 2024-12-31 NOTE — PROGRESS NOTES
We are unable to add ALT and LIPID REFLEX TO DIRECT LDL PANEL . Sample has been discarded. Orders are sent for future collection.

## 2025-01-02 ENCOUNTER — DOCUMENTATION ONLY (OUTPATIENT)
Dept: OTHER | Facility: CLINIC | Age: 85
End: 2025-01-02
Payer: COMMERCIAL

## 2025-01-03 PROBLEM — N18.30 CHRONIC KIDNEY DISEASE, STAGE III (MODERATE) (H): Status: ACTIVE | Noted: 2020-03-02

## 2025-01-20 ENCOUNTER — ANCILLARY PROCEDURE (OUTPATIENT)
Dept: CARDIOLOGY | Facility: CLINIC | Age: 85
End: 2025-01-20
Attending: INTERNAL MEDICINE
Payer: COMMERCIAL

## 2025-01-20 ENCOUNTER — OFFICE VISIT (OUTPATIENT)
Dept: CARDIOLOGY | Facility: CLINIC | Age: 85
End: 2025-01-20
Attending: NURSE PRACTITIONER
Payer: COMMERCIAL

## 2025-01-20 VITALS
RESPIRATION RATE: 16 BRPM | HEART RATE: 60 BPM | WEIGHT: 123 LBS | DIASTOLIC BLOOD PRESSURE: 71 MMHG | BODY MASS INDEX: 21.38 KG/M2 | SYSTOLIC BLOOD PRESSURE: 146 MMHG | OXYGEN SATURATION: 98 %

## 2025-01-20 DIAGNOSIS — I49.5 SICK SINUS SYNDROME (H): ICD-10-CM

## 2025-01-20 DIAGNOSIS — I35.0 SEVERE AORTIC STENOSIS: ICD-10-CM

## 2025-01-20 DIAGNOSIS — Z95.0 PACEMAKER: ICD-10-CM

## 2025-01-20 LAB
MDC_IDC_LEAD_CONNECTION_STATUS: NORMAL
MDC_IDC_LEAD_CONNECTION_STATUS: NORMAL
MDC_IDC_LEAD_IMPLANT_DT: NORMAL
MDC_IDC_LEAD_IMPLANT_DT: NORMAL
MDC_IDC_LEAD_LOCATION: NORMAL
MDC_IDC_LEAD_LOCATION: NORMAL
MDC_IDC_LEAD_LOCATION_DETAIL_1: NORMAL
MDC_IDC_LEAD_LOCATION_DETAIL_1: NORMAL
MDC_IDC_LEAD_MFG: NORMAL
MDC_IDC_LEAD_MFG: NORMAL
MDC_IDC_LEAD_MODEL: NORMAL
MDC_IDC_LEAD_MODEL: NORMAL
MDC_IDC_LEAD_POLARITY_TYPE: NORMAL
MDC_IDC_LEAD_POLARITY_TYPE: NORMAL
MDC_IDC_LEAD_SERIAL: NORMAL
MDC_IDC_LEAD_SERIAL: NORMAL
MDC_IDC_LEAD_SPECIAL_FUNCTION: NORMAL
MDC_IDC_LEAD_SPECIAL_FUNCTION: NORMAL
MDC_IDC_MSMT_BATTERY_DTM: NORMAL
MDC_IDC_MSMT_BATTERY_REMAINING_LONGEVITY: 90 MO
MDC_IDC_MSMT_BATTERY_RRT_TRIGGER: 2.62
MDC_IDC_MSMT_BATTERY_STATUS: NORMAL
MDC_IDC_MSMT_BATTERY_VOLTAGE: 3 V
MDC_IDC_MSMT_LEADCHNL_RA_IMPEDANCE_VALUE: 285 OHM
MDC_IDC_MSMT_LEADCHNL_RA_IMPEDANCE_VALUE: 361 OHM
MDC_IDC_MSMT_LEADCHNL_RA_PACING_THRESHOLD_AMPLITUDE: 0.62 V
MDC_IDC_MSMT_LEADCHNL_RA_PACING_THRESHOLD_PULSEWIDTH: 0.4 MS
MDC_IDC_MSMT_LEADCHNL_RA_SENSING_INTR_AMPL: 1 MV
MDC_IDC_MSMT_LEADCHNL_RA_SENSING_INTR_AMPL: 1 MV
MDC_IDC_MSMT_LEADCHNL_RV_IMPEDANCE_VALUE: 380 OHM
MDC_IDC_MSMT_LEADCHNL_RV_IMPEDANCE_VALUE: 513 OHM
MDC_IDC_MSMT_LEADCHNL_RV_PACING_THRESHOLD_AMPLITUDE: 0.62 V
MDC_IDC_MSMT_LEADCHNL_RV_PACING_THRESHOLD_PULSEWIDTH: 0.4 MS
MDC_IDC_MSMT_LEADCHNL_RV_SENSING_INTR_AMPL: 19 MV
MDC_IDC_MSMT_LEADCHNL_RV_SENSING_INTR_AMPL: 19 MV
MDC_IDC_PG_IMPLANT_DTM: NORMAL
MDC_IDC_PG_MFG: NORMAL
MDC_IDC_PG_MODEL: NORMAL
MDC_IDC_PG_SERIAL: NORMAL
MDC_IDC_PG_TYPE: NORMAL
MDC_IDC_SESS_CLINIC_NAME: NORMAL
MDC_IDC_SESS_DTM: NORMAL
MDC_IDC_SESS_TYPE: NORMAL
MDC_IDC_SET_BRADY_AT_MODE_SWITCH_RATE: 171 {BEATS}/MIN
MDC_IDC_SET_BRADY_HYSTRATE: NORMAL
MDC_IDC_SET_BRADY_LOWRATE: 70 {BEATS}/MIN
MDC_IDC_SET_BRADY_MAX_SENSOR_RATE: 120 {BEATS}/MIN
MDC_IDC_SET_BRADY_MAX_TRACKING_RATE: 120 {BEATS}/MIN
MDC_IDC_SET_BRADY_MODE: NORMAL
MDC_IDC_SET_BRADY_PAV_DELAY_LOW: 180 MS
MDC_IDC_SET_BRADY_SAV_DELAY_LOW: 150 MS
MDC_IDC_SET_LEADCHNL_RA_PACING_AMPLITUDE: 1.5 V
MDC_IDC_SET_LEADCHNL_RA_PACING_ANODE_ELECTRODE_1: NORMAL
MDC_IDC_SET_LEADCHNL_RA_PACING_ANODE_LOCATION_1: NORMAL
MDC_IDC_SET_LEADCHNL_RA_PACING_CAPTURE_MODE: NORMAL
MDC_IDC_SET_LEADCHNL_RA_PACING_CATHODE_ELECTRODE_1: NORMAL
MDC_IDC_SET_LEADCHNL_RA_PACING_CATHODE_LOCATION_1: NORMAL
MDC_IDC_SET_LEADCHNL_RA_PACING_POLARITY: NORMAL
MDC_IDC_SET_LEADCHNL_RA_PACING_PULSEWIDTH: 0.4 MS
MDC_IDC_SET_LEADCHNL_RA_SENSING_ANODE_ELECTRODE_1: NORMAL
MDC_IDC_SET_LEADCHNL_RA_SENSING_ANODE_LOCATION_1: NORMAL
MDC_IDC_SET_LEADCHNL_RA_SENSING_CATHODE_ELECTRODE_1: NORMAL
MDC_IDC_SET_LEADCHNL_RA_SENSING_CATHODE_LOCATION_1: NORMAL
MDC_IDC_SET_LEADCHNL_RA_SENSING_POLARITY: NORMAL
MDC_IDC_SET_LEADCHNL_RA_SENSING_SENSITIVITY: 0.3 MV
MDC_IDC_SET_LEADCHNL_RV_PACING_AMPLITUDE: 2 V
MDC_IDC_SET_LEADCHNL_RV_PACING_ANODE_ELECTRODE_1: NORMAL
MDC_IDC_SET_LEADCHNL_RV_PACING_ANODE_LOCATION_1: NORMAL
MDC_IDC_SET_LEADCHNL_RV_PACING_CAPTURE_MODE: NORMAL
MDC_IDC_SET_LEADCHNL_RV_PACING_CATHODE_ELECTRODE_1: NORMAL
MDC_IDC_SET_LEADCHNL_RV_PACING_CATHODE_LOCATION_1: NORMAL
MDC_IDC_SET_LEADCHNL_RV_PACING_POLARITY: NORMAL
MDC_IDC_SET_LEADCHNL_RV_PACING_PULSEWIDTH: 0.4 MS
MDC_IDC_SET_LEADCHNL_RV_SENSING_ANODE_ELECTRODE_1: NORMAL
MDC_IDC_SET_LEADCHNL_RV_SENSING_ANODE_LOCATION_1: NORMAL
MDC_IDC_SET_LEADCHNL_RV_SENSING_CATHODE_ELECTRODE_1: NORMAL
MDC_IDC_SET_LEADCHNL_RV_SENSING_CATHODE_LOCATION_1: NORMAL
MDC_IDC_SET_LEADCHNL_RV_SENSING_POLARITY: NORMAL
MDC_IDC_SET_LEADCHNL_RV_SENSING_SENSITIVITY: 0.9 MV
MDC_IDC_SET_ZONE_DETECTION_INTERVAL: 200 MS
MDC_IDC_SET_ZONE_DETECTION_INTERVAL: 350 MS
MDC_IDC_SET_ZONE_DETECTION_INTERVAL: 430 MS
MDC_IDC_SET_ZONE_STATUS: NORMAL
MDC_IDC_SET_ZONE_TYPE: NORMAL
MDC_IDC_SET_ZONE_VENDOR_TYPE: NORMAL
MDC_IDC_STAT_AT_BURDEN_PERCENT: 0 %
MDC_IDC_STAT_AT_DTM_END: NORMAL
MDC_IDC_STAT_AT_DTM_START: NORMAL
MDC_IDC_STAT_BRADY_AP_VP_PERCENT: 66.5 %
MDC_IDC_STAT_BRADY_AP_VS_PERCENT: 0.92 %
MDC_IDC_STAT_BRADY_AS_VP_PERCENT: 24.2 %
MDC_IDC_STAT_BRADY_AS_VS_PERCENT: 8.38 %
MDC_IDC_STAT_BRADY_DTM_END: NORMAL
MDC_IDC_STAT_BRADY_DTM_START: NORMAL
MDC_IDC_STAT_BRADY_RA_PERCENT_PACED: 71.01 %
MDC_IDC_STAT_BRADY_RV_PERCENT_PACED: 90.7 %
MDC_IDC_STAT_EPISODE_RECENT_COUNT: 0
MDC_IDC_STAT_EPISODE_RECENT_COUNT_DTM_END: NORMAL
MDC_IDC_STAT_EPISODE_RECENT_COUNT_DTM_START: NORMAL
MDC_IDC_STAT_EPISODE_TOTAL_COUNT: 0
MDC_IDC_STAT_EPISODE_TOTAL_COUNT: 100
MDC_IDC_STAT_EPISODE_TOTAL_COUNT: 3
MDC_IDC_STAT_EPISODE_TOTAL_COUNT: 466
MDC_IDC_STAT_EPISODE_TOTAL_COUNT: 66
MDC_IDC_STAT_EPISODE_TOTAL_COUNT_DTM_END: NORMAL
MDC_IDC_STAT_EPISODE_TOTAL_COUNT_DTM_START: NORMAL
MDC_IDC_STAT_EPISODE_TYPE: NORMAL
MDC_IDC_STAT_TACHYTHERAPY_RECENT_DTM_END: NORMAL
MDC_IDC_STAT_TACHYTHERAPY_RECENT_DTM_START: NORMAL
MDC_IDC_STAT_TACHYTHERAPY_TOTAL_DTM_END: NORMAL
MDC_IDC_STAT_TACHYTHERAPY_TOTAL_DTM_START: NORMAL

## 2025-01-20 PROCEDURE — 99215 OFFICE O/P EST HI 40 MIN: CPT | Performed by: INTERNAL MEDICINE

## 2025-01-20 PROCEDURE — 93296 REM INTERROG EVL PM/IDS: CPT | Performed by: INTERNAL MEDICINE

## 2025-01-20 PROCEDURE — G2211 COMPLEX E/M VISIT ADD ON: HCPCS | Performed by: INTERNAL MEDICINE

## 2025-01-20 PROCEDURE — 93294 REM INTERROG EVL PM/LDLS PM: CPT | Performed by: INTERNAL MEDICINE

## 2025-01-20 NOTE — LETTER
1/20/2025    Rose Mcelroy NP  [address]  [facility] [address]    RE: Laney Hahn       Dear Colleague,     I had the pleasure of seeing Laney Hahn in the [facility].    HEART CARE ENCOUNTER CONSULTATON NOTE      [facility]  [phone]      Assessment/Recommendations   Assessment:  1.  Aortic stenosis: Severe aortic stenosis and recommend proceeding with TAVR.  She would not be a good candidate for SAVR.  We had discussed this in the past and historically she has declined.  She has not wanted any testing or procedures done.  After discussion today she would like to hear more about the TAVR procedure and will arrange for a consultation.   2.  Cardiomyopathy: Worsening cardiomyopathy with now an LVEF down to 30-35%.  Recommend angiogram.  Depending on further discussions regarding TAVR this will be done preprocedure.    3.  Coronary artery disease: Known coronary artery disease with angiogram in 2020 showing mild to moderate nonobstructive disease.   No anginal complaints.    4.  Heart failure with preserved ejection fraction: Well compensated on low-dose Lasix  5.  Paroxysmal atrial fibrillation maintained on Eliquis for anticoagulation  6.  Sinus node dysfunction status post pacemaker  7.  Dementia     Plan:  1.  Will refer to valve clinic to discuss TAVR  2.  Continue low-dose Lasix, metoprolol and Lipitor  3.  Follow-up 6 months         History of Present Illness/Subjective    HPI: Laney Hahn is a 84 year old female with history of mild to moderate nonobstructive coronary artery disease on angiogram in 2020, cardiomyopathy with an LVEF of 30-35%, severe low-flow-low gradient aortic stenosis, heart failure with preserved ejection fraction, chronic kidney disease, dementia, sinus node dysfunction status post pacemaker placement, paroxysmal atrial fibrillation who I am seeing today for a follow-up.  I last saw her a couple years ago.   At that time we had discussed further ischemic workup due to decline in her LVEF.  We also discussed her valvular disease.  She had declined intervention on her valve and did not want to proceed with stress testing.  She lives with her brother in assisted living and is currently here with a close friend who helps her.  She is not very active.  She denies any problems with breathing, chest pain or palpitations.  She seems to have a poor understanding of her medical condition.      Echocardiogram 12/24/2024  The left ventricle is normal in size with normal left ventricular wall  thickness.  Left ventricular function is decreased. The ejection fraction is 30-35%  (moderately reduced).  The right ventricle is normal size with mildly decreased right ventricular  systolic function  The left atrium is mildly dilated.  Severe low flow, low gradient aortic stenosis is present with a peak velocity  of 2.9 m/s, mean gradient 21 mmHg, SVi 28 ml/m2, LANNY 0.6 cm2, DI 0.22.  Mild calcific mitral stenosis is present.  Compared to the prior study of 9/12/22, the degree of aortic stenosis has  advanced.     Hemodynamically significant valve disease is identified. Recommend referral to  valve clinic for further evaluation. Valve clinic phone number: [phone].  [facility] Epic: [facility]ve Clinic [facility].    Echocardiogram 9/12/2022  The left ventricle is normal in size.  There is normal left ventricular wall thickness.  Diastolic Doppler findings (E/E' ratio and/or other parameters) suggest left  ventricular filling pressures are increased.  The visual ejection fraction is 40-45%.  There is mild-moderate global hypokinesia of the left ventricle.  Normal right ventricle size and systolic function.  The left atrium is moderately dilated.  Trileaflet aortic valve with calcification and reduced excursion.  It appears moderate to severe aortic stenosis. The mean gradient is 23 mmHg  and area aortic valve 0.7-0.8 cm2. SVi  32ml/m2.  Moderate mitral valve stenosis 8 mmHg at 80 BPM.  Mild mitral valve regurgitation.     When compared to previous on 5-, aortic valve stenosis and laosi mitral  valve stenosis is getting worse. Both mitral valve and aortic valve gradients  are higher than previous study.       Coronary angiogram in 2020  Near syncopal episodes in patient with HFpEF, mild-moderate aortic valve stenosis and renal insufficiency. Troponin 0.4 and echo with stable EF 51% and inferior wall hypokinesis, which was also previously noted.    Mild coronary disease outside of a moderate proximal circumflex lesion. FFR of circumflex not flow limiting.    LV EDP normal.    She did develop an SVT triggered by ectopy from the pigtail catheter in the ventricle. Rate of SVT was in the 170s and broke with a cough. Her BP did drop with the SVT but she was asymptomatic.    Estimated blood loss was <20 ml.  The longitudinal plan of care for the diagnosis(es)/condition(s) as documented were addressed during this visit. Due to the added complexity in care, I will continue to support Laney in the subsequent management and with ongoing continuity of care.      Physical Examination  Review of Systems   Vitals: BP (!) 146/71 (BP Location: Right arm, Patient Position: Sitting, Cuff Size: Adult Regular)   Pulse 60   Resp 16   Wt 55.8 kg (123 lb)   LMP  (LMP Unknown)   SpO2 98%   BMI 21.38 kg/m    BMI= Body mass index is 21.38 kg/m .  Wt Readings from Last 3 Encounters:   01/20/25 55.8 kg (123 lb)   12/30/24 57.2 kg (126 lb)   12/23/24 55.3 kg (121 lb 14.4 oz)       General Appearance:   no distress, normal body habitus   ENT/Mouth: membranes moist, no oral lesions or bleeding gums.      EYES:  no scleral icterus, normal conjunctivae   Neck: no carotid bruits or thyromegaly   Chest/Lungs:   lungs are clear to auscultation   Cardiovascular:   Regular. Normal first and second heart sounds with loud systolic murmur no edema bilaterally         Extremities: no cyanosis or clubbing   Skin: no xanthelasma, warm.    Neurologic: normal  bilateral, no tremors     Psychiatric: alert and oriented x3, calm        Please refer above for cardiac ROS details.        Medical History  Surgical History Family History Social History   Past Medical History:   Diagnosis Date     COVID-19 09/14/2020    positive test at [facility]     DNAR (do not attempt resuscitation)      Dyslipidemia, goal LDL below 70 09/15/2020     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 Nini Rasmussen     Metabolic encephalopathy      Moderate aortic valve stenosis 08/22/2017    stable on 2020 echo     Nonobstructive atherosclerosis of coronary artery 09/15/2020    with normal LVEDP     Paroxysmal SVT (supraventricular tachycardia) 09/07/2017    said to have short episodes while hospitalized for UTI per Dr. Rhys Mensah     Syncope 08/22/2017     UTI (urinary tract infection) 08/21/2017    hospitalized with weakness     Past Surgical History:   Procedure Laterality Date     CATARACT EXTRACTION, BILATERAL       CV CORONARY ANGIOGRAM N/A 9/15/2020    Procedure: Coronary Angiogram;  Surgeon: Radhika Santa MD;  Location: [facility] Cath Lab;  Service: Cardiology     CV LEFT HEART CATHETERIZATION WITHOUT LEFT VENTRICULOGRAM Left 9/15/2020    Procedure: Left Heart Catheterization Without Left Ventriculogram;  Surgeon: Radhika Satna MD;  Location: [facility] Cath Lab;  Service: Cardiology     EP PACEMAKER INSERT N/A 4/13/2021    Procedure: EP Pacemaker Insertion;  Surgeon: Frederic Burgos MD;  Location: [facility] Cardiac Cath Lab;  Service: Cardiology     HYSTERECTOMY       PARTIAL GASTRECTOMY  1969    for ulcers     TONSILLECTOMY       Family History   Adopted: Yes        Social History     Socioeconomic History     Marital status: Single     Spouse name: Not on file      Number of children: 0     Years of education: Not on file     Highest education level: Not on file   Occupational History     Not on file   Tobacco Use     Smoking status: Never     Smokeless tobacco: Never   Vaping Use     Vaping status: Never Used   Substance and Sexual Activity     Alcohol use: No     Drug use: No     Sexual activity: Not on file   Other Topics Concern     Not on file   Social History Narrative    Her adopted brother, Jeff, lives with her. He is five years younger than her.     Social Drivers of Health     Financial Resource Strain: Unknown (12/23/2024)    Financial Resource Strain      Within the past 12 months, have you or your family members you live with been unable to get utilities (heat, electricity) when it was really needed?: Patient unable to answer   Food Insecurity: Unknown (12/23/2024)    Food Insecurity      Within the past 12 months, did you worry that your food would run out before you got money to buy more?: Patient unable to answer      Within the past 12 months, did the food you bought just not last and you didn t have money to get more?: Patient unable to answer   Transportation Needs: Unknown (12/23/2024)    Transportation Needs      Within the past 12 months, has lack of transportation kept you from medical appointments, getting your medicines, non-medical meetings or appointments, work, or from getting things that you need?: Patient unable to answer   Physical Activity: Not on file   Stress: Not on file   Social Connections: Not on file   Interpersonal Safety: Not on file   Housing Stability: Unknown (12/23/2024)    Housing Stability      Do you have housing? : Patient unable to answer      Are you worried about losing your housing?: Patient unable to answer           Medications  Allergies   Current Outpatient Medications   Medication Sig Dispense Refill     acetaminophen (TYLENOL) 500 MG tablet Take 1,000 mg by mouth every 8 hours as needed for pain.       apixaban  ANTICOAGULANT (ELIQUIS ANTICOAGULANT) 2.5 MG tablet Take 1 tablet (2.5 mg) by mouth 2 times daily. Due for appointment with Rose Mcelroy 60 tablet 1     atorvastatin (LIPITOR) 10 MG tablet Take 1 tablet (10 mg) by mouth every morning. 30 tablet 2     furosemide (LASIX) 20 MG tablet Take 0.5 tablets (10 mg) by mouth daily Due for appointment with Rose Mcelroy 15 tablet 1     metoprolol succinate ER (TOPROL XL) 50 MG 24 hr tablet Take 1 tablet (50 mg) by mouth daily 30 tablet 2       Allergies   Allergen Reactions     Sulfa Antibiotics Shortness Of Breath          Lab Results    Chemistry/lipid CBC Cardiac Enzymes/BNP/TSH/INR   Recent Labs   Lab Test 04/27/23  1109   CHOL 137   HDL 42*   LDL 62   TRIG 164*     Recent Labs   Lab Test 04/27/23  1109 03/24/22  1050 04/21/21  1101   LDL 62 102 58     Recent Labs   Lab Test 12/25/24  0627      POTASSIUM 4.2   CHLORIDE 109*   CO2 20*   *   BUN 25.8*   CR 1.11*   GFRESTIMATED 49*   CURT 8.1*     Recent Labs   Lab Test 12/25/24  0627 12/24/24  0457 12/23/24  1038   CR 1.11* 1.17* 1.48*     Recent Labs   Lab Test 04/11/21  0524 04/24/17  0647   A1C 5.2 5.7          Recent Labs   Lab Test 12/24/24  0457   WBC 4.2   HGB 12.1   HCT 36.7   MCV 98   *     Recent Labs   Lab Test 12/24/24  0457 12/23/24  1038 12/22/24  1425   HGB 12.1 13.9 13.4    Recent Labs   Lab Test 09/11/22  1733 07/22/22 1917 06/16/21  2208   TROPONINI 0.02 0.02 0.02     Recent Labs   Lab Test 06/10/21  1920 12/16/20  1257 09/14/20  1033   BNP 1,170* 1,646* 356*     Recent Labs   Lab Test 04/27/23  1109   TSH 1.53     Recent Labs   Lab Test 07/22/22 1917 03/14/21  1927 09/14/20  1914   INR 1.23* 0.93 1.04        Nichol Diallo MD                                        Thank you for allowing me to participate in the care of your patient.      Sincerely,     Nichol Diallo MD     [facility]  cc:   Rose Mcelroy NP  [address]  [address]  [place] [address]

## 2025-01-22 NOTE — PROGRESS NOTES
HEART CARE ENCOUNTER CONSULTATON NOTE      [facility]  [phone]      Assessment/Recommendations   Assessment:  1.  Aortic stenosis: Severe aortic stenosis and recommend proceeding with TAVR.  She would not be a good candidate for SAVR.  We had discussed this in the past and historically she has declined.  She has not wanted any testing or procedures done.  After discussion today she would like to hear more about the TAVR procedure and will arrange for a consultation.   2.  Cardiomyopathy: Worsening cardiomyopathy with now an LVEF down to 30-35%.  Recommend angiogram.  Depending on further discussions regarding TAVR this will be done preprocedure.    3.  Coronary artery disease: Known coronary artery disease with angiogram in 2020 showing mild to moderate nonobstructive disease.   No anginal complaints.    4.  Heart failure with preserved ejection fraction: Well compensated on low-dose Lasix  5.  Paroxysmal atrial fibrillation maintained on Eliquis for anticoagulation  6.  Sinus node dysfunction status post pacemaker  7.  Dementia     Plan:  1.  Will refer to valve clinic to discuss TAVR  2.  Continue low-dose Lasix, metoprolol and Lipitor  3.  Follow-up 6 months         History of Present Illness/Subjective    HPI: Laney Hahn is a 84 year old female with history of mild to moderate nonobstructive coronary artery disease on angiogram in 2020, cardiomyopathy with an LVEF of 30-35%, severe low-flow-low gradient aortic stenosis, heart failure with preserved ejection fraction, chronic kidney disease, dementia, sinus node dysfunction status post pacemaker placement, paroxysmal atrial fibrillation who I am seeing today for a follow-up.  I last saw her a couple years ago.  At that time we had discussed further ischemic workup due to decline in her LVEF.  We also discussed her valvular disease.  She had declined intervention on her valve and did not want to proceed with stress testing.   She lives with her brother in assisted living and is currently here with a close friend who helps her.  She is not very active.  She denies any problems with breathing, chest pain or palpitations.  She seems to have a poor understanding of her medical condition.      Echocardiogram 12/24/2024  The left ventricle is normal in size with normal left ventricular wall  thickness.  Left ventricular function is decreased. The ejection fraction is 30-35%  (moderately reduced).  The right ventricle is normal size with mildly decreased right ventricular  systolic function  The left atrium is mildly dilated.  Severe low flow, low gradient aortic stenosis is present with a peak velocity  of 2.9 m/s, mean gradient 21 mmHg, SVi 28 ml/m2, LANNY 0.6 cm2, DI 0.22.  Mild calcific mitral stenosis is present.  Compared to the prior study of 9/12/22, the degree of aortic stenosis has  advanced.     Hemodynamically significant valve disease is identified. Recommend referral to  valve clinic for further evaluation. Valve clinic phone number: [phone].  [facility] Epic: [facility] Valve Clinic [facility].    Echocardiogram 9/12/2022  The left ventricle is normal in size.  There is normal left ventricular wall thickness.  Diastolic Doppler findings (E/E' ratio and/or other parameters) suggest left  ventricular filling pressures are increased.  The visual ejection fraction is 40-45%.  There is mild-moderate global hypokinesia of the left ventricle.  Normal right ventricle size and systolic function.  The left atrium is moderately dilated.  Trileaflet aortic valve with calcification and reduced excursion.  It appears moderate to severe aortic stenosis. The mean gradient is 23 mmHg  and area aortic valve 0.7-0.8 cm2. SVi 32ml/m2.  Moderate mitral valve stenosis 8 mmHg at 80 BPM.  Mild mitral valve regurgitation.     When compared to previous on 5-, aortic valve stenosis and laosi mitral  valve stenosis is getting worse. Both mitral  valve and aortic valve gradients  are higher than previous study.       Coronary angiogram in 2020  Near syncopal episodes in patient with HFpEF, mild-moderate aortic valve stenosis and renal insufficiency. Troponin 0.4 and echo with stable EF 51% and inferior wall hypokinesis, which was also previously noted.    Mild coronary disease outside of a moderate proximal circumflex lesion. FFR of circumflex not flow limiting.    LV EDP normal.    She did develop an SVT triggered by ectopy from the pigtail catheter in the ventricle. Rate of SVT was in the 170s and broke with a cough. Her BP did drop with the SVT but she was asymptomatic.    Estimated blood loss was <20 ml.  The longitudinal plan of care for the diagnosis(es)/condition(s) as documented were addressed during this visit. Due to the added complexity in care, I will continue to support Laney in the subsequent management and with ongoing continuity of care.      Physical Examination  Review of Systems   Vitals: BP (!) 146/71 (BP Location: Right arm, Patient Position: Sitting, Cuff Size: Adult Regular)   Pulse 60   Resp 16   Wt 55.8 kg (123 lb)   LMP  (LMP Unknown)   SpO2 98%   BMI 21.38 kg/m    BMI= Body mass index is 21.38 kg/m .  Wt Readings from Last 3 Encounters:   01/20/25 55.8 kg (123 lb)   12/30/24 57.2 kg (126 lb)   12/23/24 55.3 kg (121 lb 14.4 oz)       General Appearance:   no distress, normal body habitus   ENT/Mouth: membranes moist, no oral lesions or bleeding gums.      EYES:  no scleral icterus, normal conjunctivae   Neck: no carotid bruits or thyromegaly   Chest/Lungs:   lungs are clear to auscultation   Cardiovascular:   Regular. Normal first and second heart sounds with loud systolic murmur no edema bilaterally        Extremities: no cyanosis or clubbing   Skin: no xanthelasma, warm.    Neurologic: normal  bilateral, no tremors     Psychiatric: alert and oriented x3, calm        Please refer above for cardiac ROS details.         Medical History  Surgical History Family History Social History   Past Medical History:   Diagnosis Date    COVID-19 09/14/2020    positive test at [facility]    DNAR (do not attempt resuscitation)     Dyslipidemia, goal LDL below 70 09/15/2020    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 Nini Rasmussen    Metabolic encephalopathy     Moderate aortic valve stenosis 08/22/2017    stable on 2020 echo    Nonobstructive atherosclerosis of coronary artery 09/15/2020    with normal LVEDP    Paroxysmal SVT (supraventricular tachycardia) 09/07/2017    said to have short episodes while hospitalized for UTI per Dr. Rhys Mensah    Syncope 08/22/2017    UTI (urinary tract infection) 08/21/2017    hospitalized with weakness     Past Surgical History:   Procedure Laterality Date    CATARACT EXTRACTION, BILATERAL      CV CORONARY ANGIOGRAM N/A 9/15/2020    Procedure: Coronary Angiogram;  Surgeon: Radhika Santa MD;  Location: [facility] Cath Lab;  Service: Cardiology    CV LEFT HEART CATHETERIZATION WITHOUT LEFT VENTRICULOGRAM Left 9/15/2020    Procedure: Left Heart Catheterization Without Left Ventriculogram;  Surgeon: Radhika Santa MD;  Location: [facility] Cath Lab;  Service: Cardiology    EP PACEMAKER INSERT N/A 4/13/2021    Procedure: EP Pacemaker Insertion;  Surgeon: Frederic Burgos MD;  Location: [facility] Cardiac Cath Lab;  Service: Cardiology    HYSTERECTOMY      PARTIAL GASTRECTOMY  1969    for ulcers    TONSILLECTOMY       Family History   Adopted: Yes        Social History     Socioeconomic History    Marital status: Single     Spouse name: Not on file    Number of children: 0    Years of education: Not on file    Highest education level: Not on file   Occupational History    Not on file   Tobacco Use    Smoking status: Never    Smokeless tobacco: Never   Vaping Use    Vaping status: Never  Used   Substance and Sexual Activity    Alcohol use: No    Drug use: No    Sexual activity: Not on file   Other Topics Concern    Not on file   Social History Narrative    Her adopted brother, Jeff, lives with her. He is five years younger than her.     Social Drivers of Health     Financial Resource Strain: Unknown (12/23/2024)    Financial Resource Strain     Within the past 12 months, have you or your family members you live with been unable to get utilities (heat, electricity) when it was really needed?: Patient unable to answer   Food Insecurity: Unknown (12/23/2024)    Food Insecurity     Within the past 12 months, did you worry that your food would run out before you got money to buy more?: Patient unable to answer     Within the past 12 months, did the food you bought just not last and you didn t have money to get more?: Patient unable to answer   Transportation Needs: Unknown (12/23/2024)    Transportation Needs     Within the past 12 months, has lack of transportation kept you from medical appointments, getting your medicines, non-medical meetings or appointments, work, or from getting things that you need?: Patient unable to answer   Physical Activity: Not on file   Stress: Not on file   Social Connections: Not on file   Interpersonal Safety: Not on file   Housing Stability: Unknown (12/23/2024)    Housing Stability     Do you have housing? : Patient unable to answer     Are you worried about losing your housing?: Patient unable to answer           Medications  Allergies   Current Outpatient Medications   Medication Sig Dispense Refill    acetaminophen (TYLENOL) 500 MG tablet Take 1,000 mg by mouth every 8 hours as needed for pain.      apixaban ANTICOAGULANT (ELIQUIS ANTICOAGULANT) 2.5 MG tablet Take 1 tablet (2.5 mg) by mouth 2 times daily. Due for appointment with Rose Mcelroy 60 tablet 1    atorvastatin (LIPITOR) 10 MG tablet Take 1 tablet (10 mg) by mouth every morning. 30 tablet 2    furosemide  (LASIX) 20 MG tablet Take 0.5 tablets (10 mg) by mouth daily Due for appointment with Rose Mcelroy 15 tablet 1    metoprolol succinate ER (TOPROL XL) 50 MG 24 hr tablet Take 1 tablet (50 mg) by mouth daily 30 tablet 2       Allergies   Allergen Reactions    Sulfa Antibiotics Shortness Of Breath          Lab Results    Chemistry/lipid CBC Cardiac Enzymes/BNP/TSH/INR   Recent Labs   Lab Test 04/27/23  1109   CHOL 137   HDL 42*   LDL 62   TRIG 164*     Recent Labs   Lab Test 04/27/23  1109 03/24/22  1050 04/21/21  1101   LDL 62 102 58     Recent Labs   Lab Test 12/25/24  0627      POTASSIUM 4.2   CHLORIDE 109*   CO2 20*   *   BUN 25.8*   CR 1.11*   GFRESTIMATED 49*   CURT 8.1*     Recent Labs   Lab Test 12/25/24  0627 12/24/24  0457 12/23/24  1038   CR 1.11* 1.17* 1.48*     Recent Labs   Lab Test 04/11/21  0524 04/24/17  0647   A1C 5.2 5.7          Recent Labs   Lab Test 12/24/24  0457   WBC 4.2   HGB 12.1   HCT 36.7   MCV 98   *     Recent Labs   Lab Test 12/24/24  0457 12/23/24  1038 12/22/24  1425   HGB 12.1 13.9 13.4    Recent Labs   Lab Test 09/11/22  1733 07/22/22 1917 06/16/21  2208   TROPONINI 0.02 0.02 0.02     Recent Labs   Lab Test 06/10/21  1920 12/16/20  1257 09/14/20  1033   BNP 1,170* 1,646* 356*     Recent Labs   Lab Test 04/27/23  1109   TSH 1.53     Recent Labs   Lab Test 07/22/22 1917 03/14/21  1927 09/14/20  1914   INR 1.23* 0.93 1.04        Nichol Diallo MD

## 2025-01-29 ENCOUNTER — TELEPHONE (OUTPATIENT)
Dept: CARDIOLOGY | Facility: CLINIC | Age: 85
End: 2025-01-29
Payer: COMMERCIAL

## 2025-01-29 NOTE — TELEPHONE ENCOUNTER
===View-only below this line===  ----- Message -----  From: Nichol Diallo MD  Sent: 1/20/2025   4:52 PM CST  To: Dahiana Villatoro RN    Spoke with her and she's still unsure about TAVR but willing to go to valve clinic for consult. Please call her friend Gabriella Givens [phone] for scheduling

## 2025-01-29 NOTE — TELEPHONE ENCOUNTER
Health Call Center    Phone Message    May a detailed message be left on voicemail: yes     Reason for Call: Other: Patient was expecting to hear for an appt for a valve provider. They have not heard back. Please call friend Gabriella back to discuss.      Action Taken: Other: cardiology    Travel Screening: Not Applicable  Thank you!  [facility]       Date of Service:

## 2025-01-29 NOTE — TELEPHONE ENCOUNTER
CTA TAVR ordered already, GFR is over 40 from recent labs in December. Msg sent to  to assist in making appts.     Dahiana Villatoro RN on 1/29/2025 at 3:19 PM

## 2025-02-03 NOTE — TELEPHONE ENCOUNTER
===View-only below this line===  ----- Message -----  From: Melina Pichardo  Sent: 2/3/2025  11:10 AM CST  To: Dahiana Villatoro RN    Plan for just consult at this time.  3/6

## 2025-02-17 ENCOUNTER — MEDICAL CORRESPONDENCE (OUTPATIENT)
Dept: HEALTH INFORMATION MANAGEMENT | Facility: CLINIC | Age: 85
End: 2025-02-17
Payer: COMMERCIAL

## 2025-02-26 ENCOUNTER — DOCUMENTATION ONLY (OUTPATIENT)
Dept: CARDIOLOGY | Facility: CLINIC | Age: 85
End: 2025-02-26
Payer: COMMERCIAL

## 2025-02-26 DIAGNOSIS — I35.0 SEVERE AORTIC STENOSIS: Primary | ICD-10-CM

## 2025-02-26 NOTE — PROGRESS NOTES
"Valve Clinic Prep Worksheet    Patient Name: Laney Hahn  : 1940  AGE: 84 year old     Patient Address: [address] [place] [address] BMI: 21   Height (CM):    Ht Readings from Last 1 Encounters:   24 1.615 m (5' 3.6\")                                                      Weight (kg):   Wt Readings from Last 1 Encounters:   25 55.8 kg (123 lb)                                                          Contact info/Phone number:  [phone]  Initial STS: 7.75%                                           Referred by: Dr. Diallo  Date: 25 Insurance:Payor: [email or web address] / Plan: [employer] / Product Type: POS /    Patient Care Team: Patient Care Team:  Rose Mcelroy NP as PCP - General  Rose Mcelroy NP as Assigned PCP  Nichol Diallo MD as MD (Cardiovascular Disease)  Aleisha Fernandez PA-C as Physician Assistant (Physician Assistant - Medical)  Nichol Diallo MD as Assigned Heart and Vascular Provider    Past Medical History   Problem List:  2024: Generalized weakness  2024: Falls frequently  2024: COVID-19 virus infection  2023: Acute pain of both knees  2023: Unsteady gait  2023: Sick sinus syndrome (H)  2022: Anemia due to blood loss, acute  2022: Fall  2022: Syncope, unspecified syncope type  2022: Colitis  2022: JESUS (acute kidney injury)  2022: Hyperkalemia  2022: Diarrhea  2022: Mixed hyperlipidemia  2021: Vascular dementia without behavioral disturbance (H)  2021: Essential hypertension  2021: DNAR (do not attempt resuscitation)  2021: AV node dysfunction  2021: Pericardial effusion  2021: Paroxysmal atrial fibrillation (H)  2021: Heart failure with reduced ejection fraction, NYHA class I   (H)  2021: Cardiac pacemaker in situ  2021: Anemia  2021: Junctional bradycardia, s/p PPM placement   2020: Nonobstructive atherosclerosis of coronary artery  2020: Chronic kidney " disease, stage III (moderate) (H)  2017: SVT (supraventricular tachycardia)  2017: Nonrheumatic aortic valve stenosis  2017: Bifascicular bundle branch block  2017: History of TIA (transient ischemic attack)  -10: Frequent falls  Acute diastolic congestive heart failure (H)  Sepsis (H)     Transthoracic Echocardiogram    Date (25): M mmHg Peak V: 2.9 m/sec LANNY: 0.56 cm  DI: 0.22 SVI: 28 ml/m  EF: 30-35%  Ao max P mmHg        Comment on valves: moderate annular calcification of the mitral valve, mild regurgitation and no stenosis. Tricuspid valve has no stenosis or regurgitation. Aortic valve has trace regurgitation, severe low flow low gradient stenosis.    Heart Cath Summary CTA (TAVR) MARK   No results found.  ordered- pt would like to wait to discuss further  Not ordered    Labs   Creat:   Creatinine   Date Value Ref Range Status   2024 1.11 (H) 0.51 - 0.95 mg/dL Final   03/15/2021 1.25 (H) 0.52 - 1.04 mg/dL Final       GFR:   GFR Estimate   Date Value Ref Range Status   2024 49 (L) >60 mL/min/1.73m2 Final     Comment:     eGFR calculated using  CKD-EPI equation.   2024 46 (L) >60 mL/min/1.73m2 Final     Comment:     eGFR calculated using  CKD-EPI equation.   2024 35 (L) >60 mL/min/1.73m2 Final     Comment:     eGFR calculated using  CKD-EPI equation.   2021 40 (L) >60 mL/min/1.73m2 Final   2021 44 (L) >60 mL/min/1.73m2 Final   2021 45 (L) >60 mL/min/1.73m2 Final   03/15/2021 41 (L) >60 mL/min/[1.73_m2] Final     Comment:     Non  GFR Calc  Starting 2018, serum creatinine based estimated GFR (eGFR) will be   calculated using the Chronic Kidney Disease Epidemiology Collaboration   (CKD-EPI) equation.       GFR Estimate If Black   Date Value Ref Range Status   2021 49 (L) >60 mL/min/1.73m2 Final   2021 54 (L) >60 mL/min/1.73m2 Final   2021 55 (L) >60 mL/min/1.73m2 Final   03/15/2021 47 (L)  >60 mL/min/[1.73_m2] Final     Comment:      GFR Calc  Starting 12/18/2018, serum creatinine based estimated GFR (eGFR) will be   calculated using the Chronic Kidney Disease Epidemiology Collaboration   (CKD-EPI) equation.         Potassium:   Potassium   Date Value Ref Range Status   12/25/2024 4.2 3.4 - 5.3 mmol/L Final   09/15/2022 4.0 3.5 - 5.0 mmol/L Final   03/15/2021 4.9 3.4 - 5.3 mmol/L Final       Sodium:   Sodium   Date Value Ref Range Status   12/25/2024 140 135 - 145 mmol/L Final   03/15/2021 143 133 - 144 mmol/L Final       WBC:   WBC   Date Value Ref Range Status   03/15/2021 6.0 4.0 - 11.0 10e9/L Final     WBC Count   Date Value Ref Range Status   12/24/2024 4.2 4.0 - 11.0 10e3/uL Final       Hgb:   Hemoglobin   Date Value Ref Range Status   12/24/2024 12.1 11.7 - 15.7 g/dL Final   12/23/2024 13.9 11.7 - 15.7 g/dL Final   03/15/2021 12.5 11.7 - 15.7 g/dL Final       HCT:   Hematocrit   Date Value Ref Range Status   12/24/2024 36.7 35.0 - 47.0 % Final   03/15/2021 38.4 35.0 - 47.0 % Final       Plts:   Platelet Count   Date Value Ref Range Status   12/24/2024 134 (L) 150 - 450 10e3/uL Final   03/15/2021 157 150 - 450 10e9/L Final       Albumin:   Lab Results   Component Value Date    ALBUMIN 3.8 09/11/2022       INR:   INR   Date Value Ref Range Status   07/22/2022 1.23 (H) 0.85 - 1.15 Final        Current Medications: Allergies:    Current Outpatient Medications   Medication Sig Dispense Refill    acetaminophen (TYLENOL) 500 MG tablet Take 1,000 mg by mouth every 8 hours as needed for pain.      apixaban ANTICOAGULANT (ELIQUIS ANTICOAGULANT) 2.5 MG tablet Take 1 tablet (2.5 mg) by mouth 2 times daily. Due for appointment with Rose Mcelroy 60 tablet 1    atorvastatin (LIPITOR) 10 MG tablet Take 1 tablet (10 mg) by mouth every morning. 30 tablet 2    furosemide (LASIX) 20 MG tablet Take 0.5 tablets (10 mg) by mouth daily Due for appointment with Rose Mcelroy 15 tablet 1    metoprolol  succinate ER (TOPROL XL) 50 MG 24 hr tablet Take 1 tablet (50 mg) by mouth daily 30 tablet 2     No current facility-administered medications for this visit.        Allergies   Allergen Reactions    Sulfa Antibiotics Shortness Of Breath

## 2025-03-06 ENCOUNTER — OFFICE VISIT (OUTPATIENT)
Dept: CARDIOLOGY | Facility: CLINIC | Age: 85
End: 2025-03-06
Payer: COMMERCIAL

## 2025-03-06 ENCOUNTER — ALLIED HEALTH/NURSE VISIT (OUTPATIENT)
Dept: CARDIOLOGY | Facility: CLINIC | Age: 85
End: 2025-03-06
Payer: COMMERCIAL

## 2025-03-06 ENCOUNTER — APPOINTMENT (OUTPATIENT)
Dept: CARDIOLOGY | Facility: CLINIC | Age: 85
End: 2025-03-06
Payer: COMMERCIAL

## 2025-03-06 VITALS
HEART RATE: 53 BPM | BODY MASS INDEX: 21.9 KG/M2 | SYSTOLIC BLOOD PRESSURE: 128 MMHG | OXYGEN SATURATION: 95 % | RESPIRATION RATE: 16 BRPM | DIASTOLIC BLOOD PRESSURE: 68 MMHG | WEIGHT: 126 LBS

## 2025-03-06 DIAGNOSIS — I51.89 OTHER ILL-DEFINED HEART DISEASES: ICD-10-CM

## 2025-03-06 DIAGNOSIS — I35.0 AORTIC VALVE STENOSIS, ETIOLOGY OF CARDIAC VALVE DISEASE UNSPECIFIED: ICD-10-CM

## 2025-03-06 DIAGNOSIS — I35.0 AORTIC VALVE STENOSIS, ETIOLOGY OF CARDIAC VALVE DISEASE UNSPECIFIED: Primary | ICD-10-CM

## 2025-03-06 NOTE — H&P (VIEW-ONLY)
HEART CARE VALVE CLINIC ENCOUNTER CONSULTATON NOTE      [facility]  [phone]      Assessment/Recommendations   Assessment/Plan:Laney Hahn is an 84F w/ aortic stenosis progressive LVEF decline to 30-35 CAD, SN dysfunction s/p PPM, dementia who is here for evaluation of management options for her valvular disease.     -  I personally reviewed cardiac imaging including: TTE, which showed P/M 34/21 LANNY 0.56 DI 0.22 SVI 28.5 consistent w/ low flow-low gradient severe aortic stenosis;  -  after a long discussion of natural history, risk/benefit, and management options w/ the patient, friends, and family, we all agree to proceed w/ the remainder of w/up including angio +/- PCI,  CTS consult, Dental eval    The longitudinal plan of care for the diagnosis(es)/condition(s) as documented were addressed during this visit. Due to the added complexity in care, I will continue to support Laney in the subsequent management and with ongoing continuity of care.    A total of >60 mins was spent reviewing prior records, including documentation, lab studies, cardiac testing/imaging, interview with patient, physical exam, and documentation     History of Present Illness/Subjective    HPI: Laney Hahn is a 84 year old female w/ aortic stenosis progressive LVEF decline to 30-35 CAD, SN dysfunction s/p PPM, dementia who is here for evaluation of management options for her valvular disease.    She lives in an assisted living facility w/ her brother, requiring help w/ Medications and has been there for the past 4 years. Over the past 4-6 mos she has significantly slowed down, no longer goes to bank with her brother, no longer goes to [employer] every Sun. Uses a walker and can walk to cafeteria with that. Not very active. Has had at least one fall ~3-4 mos ago, sounds like she did have syncope at the time in the context of active COVID infection. She had another fall soon thereafter and was admitted  for that.    Notably, she has been evaluated by  a couple of years earlier and was offered ischemia w/up and her valvular disease even then but the patient wasn't interested at that time. More recently, she changed her mind and is here to explore options.    No CP/SOB/ no obviuos MOSELEY but walks slowly w/ the walker. + easy fatigue, sleeps a lot. No orthopnea/PND/edema.    Recent Echocardiogram Results:  The left ventricle is normal in size with normal left ventricular wall  thickness.  Left ventricular function is decreased. The ejection fraction is 30-35%  (moderately reduced).  The right ventricle is normal size with mildly decreased right ventricular  systolic function  The left atrium is mildly dilated.  Severe low flow, low gradient aortic stenosis is present with a peak velocity  of 2.9 m/s, mean gradient 21 mmHg, SVi 28 ml/m2, LANNY 0.6 cm2, DI 0.22.  Mild calcific mitral stenosis is present.  Compared to the prior study of 9/12/22, the degree of aortic stenosis has  advanced.     Hemodynamically significant valve disease is identified. Recommend referral to  valve clinic for further evaluation. Valve clinic phone number: [phone].  [facility] Epic: [facility].    Recent Coronary Angiogram Results:  None       Physical Examination  Review of Systems   Vitals: /68 (BP Location: Right arm, Patient Position: Sitting, Cuff Size: Adult Regular)   Pulse 53   Resp 16   Wt 57.2 kg (126 lb)   SpO2 95%   BMI 21.90 kg/m    BMI= Body mass index is 21.9 kg/m .  Wt Readings from Last 3 Encounters:   03/06/25 57.2 kg (126 lb)   01/20/25 55.8 kg (123 lb)   12/30/24 57.2 kg (126 lb)       General Appearance:   no distress, normal body habitus   ENT/Mouth: membranes moist, no oral lesions or bleeding gums.      EYES:  no scleral icterus, normal conjunctivae   Neck: no carotid bruits or thyromegaly   Chest/Lungs:   lungs are clear to auscultation, no rales or wheezing, no sternal  scar, equal chest wall expansion    Cardiovascular:   Regular. Normal first and second heart sounds with 2/6 systolic murmur, no rubs, or gallops; the carotid, radial and posterior tibial pulses are intact, Jugular venous pressure 7, no edema bilaterally    Abdomen:  no organomegaly, masses, bruits, or tenderness; bowel sounds are present   Extremities: no cyanosis or clubbing   Skin: no xanthelasma, warm.    Neurologic: normal  bilateral, no tremors     Psychiatric: alert and oriented x3, calm        Please refer above for cardiac ROS details.        Medical History  Surgical History Family History Social History   Past Medical History:   Diagnosis Date    COVID-19 09/14/2020    positive test at [facility]    DNAR (do not attempt resuscitation)     Dyslipidemia, goal LDL below 70 09/15/2020    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 Nini Rasmussen    Metabolic encephalopathy     Moderate aortic valve stenosis 08/22/2017    stable on 2020 echo    Nonobstructive atherosclerosis of coronary artery 09/15/2020    with normal LVEDP    Paroxysmal SVT (supraventricular tachycardia) 09/07/2017    said to have short episodes while hospitalized for UTI per Dr. Rhys Mensah    Syncope 08/22/2017    UTI (urinary tract infection) 08/21/2017    hospitalized with weakness     Past Surgical History:   Procedure Laterality Date    CATARACT EXTRACTION, BILATERAL      CV CORONARY ANGIOGRAM N/A 9/15/2020    Procedure: Coronary Angiogram;  Surgeon: Radhika Santa MD;  Location: [facility] Cath Lab;  Service: Cardiology    CV LEFT HEART CATHETERIZATION WITHOUT LEFT VENTRICULOGRAM Left 9/15/2020    Procedure: Left Heart Catheterization Without Left Ventriculogram;  Surgeon: Radhika Santa MD;  Location: [facility] Cath Lab;  Service: Cardiology    EP PACEMAKER INSERT N/A 4/13/2021    Procedure: EP Pacemaker  Insertion;  Surgeon: Frederic Burgos MD;  Location: [facility] Cardiac Cath Lab;  Service: Cardiology    HYSTERECTOMY      PARTIAL GASTRECTOMY  1969    for ulcers    TONSILLECTOMY       Family History   Adopted: Yes        Social History     Socioeconomic History    Marital status: Single     Spouse name: Not on file    Number of children: 0    Years of education: Not on file    Highest education level: Not on file   Occupational History    Not on file   Tobacco Use    Smoking status: Never    Smokeless tobacco: Never   Vaping Use    Vaping status: Never Used   Substance and Sexual Activity    Alcohol use: No    Drug use: No    Sexual activity: Not on file   Other Topics Concern    Not on file   Social History Narrative    Her adopted brother, Jeff, lives with her. He is five years younger than her.     Social Drivers of Health     Financial Resource Strain: Unknown (12/23/2024)    Financial Resource Strain     Within the past 12 months, have you or your family members you live with been unable to get utilities (heat, electricity) when it was really needed?: Patient unable to answer   Food Insecurity: Unknown (12/23/2024)    Food Insecurity     Within the past 12 months, did you worry that your food would run out before you got money to buy more?: Patient unable to answer     Within the past 12 months, did the food you bought just not last and you didn t have money to get more?: Patient unable to answer   Transportation Needs: Unknown (12/23/2024)    Transportation Needs     Within the past 12 months, has lack of transportation kept you from medical appointments, getting your medicines, non-medical meetings or appointments, work, or from getting things that you need?: Patient unable to answer   Physical Activity: Not on file   Stress: Not on file   Social Connections: Not on file   Interpersonal Safety: Not on file   Housing Stability: Unknown (12/23/2024)    Housing Stability     Do you have housing? :  Patient unable to answer     Are you worried about losing your housing?: Patient unable to answer           Medications  Allergies   Current Outpatient Medications   Medication Sig Dispense Refill    acetaminophen (TYLENOL) 500 MG tablet Take 1,000 mg by mouth every 8 hours as needed for pain.      apixaban ANTICOAGULANT (ELIQUIS ANTICOAGULANT) 2.5 MG tablet Take 1 tablet (2.5 mg) by mouth 2 times daily. Due for appointment with Rose Mcelroy 60 tablet 1    atorvastatin (LIPITOR) 10 MG tablet Take 1 tablet (10 mg) by mouth every morning. 30 tablet 2    furosemide (LASIX) 20 MG tablet Take 0.5 tablets (10 mg) by mouth daily Due for appointment with Rose Mcelroy 15 tablet 1    metoprolol succinate ER (TOPROL XL) 50 MG 24 hr tablet Take 1 tablet (50 mg) by mouth daily 30 tablet 2       Allergies   Allergen Reactions    Sulfa Antibiotics Shortness Of Breath          Lab Results    Chemistry/lipid CBC Cardiac Enzymes/BNP/TSH/INR   Recent Labs   Lab Test 04/27/23  1109   CHOL 137   HDL 42*   LDL 62   TRIG 164*     Recent Labs   Lab Test 04/27/23  1109 03/24/22  1050 04/21/21  1101   LDL 62 102 58     Recent Labs   Lab Test 12/25/24  0627      POTASSIUM 4.2   CHLORIDE 109*   CO2 20*   *   BUN 25.8*   CR 1.11*   GFRESTIMATED 49*   CURT 8.1*     Recent Labs   Lab Test 12/25/24  0627 12/24/24  0457 12/23/24  1038   CR 1.11* 1.17* 1.48*     Recent Labs   Lab Test 04/11/21  0524 04/24/17  0647   A1C 5.2 5.7          Recent Labs   Lab Test 12/24/24  0457   WBC 4.2   HGB 12.1   HCT 36.7   MCV 98   *     Recent Labs   Lab Test 12/24/24  0457 12/23/24  1038 12/22/24  1425   HGB 12.1 13.9 13.4    Recent Labs   Lab Test 09/11/22  1733 07/22/22  1917 06/16/21  2208   TROPONINI 0.02 0.02 0.02     Recent Labs   Lab Test 06/10/21  1920 12/16/20  1257 09/14/20  1033   BNP 1,170* 1,646* 356*     Recent Labs   Lab Test 04/27/23  1109   TSH 1.53     Recent Labs   Lab Test 07/22/22 1917 03/14/21 1927 09/14/20 1914   INR 1.23*  0.93 1.04        Kayden Irby MD

## 2025-03-06 NOTE — PROGRESS NOTES
Valve Clinic TAVR - 3/6/25  (See consult note from Dr. Irby)    Referring provider: Dr. Diallo (1/29/25)    Labs completed at visit today: No; patient has a fear of needles, to avoid multiple pokes will draw AM of cath when inserting IV.     Labs reviewed prior to clinic:    Latest Reference Range & Units 12/25/24 06:27   Sodium 135 - 145 mmol/L 140   Potassium 3.4 - 5.3 mmol/L 4.2   Chloride 98 - 107 mmol/L 109 (H)   Carbon Dioxide (CO2) 22 - 29 mmol/L 20 (L)   Urea Nitrogen 8.0 - 23.0 mg/dL 25.8 (H)   Creatinine 0.51 - 0.95 mg/dL 1.11 (H)   GFR Estimate >60 mL/min/1.73m2 49 (L)   Calcium 8.8 - 10.4 mg/dL 8.1 (L)   Anion Gap 7 - 15 mmol/L 11   Magnesium 1.7 - 2.3 mg/dL 1.9   CRP Inflammation <5.00 mg/L 20.40 (H)   Glucose 70 - 99 mg/dL 106 (H)   (H): Data is abnormally high  (L): Data is abnormally low      Preliminary STS Score: 7.75%      KCCQ12 (date completed 3/6/25), scanned into chart      PMH: Frequent falls, generalized weakness, sick sinus syndrome and junctional bradycardia s/p PPM, JESUS, HLD, vascular dementia, HTN, paroxysmal Afib, HFrEF, anemia, non obstructive CAD, CKD, SVT, hx of TIA, aortic stenosis.     Social: Patient lives at an assisted living with her brother. She presents to clinic today with her brother and two close family friends Rosana and Gabriella. Gabriella helps make appts for Pat and assists with medical needs.       TTE date 12/24/25  EF 30-35 %  AV mean gradient: 21 mmHg  AV Peak Bandar: 2.9 m/sec  AV area: 0.56 cm2  AV DIM IND VTI: 0.22  LV SVi: 28 ml/m2    Comments on valves: moderate annular calcification of the mitral valve, mild regurgitation and no stenosis. Tricuspid valve has no stenosis or regurgitation. Aortic valve has trace regurgitation, severe low flow low gradient stenosis.     Plan: Pt would like to move forward with workup for TAVR. Patient will need to be scheduled for full work up including CTA TAVR, coronary angiogram and CT surgery consultation. Orders placed and  message sent to scheduling/ to arrange. Patients family contact Gabriella understands to anticipate a phone call to arrange these appts.     Gabriella's contact info includes the following: Cell # (preferred, but cannot leave VM)- [phone], Work # (backup if unable to reach her) [phone]. Address to mail angiogram teach to is [address] [place] [id number].     Patient has full dentures and will not require dental clearance.     Reviewed pre-procedure work up and procedural related education information with patient, brother and family friends Gabriella & Rosana. All questions answered, no further questions at this time. Patient has my direct contact information as well as the valve clinic line and was encouraged to call with questions or concerns.       Macario Villatoro RN BSN- Valve Clinic Coordinator   [facility]ve [facility]  [facility]  Phone: [phone]  Fax: [phone]

## 2025-03-06 NOTE — PROGRESS NOTES
HEART CARE VALVE CLINIC ENCOUNTER CONSULTATON NOTE      [facility]  [phone]      Assessment/Recommendations   Assessment/Plan:Laney Hahn is an 84F w/ aortic stenosis progressive LVEF decline to 30-35 CAD, SN dysfunction s/p PPM, dementia who is here for evaluation of management options for her valvular disease.     -  I personally reviewed cardiac imaging including: TTE, which showed P/M 34/21 LANNY 0.56 DI 0.22 SVI 28.5 consistent w/ low flow-low gradient severe aortic stenosis;  -  after a long discussion of natural history, risk/benefit, and management options w/ the patient, friends, and family, we all agree to proceed w/ the remainder of w/up including angio +/- PCI,  CTS consult, Dental eval    The longitudinal plan of care for the diagnosis(es)/condition(s) as documented were addressed during this visit. Due to the added complexity in care, I will continue to support Laney in the subsequent management and with ongoing continuity of care.    A total of >60 mins was spent reviewing prior records, including documentation, lab studies, cardiac testing/imaging, interview with patient, physical exam, and documentation     History of Present Illness/Subjective    HPI: Laney Hahn is a 84 year old female w/ aortic stenosis progressive LVEF decline to 30-35 CAD, SN dysfunction s/p PPM, dementia who is here for evaluation of management options for her valvular disease.    She lives in an assisted living facility w/ her brother, requiring help w/ Medications and has been there for the past 4 years. Over the past 4-6 mos she has significantly slowed down, no longer goes to bank with her brother, no longer goes to [employer] every Sun. Uses a walker and can walk to cafeteria with that. Not very active. Has had at least one fall ~3-4 mos ago, sounds like she did have syncope at the time in the context of active COVID infection. She had another fall soon thereafter and was admitted  for that.    Notably, she has been evaluated by  a couple of years earlier and was offered ischemia w/up and her valvular disease even then but the patient wasn't interested at that time. More recently, she changed her mind and is here to explore options.    No CP/SOB/ no obviuos MOSELEY but walks slowly w/ the walker. + easy fatigue, sleeps a lot. No orthopnea/PND/edema.    Recent Echocardiogram Results:  The left ventricle is normal in size with normal left ventricular wall  thickness.  Left ventricular function is decreased. The ejection fraction is 30-35%  (moderately reduced).  The right ventricle is normal size with mildly decreased right ventricular  systolic function  The left atrium is mildly dilated.  Severe low flow, low gradient aortic stenosis is present with a peak velocity  of 2.9 m/s, mean gradient 21 mmHg, SVi 28 ml/m2, LANNY 0.6 cm2, DI 0.22.  Mild calcific mitral stenosis is present.  Compared to the prior study of 9/12/22, the degree of aortic stenosis has  advanced.     Hemodynamically significant valve disease is identified. Recommend referral to  valve clinic for further evaluation. Valve clinic phone number: [phone].  [facility] Epic: [facility].    Recent Coronary Angiogram Results:  None       Physical Examination  Review of Systems   Vitals: /68 (BP Location: Right arm, Patient Position: Sitting, Cuff Size: Adult Regular)   Pulse 53   Resp 16   Wt 57.2 kg (126 lb)   SpO2 95%   BMI 21.90 kg/m    BMI= Body mass index is 21.9 kg/m .  Wt Readings from Last 3 Encounters:   03/06/25 57.2 kg (126 lb)   01/20/25 55.8 kg (123 lb)   12/30/24 57.2 kg (126 lb)       General Appearance:   no distress, normal body habitus   ENT/Mouth: membranes moist, no oral lesions or bleeding gums.      EYES:  no scleral icterus, normal conjunctivae   Neck: no carotid bruits or thyromegaly   Chest/Lungs:   lungs are clear to auscultation, no rales or wheezing, no sternal  scar, equal chest wall expansion    Cardiovascular:   Regular. Normal first and second heart sounds with 2/6 systolic murmur, no rubs, or gallops; the carotid, radial and posterior tibial pulses are intact, Jugular venous pressure 7, no edema bilaterally    Abdomen:  no organomegaly, masses, bruits, or tenderness; bowel sounds are present   Extremities: no cyanosis or clubbing   Skin: no xanthelasma, warm.    Neurologic: normal  bilateral, no tremors     Psychiatric: alert and oriented x3, calm        Please refer above for cardiac ROS details.        Medical History  Surgical History Family History Social History   Past Medical History:   Diagnosis Date    COVID-19 09/14/2020    positive test at [facility]    DNAR (do not attempt resuscitation)     Dyslipidemia, goal LDL below 70 09/15/2020    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 Nini Rasmussen    Metabolic encephalopathy     Moderate aortic valve stenosis 08/22/2017    stable on 2020 echo    Nonobstructive atherosclerosis of coronary artery 09/15/2020    with normal LVEDP    Paroxysmal SVT (supraventricular tachycardia) 09/07/2017    said to have short episodes while hospitalized for UTI per Dr. Rhys Mensah    Syncope 08/22/2017    UTI (urinary tract infection) 08/21/2017    hospitalized with weakness     Past Surgical History:   Procedure Laterality Date    CATARACT EXTRACTION, BILATERAL      CV CORONARY ANGIOGRAM N/A 9/15/2020    Procedure: Coronary Angiogram;  Surgeon: Radhika Santa MD;  Location: [facility] Cath Lab;  Service: Cardiology    CV LEFT HEART CATHETERIZATION WITHOUT LEFT VENTRICULOGRAM Left 9/15/2020    Procedure: Left Heart Catheterization Without Left Ventriculogram;  Surgeon: Radhika Santa MD;  Location: [facility];  Service: Cardiology    EP PACEMAKER INSERT N/A 4/13/2021    Procedure: EP Pacemaker  Insertion;  Surgeon: Frederic Burgos MD;  Location: [facility] Cardiac Cath Lab;  Service: Cardiology    HYSTERECTOMY      PARTIAL GASTRECTOMY  1969    for ulcers    TONSILLECTOMY       Family History   Adopted: Yes        Social History     Socioeconomic History    Marital status: Single     Spouse name: Not on file    Number of children: 0    Years of education: Not on file    Highest education level: Not on file   Occupational History    Not on file   Tobacco Use    Smoking status: Never    Smokeless tobacco: Never   Vaping Use    Vaping status: Never Used   Substance and Sexual Activity    Alcohol use: No    Drug use: No    Sexual activity: Not on file   Other Topics Concern    Not on file   Social History Narrative    Her adopted brother, Jeff, lives with her. He is five years younger than her.     Social Drivers of Health     Financial Resource Strain: Unknown (12/23/2024)    Financial Resource Strain     Within the past 12 months, have you or your family members you live with been unable to get utilities (heat, electricity) when it was really needed?: Patient unable to answer   Food Insecurity: Unknown (12/23/2024)    Food Insecurity     Within the past 12 months, did you worry that your food would run out before you got money to buy more?: Patient unable to answer     Within the past 12 months, did the food you bought just not last and you didn t have money to get more?: Patient unable to answer   Transportation Needs: Unknown (12/23/2024)    Transportation Needs     Within the past 12 months, has lack of transportation kept you from medical appointments, getting your medicines, non-medical meetings or appointments, work, or from getting things that you need?: Patient unable to answer   Physical Activity: Not on file   Stress: Not on file   Social Connections: Not on file   Interpersonal Safety: Not on file   Housing Stability: Unknown (12/23/2024)    Housing Stability     Do you have housing? :  Patient unable to answer     Are you worried about losing your housing?: Patient unable to answer           Medications  Allergies   Current Outpatient Medications   Medication Sig Dispense Refill    acetaminophen (TYLENOL) 500 MG tablet Take 1,000 mg by mouth every 8 hours as needed for pain.      apixaban ANTICOAGULANT (ELIQUIS ANTICOAGULANT) 2.5 MG tablet Take 1 tablet (2.5 mg) by mouth 2 times daily. Due for appointment with Rose Mcelroy 60 tablet 1    atorvastatin (LIPITOR) 10 MG tablet Take 1 tablet (10 mg) by mouth every morning. 30 tablet 2    furosemide (LASIX) 20 MG tablet Take 0.5 tablets (10 mg) by mouth daily Due for appointment with Rose Mcelroy 15 tablet 1    metoprolol succinate ER (TOPROL XL) 50 MG 24 hr tablet Take 1 tablet (50 mg) by mouth daily 30 tablet 2       Allergies   Allergen Reactions    Sulfa Antibiotics Shortness Of Breath          Lab Results    Chemistry/lipid CBC Cardiac Enzymes/BNP/TSH/INR   Recent Labs   Lab Test 04/27/23  1109   CHOL 137   HDL 42*   LDL 62   TRIG 164*     Recent Labs   Lab Test 04/27/23  1109 03/24/22  1050 04/21/21  1101   LDL 62 102 58     Recent Labs   Lab Test 12/25/24  0627      POTASSIUM 4.2   CHLORIDE 109*   CO2 20*   *   BUN 25.8*   CR 1.11*   GFRESTIMATED 49*   CURT 8.1*     Recent Labs   Lab Test 12/25/24  0627 12/24/24  0457 12/23/24  1038   CR 1.11* 1.17* 1.48*     Recent Labs   Lab Test 04/11/21  0524 04/24/17  0647   A1C 5.2 5.7          Recent Labs   Lab Test 12/24/24  0457   WBC 4.2   HGB 12.1   HCT 36.7   MCV 98   *     Recent Labs   Lab Test 12/24/24  0457 12/23/24  1038 12/22/24  1425   HGB 12.1 13.9 13.4    Recent Labs   Lab Test 09/11/22  1733 07/22/22  1917 06/16/21  2208   TROPONINI 0.02 0.02 0.02     Recent Labs   Lab Test 06/10/21  1920 12/16/20  1257 09/14/20  1033   BNP 1,170* 1,646* 356*     Recent Labs   Lab Test 04/27/23  1109   TSH 1.53     Recent Labs   Lab Test 07/22/22 1917 03/14/21 1927 09/14/20 1914   INR 1.23*  0.93 1.04        Kayden Irby MD

## 2025-03-06 NOTE — LETTER
3/6/2025    Rose Mcelroy NP  [address]  [place] [address]    RE: Laney Hahn       Dear Colleague,     I had the pleasure of seeing Laney Hahn in the [facility].    HEART CARE VALVE CLINIC ENCOUNTER CONSULTATON NOTE      [facility]  [phone]      Assessment/Recommendations   Assessment/Plan:Laney Hahn is an 84F w/ aortic stenosis progressive LVEF decline to 30-35 CAD, SN dysfunction s/p PPM, dementia who is here for evaluation of management options for her valvular disease.     -  I personally reviewed cardiac imaging including: TTE, which showed P/M 34/21 LANNY 0.56 DI 0.22 SVI 28.5 consistent w/ low flow-low gradient severe aortic stenosis;  -  after a long discussion of natural history, risk/benefit, and management options w/ the patient, friends, and family, we all agree to proceed w/ the remainder of w/up including angio +/- PCI,  CTS consult, Dental eval    The longitudinal plan of care for the diagnosis(es)/condition(s) as documented were addressed during this visit. Due to the added complexity in care, I will continue to support Laney in the subsequent management and with ongoing continuity of care.    A total of >60 mins was spent reviewing prior records, including documentation, lab studies, cardiac testing/imaging, interview with patient, physical exam, and documentation     History of Present Illness/Subjective    HPI: Laney Hahn is a 84 year old female w/ aortic stenosis progressive LVEF decline to 30-35 CAD, SN dysfunction s/p PPM, dementia who is here for evaluation of management options for her valvular disease.    She lives in an assisted living facility w/ her brother, requiring help w/ Medications and has been there for the past 4 years. Over the past 4-6 mos she has significantly slowed down, no longer goes to bank with her brother, no longer goes to [employer] every Sun. Uses a walker and can walk  to [facility] with that. Not very active. Has had at least one fall ~3-4 mos ago, sounds like she did have syncope at the time in the context of active COVID infection. She had another fall soon thereafter and was admitted for that.    Notably, she has been evaluated by  a couple of years earlier and was offered ischemia w/up and her valvular disease even then but the patient wasn't interested at that time. More recently, she changed her mind and is here to explore options.    No CP/SOB/ no obviuos MOSELEY but walks slowly w/ the walker. + easy fatigue, sleeps a lot. No orthopnea/PND/edema.    Recent Echocardiogram Results:  The left ventricle is normal in size with normal left ventricular wall  thickness.  Left ventricular function is decreased. The ejection fraction is 30-35%  (moderately reduced).  The right ventricle is normal size with mildly decreased right ventricular  systolic function  The left atrium is mildly dilated.  Severe low flow, low gradient aortic stenosis is present with a peak velocity  of 2.9 m/s, mean gradient 21 mmHg, SVi 28 ml/m2, LANNY 0.6 cm2, DI 0.22.  Mild calcific mitral stenosis is present.  Compared to the prior study of 9/12/22, the degree of aortic stenosis has  advanced.     Hemodynamically significant valve disease is identified. Recommend referral to  valve clinic for further evaluation. Valve clinic phone number: [phone].  [facility] Epic: [facility][note 3].    Recent Coronary Angiogram Results:  None       Physical Examination  Review of Systems   Vitals: /68 (BP Location: Right arm, Patient Position: Sitting, Cuff Size: Adult Regular)   Pulse 53   Resp 16   Wt 57.2 kg (126 lb)   SpO2 95%   BMI 21.90 kg/m    BMI= Body mass index is 21.9 kg/m .  Wt Readings from Last 3 Encounters:   03/06/25 57.2 kg (126 lb)   01/20/25 55.8 kg (123 lb)   12/30/24 57.2 kg (126 lb)       General Appearance:   no distress, normal body habitus   ENT/Mouth: membranes  moist, no oral lesions or bleeding gums.      EYES:  no scleral icterus, normal conjunctivae   Neck: no carotid bruits or thyromegaly   Chest/Lungs:   lungs are clear to auscultation, no rales or wheezing, no sternal scar, equal chest wall expansion    Cardiovascular:   Regular. Normal first and second heart sounds with 2/6 systolic murmur, no rubs, or gallops; the carotid, radial and posterior tibial pulses are intact, Jugular venous pressure 7, no edema bilaterally    Abdomen:  no organomegaly, masses, bruits, or tenderness; bowel sounds are present   Extremities: no cyanosis or clubbing   Skin: no xanthelasma, warm.    Neurologic: normal  bilateral, no tremors     Psychiatric: alert and oriented x3, calm        Please refer above for cardiac ROS details.        Medical History  Surgical History Family History Social History   Past Medical History:   Diagnosis Date     COVID-19 09/14/2020    positive test at [facility]     DNAR (do not attempt resuscitation)      Dyslipidemia, goal LDL below 70 09/15/2020     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 Nini Rasmussen     Metabolic encephalopathy      Moderate aortic valve stenosis 08/22/2017    stable on 2020 echo     Nonobstructive atherosclerosis of coronary artery 09/15/2020    with normal LVEDP     Paroxysmal SVT (supraventricular tachycardia) 09/07/2017    said to have short episodes while hospitalized for UTI per Dr. Rhys Mensah     Syncope 08/22/2017     UTI (urinary tract infection) 08/21/2017    hospitalized with weakness     Past Surgical History:   Procedure Laterality Date     CATARACT EXTRACTION, BILATERAL       CV CORONARY ANGIOGRAM N/A 9/15/2020    Procedure: Coronary Angiogram;  Surgeon: Radhika Santa MD;  Location: [facility] Cath Lab;  Service: Cardiology     CV LEFT HEART CATHETERIZATION WITHOUT LEFT VENTRICULOGRAM Left  9/15/2020    Procedure: Left Heart Catheterization Without Left Ventriculogram;  Surgeon: Radhika Santa MD;  Location: [facility] Cath Lab;  Service: Cardiology     EP PACEMAKER INSERT N/A 4/13/2021    Procedure: EP Pacemaker Insertion;  Surgeon: Frederic Burgos MD;  Location: [facility] Cardiac Cath Lab;  Service: Cardiology     HYSTERECTOMY       PARTIAL GASTRECTOMY  1969    for ulcers     TONSILLECTOMY       Family History   Adopted: Yes        Social History     Socioeconomic History     Marital status: Single     Spouse name: Not on file     Number of children: 0     Years of education: Not on file     Highest education level: Not on file   Occupational History     Not on file   Tobacco Use     Smoking status: Never     Smokeless tobacco: Never   Vaping Use     Vaping status: Never Used   Substance and Sexual Activity     Alcohol use: No     Drug use: No     Sexual activity: Not on file   Other Topics Concern     Not on file   Social History Narrative    Her adopted brother, Jeff, lives with her. He is five years younger than her.     Social Drivers of Health     Financial Resource Strain: Unknown (12/23/2024)    Financial Resource Strain      Within the past 12 months, have you or your family members you live with been unable to get utilities (heat, electricity) when it was really needed?: Patient unable to answer   Food Insecurity: Unknown (12/23/2024)    Food Insecurity      Within the past 12 months, did you worry that your food would run out before you got money to buy more?: Patient unable to answer      Within the past 12 months, did the food you bought just not last and you didn t have money to get more?: Patient unable to answer   Transportation Needs: Unknown (12/23/2024)    Transportation Needs      Within the past 12 months, has lack of transportation kept you from medical appointments, getting your medicines, non-medical meetings or appointments, work, or from getting things that you  need?: Patient unable to answer   Physical Activity: Not on file   Stress: Not on file   Social Connections: Not on file   Interpersonal Safety: Not on file   Housing Stability: Unknown (12/23/2024)    Housing Stability      Do you have housing? : Patient unable to answer      Are you worried about losing your housing?: Patient unable to answer           Medications  Allergies   Current Outpatient Medications   Medication Sig Dispense Refill     acetaminophen (TYLENOL) 500 MG tablet Take 1,000 mg by mouth every 8 hours as needed for pain.       apixaban ANTICOAGULANT (ELIQUIS ANTICOAGULANT) 2.5 MG tablet Take 1 tablet (2.5 mg) by mouth 2 times daily. Due for appointment with Rose Mcelroy 60 tablet 1     atorvastatin (LIPITOR) 10 MG tablet Take 1 tablet (10 mg) by mouth every morning. 30 tablet 2     furosemide (LASIX) 20 MG tablet Take 0.5 tablets (10 mg) by mouth daily Due for appointment with Rose Mcelroy 15 tablet 1     metoprolol succinate ER (TOPROL XL) 50 MG 24 hr tablet Take 1 tablet (50 mg) by mouth daily 30 tablet 2       Allergies   Allergen Reactions     Sulfa Antibiotics Shortness Of Breath          Lab Results    Chemistry/lipid CBC Cardiac Enzymes/BNP/TSH/INR   Recent Labs   Lab Test 04/27/23  1109   CHOL 137   HDL 42*   LDL 62   TRIG 164*     Recent Labs   Lab Test 04/27/23  1109 03/24/22  1050 04/21/21  1101   LDL 62 102 58     Recent Labs   Lab Test 12/25/24  0627      POTASSIUM 4.2   CHLORIDE 109*   CO2 20*   *   BUN 25.8*   CR 1.11*   GFRESTIMATED 49*   CURT 8.1*     Recent Labs   Lab Test 12/25/24  0627 12/24/24  0457 12/23/24  1038   CR 1.11* 1.17* 1.48*     Recent Labs   Lab Test 04/11/21  0524 04/24/17  0647   A1C 5.2 5.7          Recent Labs   Lab Test 12/24/24  0457   WBC 4.2   HGB 12.1   HCT 36.7   MCV 98   *     Recent Labs   Lab Test 12/24/24  0457 12/23/24  1038 12/22/24  1425   HGB 12.1 13.9 13.4    Recent Labs   Lab Test 09/11/22  1733 07/22/22  1917 06/16/21  2208    TROPONINI 0.02 0.02 0.02     Recent Labs   Lab Test 06/10/21  1920 12/16/20  1257 09/14/20  1033   BNP 1,170* 1,646* 356*     Recent Labs   Lab Test 04/27/23  1109   TSH 1.53     Recent Labs   Lab Test 07/22/22 1917 03/14/21  1927 09/14/20  1914   INR 1.23* 0.93 1.04        Kayden Irby MD                                        Thank you for allowing me to participate in the care of your patient.      Sincerely,     Kayden Irby MD     [facility]  cc:   Nichol Diallo MD  [address]  [address]  [place] [address]

## 2025-03-19 ENCOUNTER — TELEPHONE (OUTPATIENT)
Dept: CARDIOLOGY | Facility: CLINIC | Age: 85
End: 2025-03-19
Payer: COMMERCIAL

## 2025-03-19 NOTE — TELEPHONE ENCOUNTER
Faxed letter for angiogram to patient's care [facility]. Called them to notify them of letter and that patient will have hold instructions for Eliquis starting 4/1.     Also sent letter via mail to patient's friend Gabriella. Called her to notify her that the letter was sent to the assisted living but that she may need to help the patient with NPO instructions as the assisted living states they do not participate in diet restrictions or holds for patients. Additional letter was sent to Gabriella so she can bring it to [facility] and her brother to review.    Peyton Horan RN on 3/19/2025 at 10:31 AM

## 2025-03-24 ENCOUNTER — APPOINTMENT (OUTPATIENT)
Dept: CT IMAGING | Facility: HOSPITAL | Age: 85
End: 2025-03-24
Attending: STUDENT IN AN ORGANIZED HEALTH CARE EDUCATION/TRAINING PROGRAM
Payer: COMMERCIAL

## 2025-03-24 ENCOUNTER — HOSPITAL ENCOUNTER (OUTPATIENT)
Facility: HOSPITAL | Age: 85
Setting detail: OBSERVATION
Discharge: NURSING, REHAB OR LONG-TERM CARE | End: 2025-03-26
Attending: STUDENT IN AN ORGANIZED HEALTH CARE EDUCATION/TRAINING PROGRAM | Admitting: INTERNAL MEDICINE
Payer: COMMERCIAL

## 2025-03-24 DIAGNOSIS — I47.10 SVT (SUPRAVENTRICULAR TACHYCARDIA): Primary | ICD-10-CM

## 2025-03-24 DIAGNOSIS — R11.2 NAUSEA AND VOMITING, UNSPECIFIED VOMITING TYPE: ICD-10-CM

## 2025-03-24 LAB
ALBUMIN SERPL BCG-MCNC: 4.2 G/DL (ref 3.5–5.2)
ALP SERPL-CCNC: 113 U/L (ref 40–150)
ALT SERPL W P-5'-P-CCNC: 14 U/L (ref 0–50)
ANION GAP SERPL CALCULATED.3IONS-SCNC: 13 MMOL/L (ref 7–15)
AST SERPL W P-5'-P-CCNC: 21 U/L (ref 0–45)
BASOPHILS # BLD AUTO: 0 10E3/UL (ref 0–0.2)
BASOPHILS NFR BLD AUTO: 1 %
BILIRUB DIRECT SERPL-MCNC: 0.12 MG/DL (ref 0–0.3)
BILIRUB SERPL-MCNC: 0.3 MG/DL
BUN SERPL-MCNC: 27.2 MG/DL (ref 8–23)
CALCIUM SERPL-MCNC: 9.2 MG/DL (ref 8.8–10.4)
CHLORIDE SERPL-SCNC: 107 MMOL/L (ref 98–107)
CREAT SERPL-MCNC: 1.43 MG/DL (ref 0.51–0.95)
EGFRCR SERPLBLD CKD-EPI 2021: 36 ML/MIN/1.73M2
EOSINOPHIL # BLD AUTO: 0.2 10E3/UL (ref 0–0.7)
EOSINOPHIL NFR BLD AUTO: 3 %
ERYTHROCYTE [DISTWIDTH] IN BLOOD BY AUTOMATED COUNT: 13 % (ref 10–15)
EST. AVERAGE GLUCOSE BLD GHB EST-MCNC: 126 MG/DL
GLUCOSE BLDC GLUCOMTR-MCNC: 135 MG/DL (ref 70–99)
GLUCOSE SERPL-MCNC: 134 MG/DL (ref 70–99)
HBA1C MFR BLD: 6 %
HCO3 SERPL-SCNC: 22 MMOL/L (ref 22–29)
HCT VFR BLD AUTO: 40.9 % (ref 35–47)
HGB BLD-MCNC: 13.8 G/DL (ref 11.7–15.7)
IMM GRANULOCYTES # BLD: 0 10E3/UL
IMM GRANULOCYTES NFR BLD: 0 %
INR PPP: 1.15 (ref 0.85–1.15)
LIPASE SERPL-CCNC: 12 U/L (ref 13–60)
LYMPHOCYTES # BLD AUTO: 1.9 10E3/UL (ref 0.8–5.3)
LYMPHOCYTES NFR BLD AUTO: 34 %
MAGNESIUM SERPL-MCNC: 2 MG/DL (ref 1.7–2.3)
MAGNESIUM SERPL-MCNC: 2.1 MG/DL (ref 1.7–2.3)
MCH RBC QN AUTO: 32.8 PG (ref 26.5–33)
MCHC RBC AUTO-ENTMCNC: 33.7 G/DL (ref 31.5–36.5)
MCV RBC AUTO: 97 FL (ref 78–100)
MONOCYTES # BLD AUTO: 0.4 10E3/UL (ref 0–1.3)
MONOCYTES NFR BLD AUTO: 7 %
NEUTROPHILS # BLD AUTO: 3.1 10E3/UL (ref 1.6–8.3)
NEUTROPHILS NFR BLD AUTO: 55 %
NRBC # BLD AUTO: 0 10E3/UL
NRBC BLD AUTO-RTO: 0 /100
PHOSPHATE SERPL-MCNC: 3.8 MG/DL (ref 2.5–4.5)
PHOSPHATE SERPL-MCNC: 4.1 MG/DL (ref 2.5–4.5)
PLATELET # BLD AUTO: 214 10E3/UL (ref 150–450)
POTASSIUM SERPL-SCNC: 4.5 MMOL/L (ref 3.4–5.3)
PROT SERPL-MCNC: 7.1 G/DL (ref 6.4–8.3)
RBC # BLD AUTO: 4.21 10E6/UL (ref 3.8–5.2)
SODIUM SERPL-SCNC: 142 MMOL/L (ref 135–145)
TROPONIN T SERPL HS-MCNC: 28 NG/L
TROPONIN T SERPL HS-MCNC: 28 NG/L
WBC # BLD AUTO: 5.6 10E3/UL (ref 4–11)

## 2025-03-24 PROCEDURE — 258N000003 HC RX IP 258 OP 636: Performed by: INTERNAL MEDICINE

## 2025-03-24 PROCEDURE — 84100 ASSAY OF PHOSPHORUS: CPT | Performed by: STUDENT IN AN ORGANIZED HEALTH CARE EDUCATION/TRAINING PROGRAM

## 2025-03-24 PROCEDURE — 85610 PROTHROMBIN TIME: CPT | Performed by: STUDENT IN AN ORGANIZED HEALTH CARE EDUCATION/TRAINING PROGRAM

## 2025-03-24 PROCEDURE — 99223 1ST HOSP IP/OBS HIGH 75: CPT | Performed by: STUDENT IN AN ORGANIZED HEALTH CARE EDUCATION/TRAINING PROGRAM

## 2025-03-24 PROCEDURE — 36415 COLL VENOUS BLD VENIPUNCTURE: CPT | Performed by: STUDENT IN AN ORGANIZED HEALTH CARE EDUCATION/TRAINING PROGRAM

## 2025-03-24 PROCEDURE — 83735 ASSAY OF MAGNESIUM: CPT | Performed by: STUDENT IN AN ORGANIZED HEALTH CARE EDUCATION/TRAINING PROGRAM

## 2025-03-24 PROCEDURE — 83036 HEMOGLOBIN GLYCOSYLATED A1C: CPT | Performed by: STUDENT IN AN ORGANIZED HEALTH CARE EDUCATION/TRAINING PROGRAM

## 2025-03-24 PROCEDURE — 250N000011 HC RX IP 250 OP 636: Performed by: STUDENT IN AN ORGANIZED HEALTH CARE EDUCATION/TRAINING PROGRAM

## 2025-03-24 PROCEDURE — 70450 CT HEAD/BRAIN W/O DYE: CPT

## 2025-03-24 PROCEDURE — 83690 ASSAY OF LIPASE: CPT | Performed by: STUDENT IN AN ORGANIZED HEALTH CARE EDUCATION/TRAINING PROGRAM

## 2025-03-24 PROCEDURE — 93005 ELECTROCARDIOGRAM TRACING: CPT | Performed by: STUDENT IN AN ORGANIZED HEALTH CARE EDUCATION/TRAINING PROGRAM

## 2025-03-24 PROCEDURE — 96375 TX/PRO/DX INJ NEW DRUG ADDON: CPT

## 2025-03-24 PROCEDURE — 74177 CT ABD & PELVIS W/CONTRAST: CPT

## 2025-03-24 PROCEDURE — 84484 ASSAY OF TROPONIN QUANT: CPT | Performed by: STUDENT IN AN ORGANIZED HEALTH CARE EDUCATION/TRAINING PROGRAM

## 2025-03-24 PROCEDURE — 82248 BILIRUBIN DIRECT: CPT | Performed by: STUDENT IN AN ORGANIZED HEALTH CARE EDUCATION/TRAINING PROGRAM

## 2025-03-24 PROCEDURE — 85025 COMPLETE CBC W/AUTO DIFF WBC: CPT | Performed by: STUDENT IN AN ORGANIZED HEALTH CARE EDUCATION/TRAINING PROGRAM

## 2025-03-24 PROCEDURE — 120N000001 HC R&B MED SURG/OB

## 2025-03-24 PROCEDURE — 96374 THER/PROPH/DIAG INJ IV PUSH: CPT

## 2025-03-24 PROCEDURE — 258N000003 HC RX IP 258 OP 636: Performed by: STUDENT IN AN ORGANIZED HEALTH CARE EDUCATION/TRAINING PROGRAM

## 2025-03-24 PROCEDURE — 99285 EMERGENCY DEPT VISIT HI MDM: CPT | Mod: 25

## 2025-03-24 RX ORDER — HYDROMORPHONE HCL IN WATER/PF 6 MG/30 ML
0.2 PATIENT CONTROLLED ANALGESIA SYRINGE INTRAVENOUS
Status: DISCONTINUED | OUTPATIENT
Start: 2025-03-24 | End: 2025-03-26 | Stop reason: HOSPADM

## 2025-03-24 RX ORDER — ONDANSETRON 4 MG/1
4 TABLET, ORALLY DISINTEGRATING ORAL EVERY 6 HOURS PRN
Status: DISCONTINUED | OUTPATIENT
Start: 2025-03-24 | End: 2025-03-26 | Stop reason: HOSPADM

## 2025-03-24 RX ORDER — BACITRACIN ZINC 500 [USP'U]/G
OINTMENT TOPICAL PRN
COMMUNITY

## 2025-03-24 RX ORDER — LORAZEPAM 2 MG/ML
0.5 INJECTION INTRAMUSCULAR ONCE
Status: COMPLETED | OUTPATIENT
Start: 2025-03-24 | End: 2025-03-24

## 2025-03-24 RX ORDER — AMOXICILLIN 250 MG
1 CAPSULE ORAL 2 TIMES DAILY PRN
Status: DISCONTINUED | OUTPATIENT
Start: 2025-03-24 | End: 2025-03-26 | Stop reason: HOSPADM

## 2025-03-24 RX ORDER — ACETAMINOPHEN 650 MG/1
650 SUPPOSITORY RECTAL EVERY 4 HOURS PRN
Status: DISCONTINUED | OUTPATIENT
Start: 2025-03-24 | End: 2025-03-26 | Stop reason: HOSPADM

## 2025-03-24 RX ORDER — HYDROMORPHONE HCL IN WATER/PF 6 MG/30 ML
0.4 PATIENT CONTROLLED ANALGESIA SYRINGE INTRAVENOUS
Status: DISCONTINUED | OUTPATIENT
Start: 2025-03-24 | End: 2025-03-26 | Stop reason: HOSPADM

## 2025-03-24 RX ORDER — ATORVASTATIN CALCIUM 10 MG/1
10 TABLET, FILM COATED ORAL EVERY MORNING
Status: DISCONTINUED | OUTPATIENT
Start: 2025-03-25 | End: 2025-03-26 | Stop reason: HOSPADM

## 2025-03-24 RX ORDER — FUROSEMIDE 20 MG/1
10 TABLET ORAL DAILY
Status: DISCONTINUED | OUTPATIENT
Start: 2025-03-25 | End: 2025-03-26 | Stop reason: HOSPADM

## 2025-03-24 RX ORDER — OXYCODONE HYDROCHLORIDE 5 MG/1
5 TABLET ORAL EVERY 4 HOURS PRN
Status: DISCONTINUED | OUTPATIENT
Start: 2025-03-24 | End: 2025-03-26 | Stop reason: HOSPADM

## 2025-03-24 RX ORDER — PROCHLORPERAZINE MALEATE 5 MG/1
5 TABLET ORAL EVERY 6 HOURS PRN
Status: DISCONTINUED | OUTPATIENT
Start: 2025-03-24 | End: 2025-03-26 | Stop reason: HOSPADM

## 2025-03-24 RX ORDER — AMOXICILLIN 250 MG
2 CAPSULE ORAL 2 TIMES DAILY PRN
Status: DISCONTINUED | OUTPATIENT
Start: 2025-03-24 | End: 2025-03-26 | Stop reason: HOSPADM

## 2025-03-24 RX ORDER — CALCIUM CARBONATE 500 MG/1
1000 TABLET, CHEWABLE ORAL 4 TIMES DAILY PRN
Status: DISCONTINUED | OUTPATIENT
Start: 2025-03-24 | End: 2025-03-26 | Stop reason: HOSPADM

## 2025-03-24 RX ORDER — METOPROLOL SUCCINATE 50 MG/1
50 TABLET, EXTENDED RELEASE ORAL DAILY
Status: DISCONTINUED | OUTPATIENT
Start: 2025-03-25 | End: 2025-03-26 | Stop reason: HOSPADM

## 2025-03-24 RX ORDER — HYDRALAZINE HYDROCHLORIDE 20 MG/ML
10 INJECTION INTRAMUSCULAR; INTRAVENOUS EVERY 6 HOURS PRN
Status: DISCONTINUED | OUTPATIENT
Start: 2025-03-24 | End: 2025-03-26 | Stop reason: HOSPADM

## 2025-03-24 RX ORDER — ACETAMINOPHEN 325 MG/1
650 TABLET ORAL EVERY 4 HOURS PRN
Status: DISCONTINUED | OUTPATIENT
Start: 2025-03-24 | End: 2025-03-26 | Stop reason: HOSPADM

## 2025-03-24 RX ORDER — LIDOCAINE 40 MG/G
CREAM TOPICAL
Status: DISCONTINUED | OUTPATIENT
Start: 2025-03-24 | End: 2025-03-26 | Stop reason: HOSPADM

## 2025-03-24 RX ORDER — PROCHLORPERAZINE 25 MG
12.5 SUPPOSITORY, RECTAL RECTAL EVERY 12 HOURS PRN
Status: DISCONTINUED | OUTPATIENT
Start: 2025-03-24 | End: 2025-03-26 | Stop reason: HOSPADM

## 2025-03-24 RX ORDER — SODIUM CHLORIDE 9 MG/ML
INJECTION, SOLUTION INTRAVENOUS CONTINUOUS
Status: ACTIVE | OUTPATIENT
Start: 2025-03-24 | End: 2025-03-25

## 2025-03-24 RX ORDER — ACETAMINOPHEN 500 MG
1000 TABLET ORAL EVERY 8 HOURS PRN
Status: DISCONTINUED | OUTPATIENT
Start: 2025-03-24 | End: 2025-03-24

## 2025-03-24 RX ORDER — ONDANSETRON 2 MG/ML
4 INJECTION INTRAMUSCULAR; INTRAVENOUS ONCE
Status: COMPLETED | OUTPATIENT
Start: 2025-03-24 | End: 2025-03-24

## 2025-03-24 RX ORDER — ONDANSETRON 2 MG/ML
4 INJECTION INTRAMUSCULAR; INTRAVENOUS EVERY 6 HOURS PRN
Status: DISCONTINUED | OUTPATIENT
Start: 2025-03-24 | End: 2025-03-26 | Stop reason: HOSPADM

## 2025-03-24 RX ORDER — IOPAMIDOL 755 MG/ML
62 INJECTION, SOLUTION INTRAVASCULAR ONCE
Status: COMPLETED | OUTPATIENT
Start: 2025-03-24 | End: 2025-03-24

## 2025-03-24 RX ADMIN — IOPAMIDOL 62 ML: 755 INJECTION, SOLUTION INTRAVENOUS at 15:52

## 2025-03-24 RX ADMIN — SODIUM CHLORIDE: 0.9 INJECTION, SOLUTION INTRAVENOUS at 17:43

## 2025-03-24 RX ADMIN — ONDANSETRON 4 MG: 2 INJECTION, SOLUTION INTRAMUSCULAR; INTRAVENOUS at 14:56

## 2025-03-24 RX ADMIN — LORAZEPAM 0.5 MG: 2 INJECTION INTRAMUSCULAR; INTRAVENOUS at 17:43

## 2025-03-24 ASSESSMENT — ACTIVITIES OF DAILY LIVING (ADL)
ADLS_ACUITY_SCORE: 55
ADLS_ACUITY_SCORE: 57
ADLS_ACUITY_SCORE: 55
ADLS_ACUITY_SCORE: 57
ADLS_ACUITY_SCORE: 55
ADLS_ACUITY_SCORE: 57

## 2025-03-24 ASSESSMENT — COLUMBIA-SUICIDE SEVERITY RATING SCALE - C-SSRS
2. HAVE YOU ACTUALLY HAD ANY THOUGHTS OF KILLING YOURSELF IN THE PAST MONTH?: NO
6. HAVE YOU EVER DONE ANYTHING, STARTED TO DO ANYTHING, OR PREPARED TO DO ANYTHING TO END YOUR LIFE?: NO
1. IN THE PAST MONTH, HAVE YOU WISHED YOU WERE DEAD OR WISHED YOU COULD GO TO SLEEP AND NOT WAKE UP?: NO

## 2025-03-24 NOTE — ED TRIAGE NOTES
Patient arrives via EMS from [facility]. Patient felt find yesterday morning, had dinner, then vomited shortly after. Difficulty falling asleep last night, feeling unwell, anxious about upcoming heart valve surgery, upset about staff member at home (feels safe). This morning still felt a little unwell, ate brunch and then threw up again shortly after. No nausea at this time, no cp or sob.

## 2025-03-24 NOTE — ED NOTES
Bed: [place] [address]  Expected date:   Expected time:   Means of arrival:   Comments:  SARAH 84F nausea VSS

## 2025-03-24 NOTE — ED NOTES
"[facility] ED Handoff Report    ED Chief Complaint:    ED Diagnosis:  (R11.2) Nausea and vomiting, unspecified vomiting type  Comment:   Plan:        PMH:    Past Medical History:   Diagnosis Date    COVID-19 09/14/2020    positive test at [facility]    DNAR (do not attempt resuscitation)     Dyslipidemia, goal LDL below 70 09/15/2020    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 Nini Rasmussen    Metabolic encephalopathy     Moderate aortic valve stenosis 08/22/2017    stable on 2020 echo    Nonobstructive atherosclerosis of coronary artery 09/15/2020    with normal LVEDP    Paroxysmal SVT (supraventricular tachycardia) 09/07/2017    said to have short episodes while hospitalized for UTI per Dr. Rhys Mensah    Syncope 08/22/2017    UTI (urinary tract infection) 08/21/2017    hospitalized with weakness        Code Status:  Full Code      Band: Applied    Current Living Situation/Residence: lives in an assisted living facility     Elimination Status: Continent: Yes, but wears a brief    Activity Level: SBA w/ walker    Patients Preferred Language:  English     Needed: No    Vital Signs:  BP (!) 151/70   Pulse 71   Temp 97.7  F (36.5  C) (Oral)   Resp (!) 44   Ht 1.626 m (5' 4\")   Wt 57.2 kg (126 lb)   LMP  (LMP Unknown)   SpO2 97%   BMI 21.63 kg/m       Cardiac Rhythm: nsr    Pain Score: 0/10    Is the Patient Confused:  Yes, at times patient has a history of dementia    Last Food or Drink: 03/24/25, none in ED    Focused Assessment:  patient nauseated is spitting up on occasion was given lorazepam to assist in sx.    Tests Performed: Done: Labs and Imaging    Treatments Provided:  iv meds, will have MRI tomorrow due to having a pacemake for now will have ultrasound    Family Dynamics/Concerns: No    Family Updated On Visitor Policy: Yes    Plan of Care Communicated to " Family: No        Belongings Checklist Done and Signed by Patient: Yes        Covid: asymptomatic     Additional Information: patient resting at this time.     Brittny Neely RN 3/24/2025 6:27 PM

## 2025-03-24 NOTE — H&P
[facility]    History and Physical - Hospitalist Service       Date of Admission:  3/24/2025    Assessment & Plan    Assessment:  Laney Hahn is a 84 year old female with a past medical history of SVT, CKD, paroxysmal A-fib, hypertension, pacemaker, hyperlipidemia presented to the ED from assisted living facility for evaluation of vomiting, CT scan of the abdomen showed Distended gallbladder. There is also dilatation of the common bile duct measuring up to 12 mm in diameter. Choledocholithiasis is not excluded.  Radiology recommended to consider MRCP for further evaluation. Patient received Ativan and Zofran in the ED, ED provider discussed with GI who recommended an MRCP, patient is going to be admitted for further management of intractable nausea and vomiting possibly secondary to choledocholithiasis and biliary obstruction along with JESUS.    Problem list and plan:  Intractable nausea and vomiting  Possible choledocholithiasis and biliary obstruction  Patient presented to the ED from assisted living facility for evaluation of vomiting  Has been vomiting since yesterday after dinner  She felt a little better this morning but had an episode of vomiting again after lunch  CT head negative for acute process  CT scan of the abdomen showed Distended gallbladder. There is also dilatation of the common bile duct measuring up to 12 mm in diameter. Choledocholithiasis is not excluded.  Radiology recommended to consider MRCP for further evaluation.   Patient received Ativan and Zofran in the ED  ED provider discussed with GI who recommended an MRCP  GI consulted, follow-up recommendations  Continue with pain management as per protocol and antiemetics as appropriate  Follow-up MRCP  Follow-up abdominal ultrasound  Gentle IV hydration  Keep n.p.o., bowel rest, serial abdominal examination    Elevated troponin most likely in the setting of type II NSTEMI/demand ischemia  Troponin elevated but  flat  No current complaint of chest pain  Repeat EKG for symptoms  Could not appreciate any acute ischemic changes on EKG    JESUS most likely prerenal secondary to dehydration secondary to nausea and vomiting  Baseline creatinine 1.2  Current creatinine 1.4  Gentle IV hydration  Monitor renal function closely    Mild hyperglycemia most likely secondary to prediabetes  Hemoglobin A1c 6  Monitor blood sugar level intermittently    History of hypertension with high blood pressure  Monitor vital signs as per protocol  IV hydralazine as needed for elevated blood pressure  Continue with Lasix, metoprolol    History of hyperlipidemia  Continue with atorvastatin    History of A-fib  Secondary hypercoagulable state  Holding Eliquis for now for possible ERCP  Continue with metoprolol    DVT PPx  Intermittent pneumatic compression    CODE STATUS  Full code as per discussion with the patient         Diet: NPO for Procedure/Surgery per Anesthesia Guidelines Except for: Meds; Clear liquids before procedure/surgery: ADULT (Age GREATER than or Equal to 18 years) - Clear liquids 2 hours before procedure/surgery    DVT Prophylaxis: Pneumatic Compression Devices  Tenorio Catheter: Not present  Lines: None     Cardiac Monitoring: None  Code Status: Full Code  Mental status: Patient is alert awake and oriented x 3, patient is a ok Historian and most of the history was obtained from the patient, some history was obtained from chart review and the rest of the history was obtained from discussion with the ER physician  Clinically Significant Risk Factors Present on Admission                # Drug Induced Coagulation Defect: home medication list includes an anticoagulant medication    # Hypertension: Noted on problem list  # Chronic heart failure with reduced ejection fraction: last echo with EF <40%    # Dementia: noted on problem list           # Financial/Environmental Concerns:     # Pacemaker present       Disposition Plan     Medically  Ready for Discharge: Anticipated in 2-4 Days     Saad J. Gondal, MD  Hospitalist Service  [facility]  Securely message with [employer] (more info)  Text page via [employer] Paging/Directory     ______________________________________________________________________    Chief Complaint   Vomiting    History is obtained from the patient and chart review    History of Present Illness   Laney Hahn is a 84 year old female with a past medical history of SVT, CKD, paroxysmal A-fib, hypertension, pacemaker, hyperlipidemia presented to the ED from assisted living facility for evaluation of vomiting, has been vomiting since yesterday after dinner, she felt a little better this morning but had an episode of vomiting again after lunch, in the ER vitals significant for elevated blood pressure, labs significant for slightly elevated creatinine at 1.43 and a baseline of around 1.2, Hyperglycemia, troponin elevated but flat and downtrending, CT head negative for acute process, CT scan of the abdomen showed Distended gallbladder. There is also dilatation of the common bile duct measuring up to 12 mm in diameter. Choledocholithiasis is not excluded.  Radiology recommended to consider MRCP for further evaluation. Mosaic attenuation of the visualized bibasilar lung parenchyma. This can be seen with air trapping in the setting of small airways disease. Patient received Ativan and Zofran in the ED, ED provider discussed with GI who recommended an MRCP, patient is going to be admitted for further management of intractable nausea and vomiting possibly secondary to choledocholithiasis and biliary obstruction along with JESUS.         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 Nini Rasmussen    Metabolic encephalopathy     Moderate aortic valve stenosis 08/22/2017    stable on 2020 echo    Nonobstructive atherosclerosis of coronary artery 09/15/2020    with normal LVEDP    Paroxysmal SVT (supraventricular tachycardia) 09/07/2017    said to have short episodes while hospitalized for UTI per Dr. Rhys Mensah    Syncope 08/22/2017    UTI (urinary tract infection) 08/21/2017    hospitalized with weakness       Past Surgical History   Past Surgical History:   Procedure Laterality Date    CATARACT EXTRACTION, BILATERAL      CV CORONARY ANGIOGRAM N/A 9/15/2020    Procedure: Coronary Angiogram;  Surgeon: Radhika Santa MD;  Location: [facility] Cath Lab;  Service: Cardiology    CV LEFT HEART CATHETERIZATION WITHOUT LEFT VENTRICULOGRAM Left 9/15/2020    Procedure: Left Heart Catheterization Without Left Ventriculogram;  Surgeon: Radhika Santa MD;  Location: [facility] Cath Lab;  Service: Cardiology    EP PACEMAKER INSERT N/A 4/13/2021    Procedure: EP Pacemaker Insertion;  Surgeon: Frederic Burgos MD;  Location: [facility] Cardiac Cath Lab;  Service: Cardiology    HYSTERECTOMY      PARTIAL GASTRECTOMY  1969    for ulcers    TONSILLECTOMY         Prior to Admission Medications   Prior to Admission Medications   Prescriptions Last Dose Informant Patient Reported? Taking?   acetaminophen (TYLENOL) 500 MG tablet   Yes Yes   Sig: Take 1,000 mg by mouth every 8 hours as needed for pain.   apixaban ANTICOAGULANT (ELIQUIS ANTICOAGULANT) 2.5 MG tablet 3/24/2025 Morning  No Yes   Sig: Take 1 tablet (2.5 mg) by mouth 2 times daily. Due for appointment with Rose Mcelroy   atorvastatin (LIPITOR) 10 MG tablet 3/24/2025  No Yes   Sig: Take 1 tablet (10 mg) by mouth every morning.   bacitracin 500 UNIT/GM external ointment   Yes Yes   Sig: Apply topically as needed for wound care.   furosemide (LASIX) 20 MG tablet 3/24/2025  Morning  No Yes   Sig: Take 0.5 tablets (10 mg) by mouth daily Due for appointment with Rose Mcelroy   metoprolol succinate ER (TOPROL XL) 50 MG 24 hr tablet 3/24/2025 Morning  No Yes   Sig: Take 1 tablet (50 mg) by mouth daily   naloxone (NARCAN) 4 MG/0.1ML nasal spray   Yes Yes   Sig: Spray 4 mg into one nostril alternating nostrils as needed for opioid reversal. every 2-3 minutes until assistance arrives      Facility-Administered Medications: None        Review of Systems    The 10 point Review of Systems is negative other than noted in the HPI or here. Vomiting    Physical Exam   Vital Signs: Temp: 97.7  F (36.5  C) Temp src: Oral BP: (!) 151/70 Pulse: 71   Resp: (!) 44 SpO2: 97 % O2 Device: None (Room air)    Weight: 126 lbs 0 oz    GENERAL: Patient was seen and examined at bedside with no acute distress, thin, lean, cachectic, chronically ill-appearing and frail   HEENT:  Head is normocephalic, atraumatic.   EYES:  Eyes have anicteric sclerae without conjunctival injection   LUNGS: Bilateral air entry, clear to auscultation bilaterally  CARDIOVASCULAR:  Regular rate and rhythm with no murmurs, gallops or rubs.  ABDOMEN:  Normal bowel sounds. Soft; nontender   EXT: no edema    SKIN:  No acute rashes. Dry scaly wrinkled skin, poor skin turgor  NEUROLOGIC:  Grossly nonfocal.      Medical Decision Making       80 MINUTES SPENT BY ME on the date of service doing chart review, history, exam, documentation & further activities per the note.      Data     I have personally reviewed the following data over the past 24 hrs:    5.6  \   13.8   / 214     142 107 27.2 (H) /  134 (H)   4.5 22 1.43 (H) \     ALT: 14 AST: 21 AP: 113 TBILI: 0.3   ALB: 4.2 TOT PROTEIN: 7.1 LIPASE: 12 (L)     Trop: 28 (H) BNP: N/A     TSH: N/A T4: N/A A1C: 6.0 (H)     INR:  1.15 PTT:  N/A   D-dimer:  N/A Fibrinogen:  N/A       Imaging results reviewed over the past 24 hrs:   Recent Results (from the past 24 hours)   Head CT w/o contrast     Narrative    EXAM: CT HEAD W/O CONTRAST  LOCATION: [facility]  DATE: 3/24/2025    INDICATION: vomiting, on thinners  COMPARISON: 12/23/2024.  TECHNIQUE: Routine CT Head without IV contrast. Multiplanar reformats. Dose reduction techniques were used.    FINDINGS:  INTRACRANIAL CONTENTS: No intracranial hemorrhage, extraaxial collection, or mass effect.  No CT evidence of acute infarct. Chronic bilateral basal ganglia infarcts. Moderate presumed chronic small vessel ischemic changes. Moderate generalized volume   loss. No hydrocephalus. Intracranial atherosclerotic calcifications.    VISUALIZED ORBITS/SINUSES/MASTOIDS: No intraorbital abnormality. No paranasal sinus mucosal disease. No middle ear or mastoid effusion.    BONES/SOFT TISSUES: No acute abnormality.      Impression    IMPRESSION:  1.  No CT evidence for acute intracranial process.  2.  Brain atrophy and presumed chronic microvascular ischemic changes as above.   CT Abdomen Pelvis w Contrast    Narrative    EXAM: CT ABDOMEN PELVIS W CONTRAST  LOCATION: [facility]  DATE: 3/24/2025    INDICATION: Vomiting.  COMPARISON: CT abdomen pelvis, 9/11/2022  TECHNIQUE: CT scan of the abdomen and pelvis was performed following injection of IV contrast. Multiplanar reformats were obtained. Dose reduction techniques were used.  CONTRAST: Isovue 370 62 ml    FINDINGS:   LOWER CHEST: Mosaic attenuation involves the visualized bibasilar lung parenchyma.    HEPATOBILIARY: The gallbladder is distended and there is dilatation of the common bile duct measuring 12 mm in diameter.    PANCREAS: Fatty infiltration of the pancreas. No pancreatic ductal dilatation.    SPLEEN: Normal.    ADRENAL GLANDS: Normal.    KIDNEYS/BLADDER: There are bilateral benign-appearing renal cysts. These do not require any further follow-up.    BOWEL: Partial gastrectomy. Small hiatal hernia. There is no bowel obstruction or bowel inflammation.  Diverticulosis involves the sigmoid colon without acute diverticulitis. The appendix is normal.    LYMPH NODES: Normal.    VASCULATURE: Normal caliber aorta with scattered atherosclerotic calcifications.    PELVIC ORGANS: No pelvic free fluid.    MUSCULOSKELETAL: No suspicious osseous lesions.      Impression    IMPRESSION:   1.  No bowel obstruction or bowel inflammation.  2.  Small hiatal hernia.  3.  Distended gallbladder. There is also dilatation of the common bile duct measuring up to 12 mm in diameter. Choledocholithiasis is not excluded. Consider MRCP for further evaluation.  4.  Mosaic attenuation of the visualized bibasilar lung parenchyma. This can be seen with air trapping in the setting of small airways disease.

## 2025-03-24 NOTE — MEDICATION SCRIBE - ADMISSION MEDICATION HISTORY
Medication Scribe Admission Medication History    Admission medication history is complete. The information provided in this note is only as accurate as the sources available at the time of the update.    Information Source(s): Facility ([facility]/) medication list/MAR via N/A    Pertinent Information: patient currently resides at the [facility] in [place], phone number, [phone]. Spoke with nursing staff and patient took all her AM meds this morning.     Changes made to PTA medication list:  Added: Narcan, Bacitracin   Deleted: None  Changed: None    Allergies reviewed with patient and updates made in EHR: yes    Medication History Completed By: Devin Lyle 3/24/2025 5:13 PM    PTA Med List   Medication Sig Last Dose/Taking    acetaminophen (TYLENOL) 500 MG tablet Take 1,000 mg by mouth every 8 hours as needed for pain. Taking As Needed    apixaban ANTICOAGULANT (ELIQUIS ANTICOAGULANT) 2.5 MG tablet Take 1 tablet (2.5 mg) by mouth 2 times daily. Due for appointment with Rose Mcelroy 3/24/2025 Morning    atorvastatin (LIPITOR) 10 MG tablet Take 1 tablet (10 mg) by mouth every morning. 3/24/2025    bacitracin 500 UNIT/GM external ointment Apply topically as needed for wound care. Taking As Needed    furosemide (LASIX) 20 MG tablet Take 0.5 tablets (10 mg) by mouth daily Due for appointment with Rose Mcelroy 3/24/2025 Morning    metoprolol succinate ER (TOPROL XL) 50 MG 24 hr tablet Take 1 tablet (50 mg) by mouth daily 3/24/2025 Morning    naloxone (NARCAN) 4 MG/0.1ML nasal spray Spray 4 mg into one nostril alternating nostrils as needed for opioid reversal. every 2-3 minutes until assistance arrives Taking As Needed

## 2025-03-24 NOTE — ED PROVIDER NOTES
NAME: Laney Hahn  AGE: 84 year old female  YOB: 1940  MRN: [id number]  EVALUATION DATE & TIME: 3/24/2025  1:49 PM    PCP: Rose Mcelroy  ED PROVIDER: Rosana Blanchard MD.    Chief Complaint   Patient presents with    Vomiting     FINAL IMPRESSION:  1. Nausea and vomiting, unspecified vomiting type      MEDICAL DECISION MAKIN:04 PM I met with the patient, obtained history, performed an initial exam, and discussed options and plan for diagnostics and treatment here in the ED.   4:34 PM Discussed with [facility] Dr. Marrero  4:48 PM Updated patient. Still having some nausea/vomiting and now with a little bit of abdominal discomfort.   4:54 PM Updated patient's friend Gabriella on the phone per patient's request  5:17 PM Discussed with hospitalist Dr. Gondal who accepts patient for admission    85 y/o SVT, CKD, dementia, paroxysmal a fib, aortic valve stenosis, CHF, HTN, s/p pacemaker, HLP, CAD among others who presents with vomiting. Started last night. Initially reported no abdominal pain, on reassessment here reports mild discomfort.    Considered intracranial pathology. Denies trauma. CT head without acute findings.   EKG shows paced rhythm, Qtc read as prolonged (though looks normal on my read), no acute ST changes, delta troponin 28-->28 and no chest pain, do not suspect ACS.  CBC, BMP, lipase, LFTs are reassuring, slight bump in creatinine    CT abd/pelvis shows distended gallbladder with dilation of CBD (up to 12 mm). See read below. Discussed with [facility] could start with RUQ US but ideally MRCP.     Updated patient. Still having some nausea. Admitted to hospitalist with further imaging pending. Sounds like likely cannot get MRCP tonight due to her pacemaker.    Medical Decision Making  Obtained supplemental history:Supplemental history obtained?: N/A  Reviewed external records: External records reviewed?: Outpatient Record: 3/6/25 Interventional Cardiology  Care impacted by chronic  illness:Anticoagulated State, Chronic Kidney Disease, Hyperlipidemia, Hypertension, and SVT, atrial fibrillation, lacunar stroke  Did you consider but not order tests?: Work up considered but not performed and documented in chart, if applicable  Did you interpret images independently?: Independent interpretation of ECG and images noted in documentation, when applicable.  Consultation discussion with other provider:Did you involve another provider (consultant, , pharmacy, etc.)?: No  Admit.    MIPS: Not Applicable    MEDICATIONS GIVEN IN THE EMERGENCY:  Medications   sodium chloride 0.9 % infusion ( Intravenous $New Bag 3/24/25 4199)   hydrALAZINE (APRESOLINE) injection 10 mg (has no administration in time range)   atorvastatin (LIPITOR) tablet 10 mg (has no administration in time range)   furosemide (LASIX) half-tab 10 mg (has no administration in time range)   metoprolol succinate ER (TOPROL XL) 24 hr tablet 50 mg (has no administration in time range)   apixaban ANTICOAGULANT (ELIQUIS) tablet 2.5 mg ( Oral Automatically Held 3/27/25 2000)   lidocaine 1 % 0.1-1 mL (has no administration in time range)   lidocaine (LMX4) cream (has no administration in time range)   sodium chloride (PF) 0.9% PF flush 3 mL (has no administration in time range)   sodium chloride (PF) 0.9% PF flush 3 mL (has no administration in time range)   senna-docusate (SENOKOT-S/PERICOLACE) 8.6-50 MG per tablet 1 tablet (has no administration in time range)     Or   senna-docusate (SENOKOT-S/PERICOLACE) 8.6-50 MG per tablet 2 tablet (has no administration in time range)   calcium carbonate (TUMS) chewable tablet 1,000 mg (has no administration in time range)   acetaminophen (TYLENOL) tablet 650 mg (has no administration in time range)     Or   acetaminophen (TYLENOL) Suppository 650 mg (has no administration in time range)   oxyCODONE IR (ROXICODONE) half-tab 2.5 mg (has no administration in time range)   oxyCODONE (ROXICODONE) tablet 5 mg (has  "no administration in time range)   HYDROmorphone (DILAUDID) injection 0.2 mg (has no administration in time range)   HYDROmorphone (DILAUDID) injection 0.4 mg (has no administration in time range)   ondansetron (ZOFRAN ODT) ODT tab 4 mg (has no administration in time range)     Or   ondansetron (ZOFRAN) injection 4 mg (has no administration in time range)   prochlorperazine (COMPAZINE) injection 5 mg (has no administration in time range)     Or   prochlorperazine (COMPAZINE) tablet 5 mg (has no administration in time range)     Or   prochlorperazine (COMPAZINE) suppository 12.5 mg (has no administration in time range)   ondansetron (ZOFRAN) injection 4 mg (4 mg Intravenous $Given 3/24/25 9404)   iopamidol (ISOVUE-370) solution 62 mL (62 mLs Intravenous $Given 3/24/25 2152)   LORazepam (ATIVAN) injection 0.5 mg (0.5 mg Intravenous $Given 3/24/25 6428)       NEW PRESCRIPTIONS STARTED AT TODAY'S ER VISIT:  New Prescriptions    No medications on file        =================================================================  HPI    Patient information was obtained from: patient   Use of : N/A      Laney DAVID Hahn is a 84 year old female with a past medical history of SVT, stage III CKD, paroxysmal a-fib, hypertension, cardiac pacemaker, hyperlipidemia, lacunar stroke, who presents to this ED via ambulance from [facility] for evaluation of vomiting.    Patient reports vomiting yesterday after dinner. She felt better this morning, but had a episodes of emesis again after lunch today. Felt like she was \"throwing up bubbles\". Denies fall, head injury, headache, chest pain, abdominal pain, diarrhea, hematemesis, hematochezia, melena, urinary symptoms, fever. No known sickness around her. Also reports valve surgery soon, so she thinks it could be from nerves. Per chart review, patient takes Eliquis.    Per chart review: Patient has a coronary angiogram scheduled for 04/03/2025.     PHYSICAL EXAM:  " "  Vitals: BP (!) 151/70   Pulse 71   Temp 97.7  F (36.5  C) (Oral)   Resp (!) 44   Ht 1.626 m (5' 4\")   Wt 57.2 kg (126 lb)   LMP  (LMP Unknown)   SpO2 97%   BMI 21.63 kg/m     Constitutional: Well developed, well nourished. No acute distress. Holding emesis bag.  HENT: Normocephalic, atraumatic. Neck-gross ROM intact.   Eyes: Pupils mid-range, sclera white  Respiratory: CTAB, no respiratory distress  Cardiovascular: Normal heart rate, regular rhythm. Pacemaker over chest wall without erythema. No lower extremity edema   GI: Soft, not distended, non tender, no guarding  Musculoskeletal: Moving extremities intentionally and without pain. No obvious deformity.  Skin: Warm, dry, no rash.  Neurologic: Alert & oriented, speech clear, no focal deficits noted    LAB:  All pertinent labs reviewed and interpreted.  Labs Ordered and Resulted from Time of ED Arrival to Time of ED Departure   LIPASE - Abnormal       Result Value    Lipase 12 (*)    BASIC METABOLIC PANEL - Abnormal    Sodium 142      Potassium 4.5      Chloride 107      Carbon Dioxide (CO2) 22      Anion Gap 13      Urea Nitrogen 27.2 (*)     Creatinine 1.43 (*)     GFR Estimate 36 (*)     Calcium 9.2      Glucose 134 (*)    TROPONIN T, HIGH SENSITIVITY - Abnormal    Troponin T, High Sensitivity 28 (*)    TROPONIN T, HIGH SENSITIVITY - Abnormal    Troponin T, High Sensitivity 28 (*)    HEMOGLOBIN A1C - Abnormal    Estimated Average Glucose 126 (*)     Hemoglobin A1C 6.0 (*)    HEPATIC FUNCTION PANEL - Normal    Protein Total 7.1      Albumin 4.2      Bilirubin Total 0.3      Alkaline Phosphatase 113      AST 21      ALT 14      Bilirubin Direct 0.12     INR - Normal    INR 1.15     MAGNESIUM - Normal    Magnesium 2.1     PHOSPHORUS - Normal    Phosphorus 3.8     MAGNESIUM - Normal    Magnesium 2.0     PHOSPHORUS - Normal    Phosphorus 4.1     CBC WITH PLATELETS AND DIFFERENTIAL    WBC Count 5.6      RBC Count 4.21      Hemoglobin 13.8      Hematocrit " 40.9      MCV 97      MCH 32.8      MCHC 33.7      RDW 13.0      Platelet Count 214      % Neutrophils 55      % Lymphocytes 34      % Monocytes 7      % Eosinophils 3      % Basophils 1      % Immature Granulocytes 0      NRBCs per 100 WBC 0      Absolute Neutrophils 3.1      Absolute Lymphocytes 1.9      Absolute Monocytes 0.4      Absolute Eosinophils 0.2      Absolute Basophils 0.0      Absolute Immature Granulocytes 0.0      Absolute NRBCs 0.0     GLUCOSE MONITOR NURSING POCT       RADIOLOGY:  CT Abdomen Pelvis w Contrast   Final Result   IMPRESSION:    1.  No bowel obstruction or bowel inflammation.   2.  Small hiatal hernia.   3.  Distended gallbladder. There is also dilatation of the common bile duct measuring up to 12 mm in diameter. Choledocholithiasis is not excluded. Consider MRCP for further evaluation.   4.  Mosaic attenuation of the visualized bibasilar lung parenchyma. This can be seen with air trapping in the setting of small airways disease.         Head CT w/o contrast   Final Result   IMPRESSION:   1.  No CT evidence for acute intracranial process.   2.  Brain atrophy and presumed chronic microvascular ischemic changes as above.      MR Abdomen MRCP w/o & w Contrast    (Results Pending)   US Abdomen Limited    (Results Pending)       EKG:   Performed at: 13:54:13  Impression: Paced, rightward axis, non-specific intra-ventricular conduction block, cannot rule out septal infarct, T wave abnormality (consider inferolateral ischemia), abnormal EKG  Rate: 81 bpm  Rhythm: Paced  QRS Interval: 140 ms  QTc Interval: 518 ms  Comparison: When compared with EKG of 12/22/2024 13:17, No significant change was found   I have independently reviewed and interpreted the EKG(s) documented above.       I, Madonna Vera, am serving as a scribe to document services personally performed by Dr. Rosana Blanchard based on my observation and the provider's statements to me. I, Rosana Blanchard MD attest that Madonna Vera is  acting in a scribe capacity, has observed my performance of the services and has documented them in accordance with my direction.    Rosana Blanchard M.D.  Emergency Medicine  [facility] EMERGENCY DEPARTMENT  [address] [place] [phone]  Dept: [phone]     Rosana Blanchard MD  03/24/25 5220

## 2025-03-25 ENCOUNTER — DOCUMENTATION ONLY (OUTPATIENT)
Dept: CARDIOLOGY | Facility: CLINIC | Age: 85
End: 2025-03-25
Payer: COMMERCIAL

## 2025-03-25 ENCOUNTER — APPOINTMENT (OUTPATIENT)
Dept: NUCLEAR MEDICINE | Facility: HOSPITAL | Age: 85
End: 2025-03-25
Attending: PHYSICIAN ASSISTANT
Payer: COMMERCIAL

## 2025-03-25 ENCOUNTER — APPOINTMENT (OUTPATIENT)
Dept: ULTRASOUND IMAGING | Facility: HOSPITAL | Age: 85
End: 2025-03-25
Attending: STUDENT IN AN ORGANIZED HEALTH CARE EDUCATION/TRAINING PROGRAM
Payer: COMMERCIAL

## 2025-03-25 ENCOUNTER — APPOINTMENT (OUTPATIENT)
Dept: PHYSICAL THERAPY | Facility: HOSPITAL | Age: 85
End: 2025-03-25
Attending: INTERNAL MEDICINE
Payer: COMMERCIAL

## 2025-03-25 LAB
ALBUMIN SERPL BCG-MCNC: 3.7 G/DL (ref 3.5–5.2)
ALP SERPL-CCNC: 96 U/L (ref 40–150)
ALT SERPL W P-5'-P-CCNC: 12 U/L (ref 0–50)
ANION GAP SERPL CALCULATED.3IONS-SCNC: 11 MMOL/L (ref 7–15)
AST SERPL W P-5'-P-CCNC: 18 U/L (ref 0–45)
BILIRUB DIRECT SERPL-MCNC: 0.16 MG/DL (ref 0–0.3)
BILIRUB SERPL-MCNC: 0.4 MG/DL
BUN SERPL-MCNC: 20.6 MG/DL (ref 8–23)
CALCIUM SERPL-MCNC: 8.6 MG/DL (ref 8.8–10.4)
CHLORIDE SERPL-SCNC: 111 MMOL/L (ref 98–107)
CREAT SERPL-MCNC: 1.29 MG/DL (ref 0.51–0.95)
CRP SERPL-MCNC: <3 MG/L
EGFRCR SERPLBLD CKD-EPI 2021: 41 ML/MIN/1.73M2
ERYTHROCYTE [DISTWIDTH] IN BLOOD BY AUTOMATED COUNT: 13 % (ref 10–15)
GLUCOSE BLDC GLUCOMTR-MCNC: 111 MG/DL (ref 70–99)
GLUCOSE BLDC GLUCOMTR-MCNC: 173 MG/DL (ref 70–99)
GLUCOSE BLDC GLUCOMTR-MCNC: 83 MG/DL (ref 70–99)
GLUCOSE SERPL-MCNC: 116 MG/DL (ref 70–99)
HCO3 SERPL-SCNC: 22 MMOL/L (ref 22–29)
HCT VFR BLD AUTO: 37.3 % (ref 35–47)
HGB BLD-MCNC: 12.3 G/DL (ref 11.7–15.7)
MAGNESIUM SERPL-MCNC: 2.2 MG/DL (ref 1.7–2.3)
MCH RBC QN AUTO: 32.8 PG (ref 26.5–33)
MCHC RBC AUTO-ENTMCNC: 33 G/DL (ref 31.5–36.5)
MCV RBC AUTO: 100 FL (ref 78–100)
PHOSPHATE SERPL-MCNC: 3.3 MG/DL (ref 2.5–4.5)
PLATELET # BLD AUTO: 188 10E3/UL (ref 150–450)
POTASSIUM SERPL-SCNC: 4.5 MMOL/L (ref 3.4–5.3)
PROT SERPL-MCNC: 6.1 G/DL (ref 6.4–8.3)
RBC # BLD AUTO: 3.75 10E6/UL (ref 3.8–5.2)
SODIUM SERPL-SCNC: 144 MMOL/L (ref 135–145)
WBC # BLD AUTO: 5.5 10E3/UL (ref 4–11)

## 2025-03-25 PROCEDURE — 250N000013 HC RX MED GY IP 250 OP 250 PS 637: Performed by: STUDENT IN AN ORGANIZED HEALTH CARE EDUCATION/TRAINING PROGRAM

## 2025-03-25 PROCEDURE — 343N000001 HC RX 343 MED OP 636: Performed by: PHYSICIAN ASSISTANT

## 2025-03-25 PROCEDURE — 96375 TX/PRO/DX INJ NEW DRUG ADDON: CPT

## 2025-03-25 PROCEDURE — 82565 ASSAY OF CREATININE: CPT | Performed by: STUDENT IN AN ORGANIZED HEALTH CARE EDUCATION/TRAINING PROGRAM

## 2025-03-25 PROCEDURE — A9537 TC99M MEBROFENIN: HCPCS | Performed by: PHYSICIAN ASSISTANT

## 2025-03-25 PROCEDURE — 36415 COLL VENOUS BLD VENIPUNCTURE: CPT | Performed by: STUDENT IN AN ORGANIZED HEALTH CARE EDUCATION/TRAINING PROGRAM

## 2025-03-25 PROCEDURE — G0378 HOSPITAL OBSERVATION PER HR: HCPCS

## 2025-03-25 PROCEDURE — 82248 BILIRUBIN DIRECT: CPT | Performed by: STUDENT IN AN ORGANIZED HEALTH CARE EDUCATION/TRAINING PROGRAM

## 2025-03-25 PROCEDURE — 76705 ECHO EXAM OF ABDOMEN: CPT

## 2025-03-25 PROCEDURE — 84100 ASSAY OF PHOSPHORUS: CPT | Performed by: STUDENT IN AN ORGANIZED HEALTH CARE EDUCATION/TRAINING PROGRAM

## 2025-03-25 PROCEDURE — 83735 ASSAY OF MAGNESIUM: CPT | Performed by: STUDENT IN AN ORGANIZED HEALTH CARE EDUCATION/TRAINING PROGRAM

## 2025-03-25 PROCEDURE — 78226 HEPATOBILIARY SYSTEM IMAGING: CPT

## 2025-03-25 PROCEDURE — 97162 PT EVAL MOD COMPLEX 30 MIN: CPT | Mod: GP | Performed by: PHYSICAL THERAPIST

## 2025-03-25 PROCEDURE — 99204 OFFICE O/P NEW MOD 45 MIN: CPT | Mod: FS | Performed by: SPECIALIST

## 2025-03-25 PROCEDURE — 82310 ASSAY OF CALCIUM: CPT | Performed by: STUDENT IN AN ORGANIZED HEALTH CARE EDUCATION/TRAINING PROGRAM

## 2025-03-25 PROCEDURE — 85014 HEMATOCRIT: CPT | Performed by: STUDENT IN AN ORGANIZED HEALTH CARE EDUCATION/TRAINING PROGRAM

## 2025-03-25 PROCEDURE — 82962 GLUCOSE BLOOD TEST: CPT

## 2025-03-25 PROCEDURE — 86140 C-REACTIVE PROTEIN: CPT | Performed by: INTERNAL MEDICINE

## 2025-03-25 PROCEDURE — 85041 AUTOMATED RBC COUNT: CPT | Performed by: STUDENT IN AN ORGANIZED HEALTH CARE EDUCATION/TRAINING PROGRAM

## 2025-03-25 PROCEDURE — 96376 TX/PRO/DX INJ SAME DRUG ADON: CPT

## 2025-03-25 PROCEDURE — 82374 ASSAY BLOOD CARBON DIOXIDE: CPT | Performed by: STUDENT IN AN ORGANIZED HEALTH CARE EDUCATION/TRAINING PROGRAM

## 2025-03-25 PROCEDURE — 250N000011 HC RX IP 250 OP 636: Performed by: INTERNAL MEDICINE

## 2025-03-25 PROCEDURE — 97116 GAIT TRAINING THERAPY: CPT | Mod: GP | Performed by: PHYSICAL THERAPIST

## 2025-03-25 RX ORDER — KIT FOR THE PREPARATION OF TECHNETIUM TC 99M MEBROFENIN 45 MG/10ML
3-10 INJECTION, POWDER, LYOPHILIZED, FOR SOLUTION INTRAVENOUS ONCE
Status: COMPLETED | OUTPATIENT
Start: 2025-03-25 | End: 2025-03-25

## 2025-03-25 RX ORDER — PIPERACILLIN SODIUM, TAZOBACTAM SODIUM 3; .375 G/15ML; G/15ML
3.38 INJECTION, POWDER, LYOPHILIZED, FOR SOLUTION INTRAVENOUS EVERY 8 HOURS
Status: DISCONTINUED | OUTPATIENT
Start: 2025-03-25 | End: 2025-03-25

## 2025-03-25 RX ORDER — PIPERACILLIN SODIUM, TAZOBACTAM SODIUM 3; .375 G/15ML; G/15ML
3.38 INJECTION, POWDER, LYOPHILIZED, FOR SOLUTION INTRAVENOUS ONCE
Status: COMPLETED | OUTPATIENT
Start: 2025-03-25 | End: 2025-03-25

## 2025-03-25 RX ORDER — PIPERACILLIN SODIUM, TAZOBACTAM SODIUM 3; .375 G/15ML; G/15ML
3.38 INJECTION, POWDER, LYOPHILIZED, FOR SOLUTION INTRAVENOUS EVERY 8 HOURS
Status: DISCONTINUED | OUTPATIENT
Start: 2025-03-25 | End: 2025-03-26 | Stop reason: HOSPADM

## 2025-03-25 RX ADMIN — PIPERACILLIN AND TAZOBACTAM 3.38 G: 3; .375 INJECTION, POWDER, FOR SOLUTION INTRAVENOUS at 08:04

## 2025-03-25 RX ADMIN — MEBROFENIN 8.7 MILLICURIE: 45 INJECTION, POWDER, LYOPHILIZED, FOR SOLUTION INTRAVENOUS at 13:19

## 2025-03-25 RX ADMIN — ATORVASTATIN CALCIUM 10 MG: 10 TABLET, FILM COATED ORAL at 08:05

## 2025-03-25 RX ADMIN — METOPROLOL SUCCINATE 50 MG: 50 TABLET, EXTENDED RELEASE ORAL at 08:05

## 2025-03-25 RX ADMIN — PIPERACILLIN AND TAZOBACTAM 3.38 G: 3; .375 INJECTION, POWDER, FOR SOLUTION INTRAVENOUS at 21:50

## 2025-03-25 RX ADMIN — PANTOPRAZOLE SODIUM 40 MG: 40 INJECTION, POWDER, FOR SOLUTION INTRAVENOUS at 08:06

## 2025-03-25 RX ADMIN — PIPERACILLIN AND TAZOBACTAM 3.38 G: 3; .375 INJECTION, POWDER, FOR SOLUTION INTRAVENOUS at 14:54

## 2025-03-25 ASSESSMENT — ACTIVITIES OF DAILY LIVING (ADL)
ADLS_ACUITY_SCORE: 57
ADLS_ACUITY_SCORE: 59
ADLS_ACUITY_SCORE: 59
ADLS_ACUITY_SCORE: 57
ADLS_ACUITY_SCORE: 57
ADLS_ACUITY_SCORE: 59
ADLS_ACUITY_SCORE: 57
ADLS_ACUITY_SCORE: 57
ADLS_ACUITY_SCORE: 59
DEPENDENT_IADLS:: CLEANING;COOKING;LAUNDRY;MEAL PREPARATION;MEDICATION MANAGEMENT
ADLS_ACUITY_SCORE: 57
ADLS_ACUITY_SCORE: 59
ADLS_ACUITY_SCORE: 57
ADLS_ACUITY_SCORE: 59
ADLS_ACUITY_SCORE: 57
ADLS_ACUITY_SCORE: 59
ADLS_ACUITY_SCORE: 57
ADLS_ACUITY_SCORE: 59
ADLS_ACUITY_SCORE: 59

## 2025-03-25 NOTE — CONSULTS
[facility] CONSULT[facility]    Laney Hahn   [address]   [facility] [address]  84 year old female  Admission Date/Time: 3/24/2025  1:49 PM    Primary Care Provider:  Rose Mcelroy    Requesting Physician: Brock Hammonds MD      CHIEF COMPLAINT:   Nausea and vomiting    REASON FOR THE CONSULT:  I was asked to see Laney Hahn in consultation at the request of Dr. Hammonds for evaluation of nausea, vomiting.    HPI:     Laney is an 85 yo woman with hx of SVT, CKD, Afib, PPM, who presents with complaint of nausea and vomiting.    Symptoms began yesterday.  Was feeling well and went to dinner.. Only had a bite of food before she felt nauseated and vomited..  This prompted referral to the ER where imaging showed a dilated bile duct and gallbladder.  No fever  No leukocytosis,  No significant abdominal pain.    Today she feels well and is hungry.  Had not been having any persistent or intermittent abdominal pain or nausea leading up to this presentation.    REVIEW OF SYSTEMS: 13 point review of systems is negative except that noted above.    PAST MEDICAL HISTORY:   Past Medical History:   Diagnosis Date    COVID-19 09/14/2020    positive test at [facility]    DNAR (do not attempt resuscitation)     Dyslipidemia, goal LDL below 70 09/15/2020    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 Nini Rasmussen    Metabolic encephalopathy     Moderate aortic valve stenosis 08/22/2017    stable on 2020 echo    Nonobstructive atherosclerosis of coronary artery 09/15/2020    with normal LVEDP    Paroxysmal SVT (supraventricular tachycardia) 09/07/2017    said to have short episodes while hospitalized for UTI per Dr. Rhys Mensah    Syncope 08/22/2017    UTI (urinary tract infection) 08/21/2017    hospitalized with weakness       PAST SURGICAL HISTORY:   Past Surgical History:   Procedure  Laterality Date    CATARACT EXTRACTION, BILATERAL      CV CORONARY ANGIOGRAM N/A 9/15/2020    Procedure: Coronary Angiogram;  Surgeon: Radhika Santa MD;  Location: [facility] Cath Lab;  Service: Cardiology    CV LEFT HEART CATHETERIZATION WITHOUT LEFT VENTRICULOGRAM Left 9/15/2020    Procedure: Left Heart Catheterization Without Left Ventriculogram;  Surgeon: Radhika Santa MD;  Location: [facility] Cath Lab;  Service: Cardiology    EP PACEMAKER INSERT N/A 4/13/2021    Procedure: EP Pacemaker Insertion;  Surgeon: Frederic Burgos MD;  Location: [facility] Cardiac Cath Lab;  Service: Cardiology    HYSTERECTOMY      PARTIAL GASTRECTOMY  1969    for ulcers    TONSILLECTOMY         FAMILY HISTORY:   Family History   Adopted: Yes       SOCIAL HISTORY:   Social History     Tobacco Use    Smoking status: Never    Smokeless tobacco: Never   Substance Use Topics    Alcohol use: No        MEDS:  Medications Prior to Admission   Medication Sig Dispense Refill Last Dose/Taking    acetaminophen (TYLENOL) 500 MG tablet Take 1,000 mg by mouth every 8 hours as needed for pain.   Taking As Needed    apixaban ANTICOAGULANT (ELIQUIS ANTICOAGULANT) 2.5 MG tablet Take 1 tablet (2.5 mg) by mouth 2 times daily. Due for appointment with Rose Mcelroy 60 tablet 1 3/24/2025 Morning    atorvastatin (LIPITOR) 10 MG tablet Take 1 tablet (10 mg) by mouth every morning. 30 tablet 2 3/24/2025    bacitracin 500 UNIT/GM external ointment Apply topically as needed for wound care.   Taking As Needed    furosemide (LASIX) 20 MG tablet Take 0.5 tablets (10 mg) by mouth daily Due for appointment with Rose Mcelroy 15 tablet 1 3/24/2025 Morning    metoprolol succinate ER (TOPROL XL) 50 MG 24 hr tablet Take 1 tablet (50 mg) by mouth daily 30 tablet 2 3/24/2025 Morning    naloxone (NARCAN) 4 MG/0.1ML nasal spray Spray 4 mg into one nostril alternating nostrils as needed for opioid reversal. every 2-3 minutes until assistance arrives   Taking As Needed        ALLERGIES/SENSITIVITIES: Patient has no known allergies.    MEDICATIONS:  No current outpatient medications on file.       PHYSICAL EXAM:  Temp:  [97.7  F (36.5  C)-98.4  F (36.9  C)] 98  F (36.7  C)  Pulse:  [71-88] 72  Resp:  [20-44] 20  BP: (119-170)/(59-77) 119/59  SpO2:  [94 %-98 %] 95 %  Body mass index is 21.95 kg/m .  GEN: Well developed, well nourished 84 year old in no acute distress.  HEENT: sclera anicteric, moist mucous membranes.   LYMPH: No cervical lymphadenopathy  PULM: Nonlabored breathing. Breath sounds equal.   CARDIO: Regular rate  GI: Non-distended. Soft.  Non-tender to palpation.  No guarding. No rebound tenderness.  EXT: warm, no lower extremity edema  NEURO: Alert. No focal defects.   PSYCH: Mental status appropriate, mood and affect normal.    SKIN: No rashes  MSK: No joint abnormalities    LABORATORY DATA:  CBC RESULTS:   Recent Labs   Lab Test 03/25/25  0757   WBC 5.5   RBC 3.75*   HGB 12.3   HCT 37.3      MCH 32.8   MCHC 33.0   RDW 13.0           CMP Results:   Recent Labs   Lab Test 03/25/25  0757      POTASSIUM 4.5   CHLORIDE 111*   CO2 22   ANIONGAP 11   *   BUN 20.6   CR 1.29*   BILITOTAL 0.4   ALKPHOS 96   ALT 12   AST 18        [unfilled]    INR Results:   Recent Labs   Lab Test 03/24/25  1433   INR 1.15          RELEVANT IMAGING:      EXAM: CT ABDOMEN PELVIS W CONTRAST  LOCATION: [facility]  DATE: 3/24/2025     INDICATION: Vomiting.  COMPARISON: CT abdomen pelvis, 9/11/2022  TECHNIQUE: CT scan of the abdomen and pelvis was performed following injection of IV contrast. Multiplanar reformats were obtained. Dose reduction techniques were used.  CONTRAST: Isovue 370 62 ml     FINDINGS:   LOWER CHEST: Mosaic attenuation involves the visualized bibasilar lung parenchyma.     HEPATOBILIARY: The gallbladder is distended and there is dilatation of the common bile duct measuring 12 mm in diameter.     PANCREAS: Fatty infiltration of  the pancreas. No pancreatic ductal dilatation.     SPLEEN: Normal.     ADRENAL GLANDS: Normal.     KIDNEYS/BLADDER: There are bilateral benign-appearing renal cysts. These do not require any further follow-up.     BOWEL: Partial gastrectomy. Small hiatal hernia. There is no bowel obstruction or bowel inflammation. Diverticulosis involves the sigmoid colon without acute diverticulitis. The appendix is normal.     LYMPH NODES: Normal.     VASCULATURE: Normal caliber aorta with scattered atherosclerotic calcifications.     PELVIC ORGANS: No pelvic free fluid.     MUSCULOSKELETAL: No suspicious osseous lesions.                                                                      IMPRESSION:   1.  No bowel obstruction or bowel inflammation.  2.  Small hiatal hernia.  3.  Distended gallbladder. There is also dilatation of the common bile duct measuring up to 12 mm in diameter. Choledocholithiasis is not excluded. Consider MRCP for further evaluation.  4.  Mosaic attenuation of the visualized bibasilar lung parenchyma. This can be seen with air trapping in the setting of small airways disease.        EXAM: US ABDOMEN LIMITED  LOCATION: [facility]  DATE: 3/25/2025     INDICATION: Vomiting, gall bladder distension.  COMPARISON: CT abdomen and pelvis with IV contrast 3/24/2025.  TECHNIQUE: Limited abdominal ultrasound.     FINDINGS:     GALLBLADDER: Dependent sludge within the gallbladder. Mild wall thickening measuring 4.3 mm. No pericholecystic fluid. The patient was tender during sonogram of the right upper quadrant. Please correlate clinically to assess for acute cholecystitis.     BILE DUCTS: Mild biliary ectasia. The common duct measures 12.5 mm.     LIVER: Homogeneous hepatic parenchymal echotexture without discrete lesion. Small hepatic contour. Patent portal vein with normal directional flow.     RIGHT KIDNEY: No hydronephrosis. Right kidney measures 9.1 cm [id number].     PANCREAS: The  visualized portions are normal.     No ascites.                                                                      IMPRESSION:  1.  Dependent sludge within the gallbladder with mild wall thickening and sonographic tenderness during examination. Mild biliary ectasia without choledocholithiasis. Please correlate clinically to assess for acute cholecystitis.     2.  Pancreas partially visualized due to overlying bowel gas, grossly normal where seen.  ASSESSMENT:     Laney is an 85 yo woman with hx of SVT, CKD, Afib, PPM, who presents with complaint of nausea and vomiting.    Clinically well.  LFT normal  No leukocytosis  Has cholelithiasis and some biliary ectasia, but no sign or suggestion of cholecholithiasis. Appears non-toxic and not consistent with cholecystitis.    Favor this presentation to be a self limited viral illness or toxin-mediated illness.    She is hungry and no longer nauseated and having no pain.    PLAN:    -Advance diet. If tolerates, then OK to discharge home today.  -I do not think she needs the MRCP  -I do not think she needs antibiotics    If there were to be recurrence of symptoms, then could consider outpatient surgery consult for elective cholecystectomy for symptomatic cholelithiasis with intraoperative cholangiogram vs pre-op MRCP    GI will sign off, but please call if clinical situation changes or questions arise.               Edi Marrero MD  Thank you for the opportunity to participate in the care of this patient.   Please feel free to call me with any questions or concerns.  Phone number [phone].        57 minutes spent with direct patient care, clinical decision making, education at the bedside, record review and dictation.    CC: [facility], Rose Mcelroy

## 2025-03-25 NOTE — PROGRESS NOTES
[facility]    PROGRESS NOTE - Hospitalist Service    Assessment and Plan  84 year old female with a past medical history of SVT, CKD, paroxysmal A-fib, hypertension, pacemaker, hyperlipidemia presented to the ED from assisted living facility for evaluation of vomiting, CT scan of the abdomen showed Distended gallbladder.  Admitted for concern of cholecystitis.  GI and surgery consulted    Nausea and vomiting episodes  - Etiology is unclear  - Patient presented to the ED from assisted living facility for evaluation of vomiting  - CT head negative for acute process  - CT scan of the abdomen showed Distended gallbladder. There is also dilatation of the common bile duct measuring up to 12 mm in diameter. Choledocholithiasis is not excluded.  Radiology recommended to consider MRCP for further evaluation.   - Improving symptoms after received Ativan and Zofran in the ED  - GI consulted, appreciate input   - No plan for MRCP as per GI  - Surgical consult appreciate input  - Check HIDA scan  - Keep patient n.p.o. till HIDA scan results.  - IV Zosyn started empirically, can discontinue if HIDA scan is negative.     Mild elevated troponin   - most likely secondary to above  - No current complaint of chest pain  - Unremarkable  EKG for acute changes  - ACS is ruled out     Mild JESUS   -Probably secondary to dehydration secondary to nausea and vomiting  -Continue gentle IV hydration as patient still n.p.o.  - Monitor renal function closely     Mild hyperglycemia most likely secondary to prediabetes  - Hemoglobin A1c 6  - Monitor blood sugar level intermittently     Hypertension  -Still blood pressure  - Continue with home metoprolol  -Hold Lasix because of JESUS and patient is n.p.o.  - IV hydralazine as needed for elevated blood pressure     History of hyperlipidemia  - Continue with atorvastatin     History of A-fib  Holding Eliquis for now for possible ERCP  Continue with metoprolol    Weakness and  deconditioning  -PT/OT evaluation      50 MINUTES SPENT BY ME on the date of service doing chart review, history, exam, documentation & further activities per the note    Active Problems:    Nausea and vomiting, unspecified vomiting type      VTE prophylaxis:  Pneumatic Compression Devices  DIET: Orders Placed This Encounter      Regular Diet Adult      Disposition/Barriers to discharge: IV antibiotic, pending HIDA scan , surgery clearance.  Medically Ready for Discharge: Anticipated Tomorrow    Code Status: Full Code    Subjective:  Laney is feeling better today, no nausea or vomiting overnight, feeling hungry and wants food.  No fever or chills overnight.    PHYSICAL EXAM  Vitals:    03/24/25 1354 03/24/25 1906   Weight: 57.2 kg (126 lb) 58 kg (127 lb 13.9 oz)     B/P:119/59 T:98 P:72 R:20   No intake or output data in the 24 hours ending 03/25/25 1152   Body mass index is 21.95 kg/m .    Constitutional: awake, alert, cooperative, no apparent distress, and appears stated age  Respiratory: No increased work of breathing, good air exchange, clear to auscultation bilaterally, no crackles or wheezing  Cardiovascular: Normal apical impulse, regular rate and rhythm, normal S1 and S2, no S3 or S4, and no murmur noted  GI: No scars, normal bowel sounds, soft, non-distended, non-tender, no masses palpated, no hepatosplenomegally  Skin: no bruising or bleeding and normal skin color, texture, turgor  Musculoskeletal: There is no redness, warmth, or swelling of the joints.  Full range of motion noted.  no lower extremity pitting edema present  Neurologic: Awake, alert, oriented to name, place and time.  Cranial nerves II-XII are grossly intact.  Motor is 5 out of 5 bilaterally.   Sensory is intact.    Neuropsychiatric: Appropriate with examiner      PERTINENT LABS/IMAGING:    I have personally reviewed the following data over the past 24 hrs:    5.5  \   12.3   / 188     144 111 (H) 20.6 /  116 (H)   4.5 22 1.29 (H) \      ALT: 12 AST: 18 AP: 96 TBILI: 0.4   ALB: 3.7 TOT PROTEIN: 6.1 (L) LIPASE: 12 (L)     Trop: 28 (H) BNP: N/A     TSH: N/A T4: N/A A1C: 6.0 (H)     Procal: N/A CRP: <3.00 Lactic Acid: N/A       INR:  1.15 PTT:  N/A   D-dimer:  N/A Fibrinogen:  N/A       Imaging results reviewed over the past 24 hrs:   Recent Results (from the past 24 hours)   Head CT w/o contrast    Narrative    EXAM: CT HEAD W/O CONTRAST  LOCATION: [facility]  DATE: 3/24/2025    INDICATION: vomiting, on thinners  COMPARISON: 12/23/2024.  TECHNIQUE: Routine CT Head without IV contrast. Multiplanar reformats. Dose reduction techniques were used.    FINDINGS:  INTRACRANIAL CONTENTS: No intracranial hemorrhage, extraaxial collection, or mass effect.  No CT evidence of acute infarct. Chronic bilateral basal ganglia infarcts. Moderate presumed chronic small vessel ischemic changes. Moderate generalized volume   loss. No hydrocephalus. Intracranial atherosclerotic calcifications.    VISUALIZED ORBITS/SINUSES/MASTOIDS: No intraorbital abnormality. No paranasal sinus mucosal disease. No middle ear or mastoid effusion.    BONES/SOFT TISSUES: No acute abnormality.      Impression    IMPRESSION:  1.  No CT evidence for acute intracranial process.  2.  Brain atrophy and presumed chronic microvascular ischemic changes as above.   CT Abdomen Pelvis w Contrast    Narrative    EXAM: CT ABDOMEN PELVIS W CONTRAST  LOCATION: [facility]  DATE: 3/24/2025    INDICATION: Vomiting.  COMPARISON: CT abdomen pelvis, 9/11/2022  TECHNIQUE: CT scan of the abdomen and pelvis was performed following injection of IV contrast. Multiplanar reformats were obtained. Dose reduction techniques were used.  CONTRAST: Isovue 370 62 ml    FINDINGS:   LOWER CHEST: Mosaic attenuation involves the visualized bibasilar lung parenchyma.    HEPATOBILIARY: The gallbladder is distended and there is dilatation of the common bile duct measuring 12 mm  in diameter.    PANCREAS: Fatty infiltration of the pancreas. No pancreatic ductal dilatation.    SPLEEN: Normal.    ADRENAL GLANDS: Normal.    KIDNEYS/BLADDER: There are bilateral benign-appearing renal cysts. These do not require any further follow-up.    BOWEL: Partial gastrectomy. Small hiatal hernia. There is no bowel obstruction or bowel inflammation. Diverticulosis involves the sigmoid colon without acute diverticulitis. The appendix is normal.    LYMPH NODES: Normal.    VASCULATURE: Normal caliber aorta with scattered atherosclerotic calcifications.    PELVIC ORGANS: No pelvic free fluid.    MUSCULOSKELETAL: No suspicious osseous lesions.      Impression    IMPRESSION:   1.  No bowel obstruction or bowel inflammation.  2.  Small hiatal hernia.  3.  Distended gallbladder. There is also dilatation of the common bile duct measuring up to 12 mm in diameter. Choledocholithiasis is not excluded. Consider MRCP for further evaluation.  4.  Mosaic attenuation of the visualized bibasilar lung parenchyma. This can be seen with air trapping in the setting of small airways disease.     US Abdomen Limited    Narrative    EXAM: US ABDOMEN LIMITED  LOCATION: [facility]  DATE: 3/25/2025    INDICATION: Vomiting, gall bladder distension.  COMPARISON: CT abdomen and pelvis with IV contrast 3/24/2025.  TECHNIQUE: Limited abdominal ultrasound.    FINDINGS:    GALLBLADDER: Dependent sludge within the gallbladder. Mild wall thickening measuring 4.3 mm. No pericholecystic fluid. The patient was tender during sonogram of the right upper quadrant. Please correlate clinically to assess for acute cholecystitis.    BILE DUCTS: Mild biliary ectasia. The common duct measures 12.5 mm.    LIVER: Homogeneous hepatic parenchymal echotexture without discrete lesion. Small hepatic contour. Patent portal vein with normal directional flow.    RIGHT KIDNEY: No hydronephrosis. Right kidney measures 9.1 cm  [id number].    PANCREAS: The visualized portions are normal.    No ascites.      Impression    IMPRESSION:  1.  Dependent sludge within the gallbladder with mild wall thickening and sonographic tenderness during examination. Mild biliary ectasia without choledocholithiasis. Please correlate clinically to assess for acute cholecystitis.    2.  Pancreas partially visualized due to overlying bowel gas, grossly normal where seen.       Discussed with patient, family, GI, surgery, nursing staff and discharge planner    Brock Hamomnds MD  [facility] Medicine Service  [phone]

## 2025-03-25 NOTE — PROGRESS NOTES
"   03/25/25 1430   Appointment Info   Signing Clinician's Name / Credentials (PT) Roro Jacobs, PT, DPT   Quick Adds   Quick Adds Certification   Living Environment   People in Home sibling(s)   Current Living Arrangements assisted living   Home Accessibility no concerns   Self-Care   Equipment Currently Used at Home walker, rolling  (4WW)   Fall history within last six months no   Activity/Exercise/Self-Care Comment Pt independent with ADLs. Per chart review, pt receives assist for bathing from facility. Pt's brother assists with IADLs. Assisted living facility manages medications.   General Information   Onset of Illness/Injury or Date of Surgery 03/24/25   Referring Physician Brock Hammonds MD   Patient/Family Therapy Goals Statement (PT) None stated.   Pertinent History of Current Problem (include personal factors and/or comorbidities that impact the POC) Per H&P: \"84 year old female with a past medical history of SVT, CKD, paroxysmal A-fib, hypertension, pacemaker, hyperlipidemia presented to the ED from assisted living facility for evaluation of vomiting, CT scan of the abdomen showed Distended gallbladder. There is also dilatation of the common bile duct measuring up to 12 mm in diameter. Choledocholithiasis is not excluded.  Radiology recommended to consider MRCP for further evaluation. Patient received Ativan and Zofran in the ED, ED provider discussed with GI who recommended an MRCP, patient is going to be admitted for further management of intractable nausea and vomiting possibly secondary to choledocholithiasis and biliary obstruction along with JESUS.\"   Existing Precautions/Restrictions fall   Cognition   Cognitive Status Comments PLOF history differs slightly from information provided from correction. Patient notes that she is unable to remember how old she is and states \"my brother keeps track of that information\".   Range of Motion (ROM)   Range of Motion ROM is WFL   Strength (Manual Muscle Testing) "   Strength (Manual Muscle Testing) Deficits observed during functional mobility   Bed Mobility   Bed Mobility supine-sit-supine   Supine-Sit-Supine [place] (Bed Mobility) supervision;verbal cues   Assistive Device (Bed Mobility) bed rails   Transfers   Transfers sit-stand transfer   Impairments Contributing to Impaired Transfers impaired balance   Sit-Stand Transfer   Sit-Stand [place] (Transfers) contact guard;verbal cues   Assistive Device (Sit-Stand Transfers) walker, front-wheeled   Gait/Stairs (Locomotion)   [place] Level (Gait) contact guard;verbal cues   Assistive Device (Gait) walker, front-wheeled   Distance in Feet (Gait) 40'   Pattern (Gait) step-to   Deviations/Abnormal Patterns (Gait) javed decreased;gait speed decreased   Balance   Balance Comments Mildly unsteady with ambulation.   Clinical Impression   Criteria for Skilled Therapeutic Intervention Yes, treatment indicated   PT Diagnosis (PT) impaired functional mobility   Influenced by the following impairments decreased balance, impaired cognition   Functional limitations due to impairments transfers, ambulation   Clinical Presentation (PT Evaluation Complexity) stable   Clinical Presentation Rationale Clinical judgment.   Clinical Decision Making (Complexity) low complexity   Planned Therapy Interventions (PT) balance training;bed mobility training;gait training;home exercise program;neuromuscular re-education;patient/family education;strengthening;stair training;transfer training   Risk & Benefits of therapy have been explained evaluation/treatment results reviewed;participants voiced agreement with care plan;participants included;patient   PT Total Evaluation Time   PT Eval, Moderate Complexity Minutes (91327) 10   Therapy Certification   Start of care date 03/25/25   Certification date from 03/25/25   Certification date to 04/01/25   Medical Diagnosis nausea and vomiting   Physical Therapy Goals   PT Frequency Daily   PT  Predicted Duration/Target Date for Goal Attainment 04/01/25   PT Goals Bed Mobility;Transfers;Gait   PT: Bed Mobility Independent;Supine to/from sit   PT: Transfers Supervision/stand-by assist;Sit to/from stand;Assistive device   PT: Gait Supervision/stand-by assist;Assistive device;Rolling walker;Greater than 200 feet   Interventions   Interventions Quick Adds Gait Training   Gait Training   Gait Training Minutes (35709) 10   Symptoms Noted During/After Treatment (Gait Training) none   Treatment Detail/Skilled Intervention With FWW. Pt mildly unsteady. Stops frequently to adjust glasses while walking but only keeps one hand on walker which deviates from path. Verbal cues to stop, adjust glasses, and then keeping walking.   Distance in Feet additional 125'   [place] Level (Gait Training) contact guard   Physical Assistance Level (Gait Training) verbal cues;1 person assist   Assistive Device (Gait Training) rolling walker  (FWW)   Gait Analysis Deviations decreased javed;decreased step length   Impairments (Gait Analysis/Training) balance impaired;strength decreased   PT Discharge Planning   PT Plan gait, bring 4WW, LE strengthening, transfers   PT Discharge Recommendation (DC Rec) home with assist;home with home care physical therapy   PT Rationale for DC Rec Patient able to complete transfers and ambulation with stand-by to contact guard assist of 1. Lives with brother. Recommend continued PT to improve balance and reduce fall risk. Pt also appears confused this session, recommend 24 hour supervision/assist.   PT Brief overview of current status Supine <> sit, SBA. Sit <> stand, CGA. Ambulates 225' with FWW, CGA.   PT Total Distance Amb During Session (feet) 225   Physical Therapy Time and Intention   Timed Code Treatment Minutes 10   Total Session Time (sum of timed and untimed services) 20     [facility]                                                                                    OUTPATIENT PHYSICAL THERAPY    PLAN OF TREATMENT FOR OUTPATIENT REHABILITATION   Patient's Last Name, First Name, Laney Kinsey YOB: 1940   Provider's Name   [facility]   Medical Record No.  [id number]     Onset Date: 03/24/25 Start of Care Date: 03/25/25     Medical Diagnosis:  nausea and vomiting               PT Diagnosis:  impaired functional mobility Certification Dates:  From: 03/25/25  To: 04/01/25       See note for plan of treatment, functional goals, and certification details.    I CERTIFY THE NEED FOR THESE SERVICES FURNISHED UNDER        THIS PLAN OF TREATMENT AND WHILE UNDER MY CARE (Physician co-signature of this document indicates review and certification of the therapy plan).

## 2025-03-25 NOTE — PLAN OF CARE
Problem: Adult Inpatient Plan of Care  Goal: Optimal Comfort and Wellbeing  Outcome: Progressing  Goal: Readiness for Transition of Care  Outcome: Progressing     Problem: Delirium  Goal: Optimal Coping  Outcome: Progressing     Problem: Nausea and Vomiting  Goal: Nausea and Vomiting Relief  Outcome: Progressing     Problem: Adult Inpatient Plan of Care  Goal: Absence of Hospital-Acquired Illness or Injury  Intervention: Prevent Skin Injury  Recent Flowsheet Documentation  Taken 3/25/2025 0802 by Kirby Luo RN  Body Position: position changed independently   Goal Outcome Evaluation:       Patient denies nausea and vomiting at this time, worked with therapy today and tolerated well, had hepatobiliary scan today at [facility] tolerated well, appetite is fair on regular diet,. Conversation is disorganized at times, but makes needs known appropriately. Will continue to monitor    Kirby Luo RN

## 2025-03-25 NOTE — CONSULTS
Care Management Initial Consult    General Information  Assessment completed with: Caregiver, Ginny from [facility]  Type of CM/SW Visit: Initial Assessment    Primary Care Provider verified and updated as needed: Yes   Readmission within the last 30 days:        Reason for Consult: discharge planning  Advance Care Planning: Advance Care Planning Reviewed: present on chart          Communication Assessment  Patient's communication style: spoken language (English or Bilingual)             Cognitive  Cognitive/Neuro/Behavioral: WDL                      Living Environment:   People in home: sibling(s)  Adam  Current living Arrangements: assisted living  Name of Facility: [facility]   Able to return to prior arrangements: yes       Family/Social Support:  Care provided by: self, other (see comments) ([facility] staff)  Provides care for: no one     Support system: Sibling(s), Facility resident(s)/Staff          Description of Support System: Supportive, Involved         Current Resources:   Patient receiving home care services: No        Community Resources: None  Equipment currently used at home: walker, rolling  Supplies currently used at home: None       Does the patient's insurance plan have a 3 day qualifying hospital stay waiver?  Yes     Which insurance plan 3 day waiver is available? Alternative insurance waiver    Will the waiver be used for post-acute placement? Undetermined at this time    Lifestyle & Psychosocial Needs:  Social Drivers of Health     Food Insecurity: Unknown (12/23/2024)    Food Insecurity     Within the past 12 months, did you worry that your food would run out before you got money to buy more?: Patient unable to answer     Within the past 12 months, did the food you bought just not last and you didn t have money to get more?: Patient unable to answer   Depression: Not at risk (3/6/2025)    PHQ-2     PHQ-2 Score: 0   Housing Stability: Unknown (12/23/2024)    Housing Stability     Do you have  housing? : Patient unable to answer     Are you worried about losing your housing?: Patient unable to answer   Tobacco Use: Low Risk  (3/24/2025)    Patient History     Smoking Tobacco Use: Never     Smokeless Tobacco Use: Never     Passive Exposure: Not on file   Financial Resource Strain: Unknown (12/23/2024)    Financial Resource Strain     Within the past 12 months, have you or your family members you live with been unable to get utilities (heat, electricity) when it was really needed?: Patient unable to answer   Alcohol Use: Not on file   Transportation Needs: Unknown (12/23/2024)    Transportation Needs     Within the past 12 months, has lack of transportation kept you from medical appointments, getting your medicines, non-medical meetings or appointments, work, or from getting things that you need?: Patient unable to answer   Physical Activity: Not on file   Interpersonal Safety: Not on file   Stress: Not on file   Social Connections: Not on file   Health Literacy: Not on file       Functional Status:  Prior to admission patient needed assistance:   Dependent ADLs:: Ambulation-walker  Dependent IADLs:: Cleaning, Cooking, Laundry, Meal Preparation, Medication Management      Values/Beliefs:  Spiritual, Cultural Beliefs, [employer]ism Practices, Values that affect care:                 Discussed  Partnership in Safe Discharge Planning  document with patient/family: No    Additional Information:  Spoke to Ginny DICKERSON at [facility]. Says patient receives the following services: laundry, housekeeping, dressing, grooming, bathing, meals, medication management . Her brother lives with her. Would like to be updated on discharge plan.     11:48 AM  Met with patient. Verified above information. She requests wheelchair transport and is aware of potential cost. Transport arranged for tomorrow [phone]    Next Steps: continue to follow     Maida Cain RN

## 2025-03-25 NOTE — PLAN OF CARE
Goal Outcome Evaluation:       Patient admitted from the ED via stretcher, makes needs known appropriately, denies pain, denies nausea and vomiting at this time.    Kirby Luo RN

## 2025-03-25 NOTE — PROGRESS NOTES
Is the implanted device safe for MRI Exam? Yes  Is this device 3T compatible? Yes  Device Type: Pacemaker    Device Information: Medtronic, PPM Gema (D)    Cardiology Orders for Device Programming     -- Yes -- The patient has a MRI conditional pulse generator and leads from the same     -- Yes -- The pulse generator and leads have been implanted for at least 6 weeks. (Does not apply to [employer] devices)    -- Yes-- The device is implanted in the right or left pectoral region    -- Yes  - There are not any additional active cardiac devices, abandoned leads, lead extenders or adapters    -- Yes -- The device lead impedance measurements are within the normal range per  guidelines    -- Yes -- If the patient is pacemaker dependent the thresholds are less than or equal to 2.0V @ 0.4ms.  (She is PPM dependent; last underlying was complete heart block)     -- Yes -- RA and RV leads are programmed to bipolar pacing operation or pacing off. If no, CONTACT THE DEVICE REP FOR PROGRAMMING      Date of last Remote Device check: January 20th, 2025  Results of last Device check:  1.   Right atrium impedance: 361 Ohms  2.   Right ventricle impedance: 513 Ohms  3.   Left ventricle impedance: N/A     4.   Right atrium threshold: 0.625 V at 0.4 ms  5.   Right ventricle threshold: 0.625 V at 0.4 ms  6.   Left ventricle threshold: N/A     Device programming during the scan guidelines   Pacing Mode (check one): Use the recommended pacing mode per Medtronic MRI Access Application  If remote reprogramming is applicable, please contact the Rep to assist     Valarie Henderson RN

## 2025-03-25 NOTE — PLAN OF CARE
Problem: Adult Inpatient Plan of Care  Goal: Optimal Comfort and Wellbeing  Outcome: Progressing     Problem: Nausea and Vomiting  Goal: Nausea and Vomiting Relief  Outcome: Progressing   Goal Outcome Evaluation:       Pt is A/O x4. Denies pain, no N/V  noted on this shift. VSS continue to monitor. Jenny Clemons RN

## 2025-03-25 NOTE — CONSULTS
General Surgery Consultation  Laney Hahn MRN# [id number]   Age/Sex: 84 year old female YOB: 1940     Reason for consult: 1. Nausea and vomiting, unspecified vomiting type            Requesting physician: Dr. Brock Hammonds                   Assessment and Plan:   Assessment:  Nausea and vomiting  Ms. Hahn is a 85 yo F with PMH of dementia, paroxysmal a-fib, HTN, SVT, s/p pacemaker who was admitted from her assisted living facility with nausea and vomiting x 2 days. Labs without leukocytosis, normal LFT's, lipase 12. CT abdomen/pelvis obtained and noted distended gallbladder with CBD measuring 12 mm. RUQ US also obtained and demonstrated sludge within the gallbladder with mild wall thickening and CBD measured 12.5 mm. GI consulted and did not recommend MRCP or further work-up. Patient clinically stable and abdominal exam benign. Will obtain HIDA scan to rule out cholecystitis.     Plan:  - NPO for HIDA  - HIDA scan. If negative for cholecystitis, okay to advance diet and surgery will sign off.  - General surgery will continue to follow.          Chief Complaint:     Chief Complaint   Patient presents with    Vomiting        History is obtained from the patient and electronic health record    HPI:   Laney Hahn is a 84 year old female with PMH of dementia, paroxysmal a-fib, HTN, SVT, s/p pacemaker who presented to the [facility] Emergency Department from her assisted living facility with nausea and vomiting x 2 days. Patient is a poor historian secondary to her dementia. Per report, patient had vomiting after eating dinner two days ago and after lunch the day of admission which prompted her facility to seek medical evaluation. Currently, she is feeling well and endorses feeling hungry. Denies abdominal pain, nausea, vomiting, fever, chills, diarrhea, constipation. Past abdominal surgical history includes total hysterectomy and partial gastrectomy per chart. Patient is also on Eliquis  per chart, uncertain when last dose was taken.          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 Nini Rasmussen    Metabolic encephalopathy     Moderate aortic valve stenosis 08/22/2017    stable on 2020 echo    Nonobstructive atherosclerosis of coronary artery 09/15/2020    with normal LVEDP    Paroxysmal SVT (supraventricular tachycardia) 09/07/2017    said to have short episodes while hospitalized for UTI per Dr. Rhys Mensah    Syncope 08/22/2017    UTI (urinary tract infection) 08/21/2017    hospitalized with weakness              Past Surgical History:     Past Surgical History:   Procedure Laterality Date    CATARACT EXTRACTION, BILATERAL      CV CORONARY ANGIOGRAM N/A 9/15/2020    Procedure: Coronary Angiogram;  Surgeon: Radhika Santa MD;  Location: [facility] Cath Lab;  Service: Cardiology    CV LEFT HEART CATHETERIZATION WITHOUT LEFT VENTRICULOGRAM Left 9/15/2020    Procedure: Left Heart Catheterization Without Left Ventriculogram;  Surgeon: Radhika Santa MD;  Location: [facility];  Service: Cardiology    EP PACEMAKER INSERT N/A 4/13/2021    Procedure: EP Pacemaker Insertion;  Surgeon: Frederic Burgos MD;  Location: [facility] Cardiac Cath Lab;  Service: Cardiology    HYSTERECTOMY      PARTIAL GASTRECTOMY  1969    for ulcers    TONSILLECTOMY               Social History:    reports that she has never smoked. She has never used smokeless tobacco. She reports that she does not drink alcohol and does not use drugs.           Family History:     Family History   Adopted: Yes              Allergies:   No Known Allergies           Medications:     Prior to Admission  "medications    Medication Sig Start Date End Date Taking? Authorizing Provider   acetaminophen (TYLENOL) 500 MG tablet Take 1,000 mg by mouth every 8 hours as needed for pain.   Yes Unknown, Entered By History   apixaban ANTICOAGULANT (ELIQUIS ANTICOAGULANT) 2.5 MG tablet Take 1 tablet (2.5 mg) by mouth 2 times daily. Due for appointment with Rose Mcelroy 12/28/24  Yes Erinn Celaya PA-C   atorvastatin (LIPITOR) 10 MG tablet Take 1 tablet (10 mg) by mouth every morning. 12/28/24  Yes Erinn Celaya PA-C   bacitracin 500 UNIT/GM external ointment Apply topically as needed for wound care.   Yes Reported, Patient   furosemide (LASIX) 20 MG tablet Take 0.5 tablets (10 mg) by mouth daily Due for appointment with Rose Mcelroy 4/10/24  Yes Rose Mcelroy NP   metoprolol succinate ER (TOPROL XL) 50 MG 24 hr tablet Take 1 tablet (50 mg) by mouth daily 4/10/24  Yes Rose Mcelroy NP   naloxone (NARCAN) 4 MG/0.1ML nasal spray Spray 4 mg into one nostril alternating nostrils as needed for opioid reversal. every 2-3 minutes until assistance arrives   Yes Reported, Patient              Review of Systems:   The Review of Systems is negative other than noted in the HPI            Physical Exam:   Patient Vitals for the past 24 hrs:   BP Temp Temp src Pulse Resp SpO2 Height Weight   03/25/25 0734 119/59 98  F (36.7  C) Oral 72 20 95 % -- --   03/24/25 2359 119/67 97.8  F (36.6  C) Axillary 74 20 94 % -- --   03/24/25 1906 (!) 154/77 98.4  F (36.9  C) Oral 78 20 98 % -- 58 kg (127 lb 13.9 oz)   03/24/25 1730 (!) 151/70 -- -- 71 (!) 44 97 % -- --   03/24/25 1538 (!) 159/70 -- -- 88 -- 98 % -- --   03/24/25 1409 (!) 154/72 -- -- 81 -- 98 % -- --   03/24/25 1354 (!) 170/74 97.7  F (36.5  C) Oral 81 27 98 % 1.626 m (5' 4\") 57.2 kg (126 lb)        No intake or output data in the 24 hours ending 03/25/25 1151   Constitutional:   awake, alert, cooperative, no apparent distress, and appears stated age       Eyes:   PERRL, conjunctiva/corneas clear, " EOM's intact; no scleral edema or icterus noted        ENT:   Normocephalic, without obvious abnormality, atraumatic, Lips, mucosa, and tongue normal        Lungs:   Normal respiratory effort, no accessory muscle use       Cardiovascular:   Regular rate and rhythm       Abdomen:   Soft, non-tender, non-distended. No rebound or guarding. Prior well healed midline scar.       Musculoskeletal:   No obvious swelling, bruising or deformity       Skin:   Skin color and texture normal for patient, no rashes or lesions              Data:         All imaging studies reviewed by me.    Results for orders placed or performed during the hospital encounter of 03/24/25 (from the past 24 hours)   CBC with platelets + differential    Narrative    The following orders were created for panel order CBC with platelets + differential.  Procedure                               Abnormality         Status                     ---------                               -----------         ------                     CBC with platelets and ...[[id number]]                      Final result                 Please view results for these tests on the individual orders.   Hepatic function panel   Result Value Ref Range    Protein Total 7.1 6.4 - 8.3 g/dL    Albumin 4.2 3.5 - 5.2 g/dL    Bilirubin Total 0.3 <=1.2 mg/dL    Alkaline Phosphatase 113 40 - 150 U/L    AST 21 0 - 45 U/L    ALT 14 0 - 50 U/L    Bilirubin Direct 0.12 0.00 - 0.30 mg/dL   Lipase   Result Value Ref Range    Lipase 12 (L) 13 - 60 U/L   Basic metabolic panel   Result Value Ref Range    Sodium 142 135 - 145 mmol/L    Potassium 4.5 3.4 - 5.3 mmol/L    Chloride 107 98 - 107 mmol/L    Carbon Dioxide (CO2) 22 22 - 29 mmol/L    Anion Gap 13 7 - 15 mmol/L    Urea Nitrogen 27.2 (H) 8.0 - 23.0 mg/dL    Creatinine 1.43 (H) 0.51 - 0.95 mg/dL    GFR Estimate 36 (L) >60 mL/min/1.73m2    Calcium 9.2 8.8 - 10.4 mg/dL    Glucose 134 (H) 70 - 99 mg/dL   INR   Result Value Ref Range    INR 1.15 0.85 -  1.15   Troponin T, High Sensitivity   Result Value Ref Range    Troponin T, High Sensitivity 28 (H) <=14 ng/L   CBC with platelets and differential   Result Value Ref Range    WBC Count 5.6 4.0 - 11.0 10e3/uL    RBC Count 4.21 3.80 - 5.20 10e6/uL    Hemoglobin 13.8 11.7 - 15.7 g/dL    Hematocrit 40.9 35.0 - 47.0 %    MCV 97 78 - 100 fL    MCH 32.8 26.5 - 33.0 pg    MCHC 33.7 31.5 - 36.5 g/dL    RDW 13.0 10.0 - 15.0 %    Platelet Count 214 150 - 450 10e3/uL    % Neutrophils 55 %    % Lymphocytes 34 %    % Monocytes 7 %    % Eosinophils 3 %    % Basophils 1 %    % Immature Granulocytes 0 %    NRBCs per 100 WBC 0 <1 /100    Absolute Neutrophils 3.1 1.6 - 8.3 10e3/uL    Absolute Lymphocytes 1.9 0.8 - 5.3 10e3/uL    Absolute Monocytes 0.4 0.0 - 1.3 10e3/uL    Absolute Eosinophils 0.2 0.0 - 0.7 10e3/uL    Absolute Basophils 0.0 0.0 - 0.2 10e3/uL    Absolute Immature Granulocytes 0.0 <=0.4 10e3/uL    Absolute NRBCs 0.0 10e3/uL   Magnesium   Result Value Ref Range    Magnesium 2.1 1.7 - 2.3 mg/dL   Hemoglobin A1c   Result Value Ref Range    Estimated Average Glucose 126 (H) <117 mg/dL    Hemoglobin A1C 6.0 (H) <5.7 %   Phosphorus   Result Value Ref Range    Phosphorus 4.1 2.5 - 4.5 mg/dL   Head CT w/o contrast    Narrative    EXAM: CT HEAD W/O CONTRAST  LOCATION: [facility]  DATE: 3/24/2025    INDICATION: vomiting, on thinners  COMPARISON: 12/23/2024.  TECHNIQUE: Routine CT Head without IV contrast. Multiplanar reformats. Dose reduction techniques were used.    FINDINGS:  INTRACRANIAL CONTENTS: No intracranial hemorrhage, extraaxial collection, or mass effect.  No CT evidence of acute infarct. Chronic bilateral basal ganglia infarcts. Moderate presumed chronic small vessel ischemic changes. Moderate generalized volume   loss. No hydrocephalus. Intracranial atherosclerotic calcifications.    VISUALIZED ORBITS/SINUSES/MASTOIDS: No intraorbital abnormality. No paranasal sinus mucosal disease. No middle  ear or mastoid effusion.    BONES/SOFT TISSUES: No acute abnormality.      Impression    IMPRESSION:  1.  No CT evidence for acute intracranial process.  2.  Brain atrophy and presumed chronic microvascular ischemic changes as above.   CT Abdomen Pelvis w Contrast    Narrative    EXAM: CT ABDOMEN PELVIS W CONTRAST  LOCATION: [facility]  DATE: 3/24/2025    INDICATION: Vomiting.  COMPARISON: CT abdomen pelvis, 9/11/2022  TECHNIQUE: CT scan of the abdomen and pelvis was performed following injection of IV contrast. Multiplanar reformats were obtained. Dose reduction techniques were used.  CONTRAST: Isovue 370 62 ml    FINDINGS:   LOWER CHEST: Mosaic attenuation involves the visualized bibasilar lung parenchyma.    HEPATOBILIARY: The gallbladder is distended and there is dilatation of the common bile duct measuring 12 mm in diameter.    PANCREAS: Fatty infiltration of the pancreas. No pancreatic ductal dilatation.    SPLEEN: Normal.    ADRENAL GLANDS: Normal.    KIDNEYS/BLADDER: There are bilateral benign-appearing renal cysts. These do not require any further follow-up.    BOWEL: Partial gastrectomy. Small hiatal hernia. There is no bowel obstruction or bowel inflammation. Diverticulosis involves the sigmoid colon without acute diverticulitis. The appendix is normal.    LYMPH NODES: Normal.    VASCULATURE: Normal caliber aorta with scattered atherosclerotic calcifications.    PELVIC ORGANS: No pelvic free fluid.    MUSCULOSKELETAL: No suspicious osseous lesions.      Impression    IMPRESSION:   1.  No bowel obstruction or bowel inflammation.  2.  Small hiatal hernia.  3.  Distended gallbladder. There is also dilatation of the common bile duct measuring up to 12 mm in diameter. Choledocholithiasis is not excluded. Consider MRCP for further evaluation.  4.  Mosaic attenuation of the visualized bibasilar lung parenchyma. This can be seen with air trapping in the setting of small airways disease.      Troponin T, High Sensitivity   Result Value Ref Range    Troponin T, High Sensitivity 28 (H) <=14 ng/L   Phosphorus   Result Value Ref Range    Phosphorus 3.8 2.5 - 4.5 mg/dL   Magnesium   Result Value Ref Range    Magnesium 2.0 1.7 - 2.3 mg/dL   Glucose by meter   Result Value Ref Range    GLUCOSE BY METER POCT 135 (H) 70 - 99 mg/dL   US Abdomen Limited    Narrative    EXAM: US ABDOMEN LIMITED  LOCATION: [facility]  DATE: 3/25/2025    INDICATION: Vomiting, gall bladder distension.  COMPARISON: CT abdomen and pelvis with IV contrast 3/24/2025.  TECHNIQUE: Limited abdominal ultrasound.    FINDINGS:    GALLBLADDER: Dependent sludge within the gallbladder. Mild wall thickening measuring 4.3 mm. No pericholecystic fluid. The patient was tender during sonogram of the right upper quadrant. Please correlate clinically to assess for acute cholecystitis.    BILE DUCTS: Mild biliary ectasia. The common duct measures 12.5 mm.    LIVER: Homogeneous hepatic parenchymal echotexture without discrete lesion. Small hepatic contour. Patent portal vein with normal directional flow.    RIGHT KIDNEY: No hydronephrosis. Right kidney measures 9.1 cm [id number].    PANCREAS: The visualized portions are normal.    No ascites.      Impression    IMPRESSION:  1.  Dependent sludge within the gallbladder with mild wall thickening and sonographic tenderness during examination. Mild biliary ectasia without choledocholithiasis. Please correlate clinically to assess for acute cholecystitis.    2.  Pancreas partially visualized due to overlying bowel gas, grossly normal where seen.   Glucose by meter   Result Value Ref Range    GLUCOSE BY METER POCT 111 (H) 70 - 99 mg/dL   Comprehensive metabolic panel   Result Value Ref Range    Sodium 144 135 - 145 mmol/L    Potassium 4.5 3.4 - 5.3 mmol/L    Carbon Dioxide (CO2) 22 22 - 29 mmol/L    Anion Gap 11 7 - 15 mmol/L    Urea Nitrogen 20.6 8.0 - 23.0 mg/dL    Creatinine 1.29  (H) 0.51 - 0.95 mg/dL    GFR Estimate 41 (L) >60 mL/min/1.73m2    Calcium 8.6 (L) 8.8 - 10.4 mg/dL    Chloride 111 (H) 98 - 107 mmol/L    Glucose 116 (H) 70 - 99 mg/dL    Alkaline Phosphatase 96 40 - 150 U/L    AST 18 0 - 45 U/L    ALT 12 0 - 50 U/L    Protein Total 6.1 (L) 6.4 - 8.3 g/dL    Albumin 3.7 3.5 - 5.2 g/dL    Bilirubin Total 0.4 <=1.2 mg/dL   CBC with platelets   Result Value Ref Range    WBC Count 5.5 4.0 - 11.0 10e3/uL    RBC Count 3.75 (L) 3.80 - 5.20 10e6/uL    Hemoglobin 12.3 11.7 - 15.7 g/dL    Hematocrit 37.3 35.0 - 47.0 %     78 - 100 fL    MCH 32.8 26.5 - 33.0 pg    MCHC 33.0 31.5 - 36.5 g/dL    RDW 13.0 10.0 - 15.0 %    Platelet Count 188 150 - 450 10e3/uL   Magnesium   Result Value Ref Range    Magnesium 2.2 1.7 - 2.3 mg/dL   Phosphorus   Result Value Ref Range    Phosphorus 3.3 2.5 - 4.5 mg/dL   Bilirubin direct   Result Value Ref Range    Bilirubin Direct 0.16 0.00 - 0.30 mg/dL   CRP inflammation   Result Value Ref Range    CRP Inflammation <3.00 <5.00 mg/L           Sonya Holt PA-C  [facility]  Surgery [address]  [address] [place] [id number]?  Office: [phone]

## 2025-03-25 NOTE — PROGRESS NOTES
General Surgery Progress Note:    Hospital Day # 1    ASSESSMENT:   1. Nausea and vomiting, unspecified vomiting type      Laney DAVID Hahn is a 84 year old female admitted with nausea and vomiting x 2 days of unclear etiology. RUQ US with sludge and gallbladder with mild wall thickening and CBD measuring 12.5 mm. HIDA scan done and negative for acute cholecystitis.     PLAN:   - Okay to advance diet as tolerated  - No indication for acute surgical intervention  - General surgery will sign off at this time. Please call if further questions.    Sonya Holt PA-C  [facility]  Surgery Clinic - [address] [address]?  Office: [phone]

## 2025-03-26 ENCOUNTER — TELEPHONE (OUTPATIENT)
Dept: INTERNAL MEDICINE | Facility: CLINIC | Age: 85
End: 2025-03-26

## 2025-03-26 ENCOUNTER — TELEPHONE (OUTPATIENT)
Dept: CARDIOLOGY | Facility: CLINIC | Age: 85
End: 2025-03-26
Payer: COMMERCIAL

## 2025-03-26 ENCOUNTER — PREP FOR PROCEDURE (OUTPATIENT)
Dept: CARDIOLOGY | Facility: CLINIC | Age: 85
End: 2025-03-26
Payer: COMMERCIAL

## 2025-03-26 ENCOUNTER — APPOINTMENT (OUTPATIENT)
Dept: OCCUPATIONAL THERAPY | Facility: HOSPITAL | Age: 85
End: 2025-03-26
Attending: INTERNAL MEDICINE
Payer: COMMERCIAL

## 2025-03-26 VITALS
HEIGHT: 64 IN | TEMPERATURE: 98.3 F | WEIGHT: 127.87 LBS | BODY MASS INDEX: 21.83 KG/M2 | SYSTOLIC BLOOD PRESSURE: 113 MMHG | OXYGEN SATURATION: 96 % | DIASTOLIC BLOOD PRESSURE: 58 MMHG | RESPIRATION RATE: 16 BRPM | HEART RATE: 77 BPM

## 2025-03-26 DIAGNOSIS — I35.0 AORTIC VALVE STENOSIS, ETIOLOGY OF CARDIAC VALVE DISEASE UNSPECIFIED: Primary | ICD-10-CM

## 2025-03-26 LAB
ALBUMIN SERPL BCG-MCNC: 3.5 G/DL (ref 3.5–5.2)
ALP SERPL-CCNC: 87 U/L (ref 40–150)
ALT SERPL W P-5'-P-CCNC: 11 U/L (ref 0–50)
ANION GAP SERPL CALCULATED.3IONS-SCNC: 6 MMOL/L (ref 7–15)
AST SERPL W P-5'-P-CCNC: 20 U/L (ref 0–45)
BILIRUB SERPL-MCNC: 0.5 MG/DL
BUN SERPL-MCNC: 20.6 MG/DL (ref 8–23)
CALCIUM SERPL-MCNC: 8.3 MG/DL (ref 8.8–10.4)
CHLORIDE SERPL-SCNC: 112 MMOL/L (ref 98–107)
CREAT SERPL-MCNC: 1.45 MG/DL (ref 0.51–0.95)
EGFRCR SERPLBLD CKD-EPI 2021: 36 ML/MIN/1.73M2
ERYTHROCYTE [DISTWIDTH] IN BLOOD BY AUTOMATED COUNT: 13 % (ref 10–15)
GLUCOSE SERPL-MCNC: 97 MG/DL (ref 70–99)
HCO3 SERPL-SCNC: 24 MMOL/L (ref 22–29)
HCT VFR BLD AUTO: 34.8 % (ref 35–47)
HGB BLD-MCNC: 11.5 G/DL (ref 11.7–15.7)
MCH RBC QN AUTO: 32.8 PG (ref 26.5–33)
MCHC RBC AUTO-ENTMCNC: 33 G/DL (ref 31.5–36.5)
MCV RBC AUTO: 99 FL (ref 78–100)
PLATELET # BLD AUTO: 176 10E3/UL (ref 150–450)
POTASSIUM SERPL-SCNC: 4.4 MMOL/L (ref 3.4–5.3)
PROT SERPL-MCNC: 5.6 G/DL (ref 6.4–8.3)
RBC # BLD AUTO: 3.51 10E6/UL (ref 3.8–5.2)
SODIUM SERPL-SCNC: 142 MMOL/L (ref 135–145)
WBC # BLD AUTO: 4.5 10E3/UL (ref 4–11)

## 2025-03-26 PROCEDURE — 97165 OT EVAL LOW COMPLEX 30 MIN: CPT | Mod: GO

## 2025-03-26 PROCEDURE — 85027 COMPLETE CBC AUTOMATED: CPT | Performed by: INTERNAL MEDICINE

## 2025-03-26 PROCEDURE — 97530 THERAPEUTIC ACTIVITIES: CPT | Mod: GO

## 2025-03-26 PROCEDURE — G0378 HOSPITAL OBSERVATION PER HR: HCPCS

## 2025-03-26 PROCEDURE — 250N000013 HC RX MED GY IP 250 OP 250 PS 637: Performed by: STUDENT IN AN ORGANIZED HEALTH CARE EDUCATION/TRAINING PROGRAM

## 2025-03-26 PROCEDURE — 80053 COMPREHEN METABOLIC PANEL: CPT | Performed by: INTERNAL MEDICINE

## 2025-03-26 PROCEDURE — 96376 TX/PRO/DX INJ SAME DRUG ADON: CPT

## 2025-03-26 PROCEDURE — 36415 COLL VENOUS BLD VENIPUNCTURE: CPT | Performed by: INTERNAL MEDICINE

## 2025-03-26 PROCEDURE — 250N000011 HC RX IP 250 OP 636: Performed by: INTERNAL MEDICINE

## 2025-03-26 RX ORDER — FENTANYL CITRATE 50 UG/ML
25 INJECTION, SOLUTION INTRAMUSCULAR; INTRAVENOUS
OUTPATIENT
Start: 2025-03-26

## 2025-03-26 RX ORDER — ASPIRIN 325 MG
325 TABLET ORAL ONCE
OUTPATIENT
Start: 2025-03-26 | End: 2025-03-26

## 2025-03-26 RX ORDER — ASPIRIN 81 MG/1
243 TABLET, CHEWABLE ORAL ONCE
OUTPATIENT
Start: 2025-03-26

## 2025-03-26 RX ORDER — LIDOCAINE 40 MG/G
CREAM TOPICAL
OUTPATIENT
Start: 2025-03-26

## 2025-03-26 RX ORDER — SODIUM CHLORIDE 9 MG/ML
INJECTION, SOLUTION INTRAVENOUS CONTINUOUS
OUTPATIENT
Start: 2025-03-26

## 2025-03-26 RX ADMIN — METOPROLOL SUCCINATE 50 MG: 50 TABLET, EXTENDED RELEASE ORAL at 09:11

## 2025-03-26 RX ADMIN — ATORVASTATIN CALCIUM 10 MG: 10 TABLET, FILM COATED ORAL at 09:10

## 2025-03-26 RX ADMIN — PANTOPRAZOLE SODIUM 40 MG: 40 INJECTION, POWDER, FOR SOLUTION INTRAVENOUS at 09:11

## 2025-03-26 RX ADMIN — PIPERACILLIN AND TAZOBACTAM 3.38 G: 3; .375 INJECTION, POWDER, FOR SOLUTION INTRAVENOUS at 06:14

## 2025-03-26 ASSESSMENT — ACTIVITIES OF DAILY LIVING (ADL)
ADLS_ACUITY_SCORE: 59
ADLS_ACUITY_SCORE: 60
ADLS_ACUITY_SCORE: 59
ADLS_ACUITY_SCORE: 60
ADLS_ACUITY_SCORE: 59
ADLS_ACUITY_SCORE: 59

## 2025-03-26 NOTE — CONSULTS
Care Management Discharge Note    Discharge Date: 03/26/2025       Discharge Disposition: Assisted Living    Discharge Services:      Discharge DME:      Discharge Transportation:      Private pay costs discussed: transportation costs    Does the patient's insurance plan have a 3 day qualifying hospital stay waiver?  Yes     Which insurance plan 3 day waiver is available? Alternative insurance waiver    Will the waiver be used for post-acute placement? No    PAS Confirmation Code:    Patient/family educated on Medicare website which has current facility and service quality ratings:      Education Provided on the Discharge Plan:    Persons Notified of Discharge Plans: patient   Patient/Family in Agreement with the Plan:      Handoff Referral Completed: No, handoff not indicated or clinically appropriate    Additional Information:  See below     Maida Cain RN          Message left for TUAN Gross at [facility] to update that patient ride is 11-12 today to return to [facility]     Met with patient and updated her. CATHIE signed. Patient says she is not interested in home PT .

## 2025-03-26 NOTE — PROGRESS NOTES
"PRIMARY DIAGNOSIS: \"GENERIC\" NURSING  OUTPATIENT/OBSERVATION GOALS TO BE MET BEFORE DISCHARGE:  ADLs back to baseline: Yes    Activity and level of assistance: Up with standby assistance.    Pain status: Pain free.    Return to near baseline physical activity: Yes     Discharge Planner Nurse   Safe discharge environment identified: Yes  Barriers to discharge: No       Entered by: Jenny Clemons RN 03/26/2025 3:48 AM     Please review provider order for any additional goals.   Nurse to notify provider when observation goals have been met and patient is ready for discharge.      "

## 2025-03-26 NOTE — TELEPHONE ENCOUNTER
Angiogram letter faxed to [phone] with orders to hold Eliquis on 4/1 for procedure on 4/3. See Letters documentation for full instructions.

## 2025-03-26 NOTE — PROGRESS NOTES
"   03/26/25 7350   Appointment Info   Signing Clinician's Name / Credentials (OT) Peyton Glynn, OTR/L   Quick Adds   Quick Adds Certification   Living Environment   People in Home sibling(s)   Current Living Arrangements assisted living   Home Accessibility no concerns   Self-Care   Equipment Currently Used at Home walker, rolling  (4WW)   Fall history within last six months no   Activity/Exercise/Self-Care Comment Pt independent with most ADLs, per chart review has assist with bathing from facility.   Instrumental Activities of Daily Living (IADL)   IADL Comments Brother assists with all IADLs.   General Information   Onset of Illness/Injury or Date of Surgery 03/24/25   Referring Physician Brock Hammonds MD   Patient/Family Therapy Goal Statement (OT) none stated   Additional Occupational Profile Info/Pertinent History of Current Problem Per chart review, pt \"is 84 year old female with a past medical history of SVT, CKD, paroxysmal A-fib, hypertension, pacemaker, hyperlipidemia presented to the ED from assisted living facility for evaluation of vomiting, CT scan of the abdomen showed Distended gallbladder.  Admitted for concern of cholecystitis.\"   Existing Precautions/Restrictions fall   Limitations/Impairments safety/cognitive   Cognitive Status Examination   Orientation Status place  (not oriented to time or situation)   Range of Motion Comprehensive   General Range of Motion bilateral upper extremity ROM WFL   Strength Comprehensive (MMT)   Comment, General Manual Muscle Testing (MMT) Assessment Slight generalized weakness noted functionally.   Coordination   Coordination Comments slight tremor noted with functional tasks   Bed Mobility   Bed Mobility supine-sit;sit-supine   Supine-Sit [place] (Bed Mobility) supervision   Sit-Supine [place] (Bed Mobility) supervision   Assistive Device (Bed Mobility) bed rails   Transfers   Transfers sit-stand transfer   Sit-Stand Transfer   Sit-Stand [place] " (Transfers) contact guard   Assistive Device (Sit-Stand Transfers) walker, front-wheeled   Balance   Balance Comments Slightly unsteady on feet using FWW, no significant LOB observed   Activities of Daily Living   BADL Assessment/Intervention grooming;lower body dressing;toileting   Lower Body Dressing Assessment/Training   Position (Lower Body Dressing) unsupported sitting   [place] Level (Lower Body Dressing) supervision;verbal cues   Grooming Assessment/Training   Position (Grooming) sink side;unsupported standing   [place] Level (Grooming) contact guard assist;verbal cues   Toileting   [place] Level (Toileting) supervision   Clinical Impression   Criteria for Skilled Therapeutic Interventions Met (OT) Yes, treatment indicated   OT Diagnosis Impaired ability to perform ADLs and functional mobility.   Influenced by the following impairments nausea/vomiting   OT Problem List-Impairments impacting ADL problems related to;activity tolerance impaired;balance;cognition;mobility;strength   Assessment of Occupational Performance 1-3 Performance Deficits   Identified Performance Deficits cognition, grooming/hygiene, lower body dressing, toileting   Planned Therapy Interventions (OT) ADL retraining;balance training;cognition;strengthening;transfer training;home program guidelines;progressive activity/exercise;risk factor education   Clinical Decision Making Complexity (OT) problem focused assessment/low complexity   Risk & Benefits of therapy have been explained evaluation/treatment results reviewed;care plan/treatment goals reviewed;risks/benefits reviewed;current/potential barriers reviewed;participants voiced agreement with care plan;participants included;patient   OT Total Evaluation Time   OT Eval, Low Complexity Minutes (12322) 12   Therapy Certification   Medical Diagnosis nausea/vomiting   Start of Care Date 03/26/25   Certification date from 03/26/25   Certification date to 04/02/25   OT Goals    Therapy Frequency (OT) 5 times/week   OT Predicted Duration/Target Date for Goal Attainment 04/02/25   OT Goals Hygiene/Grooming;Lower Body Dressing;Toilet Transfer/Toileting;Cognition   OT: Hygiene/Grooming modified independent;using adaptive equipment;within precautions;while standing   OT: Lower Body Dressing Modified independent;using adaptive equipment   OT: Toilet Transfer/Toileting Modified independent;toilet transfer;cleaning and garment management;using adaptive equipment   OT: Cognitive Patient/caregiver will verbalize understanding of cognitive assessment results/recommendations as needed for safe discharge planning   Interventions   Interventions Quick Adds Therapeutic Activity   Therapeutic Activities   Therapeutic Activity Minutes (41221) 8   Symptoms noted during/after treatment fatigue   Treatment Detail/Skilled Intervention Provided pt with 3 words and cued to recall after delay. Pt able to recall 2/3 words accurately independently, 3rd word required 1 category cue to accurately recall. Pt performed functional mobility in room with CGA using FWW, cueing for safety needed. Pt completed additional lower body dressing task seated with SBA with cueing needed for problem-solving. Pt left in bed at end of session with bed alarm on and call light in reach.   OT Discharge Planning   OT Plan SLUMS, ambulatory ADLs, balance   OT Discharge Recommendation (DC Rec) home with assist;home with home care occupational therapy   OT Rationale for DC Rec Pt moving well with CGA/SBA, noted to have some confusion. Lives with brother in supportive living environment. May benefit from  OT services.   OT Brief overview of current status CGA functional mobility, SBA lower body dressing, CGA standing G/H, SBA/CGA toileting   OT Total Distance Amb During Session (feet) 31   Total Session Time   Timed Code Treatment Minutes 8   Total Session Time (sum of timed and untimed services) 20     [facility]  Services                                                                                   OUTPATIENT OCCUPATIONAL THERAPY    PLAN OF TREATMENT FOR OUTPATIENT REHABILITATION   Patient's Last Name, First Name, Laney Kinsey YOB: 1940   Provider's Name   [facility]   Medical Record No.  [id number]     Onset Date: 03/24/25 Start of Care Date: 03/26/25     Medical Diagnosis:  nausea/vomiting               OT Diagnosis:  Impaired ability to perform ADLs and functional mobility. Certification Dates:  From: 03/26/25  To: 04/02/25     See note for plan of treatment, functional goals, and certification details.    I CERTIFY THE NEED FOR THESE SERVICES FURNISHED UNDER        THIS PLAN OF TREATMENT AND WHILE UNDER MY CARE (Physician co-signature of this document indicates review and certification of the therapy plan).

## 2025-03-26 NOTE — TELEPHONE ENCOUNTER
[facility] Call Center    Phone Message    May a detailed message be left on voicemail: yes     Reason for Call: Medication Question or concern regarding medication   Prescription Clarification  Name of Medication: Eliquis  Prescribing Provider: Imani      What on the order needs clarification? Needs to order faxed to them to hold the Eliquis, please fax to: [phone]      Action Taken: Other: Cardiology    Travel Screening: Not Applicable     Date of Service:

## 2025-03-26 NOTE — TELEPHONE ENCOUNTER
[facility] - Incontinence Supply Order    Fax received and placed in green folder at PCP's desk.

## 2025-03-26 NOTE — PLAN OF CARE
Occupational Therapy Discharge Summary    Reason for therapy discharge:    Discharged to home.    Progress towards therapy goal(s). See goals on Care Plan in [facility] electronic health record for goal details.  Goals partially met.  Barriers to achieving goals:   discharge from facility.    Therapy recommendation(s):    Continued therapy is recommended.  Rationale/Recommendations:  home care OT services recommended, patient declined. Recommend formal cognitive testing in outpatient.    Peyton Maki, OTR/L 3/26/25

## 2025-03-26 NOTE — TELEPHONE ENCOUNTER
Pre-Procedure Angiogram Education     Laney Hahn  [address]   [facility] [address]  [phone] (home)     Procedure cardiologist:  Dr. Dillon  PCP:  Rose Mcelroy  H&P completed by:  Dr. Irby  Admit date 4/3  Arrival time:  0930  Anticoagulation: Yes- Eliquis- hold start 4/1  CPAP: No  Previous PCI: 2020-   Bypass Grafts: No  Renal Issues: Yes GFR 36  Diabetic?: No  Device?: Yes  Type:  Medtronic PPM     Angiogram Teaching    Reason for Visit:  Telephone call to discuss pre-procedure education in preparation for: coronary angiogram with possible percutaneous intervention, Right Heart Catheterization with invasive aortic vave gradient study     **Spoke with patients care facility RN to notify of hold times for Eliquis and medication orders morning of procedure as well as NPO orders. Notified patient's friend Gabriella of these instructions as well- she states the patient would not understand the information if directly sent to [facility] so I did send an additional letter to Gabriella so she could bring it to [facility] to review instructions for angiogram.     Procedure Prep:  Primary Cardiologist note dated: 01/20/2025  EKG results obtained, dated: to be obtained date of procedure   T&S- To be obtained date of procedure  BMP, CBC- to be obtained date of procedure.   Patients support person does note patient has significant needle phobia.      Patient Education  Patient states understanding of procedure and risks and agrees to proceed.    Pre-procedure instructions  Patient instructed to be NPO per anesthesia guidelines; no food after 130 am, clear liquids until 730am, nothing by mouth after 730am   Patient instructed to shower the evening before or the morning of the procedure.  Leave all valuables at home (jewlery, rings, watches, large amounts of money).  Patient understands there are two visitors allowed during patients stay.   Patient instructed to arrange for transportation home following  procedure from a responsible family member of friend. No driving for at least 24 hours post-procedure.  Patient instructed to have a responsible adult with them for 24 hours post-procedure.  Post-procedure follow up process.  Conscious sedation discussed.    Pre-procedure medication instructions  Patient instructed on antiplatelet medication.  Continue medications as scheduled up until the date of procedure unless indicated below.   Patient instructed to take Aspirin am of procedure: No  ** Aspirin to be given in [facility] prior to procedure**     Other medication: instructed to only take metoprolol a.m. of the procedure.  Instructed patient to hold all other prescription medications, OTC meds, supplements and vitamins the day of procedure.     Patients care facility RN instructed to hold anticoagulation Eliquis beginning on 4/1. Friend Gabriella also notified.     *PATIENTS RECORDS AVAILABLE IN [employer] UNLESS OTHERWISE INDICATED*      Patient Active Problem List   Diagnosis    SVT (supraventricular tachycardia)    Chronic kidney disease, stage III (moderate) (H)    Vascular dementia without behavioral disturbance (H)    Paroxysmal atrial fibrillation (H)    Nonrheumatic aortic valve stenosis    Heart failure with reduced ejection fraction, NYHA class I (H)    Essential hypertension    DNAR (do not attempt resuscitation)    Cardiac pacemaker in situ    Mixed hyperlipidemia    AV node dysfunction    Bifascicular bundle branch block    Junctional bradycardia, s/p PPM placement     Nonobstructive atherosclerosis of coronary artery    Sick sinus syndrome (H)    Acute pain of both knees    Unsteady gait    Generalized weakness    Falls frequently    COVID-19 virus infection    Nausea and vomiting, unspecified vomiting type       Current Outpatient Medications   Medication Sig Dispense Refill    acetaminophen (TYLENOL) 500 MG tablet Take 1,000 mg by mouth every 8 hours as needed for pain.      apixaban ANTICOAGULANT (ELIQUIS  ANTICOAGULANT) 2.5 MG tablet Take 1 tablet (2.5 mg) by mouth 2 times daily. Due for appointment with Rose Mcelroy 60 tablet 1    atorvastatin (LIPITOR) 10 MG tablet Take 1 tablet (10 mg) by mouth every morning. 30 tablet 2    bacitracin 500 UNIT/GM external ointment Apply topically as needed for wound care.      furosemide (LASIX) 20 MG tablet Take 0.5 tablets (10 mg) by mouth daily Due for appointment with Rose Mcelroy 15 tablet 1    metoprolol succinate ER (TOPROL XL) 50 MG 24 hr tablet Take 1 tablet (50 mg) by mouth daily 30 tablet 2    naloxone (NARCAN) 4 MG/0.1ML nasal spray Spray 4 mg into one nostril alternating nostrils as needed for opioid reversal. every 2-3 minutes until assistance arrives         No Known Allergies        Orders placed. Patient has my contact information if they have any additional questions or concerns.     Peyton Horan RN BSN  Valve Clinic Coordinator  [facility]  [phone]\

## 2025-03-26 NOTE — TELEPHONE ENCOUNTER
Called patients care facility to let them know that we will be cancelling Pat's CTA- TAVR for tomorrow 3/27. Patient was just discharged from the hospital today 3/26 and had an JESUS from recent illness. We would not recommend CTA - TAVR at this time while she has JESUS.  We will hernan CTA and will continue with TAVR workup next week including CT surgery visit and angiogram on 4/3. Relayed this information to the Care facility staff and RN, as well as Laney and her friend Gabriella all via phone. All members verbalized understanding. We will follow up after angiogram next week to reschedule CTA if needed.        Peyton Horan RN on 3/26/2025 at 3:48 PM

## 2025-03-26 NOTE — PLAN OF CARE
"PRIMARY DIAGNOSIS: \"GENERIC\" NURSING  OUTPATIENT/OBSERVATION GOALS TO BE MET BEFORE DISCHARGE:  ADLs back to baseline: Yes    Activity and level of assistance: Up with standby assistance.    Pain status: Pain free.    Return to near baseline physical activity: Yes     Discharge Planner Nurse   Safe discharge environment identified: Yes  Barriers to discharge: Yes Possible discharge today       Entered by: Michelle Barragan RN 03/26/2025      Please review provider order for any additional goals.   Nurse to notify provider when observation goals have been met and patient is ready for discharge.Goal Outcome Evaluation:                        "

## 2025-03-26 NOTE — PLAN OF CARE
Physical Therapy Discharge Summary    Reason for therapy discharge:    Discharged to home with home therapy.    Progress towards therapy goal(s). See goals on Care Plan in [facility] electronic health record for goal details.  Goals not met.  Barriers to achieving goals:   discharge from facility.    Therapy recommendation(s):    Continued therapy is recommended.  Rationale/Recommendations:  to improve functional mobility.

## 2025-03-26 NOTE — PROGRESS NOTES
"PRIMARY DIAGNOSIS: \"GENERIC\" NURSING  OUTPATIENT/OBSERVATION GOALS TO BE MET BEFORE DISCHARGE:  ADLs back to baseline: Yes    Activity and level of assistance: Up with standby assistance.    Pain status: Pain free.    Return to near baseline physical activity: Yes     Discharge Planner Nurse   Safe discharge environment identified: Yes  Barriers to discharge: No       Entered by: Jenny Clemons RN 03/26/2025 6:19 AM     Please review provider order for any additional goals.   Nurse to notify provider when observation goals have been met and patient is ready for discharge.      "

## 2025-03-27 ENCOUNTER — PATIENT OUTREACH (OUTPATIENT)
Dept: CARE COORDINATION | Facility: CLINIC | Age: 85
End: 2025-03-27
Payer: COMMERCIAL

## 2025-03-27 LAB
ATRIAL RATE - MUSE: 81 BPM
DIASTOLIC BLOOD PRESSURE - MUSE: 74 MMHG
INTERPRETATION ECG - MUSE: NORMAL
P AXIS - MUSE: 66 DEGREES
PR INTERVAL - MUSE: 198 MS
QRS DURATION - MUSE: 140 MS
QT - MUSE: 446 MS
QTC - MUSE: 518 MS
R AXIS - MUSE: 104 DEGREES
SYSTOLIC BLOOD PRESSURE - MUSE: 170 MMHG
T AXIS - MUSE: -44 DEGREES
VENTRICULAR RATE- MUSE: 81 BPM

## 2025-03-27 NOTE — PROGRESS NOTES
Clinic Care Coordination Contact    Situation: Patient chart reviewed by care coordinator.    Background: Clinic Care Coordination Referral received from inpatient care team for transition handoff communication following hospital admission.    Assessment: Upon chart review, patient is not a candidate for Primary Care Clinic Care Coordination enrollment due to reason stated below:  Patient is receiving duplicative services from assisted living facility.    Plan/Recommendations: Clinic Care Coordination Referral/order cancelled. RN/SW CC will perform no further monitoring/outreaches at this time and will remain available as needed. If new needs arise, a new Care Coordination Referral may be placed.    Mil[place]ro [place],   [facility]  [phone]

## 2025-03-27 NOTE — TELEPHONE ENCOUNTER
Form reviewed by PCP. Needs appointment for this.    Please assist with scheduling appt.    Form in incomplete bin at writer's desk

## 2025-03-28 NOTE — TELEPHONE ENCOUNTER
Called mobile number listed on the file and Gabriella picked up. She states patient does not need more supply for incontinence because she still got so many of them. Writer let gabriella know that if appointment needs for other reason we'll give her a call back.

## 2025-03-31 ENCOUNTER — TELEPHONE (OUTPATIENT)
Dept: CARDIOLOGY | Facility: CLINIC | Age: 85
End: 2025-03-31
Payer: COMMERCIAL

## 2025-03-31 NOTE — TELEPHONE ENCOUNTER
Spoke to [facility] nursing team- they are aware pt needs to hold Eliquis starting tomorrow. Faxed for 4th time instructions for prep for angiogram to nursing team per their request. Most recent number sent to was [phone].     Dahiana Villatoro RN on 3/31/2025 at 1:57 PM

## 2025-03-31 NOTE — TELEPHONE ENCOUNTER
Called [facility] at 11AM to speak with facility nurse to confirm fax was received for medication hold times. This was faxed 2 times  but per nurse they still do not have documentation. Macario PICKERING did speak with nurse around 2 PM and  faxed over specific orders for Pats medication instructions.       When I spoke with nurse she did confirm that she does not control diet orders of residents and we would need to communicate this separately.  Phone call made to Pat's friend Gabriella as well. Gabriella will see patient tomorrow and will assist in communicating NPO instructions to Pat as well as is helping to coordinate arrival time for patient and  for patient.       Peyton Horan RN on 3/31/2025 at 3:03 PM

## 2025-04-01 ENCOUNTER — TELEPHONE (OUTPATIENT)
Dept: INTERNAL MEDICINE | Facility: CLINIC | Age: 85
End: 2025-04-01
Payer: COMMERCIAL

## 2025-04-01 ENCOUNTER — DOCUMENTATION ONLY (OUTPATIENT)
Dept: OTHER | Facility: CLINIC | Age: 85
End: 2025-04-01
Payer: COMMERCIAL

## 2025-04-01 NOTE — TELEPHONE ENCOUNTER
[facility] - Physician's Order from 04/01/2025-03/31/2026    Fax received and placed in green folder at PCP's desk.

## 2025-04-02 ENCOUNTER — TELEPHONE (OUTPATIENT)
Dept: CARDIOLOGY | Facility: CLINIC | Age: 85
End: 2025-04-02

## 2025-04-02 NOTE — TELEPHONE ENCOUNTER
Called Gabriella back and let her know that we will cancel the appointment for tomorrow as well as today's device check.  Gabriella and Janette would prefer to schedule both appointments on the same day again if possible. I instructed her that Melina will call her to reschedule.       Peyton Horan RN on 4/2/2025 at 12:49 PM

## 2025-04-02 NOTE — TELEPHONE ENCOUNTER
[facility] Call Center    Phone Message    May a detailed message be left on voicemail: yes     Reason for Call: Other: Gabriella is requesting a call back to reschedule 4/3/25 NEW TAVR.  Pt has angiogram that afternoon     Action Taken: Other: cardio    Travel Screening: Not Applicable    Thank you!  [facility]       Date of Service:

## 2025-04-03 ENCOUNTER — TELEPHONE (OUTPATIENT)
Dept: CARDIOLOGY | Facility: CLINIC | Age: 85
End: 2025-04-03

## 2025-04-03 ENCOUNTER — HOSPITAL ENCOUNTER (INPATIENT)
Facility: HOSPITAL | Age: 85
LOS: 6 days | Discharge: HOME | End: 2025-04-09
Attending: INTERNAL MEDICINE
Payer: COMMERCIAL

## 2025-04-03 DIAGNOSIS — Z66 DNAR (DO NOT ATTEMPT RESUSCITATION): Primary | ICD-10-CM

## 2025-04-03 DIAGNOSIS — I51.89 OTHER ILL-DEFINED HEART DISEASES: ICD-10-CM

## 2025-04-03 DIAGNOSIS — I50.20 HEART FAILURE WITH REDUCED EJECTION FRACTION, NYHA CLASS I (H): ICD-10-CM

## 2025-04-03 DIAGNOSIS — I35.0 AORTIC VALVE STENOSIS, ETIOLOGY OF CARDIAC VALVE DISEASE UNSPECIFIED: ICD-10-CM

## 2025-04-03 PROBLEM — Z98.890 STATUS POST CORONARY ANGIOGRAM: Status: ACTIVE | Noted: 2025-04-03

## 2025-04-03 LAB
ABO + RH BLD: NORMAL
ANION GAP SERPL CALCULATED.3IONS-SCNC: 6 MMOL/L (ref 7–15)
ATRIAL RATE - MUSE: 71 BPM
BASE EXCESS BLDA CALC-SCNC: -3.1 MMOL/L (ref -3–3)
BASE EXCESS BLDV CALC-SCNC: -1.3 MMOL/L (ref -3–3)
BLD GP AB SCN SERPL QL: NEGATIVE
BUN SERPL-MCNC: 17.6 MG/DL (ref 8–23)
CALCIUM SERPL-MCNC: 8.5 MG/DL (ref 8.8–10.4)
CHLORIDE SERPL-SCNC: 108 MMOL/L (ref 98–107)
CREAT SERPL-MCNC: 1.23 MG/DL (ref 0.51–0.95)
DIASTOLIC BLOOD PRESSURE - MUSE: NORMAL MMHG
EGFRCR SERPLBLD CKD-EPI 2021: 43 ML/MIN/1.73M2
ERYTHROCYTE [DISTWIDTH] IN BLOOD BY AUTOMATED COUNT: 13.2 % (ref 10–15)
GLUCOSE SERPL-MCNC: 114 MG/DL (ref 70–99)
HCO3 BLDA-SCNC: 22 MMOL/L (ref 21–28)
HCO3 BLDV-SCNC: 22 MMOL/L (ref 21–28)
HCO3 SERPL-SCNC: 29 MMOL/L (ref 22–29)
HCT VFR BLD AUTO: 37.6 % (ref 35–47)
HGB BLD-MCNC: 12.7 G/DL (ref 11.7–15.7)
INTERPRETATION ECG - MUSE: NORMAL
MCH RBC QN AUTO: 33.4 PG (ref 26.5–33)
MCHC RBC AUTO-ENTMCNC: 33.8 G/DL (ref 31.5–36.5)
MCV RBC AUTO: 99 FL (ref 78–100)
OXYHGB MFR BLDA: 99 % (ref 92–100)
OXYHGB MFR BLDV: 45 % (ref 70–75)
P AXIS - MUSE: NORMAL DEGREES
PCO2 BLDA: 32 MM HG (ref 35–45)
PCO2 BLDV: 58 MM HG (ref 40–50)
PH BLDA: 7.42 [PH] (ref 7.35–7.45)
PH BLDV: 7.27 [PH] (ref 7.32–7.43)
PLATELET # BLD AUTO: 192 10E3/UL (ref 150–450)
PO2 BLDA: 130 MM HG (ref 80–105)
PO2 BLDV: 28 MM HG (ref 25–47)
POTASSIUM SERPL-SCNC: 4.7 MMOL/L (ref 3.4–5.3)
PR INTERVAL - MUSE: 178 MS
QRS DURATION - MUSE: 134 MS
QT - MUSE: 468 MS
QTC - MUSE: 508 MS
R AXIS - MUSE: 104 DEGREES
RBC # BLD AUTO: 3.8 10E6/UL (ref 3.8–5.2)
SAO2 % BLDA: 100 % (ref 95–96)
SAO2 % BLDV: 46 % (ref 70–75)
SODIUM SERPL-SCNC: 143 MMOL/L (ref 135–145)
SPECIMEN EXP DATE BLD: NORMAL
SYSTOLIC BLOOD PRESSURE - MUSE: NORMAL MMHG
T AXIS - MUSE: -42 DEGREES
VENTRICULAR RATE- MUSE: 71 BPM
WBC # BLD AUTO: 5 10E3/UL (ref 4–11)

## 2025-04-03 PROCEDURE — C1751 CATH, INF, PER/CENT/MIDLINE: HCPCS | Performed by: INTERNAL MEDICINE

## 2025-04-03 PROCEDURE — 210N000001 HC R&B IMCU HEART CARE

## 2025-04-03 PROCEDURE — 93460 R&L HRT ART/VENTRICLE ANGIO: CPT | Mod: 26 | Performed by: INTERNAL MEDICINE

## 2025-04-03 PROCEDURE — 36415 COLL VENOUS BLD VENIPUNCTURE: CPT | Performed by: INTERNAL MEDICINE

## 2025-04-03 PROCEDURE — 93005 ELECTROCARDIOGRAM TRACING: CPT

## 2025-04-03 PROCEDURE — 80048 BASIC METABOLIC PNL TOTAL CA: CPT | Performed by: INTERNAL MEDICINE

## 2025-04-03 PROCEDURE — 99152 MOD SED SAME PHYS/QHP 5/>YRS: CPT | Performed by: INTERNAL MEDICINE

## 2025-04-03 PROCEDURE — 255N000002 HC RX 255 OP 636: Performed by: INTERNAL MEDICINE

## 2025-04-03 PROCEDURE — 82805 BLOOD GASES W/O2 SATURATION: CPT

## 2025-04-03 PROCEDURE — 93460 R&L HRT ART/VENTRICLE ANGIO: CPT | Performed by: INTERNAL MEDICINE

## 2025-04-03 PROCEDURE — B2111ZZ FLUOROSCOPY OF MULTIPLE CORONARY ARTERIES USING LOW OSMOLAR CONTRAST: ICD-10-PCS | Performed by: INTERNAL MEDICINE

## 2025-04-03 PROCEDURE — 258N000003 HC RX IP 258 OP 636: Performed by: INTERNAL MEDICINE

## 2025-04-03 PROCEDURE — 82374 ASSAY BLOOD CARBON DIOXIDE: CPT | Performed by: INTERNAL MEDICINE

## 2025-04-03 PROCEDURE — 85041 AUTOMATED RBC COUNT: CPT | Performed by: INTERNAL MEDICINE

## 2025-04-03 PROCEDURE — 250N000011 HC RX IP 250 OP 636: Performed by: INTERNAL MEDICINE

## 2025-04-03 PROCEDURE — 250N000013 HC RX MED GY IP 250 OP 250 PS 637: Performed by: INTERNAL MEDICINE

## 2025-04-03 PROCEDURE — 93010 ELECTROCARDIOGRAM REPORT: CPT | Performed by: INTERNAL MEDICINE

## 2025-04-03 PROCEDURE — C1769 GUIDE WIRE: HCPCS | Performed by: INTERNAL MEDICINE

## 2025-04-03 PROCEDURE — 250N000011 HC RX IP 250 OP 636: Performed by: NURSE PRACTITIONER

## 2025-04-03 PROCEDURE — 85014 HEMATOCRIT: CPT | Performed by: INTERNAL MEDICINE

## 2025-04-03 PROCEDURE — 99223 1ST HOSP IP/OBS HIGH 75: CPT | Mod: 25 | Performed by: INTERNAL MEDICINE

## 2025-04-03 PROCEDURE — 93005 ELECTROCARDIOGRAM TRACING: CPT | Performed by: INTERNAL MEDICINE

## 2025-04-03 PROCEDURE — 86900 BLOOD TYPING SEROLOGIC ABO: CPT | Performed by: INTERNAL MEDICINE

## 2025-04-03 PROCEDURE — C1887 CATHETER, GUIDING: HCPCS | Performed by: INTERNAL MEDICINE

## 2025-04-03 PROCEDURE — 99153 MOD SED SAME PHYS/QHP EA: CPT | Performed by: INTERNAL MEDICINE

## 2025-04-03 PROCEDURE — 250N000013 HC RX MED GY IP 250 OP 250 PS 637: Performed by: NURSE PRACTITIONER

## 2025-04-03 PROCEDURE — 272N000001 HC OR GENERAL SUPPLY STERILE: Performed by: INTERNAL MEDICINE

## 2025-04-03 PROCEDURE — 250N000009 HC RX 250: Performed by: INTERNAL MEDICINE

## 2025-04-03 PROCEDURE — 4A023N8 MEASUREMENT OF CARDIAC SAMPLING AND PRESSURE, BILATERAL, PERCUTANEOUS APPROACH: ICD-10-PCS | Performed by: INTERNAL MEDICINE

## 2025-04-03 PROCEDURE — C1894 INTRO/SHEATH, NON-LASER: HCPCS | Performed by: INTERNAL MEDICINE

## 2025-04-03 PROCEDURE — 99222 1ST HOSP IP/OBS MODERATE 55: CPT | Performed by: INTERNAL MEDICINE

## 2025-04-03 RX ORDER — SODIUM CHLORIDE 9 MG/ML
INJECTION, SOLUTION INTRAVENOUS CONTINUOUS
Status: DISCONTINUED | OUTPATIENT
Start: 2025-04-03 | End: 2025-04-03 | Stop reason: HOSPADM

## 2025-04-03 RX ORDER — IODIXANOL 320 MG/ML
INJECTION, SOLUTION INTRAVASCULAR
Status: DISCONTINUED | OUTPATIENT
Start: 2025-04-03 | End: 2025-04-03 | Stop reason: HOSPADM

## 2025-04-03 RX ORDER — NALOXONE HYDROCHLORIDE 0.4 MG/ML
0.4 INJECTION, SOLUTION INTRAMUSCULAR; INTRAVENOUS; SUBCUTANEOUS
Status: ACTIVE | OUTPATIENT
Start: 2025-04-03 | End: 2025-04-03

## 2025-04-03 RX ORDER — LIDOCAINE 40 MG/G
CREAM TOPICAL
Status: DISCONTINUED | OUTPATIENT
Start: 2025-04-03 | End: 2025-04-03 | Stop reason: HOSPADM

## 2025-04-03 RX ORDER — ONDANSETRON 2 MG/ML
INJECTION INTRAMUSCULAR; INTRAVENOUS
Status: DISCONTINUED | OUTPATIENT
Start: 2025-04-03 | End: 2025-04-03 | Stop reason: HOSPADM

## 2025-04-03 RX ORDER — FUROSEMIDE 10 MG/ML
40 INJECTION INTRAMUSCULAR; INTRAVENOUS 3 TIMES DAILY
Status: DISCONTINUED | OUTPATIENT
Start: 2025-04-03 | End: 2025-04-05

## 2025-04-03 RX ORDER — ASPIRIN 81 MG/1
243 TABLET, CHEWABLE ORAL ONCE
Status: COMPLETED | OUTPATIENT
Start: 2025-04-03 | End: 2025-04-03

## 2025-04-03 RX ORDER — OXYCODONE HYDROCHLORIDE 5 MG/1
5 TABLET ORAL EVERY 4 HOURS PRN
Status: DISCONTINUED | OUTPATIENT
Start: 2025-04-03 | End: 2025-04-09 | Stop reason: HOSPADM

## 2025-04-03 RX ORDER — FENTANYL CITRATE 50 UG/ML
INJECTION, SOLUTION INTRAMUSCULAR; INTRAVENOUS
Status: DISCONTINUED | OUTPATIENT
Start: 2025-04-03 | End: 2025-04-03 | Stop reason: HOSPADM

## 2025-04-03 RX ORDER — ACETAMINOPHEN 325 MG/1
650 TABLET ORAL EVERY 4 HOURS PRN
Status: DISCONTINUED | OUTPATIENT
Start: 2025-04-03 | End: 2025-04-09 | Stop reason: HOSPADM

## 2025-04-03 RX ORDER — FLUMAZENIL 0.1 MG/ML
0.2 INJECTION, SOLUTION INTRAVENOUS
Status: ACTIVE | OUTPATIENT
Start: 2025-04-03 | End: 2025-04-03

## 2025-04-03 RX ORDER — ATROPINE SULFATE 0.1 MG/ML
0.5 INJECTION INTRAVENOUS
Status: ACTIVE | OUTPATIENT
Start: 2025-04-03 | End: 2025-04-03

## 2025-04-03 RX ORDER — METOPROLOL SUCCINATE 50 MG/1
50 TABLET, EXTENDED RELEASE ORAL DAILY
Status: DISCONTINUED | OUTPATIENT
Start: 2025-04-04 | End: 2025-04-09 | Stop reason: HOSPADM

## 2025-04-03 RX ORDER — NALOXONE HYDROCHLORIDE 0.4 MG/ML
0.2 INJECTION, SOLUTION INTRAMUSCULAR; INTRAVENOUS; SUBCUTANEOUS
Status: ACTIVE | OUTPATIENT
Start: 2025-04-03 | End: 2025-04-03

## 2025-04-03 RX ORDER — ASPIRIN 325 MG
325 TABLET ORAL ONCE
Status: COMPLETED | OUTPATIENT
Start: 2025-04-03 | End: 2025-04-03

## 2025-04-03 RX ORDER — OXYCODONE HYDROCHLORIDE 5 MG/1
10 TABLET ORAL EVERY 4 HOURS PRN
Status: DISCONTINUED | OUTPATIENT
Start: 2025-04-03 | End: 2025-04-09 | Stop reason: HOSPADM

## 2025-04-03 RX ORDER — FENTANYL CITRATE 50 UG/ML
25 INJECTION, SOLUTION INTRAMUSCULAR; INTRAVENOUS
Status: DISCONTINUED | OUTPATIENT
Start: 2025-04-03 | End: 2025-04-03

## 2025-04-03 RX ORDER — HYDRALAZINE HYDROCHLORIDE 20 MG/ML
10 INJECTION INTRAMUSCULAR; INTRAVENOUS
Status: DISCONTINUED | OUTPATIENT
Start: 2025-04-03 | End: 2025-04-09 | Stop reason: HOSPADM

## 2025-04-03 RX ORDER — FUROSEMIDE 10 MG/ML
INJECTION INTRAMUSCULAR; INTRAVENOUS
Status: DISCONTINUED | OUTPATIENT
Start: 2025-04-03 | End: 2025-04-03 | Stop reason: HOSPADM

## 2025-04-03 RX ORDER — FENTANYL CITRATE 50 UG/ML
25 INJECTION, SOLUTION INTRAMUSCULAR; INTRAVENOUS
Status: DISCONTINUED | OUTPATIENT
Start: 2025-04-03 | End: 2025-04-03 | Stop reason: HOSPADM

## 2025-04-03 RX ORDER — ATORVASTATIN CALCIUM 10 MG/1
10 TABLET, FILM COATED ORAL EVERY MORNING
Status: DISCONTINUED | OUTPATIENT
Start: 2025-04-04 | End: 2025-04-09 | Stop reason: HOSPADM

## 2025-04-03 RX ORDER — HEPARIN SODIUM 1000 [USP'U]/ML
INJECTION, SOLUTION INTRAVENOUS; SUBCUTANEOUS
Status: DISCONTINUED | OUTPATIENT
Start: 2025-04-03 | End: 2025-04-03 | Stop reason: HOSPADM

## 2025-04-03 RX ADMIN — SODIUM CHLORIDE 150 ML/HR: 0.9 INJECTION, SOLUTION INTRAVENOUS at 10:31

## 2025-04-03 RX ADMIN — PROCHLORPERAZINE EDISYLATE 5 MG: 5 INJECTION INTRAMUSCULAR; INTRAVENOUS at 13:26

## 2025-04-03 RX ADMIN — FUROSEMIDE 40 MG: 10 INJECTION, SOLUTION INTRAMUSCULAR; INTRAVENOUS at 19:50

## 2025-04-03 RX ADMIN — APIXABAN 2.5 MG: 2.5 TABLET, FILM COATED ORAL at 19:50

## 2025-04-03 RX ADMIN — ASPIRIN 325 MG: 325 TABLET ORAL at 11:08

## 2025-04-03 ASSESSMENT — EJECTION FRACTION: EF_VALUE: .3

## 2025-04-03 ASSESSMENT — ACTIVITIES OF DAILY LIVING (ADL)
ADLS_ACUITY_SCORE: 59
ADLS_ACUITY_SCORE: 64
ADLS_ACUITY_SCORE: 64
ADLS_ACUITY_SCORE: 59
ADLS_ACUITY_SCORE: 64
ADLS_ACUITY_SCORE: 59
ADLS_ACUITY_SCORE: 64
ADLS_ACUITY_SCORE: 64
ADLS_ACUITY_SCORE: 59
ADLS_ACUITY_SCORE: 59

## 2025-04-03 NOTE — CONSULTS
[facility] Structural Heart       [facility] HEART CARE   [address], [place] [address]   [email or web address]   OFFICE: [phone]     IMPRESSION:  Moderate to severe, low-flow, low gradient aortic valve stenosis (PV 2.9 m/s, MG 21 mmHg, and calculated valve area of 0.6 cm .  DI 0.22.  SVI 28 mL/m ). Invasive mean gradient 9 mmHg, calculated valve area of 0.7 cm .   Functional Capacity:  NYHA class III, CCS class 0   Decompensated acute on chronic heart failure with reduced ejection fraction (severely elevated filling pressures with low cardiac output/index).   Severe pulmonary hypertension, mixed with large postcapillary competent   Paroxysmal atrial fibrillation on therapeutic anticoagulation  Advanced conduction disease post dual-chamber pacemaker  Chronic kidney disease, stage III  Hyperlipidemia  Vascular dementia         SUMMARY OF RECOMMENDATIONS: We had a long discussion with  and her cousin guarding natural progression and therapeutic options of calcific aortic valve disease.  Severity of her aortic valve stenosis is unclear (suspect severe), we will plan for a dobutamine echocardiogram once heart failure improves.  In the meantime we will admit to inpatient for IV diuresis.  Will also benefit from a palliative care consult given advanced dementia.      History of present illness: 84-year-old woman who presented for coronary angiogram in the setting of preoperative evaluation for aortic valve disease.  Found to have acute on chronic decompensated heart failure with low output.  History difficult to complaint given patient's dementia.  Reports dyspnea on exertion.The patient denies symptoms of chest pain,palpitations, dizziness, lightheadedness, presyncope, syncope, orthopnea, PND, early satiety/abdominal bloating, lower extremity edema, stroke like symptoms, or claudication.       ROS:  A 14 point review of symptoms was reviewed.  No prior hx of  rheumatic fever or chest irradiation. There were no additional findings pertinent to this encounter.     Past Medical History: as above     Social History: denies tobacco and alcohol use     Family History: no early family hx of CAD or SCD    Allergies: reviewed   No Known Allergies    Pertinent Medications:    Current Facility-Administered Medications   Medication Dose Route Frequency Provider Last Rate Last Admin    acetaminophen (TYLENOL) tablet 650 mg  650 mg Oral Q4H PRN Diane Hudson CNP        apixaban ANTICOAGULANT (ELIQUIS) tablet 2.5 mg  2.5 mg Oral BID Diane Hudson CNP        [START ON 4/4/2025] atorvastatin (LIPITOR) tablet 10 mg  10 mg Oral QAM Diane Hudson CNP        atropine injection 0.5 mg  0.5 mg Intravenous Once PRN Diane Hudson CNP        fentaNYL (PF) (SUBLIMAZE) injection 25 mcg  25 mcg Intravenous Q15 Min PRN Diane Hudson CNP        flumazenil (ROMAZICON) injection 0.2 mg  0.2 mg Intravenous q1 min prn Diane Hudson CNP        furosemide (LASIX) injection 40 mg  40 mg Intravenous TID Diane Hudson CNP        HOLD:  Metformin and metformin containing medications if patient received IV contrast with acute kidney injury or severe chronic kidney disease (stage IV or stage V; i.e., eGFR less than 30)   Does not apply HOLD Diane Hudson CNP        hydrALAZINE (APRESOLINE) injection 10 mg  10 mg Intravenous Once PRN Diane Hudson CNP        [START ON 4/4/2025] metoprolol succinate ER (TOPROL XL) 24 hr tablet 50 mg  50 mg Oral Daily Diane Hudson CNP        midazolam (VERSED) injection 0.5 mg  0.5 mg Intravenous Q5 Min PRN Diane Hudson CNP        naloxone (NARCAN) injection 0.2 mg  0.2 mg Intravenous Q2 Min PRN Diane Hudson CNP        Or    naloxone (NARCAN) injection 0.4 mg  0.4 mg Intravenous Q2 Min PRN Diane Hudson CNP        Or    naloxone (NARCAN)  injection 0.2 mg  0.2 mg Intramuscular Q2 Min PRN Kalamitsiotis, Diane C, CNP        Or    naloxone (NARCAN) injection 0.4 mg  0.4 mg Intramuscular Q2 Min PRN Kalamitsiotis, Diane C, CNP        oxyCODONE (ROXICODONE) tablet 5 mg  5 mg Oral Q4H PRN Kalamitsiotis, Diane C, CNP        Or    oxyCODONE (ROXICODONE) tablet 10 mg  10 mg Oral Q4H PRN Gilmeramitsiotis, Diane C, CNP        sodium chloride 0.9% BOLUS 250 mL  250 mL Intravenous Once PRN Kalamitsiotis, Diane C, CNP           OBJECTIVE:  Physical Examination   BP (!) 149/79 (BP Location: Left arm)   Pulse 70   Temp 97.7  F (36.5  C) (Oral)   Resp 15   LMP  (LMP Unknown)   SpO2 95%    Constitutional:  Appears somewhat frail, no distress, no pallor.   Cognitive: Alert, oriented x 3, Appropriate speech, sometimes tangential   Chest:  bibasilar crackles  Cardiac: regular rhythm, normal S1/S2, mid-late peaking systolic murmur. JVP elevated.   Abdoment:  Soft, non-tender  Pulses: 2+ distal pulses.  No carotid bruits  Skin and Integument:  +1 peripheral edema, extremities warm  Neuro:  Grossly normal for motor, sensory.      Studies:     Laboratory performed April 2025 and reviewed by me personally showed: Scr 1.23, Hgb 12.7, and plt 192       Electrocardiogram performed today and reviewed by me personally showed: AV dual paced rhythm     TTE performed December 2024 and reviewed by me personally showed: Normal LV size with moderately reduced LV function, estimated EF 30 to 35%, normal RV size with mildly reduced RV function, mild mitral stenosis, and moderate to severe low-flow, low gradient aortic valve stenosis with PV 2.9 m/s, MG 21 mmHg, and calculated valve area of 0.6 cm .  DI 0.22.  SVI 28 mL/m .    Cath performed today and reviewed by me personally showed:   No angiographic evidence of obstructive coronary artery disease.  Severely elevated right and left-sided filling pressures.  Severe pulmonary hypertension, mixed etiology with large component from  postcapillary  Low cardiac output and index.  Low flow, low gradient moderate to severe aortic valve stenosis (mean gradient 9 mmHg, calculated valve area of 0.7 cm ).    TAVR CT pending.         I sincerely appreciate the opportunity to participate in this patient's care.  Please do not hesitate to contact me if any questions or concerns arise.     Tyrese Dillon MD  Interventional Cardiology

## 2025-04-03 NOTE — TELEPHONE ENCOUNTER
Spoke to Rosana and Janette. Pat had some water this AM but it was before 7 AM. We discussed no concern or worries, please plan to come in today for her angiogram.     Dahiana Villatoro RN on 4/3/2025 at 9:19 AM

## 2025-04-03 NOTE — INTERVAL H&P NOTE
I have reviewed the surgical (or preoperative) H&P that is linked to this encounter, and examined the patient. There are no significant changes    Clinical Conditions Present on Arrival:  Clinically Significant Risk Factors Present on Admission          # Hyperchloremia: Highest Cl = 112 mmol/L in last 30 days, will monitor as appropriate       # Hypocalcemia: Lowest Ca = 8.3 mg/dL in last 30 days, will monitor and replace as appropriate      # Drug Induced Coagulation Defect: home medication list includes an anticoagulant medication

## 2025-04-03 NOTE — PHARMACY-ADMISSION MEDICATION HISTORY
Pharmacist Admission Medication History    Admission medication history is complete. The information provided in this note is only as accurate as the sources available at the time of the update.    Information Source(s): Caregiver, Facility (TCU/NH/) medication list/MAR, and CareEverywhere/SureScripts via phone    Pertinent Information: Patient's assisted living facility was contacted over the phone to confirm current medications and last known administration times (Saumya PICKERING, [phone])     Changes made to PTA medication list:  Added: None  Deleted: None  Changed: None    Allergies reviewed with patient and updates made in EHR: yes    Medication History Completed By: Dano Rodriguez [facility] 4/3/2025 1:07 PM    PTA Med List   Medication Sig Last Dose/Taking    acetaminophen (TYLENOL) 500 MG tablet Take 1,000 mg by mouth every 8 hours as needed for pain. Taking As Needed    apixaban ANTICOAGULANT (ELIQUIS ANTICOAGULANT) 2.5 MG tablet Take 1 tablet (2.5 mg) by mouth 2 times daily. Due for appointment with Rose Mcelroy 3/31/2025    atorvastatin (LIPITOR) 10 MG tablet Take 1 tablet (10 mg) by mouth every morning. 4/2/2025 Morning    furosemide (LASIX) 20 MG tablet Take 0.5 tablets (10 mg) by mouth daily Due for appointment with Rose Mcelroy 4/2/2025 Morning    metoprolol succinate ER (TOPROL XL) 50 MG 24 hr tablet Take 1 tablet (50 mg) by mouth daily 4/3/2025 Morning

## 2025-04-03 NOTE — PROGRESS NOTES
Pt stable post angiogram. N/V relieved after compazine. Pt continues to be forgetful, which is baseline with her mild dementia. Pt has not understood voiding in bed with purewick, was found out of bed standing in large void of urine. Railings were up, call light in reach. Pt's cousin Rosana Scott is present, Dr. Dillon has spoken to pt and Rosana Scott. Right radial site ecchymotic from initial leaking with cath lab. Deflation in process w/o further leaking. Pt uses walker, unable to follow thru on protecting right wrist, needing frequent reminders. Rt fem venous site fine. Pt having good response to Lasix, multiple urgent trips to Bathroom as pt refuses bedpan/purewick.

## 2025-04-03 NOTE — CONSULTS
[facility]  Consult Note - Hospitalist Service  Date of Admission:  4/3/2025  Consult Requested by: Maryse Anderson CNP   Reason for Consult: Post op medical management    Assessment & Plan   Laney Hahn is a 84 year old female with PMH CKD III, PAF, hypertension, pacemaker, hyperlipidemia, dementia, aortic stenosis, and HFrEF is admitted on 4/3/2025 for planned elective coronary angiogram and right heart catheterization for pre-TAVR workup.  Patient admitted for IV diuresis.Workup for pre TAVR will resume once heart failure improves. [facility] consulted to assist with medical management.     S/p coronary angiogram  Aortic stenosis-pre TAVR workup  -Findings of angiogram-No angiographic evidence of obstructive coronary artery disease.  Severely elevated right and left-sided filling pressures.  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  Low flow, low gradient moderate to severe aortic valve stenosis  -Patient wrist and groin sites are CDI without hematomas. Patient denies numbness or tingling to extremities. CMS intact.   -PTA apixaban  -Continuous cardiac monitoring    HFrEF  -Echocardiogram 12/23/2024-EF 30-35%.  Severe low-flow, low gradient aortic stenosis  -IV diuresis with Lasix per cardiology  -PTA metoprolol    Hyperlipidemia  -PTA atorvastatin    Dementia  -1:1 monitoring as patient is impulsive, but redirectable        The patient's care was discussed with the Attending Physician, Dr. Krishnan .    Clinically Significant Risk Factors Present on Admission          # Hyperchloremia: Highest Cl = 108 mmol/L in last 2 days, will monitor as appropriate           # Drug Induced Coagulation Defect: home medication list includes an anticoagulant medication    # Hypertension: Noted on problem list  # Chronic heart failure with reduced ejection fraction: last echo with EF <40%    # Acute Hypercapnic Respiratory  Failure: based on venous blood gas results.  Continue supplemental oxygen and ventilatory support as indicated.  # Dementia: noted on problem list           # Financial/Environmental Concerns:     # Pacemaker present       Vilma Baptiste NP  Hospitalist Service  Securely message with [employer] (more info)  Text page via [employer] Paging/Directory   ______________________________________________________________________    Chief Complaint       History of Present Illness   Laney Hahn is a 84 year old female who is admitted for elective coronary angiogram for pre TAVR workup and admitted for further IV diuresis.      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 Nini Rasmussen    Metabolic encephalopathy     Moderate aortic valve stenosis 08/22/2017    stable on 2020 echo    Nonobstructive atherosclerosis of coronary artery 09/15/2020    with normal LVEDP    Paroxysmal SVT (supraventricular tachycardia) 09/07/2017    said to have short episodes while hospitalized for UTI per Dr. Rhys Mensah    Syncope 08/22/2017    UTI (urinary tract infection) 08/21/2017    hospitalized with weakness       Past Surgical History   Past Surgical History:   Procedure Laterality Date    CATARACT EXTRACTION, BILATERAL      CV CORONARY ANGIOGRAM N/A 9/15/2020    Procedure: Coronary Angiogram;  Surgeon: Radhika Santa MD;  Location: [facility] Cath Lab;  Service: Cardiology    CV LEFT HEART CATHETERIZATION WITHOUT LEFT VENTRICULOGRAM Left 9/15/2020    Procedure: Left Heart Catheterization Without Left Ventriculogram;  Surgeon: Radhika Santa MD;  Location: [facility] Cath Lab;  Service: Cardiology    EP PACEMAKER  INSERT N/A 2021    Procedure: EP Pacemaker Insertion;  Surgeon: Frederic Burgos MD;  Location: [facility] Cardiac Cath Lab;  Service: Cardiology    HYSTERECTOMY      PARTIAL GASTRECTOMY      for ulcers    TONSILLECTOMY         Medications   I have reviewed this patient's current medications       Review of Systems    The 10 point Review of Systems is negative other than noted in the HPI or here.      Physical Exam   Vital Signs: Temp: 97.7  F (36.5  C) Temp src: Oral BP: (!) 151/72 Pulse: 94   Resp: 15 SpO2: 96 % O2 Device: None (Room air)    Weight: 0 lbs 0 oz    Constitutional: awake, alert, cooperative, no apparent distress, and appears stated age  Respiratory: No increased work of breathing, good air exchange, clear to auscultation bilaterally, no crackles or wheezing  Cardiovascular: Normal apical impulse, regular rate and rhythm, normal S1 and S2, no S3 or S4, and no murmur noted  GI: No scars, normal bowel sounds, soft, non-distended, non-tender, no masses palpated, no hepatosplenomegally    Medical Decision Making       45 MINUTES SPENT BY ME on the date of service doing chart review, history, exam, documentation & further activities per the note.      Data     I have personally reviewed the following data over the past 24 hrs:    5.0  \   12.7   / 192     143 108 (H) 17.6 /  114 (H)   4.7 29 1.23 (H) \       Imaging results reviewed over the past 24 hrs:   Recent Results (from the past 24 hours)   Cardiac Catheterization    Narrative    CARDIAC CATHETERIZATION AND INTERVENTION REPORT    PATIENT NAME:  Laney Hahn          MEDICAL RECORD NUMBER: [id number]  : 1940       PROCEDURE DATE: April 3, 2025        CONCLUSIONS:   No angiographic evidence of obstructive coronary artery disease.  Severely elevated right and left-sided filling pressures.  Severe pulmonary hypertension, mixed etiology with large component from   postcapillary  Low cardiac output and index.  Low flow, low  gradient moderate to severe aortic valve stenosis (mean   gradient 9 mmHg, calculated valve area of 0.7 cm ).      RECOMMENDATIONS:   Postprocedure cares, please see orders.  Will admit for IV diuresis and heart failure optimization.  Continue workup for aortic valve stenosis as planned including dobutamine   stress echocardiogram once heart failure improved.    PROCEDURE PERFORMED:   Selective right and left coronary angiography  Right heart catheterization  Left heart catheterization    PRE-OP DIAGNOSIS:  Aortic valve disease    POST-OP DIAGNOSIS:   Aortic valve disease    PROCEDURALISTS: Tyrese Dillon MD      DIAGNOSTIC PROCEDURAL DETAILS:   Signed, informed consent was obtained. The patient was placed on the table   and prepped and draped in the usual fashion. Time out and site   verification performed.   Access: Right radial artery, right common femoral vein using ultrasound   guidance and micropuncture technique  Catheters: JL 3.5, JR4, dual-lumen pigtail, [place]  Hemostasis: TR band for right radial artery, manual compression for right   femoral vein    PROCEDURAL MEDICATIONS:  Conscious sedation - midazolam and fentanyl, please see procedure log for   dosing.   Local anesthesia - 1% lidocaine   Procedural anticoagulation - 75 units/kg of heparin     CONTRAST VOLUME: 40 mL Visipaque  BLOOD LOSS: minimal   FLUIDS: None   SPECIMENS: None  COMPLICATIONS: None    FINDINGS:    Right Heart Catheterization         RA: 18 mmHg         RV: 64/13/21 mmHg (s/d/ed)         PA: 64/41/52 mmHg (s/d/m)         PCWP: 30 mmHg          CO 2.18 L/min ([place])  CI:  1.34 L/min/m2             Left Heart Catheterization    LVEDP 32 mmHg  LV-AO peak to peak 7-8 mmHg with mean gradient 9 mmHg.  Calculated valve   area 0.7 cm .    Coronary Angiography:  LEFT MAIN: Normal caliber vessel that bifurcates into left anterior   descending and left circumflex arteries.  The left main has mild luminal   irregularities.  LEFT ANTERIOR  DESCENDING: Large vessel that gives rise to a large diagonal   branch and multiple septal perforators.  The LAD wraps around the apex   distally.  The LAD and its branches have mild luminal irregularities.  LEFT CIRCUMFLEX: Codominant vessel that gives rise to a  OM branch and   terminates into the posterolateral system.  The left circumflex and its   branches have mild luminal irregularities.  RIGHT CORONARY ARTERY: Codominant system with mild luminal irregularities.    Please do not hesitate to contact me if you have any questions or   concerns. I was present for the procedure in its entirety. Moderate   conscious sedation at level 3-4 with fentanyl and midazolam was   administered, and I spent continuous face to face time with the patient   which encompassed the duration of the procedure.  Please refer to the   sedation flow sheet for additional information.  I have reviewed and agree   with the documentation provided in the RN sedation flow sheet as outlined.   I concluded sedation and recovery was monitored by the trained independent   observer. I attest that I have ordered all medications administered,   equipment used, and tests performed during the procedure.    Tyrese Dillon MD  Interventional Cardiology

## 2025-04-03 NOTE — PRE-PROCEDURE
GENERAL PRE-PROCEDURE:   Procedure:  Coronary angiogram with possible PCI, right heart catheterization  Date/Time:  4/3/2025 10:28 AM    Written consent obtained?: Yes    Risks and benefits: Risks, benefits and alternatives were discussed    Consent given by:  Patient (Patient's cousin Rosana Scott present at bedside)  Patient states understanding of procedure being performed: Yes    Patient's understanding of procedure matches consent: Yes    Procedure consent matches procedure scheduled: Yes    Expected level of sedation:  Moderate  Appropriately NPO:  Yes  ASA Class:  3 (severe AS, HTN, HLD, PAF; on NOAC, SND; s/p PPM in situ, CKD Stage IIIb, dementia)  Mallampati  :  Grade 3- soft palate visible, posterior pharyngeal wall not visible  Lungs:  Lungs clear with good breath sounds bilaterally  Heart:  Systolic murmur  History & Physical reviewed:  History and physical reviewed and updates made (see comment)  H&P Comments:  Clinically Significant Risk Factors Present on Admission    Cardiovascular : severe AS, HTN, HLD, PAF; on NOAC, SND; s/p PPM in situ    Fluid & Electrolyte Disorders : Not present on admission    Gastroenterology : Not present on admission    Hematology/Oncology : Not present on admission    Nephrology : CKD Stage IIIb; stable, will minimize contrast administration, IV hydration per protocol    Neurology : Dementia; resides in assisted living     Pulmonology : Not present on admission    Systemic : Not present on admission    Statement of review:  I have reviewed the lab findings, diagnostic data, medications, and the plan for sedation

## 2025-04-03 NOTE — TELEPHONE ENCOUNTER
Health Call Center    Phone Message    May a detailed message be left on voicemail: no     Reason for Call: Other: Gabriella calling to find out if ok for patient to have ice water before Coronary Angiogram. Gabriella would like us to call Rosana if patient needs to be rescheduled. Please call Rosana at  [phone]. Thank you       Action Taken: Other: cardiology     Travel Screening: Not Applicable     Date of Service:

## 2025-04-03 NOTE — PROGRESS NOTES
Brief CV progress:     Laney Hahn is an 84 year old WF with past medical history of severe aortic stenosis, HTN, HLD, PAF; on NOAC, SND; s/p PPM in situ, CKD Stage IIIb and dementia who presented for elective coronary angiogram and right heart catheterization as part of pre-TAVR work up. Patient reported to have severely elevated filling pressures during heart catheterization today. Case discussed with Dr. Dillon. Will give Lasix 40mg IVP x1 now. Will admit to telemetry for diagnosis of decompensated heart failure with scheduled IV diuretic regimen and close monitoring of renal function and electrolytes. Spoke with Dr. Cronin who will assume care in AM. Hospitalist medicine consultation for non-cardiac medical problems; assistance appreciated.    Thank you for the opportunity to participate in the care of this patient

## 2025-04-03 NOTE — PROGRESS NOTES
Pt found to be out of bed standing in urine all over the floor. Pt had purewick in place, siderails up, call light in reach. Pt has known mild dementia. Aware she is in hospital, but very forgetful. Right groin site intact.

## 2025-04-04 LAB
ANION GAP SERPL CALCULATED.3IONS-SCNC: 10 MMOL/L (ref 7–15)
BUN SERPL-MCNC: 22.8 MG/DL (ref 8–23)
CALCIUM SERPL-MCNC: 9 MG/DL (ref 8.8–10.4)
CHLORIDE SERPL-SCNC: 100 MMOL/L (ref 98–107)
CREAT SERPL-MCNC: 1.54 MG/DL (ref 0.51–0.95)
EGFRCR SERPLBLD CKD-EPI 2021: 33 ML/MIN/1.73M2
GLUCOSE SERPL-MCNC: 147 MG/DL (ref 70–99)
HCO3 SERPL-SCNC: 31 MMOL/L (ref 22–29)
NT-PROBNP SERPL-MCNC: 6914 PG/ML (ref 0–1800)
POTASSIUM SERPL-SCNC: 4.5 MMOL/L (ref 3.4–5.3)
SODIUM SERPL-SCNC: 141 MMOL/L (ref 135–145)

## 2025-04-04 PROCEDURE — 250N000013 HC RX MED GY IP 250 OP 250 PS 637: Performed by: NURSE PRACTITIONER

## 2025-04-04 PROCEDURE — 36415 COLL VENOUS BLD VENIPUNCTURE: CPT | Performed by: NURSE PRACTITIONER

## 2025-04-04 PROCEDURE — 250N000011 HC RX IP 250 OP 636: Performed by: INTERNAL MEDICINE

## 2025-04-04 PROCEDURE — 80048 BASIC METABOLIC PNL TOTAL CA: CPT | Performed by: NURSE PRACTITIONER

## 2025-04-04 PROCEDURE — P9047 ALBUMIN (HUMAN), 25%, 50ML: HCPCS | Performed by: INTERNAL MEDICINE

## 2025-04-04 PROCEDURE — 99223 1ST HOSP IP/OBS HIGH 75: CPT | Performed by: INTERNAL MEDICINE

## 2025-04-04 PROCEDURE — 210N000001 HC R&B IMCU HEART CARE

## 2025-04-04 PROCEDURE — 83880 ASSAY OF NATRIURETIC PEPTIDE: CPT | Performed by: INTERNAL MEDICINE

## 2025-04-04 PROCEDURE — 84520 ASSAY OF UREA NITROGEN: CPT | Performed by: NURSE PRACTITIONER

## 2025-04-04 PROCEDURE — 250N000011 HC RX IP 250 OP 636: Performed by: NURSE PRACTITIONER

## 2025-04-04 PROCEDURE — 99232 SBSQ HOSP IP/OBS MODERATE 35: CPT | Performed by: INTERNAL MEDICINE

## 2025-04-04 RX ORDER — ALBUMIN (HUMAN) 12.5 G/50ML
50 SOLUTION INTRAVENOUS ONCE
Status: COMPLETED | OUTPATIENT
Start: 2025-04-04 | End: 2025-04-04

## 2025-04-04 RX ADMIN — FUROSEMIDE 40 MG: 10 INJECTION, SOLUTION INTRAMUSCULAR; INTRAVENOUS at 07:41

## 2025-04-04 RX ADMIN — APIXABAN 2.5 MG: 2.5 TABLET, FILM COATED ORAL at 19:28

## 2025-04-04 RX ADMIN — ALBUMIN HUMAN 50 G: 0.25 SOLUTION INTRAVENOUS at 12:39

## 2025-04-04 RX ADMIN — APIXABAN 2.5 MG: 2.5 TABLET, FILM COATED ORAL at 07:41

## 2025-04-04 RX ADMIN — FUROSEMIDE 40 MG: 10 INJECTION, SOLUTION INTRAMUSCULAR; INTRAVENOUS at 19:27

## 2025-04-04 RX ADMIN — METOPROLOL SUCCINATE 50 MG: 50 TABLET, EXTENDED RELEASE ORAL at 07:41

## 2025-04-04 RX ADMIN — FUROSEMIDE 40 MG: 10 INJECTION, SOLUTION INTRAMUSCULAR; INTRAVENOUS at 13:17

## 2025-04-04 RX ADMIN — ATORVASTATIN CALCIUM 10 MG: 10 TABLET, FILM COATED ORAL at 07:41

## 2025-04-04 ASSESSMENT — ACTIVITIES OF DAILY LIVING (ADL)
ADLS_ACUITY_SCORE: 65
ADLS_ACUITY_SCORE: 65
ADLS_ACUITY_SCORE: 60
ADLS_ACUITY_SCORE: 64
ADLS_ACUITY_SCORE: 65
ADLS_ACUITY_SCORE: 64
ADLS_ACUITY_SCORE: 65
ADLS_ACUITY_SCORE: 65
ADLS_ACUITY_SCORE: 60
ADLS_ACUITY_SCORE: 65
ADLS_ACUITY_SCORE: 60
ADLS_ACUITY_SCORE: 65
ADLS_ACUITY_SCORE: 60

## 2025-04-04 NOTE — PLAN OF CARE
Problem: Cardiac Catheterization (Diagnostic/Interventional)  Goal: Absence of Bleeding  4/4/2025 0526 by Genny Mak RN  Outcome: Progressing  4/4/2025 0525 by Genny Mak RN  Outcome: Progressing  Goal: Stable Heart Rate and Rhythm  4/4/2025 0526 by Genny Mak RN  Outcome: Progressing  4/4/2025 0525 by Genny Mak RN  Outcome: Progressing  Goal: Anesthesia/Sedation Recovery  Intervention: Optimize Anesthesia Recovery  Recent Flowsheet Documentation  Taken 4/4/2025 0317 by Genny Mak RN  Safety Promotion/Fall Prevention:   activity supervised   bedside attendant   increased rounding and observation   room door open   toileting scheduled  Taken 4/4/2025 0007 by Genny Mak RN  Safety Promotion/Fall Prevention:   activity supervised   bedside attendant   increased rounding and observation   room door open   toileting scheduled  Goal: Optimal Pain Control and Function  4/4/2025 0526 by Genny Mak RN  Outcome: Progressing  4/4/2025 0525 by Genny Mak RN  Outcome: Progressing  Goal: Absence of Vascular Access Complication  4/4/2025 0526 by Genny Mak RN  Outcome: Progressing  4/4/2025 0525 by Genny Mak RN  Outcome: Progressing  Intervention: Prevent and Manage Access Complications  Recent Flowsheet Documentation  Taken 4/4/2025 0317 by Genny Mak RN  Activity Management: activity adjusted per tolerance  Taken 4/4/2025 0007 by Genny Mak RN  Activity Management: activity adjusted per tolerance     Problem: Confusion Chronic  Goal: Optimal Cognitive Function  Outcome: Progressing  Intervention: Minimize Injury Risk and Provide Safety  Recent Flowsheet Documentation  Taken 4/4/2025 0317 by Genny Mak RN  Enhanced Safety Measures: pain management  Taken 4/4/2025 0007 by Genny Mak RN  Enhanced Safety Measures: pain management     Goal Outcome Evaluation:         Patient had right heart catheterization yesterday via right radial and right femoral. Right  radial has minimal bruising, right groin without complication. Denied any pain. Patient confused to place, time and situation but redirectable. Falls precaution in place.

## 2025-04-04 NOTE — PLAN OF CARE
Goal Outcome Evaluation:               Heart Failure Care Map  GOALS TO BE MET BEFORE DISCHARGE:    1. Decrease congestion and/or edema with diuretic therapy to achieve near optimal volume status.     Dyspnea improved: Yes, satisfactory for discharge.   Edema improved: Yes, satisfactory for discharge.        Last 24 hour I/O:   Intake/Output Summary (Last 24 hours) at 4/4/2025 1419  Last data filed at 4/4/2025 1239  Gross per 24 hour   Intake 240 ml   Output 1550 ml   Net -1310 ml           Net I/O and Weights since admission:   03/05 1500 - 04/04 1459  In: 240 [P.O.:240]  Out: 1550 [Urine:1550]  Net: -1310     Vitals:    04/04/25 0312   Weight: 53.8 kg (118 lb 11.2 oz)       2.  O2 sats > 90% on room air, or at prior home O2 therapy level.      Able to wean O2 this shift to keep sats above 90%?: Yes, satisfactory for discharge.   Does patient use Home O2? No          Current oxygenation status:   SpO2: 92 %     O2 Device: None (Room air),      3.  Tolerates ambulation and mobility near baseline.     Ambulation: Yes, satisfactory for discharge.   Times patient ambulated with staff this shift: 2    Please review the Heart Failure Care Map for additional HF goal outcomes.    Alert/confused, denies shortness of breath voiding good amounts, received albumin and lasix per orders    Bess Mann RN  4/4/2025

## 2025-04-04 NOTE — CONSULTS
HEART CARE NOTE        Thank you, Ariella Cardona for asking the [facility] Heart Care team to see Laney Hahn to evaluate ADHF.      Assessment/Recommendations     1. HFrEF c/b severe ADHF  Assessment / Plan  Diuresing well on current regimen - no changes at this time; continue to monitor UOP and renal function closely   Patient is high risk for adverse cardiac events 2/2 advanced age, frailty, valvular heart disease, systolic dysfunction    Current Pharmacotherapy AHA Guideline-Directed Medical Therapy   Lisinopril not started Lisinopril 20 mg twice daily   Metoprolol succinate 50 mg daily Carvedilol 25 mg twice daily   Spironolactone - not started Spironolactone 25 mg once daily   Hydralazine NA Hydralazine 100 mg three times daily   Isosorbide dinitrate NA Isosorbide dinitrate 40 mg three times daily   SGLT2 inhibitor:Dapagliflozin/Empagliflozin - not started Dapagliflozin or Empagliflozin 10 mg daily     2. Valvular heart disease  Assessment / Plan  Severe aortic stenosis; TAVR eval underway; Structural Interventional Cardiology following along. Please contact their team directly with any questions or concerns regarding TAVR    3. JESUS on CKD stage 3  Assessment / Plan  CRS; diuresis as above; continue to monitor UOP and renal function closey    4. HTN  Assessment / Plan  Adequate control - no additional recommendations as this time    5. Afib  Assessment / Plan  Paroxysmal; currently on apixaban  S/p PPM    6. HLP  Assessment / Plan  Currently on atorvastatin    Clinically Significant Risk Factors Present on Admission          # Hyperchloremia: Highest Cl = 108 mmol/L in last 2 days, will monitor as appropriate           # Drug Induced Coagulation Defect: home medication list includes an anticoagulant medication    # Hypertension: Noted on problem list  # Acute heart failure with reduced ejection fraction: last echo with EF <40% and receiving IV diuretics    # Acute Hypercapnic Respiratory Failure:  based on venous blood gas results.  Continue supplemental oxygen and ventilatory support as indicated.  # Dementia: noted on problem list           # Financial/Environmental Concerns:     # Pacemaker present      Cardiac Arrhythmia: Atrial fibrillation: Longstanding  Cardiomyopathy  Non-Rheumatic Valve Disease: Aortic valve stenosis  Systolic acute    Fluid overload, unspecified, Other fluid overload, and Other disorders of electrolyte and fluid balance, not elsewhere classified    Acute kidney failure, unspecified  CKD POA List: Stage 3b (GFR 30-44)    85 minutes spent reviewing prior records (including documentation, laboratory studies, cardiac testing/imaging), history and physical exam, planning, and subsequent documentation.    History of Present Illness/Subjective    Ms. Laney Hahn is a 84 year old female with a PMHx significant for (per Epic notation) CKD III, PAF, hypertension, pacemaker, hyperlipidemia, dementia, aortic stenosis, and HFrEF is admitted on 4/3/2025 for planned elective coronary angiogram and right heart catheterization for pre-TAVR workup.  Patient admitted for IV diuresis.Workup for pre TAVR will resume once heart failure improves    Today, Mrs. Hahn denies acute cardiac events or complaints; Management plan as detailed above    ECG: Personally reviewed. Paced rhythm.    ECHO (personnaly Reviewed on 4/4/25):   The left ventricle is normal in size with normal left ventricular wall  thickness.  Left ventricular function is decreased. The ejection fraction is 30-35%  (moderately reduced).  The right ventricle is normal size with mildly decreased right ventricular  systolic function  The left atrium is mildly dilated.  Severe low flow, low gradient aortic stenosis is present with a peak velocity  of 2.9 m/s, mean gradient 21 mmHg, SVi 28 ml/m2, LANNY 0.6 cm2, DI 0.22.  Mild calcific mitral stenosis is present.  Compared to the prior study of 9/12/22, the degree of aortic stenosis  has  advanced.     Hemodynamically significant valve disease is identified. Recommend referral to  valve clinic for further evaluation. Valve clinic phone number: [phone].  [facility] Epic: [facility].    Telemetry: personally reviewed April 4, 2025; notable for paced     Lab results: personally reviewed April 4, 2025; notable for JESUS    Medical history and pertinent documents reviewed in Care Everywhere please where applicable see details above          Physical Examination Review of Systems   /59   Pulse 82   Temp 98.2  F (36.8  C) (Oral)   Resp 20   Wt 53.8 kg (118 lb 11.2 oz)   LMP  (LMP Unknown)   SpO2 94%   BMI 20.37 kg/m    Body mass index is 20.37 kg/m .  Wt Readings from Last 3 Encounters:   04/04/25 53.8 kg (118 lb 11.2 oz)   03/24/25 58 kg (127 lb 13.9 oz)   03/06/25 57.2 kg (126 lb)     General Appearance:   no distress, normal body habitus   ENT/Mouth: membranes moist, no oral lesions or bleeding gums.      EYES:  no scleral icterus, normal conjunctivae   Neck: no carotid bruits or thyromegaly   Chest/Lungs:   lungs are clear to auscultation, no rales or wheezing, equal chest wall expansion    Cardiovascular:   Regular. Normal first and second heart sounds with +TROY; no rubs, or gallops; the carotid, radial and posterior tibial pulses are intact, + JVD and trace LE edema bilaterally    Abdomen:  no organomegaly, masses, bruits, or tenderness; bowel sounds are present   Extremities: no cyanosis or clubbing   Skin: no xanthelasma, warm.    Neurologic: NAD     Psychiatric: alert and oriented x3, calm     A complete 10 systems ROS was reviewed  And is negative except what is listed in the HPI.          Medical History  Surgical History Family History Social History   Past Medical History:   Diagnosis Date    COVID-19 09/14/2020    [facility])     Dyslipidemia, goal LDL below 70 09/15/2020    Lacunar stroke (H)  11/12/2015    seen on CT scan and confirmed at second scan; symptoms included gait disturbance, facial droop and difficulty writing per Dr. Nini Rasmussen    Metabolic encephalopathy     Moderate aortic valve stenosis 08/22/2017    stable on 2020 echo    Nonobstructive atherosclerosis of coronary artery 09/15/2020    with normal LVEDP    Paroxysmal SVT (supraventricular tachycardia) 09/07/2017    said to have short episodes while hospitalized for UTI per Dr. Rhys Mensah    Syncope 08/22/2017    UTI (urinary tract infection) 08/21/2017    hospitalized with weakness    Past Surgical History:   Procedure Laterality Date    CATARACT EXTRACTION, BILATERAL      CV CORONARY ANGIOGRAM N/A 9/15/2020    Procedure: Coronary Angiogram;  Surgeon: Radhika Santa MD;  Location: [facility] Cath Lab;  Service: Cardiology    CV LEFT HEART CATHETERIZATION WITHOUT LEFT VENTRICULOGRAM Left 9/15/2020    Procedure: Left Heart Catheterization Without Left Ventriculogram;  Surgeon: Radhika Santa MD;  Location: [facility] Cath Lab;  Service: Cardiology    EP PACEMAKER INSERT N/A 4/13/2021    Procedure: EP Pacemaker Insertion;  Surgeon: Frederic Burgos MD;  Location: [facility] Cardiac Cath Lab;  Service: Cardiology    HYSTERECTOMY      PARTIAL GASTRECTOMY  1969    for ulcers    TONSILLECTOMY      no family history of premature coronary artery disease Social History     Socioeconomic History    Marital status: Single     Spouse name: Not on file    Number of children: 0    Years of education: Not on file    Highest education level: Not on file   Occupational History    Not on file   Tobacco Use    Smoking status: Never    Smokeless tobacco: Never   Vaping Use    Vaping status: Never Used   Substance and Sexual Activity    Alcohol use: No    Drug use: No    Sexual activity: Not on file   Other Topics Concern    Not on file   Social History Narrative    Her adopted brother, Jeff, lives with her. He is five years younger than her.      Social Drivers of Health     Financial Resource Strain: Unknown (12/23/2024)    Financial Resource Strain     Within the past 12 months, have you or your family members you live with been unable to get utilities (heat, electricity) when it was really needed?: Patient unable to answer   Food Insecurity: Unknown (12/23/2024)    Food Insecurity     Within the past 12 months, did you worry that your food would run out before you got money to buy more?: Patient unable to answer     Within the past 12 months, did the food you bought just not last and you didn t have money to get more?: Patient unable to answer   Transportation Needs: Unknown (12/23/2024)    Transportation Needs     Within the past 12 months, has lack of transportation kept you from medical appointments, getting your medicines, non-medical meetings or appointments, work, or from getting things that you need?: Patient unable to answer   Physical Activity: Not on file   Stress: Not on file   Social Connections: Not on file   Interpersonal Safety: Low Risk  (4/3/2025)    Interpersonal Safety     Do you feel physically and emotionally safe where you currently live?: Yes     Within the past 12 months, have you been hit, slapped, kicked or otherwise physically hurt by someone?: No     Within the past 12 months, have you been humiliated or emotionally abused in other ways by your partner or ex-partner?: No   Housing Stability: Unknown (12/23/2024)    Housing Stability     Do you have housing? : Patient unable to answer     Are you worried about losing your housing?: Patient unable to answer           Lab Results    Chemistry/lipid CBC Cardiac Enzymes/BNP/TSH/INR   Lab Results   Component Value Date    CHOL 137 04/27/2023    HDL 42 (L) 04/27/2023    TRIG 164 (H) 04/27/2023    BUN 22.8 04/04/2025     04/04/2025    CO2 31 (H) 04/04/2025    Lab Results   Component Value Date    WBC 5.0 04/03/2025    HGB 12.7 04/03/2025    HCT 37.6 04/03/2025    MCV 99  04/03/2025     04/03/2025    Lab Results   Component Value Date    TROPONINI 0.02 09/11/2022    BNP 1,170 (H) 06/10/2021    TSH 1.53 04/27/2023    INR 1.15 03/24/2025     Lab Results   Component Value Date    TROPONINI 0.02 09/11/2022          Weight:    Wt Readings from Last 3 Encounters:   04/04/25 53.8 kg (118 lb 11.2 oz)   03/24/25 58 kg (127 lb 13.9 oz)   03/06/25 57.2 kg (126 lb)       Allergies  No Known Allergies      Surgical History  Past Surgical History:   Procedure Laterality Date    CATARACT EXTRACTION, BILATERAL      CV CORONARY ANGIOGRAM N/A 9/15/2020    Procedure: Coronary Angiogram;  Surgeon: Radhika Santa MD;  Location: [facility] Cath Lab;  Service: Cardiology    CV LEFT HEART CATHETERIZATION WITHOUT LEFT VENTRICULOGRAM Left 9/15/2020    Procedure: Left Heart Catheterization Without Left Ventriculogram;  Surgeon: Radhika Santa MD;  Location: [facility] Cath Lab;  Service: Cardiology    EP PACEMAKER INSERT N/A 4/13/2021    Procedure: EP Pacemaker Insertion;  Surgeon: Frederic Burgos MD;  Location: [facility] Cardiac Cath Lab;  Service: Cardiology    HYSTERECTOMY      PARTIAL GASTRECTOMY  1969    for ulcers    TONSILLECTOMY         Social History  Tobacco:   History   Smoking Status    Never   Smokeless Tobacco    Never    Alcohol:   Social History    Substance and Sexual Activity      Alcohol use: No   Illicit Drugs:   History   Drug Use No       Family History  Family History   Adopted: Yes          Pierre Cronin MD on 4/4/2025      cc: Rose Mcelroy,

## 2025-04-04 NOTE — PLAN OF CARE
Problem: Confusion Chronic  Goal: Optimal Cognitive Function  Outcome: Progressing     Problem: Cardiac Catheterization (Diagnostic/Interventional)  Goal: Optimal Pain Control and Function  Outcome: Progressing   Goal Outcome Evaluation:      Plan of Care Reviewed With: patient, family    Overall Patient Progress: improvingOverall Patient Progress: improving       Pt A/Ox3, disorientated to place w/ intermittent confusion. On 1:1 for safety. On RA, denies pain, A-paced, SBA w/ walker and gait belt.     Family visited at bedside for a couple hours this shift.     R radial site bruised. R groin site WDL.     Hanna Lopes RN

## 2025-04-04 NOTE — PROGRESS NOTES
RN Valve Team Inpatient Note     Summary: 83 y/o female hx of frequent falls, generalized weakness, sick sinus syndrome and junctional bradycardia s/p PPM, JESUS, HLD, vascular dementia, HTN, paroxysmal Afib, HFrEF, anemia, non obstructive CAD, CKD, SVT, hx of TIA, aortic stenosis.     Patient presented yesterday 4/3 for a coronary angiogram for pre-TAVR evaluation. Pt had severely elevated right and left sided filling pressures, and moderate to severe aortic stenosis- no obstructive CAD. Patient was admitted to the hospital post procedure for IV diuresis and monitoring.      Physical Exam: Deferred     Plan: Valve team cancelled appt with Dr. Amin today for pre-TAVR eval. Will hopefully be able to get dobutamine stress test this admission once euvolemic. If severe, patient will likely need a goals of care discussion with the team regarding continuing to move forward with valve replacement process. IF moving forward with TAVR will still need CTA TAVR when kidney function allows and CT surgery consultation. Initial plan was for potential 4/15 TAVR, this date has been removed as a hold. Unlikely patient would be prepared by this time. This was discussed in detail with patients family friends/caregivers Trish.     Macario Villatoro RN BSN  Structural Heart Coordinator   [facility]  [phone]

## 2025-04-04 NOTE — PLAN OF CARE
"  Problem: Adult Inpatient Plan of Care  Goal: Plan of Care Review  Description: The Plan of Care Review/Shift note should be completed every shift.  The Outcome Evaluation is a brief statement about your assessment that the patient is improving, declining, or no change.  This information will be displayed automatically on your shiftnote.  Outcome: Progressing  Flowsheets (Taken 4/3/2025 2215)  Plan of Care Reviewed With:   patient   family  Goal: Patient-Specific Goal (Individualized)  Description: You can add care plan individualizations to a care plan. Examples of Individualization might be:  \"Parent requests to be called daily at 9am for status\", \"I have a hard time hearing out of my right ear\", or \"Do not touch me to wake me up as it startlesme\".  Outcome: Progressing  Goal: Absence of Hospital-Acquired Illness or Injury  Outcome: Progressing  Intervention: Identify and Manage Fall Risk  Recent Flowsheet Documentation  Taken 4/3/2025 1653 by Sergio Ventura RN  Safety Promotion/Fall Prevention:   activity supervised   bedside attendant   increased rounding and observation   room door open   toileting scheduled  Intervention: Prevent Infection  Recent Flowsheet Documentation  Taken 4/3/2025 1653 by Sergio Ventura RN  Infection Prevention:   personal protective equipment utilized   hand hygiene promoted   rest/sleep promoted  Goal: Optimal Comfort and Wellbeing  Outcome: Progressing  Goal: Readiness for Transition of Care  Outcome: Progressing  Flowsheets (Taken 4/3/2025 2000)  Transportation Anticipated: family or friend will provide  Intervention: Mutually Develop Transition Plan  Recent Flowsheet Documentation  Taken 4/3/2025 2000 by Sergio Ventura RN  Transportation Anticipated: family or friend will provide  Patient/Family Anticipates Transition to: assisted living     Goal Outcome Evaluation:      Plan of Care Reviewed With: patient, family    Pt arrived from [facility] around 1700 hours in stable condition. Pt " alert, disoriented to time/situation & with intermittent confusion, impulsive as well requiring frequent redirection for safety. Pt immediately placed on 1-1 supervision on arrival from [facility]. Pressures stable & pt saturating well on RA - lungs with crackles to lower lobes bilaterally, pt denies any SOB & appears comfortable. Tele tracing 100% AV paced around 70 BPM. Pt had angiogram with R-radial & R-femoral access. TR band was immediately removed by [facility] RN on her arrival to [facility]. R-radial site is bruised, but no bleeding or hematoma formation. R-femoral site CDI, no bruising noted. Pt ambulating well with assist of 1. Continent of bladder. Plan to continue IV diuresis for CHF then possible TAVR. Continue POC    Sergio Ventura RN on 4/3/2025 at 10:25 PM

## 2025-04-04 NOTE — PROGRESS NOTES
[facility]    Medicine Progress Note - Hospitalist Service    Date of Admission:  4/3/2025    Assessment & Plan   Laney Hahn is a 84 year old female with PMH CKD III, PAF, hypertension, pacemaker, hyperlipidemia, dementia, aortic stenosis, and HFrEF is admitted on 4/3/2025 for planned elective coronary angiogram and right heart catheterization for pre-TAVR workup.  Patient admitted for IV diuresis.Workup for pre TAVR will resume once heart failure improves. [facility] [place] to assist with medical management.     S/p coronary angiogram  Severe aortic stenosis-pre TAVR workup  -Findings of angiogram-No angiographic evidence of obstructive coronary artery disease.  Severely elevated right and left-sided filling pressures.  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  Low flow, low gradient moderate to severe aortic valve stenosis  -Patient wrist and groin sites are CDI without hematomas. Patient denies numbness or tingling to extremities. CMS intact.   -PTA apixaban  -Continuous cardiac monitoring  -cardiologist and Structural Interventional Cardiology following     HFrEF  -Echocardiogram 12/23/2024-EF 30-35%.  Severe low-flow, low gradient aortic stenosis  -IV diuresis with Lasix per cardiology  -PTA metoprolol    Hyperlipidemia  -PTA atorvastatin    Dementia  -1:1 monitoring as patient is impulsive, but redirectable           Diet: Regular Diet Adult    DVT Prophylaxis: DOAC  Tenorio Catheter: Not present  Lines: None     Cardiac Monitoring: ACTIVE order. Indication: Post- PCI/Angiogram (24 hours)  Code Status: Full Code      Clinically Significant Risk Factors Present on Admission          # Hyperchloremia: Highest Cl = 108 mmol/L in last 2 days, will monitor as appropriate           # Drug Induced Coagulation Defect: home medication list includes an anticoagulant medication    # Hypertension: Noted on problem  list  # Acute heart failure with reduced ejection fraction: last echo with EF <40% and receiving IV diuretics    # Acute Hypercapnic Respiratory Failure: based on venous blood gas results.  Continue supplemental oxygen and ventilatory support as indicated.  # Dementia: noted on problem list           # Financial/Environmental Concerns:     # Pacemaker present       Social Drivers of Health    Food Insecurity: Unknown (12/23/2024)    Food Insecurity     Within the past 12 months, did you worry that your food would run out before you got money to buy more?: Patient unable to answer     Within the past 12 months, did the food you bought just not last and you didn t have money to get more?: Patient unable to answer   Housing Stability: Unknown (12/23/2024)    Housing Stability     Do you have housing? : Patient unable to answer     Are you worried about losing your housing?: Patient unable to answer   Financial Resource Strain: Unknown (12/23/2024)    Financial Resource Strain     Within the past 12 months, have you or your family members you live with been unable to get utilities (heat, electricity) when it was really needed?: Patient unable to answer   Transportation Needs: Unknown (12/23/2024)    Transportation Needs     Within the past 12 months, has lack of transportation kept you from medical appointments, getting your medicines, non-medical meetings or appointments, work, or from getting things that you need?: Patient unable to answer          Disposition Plan     Medically Ready for Discharge: Anticipated in 2-4 Days    Defer to honey Snyder MD  Hospitalist Service  [facility]  Securely message with [employer] (more info)  Text page via [employer] Paging/Directory   ______________________________________________________________________    Interval History   Dementia, poor historian, currently calm and follow simple directions, hard hearing, denies cp/sob although  unreliable    Physical Exam   Vital Signs: Temp: 98.8  F (37.1  C) Temp src: Oral BP: 110/61 Pulse: 88   Resp: 18 SpO2: 93 % O2 Device: None (Room air)    Weight: 118 lbs 11.2 oz    General.  Awake alert orientedx2 in mild resp distress.  HEENT.  Pupils equal round react to light, anicteric, EOM intact.  Neck supple no JVD.  CVS regular rhythm +murmur gallops.  Lungs.  Clear to auscultation bilateral no wheezing or rales.  Abdomen.  Soft nontender bowel sounds present.  Extremities.  trace edema no calf tenderness.  Neurological.  No focal deficit.  Skin no rash. No pallor.  Psych. Impaired memory and cognition    Medical Decision Making       42 MINUTES SPENT BY ME on the date of service doing chart review, history, exam, documentation & further activities per the note.      Data     I have personally reviewed the following data over the past 24 hrs:    N/A  \   N/A   / N/A     141 100 22.8 /  147 (H)   4.5 31 (H) 1.54 (H) \     Trop: N/A BNP: 6,914 (H)       Imaging results reviewed over the past 24 hrs:   No results found for this or any previous visit (from the past 24 hours).

## 2025-04-05 LAB — MAGNESIUM SERPL-MCNC: 2.1 MG/DL (ref 1.7–2.3)

## 2025-04-05 PROCEDURE — 83735 ASSAY OF MAGNESIUM: CPT | Performed by: HOSPITALIST

## 2025-04-05 PROCEDURE — 99232 SBSQ HOSP IP/OBS MODERATE 35: CPT | Performed by: INTERNAL MEDICINE

## 2025-04-05 PROCEDURE — 250N000013 HC RX MED GY IP 250 OP 250 PS 637: Performed by: NURSE PRACTITIONER

## 2025-04-05 PROCEDURE — 250N000013 HC RX MED GY IP 250 OP 250 PS 637: Performed by: INTERNAL MEDICINE

## 2025-04-05 PROCEDURE — 99233 SBSQ HOSP IP/OBS HIGH 50: CPT | Performed by: INTERNAL MEDICINE

## 2025-04-05 PROCEDURE — 36415 COLL VENOUS BLD VENIPUNCTURE: CPT | Performed by: HOSPITALIST

## 2025-04-05 PROCEDURE — 250N000011 HC RX IP 250 OP 636: Performed by: INTERNAL MEDICINE

## 2025-04-05 PROCEDURE — 210N000001 HC R&B IMCU HEART CARE

## 2025-04-05 RX ORDER — NALOXONE HYDROCHLORIDE 0.4 MG/ML
0.4 INJECTION, SOLUTION INTRAMUSCULAR; INTRAVENOUS; SUBCUTANEOUS
Status: DISCONTINUED | OUTPATIENT
Start: 2025-04-05 | End: 2025-04-09 | Stop reason: HOSPADM

## 2025-04-05 RX ORDER — NALOXONE HYDROCHLORIDE 0.4 MG/ML
0.2 INJECTION, SOLUTION INTRAMUSCULAR; INTRAVENOUS; SUBCUTANEOUS
Status: DISCONTINUED | OUTPATIENT
Start: 2025-04-05 | End: 2025-04-09 | Stop reason: HOSPADM

## 2025-04-05 RX ADMIN — FUROSEMIDE 40 MG: 10 INJECTION, SOLUTION INTRAMUSCULAR; INTRAVENOUS at 08:56

## 2025-04-05 RX ADMIN — ATORVASTATIN CALCIUM 10 MG: 10 TABLET, FILM COATED ORAL at 08:55

## 2025-04-05 RX ADMIN — APIXABAN 2.5 MG: 2.5 TABLET, FILM COATED ORAL at 08:55

## 2025-04-05 RX ADMIN — METOPROLOL SUCCINATE 50 MG: 50 TABLET, EXTENDED RELEASE ORAL at 08:55

## 2025-04-05 RX ADMIN — APIXABAN 2.5 MG: 2.5 TABLET, FILM COATED ORAL at 20:02

## 2025-04-05 ASSESSMENT — ACTIVITIES OF DAILY LIVING (ADL)
ADLS_ACUITY_SCORE: 65
ADLS_ACUITY_SCORE: 60
ADLS_ACUITY_SCORE: 65
ADLS_ACUITY_SCORE: 60
ADLS_ACUITY_SCORE: 65
ADLS_ACUITY_SCORE: 60
ADLS_ACUITY_SCORE: 65
ADLS_ACUITY_SCORE: 65
ADLS_ACUITY_SCORE: 60
ADLS_ACUITY_SCORE: 65
ADLS_ACUITY_SCORE: 60
ADLS_ACUITY_SCORE: 65
ADLS_ACUITY_SCORE: 60

## 2025-04-05 NOTE — PROGRESS NOTES
[facility]    Medicine Progress Note - Hospitalist Service    Date of Admission:  4/3/2025    Assessment & Plan   Laney Hahn is a 84 year old female with PMH CKD III, PAF, hypertension, pacemaker, hyperlipidemia, dementia, aortic stenosis, and HFrEF is admitted on 4/3/2025 for planned elective coronary angiogram and right heart catheterization for pre-TAVR workup.  Patient admitted for IV diuresis.Workup for pre TAVR will resume once heart failure improves. [facility] [place] consulted to assist with medical management.       4/5 :     No cp   Sob slowly improving  Cardiology following  Creatinine rising to 1.54, hold lasix  On metoprolol, lipitor, eliquis    Discharge in am once creatinine stable      A/p :     S/p coronary angiogram  Severe aortic stenosis-pre TAVR workup  -Findings of angiogram-No angiographic evidence of obstructive coronary artery disease.  Severely elevated right and left-sided filling pressures.  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  Low flow, low gradient moderate to severe aortic valve stenosis  -Patient wrist and groin sites are CDI without hematomas. Patient denies numbness or tingling to extremities. CMS intact.   -PTA apixaban  -Continuous cardiac monitoring  -cardiologist and Structural Interventional Cardiology following     HFrEF  -Echocardiogram 12/23/2024-EF 30-35%.  Severe low-flow, low gradient aortic stenosis  -IV diuresis with Lasix per cardiology  -PTA metoprolol    Hyperlipidemia  -PTA atorvastatin    Dementia  -1:1 monitoring as patient is impulsive, but redirectable           Diet: Regular Diet Adult    DVT Prophylaxis: DOAC  Tenorio Catheter: Not present  Lines: None     Cardiac Monitoring: ACTIVE order. Indication: Acute decompensated heart failure (48 hours)  Code Status: Full Code      Clinically Significant Risk Factors                     # Hypertension: Noted on problem  list  # Acute heart failure with reduced ejection fraction: last echo with EF <40% and receiving IV diuretics    # Acute Hypercapnic Respiratory Failure: based on venous blood gas results.  Continue supplemental oxygen and ventilatory support as indicated.  # Dementia: noted on problem list            # Financial/Environmental Concerns:     # Pacemaker present       Social Drivers of Health    Food Insecurity: Unknown (12/23/2024)    Food Insecurity     Within the past 12 months, did you worry that your food would run out before you got money to buy more?: Patient unable to answer     Within the past 12 months, did the food you bought just not last and you didn t have money to get more?: Patient unable to answer   Housing Stability: Unknown (12/23/2024)    Housing Stability     Do you have housing? : Patient unable to answer     Are you worried about losing your housing?: Patient unable to answer   Financial Resource Strain: Unknown (12/23/2024)    Financial Resource Strain     Within the past 12 months, have you or your family members you live with been unable to get utilities (heat, electricity) when it was really needed?: Patient unable to answer   Transportation Needs: Unknown (12/23/2024)    Transportation Needs     Within the past 12 months, has lack of transportation kept you from medical appointments, getting your medicines, non-medical meetings or appointments, work, or from getting things that you need?: Patient unable to answer          Disposition Plan     Medically Ready for Discharge: Anticipated in 2-4 Days    Defer to honey Saleh MD  Hospitalist Service  [facility]  Securely message with [employer] (more info)  Text page via [facility] Paging/Directory   ______________________________________________________________________      Physical Exam   Vital Signs: Temp: 97.1  F (36.2  C) Temp src: Oral BP: 137/63 Pulse: 73   Resp: 20 SpO2: 93 % O2 Device: None (Room air)     Weight: 118 lbs 1.6 oz    General.  Awake alert orientedx2 in mild resp distress.  HEENT.  Pupils equal round react to light, anicteric, EOM intact.  Neck supple no JVD.  CVS regular rhythm +murmur gallops.  Lungs.  Clear to auscultation bilateral no wheezing or rales.  Abdomen.  Soft nontender bowel sounds present.  Extremities.  trace edema no calf tenderness.  Neurological.  No focal deficit.  Skin no rash. No pallor.  Psych. Impaired memory and cognition    Medical Decision Making       42 MINUTES SPENT BY ME on the date of service doing chart review, history, exam, documentation & further activities per the note.      Data         Imaging results reviewed over the past 24 hrs:   No results found for this or any previous visit (from the past 24 hours).

## 2025-04-05 NOTE — PLAN OF CARE
Problem: Confusion Chronic  Goal: Optimal Cognitive Function  Outcome: Progressing  Intervention: Minimize Injury Risk and Provide Safety  Recent Flowsheet Documentation  Taken 4/5/2025 0312 by Aiyana Bhatia RN  Enhanced Safety Measures:  at bedside  Taken 4/4/2025 2348 by Aiyana Bhatia RN  Enhanced Safety Measures:  at bedside  Intervention: Minimize and Manage Confusion  Recent Flowsheet Documentation  Taken 4/5/2025 0312 by Aiyana Bhatia RN  Reorientation Measures: clock in view  Taken 4/4/2025 2348 by Aiyana Bhatia RN  Sensory Stimulation Regulation:   lighting decreased   care clustered  Reorientation Measures: clock in view     Problem: Delirium  Goal: Improved Attention and Thought Clarity  Outcome: Progressing  Intervention: Maximize Cognitive Function  Recent Flowsheet Documentation  Taken 4/5/2025 0312 by Aiyana Bhatia RN  Reorientation Measures: clock in view  Taken 4/4/2025 2348 by Aiyana Bhatia RN  Sensory Stimulation Regulation:   lighting decreased   care clustered  Reorientation Measures: clock in view         Goal Outcome Evaluation:  Patient is aox 2-3. Patient is forgetful of situation and sometimes place. Has hx of dementia. 1-1 sitter at bedside for safety. VSS. On room air. Has a-paced rhythm. Underlying SR w/ BBB. Denied chest pain, SOB, and dizziness. Had leg cramping in evening that resolved with activity. MD updated. Magnesium checked this am and was 2.1. Patient is SBA w/ a walker. Falls precaution in place. Continue plan of care.

## 2025-04-05 NOTE — PROGRESS NOTES
[facility]  Cardiac Electrophysiology  [address]  [place] [address]   Office: [phone]  Fax: [phone]     Cardiology Progress Note    Patient: Laney Hahn   : 1940       Assessment/Recommendations     Acute on chronic systolic heart failure - LVEF 30-35% by 2024 TTE  - hold lasix 40mg IV three times daily given JESUS, appearing more euvolemic on exam  - continue metoprolol XL 50mg daily    Severe aortic stenosis - severe LFLG aortic stenosis by 2024 TTE  - TAVR evaluation - consider DSE when more euvolemic    Paroxysmal atrial fibrillation  - continue metoprolol XL  - continue apixaban    Dual chamber pacemaker in-situ - implanted for SND  - continue routine pacemaker follow-up    Dementia  Non-obstructive CAD  JESUS on CKD3         Interval Events   I/O 560/950, -0.39L, -1.6L since admission, Cr 1.54 (1.23).  She has no complaints, is unable to provide history.       Physical Examination  Review of Systems   VITALS: /68 (BP Location: Left arm)   Pulse 87   Temp 98  F (36.7  C) (Oral)   Resp 18   Wt 53.6 kg (118 lb 1.6 oz)   LMP  (LMP Unknown)   SpO2 94%   BMI 20.27 kg/m    Wt Readings from Last 3 Encounters:   25 53.6 kg (118 lb 1.6 oz)   25 58 kg (127 lb 13.9 oz)   25 57.2 kg (126 lb)       Intake/Output Summary (Last 24 hours) at 2025 0824  Last data filed at 2025 0441  Gross per 24 hour   Intake 560 ml   Output 800 ml   Net -240 ml     CONSTITUTIONAL: no distress  EYES:  Conjunctivae pink, sclerae clear.    E/N/T:  Oral mucosa pink, moist mucous membranes  RESPIRATORY:  Respiratory effort is normal  CARDIOVASCULAR:  normal S1 and S2, trace 1+ bilateral LE edema  GASTROINTESTINAL:  Abdomen without masses or tenderness  EXTREMITIES:  No clubbing or cyanosis.    MUSCULOSKELETAL:  Overall grossly normal muscle strength  SKIN:  Overall, skin warm and dry, no lesions. Deferred         Medical History   Surgical History   Past Medical History:   Diagnosis Date    COVID-19 09/14/2020    positive test at [facility]    DNAR (do not attempt resuscitation)     Dyslipidemia, goal LDL below 70 09/15/2020    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 Nini Rasmussen    Metabolic encephalopathy     Moderate aortic valve stenosis 08/22/2017    stable on 2020 echo    Nonobstructive atherosclerosis of coronary artery 09/15/2020    with normal LVEDP    Paroxysmal SVT (supraventricular tachycardia) 09/07/2017    said to have short episodes while hospitalized for UTI per Dr. Rhys Mensah    Syncope 08/22/2017    UTI (urinary tract infection) 08/21/2017    hospitalized with weakness    Past Surgical History:   Procedure Laterality Date    CATARACT EXTRACTION, BILATERAL      CV CORONARY ANGIOGRAM N/A 9/15/2020    Procedure: Coronary Angiogram;  Surgeon: Radhika Santa MD;  Location: [facility] Cath Lab;  Service: Cardiology    CV CORONARY ANGIOGRAM N/A 4/3/2025    Procedure: Coronary Angiogram;  Surgeon: Tyrese Dillon MD;  Location: [facility] LAB CV    CV LEFT HEART CATH N/A 4/3/2025    Procedure: Left Heart Catheterization;  Surgeon: Tyrese Dillon MD;  Location: [facility] CV    CV LEFT HEART CATHETERIZATION WITHOUT LEFT VENTRICULOGRAM Left 9/15/2020    Procedure: Left Heart Catheterization Without Left Ventriculogram;  Surgeon: Radhika Santa MD;  Location: [facility] Cath Lab;  Service: Cardiology    CV RIGHT HEART CATH MEASUREMENTS RECORDED N/A 4/3/2025    Procedure: Right Heart Catheterization with invasive aortic vave gradient study;  Surgeon: Tyrese Dillon MD;  Location: [facility] CV    EP PACEMAKER INSERT N/A 4/13/2021    Procedure: EP Pacemaker Insertion;  Surgeon: Frederic Burgos MD;  Location: [facility] Cardiac Cath Lab;  Service: Cardiology    HYSTERECTOMY       PARTIAL GASTRECTOMY  1969    for ulcers    TONSILLECTOMY           Family History Social History   Family History   Adopted: Yes        Social History     Tobacco Use    Smoking status: Never    Smokeless tobacco: Never   Vaping Use    Vaping status: Never Used   Substance Use Topics    Alcohol use: No    Drug use: No         Medications  Allergies     Current Facility-Administered Medications:     acetaminophen (TYLENOL) tablet 650 mg, 650 mg, Oral, Q4H PRN, Diane Hudson CNP    apixaban ANTICOAGULANT (ELIQUIS) tablet 2.5 mg, 2.5 mg, Oral, BID, Diane Hudson CNP, 2.5 mg at 04/04/25 1928    atorvastatin (LIPITOR) tablet 10 mg, 10 mg, Oral, QAM, Diane Hudson CNP, 10 mg at 04/04/25 0741    furosemide (LASIX) injection 40 mg, 40 mg, Intravenous, TID, Perez Snyder MD, 40 mg at 04/04/25 1927    HOLD:  Metformin and metformin containing medications if patient received IV contrast with acute kidney injury or severe chronic kidney disease (stage IV or stage V; i.e., eGFR less than 30), , Does not apply, HOLD, Diane Hudson CNP    hydrALAZINE (APRESOLINE) injection 10 mg, 10 mg, Intravenous, Once PRN, Diane Hudson CNP    metoprolol succinate ER (TOPROL XL) 24 hr tablet 50 mg, 50 mg, Oral, Daily, Perez Snyder MD, 50 mg at 04/04/25 0741    oxyCODONE (ROXICODONE) tablet 5 mg, 5 mg, Oral, Q4H PRN **OR** oxyCODONE (ROXICODONE) tablet 10 mg, 10 mg, Oral, Q4H PRN, Diane Hudson CNP   No Known Allergies       Lab Results    Chemistry CBC Cardiac Enzymes/BNP/TSH/INR   Recent Labs   Lab Test 04/04/25 0449      POTASSIUM 4.5   CHLORIDE 100   CO2 31*   *   BUN 22.8   CR 1.54*   GFRESTIMATED 33*   CURT 9.0     Recent Labs   Lab Test 04/04/25  0449 04/03/25  1013 03/26/25  0642   CR 1.54* 1.23* 1.45*          Recent Labs   Lab Test 04/03/25  1013   WBC 5.0   HGB 12.7   HCT 37.6   MCV 99        Recent Labs   Lab Test 04/03/25  1013 03/26/25  0642  03/25/25  0757   HGB 12.7 11.5* 12.3    Recent Labs   Lab Test 09/11/22  1733 07/22/22  1917 06/16/21  2208   TROPONINI 0.02 0.02 0.02     Recent Labs   Lab Test 04/04/25  0449 06/10/21  1920 12/16/20  1257 09/14/20  1033   BNP  --  1,170* 1,646* 356*   NTBNPI 6,914*  --   --   --      Recent Labs   Lab Test 04/27/23  1109   TSH 1.53     Recent Labs   Lab Test 03/24/25  1433 07/22/22  1917 03/14/21  1927   INR 1.15 1.23* 0.93            Toña Baltazar MD  4/5/2025  8:24 AM

## 2025-04-05 NOTE — PLAN OF CARE
Problem: Delirium  Goal: Optimal Coping  Outcome: Progressing   Goal Outcome Evaluation:             Alert confused to time and very forgetful, has been sleeping on and off this am. Get up with assist of 1 to ambulate. Continues to have 1:1 sitter at bedside

## 2025-04-05 NOTE — CONSULTS
NUTRITION CONSULT     Received order: Dietitian to see for Heart Healthy Diet education as indicated by admission status      Pt not appropriate for education, hx of dementia and on a 1:1     Will follow for LOS

## 2025-04-06 LAB
ANION GAP SERPL CALCULATED.3IONS-SCNC: 15 MMOL/L (ref 7–15)
BUN SERPL-MCNC: 49.7 MG/DL (ref 8–23)
CALCIUM SERPL-MCNC: 8.9 MG/DL (ref 8.8–10.4)
CHLORIDE SERPL-SCNC: 99 MMOL/L (ref 98–107)
CREAT SERPL-MCNC: 1.66 MG/DL (ref 0.51–0.95)
EGFRCR SERPLBLD CKD-EPI 2021: 30 ML/MIN/1.73M2
GLUCOSE SERPL-MCNC: 217 MG/DL (ref 70–99)
HCO3 SERPL-SCNC: 27 MMOL/L (ref 22–29)
POTASSIUM SERPL-SCNC: 3.6 MMOL/L (ref 3.4–5.3)
SODIUM SERPL-SCNC: 141 MMOL/L (ref 135–145)

## 2025-04-06 PROCEDURE — 250N000013 HC RX MED GY IP 250 OP 250 PS 637: Performed by: INTERNAL MEDICINE

## 2025-04-06 PROCEDURE — 36415 COLL VENOUS BLD VENIPUNCTURE: CPT | Performed by: INTERNAL MEDICINE

## 2025-04-06 PROCEDURE — 80048 BASIC METABOLIC PNL TOTAL CA: CPT | Performed by: INTERNAL MEDICINE

## 2025-04-06 PROCEDURE — 250N000013 HC RX MED GY IP 250 OP 250 PS 637: Performed by: NURSE PRACTITIONER

## 2025-04-06 PROCEDURE — 99232 SBSQ HOSP IP/OBS MODERATE 35: CPT | Performed by: INTERNAL MEDICINE

## 2025-04-06 PROCEDURE — 99233 SBSQ HOSP IP/OBS HIGH 50: CPT | Performed by: INTERNAL MEDICINE

## 2025-04-06 PROCEDURE — 210N000001 HC R&B IMCU HEART CARE

## 2025-04-06 RX ADMIN — METOPROLOL SUCCINATE 50 MG: 50 TABLET, EXTENDED RELEASE ORAL at 07:43

## 2025-04-06 RX ADMIN — APIXABAN 2.5 MG: 2.5 TABLET, FILM COATED ORAL at 07:43

## 2025-04-06 RX ADMIN — APIXABAN 2.5 MG: 2.5 TABLET, FILM COATED ORAL at 20:25

## 2025-04-06 RX ADMIN — ATORVASTATIN CALCIUM 10 MG: 10 TABLET, FILM COATED ORAL at 07:43

## 2025-04-06 ASSESSMENT — ACTIVITIES OF DAILY LIVING (ADL)
ADLS_ACUITY_SCORE: 65
ADLS_ACUITY_SCORE: 60
ADLS_ACUITY_SCORE: 65
ADLS_ACUITY_SCORE: 60
ADLS_ACUITY_SCORE: 65
ADLS_ACUITY_SCORE: 60
ADLS_ACUITY_SCORE: 65
ADLS_ACUITY_SCORE: 65
ADLS_ACUITY_SCORE: 60
ADLS_ACUITY_SCORE: 65
ADLS_ACUITY_SCORE: 60
ADLS_ACUITY_SCORE: 60
ADLS_ACUITY_SCORE: 65
ADLS_ACUITY_SCORE: 60
ADLS_ACUITY_SCORE: 65
ADLS_ACUITY_SCORE: 60
ADLS_ACUITY_SCORE: 65
ADLS_ACUITY_SCORE: 65
ADLS_ACUITY_SCORE: 60
ADLS_ACUITY_SCORE: 65

## 2025-04-06 NOTE — PROGRESS NOTES
[facility]    Medicine Progress Note - Hospitalist Service    Date of Admission:  4/3/2025    Assessment & Plan   Laney Hahn is a 84 year old female with PMH CKD III, PAF, hypertension, pacemaker, hyperlipidemia, dementia, aortic stenosis, and HFrEF is admitted on 4/3/2025 for planned elective coronary angiogram and right heart catheterization for pre-TAVR workup.  Patient admitted for IV diuresis.Workup for pre TAVR will resume once heart failure improves. [facility] [place] [place] consulted to assist with medical management.       4/5 :     No cp   Sob slowly improving  Cardiology following  Creatinine rising to 1.54, hold lasix  On metoprolol, lipitor, eliquis    Discharge in am once creatinine stable        4/6 :     No cp   Sob slowly improving  Cardiology following  Creatinine rising to 1.54--1.66, hold lasix  On metoprolol, lipitor, eliquis    Discharge in am once creatinine stable    A/p :     S/p coronary angiogram  Severe aortic stenosis-pre TAVR workup  -Findings of angiogram-No angiographic evidence of obstructive coronary artery disease.  Severely elevated right and left-sided filling pressures.  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  Low flow, low gradient moderate to severe aortic valve stenosis  -Patient wrist and groin sites are CDI without hematomas. Patient denies numbness or tingling to extremities. CMS intact.   -PTA apixaban  -Continuous cardiac monitoring  -cardiologist and Structural Interventional Cardiology following     HFrEF  -Echocardiogram 12/23/2024-EF 30-35%.  Severe low-flow, low gradient aortic stenosis  -IV diuresis with Lasix per cardiology  -PTA metoprolol    Hyperlipidemia  -PTA atorvastatin    Dementia  -1:1 monitoring as patient is impulsive, but redirectable           Diet: Regular Diet Adult    DVT Prophylaxis: DOAC  Tenorio Catheter: Not present  Lines: None     Cardiac Monitoring:  ACTIVE order. Indication: Acute decompensated heart failure (48 hours)  Code Status: Full Code      Clinically Significant Risk Factors                     # Hypertension: Noted on problem list  # Acute heart failure with reduced ejection fraction: last echo with EF <40% and receiving IV diuretics    # Acute Hypercapnic Respiratory Failure: based on venous blood gas results.  Continue supplemental oxygen and ventilatory support as indicated.  # Dementia: noted on problem list            # Financial/Environmental Concerns:     # Pacemaker present       Social Drivers of Health    Food Insecurity: Unknown (12/23/2024)    Food Insecurity     Within the past 12 months, did you worry that your food would run out before you got money to buy more?: Patient unable to answer     Within the past 12 months, did the food you bought just not last and you didn t have money to get more?: Patient unable to answer   Housing Stability: Unknown (12/23/2024)    Housing Stability     Do you have housing? : Patient unable to answer     Are you worried about losing your housing?: Patient unable to answer   Financial Resource Strain: Unknown (12/23/2024)    Financial Resource Strain     Within the past 12 months, have you or your family members you live with been unable to get utilities (heat, electricity) when it was really needed?: Patient unable to answer   Transportation Needs: Unknown (12/23/2024)    Transportation Needs     Within the past 12 months, has lack of transportation kept you from medical appointments, getting your medicines, non-medical meetings or appointments, work, or from getting things that you need?: Patient unable to answer          Disposition Plan     Medically Ready for Discharge: Anticipated in 2-4 Days    Defer to honey Saleh MD  Hospitalist Service  [facility]  Securely message with [employer] (more info)  Text page via [facility] Paging/Directory    ______________________________________________________________________      Physical Exam   Vital Signs: Temp: 97.9  F (36.6  C) Temp src: Oral BP: 113/64 Pulse: 70   Resp: 20 SpO2: 97 % O2 Device: None (Room air)    Weight: 118 lbs 11.2 oz    General.  Awake alert orientedx2 in mild resp distress.  HEENT.  Pupils equal round react to light, anicteric, EOM intact.  Neck supple no JVD.  CVS regular rhythm +murmur gallops.  Lungs.  Clear to auscultation bilateral no wheezing or rales.  Abdomen.  Soft nontender bowel sounds present.  Extremities.  trace edema no calf tenderness.  Neurological.  No focal deficit.  Skin no rash. No pallor.  Psych. Impaired memory and cognition    Medical Decision Making       42 MINUTES SPENT BY ME on the date of service doing chart review, history, exam, documentation & further activities per the note.      Data     I have personally reviewed the following data over the past 24 hrs:    N/A  \   N/A   / N/A     141 99 49.7 (H) /  217 (H)   3.6 27 1.66 (H) \       Imaging results reviewed over the past 24 hrs:   No results found for this or any previous visit (from the past 24 hours).

## 2025-04-06 NOTE — PLAN OF CARE
Problem: Adult Inpatient Plan of Care  Goal: Plan of Care Review  Description: The Plan of Care Review/Shift note should be completed every shift.  The Outcome Evaluation is a brief statement about your assessment that the patient is improving, declining, or no change.  This information will be displayed automatically on your shiftnote.  4/6/2025 0838 by Bess Mann RN  Outcome: Progressing  4/6/2025 1248 by Bess Mann RN  Outcome: Progressing   Goal Outcome Evaluation:       Trialing off 1:1, room near station, bed alarm on, up ambulating gait stable

## 2025-04-06 NOTE — PLAN OF CARE
Problem: Adult Inpatient Plan of Care  Goal: Plan of Care Review  Description: The Plan of Care Review/Shift note should be completed every shift.  The Outcome Evaluation is a brief statement about your assessment that the patient is improving, declining, or no change.  This information will be displayed automatically on your shiftnote.  Outcome: Progressing   Goal Outcome Evaluation:                    Alert/confused, easily redirectable, up ambulating in rebollar

## 2025-04-06 NOTE — PLAN OF CARE
Problem: Confusion Chronic  Goal: Optimal Cognitive Function  Outcome: Progressing  Intervention: Minimize Injury Risk and Provide Safety  Recent Flowsheet Documentation  Taken 4/6/2025 0014 by Aiyana Bhatia RN  Enhanced Safety Measures:   room near unit station    at bedside  Intervention: Minimize and Manage Confusion  Recent Flowsheet Documentation  Taken 4/6/2025 0014 by Aiyana Bhatia RN  Sensory Stimulation Regulation:   care clustered   lighting decreased  Reorientation Measures: clock in view     Problem: Cardiac Catheterization (Diagnostic/Interventional)  Goal: Stable Heart Rate and Rhythm  Outcome: Progressing           Goal Outcome Evaluation:  Patient is aox3. Patient is forgetful of situation. Easily redirectable. VSS. On room air. Denied SOB, chest pain, and dizziness. Has a-paced rhythm. Has underlying SR w/ bbb, pvcs, and inverted tw. Patient up to bathroom with SBA and walker. Patient is impulsive. 1-1 sitter at bedside for safety.

## 2025-04-06 NOTE — PLAN OF CARE
Problem: Adult Inpatient Plan of Care  Goal: Optimal Comfort and Wellbeing  Outcome: Progressing     Problem: Adult Inpatient Plan of Care  Goal: Readiness for Transition of Care  Outcome: Progressing   Goal Outcome Evaluation:         Pt alert but forgetful. Disoriented to time and situation. Denies pain. Ambulated the halls this evening x2. Up with assist x1 walker and gaitbelt. R radial site bruised. R groin site WDL. Continues on 1:1 precautions for safety.

## 2025-04-06 NOTE — PROGRESS NOTES
[facility]  Cardiac Electrophysiology  [address]  [place] [address]   Office: [phone]  Fax: [phone]     Cardiology Progress Note    Patient: Laney Hahn   : 1940       Assessment/Recommendations     Acute on chronic systolic heart failure - LVEF 30-35% by 2024 TTE  - hold lasix 40mg IV three times daily given JESUS, euvolemic on exam  - continue metoprolol XL 50mg daily     Severe aortic stenosis - severe LFLG aortic stenosis by 2024 TTE  - TAVR evaluation - consider DSE when more euvolemic     Paroxysmal atrial fibrillation  - continue metoprolol XL  - continue apixaban     Dual chamber pacemaker in-situ - implanted for SND  - continue routine pacemaker follow-up     Dementia  Non-obstructive CAD  JESUS on CKD3         Interval Events   I/O 657/550, -1.5L since admission, Cr today pending.  Ambulated in hallway without reported dyspnea.  She is unable to provide history.       Physical Examination  Review of Systems   VITALS: /65 (BP Location: Left arm)   Pulse 72   Temp 97.6  F (36.4  C) (Oral)   Resp 18   Wt 53.8 kg (118 lb 11.2 oz)   LMP  (LMP Unknown)   SpO2 94%   BMI 20.37 kg/m    Wt Readings from Last 3 Encounters:   25 53.8 kg (118 lb 11.2 oz)   25 58 kg (127 lb 13.9 oz)   25 57.2 kg (126 lb)       Intake/Output Summary (Last 24 hours) at 2025 0809  Last data filed at 2025 0636  Gross per 24 hour   Intake 657 ml   Output 450 ml   Net 207 ml     CONSTITUTIONAL: no distress  EYES:  Conjunctivae pink, sclerae clear.    E/N/T:  Oral mucosa pink, moist mucous membranes  RESPIRATORY:  Respiratory effort is normal  CARDIOVASCULAR:  normal S1 and S2, trace 1+ bilateral LE edema  GASTROINTESTINAL:  Abdomen without masses or tenderness  EXTREMITIES:  No clubbing or cyanosis.    MUSCULOSKELETAL:  Overall grossly normal muscle strength  SKIN:  Overall, skin warm and dry, no lesions. Deferred           Medical History  Surgical History   Past Medical History:   Diagnosis Date    COVID-19 09/14/2020    positive test at [facility]    DNAR (do not attempt resuscitation)     Dyslipidemia, goal LDL below 70 09/15/2020    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 Nini Rasmussen    Metabolic encephalopathy     Moderate aortic valve stenosis 08/22/2017    stable on 2020 echo    Nonobstructive atherosclerosis of coronary artery 09/15/2020    with normal LVEDP    Paroxysmal SVT (supraventricular tachycardia) 09/07/2017    said to have short episodes while hospitalized for UTI per Dr. Rhys Mensah    Syncope 08/22/2017    UTI (urinary tract infection) 08/21/2017    hospitalized with weakness    Past Surgical History:   Procedure Laterality Date    CATARACT EXTRACTION, BILATERAL      CV CORONARY ANGIOGRAM N/A 9/15/2020    Procedure: Coronary Angiogram;  Surgeon: Radhika Santa MD;  Location: [facility] Cath Lab;  Service: Cardiology    CV CORONARY ANGIOGRAM N/A 4/3/2025    Procedure: Coronary Angiogram;  Surgeon: Tyrese Dillon MD;  Location: [facility] LAB CV    CV LEFT HEART CATH N/A 4/3/2025    Procedure: Left Heart Catheterization;  Surgeon: Tyrese Dillon MD;  Location: [facility] CV    CV LEFT HEART CATHETERIZATION WITHOUT LEFT VENTRICULOGRAM Left 9/15/2020    Procedure: Left Heart Catheterization Without Left Ventriculogram;  Surgeon: Radhika Santa MD;  Location: [facility] Cath Lab;  Service: Cardiology    CV RIGHT HEART CATH MEASUREMENTS RECORDED N/A 4/3/2025    Procedure: Right Heart Catheterization with invasive aortic vave gradient study;  Surgeon: Tyrese Dillon MD;  Location: [facility] CV    EP PACEMAKER INSERT N/A 4/13/2021    Procedure: EP Pacemaker Insertion;  Surgeon: Frederic Burgos MD;  Location: [facility] Cardiac Cath Lab;  Service: Cardiology     HYSTERECTOMY      PARTIAL GASTRECTOMY  1969    for ulcers    TONSILLECTOMY           Family History Social History   Family History   Adopted: Yes        Social History     Tobacco Use    Smoking status: Never    Smokeless tobacco: Never   Vaping Use    Vaping status: Never Used   Substance Use Topics    Alcohol use: No    Drug use: No         Medications  Allergies     Current Facility-Administered Medications:     acetaminophen (TYLENOL) tablet 650 mg, 650 mg, Oral, Q4H PRN, Diane Hudson, CNP    apixaban ANTICOAGULANT (ELIQUIS) tablet 2.5 mg, 2.5 mg, Oral, BID, Diane Hudson C, CNP, 2.5 mg at 04/06/25 0743    atorvastatin (LIPITOR) tablet 10 mg, 10 mg, Oral, QAM, Diane Hudson, CNP, 10 mg at 04/06/25 0743    HOLD:  Metformin and metformin containing medications if patient received IV contrast with acute kidney injury or severe chronic kidney disease (stage IV or stage V; i.e., eGFR less than 30), , Does not apply, HOLD, Diane Hudson CNP    hydrALAZINE (APRESOLINE) injection 10 mg, 10 mg, Intravenous, Once PRN, Diane Hudson CNP    metoprolol succinate ER (TOPROL XL) 24 hr tablet 50 mg, 50 mg, Oral, Daily, Perez Snyder MD, 50 mg at 04/06/25 0743    naloxone (NARCAN) injection 0.2 mg, 0.2 mg, Intravenous, Q2 Min PRN **OR** naloxone (NARCAN) injection 0.4 mg, 0.4 mg, Intravenous, Q2 Min PRN **OR** naloxone (NARCAN) injection 0.2 mg, 0.2 mg, Intramuscular, Q2 Min PRN **OR** naloxone (NARCAN) injection 0.4 mg, 0.4 mg, Intramuscular, Q2 Min PRN, Lucian Saleh MD    oxyCODONE (ROXICODONE) tablet 5 mg, 5 mg, Oral, Q4H PRN **OR** oxyCODONE (ROXICODONE) tablet 10 mg, 10 mg, Oral, Q4H PRN, Diane Hudson CNP   No Known Allergies       Lab Results    Chemistry CBC Cardiac Enzymes/BNP/TSH/INR   Recent Labs   Lab Test 04/04/25  0449      POTASSIUM 4.5   CHLORIDE 100   CO2 31*   *   BUN 22.8   CR 1.54*   GFRESTIMATED 33*   CURT 9.0     Recent Labs    Lab Test 04/04/25  0449 04/03/25  1013 03/26/25  0642   CR 1.54* 1.23* 1.45*          Recent Labs   Lab Test 04/03/25  1013   WBC 5.0   HGB 12.7   HCT 37.6   MCV 99        Recent Labs   Lab Test 04/03/25  1013 03/26/25  0642 03/25/25  0757   HGB 12.7 11.5* 12.3    Recent Labs   Lab Test 09/11/22  1733 07/22/22  1917 06/16/21  2208   TROPONINI 0.02 0.02 0.02     Recent Labs   Lab Test 04/04/25  0449 06/10/21  1920 12/16/20  1257 09/14/20  1033   BNP  --  1,170* 1,646* 356*   NTBNPI 6,914*  --   --   --      Recent Labs   Lab Test 04/27/23  1109   TSH 1.53     Recent Labs   Lab Test 03/24/25  1433 07/22/22  1917 03/14/21  1927   INR 1.15 1.23* 0.93            Toña Baltazar MD  4/6/2025  8:09 AM

## 2025-04-07 ENCOUNTER — TELEPHONE (OUTPATIENT)
Dept: CARDIOLOGY | Facility: CLINIC | Age: 85
End: 2025-04-07
Payer: COMMERCIAL

## 2025-04-07 DIAGNOSIS — I50.9 ACUTE DECOMPENSATED HEART FAILURE (H): Primary | ICD-10-CM

## 2025-04-07 LAB
ANION GAP SERPL CALCULATED.3IONS-SCNC: 11 MMOL/L (ref 7–15)
BUN SERPL-MCNC: 50.3 MG/DL (ref 8–23)
CALCIUM SERPL-MCNC: 8.8 MG/DL (ref 8.8–10.4)
CHLORIDE SERPL-SCNC: 104 MMOL/L (ref 98–107)
CREAT SERPL-MCNC: 1.35 MG/DL (ref 0.51–0.95)
EGFRCR SERPLBLD CKD-EPI 2021: 39 ML/MIN/1.73M2
GLUCOSE SERPL-MCNC: 136 MG/DL (ref 70–99)
HCO3 SERPL-SCNC: 26 MMOL/L (ref 22–29)
HOLD SPECIMEN: NORMAL
POTASSIUM SERPL-SCNC: 3.8 MMOL/L (ref 3.4–5.3)
SODIUM SERPL-SCNC: 141 MMOL/L (ref 135–145)

## 2025-04-07 PROCEDURE — P9047 ALBUMIN (HUMAN), 25%, 50ML: HCPCS | Performed by: INTERNAL MEDICINE

## 2025-04-07 PROCEDURE — 99232 SBSQ HOSP IP/OBS MODERATE 35: CPT | Performed by: INTERNAL MEDICINE

## 2025-04-07 PROCEDURE — 99233 SBSQ HOSP IP/OBS HIGH 50: CPT | Performed by: INTERNAL MEDICINE

## 2025-04-07 PROCEDURE — 250N000011 HC RX IP 250 OP 636: Performed by: INTERNAL MEDICINE

## 2025-04-07 PROCEDURE — 250N000013 HC RX MED GY IP 250 OP 250 PS 637: Performed by: NURSE PRACTITIONER

## 2025-04-07 PROCEDURE — 250N000013 HC RX MED GY IP 250 OP 250 PS 637: Performed by: INTERNAL MEDICINE

## 2025-04-07 PROCEDURE — 210N000001 HC R&B IMCU HEART CARE

## 2025-04-07 PROCEDURE — 36415 COLL VENOUS BLD VENIPUNCTURE: CPT | Performed by: INTERNAL MEDICINE

## 2025-04-07 PROCEDURE — 80048 BASIC METABOLIC PNL TOTAL CA: CPT | Performed by: INTERNAL MEDICINE

## 2025-04-07 RX ORDER — TORSEMIDE 20 MG/1
20 TABLET ORAL DAILY
Status: DISCONTINUED | OUTPATIENT
Start: 2025-04-07 | End: 2025-04-09 | Stop reason: HOSPADM

## 2025-04-07 RX ORDER — ALBUMIN (HUMAN) 12.5 G/50ML
50 SOLUTION INTRAVENOUS ONCE
Status: COMPLETED | OUTPATIENT
Start: 2025-04-07 | End: 2025-04-07

## 2025-04-07 RX ADMIN — METOPROLOL SUCCINATE 50 MG: 50 TABLET, EXTENDED RELEASE ORAL at 09:06

## 2025-04-07 RX ADMIN — APIXABAN 2.5 MG: 2.5 TABLET, FILM COATED ORAL at 19:33

## 2025-04-07 RX ADMIN — TORSEMIDE 20 MG: 20 TABLET ORAL at 09:06

## 2025-04-07 RX ADMIN — ALBUMIN HUMAN 50 G: 0.25 SOLUTION INTRAVENOUS at 10:16

## 2025-04-07 RX ADMIN — APIXABAN 2.5 MG: 2.5 TABLET, FILM COATED ORAL at 09:06

## 2025-04-07 RX ADMIN — ATORVASTATIN CALCIUM 10 MG: 10 TABLET, FILM COATED ORAL at 09:06

## 2025-04-07 ASSESSMENT — ACTIVITIES OF DAILY LIVING (ADL)
ADLS_ACUITY_SCORE: 65
ADLS_ACUITY_SCORE: 62
ADLS_ACUITY_SCORE: 60
ADLS_ACUITY_SCORE: 60
ADLS_ACUITY_SCORE: 62
ADLS_ACUITY_SCORE: 62
ADLS_ACUITY_SCORE: 60
ADLS_ACUITY_SCORE: 62
ADLS_ACUITY_SCORE: 60
ADLS_ACUITY_SCORE: 65
ADLS_ACUITY_SCORE: 60
ADLS_ACUITY_SCORE: 65
ADLS_ACUITY_SCORE: 62
ADLS_ACUITY_SCORE: 65
ADLS_ACUITY_SCORE: 65
ADLS_ACUITY_SCORE: 62
ADLS_ACUITY_SCORE: 60
ADLS_ACUITY_SCORE: 60
ADLS_ACUITY_SCORE: 62

## 2025-04-07 NOTE — PROGRESS NOTES
[facility]    Medicine Progress Note - Hospitalist Service    Date of Admission:  4/3/2025    Assessment & Plan   Laney Hahn is a 84 year old female with PMH CKD III, PAF, hypertension, pacemaker, hyperlipidemia, dementia, aortic stenosis, and HFrEF is admitted on 4/3/2025 for planned elective coronary angiogram and right heart catheterization for pre-TAVR workup.  Patient admitted for IV diuresis.Workup for pre TAVR will resume once heart failure improves. [facility] consulted to assist with medical management.       4/5 :     No cp   Sob slowly improving  Cardiology following  Creatinine rising to 1.54, hold lasix  On metoprolol, lipitor, eliquis        4/6 :     No cp   Sob slowly improving  Cardiology following  Creatinine rising to 1.54--1.66, hold lasix  On metoprolol, lipitor, eliquis    Discharge in am once creatinine stable      4/7 :     No cp   Sob slowly improving  Cardiology following  Creatinine rising to 1.54--1.66--1.35, resume oral lasix  On metoprolol, lipitor, eliquis    Pt/ot evaluation    Discharge in am if creatinine stable            A/p :     S/p coronary angiogram  Severe aortic stenosis-pre TAVR workup  -Findings of angiogram-No angiographic evidence of obstructive coronary artery disease.  Severely elevated right and left-sided filling pressures.  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  Low flow, low gradient moderate to severe aortic valve stenosis  -Patient wrist and groin sites are CDI without hematomas. Patient denies numbness or tingling to extremities. CMS intact.   -PTA apixaban  -Continuous cardiac monitoring  -cardiologist and Structural Interventional Cardiology following     HFrEF  -Echocardiogram 12/23/2024-EF 30-35%.  Severe low-flow, low gradient aortic stenosis  -IV diuresis with Lasix per cardiology  -PTA metoprolol    Hyperlipidemia  -PTA atorvastatin    Dementia  -1:1  monitoring as patient is impulsive, but redirectable           Diet: Regular Diet Adult    DVT Prophylaxis: DOAC  Tenorio Catheter: Not present  Lines: None     Cardiac Monitoring: ACTIVE order. Indication: Acute decompensated heart failure (48 hours)  Code Status: Full Code      Clinically Significant Risk Factors                     # Hypertension: Noted on problem list  # Acute heart failure with reduced ejection fraction: last echo with EF <40% and receiving IV diuretics    # Acute Hypercapnic Respiratory Failure: based on venous blood gas results.  Continue supplemental oxygen and ventilatory support as indicated.  # Dementia: noted on problem list            # Financial/Environmental Concerns:     # Pacemaker present       Social Drivers of Health    Food Insecurity: Unknown (12/23/2024)    Food Insecurity     Within the past 12 months, did you worry that your food would run out before you got money to buy more?: Patient unable to answer     Within the past 12 months, did the food you bought just not last and you didn t have money to get more?: Patient unable to answer   Housing Stability: Unknown (12/23/2024)    Housing Stability     Do you have housing? : Patient unable to answer     Are you worried about losing your housing?: Patient unable to answer   Financial Resource Strain: Unknown (12/23/2024)    Financial Resource Strain     Within the past 12 months, have you or your family members you live with been unable to get utilities (heat, electricity) when it was really needed?: Patient unable to answer   Transportation Needs: Unknown (12/23/2024)    Transportation Needs     Within the past 12 months, has lack of transportation kept you from medical appointments, getting your medicines, non-medical meetings or appointments, work, or from getting things that you need?: Patient unable to answer          Disposition Plan     Medically Ready for Discharge: Anticipated in 2-4 Days    Defer to honey Epstein  MD Delfino  Hospitalist Service  [facility]  Securely message with Lott[employer] (more info)  Text page via [employer] Paging/Directory   ______________________________________________________________________      Physical Exam   Vital Signs: Temp: 97.9  F (36.6  C) Temp src: Oral BP: 131/62 Pulse: 71   Resp: 17 SpO2: 96 % O2 Device: None (Room air)    Weight: 119 lbs 8 oz    General.  Awake alert orientedx2 in mild resp distress.  HEENT.  Pupils equal round react to light, anicteric, EOM intact.  Neck supple no JVD.  CVS regular rhythm +murmur gallops.  Lungs.  Clear to auscultation bilateral no wheezing or rales.  Abdomen.  Soft nontender bowel sounds present.  Extremities.  trace edema no calf tenderness.  Neurological.  No focal deficit.  Skin no rash. No pallor.  Psych. Impaired memory and cognition    Medical Decision Making       42 MINUTES SPENT BY ME on the date of service doing chart review, history, exam, documentation & further activities per the note.      Data     I have personally reviewed the following data over the past 24 hrs:    N/A  \   N/A   / N/A     141 104 50.3 (H) /  136 (H)   3.8 26 1.35 (H) \       Imaging results reviewed over the past 24 hrs:   No results found for this or any previous visit (from the past 24 hours).

## 2025-04-07 NOTE — PROGRESS NOTES
HEART CARE NOTE          Assessment/Recommendations   1. HFrEF c/b severe ADHF  Assessment / Plan  JESUS resolving; Transition to oral diuretic regimen; continue to monitor UOP and renal function closely   Patient is high risk for adverse cardiac events 2/2 advanced age, frailty, valvular heart disease, systolic dysfunction     Current Pharmacotherapy AHA Guideline-Directed Medical Therapy   Lisinopril not started Lisinopril 20 mg twice daily   Metoprolol succinate 50 mg daily Carvedilol 25 mg twice daily   Spironolactone - not started Spironolactone 25 mg once daily   Hydralazine NA Hydralazine 100 mg three times daily   Isosorbide dinitrate NA Isosorbide dinitrate 40 mg three times daily   SGLT2 inhibitor:Dapagliflozin/Empagliflozin - not started Dapagliflozin or Empagliflozin 10 mg daily      2. Valvular heart disease  Assessment / Plan  Severe aortic stenosis; TAVR eval underway; Structural Interventional Cardiology following along. Please contact their team directly with any questions or concerns regarding TAVR     3. JESUS on CKD stage 3  Assessment / Plan  CRS; resolving; diuresis as above; continue to monitor UOP and renal function closey     4. HTN  Assessment / Plan  Adequate control - no additional recommendations as this time     5. Afib  Assessment / Plan  Paroxysmal; currently on apixaban  S/p PPM     6. HLP  Assessment / Plan  Currently on atorvastatin    Plan of care discussed on April 7, 2025 with patient at bedside, and primary team overseeing patient's care    History of Present Illness/Subjective    Ms. Laney Hahn is a 84 year old female with a PMHx significant for (per Epic notation) CKD III, PAF, hypertension, pacemaker, hyperlipidemia, dementia, aortic stenosis, and HFrEF is admitted on 4/3/2025 for planned elective coronary angiogram and right heart catheterization for pre-TAVR workup.  Patient admitted for IV diuresis.Workup for pre TAVR will resume once heart failure improves      Today, Mrs. Hahn denies acute cardiac events or complaints; Management plan as detailed above     ECG: Personally reviewed. Paced rhythm.     ECHO (personnaly Reviewed on 4/4/25):   The left ventricle is normal in size with normal left ventricular wall  thickness.  Left ventricular function is decreased. The ejection fraction is 30-35%  (moderately reduced).  The right ventricle is normal size with mildly decreased right ventricular  systolic function  The left atrium is mildly dilated.  Severe low flow, low gradient aortic stenosis is present with a peak velocity  of 2.9 m/s, mean gradient 21 mmHg, SVi 28 ml/m2, LANNY 0.6 cm2, DI 0.22.  Mild calcific mitral stenosis is present.  Compared to the prior study of 9/12/22, the degree of aortic stenosis has  advanced.     Hemodynamically significant valve disease is identified. Recommend referral to  valve clinic for further evaluation. Valve clinic phone number: [phone].  [facility] Epic: [facility].    Telemetry: personally reviewed April 7, 2025; notable for paced rhythm     Lab results: personally reviewed April 7, 2025; notable for resolving JESUS    Medical history and pertinent documents reviewed in Care Everywhere please where applicable see details above        Physical Examination Review of Systems   /79 (BP Location: Left arm, Patient Position: Semi-Blue's, Cuff Size: Adult Small)   Pulse 82   Temp 97.6  F (36.4  C) (Oral)   Resp 18   Wt 54.2 kg (119 lb 8 oz)   LMP  (LMP Unknown)   SpO2 95%   BMI 20.51 kg/m    Body mass index is 20.51 kg/m .  Wt Readings from Last 3 Encounters:   04/07/25 54.2 kg (119 lb 8 oz)   03/24/25 58 kg (127 lb 13.9 oz)   03/06/25 57.2 kg (126 lb)     General Appearance:   no distress, normal body habitus   ENT/Mouth: membranes moist, no oral lesions or bleeding gums.      EYES:  no scleral icterus, normal conjunctivae   Neck: no carotid bruits or thyromegaly   Chest/Lungs:   lungs are  clear to auscultation, no rales or wheezing, equal chest wall expansion    Cardiovascular:   Regular. Normal first and second heart sounds with +TROY; no rubs, or gallops; the carotid, radial and posterior tibial pulses are intact, no JVD or LE edema bilaterally    Abdomen:  no organomegaly, masses, bruits, or tenderness; bowel sounds are present   Extremities: no cyanosis or clubbing   Skin: no xanthelasma, warm.    Neurologic: NAD     Psychiatric: alert and oriented x3, calm     A complete 10 systems ROS was reviewed  And is negative except what is listed in the HPI.          Medical History  Surgical History Family History Social History   Past Medical History:   Diagnosis Date    COVID-19 09/14/2020    positive test at [facility]    DNAR (do not attempt resuscitation)     Dyslipidemia, goal LDL below 70 09/15/2020    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 Nini Rasmussen    Metabolic encephalopathy     Moderate aortic valve stenosis 08/22/2017    stable on 2020 echo    Nonobstructive atherosclerosis of coronary artery 09/15/2020    with normal LVEDP    Paroxysmal SVT (supraventricular tachycardia) 09/07/2017    said to have short episodes while hospitalized for UTI per Dr. Rhys Mensah    Syncope 08/22/2017    UTI (urinary tract infection) 08/21/2017    hospitalized with weakness    Past Surgical History:   Procedure Laterality Date    CATARACT EXTRACTION, BILATERAL      CV CORONARY ANGIOGRAM N/A 9/15/2020    Procedure: Coronary Angiogram;  Surgeon: Radhika Santa MD;  Location[facility] Cath Lab;  Service: Cardiology    CV CORONARY ANGIOGRAM N/A 4/3/2025    Procedure: Coronary Angiogram;  Surgeon: Tyrese Dillon MD;  Location: [facility] CATH LAB CV    CV LEFT HEART CATH N/A 4/3/2025    Procedure: Left Heart Catheterization;  Surgeon: Tyrese Dillon MD;  Location: [facility] CATH LAB CV     CV LEFT HEART CATHETERIZATION WITHOUT LEFT VENTRICULOGRAM Left 9/15/2020    Procedure: Left Heart Catheterization Without Left Ventriculogram;  Surgeon: Radhika Santa MD;  Location: [facility] Cath Lab;  Service: Cardiology    CV RIGHT HEART CATH MEASUREMENTS RECORDED N/A 4/3/2025    Procedure: Right Heart Catheterization with invasive aortic vave gradient study;  Surgeon: Tyrese Dillon MD;  Location: [facility] CATH [facility] CV    EP PACEMAKER INSERT N/A 4/13/2021    Procedure: EP Pacemaker Insertion;  Surgeon: Frederic Burgos MD;  Location: [facility] Cardiac Cath Lab;  Service: Cardiology    HYSTERECTOMY      PARTIAL GASTRECTOMY  1969    for ulcers    TONSILLECTOMY      no family history of premature coronary artery disease Social History     Socioeconomic History    Marital status: Single     Spouse name: Not on file    Number of children: 0    Years of education: Not on file    Highest education level: Not on file   Occupational History    Not on file   Tobacco Use    Smoking status: Never    Smokeless tobacco: Never   Vaping Use    Vaping status: Never Used   Substance and Sexual Activity    Alcohol use: No    Drug use: No    Sexual activity: Not on file   Other Topics Concern    Not on file   Social History Narrative    Her adopted brother, Jeff, lives with her. He is five years younger than her.     Social Drivers of Health     Financial Resource Strain: Unknown (12/23/2024)    Financial Resource Strain     Within the past 12 months, have you or your family members you live with been unable to get utilities (heat, electricity) when it was really needed?: Patient unable to answer   Food Insecurity: Unknown (12/23/2024)    Food Insecurity     Within the past 12 months, did you worry that your food would run out before you got money to buy more?: Patient unable to answer     Within the past 12 months, did the food you bought just not last and you didn t have money to get more?: Patient unable to answer    Transportation Needs: Unknown (12/23/2024)    Transportation Needs     Within the past 12 months, has lack of transportation kept you from medical appointments, getting your medicines, non-medical meetings or appointments, work, or from getting things that you need?: Patient unable to answer   Physical Activity: Not on file   Stress: Not on file   Social Connections: Not on file   Interpersonal Safety: Low Risk  (4/3/2025)    Interpersonal Safety     Do you feel physically and emotionally safe where you currently live?: Yes     Within the past 12 months, have you been hit, slapped, kicked or otherwise physically hurt by someone?: No     Within the past 12 months, have you been humiliated or emotionally abused in other ways by your partner or ex-partner?: No   Housing Stability: Unknown (12/23/2024)    Housing Stability     Do you have housing? : Patient unable to answer     Are you worried about losing your housing?: Patient unable to answer           Lab Results    Chemistry/lipid CBC Cardiac Enzymes/BNP/TSH/INR   Lab Results   Component Value Date    CHOL 137 04/27/2023    HDL 42 (L) 04/27/2023    TRIG 164 (H) 04/27/2023    BUN 50.3 (H) 04/07/2025     04/07/2025    CO2 26 04/07/2025    Lab Results   Component Value Date    WBC 5.0 04/03/2025    HGB 12.7 04/03/2025    HCT 37.6 04/03/2025    MCV 99 04/03/2025     04/03/2025    Lab Results   Component Value Date    TROPONINI 0.02 09/11/2022    BNP 1,170 (H) 06/10/2021    TSH 1.53 04/27/2023    INR 1.15 03/24/2025     Lab Results   Component Value Date    TROPONINI 0.02 09/11/2022          Weight:    Wt Readings from Last 3 Encounters:   04/07/25 54.2 kg (119 lb 8 oz)   03/24/25 58 kg (127 lb 13.9 oz)   03/06/25 57.2 kg (126 lb)       Allergies  No Known Allergies      Surgical History  Past Surgical History:   Procedure Laterality Date    CATARACT EXTRACTION, BILATERAL      CV CORONARY ANGIOGRAM N/A 9/15/2020    Procedure: Coronary Angiogram;  Surgeon:  Radhika Santa MD;  Location: [facility] Cath Lab;  Service: Cardiology    CV CORONARY ANGIOGRAM N/A 4/3/2025    Procedure: Coronary Angiogram;  Surgeon: Tyrese Dillon MD;  Location: [facility] CV    CV LEFT HEART CATH N/A 4/3/2025    Procedure: Left Heart Catheterization;  Surgeon: Tyrese Dillon MD;  Location: [facility] CV    CV LEFT HEART CATHETERIZATION WITHOUT LEFT VENTRICULOGRAM Left 9/15/2020    Procedure: Left Heart Catheterization Without Left Ventriculogram;  Surgeon: Radhika Santa MD;  Location: [facility] Cath Lab;  Service: Cardiology    CV RIGHT HEART CATH MEASUREMENTS RECORDED N/A 4/3/2025    Procedure: Right Heart Catheterization with invasive aortic vave gradient study;  Surgeon: Tyrese Dillon MD;  Location: [facility] CV    EP PACEMAKER INSERT N/A 4/13/2021    Procedure: EP Pacemaker Insertion;  Surgeon: Frederic Burgos MD;  Location: [facility] Cardiac Cath Lab;  Service: Cardiology    HYSTERECTOMY      PARTIAL GASTRECTOMY  1969    for ulcers    TONSILLECTOMY         Social History  Tobacco:   History   Smoking Status    Never   Smokeless Tobacco    Never    Alcohol:   Social History    Substance and Sexual Activity      Alcohol use: No   Illicit Drugs:   History   Drug Use No       Family History  Family History   Adopted: Yes          Pierre Cronin MD on 4/7/2025      cc: Rose Mcelroy

## 2025-04-07 NOTE — PLAN OF CARE
Problem: Confusion Chronic  Goal: Optimal Cognitive Function  Intervention: Minimize Injury Risk and Provide Safety  Recent Flowsheet Documentation  Taken 4/7/2025 0419 by Aiyana Bhatia RN  Enhanced Safety Measures: room near unit station  Taken 4/7/2025 0000 by Aiyana Bhatia RN  Enhanced Safety Measures: room near unit station     Problem: Adult Inpatient Plan of Care  Goal: Absence of Hospital-Acquired Illness or Injury  Intervention: Prevent Skin Injury  Recent Flowsheet Documentation  Taken 4/7/2025 0000 by Aiyana Bhatia RN  Body Position: position changed independently         Goal Outcome Evaluation:  Patient is off 1-1 since 5:59 pm yesterday. Patient is aox3. Patient is disoriented to situation, but easily redirectable. Patient doesn't use call-light before getting up to bathroom. Bed alarm on. Patient is SBA with a walker. VSS. On room air. Has a-paced rhythm. Patient denied SOB, dizziness, and chest pain. Cr trended down to 1.35 this am. Continue plan of care.

## 2025-04-07 NOTE — PLAN OF CARE
Problem: Confusion Chronic  Goal: Optimal Cognitive Function  Outcome: Progressing  Intervention: Minimize Injury Risk and Provide Safety  Recent Flowsheet Documentation  Taken 4/7/2025 0834 by Liane Morgan RN  Enhanced Safety Measures: room near unit station  Intervention: Minimize and Manage Confusion  Recent Flowsheet Documentation  Taken 4/7/2025 0834 by Liane Morgan, RN  Sensory Stimulation Regulation:   care clustered   lighting decreased   quiet environment promoted  Reorientation Measures: clock in view  Environmental Support: calm environment promoted  Environment Familiarity/Consistency: daily routine followed  Taken 4/7/2025 0730 by Liane Morgan RN  Self-Care Promotion: independence encouraged        Problem: Cardiac Catheterization (Diagnostic/Interventional)  Goal: Absence of Bleeding  Outcome: Progressing  Goal: Absence of Contrast-Induced Injury  Outcome: Progressing  Goal: Stable Heart Rate and Rhythm  Outcome: Progressing  Intervention: Monitor and Manage Cardiac Rhythm Effect  Recent Flowsheet Documentation  Taken 4/7/2025 0834 by Liane Morgan, RAHEEL  Fluid/Electrolyte Management: fluids provided  Goal: Absence of Embolism Signs and Symptoms  Outcome: Progressing  Intervention: Prevent or Manage Embolism  Recent Flowsheet Documentation  Taken 4/7/2025 0834 by Liane Morgan RN  VTE Prevention/Management: SCDs off (sequential compression devices)  Goal: Anesthesia/Sedation Recovery  Outcome: Progressing  Intervention: Optimize Anesthesia Recovery  Recent Flowsheet Documentation  Taken 4/7/2025 0834 by Liane Morgan RN  Safety Promotion/Fall Prevention: safety round/check completed  Reorientation Measures: clock in view  Goal: Optimal Pain Control and Function  Outcome: Progressing  Goal: Absence of Vascular Access Complication  Outcome: Progressing  Intervention: Prevent and Manage Access Complications  Recent Flowsheet Documentation  Taken 4/7/2025 0917 by Eddie  Liane CONNER, RN  Activity Management: back to bed  Head of Bed (HOB) Positioning: HOB at 20-30 degrees  Taken 4/7/2025 0834 by Liane Morgan, RN  Bleeding Precautions: monitored for signs of bleeding  Activity Management: activity adjusted per tolerance  Taken 4/7/2025 0730 by Liane Morgan, RN  Activity Management: up in chair   Goal Outcome Evaluation:    Pt is pleasant and oriented to self. VSS. On room air. Denies pain.Albumin 50g given this morning. Pt is from halfway, will likely return at discharge. She uses a walker with SBA to toilet. All alarms are active.         Cardiac rhythm is A-paced underlying SR with BBB.    Pt needs dobutamine stress Echo prior to discharge or on outpatient basis.

## 2025-04-07 NOTE — CONSULTS
Care Management Initial Consult    General Information  Assessment completed with: Gabriella Justice  Type of CM/SW Visit: Initial Assessment    Primary Care Provider verified and updated as needed: Yes   Readmission within the last 30 days: current reason for admission unrelated to previous admission   Return Category: Exacerbation of disease  Reason for Consult: discharge planning  Advance Care Planning: Advance Care Planning Reviewed: present on chart          Communication Assessment  Patient's communication style: spoken language (English or Bilingual)             Cognitive  Cognitive/Neuro/Behavioral: .WDL except  Level of Consciousness: alert, confused  Arousal Level: opens eyes spontaneously  Orientation: disoriented to, situation  Mood/Behavior: calm, cooperative  Best Language: 0 - No aphasia  Speech: spontaneous, clear    Living Environment:   People in home: sibling(s), facility resident  brother Jeff  Current living Arrangements: assisted living  [facility]   Able to return to prior arrangements: yes       Family/Social Support:  Care provided by: self, homecare agency  Provides care for: no one, unable/limited ability to care for self  Marital Status: Single  Support system: Facility resident(s)/Staff, Friend, Sibling(s)          Description of Support System: Supportive    Support Assessment: Adequate family and caregiver support    Current Resources:   Patient receiving home care services:          Community Resources: [facility]  Equipment currently used at home: walker, rolling  Supplies currently used at home: Incontinence Supplies    Employment/Financial:  Employment Status: retired        Financial Concerns:             Does the patient's insurance plan have a 3 day qualifying hospital stay waiver?  No    Lifestyle & Psychosocial Needs:  Social Drivers of Health     Food Insecurity: Unknown (12/23/2024)    Food Insecurity     Within the past 12 months, did  you worry that your food would run out before you got money to buy more?: Patient unable to answer     Within the past 12 months, did the food you bought just not last and you didn t have money to get more?: Patient unable to answer   Depression: Not at risk (3/6/2025)    PHQ-2     PHQ-2 Score: 0   Housing Stability: Unknown (12/23/2024)    Housing Stability     Do you have housing? : Patient unable to answer     Are you worried about losing your housing?: Patient unable to answer   Tobacco Use: Low Risk  (3/24/2025)    Patient History     Smoking Tobacco Use: Never     Smokeless Tobacco Use: Never     Passive Exposure: Not on file   Financial Resource Strain: Unknown (12/23/2024)    Financial Resource Strain     Within the past 12 months, have you or your family members you live with been unable to get utilities (heat, electricity) when it was really needed?: Patient unable to answer   Alcohol Use: Not on file   Transportation Needs: Unknown (12/23/2024)    Transportation Needs     Within the past 12 months, has lack of transportation kept you from medical appointments, getting your medicines, non-medical meetings or appointments, work, or from getting things that you need?: Patient unable to answer   Physical Activity: Not on file   Interpersonal Safety: Low Risk  (4/3/2025)    Interpersonal Safety     Do you feel physically and emotionally safe where you currently live?: Yes     Within the past 12 months, have you been hit, slapped, kicked or otherwise physically hurt by someone?: No     Within the past 12 months, have you been humiliated or emotionally abused in other ways by your partner or ex-partner?: No   Stress: Not on file   Social Connections: Not on file   Health Literacy: Not on file       Functional Status:  Prior to admission patient needed assistance:   Dependent ADLs:: Ambulation-walker, Bathing, Dressing  Dependent IADLs:: Medication Management, Meal Preparation, Laundry, Cleaning, Transportation        Mental Health Status:  Mental Health Status: No Current Concerns       Chemical Dependency Status:  Chemical Dependency Status: No Current Concerns             Values/Beliefs:  Spiritual, Cultural Beliefs, Sikh Practices, Values that affect care:                 Discussed  Partnership in Safe Discharge Planning  document with patient/family: No    Additional Information:    Initial CM/SW assessment.  Met briefly with patient, she is pleasant, unable to answer most questions.  Inquired, she confirmed to call her friend Gabriella.    Spoke with Gabriella via telephone who confirms information.  Janette lives at [facility] in [place]; she and her brother Jeff share an apartment here.  Gabriella reports Jeff has some cognitive disabilities.  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  Gabriella confirms they have good support in current living setting.  Janette is assisted with meds 2x/day, dressing 1x/day, 3meals/day, and weekly cleaning, laundry, & shower.  Gabriella confirms 2nd contact would be cousin Rosana Codi.  Gabriella states she is not available until after 4pm for transportation needs; cousin Rosana may be available, otherwise wheelchair transportation.  Discussed potential OOP costs, Gabriella expressed understanding.     Spoke with RAHEEL Gross at [facility].  She confirms above services; also, Janette uses a walker for mobility in and out of the apartment.  She confirms Janette was scheduled to have PT & OT consults this week for possible supports; Ginny request these be ordered upon hospital discharge to get them set up for her again.  When ready to discharge, Ginny requests Pat be back by 3pm on day of discharge.    Contacts:  Gabriella Givens, friend - [phone] - she reports her VM is not working, please don't leave a message but try calling back.                       Rosana Aguirre, cousin - [phone]                    Counts include 234 beds at the [facility] - [phone], X3 (for nursing)    Next Steps:     Has been off 1:1 since @6pm on 04/06, watch to confirm this continues.  CM continues to follow for possible discharge needs and recommendations.      Lizzette Esteban, MEAGANW

## 2025-04-07 NOTE — PROGRESS NOTES
Pat is being closely followed by Structural Valve team for aortic stenosis. She requires a dobutamine stress echo to help determine severity of her aortic stenosis and work up for Transcatheter Aortic Valve Replacement.  Structural team (Dr. Dillon) would prefer for this stress echo to be done prior to discharge from this hospitalization. As patient has already received beta blocker today 4/7 would prefer to do DSE on 4/8 and then from Valve team standpoint patient would be clear to discharge. If DSE cannot be done by end of day 4/8 then patient may discharge (if medically ready) and we will have to bring her back for outpatient DSE- however, not preferred.   Orders placed for Dobutamine stress echo and nursing communication order placed.     Please feel free to contact me with any concerns or questions.   Thank you     Peyton Horan RN BSN  Valve Clinic Coordinator  [facility]  [phone]

## 2025-04-08 ENCOUNTER — APPOINTMENT (OUTPATIENT)
Dept: PHYSICAL THERAPY | Facility: HOSPITAL | Age: 85
End: 2025-04-08
Attending: INTERNAL MEDICINE
Payer: COMMERCIAL

## 2025-04-08 LAB
ANION GAP SERPL CALCULATED.3IONS-SCNC: 9 MMOL/L (ref 7–15)
BUN SERPL-MCNC: 48.2 MG/DL (ref 8–23)
CALCIUM SERPL-MCNC: 8.9 MG/DL (ref 8.8–10.4)
CHLORIDE SERPL-SCNC: 104 MMOL/L (ref 98–107)
CREAT SERPL-MCNC: 1.27 MG/DL (ref 0.51–0.95)
EGFRCR SERPLBLD CKD-EPI 2021: 42 ML/MIN/1.73M2
GLUCOSE SERPL-MCNC: 121 MG/DL (ref 70–99)
HCO3 SERPL-SCNC: 30 MMOL/L (ref 22–29)
HOLD SPECIMEN: NORMAL
POTASSIUM SERPL-SCNC: 3.9 MMOL/L (ref 3.4–5.3)
SODIUM SERPL-SCNC: 143 MMOL/L (ref 135–145)

## 2025-04-08 PROCEDURE — 999N000111 HC STATISTIC OT IP EVAL DEFER

## 2025-04-08 PROCEDURE — 250N000013 HC RX MED GY IP 250 OP 250 PS 637: Performed by: INTERNAL MEDICINE

## 2025-04-08 PROCEDURE — 82374 ASSAY BLOOD CARBON DIOXIDE: CPT | Performed by: INTERNAL MEDICINE

## 2025-04-08 PROCEDURE — 250N000013 HC RX MED GY IP 250 OP 250 PS 637: Performed by: NURSE PRACTITIONER

## 2025-04-08 PROCEDURE — 210N000001 HC R&B IMCU HEART CARE

## 2025-04-08 PROCEDURE — 97161 PT EVAL LOW COMPLEX 20 MIN: CPT | Mod: GP

## 2025-04-08 PROCEDURE — 36415 COLL VENOUS BLD VENIPUNCTURE: CPT | Performed by: INTERNAL MEDICINE

## 2025-04-08 PROCEDURE — 97116 GAIT TRAINING THERAPY: CPT | Mod: GP

## 2025-04-08 PROCEDURE — 80048 BASIC METABOLIC PNL TOTAL CA: CPT | Performed by: INTERNAL MEDICINE

## 2025-04-08 PROCEDURE — 99233 SBSQ HOSP IP/OBS HIGH 50: CPT | Performed by: INTERNAL MEDICINE

## 2025-04-08 PROCEDURE — 99232 SBSQ HOSP IP/OBS MODERATE 35: CPT | Performed by: INTERNAL MEDICINE

## 2025-04-08 RX ADMIN — TORSEMIDE 20 MG: 20 TABLET ORAL at 08:50

## 2025-04-08 RX ADMIN — APIXABAN 2.5 MG: 2.5 TABLET, FILM COATED ORAL at 08:50

## 2025-04-08 RX ADMIN — ATORVASTATIN CALCIUM 10 MG: 10 TABLET, FILM COATED ORAL at 08:50

## 2025-04-08 RX ADMIN — APIXABAN 2.5 MG: 2.5 TABLET, FILM COATED ORAL at 21:01

## 2025-04-08 ASSESSMENT — ACTIVITIES OF DAILY LIVING (ADL)
ADLS_ACUITY_SCORE: 65
ADLS_ACUITY_SCORE: 65
ADLS_ACUITY_SCORE: 62
ADLS_ACUITY_SCORE: 65
ADLS_ACUITY_SCORE: 65
ADLS_ACUITY_SCORE: 62
ADLS_ACUITY_SCORE: 65
ADLS_ACUITY_SCORE: 62
ADLS_ACUITY_SCORE: 62
ADLS_ACUITY_SCORE: 65
ADLS_ACUITY_SCORE: 62
ADLS_ACUITY_SCORE: 65
ADLS_ACUITY_SCORE: 65
ADLS_ACUITY_SCORE: 62
ADLS_ACUITY_SCORE: 62
ADLS_ACUITY_SCORE: 65

## 2025-04-08 NOTE — PROGRESS NOTES
Occupational Therapy: Orders received. Chart reviewed and discussed with care team; PT. Occupational Therapy as per report, no ADL needs & apparently @ her prior level of functioning. Pt has some services in place in her home. Will complete orders. Thank you.  Danielle GALEAS/DAVID

## 2025-04-08 NOTE — PLAN OF CARE
Bed alarm on, room door open. Resting most of evening.     AOx2 to self and place; VSS on RA; SR w/ 1st degree AVB, BBB and occasional A-pacing on tele; denies pain, CP, SOB, dizziness.  Able to make needs known, call light in reach.    Lucia Cifuentes RN              Goal Outcome Evaluation:      Plan of Care Reviewed With: patient    Overall Patient Progress: no changeOverall Patient Progress: no change         Problem: Adult Inpatient Plan of Care  Goal: Plan of Care Review  Description: The Plan of Care Review/Shift note should be completed every shift.  The Outcome Evaluation is a brief statement about your assessment that the patient is improving, declining, or no change.  This information will be displayed automatically on your shiftnote.  Outcome: Progressing  Flowsheets (Taken 4/7/2025 1900)  Plan of Care Reviewed With: patient  Overall Patient Progress: no change  Goal: Absence of Hospital-Acquired Illness or Injury  Intervention: Identify and Manage Fall Risk  Recent Flowsheet Documentation  Taken 4/7/2025 1600 by Lucia Cifuentes RN  Safety Promotion/Fall Prevention:   increased rounding and observation   clutter free environment maintained   supervised activity   lighting adjusted   mobility aid in reach   activity supervised   assistive device/personal items within reach   room near nurse's station   room door open   safety round/check completed   nonskid shoes/slippers when out of bed  Intervention: Prevent Skin Injury  Recent Flowsheet Documentation  Taken 4/7/2025 1600 by Lucia Cifuentes RN  Body Position: position changed independently  Intervention: Prevent and Manage VTE (Venous Thromboembolism) Risk  Recent Flowsheet Documentation  Taken 4/7/2025 1600 by Lucia Cifuentes RN  VTE Prevention/Management: SCDs off (sequential compression devices)  Intervention: Prevent Infection  Recent Flowsheet Documentation  Taken 4/7/2025 1600 by Lucia Cifuentes RN  Infection Prevention:   rest/sleep promoted   single  patient room provided   hand hygiene promoted   environmental surveillance performed   personal protective equipment utilized     Problem: Delirium  Goal: Improved Behavioral Control  Intervention: Prevent and Manage Agitation  Recent Flowsheet Documentation  Taken 4/7/2025 1600 by Lucia Cifuentes RN  Environment Familiarity/Consistency: daily routine followed  Intervention: Minimize Safety Risk  Recent Flowsheet Documentation  Taken 4/7/2025 1600 by Lucia Cifuentes RN  Enhanced Safety Measures:   room near unit station   pain management   review medications for side effects with activity   assistive devices when indicated  Goal: Improved Attention and Thought Clarity  Intervention: Maximize Cognitive Function  Recent Flowsheet Documentation  Taken 4/7/2025 1600 by Lucia Cifuentes RN  Sensory Stimulation Regulation:   care clustered   lighting decreased   quiet environment promoted   television on  Reorientation Measures:   clock in view   reorientation provided  Goal: Improved Sleep  Intervention: Promote Sleep  Recent Flowsheet Documentation  Taken 4/7/2025 1600 by Lucia Cifuentes RN  Sleep/Rest Enhancement:   visualization   noise level reduced     Problem: Cardiac Catheterization (Diagnostic/Interventional)  Goal: Absence of Embolism Signs and Symptoms  Intervention: Prevent or Manage Embolism  Recent Flowsheet Documentation  Taken 4/7/2025 1600 by Lucia Cifuentes RN  VTE Prevention/Management: SCDs off (sequential compression devices)  Goal: Anesthesia/Sedation Recovery  Intervention: Optimize Anesthesia Recovery  Recent Flowsheet Documentation  Taken 4/7/2025 1600 by Lucia Cifuentes RN  Safety Promotion/Fall Prevention:   increased rounding and observation   clutter free environment maintained   supervised activity   lighting adjusted   mobility aid in reach   activity supervised   assistive device/personal items within reach   room near nurse's station   room door open   safety round/check completed   nonskid  shoes/slippers when out of bed  Reorientation Measures:   clock in view   reorientation provided  Goal: Absence of Vascular Access Complication  Intervention: Prevent and Manage Access Complications  Recent Flowsheet Documentation  Taken 4/7/2025 1600 by Lucia Cifuentes RN  Activity Management: activity adjusted per tolerance  Head of Bed (HOB) Positioning: HOB at 20-30 degrees     Problem: Confusion Chronic  Goal: Optimal Cognitive Function  Intervention: Minimize Injury Risk and Provide Safety  Recent Flowsheet Documentation  Taken 4/7/2025 1600 by Lucia Cifuentes RN  Enhanced Safety Measures:   room near unit station   pain management   review medications for side effects with activity   assistive devices when indicated  Intervention: Minimize and Manage Confusion  Recent Flowsheet Documentation  Taken 4/7/2025 1600 by Lucia Cifuentes RN  Sensory Stimulation Regulation:   care clustered   lighting decreased   quiet environment promoted   television on  Reorientation Measures:   clock in view   reorientation provided  Environmental Support:   calm environment promoted   caregiver consistency promoted   distractions minimized   personal routine supported  Environment Familiarity/Consistency: daily routine followed

## 2025-04-08 NOTE — PROGRESS NOTES
"   04/08/25 1510   Appointment Info   Signing Clinician's Name / Credentials (PT) Marylin Dominique DPT   Living Environment   People in Home sibling(s)   Current Living Arrangements assisted living   Home Accessibility no concerns   Self-Care   Usual Activity Tolerance moderate   Current Activity Tolerance moderate   Equipment Currently Used at Home walker, rolling  (4WW)   Activity/Exercise/Self-Care Comment per pt report, \"I do everything by myself, no one helps me.\" from chart review: receives assist for bathing from facility. brother assists with IADLs. [facility] manages meds.   General Information   Onset of Illness/Injury or Date of Surgery 04/03/25   Referring Physician Lucian Saleh MD   Patient/Family Therapy Goals Statement (PT) go home   Pertinent History of Current Problem (include personal factors and/or comorbidities that impact the POC) Laney Hahn is a 84 year old female with PMH CKD III, PAF, hypertension, pacemaker, hyperlipidemia, dementia, aortic stenosis, and HFrEF is admitted on 4/3/2025 for planned elective coronary angiogram and right heart catheterization for pre-TAVR workup.  Patient admitted for IV diuresis.Workup for pre TAVR will resume once heart failure improves   Existing Precautions/Restrictions fall   Cognition   Cognitive Status Comments unable to recall procedures and tests upcoming   Pain Assessment   Patient Currently in Pain No   Range of Motion (ROM)   Range of Motion ROM is WFL   Strength (Manual Muscle Testing)   Strength (Manual Muscle Testing) strength is WFL   Strength Comments generalized weakness but functional   Bed Mobility   Bed Mobility supine-sit-supine   Supine-Sit-Supine [place] (Bed Mobility) supervision;verbal cues   Assistive Device (Bed Mobility) bed rails   Comment, (Bed Mobility) impulsive at times   Transfers   Transfers sit-stand transfer   Sit-Stand Transfer   Sit-Stand [place] (Transfers) supervision   Assistive Device (Sit-Stand " Transfers)   (none)   Comment, (Sit-Stand Transfer) impulsive   Gait/Stairs (Locomotion)   [place] Level (Gait) supervision   Assistive Device (Gait) walker, 4-wheeled   Distance in Feet (Gait) 20'   Pattern (Gait) step-through   Comment, (Gait/Stairs) no difficulties   Balance   Balance Comments looking L and R during amb, no LOB noted   Clinical Impression   Criteria for Skilled Therapeutic Intervention Yes, treatment indicated   PT Diagnosis (PT) impaired functional mobility, gait abnormality   Influenced by the following impairments decreased strength, decreased endurance   Functional limitations due to impairments gait, transfers, bed mob   Clinical Presentation (PT Evaluation Complexity) stable   Clinical Presentation Rationale pt presents as medically diagnosed   Clinical Decision Making (Complexity) low complexity   Planned Therapy Interventions (PT) balance training;bed mobility training;gait training;home exercise program;neuromuscular re-education;patient/family education;strengthening;transfer training   Risk & Benefits of therapy have been explained evaluation/treatment results reviewed;patient   PT Total Evaluation Time   PT Eval, Low Complexity Minutes (37256) 10   Physical Therapy Goals   PT Frequency One time eval and treatment only   PT Predicted Duration/Target Date for Goal Attainment 04/08/25   PT: Bed Mobility Supervision/stand-by assist;Supine to/from sit;Goal Met;Completed   PT: Transfers Supervision/stand-by assist;Sit to/from stand;Bed to/from chair;Assistive device;Goal Met;Completed   PT: Gait Supervision/stand-by assist;Rolling walker;Greater than 200 feet;Goal Met;Completed   Interventions   Interventions Quick Adds Gait Training   Gait Training   Gait Training Minutes (34253) 10   Symptoms Noted During/After Treatment (Gait Training) none   Treatment Detail/Skilled Intervention amb x additional 400' with 4WW, cues for safety with walker and mobility. no reports of dizziness or  light headedness/SOB noted. Pt reports she has been walking multiple times with RN staff. Occasional cues for upright during ambulation d/t distractions at times. Educated pt to continue walking with RN staff.   Distance in Feet 400'   [place] Level (Gait Training) stand-by assist   Physical Assistance Level (Gait Training) verbal cues;1 person assist   Weight Bearing (Gait Training) weight-bearing as tolerated   Assistive Device (Gait Training) rolling walker  (4WW)   Pattern Analysis (Gait Training) swing-through gait   Gait Analysis Deviations decreased javed   PT Discharge Planning   PT Plan dc PT   PT Discharge Recommendation (DC Rec) home with assist   PT Rationale for DC Rec safe for home d/c with family support   PT Brief overview of current status supine <> sit SBA, sit <> stand SBA, amb x 420' SBA with 4WW   PT Total Distance Amb During Session (feet) 420   PT Equipment Needed at Discharge walker, rolling  (owns 4WW)   Physical Therapy Time and Intention   Timed Code Treatment Minutes 10   Total Session Time (sum of timed and untimed services) 20

## 2025-04-08 NOTE — PLAN OF CARE
Problem: Delirium  Goal: Optimal Coping  Outcome: Progressing  Intervention: Optimize Psychosocial Adjustment to Delirium  Recent Flowsheet Documentation  Taken 4/8/2025 0005 by Portillo Jackson RN  Family/Support System Care:   family care conference arranged   presence promoted   self-care encouraged  Goal: Improved Behavioral Control  Outcome: Progressing  Intervention: Minimize Safety Risk  Recent Flowsheet Documentation  Taken 4/8/2025 0400 by Portillo Jackson RN  Enhanced Safety Measures:   room near unit station   review medications for side effects with activity   pain management  Trust Relationship/Rapport: care explained  Taken 4/8/2025 0005 by Portillo Jackson RN  Trust Relationship/Rapport:   care explained   choices provided   empathic listening provided   questions answered   questions encouraged   reassurance provided   thoughts/feelings acknowledged  Taken 4/8/2025 0000 by Portillo Jackson RN  Enhanced Safety Measures:   room near unit station   review medications for side effects with activity   pain management  Trust Relationship/Rapport: care explained  Goal: Improved Attention and Thought Clarity  Outcome: Not Progressing  Intervention: Maximize Cognitive Function  Recent Flowsheet Documentation  Taken 4/8/2025 0400 by Portillo Jackson RN  Sensory Stimulation Regulation:   care clustered   lighting decreased   music on   tactile stimulation minimized   tactile stimulation provided   visual stimulation minimized   visual stimulation provided  Reorientation Measures:   calendar in view   clock in view  Taken 4/8/2025 0005 by Portillo Jackson RN  Sensory Stimulation Regulation:   care clustered   lighting decreased   television on   visitors limited  Reorientation Measures:   calendar in view   clock in view  Taken 4/8/2025 0000 by Portillo Jackson, RN  Sensory Stimulation Regulation:   care clustered   lighting decreased   music on   tactile  stimulation minimized   tactile stimulation provided   visual stimulation minimized   visual stimulation provided  Reorientation Measures:   calendar in view   clock in view  Goal: Improved Sleep  Outcome: Not Progressing     Problem: Confusion Chronic  Goal: Optimal Cognitive Function  Outcome: Not Progressing  Intervention: Minimize Injury Risk and Provide Safety  Recent Flowsheet Documentation  Taken 4/8/2025 0400 by Portillo Jackson RN  Enhanced Safety Measures:   room near unit station   review medications for side effects with activity   pain management  Taken 4/8/2025 0000 by Portillo Jackson RN  Enhanced Safety Measures:   room near unit station   review medications for side effects with activity   pain management  Intervention: Minimize and Manage Confusion  Recent Flowsheet Documentation  Taken 4/8/2025 0400 by Portillo Jackson RN  Sensory Stimulation Regulation:   care clustered   lighting decreased   music on   tactile stimulation minimized   tactile stimulation provided   visual stimulation minimized   visual stimulation provided  Reorientation Measures:   calendar in view   clock in view  Taken 4/8/2025 0005 by Portillo Jackson RN  Sensory Stimulation Regulation:   care clustered   lighting decreased   television on   visitors limited  Reorientation Measures:   calendar in view   clock in view  Family/Support System Care:   family care conference arranged   presence promoted   self-care encouraged  Taken 4/8/2025 0000 by Portillo Jackson RN  Sensory Stimulation Regulation:   care clustered   lighting decreased   music on   tactile stimulation minimized   tactile stimulation provided   visual stimulation minimized   visual stimulation provided  Reorientation Measures:   calendar in view   clock in view   Goal Outcome Evaluation:      Cardiac:  V-Paced with 100% capture.  Rate 70-80's.  Respiratory:  Rate 16-20, non-labored.  Sat's: Maintaining mid 90's at RA.  LS:  Diminished in the mid fields and bases, bilat.  Neuro:  WNL.  GI:  Passing flatus.  BS: Present X4 quadrants.  No BM for HS/NOC.  :  Voiding and missing hat in toilet.  Pain:  Denies.  Ambulation:  Up with assist X1 with belt and walker.  Labs:  AM labs pending.  Plan:  Telemetry. Dobutamine Stress test today.  Pending possible discharge.

## 2025-04-08 NOTE — PROGRESS NOTES
HEART CARE NOTE          Assessment/Recommendations   1. HFrEF c/b severe ADHF  Assessment / Plan  Tolerating oral diuretic regimen - no changes at this time; continue to monitor UOP and renal function closely   Patient is high risk for adverse cardiac events 2/2 advanced age, frailty, valvular heart disease, systolic dysfunction     Current Pharmacotherapy AHA Guideline-Directed Medical Therapy   Lisinopril not started Lisinopril 20 mg twice daily   Metoprolol succinate 50 mg daily Carvedilol 25 mg twice daily   Spironolactone - not started Spironolactone 25 mg once daily   Hydralazine NA Hydralazine 100 mg three times daily   Isosorbide dinitrate NA Isosorbide dinitrate 40 mg three times daily   SGLT2 inhibitor:Dapagliflozin/Empagliflozin - not started Dapagliflozin or Empagliflozin 10 mg daily      2. Valvular heart disease  Assessment / Plan  Severe aortic stenosis; TAVR eval underway; Structural Interventional Cardiology following along. Please contact their team directly with any questions or concerns regarding TAVR; plan for DSE this admission     3. JESUS on CKD stage 3  Assessment / Plan  CRS; resolving; diuresis as above; continue to monitor UOP and renal function closey     4. HTN  Assessment / Plan  Adequate control - no additional recommendations as this time     5. Afib  Assessment / Plan  Paroxysmal; currently on apixaban  S/p PPM     6. HLP  Assessment / Plan  Currently on atorvastatin    Plan of care discussed on April 8, 2025 with patient at bedside, and primary team overseeing patient's care    Cardiology team will sign-off for now. Discharge on currently prescribed cardiac meds. Please do not hesitate to consult us again if new questions or concerns arise. Follow-up appointment will be arranged by CORE/HF clinic.     History of Present Illness/Subjective    Ms. Laney Hahn is a 84 year old female with a PMHx significant for (per Epic notation) CKD III, PAF, hypertension, pacemaker,  hyperlipidemia, dementia, aortic stenosis, and HFrEF is admitted on 4/3/2025 for planned elective coronary angiogram and right heart catheterization for pre-TAVR workup.  Patient admitted for IV diuresis.Workup for pre TAVR will resume once heart failure improves     Today, Mrs. Hahn denies acute cardiac events or complaints; Management plan as detailed above     ECG: Personally reviewed. Paced rhythm.     ECHO (personnaly Reviewed on 4/4/25):   The left ventricle is normal in size with normal left ventricular wall  thickness.  Left ventricular function is decreased. The ejection fraction is 30-35%  (moderately reduced).  The right ventricle is normal size with mildly decreased right ventricular  systolic function  The left atrium is mildly dilated.  Severe low flow, low gradient aortic stenosis is present with a peak velocity  of 2.9 m/s, mean gradient 21 mmHg, SVi 28 ml/m2, LANNY 0.6 cm2, DI 0.22.  Mild calcific mitral stenosis is present.  Compared to the prior study of 9/12/22, the degree of aortic stenosis has  advanced.     Hemodynamically significant valve disease is identified. Recommend referral to  valve clinic for further evaluation. Valve clinic phone number: [phone].  [facility] Epic: [facility] Valve Clinic [facility].    Lab results: personally reviewed April 8, 2025; notable for resolving JESUS    Medical history and pertinent documents reviewed in Care Everywhere please where applicable see details above        Physical Examination Review of Systems   BP (!) 144/69 (BP Location: Right arm)   Pulse 73   Temp 98  F (36.7  C) (Oral)   Resp 18   Wt 54.5 kg (120 lb 1.6 oz)   LMP  (LMP Unknown)   SpO2 98%   BMI 20.62 kg/m    Body mass index is 20.62 kg/m .  Wt Readings from Last 3 Encounters:   04/08/25 54.5 kg (120 lb 1.6 oz)   03/24/25 58 kg (127 lb 13.9 oz)   03/06/25 57.2 kg (126 lb)     General Appearance:   no distress, normal body habitus   ENT/Mouth: membranes moist, no oral lesions  or bleeding gums.      EYES:  no scleral icterus, normal conjunctivae   Neck: no carotid bruits or thyromegaly   Chest/Lungs:   lungs are clear to auscultation, no rales or wheezing, equal chest wall expansion    Cardiovascular:   Regular. Normal first and second heart sounds with +TROY; no rubs, or gallops; the carotid, radial and posterior tibial pulses are intact, no JVD or LE edema bilaterally    Abdomen:  no organomegaly, masses, bruits, or tenderness; bowel sounds are present   Extremities: no cyanosis or clubbing   Skin: no xanthelasma, warm.    Neurologic: NAD     Psychiatric: alert and calm     A complete 10 systems ROS was reviewed  And is negative except what is listed in the HPI.          Medical History  Surgical History Family History Social History   Past Medical History:   Diagnosis Date    COVID-19 09/14/2020    positive test at [facility]    DNAR (do not attempt resuscitation)     Dyslipidemia, goal LDL below 70 09/15/2020    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 Nini Rasmussen    Metabolic encephalopathy     Moderate aortic valve stenosis 08/22/2017    stable on 2020 echo    Nonobstructive atherosclerosis of coronary artery 09/15/2020    with normal LVEDP    Paroxysmal SVT (supraventricular tachycardia) 09/07/2017    said to have short episodes while hospitalized for UTI per Dr. Rhys Mensah    Syncope 08/22/2017    UTI (urinary tract infection) 08/21/2017    hospitalized with weakness    Past Surgical History:   Procedure Laterality Date    CATARACT EXTRACTION, BILATERAL      CV CORONARY ANGIOGRAM N/A 9/15/2020    Procedure: Coronary Angiogram;  Surgeon: Radhika Santa MD;  Location: [facility] Cath Lab;  Service: Cardiology    CV CORONARY ANGIOGRAM N/A 4/3/2025    Procedure: Coronary Angiogram;  Surgeon: Tyrese Dillon MD;  Location: [facility] CATH LAB CV    CV LEFT HEART  CATH N/A 4/3/2025    Procedure: Left Heart Catheterization;  Surgeon: Tyrese Dillon MD;  Location: [facility] CATH [facility] CV    CV LEFT HEART CATHETERIZATION WITHOUT LEFT VENTRICULOGRAM Left 9/15/2020    Procedure: Left Heart Catheterization Without Left Ventriculogram;  Surgeon: Radhika Santa MD;  Location: [facility] Cath Lab;  Service: Cardiology    CV RIGHT HEART CATH MEASUREMENTS RECORDED N/A 4/3/2025    Procedure: Right Heart Catheterization with invasive aortic vave gradient study;  Surgeon: Tyrese Dillon MD;  Location: [facility] CV    EP PACEMAKER INSERT N/A 4/13/2021    Procedure: EP Pacemaker Insertion;  Surgeon: Frederic Burgos MD;  Location: [facility] Cardiac Cath Lab;  Service: Cardiology    HYSTERECTOMY      PARTIAL GASTRECTOMY  1969    for ulcers    TONSILLECTOMY      no family history of premature coronary artery disease Social History     Socioeconomic History    Marital status: Single     Spouse name: Not on file    Number of children: 0    Years of education: Not on file    Highest education level: Not on file   Occupational History    Not on file   Tobacco Use    Smoking status: Never    Smokeless tobacco: Never   Vaping Use    Vaping status: Never Used   Substance and Sexual Activity    Alcohol use: No    Drug use: No    Sexual activity: Not on file   Other Topics Concern    Not on file   Social History Narrative    Her adopted brother, Jeff, lives with her. He is five years younger than her.     Social Drivers of Health     Financial Resource Strain: Unknown (12/23/2024)    Financial Resource Strain     Within the past 12 months, have you or your family members you live with been unable to get utilities (heat, electricity) when it was really needed?: Patient unable to answer   Food Insecurity: Unknown (12/23/2024)    Food Insecurity     Within the past 12 months, did you worry that your food would run out before you got money to buy more?: Patient unable to answer      Within the past 12 months, did the food you bought just not last and you didn t have money to get more?: Patient unable to answer   Transportation Needs: Unknown (12/23/2024)    Transportation Needs     Within the past 12 months, has lack of transportation kept you from medical appointments, getting your medicines, non-medical meetings or appointments, work, or from getting things that you need?: Patient unable to answer   Physical Activity: Not on file   Stress: Not on file   Social Connections: Not on file   Interpersonal Safety: Low Risk  (4/3/2025)    Interpersonal Safety     Do you feel physically and emotionally safe where you currently live?: Yes     Within the past 12 months, have you been hit, slapped, kicked or otherwise physically hurt by someone?: No     Within the past 12 months, have you been humiliated or emotionally abused in other ways by your partner or ex-partner?: No   Housing Stability: Unknown (12/23/2024)    Housing Stability     Do you have housing? : Patient unable to answer     Are you worried about losing your housing?: Patient unable to answer           Lab Results    Chemistry/lipid CBC Cardiac Enzymes/BNP/TSH/INR   Lab Results   Component Value Date    CHOL 137 04/27/2023    HDL 42 (L) 04/27/2023    TRIG 164 (H) 04/27/2023    BUN 48.2 (H) 04/08/2025     04/08/2025    CO2 30 (H) 04/08/2025    Lab Results   Component Value Date    WBC 5.0 04/03/2025    HGB 12.7 04/03/2025    HCT 37.6 04/03/2025    MCV 99 04/03/2025     04/03/2025    Lab Results   Component Value Date    TROPONINI 0.02 09/11/2022    BNP 1,170 (H) 06/10/2021    TSH 1.53 04/27/2023    INR 1.15 03/24/2025     Lab Results   Component Value Date    TROPONINI 0.02 09/11/2022          Weight:    Wt Readings from Last 3 Encounters:   04/08/25 54.5 kg (120 lb 1.6 oz)   03/24/25 58 kg (127 lb 13.9 oz)   03/06/25 57.2 kg (126 lb)       Allergies  No Known Allergies      Surgical History  Past Surgical History:    Procedure Laterality Date    CATARACT EXTRACTION, BILATERAL      CV CORONARY ANGIOGRAM N/A 9/15/2020    Procedure: Coronary Angiogram;  Surgeon: Radhika Santa MD;  Location: [facility] Cath Lab;  Service: Cardiology    CV CORONARY ANGIOGRAM N/A 4/3/2025    Procedure: Coronary Angiogram;  Surgeon: Tyrese Dillon MD;  Location: [facility] LAB CV    CV LEFT HEART CATH N/A 4/3/2025    Procedure: Left Heart Catheterization;  Surgeon: Tyrese Dillon MD;  Location: [facility] LAB CV    CV LEFT HEART CATHETERIZATION WITHOUT LEFT VENTRICULOGRAM Left 9/15/2020    Procedure: Left Heart Catheterization Without Left Ventriculogram;  Surgeon: Radhika Santa MD;  Location: [facility] Cath Lab;  Service: Cardiology    CV RIGHT HEART CATH MEASUREMENTS RECORDED N/A 4/3/2025    Procedure: Right Heart Catheterization with invasive aortic vave gradient study;  Surgeon: Tyrese Dillon MD;  Location: [facility] CV    EP PACEMAKER INSERT N/A 4/13/2021    Procedure: EP Pacemaker Insertion;  Surgeon: Frederic Burgos MD;  Location: [facility] Cardiac Cath Lab;  Service: Cardiology    HYSTERECTOMY      PARTIAL GASTRECTOMY  1969    for ulcers    TONSILLECTOMY         Social History  Tobacco:   History   Smoking Status    Never   Smokeless Tobacco    Never    Alcohol:   Social History    Substance and Sexual Activity      Alcohol use: No   Illicit Drugs:   History   Drug Use No       Family History  Family History   Adopted: Yes          Pierre Cronin MD on 4/8/2025      cc: Rose Mcelroy

## 2025-04-08 NOTE — PLAN OF CARE
Problem: Confusion Chronic  Goal: Optimal Cognitive Function  Outcome: Progressing  Intervention: Minimize Injury Risk and Provide Safety  Recent Flowsheet Documentation  Taken 4/8/2025 0759 by Liane Morgan, RN  Enhanced Safety Measures:   room near unit station   review medications for side effects with activity   pain management  Intervention: Minimize and Manage Confusion  Recent Flowsheet Documentation  Taken 4/8/2025 1305 by Liane Morgan, RN  Self-Care Promotion: independence encouraged  Taken 4/8/2025 0759 by Liane Morgan, RN  Sensory Stimulation Regulation:   care clustered   lighting decreased   music on   tactile stimulation minimized   tactile stimulation provided   visual stimulation minimized   visual stimulation provided  Reorientation Measures:   calendar in view   clock in view  Self-Care Promotion: independence encouraged  Environmental Support:   calm environment promoted   caregiver consistency promoted   distractions minimized   personal routine supported  Environment Familiarity/Consistency: daily routine followed   Goal Outcome Evaluation:    Pt was awaiting discharge for dobutamine stress echo. Metoprolol was held this morning for the procedure. Unable to complete today. Patient has no swelling to LE's. Poor intake, needs encouragement to eat and drink.     Pt is SBA with gait belt and walker. Denies pain.    Cardiac rhythm is A-paced with underlying SR with BBB.     No protocols.

## 2025-04-08 NOTE — PLAN OF CARE
Physical Therapy Discharge Summary    Reason for therapy discharge:    All goals and outcomes met, no further needs identified.    Progress towards therapy goal(s). See goals on Care Plan in [facility] electronic health record for goal details.  Goals met    Therapy recommendation(s):    Continue ambulation with RN staff while hospitalized to maintain strength and endurance levels prior to returning back to [facility]. Recommending family assist at [facility] as needed.    Marylin Dominique, PT, DPT  4/8/2025

## 2025-04-09 ENCOUNTER — APPOINTMENT (OUTPATIENT)
Dept: CARDIOLOGY | Facility: HOSPITAL | Age: 85
DRG: 286 | End: 2025-04-09
Attending: INTERNAL MEDICINE
Payer: COMMERCIAL

## 2025-04-09 ENCOUNTER — DOCUMENTATION ONLY (OUTPATIENT)
Dept: CARDIOLOGY | Facility: CLINIC | Age: 85
End: 2025-04-09

## 2025-04-09 VITALS
HEART RATE: 80 BPM | BODY MASS INDEX: 20.43 KG/M2 | WEIGHT: 119 LBS | SYSTOLIC BLOOD PRESSURE: 150 MMHG | DIASTOLIC BLOOD PRESSURE: 62 MMHG | TEMPERATURE: 98.2 F | RESPIRATION RATE: 16 BRPM | OXYGEN SATURATION: 96 %

## 2025-04-09 LAB
ANION GAP SERPL CALCULATED.3IONS-SCNC: 9 MMOL/L (ref 7–15)
BUN SERPL-MCNC: 50.8 MG/DL (ref 8–23)
CALCIUM SERPL-MCNC: 9.9 MG/DL (ref 8.8–10.4)
CHLORIDE SERPL-SCNC: 101 MMOL/L (ref 98–107)
CREAT SERPL-MCNC: 1.48 MG/DL (ref 0.51–0.95)
EGFRCR SERPLBLD CKD-EPI 2021: 35 ML/MIN/1.73M2
GLUCOSE SERPL-MCNC: 140 MG/DL (ref 70–99)
HCO3 SERPL-SCNC: 31 MMOL/L (ref 22–29)
POTASSIUM SERPL-SCNC: 4.7 MMOL/L (ref 3.4–5.3)
SODIUM SERPL-SCNC: 141 MMOL/L (ref 135–145)

## 2025-04-09 PROCEDURE — P9047 ALBUMIN (HUMAN), 25%, 50ML: HCPCS | Performed by: INTERNAL MEDICINE

## 2025-04-09 PROCEDURE — 80048 BASIC METABOLIC PNL TOTAL CA: CPT | Performed by: INTERNAL MEDICINE

## 2025-04-09 PROCEDURE — 250N000013 HC RX MED GY IP 250 OP 250 PS 637: Performed by: INTERNAL MEDICINE

## 2025-04-09 PROCEDURE — 99239 HOSP IP/OBS DSCHRG MGMT >30: CPT | Performed by: INTERNAL MEDICINE

## 2025-04-09 PROCEDURE — 250N000013 HC RX MED GY IP 250 OP 250 PS 637: Performed by: NURSE PRACTITIONER

## 2025-04-09 PROCEDURE — 250N000011 HC RX IP 250 OP 636: Performed by: INTERNAL MEDICINE

## 2025-04-09 PROCEDURE — 93356 MYOCRD STRAIN IMG SPCKL TRCK: CPT | Performed by: INTERNAL MEDICINE

## 2025-04-09 PROCEDURE — 93306 TTE W/DOPPLER COMPLETE: CPT | Mod: 26 | Performed by: INTERNAL MEDICINE

## 2025-04-09 PROCEDURE — 36415 COLL VENOUS BLD VENIPUNCTURE: CPT | Performed by: INTERNAL MEDICINE

## 2025-04-09 PROCEDURE — 255N000002 HC RX 255 OP 636: Performed by: INTERNAL MEDICINE

## 2025-04-09 RX ORDER — TORSEMIDE 20 MG/1
20 TABLET ORAL DAILY
Qty: 60 TABLET | Refills: 1 | Status: ON HOLD | OUTPATIENT
Start: 2025-04-10 | End: 2025-04-13

## 2025-04-09 RX ORDER — TORSEMIDE 20 MG/1
20 TABLET ORAL DAILY
Qty: 60 TABLET | Refills: 1 | Status: SHIPPED | OUTPATIENT
Start: 2025-04-10 | End: 2025-04-09

## 2025-04-09 RX ORDER — ALBUMIN (HUMAN) 12.5 G/50ML
50 SOLUTION INTRAVENOUS ONCE
Status: COMPLETED | OUTPATIENT
Start: 2025-04-09 | End: 2025-04-09

## 2025-04-09 RX ADMIN — APIXABAN 2.5 MG: 2.5 TABLET, FILM COATED ORAL at 07:52

## 2025-04-09 RX ADMIN — PERFLUTREN 3 ML: 6.52 INJECTION, SUSPENSION INTRAVENOUS at 08:25

## 2025-04-09 RX ADMIN — METOPROLOL SUCCINATE 50 MG: 50 TABLET, EXTENDED RELEASE ORAL at 07:53

## 2025-04-09 RX ADMIN — ALBUMIN HUMAN 50 G: 0.25 SOLUTION INTRAVENOUS at 05:52

## 2025-04-09 RX ADMIN — ATORVASTATIN CALCIUM 10 MG: 10 TABLET, FILM COATED ORAL at 07:52

## 2025-04-09 RX ADMIN — TORSEMIDE 20 MG: 20 TABLET ORAL at 07:53

## 2025-04-09 ASSESSMENT — ACTIVITIES OF DAILY LIVING (ADL)
ADLS_ACUITY_SCORE: 62

## 2025-04-09 NOTE — PLAN OF CARE
Problem: Delirium  Goal: Optimal Coping  4/9/2025 0412 by Carolyn Ricci RN  Outcome: Progressing  4/8/2025 2203 by Carolyn Ricci RN  Outcome: Progressing  Goal: Improved Behavioral Control  4/9/2025 0412 by Carolyn Ricci RN  Outcome: Progressing  4/8/2025 2203 by Carolyn Ricci RN  Outcome: Progressing  Intervention: Prevent and Manage Agitation  Recent Flowsheet Documentation  Taken 4/9/2025 0332 by Carolyn Ricci RN  Environment Familiarity/Consistency: daily routine followed  Taken 4/9/2025 0045 by Carolyn Ricci RN  Environment Familiarity/Consistency: daily routine followed  Taken 4/8/2025 2130 by Carolyn Ricci RN  Environment Familiarity/Consistency: daily routine followed  Taken 4/8/2025 1600 by Carolyn Ricci RN  Environment Familiarity/Consistency: daily routine followed  Intervention: Minimize Safety Risk  Recent Flowsheet Documentation  Taken 4/9/2025 0332 by Carolyn Ricci RN  Enhanced Safety Measures:   room near unit station   review medications for side effects with activity   pain management  Trust Relationship/Rapport: care explained  Taken 4/9/2025 0045 by Carolyn Ricci RN  Enhanced Safety Measures:   room near unit station   review medications for side effects with activity   pain management  Trust Relationship/Rapport: care explained  Taken 4/8/2025 2130 by Carolyn Ricci RN  Enhanced Safety Measures:   room near unit station   review medications for side effects with activity   pain management  Trust Relationship/Rapport: care explained  Taken 4/8/2025 1600 by Carolyn Ricci RN  Enhanced Safety Measures:   room near unit station   review medications for side effects with activity   pain management  Trust Relationship/Rapport: care explained  Goal: Improved Attention and Thought Clarity  4/9/2025 0412 by Carolyn Ricci RN  Outcome: Progressing  4/8/2025 2203 by Radhames  Carolyn TARANGO RN  Outcome: Progressing  Intervention: Maximize Cognitive Function  Recent Flowsheet Documentation  Taken 4/9/2025 0332 by Carolyn Ricci RN  Sensory Stimulation Regulation:   care clustered   lighting decreased   music on   tactile stimulation minimized   tactile stimulation provided   visual stimulation minimized   visual stimulation provided  Reorientation Measures:   calendar in view   clock in view  Taken 4/9/2025 0045 by Carolyn Ricci RN  Sensory Stimulation Regulation:   care clustered   lighting decreased   music on   tactile stimulation minimized   tactile stimulation provided   visual stimulation minimized   visual stimulation provided  Reorientation Measures:   calendar in view   clock in view  Taken 4/8/2025 2130 by Carolyn Ricci RN  Sensory Stimulation Regulation:   care clustered   lighting decreased   music on   tactile stimulation minimized   tactile stimulation provided   visual stimulation minimized   visual stimulation provided  Reorientation Measures:   calendar in view   clock in view  Taken 4/8/2025 1600 by Carolyn Ricci RN  Sensory Stimulation Regulation:   care clustered   lighting decreased   music on   tactile stimulation minimized   tactile stimulation provided   visual stimulation minimized   visual stimulation provided  Reorientation Measures:   calendar in view   clock in view  Goal: Improved Sleep  4/9/2025 0412 by Carolyn Ricci RN  Outcome: Progressing  4/8/2025 2203 by Carolyn Ricci RN  Outcome: Progressing  Intervention: Promote Sleep  Recent Flowsheet Documentation  Taken 4/8/2025 1600 by Carolyn Ricci RN  Sleep/Rest Enhancement:   snack   visualization   Goal Outcome Evaluation:       Pt sleeping most of the night, still forgets to use call light when getting up. Bed alarm in place. For ECHO this AM. Possible discharge to assisted living after ECHO result is in. Albumin given this morning.

## 2025-04-09 NOTE — CARE PLAN
Heart Failure Care Map  GOALS TO BE MET BEFORE DISCHARGE:    1. Decrease congestion and/or edema with diuretic therapy to achieve near optimal volume status.     Dyspnea improved: Yes, satisfactory for discharge.   Edema improved: Yes, satisfactory for discharge.        Last 24 hour I/O:   Intake/Output Summary (Last 24 hours) at 4/9/2025 0635  Last data filed at 4/9/2025 0600  Gross per 24 hour   Intake 600 ml   Output 500 ml   Net 100 ml           Net I/O and Weights since admission:   03/10 0700 - 04/09 0659  In: 3127 [P.O.:3127]  Out: 4175 [Urine:4175]  Net: -1048     Vitals:    04/04/25 0312 04/05/25 0441 04/06/25 0446 04/07/25 0419   Weight: 53.8 kg (118 lb 11.2 oz) 53.6 kg (118 lb 1.6 oz) 53.8 kg (118 lb 11.2 oz) 54.2 kg (119 lb 8 oz)    04/08/25 0410 04/09/25 0320   Weight: 54.5 kg (120 lb 1.6 oz) 54 kg (119 lb)       2.  O2 sats > 90% on room air, or at prior home O2 therapy level.      Able to wean O2 this shift to keep sats above 90%?: Yes, satisfactory for discharge.   Does patient use Home O2? No          Current oxygenation status:   SpO2: 94 %     O2 Device: None (Room air),      3.  Tolerates ambulation and mobility near baseline.     Ambulation: No, further care required to meet this goal. Please explain pt sleeping   Times patient ambulated with staff this shift: 0    Please review the Heart Failure Care Map for additional HF goal outcomes.    Carolyn Ricci, RAHEEL  4/9/2025

## 2025-04-09 NOTE — PLAN OF CARE
"  Problem: Adult Inpatient Plan of Care  Goal: Plan of Care Review  Description: The Plan of Care Review/Shift note should be completed every shift.  The Outcome Evaluation is a brief statement about your assessment that the patient is improving, declining, or no change.  This information will be displayed automatically on your shiftnote.  Outcome: Adequate for Care Transition  Goal: Patient-Specific Goal (Individualized)  Description: You can add care plan individualizations to a care plan. Examples of Individualization might be:  \"Parent requests to be called daily at 9am for status\", \"I have a hard time hearing out of my right ear\", or \"Do not touch me to wake me up as it startlesme\".  Outcome: Adequate for Care Transition  Goal: Absence of Hospital-Acquired Illness or Injury  Outcome: Adequate for Care Transition  Intervention: Identify and Manage Fall Risk  Recent Flowsheet Documentation  Taken 4/9/2025 0800 by Kiana Bateman RN  Safety Promotion/Fall Prevention:   activity supervised   room organization consistent   safety round/check completed   room near nurse's station   room door open   patient and family education   nonskid shoes/slippers when out of bed   mobility aid in reach   lighting adjusted   increase visualization of patient   increased rounding and observation   clutter free environment maintained  Intervention: Prevent Skin Injury  Recent Flowsheet Documentation  Taken 4/9/2025 0800 by Kiana Bateman RN  Body Position: position changed independently  Skin Protection:   adhesive use limited   protective footwear used  Intervention: Prevent and Manage VTE (Venous Thromboembolism) Risk  Recent Flowsheet Documentation  Taken 4/9/2025 0800 by Kiana Bateman RN  VTE Prevention/Management: SCDs off (sequential compression devices)  Intervention: Prevent Infection  Recent Flowsheet Documentation  Taken 4/9/2025 0800 by Kiana Bateman RN  Infection Prevention:   " environmental surveillance performed   equipment surfaces disinfected   hand hygiene promoted   personal protective equipment utilized   rest/sleep promoted   visitors restricted/screened   single patient room provided  Goal: Optimal Comfort and Wellbeing  Outcome: Adequate for Care Transition  Intervention: Provide Person-Centered Care  Recent Flowsheet Documentation  Taken 4/9/2025 0800 by Kiana Bateman RN  Trust Relationship/Rapport: care explained  Goal: Readiness for Transition of Care  Outcome: Adequate for Care Transition     Problem: Delirium  Goal: Optimal Coping  Outcome: Adequate for Care Transition  Goal: Improved Behavioral Control  Outcome: Adequate for Care Transition  Intervention: Prevent and Manage Agitation  Recent Flowsheet Documentation  Taken 4/9/2025 0800 by Kiana Bateman RN  Environment Familiarity/Consistency: daily routine followed  Intervention: Minimize Safety Risk  Recent Flowsheet Documentation  Taken 4/9/2025 0800 by Kiana Bateman RN  Enhanced Safety Measures:   room near unit station   review medications for side effects with activity   pain management  Trust Relationship/Rapport: care explained  Goal: Improved Attention and Thought Clarity  Outcome: Adequate for Care Transition  Intervention: Maximize Cognitive Function  Recent Flowsheet Documentation  Taken 4/9/2025 0800 by Kiana Bateman RN  Sensory Stimulation Regulation:   care clustered   lighting decreased   music on   tactile stimulation minimized   tactile stimulation provided   visual stimulation minimized   visual stimulation provided  Reorientation Measures:   calendar in view   clock in view  Goal: Improved Sleep  Outcome: Adequate for Care Transition     Problem: Cardiac Catheterization (Diagnostic/Interventional)  Goal: Absence of Bleeding  Outcome: Adequate for Care Transition  Goal: Absence of Contrast-Induced Injury  Outcome: Adequate for Care Transition  Goal: Stable Heart  Rate and Rhythm  Outcome: Adequate for Care Transition  Intervention: Monitor and Manage Cardiac Rhythm Effect  Recent Flowsheet Documentation  Taken 4/9/2025 0800 by Kiana Bateman RN  Fluid/Electrolyte Management: fluids provided  Goal: Absence of Embolism Signs and Symptoms  Outcome: Adequate for Care Transition  Intervention: Prevent or Manage Embolism  Recent Flowsheet Documentation  Taken 4/9/2025 0800 by Kiana Bateman RN  VTE Prevention/Management: SCDs off (sequential compression devices)  Goal: Anesthesia/Sedation Recovery  Outcome: Adequate for Care Transition  Intervention: Optimize Anesthesia Recovery  Recent Flowsheet Documentation  Taken 4/9/2025 0800 by Kiana Bateman, RN  Safety Promotion/Fall Prevention:   activity supervised   room organization consistent   safety round/check completed   room near nurse's station   room door open   patient and family education   nonskid shoes/slippers when out of bed   mobility aid in reach   lighting adjusted   increase visualization of patient   increased rounding and observation   clutter free environment maintained  Reorientation Measures:   calendar in view   clock in view  Goal: Optimal Pain Control and Function  Outcome: Adequate for Care Transition  Goal: Absence of Vascular Access Complication  Outcome: Adequate for Care Transition  Intervention: Prevent and Manage Access Complications  Recent Flowsheet Documentation  Taken 4/9/2025 0800 by Kiana Bateman RN  Activity Management: activity adjusted per tolerance  Head of Bed (HOB) Positioning: HOB at 20-30 degrees     Problem: Confusion Chronic  Goal: Optimal Cognitive Function  Outcome: Adequate for Care Transition  Intervention: Minimize Injury Risk and Provide Safety  Recent Flowsheet Documentation  Taken 4/9/2025 0800 by Kiana Bateman RN  Enhanced Safety Measures:   room near unit station   review medications for side effects with activity   pain  management  Intervention: Minimize and Manage Confusion  Recent Flowsheet Documentation  Taken 4/9/2025 0800 by Kiana Bateman RN  Sensory Stimulation Regulation:   care clustered   lighting decreased   music on   tactile stimulation minimized   tactile stimulation provided   visual stimulation minimized   visual stimulation provided  Reorientation Measures:   calendar in view   clock in view  Environmental Support:   calm environment promoted   caregiver consistency promoted   distractions minimized   personal routine supported  Environment Familiarity/Consistency: daily routine followed   Goal Outcome Evaluation:       Patient alert and oriented x 3. Forgetful and hx dementia. Chair alarm on yet gets up and does not use the call light. Patient now back in bed with one assist to help and bed alarm on.  said the MD said the patient will be discharged today and to go back [facility].  to call Gabriella, friend, to be updated on discharge today around 1:30 pm with [facility] transport. Patient denied any pain. Has a PPM. Voided in the toilet. Patient walked with a walker/gait belt with one assist. Call light in reach. .

## 2025-04-09 NOTE — PROGRESS NOTES
Care Management Discharge Note    Discharge Date: 04/09/2025       Discharge Disposition: Assisted Living    Discharge Services:      Discharge DME:      Discharge Transportation: family or friend will provide    Private pay costs discussed: transportation costs    Does the patient's insurance plan have a 3 day qualifying hospital stay waiver?  Yes     Which insurance plan 3 day waiver is available? Alternative insurance waiver    Will the waiver be used for post-acute placement? No    PAS Confirmation Code:    Patient/family educated on Medicare website which has current facility and service quality ratings:      Education Provided on the Discharge Plan:    Persons Notified of Discharge Plans: patient   Patient/Family in Agreement with the Plan:      Handoff Referral Completed: No, handoff not indicated or clinically appropriate    Additional Information:  See below     Maida Cain RN        Met with patient. She says she needs a ride home at discharge. Discussed potential OOP cost and patient is agreeable     Transport arranged for [id number]    Message left for nurse Gross at [facility]     10:18 AM  Updated friend Gabriella     Confirmed with [facility] that they can accept patient back today     1:32 PM  Discharge orders faxed

## 2025-04-09 NOTE — PLAN OF CARE
Problem: Delirium  Goal: Optimal Coping  Outcome: Progressing  Goal: Improved Behavioral Control  Outcome: Progressing  Intervention: Prevent and Manage Agitation  Recent Flowsheet Documentation  Taken 4/8/2025 2130 by Carolyn Ricci RN  Environment Familiarity/Consistency: daily routine followed  Taken 4/8/2025 1600 by Carolyn Ricci RN  Environment Familiarity/Consistency: daily routine followed  Intervention: Minimize Safety Risk  Recent Flowsheet Documentation  Taken 4/8/2025 2130 by Carolyn Ricci RN  Enhanced Safety Measures:   room near unit station   review medications for side effects with activity   pain management  Trust Relationship/Rapport: care explained  Taken 4/8/2025 1600 by Carolyn Ricci RN  Enhanced Safety Measures:   room near unit station   review medications for side effects with activity   pain management  Trust Relationship/Rapport: care explained  Goal: Improved Attention and Thought Clarity  Outcome: Progressing  Intervention: Maximize Cognitive Function  Recent Flowsheet Documentation  Taken 4/8/2025 2130 by Carolyn Ricci RN  Sensory Stimulation Regulation:   care clustered   lighting decreased   music on   tactile stimulation minimized   tactile stimulation provided   visual stimulation minimized   visual stimulation provided  Reorientation Measures:   calendar in view   clock in view  Taken 4/8/2025 1600 by Carolyn Ricic RN  Sensory Stimulation Regulation:   care clustered   lighting decreased   music on   tactile stimulation minimized   tactile stimulation provided   visual stimulation minimized   visual stimulation provided  Reorientation Measures:   calendar in view   clock in view  Goal: Improved Sleep  Outcome: Progressing  Intervention: Promote Sleep  Recent Flowsheet Documentation  Taken 4/8/2025 2130 by Carolyn Ricci RN  Sleep/Rest Enhancement:   snack   visualization  Taken 4/8/2025 1600 by Carolyn Ricci  W, RN  Sleep/Rest Enhancement:   snack   visualization     Problem: Confusion Chronic  Goal: Optimal Cognitive Function  Outcome: Progressing  Intervention: Minimize Injury Risk and Provide Safety  Recent Flowsheet Documentation  Taken 4/8/2025 2130 by Carolyn Ricci RN  Enhanced Safety Measures:   room near unit station   review medications for side effects with activity   pain management  Taken 4/8/2025 1600 by Carolyn Ricci RN  Enhanced Safety Measures:   room near unit station   review medications for side effects with activity   pain management  Intervention: Minimize and Manage Confusion  Recent Flowsheet Documentation  Taken 4/8/2025 2130 by Carolyn Ricci RN  Sensory Stimulation Regulation:   care clustered   lighting decreased   music on   tactile stimulation minimized   tactile stimulation provided   visual stimulation minimized   visual stimulation provided  Reorientation Measures:   calendar in view   clock in view  Environmental Support:   calm environment promoted   caregiver consistency promoted   distractions minimized   personal routine supported  Environment Familiarity/Consistency: daily routine followed  Taken 4/8/2025 1600 by Carolyn Ricci RN  Sensory Stimulation Regulation:   care clustered   lighting decreased   music on   tactile stimulation minimized   tactile stimulation provided   visual stimulation minimized   visual stimulation provided  Reorientation Measures:   calendar in view   clock in view  Environmental Support:   calm environment promoted   caregiver consistency promoted   distractions minimized   personal routine supported  Environment Familiarity/Consistency: daily routine followed   Goal Outcome Evaluation:       Pt is alert and oriented x 3, forgetful.Bed alarm in place. Lung sounds  diminished, on RA. Tele is NSR with BBB. Pt ambulated in the hallway with staff several times this shift. She denies SOB. She verbalized she wants to go home  today. She did not have a BM x 3 days now but refused to take stool softener or prune juice. She is voiding well.

## 2025-04-09 NOTE — PROGRESS NOTES
[facility]    Medicine Progress Note - Hospitalist Service    Date of Admission:  4/3/2025    Assessment & Plan   Laney Hahn is a 84 year old female with PMH CKD III, PAF, hypertension, pacemaker, hyperlipidemia, dementia, aortic stenosis, and HFrEF is admitted on 4/3/2025 for planned elective coronary angiogram and right heart catheterization for pre-TAVR workup.  Patient admitted for IV diuresis.Workup for pre TAVR will resume once heart failure improves. [facility] [place] with medical management.       4/5 :     No cp   Sob slowly improving  Cardiology following  Creatinine rising to 1.54, hold lasix  On metoprolol, lipitor, eliquis        4/6 :     No cp   Sob slowly improving  Cardiology following  Creatinine rising to 1.54--1.66, hold lasix  On metoprolol, lipitor, eliquis    Discharge in am once creatinine stable      4/7 :     No cp   Sob slowly improving  Cardiology following  Creatinine rising to 1.54--1.66--1.35, resume oral lasix  On metoprolol, lipitor, eliquis    Pt/ot evaluation          4/8 :     No cp   Sob slowly improving  Cardiology following  Creatinine rising to 1.54--1.66--1.35, tolerating oral lasix  Check echo again today  On metoprolol, lipitor, eliquis    Pt/ot evaluation    Discharge in am if creatinine stable and after echo      A/p :     S/p coronary angiogram  Severe aortic stenosis-pre TAVR workup  -Findings of angiogram-No angiographic evidence of obstructive coronary artery disease.  Severely elevated right and left-sided filling pressures.  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  Low flow, low gradient moderate to severe aortic valve stenosis  -Patient wrist and groin sites are CDI without hematomas. Patient denies numbness or tingling to extremities. CMS intact.   -PTA apixaban  -Continuous cardiac monitoring  -cardiologist and Structural Interventional Cardiology  following     HFrEF  -Echocardiogram 12/23/2024-EF 30-35%.  Severe low-flow, low gradient aortic stenosis  -IV diuresis with Lasix per cardiology  -PTA metoprolol    Hyperlipidemia  -PTA atorvastatin    Dementia  -1:1 monitoring as patient is impulsive, but redirectable           Diet: Regular Diet Adult    DVT Prophylaxis: DOAC  Tenorio Catheter: Not present  Lines: None     Cardiac Monitoring: ACTIVE order. Indication: Acute decompensated heart failure (48 hours)  Code Status: Full Code      Clinically Significant Risk Factors                     # Hypertension: Noted on problem list  # Chronic heart failure with reduced ejection fraction: last echo with EF <40%    # Acute Hypercapnic Respiratory Failure: based on venous blood gas results.  Continue supplemental oxygen and ventilatory support as indicated.  # Dementia: noted on problem list            # Financial/Environmental Concerns:     # Pacemaker present       Social Drivers of Health    Food Insecurity: Unknown (12/23/2024)    Food Insecurity     Within the past 12 months, did you worry that your food would run out before you got money to buy more?: Patient unable to answer     Within the past 12 months, did the food you bought just not last and you didn t have money to get more?: Patient unable to answer   Housing Stability: Unknown (12/23/2024)    Housing Stability     Do you have housing? : Patient unable to answer     Are you worried about losing your housing?: Patient unable to answer   Financial Resource Strain: Unknown (12/23/2024)    Financial Resource Strain     Within the past 12 months, have you or your family members you live with been unable to get utilities (heat, electricity) when it was really needed?: Patient unable to answer   Transportation Needs: Unknown (12/23/2024)    Transportation Needs     Within the past 12 months, has lack of transportation kept you from medical appointments, getting your medicines, non-medical meetings or  appointments, work, or from getting things that you need?: Patient unable to answer          Disposition Plan     Medically Ready for Discharge: Anticipated in 2-4 Days    Defer to card         Lucian Saleh MD  Hospitalist Service  [facility]  Securely message with [email or web address] (more info)  Text page via [email or web address] Paging/Directory   ______________________________________________________________________      Physical Exam   Vital Signs: Temp: 97.8  F (36.6  C) Temp src: Oral BP: 106/56 Pulse: 86   Resp: 19 SpO2: 98 % O2 Device: None (Room air)    Weight: 120 lbs 1.6 oz    General.  Awake alert orientedx2 in mild resp distress.  HEENT.  Pupils equal round react to light, anicteric, EOM intact.  Neck supple no JVD.  CVS regular rhythm +murmur gallops.  Lungs.  Clear to auscultation bilateral no wheezing or rales.  Abdomen.  Soft nontender bowel sounds present.  Extremities.  trace edema no calf tenderness.  Neurological.  No focal deficit.  Skin no rash. No pallor.  Psych. Impaired memory and cognition    Medical Decision Making       42 MINUTES SPENT BY ME on the date of service doing chart review, history, exam, documentation & further activities per the note.      Data     I have personally reviewed the following data over the past 24 hrs:    N/A  \   N/A   / N/A     143 104 48.2 (H) /  121 (H)   3.9 30 (H) 1.27 (H) \       Imaging results reviewed over the past 24 hrs:   No results found for this or any previous visit (from the past 24 hours).

## 2025-04-09 NOTE — PROGRESS NOTES
Pat unable to get Dobutamine stress echo done during hospitalization due to medication holds and staffing challenges. Will place order for outpatient dobutamine stress echo and facilitate scheduling with Non- invasive .     Peyton Horan RN on 4/9/2025 at 3:02 PM

## 2025-04-10 ENCOUNTER — APPOINTMENT (OUTPATIENT)
Dept: ULTRASOUND IMAGING | Facility: HOSPITAL | Age: 85
DRG: 682 | End: 2025-04-10
Attending: EMERGENCY MEDICINE
Payer: COMMERCIAL

## 2025-04-10 ENCOUNTER — APPOINTMENT (OUTPATIENT)
Dept: CT IMAGING | Facility: HOSPITAL | Age: 85
DRG: 682 | End: 2025-04-10
Attending: INTERNAL MEDICINE
Payer: COMMERCIAL

## 2025-04-10 ENCOUNTER — APPOINTMENT (OUTPATIENT)
Dept: CT IMAGING | Facility: HOSPITAL | Age: 85
DRG: 682 | End: 2025-04-10
Attending: EMERGENCY MEDICINE
Payer: COMMERCIAL

## 2025-04-10 ENCOUNTER — HOSPITAL ENCOUNTER (INPATIENT)
Facility: HOSPITAL | Age: 85
DRG: 682 | End: 2025-04-10
Attending: EMERGENCY MEDICINE | Admitting: INTERNAL MEDICINE
Payer: COMMERCIAL

## 2025-04-10 VITALS
RESPIRATION RATE: 16 BRPM | TEMPERATURE: 97.5 F | WEIGHT: 119 LBS | SYSTOLIC BLOOD PRESSURE: 98 MMHG | HEART RATE: 93 BPM | OXYGEN SATURATION: 99 % | DIASTOLIC BLOOD PRESSURE: 50 MMHG | BODY MASS INDEX: 20.43 KG/M2

## 2025-04-10 DIAGNOSIS — R41.0 DISORIENTATION: ICD-10-CM

## 2025-04-10 DIAGNOSIS — E86.0 DEHYDRATION: ICD-10-CM

## 2025-04-10 DIAGNOSIS — R29.6 FALLS FREQUENTLY: ICD-10-CM

## 2025-04-10 DIAGNOSIS — R19.7 DIARRHEA OF PRESUMED INFECTIOUS ORIGIN: ICD-10-CM

## 2025-04-10 DIAGNOSIS — F01.50 VASCULAR DEMENTIA WITHOUT BEHAVIORAL DISTURBANCE (H): Primary | ICD-10-CM

## 2025-04-10 DIAGNOSIS — R53.1 GENERALIZED WEAKNESS: ICD-10-CM

## 2025-04-10 LAB
ALBUMIN SERPL BCG-MCNC: 4.9 G/DL (ref 3.5–5.2)
ALBUMIN SERPL BCG-MCNC: 5.4 G/DL (ref 3.5–5.2)
ALBUMIN UR-MCNC: NEGATIVE MG/DL
ALP SERPL-CCNC: 127 U/L (ref 40–150)
ALP SERPL-CCNC: 96 U/L (ref 40–150)
ALT SERPL W P-5'-P-CCNC: 38 U/L (ref 0–50)
ALT SERPL W P-5'-P-CCNC: 45 U/L (ref 0–50)
AMMONIA PLAS-SCNC: 11 UMOL/L (ref 11–51)
AMMONIA PLAS-SCNC: 12 UMOL/L (ref 11–51)
ANION GAP SERPL CALCULATED.3IONS-SCNC: 18 MMOL/L (ref 7–15)
ANION GAP SERPL CALCULATED.3IONS-SCNC: 19 MMOL/L (ref 7–15)
APPEARANCE UR: CLEAR
AST SERPL W P-5'-P-CCNC: 69 U/L (ref 0–45)
AST SERPL W P-5'-P-CCNC: 73 U/L (ref 0–45)
BACTERIA BLD CULT: NORMAL
BACTERIA BLD CULT: NORMAL
BASE EXCESS BLDV CALC-SCNC: -1.8 MMOL/L (ref -3–3)
BASOPHILS # BLD AUTO: 0 10E3/UL (ref 0–0.2)
BASOPHILS # BLD AUTO: 0 10E3/UL (ref 0–0.2)
BASOPHILS NFR BLD AUTO: 0 %
BASOPHILS NFR BLD AUTO: 0 %
BILIRUB SERPL-MCNC: 1 MG/DL
BILIRUB SERPL-MCNC: 1.2 MG/DL
BILIRUB UR QL STRIP: NEGATIVE
BUN SERPL-MCNC: 65.4 MG/DL (ref 8–23)
BUN SERPL-MCNC: 66.2 MG/DL (ref 8–23)
CALCIUM SERPL-MCNC: 9.5 MG/DL (ref 8.8–10.4)
CALCIUM SERPL-MCNC: 9.8 MG/DL (ref 8.8–10.4)
CHLORIDE SERPL-SCNC: 100 MMOL/L (ref 98–107)
CHLORIDE SERPL-SCNC: 103 MMOL/L (ref 98–107)
COLOR UR AUTO: YELLOW
CREAT BLD-MCNC: 2.4 MG/DL (ref 0.5–1)
CREAT SERPL-MCNC: 1.94 MG/DL (ref 0.51–0.95)
CREAT SERPL-MCNC: 2.08 MG/DL (ref 0.51–0.95)
EGFRCR SERPLBLD CKD-EPI 2021: 19 ML/MIN/1.73M2
EGFRCR SERPLBLD CKD-EPI 2021: 23 ML/MIN/1.73M2
EGFRCR SERPLBLD CKD-EPI 2021: 25 ML/MIN/1.73M2
EOSINOPHIL # BLD AUTO: 0.1 10E3/UL (ref 0–0.7)
EOSINOPHIL # BLD AUTO: 0.5 10E3/UL (ref 0–0.7)
EOSINOPHIL NFR BLD AUTO: 1 %
EOSINOPHIL NFR BLD AUTO: 4 %
ERYTHROCYTE [DISTWIDTH] IN BLOOD BY AUTOMATED COUNT: 12.6 % (ref 10–15)
ERYTHROCYTE [DISTWIDTH] IN BLOOD BY AUTOMATED COUNT: 12.7 % (ref 10–15)
ETHANOL SERPL-MCNC: <0.01 G/DL
GLUCOSE SERPL-MCNC: 189 MG/DL (ref 70–99)
GLUCOSE SERPL-MCNC: 227 MG/DL (ref 70–99)
GLUCOSE UR STRIP-MCNC: NEGATIVE MG/DL
HCO3 BLDV-SCNC: 24 MMOL/L (ref 21–28)
HCO3 SERPL-SCNC: 20 MMOL/L (ref 22–29)
HCO3 SERPL-SCNC: 23 MMOL/L (ref 22–29)
HCT VFR BLD AUTO: 37.3 % (ref 35–47)
HCT VFR BLD AUTO: 39.5 % (ref 35–47)
HGB BLD-MCNC: 11.7 G/DL (ref 11.7–15.7)
HGB BLD-MCNC: 12.5 G/DL (ref 11.7–15.7)
HGB BLD-MCNC: 13.7 G/DL (ref 11.7–15.7)
HGB UR QL STRIP: ABNORMAL
HOLD SPECIMEN: NORMAL
HYALINE CASTS: 5 /LPF
IMM GRANULOCYTES # BLD: 0 10E3/UL
IMM GRANULOCYTES # BLD: 0.1 10E3/UL
IMM GRANULOCYTES NFR BLD: 0 %
IMM GRANULOCYTES NFR BLD: 1 %
INR PPP: 1.31 (ref 0.85–1.15)
KETONES UR STRIP-MCNC: NEGATIVE MG/DL
LACTATE SERPL-SCNC: 0.8 MMOL/L (ref 0.7–2)
LACTATE SERPL-SCNC: 1.7 MMOL/L (ref 0.7–2)
LACTATE SERPL-SCNC: 2.3 MMOL/L (ref 0.7–2)
LEUKOCYTE ESTERASE UR QL STRIP: NEGATIVE
LYMPHOCYTES # BLD AUTO: 0.7 10E3/UL (ref 0.8–5.3)
LYMPHOCYTES # BLD AUTO: 0.9 10E3/UL (ref 0.8–5.3)
LYMPHOCYTES NFR BLD AUTO: 7 %
LYMPHOCYTES NFR BLD AUTO: 8 %
MAGNESIUM SERPL-MCNC: 2 MG/DL (ref 1.7–2.3)
MCH RBC QN AUTO: 32.7 PG (ref 26.5–33)
MCH RBC QN AUTO: 33.4 PG (ref 26.5–33)
MCHC RBC AUTO-ENTMCNC: 33.5 G/DL (ref 31.5–36.5)
MCHC RBC AUTO-ENTMCNC: 34.7 G/DL (ref 31.5–36.5)
MCV RBC AUTO: 96 FL (ref 78–100)
MCV RBC AUTO: 98 FL (ref 78–100)
MONOCYTES # BLD AUTO: 0.2 10E3/UL (ref 0–1.3)
MONOCYTES # BLD AUTO: 0.9 10E3/UL (ref 0–1.3)
MONOCYTES NFR BLD AUTO: 2 %
MONOCYTES NFR BLD AUTO: 7 %
MUCOUS THREADS #/AREA URNS LPF: PRESENT /LPF
NEUTROPHILS # BLD AUTO: 10.9 10E3/UL (ref 1.6–8.3)
NEUTROPHILS # BLD AUTO: 7.3 10E3/UL (ref 1.6–8.3)
NEUTROPHILS NFR BLD AUTO: 82 %
NEUTROPHILS NFR BLD AUTO: 89 %
NITRATE UR QL: NEGATIVE
NRBC # BLD AUTO: 0 10E3/UL
NRBC # BLD AUTO: 0 10E3/UL
NRBC BLD AUTO-RTO: 0 /100
NRBC BLD AUTO-RTO: 0 /100
O2/TOTAL GAS SETTING VFR VENT: 21 %
OSMOLALITY UR: 352 MMOL/KG (ref 100–1200)
OXYHGB MFR BLDV: 77 % (ref 70–75)
PCO2 BLDV: 42 MM HG (ref 40–50)
PH BLDV: 7.36 [PH] (ref 7.32–7.43)
PH UR STRIP: 5 [PH] (ref 5–7)
PLATELET # BLD AUTO: 175 10E3/UL (ref 150–450)
PLATELET # BLD AUTO: 189 10E3/UL (ref 150–450)
PO2 BLDV: 44 MM HG (ref 25–47)
POTASSIUM SERPL-SCNC: 4.3 MMOL/L (ref 3.4–5.3)
POTASSIUM SERPL-SCNC: 4.6 MMOL/L (ref 3.4–5.3)
PROCALCITONIN SERPL IA-MCNC: 8.91 NG/ML
PROT SERPL-MCNC: 6.9 G/DL (ref 6.4–8.3)
PROT SERPL-MCNC: 8 G/DL (ref 6.4–8.3)
RBC # BLD AUTO: 3.82 10E6/UL (ref 3.8–5.2)
RBC # BLD AUTO: 4.1 10E6/UL (ref 3.8–5.2)
RBC URINE: 66 /HPF
SAO2 % BLDV: 78.1 % (ref 70–75)
SODIUM SERPL-SCNC: 141 MMOL/L (ref 135–145)
SODIUM SERPL-SCNC: 142 MMOL/L (ref 135–145)
SODIUM UR-SCNC: 45 MMOL/L
SP GR UR STRIP: 1.02 (ref 1–1.03)
SQUAMOUS EPITHELIAL: 1 /HPF
TROPONIN T SERPL HS-MCNC: 39 NG/L
TROPONIN T SERPL HS-MCNC: 39 NG/L
UROBILINOGEN UR STRIP-MCNC: 3 MG/DL
WBC # BLD AUTO: 13.4 10E3/UL (ref 4–11)
WBC # BLD AUTO: 8.3 10E3/UL (ref 4–11)
WBC URINE: 2 /HPF

## 2025-04-10 PROCEDURE — 83735 ASSAY OF MAGNESIUM: CPT | Performed by: EMERGENCY MEDICINE

## 2025-04-10 PROCEDURE — 76705 ECHO EXAM OF ABDOMEN: CPT

## 2025-04-10 PROCEDURE — 85610 PROTHROMBIN TIME: CPT | Performed by: EMERGENCY MEDICINE

## 2025-04-10 PROCEDURE — 93005 ELECTROCARDIOGRAM TRACING: CPT | Performed by: EMERGENCY MEDICINE

## 2025-04-10 PROCEDURE — 85004 AUTOMATED DIFF WBC COUNT: CPT | Performed by: EMERGENCY MEDICINE

## 2025-04-10 PROCEDURE — 258N000003 HC RX IP 258 OP 636: Performed by: INTERNAL MEDICINE

## 2025-04-10 PROCEDURE — 84484 ASSAY OF TROPONIN QUANT: CPT | Performed by: EMERGENCY MEDICINE

## 2025-04-10 PROCEDURE — 250N000013 HC RX MED GY IP 250 OP 250 PS 637: Performed by: INTERNAL MEDICINE

## 2025-04-10 PROCEDURE — 99207 PR NO BILLABLE SERVICE THIS VISIT: CPT | Performed by: PHYSICIAN ASSISTANT

## 2025-04-10 PROCEDURE — 82077 ASSAY SPEC XCP UR&BREATH IA: CPT | Performed by: EMERGENCY MEDICINE

## 2025-04-10 PROCEDURE — 72125 CT NECK SPINE W/O DYE: CPT

## 2025-04-10 PROCEDURE — 82805 BLOOD GASES W/O2 SATURATION: CPT | Performed by: EMERGENCY MEDICINE

## 2025-04-10 PROCEDURE — 82565 ASSAY OF CREATININE: CPT

## 2025-04-10 PROCEDURE — 99291 CRITICAL CARE FIRST HOUR: CPT | Mod: 25

## 2025-04-10 PROCEDURE — 258N000003 HC RX IP 258 OP 636: Performed by: EMERGENCY MEDICINE

## 2025-04-10 PROCEDURE — 82310 ASSAY OF CALCIUM: CPT | Performed by: EMERGENCY MEDICINE

## 2025-04-10 PROCEDURE — 36415 COLL VENOUS BLD VENIPUNCTURE: CPT | Performed by: EMERGENCY MEDICINE

## 2025-04-10 PROCEDURE — 71250 CT THORAX DX C-: CPT

## 2025-04-10 PROCEDURE — 250N000011 HC RX IP 250 OP 636: Performed by: INTERNAL MEDICINE

## 2025-04-10 PROCEDURE — 96361 HYDRATE IV INFUSION ADD-ON: CPT

## 2025-04-10 PROCEDURE — 83605 ASSAY OF LACTIC ACID: CPT | Performed by: INTERNAL MEDICINE

## 2025-04-10 PROCEDURE — 70450 CT HEAD/BRAIN W/O DYE: CPT

## 2025-04-10 PROCEDURE — 83605 ASSAY OF LACTIC ACID: CPT | Performed by: EMERGENCY MEDICINE

## 2025-04-10 PROCEDURE — 36415 COLL VENOUS BLD VENIPUNCTURE: CPT | Performed by: INTERNAL MEDICINE

## 2025-04-10 PROCEDURE — 84145 PROCALCITONIN (PCT): CPT | Performed by: EMERGENCY MEDICINE

## 2025-04-10 PROCEDURE — 84450 TRANSFERASE (AST) (SGOT): CPT | Performed by: INTERNAL MEDICINE

## 2025-04-10 PROCEDURE — 82140 ASSAY OF AMMONIA: CPT | Performed by: EMERGENCY MEDICINE

## 2025-04-10 PROCEDURE — 250N000011 HC RX IP 250 OP 636: Performed by: EMERGENCY MEDICINE

## 2025-04-10 PROCEDURE — 84460 ALANINE AMINO (ALT) (SGPT): CPT | Performed by: INTERNAL MEDICINE

## 2025-04-10 PROCEDURE — 83935 ASSAY OF URINE OSMOLALITY: CPT | Performed by: INTERNAL MEDICINE

## 2025-04-10 PROCEDURE — 120N000001 HC R&B MED SURG/OB

## 2025-04-10 PROCEDURE — 85018 HEMOGLOBIN: CPT | Performed by: INTERNAL MEDICINE

## 2025-04-10 PROCEDURE — 87040 BLOOD CULTURE FOR BACTERIA: CPT | Performed by: EMERGENCY MEDICINE

## 2025-04-10 PROCEDURE — 84300 ASSAY OF URINE SODIUM: CPT | Performed by: INTERNAL MEDICINE

## 2025-04-10 PROCEDURE — 84145 PROCALCITONIN (PCT): CPT | Performed by: INTERNAL MEDICINE

## 2025-04-10 PROCEDURE — 99222 1ST HOSP IP/OBS MODERATE 55: CPT | Mod: FS | Performed by: SURGERY

## 2025-04-10 PROCEDURE — 82140 ASSAY OF AMMONIA: CPT | Performed by: INTERNAL MEDICINE

## 2025-04-10 PROCEDURE — 85041 AUTOMATED RBC COUNT: CPT | Performed by: INTERNAL MEDICINE

## 2025-04-10 PROCEDURE — 74176 CT ABD & PELVIS W/O CONTRAST: CPT

## 2025-04-10 PROCEDURE — 84155 ASSAY OF PROTEIN SERUM: CPT | Performed by: INTERNAL MEDICINE

## 2025-04-10 PROCEDURE — 85004 AUTOMATED DIFF WBC COUNT: CPT | Performed by: INTERNAL MEDICINE

## 2025-04-10 PROCEDURE — 99223 1ST HOSP IP/OBS HIGH 75: CPT | Performed by: INTERNAL MEDICINE

## 2025-04-10 PROCEDURE — 81001 URINALYSIS AUTO W/SCOPE: CPT | Performed by: EMERGENCY MEDICINE

## 2025-04-10 PROCEDURE — 96365 THER/PROPH/DIAG IV INF INIT: CPT

## 2025-04-10 RX ORDER — ONDANSETRON 2 MG/ML
4 INJECTION INTRAMUSCULAR; INTRAVENOUS EVERY 6 HOURS PRN
Status: DISCONTINUED | OUTPATIENT
Start: 2025-04-10 | End: 2025-04-10

## 2025-04-10 RX ORDER — ATORVASTATIN CALCIUM 10 MG/1
10 TABLET, FILM COATED ORAL EVERY MORNING
Status: DISCONTINUED | OUTPATIENT
Start: 2025-04-11 | End: 2025-04-14 | Stop reason: HOSPADM

## 2025-04-10 RX ORDER — METOPROLOL SUCCINATE 25 MG/1
25 TABLET, EXTENDED RELEASE ORAL DAILY
Status: DISCONTINUED | OUTPATIENT
Start: 2025-04-10 | End: 2025-04-14 | Stop reason: HOSPADM

## 2025-04-10 RX ORDER — LIDOCAINE 40 MG/G
CREAM TOPICAL
Status: DISCONTINUED | OUTPATIENT
Start: 2025-04-10 | End: 2025-04-14 | Stop reason: HOSPADM

## 2025-04-10 RX ORDER — CALCIUM CARBONATE 500 MG/1
1000 TABLET, CHEWABLE ORAL 4 TIMES DAILY PRN
Status: DISCONTINUED | OUTPATIENT
Start: 2025-04-10 | End: 2025-04-14 | Stop reason: HOSPADM

## 2025-04-10 RX ORDER — ONDANSETRON 4 MG/1
4 TABLET, ORALLY DISINTEGRATING ORAL EVERY 6 HOURS PRN
Status: DISCONTINUED | OUTPATIENT
Start: 2025-04-10 | End: 2025-04-10

## 2025-04-10 RX ORDER — HYDRALAZINE HYDROCHLORIDE 20 MG/ML
10 INJECTION INTRAMUSCULAR; INTRAVENOUS EVERY 4 HOURS PRN
Status: DISCONTINUED | OUTPATIENT
Start: 2025-04-10 | End: 2025-04-14 | Stop reason: HOSPADM

## 2025-04-10 RX ORDER — SODIUM CHLORIDE 9 MG/ML
INJECTION, SOLUTION INTRAVENOUS CONTINUOUS
Status: DISCONTINUED | OUTPATIENT
Start: 2025-04-10 | End: 2025-04-12

## 2025-04-10 RX ORDER — ONDANSETRON 2 MG/ML
4 INJECTION INTRAMUSCULAR; INTRAVENOUS EVERY 30 MIN PRN
Status: DISCONTINUED | OUTPATIENT
Start: 2025-04-10 | End: 2025-04-14 | Stop reason: HOSPADM

## 2025-04-10 RX ORDER — PIPERACILLIN SODIUM, TAZOBACTAM SODIUM 3; .375 G/15ML; G/15ML
3.38 INJECTION, POWDER, LYOPHILIZED, FOR SOLUTION INTRAVENOUS ONCE
Status: COMPLETED | OUTPATIENT
Start: 2025-04-10 | End: 2025-04-10

## 2025-04-10 RX ORDER — DEXTROSE MONOHYDRATE 25 G/50ML
25-50 INJECTION, SOLUTION INTRAVENOUS
Status: DISCONTINUED | OUTPATIENT
Start: 2025-04-10 | End: 2025-04-14 | Stop reason: HOSPADM

## 2025-04-10 RX ORDER — PIPERACILLIN SODIUM, TAZOBACTAM SODIUM 3; .375 G/15ML; G/15ML
3.38 INJECTION, POWDER, LYOPHILIZED, FOR SOLUTION INTRAVENOUS EVERY 8 HOURS
Status: DISCONTINUED | OUTPATIENT
Start: 2025-04-10 | End: 2025-04-11

## 2025-04-10 RX ORDER — NICOTINE POLACRILEX 4 MG
15-30 LOZENGE BUCCAL
Status: DISCONTINUED | OUTPATIENT
Start: 2025-04-10 | End: 2025-04-14 | Stop reason: HOSPADM

## 2025-04-10 RX ORDER — HYDRALAZINE HYDROCHLORIDE 10 MG/1
10 TABLET, FILM COATED ORAL EVERY 4 HOURS PRN
Status: DISCONTINUED | OUTPATIENT
Start: 2025-04-10 | End: 2025-04-14 | Stop reason: HOSPADM

## 2025-04-10 RX ADMIN — SODIUM CHLORIDE 500 ML: 0.9 INJECTION, SOLUTION INTRAVENOUS at 01:47

## 2025-04-10 RX ADMIN — PIPERACILLIN AND TAZOBACTAM 3.38 G: 3; .375 INJECTION, POWDER, FOR SOLUTION INTRAVENOUS at 21:20

## 2025-04-10 RX ADMIN — PIPERACILLIN AND TAZOBACTAM 3.38 G: 3; .375 INJECTION, POWDER, FOR SOLUTION INTRAVENOUS at 17:28

## 2025-04-10 RX ADMIN — SODIUM CHLORIDE: 9 INJECTION, SOLUTION INTRAVENOUS at 06:26

## 2025-04-10 RX ADMIN — SODIUM CHLORIDE 500 ML: 9 INJECTION, SOLUTION INTRAVENOUS at 21:31

## 2025-04-10 RX ADMIN — APIXABAN 2.5 MG: 2.5 TABLET, FILM COATED ORAL at 21:19

## 2025-04-10 RX ADMIN — PIPERACILLIN AND TAZOBACTAM 3.38 G: 3; .375 INJECTION, POWDER, FOR SOLUTION INTRAVENOUS at 03:45

## 2025-04-10 ASSESSMENT — ACTIVITIES OF DAILY LIVING (ADL)
ADLS_ACUITY_SCORE: 69
ADLS_ACUITY_SCORE: 62
ADLS_ACUITY_SCORE: 62
ADLS_ACUITY_SCORE: 57
DEPENDENT_IADLS:: CLEANING;COOKING;LAUNDRY;MEDICATION MANAGEMENT;TRANSPORTATION
ADLS_ACUITY_SCORE: 69
ADLS_ACUITY_SCORE: 67
ADLS_ACUITY_SCORE: 67
ADLS_ACUITY_SCORE: 62
ADLS_ACUITY_SCORE: 57
ADLS_ACUITY_SCORE: 57
ADLS_ACUITY_SCORE: 62
ADLS_ACUITY_SCORE: 57
ADLS_ACUITY_SCORE: 62
ADLS_ACUITY_SCORE: 57
ADLS_ACUITY_SCORE: 67
ADLS_ACUITY_SCORE: 67
ADLS_ACUITY_SCORE: 57
ADLS_ACUITY_SCORE: 69
ADLS_ACUITY_SCORE: 70
ADLS_ACUITY_SCORE: 62

## 2025-04-10 ASSESSMENT — ENCOUNTER SYMPTOMS: DIARRHEA: 1

## 2025-04-10 ASSESSMENT — COLUMBIA-SUICIDE SEVERITY RATING SCALE - C-SSRS: IS THE PATIENT NOT ABLE TO COMPLETE C-SSRS: UNABLE TO VERBALIZE

## 2025-04-10 NOTE — PROGRESS NOTES
Patient now in ED for altered mental status, diarrhea, and dehydration.   Will pause on ordering DSE for now and check back in early next week to see if still hospitalized or recovering.     Peyton Horan RN on 4/10/2025 at 9:35 AM

## 2025-04-10 NOTE — ED NOTES
Bed: JNED-09  Expected date: 4/10/25  Expected time:   Means of arrival:   Comments:  Jonathan  84 female  AMS, Diarrhea  , Paced rhythm

## 2025-04-10 NOTE — CONSULTS
Care Management Initial Consult    General Information  Assessment completed with: Caregiver,    Type of CM/SW Visit: Initial Assessment    Primary Care Provider verified and updated as needed: Yes   Readmission within the last 30 days: current reason for admission unrelated to previous admission   Return Category: New Diagnosis  Reason for Consult: discharge planning  Advance Care Planning: Advance Care Planning Reviewed: present on chart          Communication Assessment  Patient's communication style: spoken language (English or Bilingual)             Cognitive  Cognitive/Neuro/Behavioral: .WDL except, orientation  Level of Consciousness: alert, intermittent confusion  Arousal Level: arouses to touch/gentle shaking, opens eyes spontaneously  Orientation: person, place (disoriented to situation and time. Patient knew it was 2025, but thought it was September)  Mood/Behavior: restless (patient has a 1 : 1 sitter)  Best Language: 0 - No aphasia  Speech: clear, spontaneous, logical    Living Environment:   People in home: sibling(s), facility resident     Current living Arrangements: assisted living, apartment  Name of Facility: [facility]   Able to return to prior arrangements: yes       Family/Social Support:  Care provided by: self, other (see comments) (facility staff)  Provides care for: no one, unable/limited ability to care for self  Marital Status: Single  Support system: Sibling(s), Friend, Facility resident(s)/Staff          Description of Support System: Supportive, Involved    Support Assessment: Adequate family and caregiver support    Current Resources:   Patient receiving home care services: No        Community Resources: [facility]  Equipment currently used at home: walker, rolling  Supplies currently used at home: Incontinence Supplies    Employment/Financial:  Employment Status: retired        Financial Concerns: none   Referral to Financial Worker: No       Does the  patient's insurance plan have a 3 day qualifying hospital stay waiver?  No    Lifestyle & Psychosocial Needs:  Social Drivers of Health     Food Insecurity: Unknown (12/23/2024)    Food Insecurity     Within the past 12 months, did you worry that your food would run out before you got money to buy more?: Patient unable to answer     Within the past 12 months, did the food you bought just not last and you didn t have money to get more?: Patient unable to answer   Depression: Not at risk (3/6/2025)    PHQ-2     PHQ-2 Score: 0   Housing Stability: Unknown (12/23/2024)    Housing Stability     Do you have housing? : Patient unable to answer     Are you worried about losing your housing?: Patient unable to answer   Tobacco Use: Low Risk  (3/24/2025)    Patient History     Smoking Tobacco Use: Never     Smokeless Tobacco Use: Never     Passive Exposure: Not on file   Financial Resource Strain: Unknown (12/23/2024)    Financial Resource Strain     Within the past 12 months, have you or your family members you live with been unable to get utilities (heat, electricity) when it was really needed?: Patient unable to answer   Alcohol Use: Not on file   Transportation Needs: Unknown (12/23/2024)    Transportation Needs     Within the past 12 months, has lack of transportation kept you from medical appointments, getting your medicines, non-medical meetings or appointments, work, or from getting things that you need?: Patient unable to answer   Physical Activity: Not on file   Interpersonal Safety: Low Risk  (4/3/2025)    Interpersonal Safety     Do you feel physically and emotionally safe where you currently live?: Yes     Within the past 12 months, have you been hit, slapped, kicked or otherwise physically hurt by someone?: No     Within the past 12 months, have you been humiliated or emotionally abused in other ways by your partner or ex-partner?: No   Stress: Not on file   Social Connections: Not on file   Health Literacy: Not  on file       Functional Status:  Prior to admission patient needed assistance:   Dependent ADLs:: Ambulation-walker, Bathing, Dressing  Dependent IADLs:: Cleaning, Cooking, Laundry, Medication Management, Transportation       Mental Health Status:  Mental Health Status: No Current Concerns       Chemical Dependency Status:  Chemical Dependency Status: No Current Concerns             Values/Beliefs:  Spiritual, Cultural Beliefs, Druze Practices, Values that affect care: no               Discussed  Partnership in Safe Discharge Planning  document with patient/family: No    Additional Information:  CM consult received for discharge planning.  Patient with confusion, currently on 1:1 so placed call to assisted living facility nurse to introduce CM role and perform initial assessment.  Demographics verified and updated as needed.  Laney lives with her brother in an apartment at [facility] living [facility].  She was hospitalized at [facility] 3/24-3/26 & 4/3-4/9.  Receives assistance with bathing, dressing, and most IADLs from assisted living staff.  Will need to coordinate timing of discharge with [facility] staff.     Contacts:  Gabriella Givens, friend - [phone] -  is not working, please don't leave a message but try calling back.                      Rosana Aguirre, cousin - [phone]                    [facility] - [phone], opt 3 for nursing    Next Steps: CM to follow for medical progression of care, discharge recommendations, and final discharge plan.      Peng Howell CRNI

## 2025-04-10 NOTE — ED TRIAGE NOTES
Pt arrives via EMS from [facility]. Staff found patient on the toilet actively having diarrhea and altered. Pt is normally alert and oriented. Per EMS, they placed a couple different IV's and patient ripped each out.

## 2025-04-10 NOTE — ED PROVIDER NOTES
NAME: Laney Hahn  AGE: 84 year old female  YOB: 1940  MRN: [id number]  EVALUATION DATE & TIME: 4/10/2025  1:19 AM    PCP: Rose Mcelroy    ED PROVIDER: Froylan Macias M.D.      Chief Complaint   Patient presents with    Altered Mental Status     FINAL IMPRESSION:  1. Disorientation    2. Diarrhea of presumed infectious origin    3. Dehydration      MEDICAL DECISION MAKIN:36 AM I met with the patient, obtained history, performed an initial exam, and discussed options and plan for diagnostics and treatment here in the ED.   2:53 AM Reassessed and updated patient with findings. Patient is still altered.   3:54 AM I spoke with the hospitalist Dr. Lewis.     Patient was clinically assessed and consented to treatment. After assessment, medical decision making and workup were discussed with the patient. The patient was agreeable to plan for testing, workup, and treatment.  Pertinent Labs & Imaging studies reviewed. (See chart for details)     Medical Decision Making  I obtained history from EMS  I reviewed the EMR: Outpatient Record: Outpatient cardiology notes from April of this year  I considered additional work up, including CT contrast study of the abdomen however creatinine and GFR are significantly abnormal, but deferred creatinine and GFR abnormalities  I independently interpreted the EKG and note paced rhythm with no ischemia noted.. See radiology report for final interpretation.  I discussed the care with another health care provider: Hospitalist  Admit.    MIPS (CTPE, Dental pain, Tenorio, Sinusitis, Asthma/COPD, Head Trauma): Adult Minor Head Trauma:GCS less than 15 and Currently taking anticoagulant medications: warfarin or other novel anticoagulant medications    SEPSIS: The patient has signs of sepsis   Sepsis ED evaluation   The patient has signs of sepsis as evidenced by:  1. Presence of 2 SIRS criteria, suspected infection, AND  2. Organ dysfunction: Lactic Acidosis with  value >2.0 due to sepsis and Acute encephalopathy due to sepsis    Sepsis Care Initiation: Starting at  2:53 AM on 04/10/25, until specified. Prior to this documentation, sepsis, severe sepsis, or septic shock was NOT thought to be a significant cause of illness. This order represents the first time infection was seriously considered to be affecting the patient.    Lactic Acid Results:  Recent Labs   Lab Test 04/10/25  0332 04/10/25  0140 06/08/21  2125   LACT 1.7 2.3* 2.2*       3 Hour Bundle 6 Hour Bundle (Reassessment)   Blood Cultures before IV Antibiotics: Yes  Antibiotics given: see below  Prehospital fluid volume (mL):                     Total fluids given (ED +Pre-hospital):  Full 30 mL/kg bolus intentionally NOT administered to this patient due to CHF and acute Pulmonary Edema. The target volume to infuse in this patient is 1000ml.   Repeat Lactic Acid Level: Ordered by reflex for 2 hours after initial lactic acid collection.  Vasopressors: MAP>65 after initial IVF bolus, will continue to monitor fluid status and vital signs.  Repeat perfusion exam: I attest to having performed a repeat sepsis exam and assessment of perfusion at 5:05 AM .   BMI Readings from Last 1 Encounters:   04/10/25 20.43 kg/m        Anti-infectives (From admission through now)      Start     Dose/Rate Route Frequency Ordered Stop    04/10/25 0300  piperacillin-tazobactam (ZOSYN) 3.375 g vial to attach to  mL bag         3.375 g  over 30 Minutes Intravenous ONCE 04/10/25 0243 04/10/25 0431                Laney Hahn is a 84 year old female who presents with altered mental status and diarrhea.   Differential diagnosis includes but not limited to intracranial hemorrhage, anticoagulation, fall, hepatic encephalopathy, C. difficile, dehydration, uremic encephalopathy, metabolic abnormality, urinary tract infection, sepsis, SIRS.  Patient is 84-year-old female who normally is alert and oriented x 3.  She coming from a care  facility where she was found to be altered and having profuse diarrhea.  Patient having continued for profuse diarrhea and transported here.  She does appear stable from vital signs but patient is altered and cannot tell me much story.  History is limited due to this.  Patient does seem to have some tenderness in the right upper quadrant and will plan for CT scan of the abdomen but also given that she is on anticoagulation for paroxysmal atrial fibrillation we did proceed with further lab workup and EKGs.  EKG did show paced rhythm.  Labs returned showing slightly elevated lactate and concerning for infectious process.  CBC was unchanged from prior.  Metabolic panel did show elevation in creatinine which would be consistent with dehydration and acute kidney injury.  She also had significant BUN elevation which could be causing uremic encephalopathy and patient initially agitated with EMS removing multiple IVs a place.  After IV was placed here and patient was given fluid she seemed to be more alert and less agitated following commands better and still slightly confused but definitely improved.  CT scan of the head and neck were obtained due to the anticoagulation and possibility of a fall since it was unwitnessed when patient became altered tonight.  No evidence of intracranial injury there.  Patient also had elevated procalcitonin which indicative of infection and sepsis protocol followed with 2 blood cultures obtained and awaiting urinalysis with broad-spectrum antibiotics with Zosyn started.  Additionally with the diarrhea I did start stool studies with enteric panel and C. difficile though it only been tonight reportedly that she had diarrhea so unlikely C. difficile.  CT scan of the abdomen obtained but due to her chronic kidney disease and worsening acute kidney problems contrast was not able to be used.  After 500 mL of fluids creatinine did improve and likely just mild dehydration but given the altered  mental status patient will still require admission.  Urine obtained from straight catheterization and patient started on continuous IV fluids and antibiotics with admission discussed with hospitalist.  Patient continued to improve as far as the high mental status but still slightly confused and workup so far negative.  With the CT scan the abdomen unremarkable I did get right upper quadrant ultrasound due to the tenderness there but this did not show any acute stones.      Critical Care     Performed by: Dr iBpin Macias  Authorized by: Dr Bipin Macias  Total critical care time: 50 minutes  Critical care was necessary to treat or prevent imminent or life-threatening deterioration of the following conditions: Sepsis, seizures, altered mental status, intracranial hemorrhage, anticoagulation  Critical care was time spent personally by me on the following activities: development of treatment plan with patient or surrogate, discussions with consultants, examination of patient, evaluation of patient's response to treatment, obtaining history from patient or surrogate, ordering and performing treatments and interventions, ordering and review of laboratory studies, ordering and review of radiographic studies, re-evaluation of patient's condition and monitoring for potential decompensation.  Critical care time was exclusive of separately billable procedures and treating other patients.     MEDICATIONS GIVEN IN THE EMERGENCY:  Medications   ondansetron (ZOFRAN) injection 4 mg (has no administration in time range)   lidocaine 1 % 0.1-1 mL (has no administration in time range)   lidocaine (LMX4) cream (has no administration in time range)   sodium chloride (PF) 0.9% PF flush 3 mL (3 mLs Intracatheter $Given 4/10/25 4308)   sodium chloride (PF) 0.9% PF flush 3 mL (has no administration in time range)   calcium carbonate (TUMS) chewable tablet 1,000 mg (has no administration in time range)   hydrALAZINE (APRESOLINE) tablet 10 mg (has  no administration in time range)     Or   hydrALAZINE (APRESOLINE) injection 10 mg (has no administration in time range)   sodium chloride 0.9 % infusion ( Intravenous $New Bag 4/10/25 0682)   glucose gel 15-30 g (has no administration in time range)     Or   dextrose 50 % injection 25-50 mL (has no administration in time range)     Or   glucagon injection 1 mg (has no administration in time range)   sodium chloride 0.9% BOLUS 500 mL (0 mLs Intravenous Stopped 4/10/25 0244)   piperacillin-tazobactam (ZOSYN) 3.375 g vial to attach to  mL bag (0 g Intravenous Stopped 4/10/25 0431)       NEW PRESCRIPTIONS STARTED AT TODAY'S ER VISIT:  New Prescriptions    No medications on file          =================================================================    HPI    HPI limited due to altered mental status.     Patient information was obtained from: patient and EMS    Use of : N/A         Laney Hahn is a 84 year old female with a past medical history of SVT, chronic kidney disease, dementia, paroxysmal atrial fibrillation, heart failure, hypertension, DNR, pacemaker, hyperlipidemia, who presents for altered mental status.     Patient presents to the ED for altered mental status. She does not remember going to the bathroom. She denies abdominal surgery. She was recently discharged from the hospital. She is anticoagulated on Eliquis. She denies any pain related complaints.     Per EMS, patient presents from assisted living. Staff found patient on the toilet with diarrhea and altered mental status. She is normally alert and oriented. She has ripped 3 IVs out. There are no known falls.     Patient states no other complaints or concerns at this time.       REVIEW OF SYSTEMS   Review of Systems   Gastrointestinal:  Positive for diarrhea.   Neurological:         Altered.    All other systems reviewed and are negative.       PAST MEDICAL HISTORY:  Past Medical History:   Diagnosis Date    COVID-19  09/14/2020    positive test at [facility]    DNAR (do not attempt resuscitation)     Dyslipidemia, goal LDL below 70 09/15/2020    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 Nini Rasmussen    Metabolic encephalopathy     Moderate aortic valve stenosis 08/22/2017    stable on 2020 echo    Nonobstructive atherosclerosis of coronary artery 09/15/2020    with normal LVEDP    Paroxysmal SVT (supraventricular tachycardia) 09/07/2017    said to have short episodes while hospitalized for UTI per Dr. Rhys Mensah    Syncope 08/22/2017    UTI (urinary tract infection) 08/21/2017    hospitalized with weakness       PAST SURGICAL HISTORY:  Past Surgical History:   Procedure Laterality Date    CATARACT EXTRACTION, BILATERAL      CV CORONARY ANGIOGRAM N/A 9/15/2020    Procedure: Coronary Angiogram;  Surgeon: Radhika Santa MD;  Location: [facility] Cath Lab;  Service: Cardiology    CV CORONARY ANGIOGRAM N/A 4/3/2025    Procedure: Coronary Angiogram;  Surgeon: Tyrese Dillon MD;  Location: [facility] CV    CV LEFT HEART CATH N/A 4/3/2025    Procedure: Left Heart Catheterization;  Surgeon: Tyrese Dillon MD;  Location: [facility] CV    CV LEFT HEART CATHETERIZATION WITHOUT LEFT VENTRICULOGRAM Left 9/15/2020    Procedure: Left Heart Catheterization Without Left Ventriculogram;  Surgeon: Radhika Santa MD;  Location: [facility] Cath Lab;  Service: Cardiology    CV RIGHT HEART CATH MEASUREMENTS RECORDED N/A 4/3/2025    Procedure: Right Heart Catheterization with invasive aortic vave gradient study;  Surgeon: Tyrese Dillno MD;  Location: [facility] CV    EP PACEMAKER INSERT N/A 4/13/2021    Procedure: EP Pacemaker Insertion;  Surgeon: Frederic Burgos MD;  Location: [facility] Cardiac Cath Lab;  Service: Cardiology    HYSTERECTOMY      PARTIAL GASTRECTOMY  1969    for ulcers     TONSILLECTOMY         CURRENT MEDICATIONS:      Current Facility-Administered Medications:     calcium carbonate (TUMS) chewable tablet 1,000 mg, 1,000 mg, Oral, 4x Daily PRN, Davina Lewis MD    glucose gel 15-30 g, 15-30 g, Oral, Q15 Min PRN **OR** dextrose 50 % injection 25-50 mL, 25-50 mL, Intravenous, Q15 Min PRN **OR** glucagon injection 1 mg, 1 mg, Subcutaneous, Q15 Min PRN, Davina Lewis MD    hydrALAZINE (APRESOLINE) tablet 10 mg, 10 mg, Oral, Q4H PRN **OR** hydrALAZINE (APRESOLINE) injection 10 mg, 10 mg, Intravenous, Q4H PRN, Davina Lewis MD    lidocaine (LMX4) cream, , Topical, Q1H PRN, Davina Lewis MD    lidocaine 1 % 0.1-1 mL, 0.1-1 mL, Other, Q1H PRN, Davina Lewis MD    ondansetron (ZOFRAN) injection 4 mg, 4 mg, Intravenous, Q30 Min PRN, WallFroylan MD    sodium chloride (PF) 0.9% PF flush 3 mL, 3 mL, Intracatheter, Q8H, Davina Lewis MD, 3 mL at 04/10/25 0625    sodium chloride (PF) 0.9% PF flush 3 mL, 3 mL, Intracatheter, q1 min prn, Davina Lewis MD    sodium chloride 0.9 % infusion, , Intravenous, Continuous, Davina Lewis MD, Last Rate: 50 mL/hr at 04/10/25 0626, New Bag at 04/10/25 0626    Current Outpatient Medications:     acetaminophen (TYLENOL) 500 MG tablet, Take 1,000 mg by mouth every 8 hours as needed for pain., Disp: , Rfl:     apixaban ANTICOAGULANT (ELIQUIS ANTICOAGULANT) 2.5 MG tablet, Take 1 tablet (2.5 mg) by mouth 2 times daily. Due for appointment with Rose Mcelroy, Disp: 60 tablet, Rfl: 1    atorvastatin (LIPITOR) 10 MG tablet, Take 1 tablet (10 mg) by mouth every morning., Disp: 30 tablet, Rfl: 2    metoprolol succinate ER (TOPROL XL) 50 MG 24 hr tablet, Take 1 tablet (50 mg) by mouth daily, Disp: 30 tablet, Rfl: 2    naloxone (NARCAN) 4 MG/0.1ML nasal spray, Spray 4 mg into one nostril alternating nostrils as needed for opioid reversal. every 2-3 minutes until assistance arrives, Disp: , Rfl:     torsemide (DEMADEX) 20 MG tablet, Take 1 tablet  (20 mg) by mouth daily., Disp: 60 tablet, Rfl: 1    ALLERGIES:  No Known Allergies    FAMILY HISTORY:  Family History   Adopted: Yes       SOCIAL HISTORY:   Social History     Socioeconomic History    Marital status: Single    Number of children: 0   Tobacco Use    Smoking status: Never    Smokeless tobacco: Never   Vaping Use    Vaping status: Never Used   Substance and Sexual Activity    Alcohol use: No    Drug use: No   Social History Narrative    Her adopted brother, Jeff, lives with her. He is five years younger than her.     Social Drivers of Health     Financial Resource Strain: Unknown (12/23/2024)    Financial Resource Strain     Within the past 12 months, have you or your family members you live with been unable to get utilities (heat, electricity) when it was really needed?: Patient unable to answer   Food Insecurity: Unknown (12/23/2024)    Food Insecurity     Within the past 12 months, did you worry that your food would run out before you got money to buy more?: Patient unable to answer     Within the past 12 months, did the food you bought just not last and you didn t have money to get more?: Patient unable to answer   Transportation Needs: Unknown (12/23/2024)    Transportation Needs     Within the past 12 months, has lack of transportation kept you from medical appointments, getting your medicines, non-medical meetings or appointments, work, or from getting things that you need?: Patient unable to answer   Interpersonal Safety: Low Risk  (4/3/2025)    Interpersonal Safety     Do you feel physically and emotionally safe where you currently live?: Yes     Within the past 12 months, have you been hit, slapped, kicked or otherwise physically hurt by someone?: No     Within the past 12 months, have you been humiliated or emotionally abused in other ways by your partner or ex-partner?: No   Housing Stability: Unknown (12/23/2024)    Housing Stability     Do you have housing? : Patient unable to answer      Are you worried about losing your housing?: Patient unable to answer       PHYSICAL EXAM:    Vitals: BP 94/61   Pulse 92   Temp 97.3  F (36.3  C) (Oral)   Resp 20   Wt 54 kg (119 lb)   LMP  (LMP Unknown)   SpO2 96%   BMI 20.43 kg/m     Physical Exam  Vitals and nursing note reviewed.   Constitutional:       General: She is not in acute distress.     Appearance: She is well-developed and normal weight. She is ill-appearing. She is not toxic-appearing or diaphoretic.   HENT:      Head: Normocephalic and atraumatic.      Mouth/Throat:      Comments: Dry mucous membranes  Eyes:      General: No scleral icterus.     Extraocular Movements: Extraocular movements intact.      Right eye: Normal extraocular motion and no nystagmus.      Left eye: Normal extraocular motion and no nystagmus.      Pupils: Pupils are equal, round, and reactive to light. Pupils are equal.      Right eye: Pupil is reactive.      Left eye: Pupil is reactive.   Cardiovascular:      Rate and Rhythm: Normal rate and regular rhythm.      Heart sounds: Normal heart sounds.   Pulmonary:      Effort: Pulmonary effort is normal. No respiratory distress.      Breath sounds: Normal breath sounds.   Abdominal:      General: There is no distension.      Palpations: Abdomen is soft.      Tenderness: There is abdominal tenderness (Tender in right upper quadrant). There is no guarding.   Musculoskeletal:         General: No tenderness. Normal range of motion.      Cervical back: Normal range of motion and neck supple.   Skin:     General: Skin is warm and dry.      Coloration: Skin is not cyanotic or pale.      Comments: No jaundice   Neurological:      Mental Status: She is alert. She is disoriented and confused.      GCS: GCS eye subscore is 4. GCS verbal subscore is 4. GCS motor subscore is 6.      Cranial Nerves: No cranial nerve deficit or facial asymmetry.      Coordination: Coordination normal.   Psychiatric:         Mood and Affect: Mood is  anxious.           LAB:  All pertinent labs reviewed and interpreted.  Labs Ordered and Resulted from Time of ED Arrival to Time of ED Departure   INR - Abnormal       Result Value    INR 1.31 (*)    COMPREHENSIVE METABOLIC PANEL - Abnormal    Sodium 141      Potassium 4.3      Carbon Dioxide (CO2) 23      Anion Gap 18 (*)     Urea Nitrogen 65.4 (*)     Creatinine 1.94 (*)     GFR Estimate 25 (*)     Calcium 9.8      Chloride 100      Glucose 189 (*)     Alkaline Phosphatase 127      AST 73 (*)     ALT 38      Protein Total 8.0      Albumin 5.4 (*)     Bilirubin Total 1.2     LACTIC ACID WHOLE BLOOD WITH 1X REPEAT IN 2 HR WHEN >2 - Abnormal    Lactic Acid, Initial 2.3 (*)    PROCALCITONIN - Abnormal    Procalcitonin 8.91 (*)    TROPONIN T, HIGH SENSITIVITY - Abnormal    Troponin T, High Sensitivity 39 (*)    BLOOD GAS VENOUS - Abnormal    pH Venous 7.36      pCO2 Venous 42      pO2 Venous 44      Bicarbonate Venous 24      Base Excess/Deficit Venous -1.8      FIO2 21      Oxyhemoglobin Venous 77 (*)     O2 Sat, Venous 78.1 (*)    ROUTINE UA WITH MICROSCOPIC REFLEX TO CULTURE - Abnormal    Color Urine Yellow      Appearance Urine Clear      Glucose Urine Negative      Bilirubin Urine Negative      Ketones Urine Negative      Specific Gravity Urine 1.016      Blood Urine 0.5 mg/dL (*)     pH Urine 5.0      Protein Albumin Urine Negative      Urobilinogen Urine 3.0 (*)     Nitrite Urine Negative      Leukocyte Esterase Urine Negative      Mucus Urine Present (*)     RBC Urine 66 (*)     WBC Urine 2      Squamous Epithelials Urine 1      Hyaline Casts Urine 5 (*)    CBC WITH PLATELETS AND DIFFERENTIAL - Abnormal    WBC Count 8.3      RBC Count 4.10      Hemoglobin 13.7      Hematocrit 39.5      MCV 96      MCH 33.4 (*)     MCHC 34.7      RDW 12.6      Platelet Count 189      % Neutrophils 89      % Lymphocytes 8      % Monocytes 2      % Eosinophils 1      % Basophils 0      % Immature Granulocytes 0      NRBCs per 100  WBC 0      Absolute Neutrophils 7.3      Absolute Lymphocytes 0.7 (*)     Absolute Monocytes 0.2      Absolute Eosinophils 0.1      Absolute Basophils 0.0      Absolute Immature Granulocytes 0.0      Absolute NRBCs 0.0     ISTAT CREATININE POCT - Abnormal    Creatinine POCT 2.4 (*)     GFR, ESTIMATED POCT 19 (*)    TROPONIN T, HIGH SENSITIVITY - Abnormal    Troponin T, High Sensitivity 39 (*)    AMMONIA - Normal    Ammonia 11     MAGNESIUM - Normal    Magnesium 2.0     ETHYL ALCOHOL LEVEL - Normal    Alcohol ethyl <0.01     LACTIC ACID WHOLE BLOOD - Normal    Lactic Acid 1.7     COMPREHENSIVE METABOLIC PANEL   AMMONIA   ISTAT CREATININE POCT   BLOOD CULTURE   BLOOD CULTURE   ENTERIC BACTERIA AND VIRUS PANEL BY PCR   C. DIFFICILE TOXIN B PCR WITH REFLEX TO C. DIFFICILE EIA       RADIOLOGY:  US Abdomen Limited (RUQ)   Final Result   IMPRESSION:   1.  Extrahepatic biliary dilatation with the common duct measuring 12 mm. No intrahepatic biliary dilatation seen. Recommend correlation with liver function tests. If there is clinical concern for biliary obstruction, MRCP could be considered.   2.  No gallstones or evidence of cholecystitis.            CT Abdomen Pelvis w/o Contrast   Final Result   IMPRESSION:    1.  No acute findings to explain patient's symptoms.   2.  Colonic diverticulosis without diverticulitis.   3.  Moderate left renal atrophy.            CT Cervical Spine w/o Contrast   Final Result   IMPRESSION:   1.  No fracture or posttraumatic subluxation.   2.  Advanced spondylosis with spinal canal and foraminal stenoses as detailed.         CT Head w/o Contrast   Final Result   IMPRESSION:   1.  No CT evidence for acute intracranial process.    2.  Brain atrophy and chronic ischemic changes, as above.           EKG:   Performed at: 1:50 AM on April 10, 2025  Impression: Paced rhythm with similar wide-complex to prior EKG, no signs of acute ectopy  Rate: 90 bpm  Rhythm: Paced rhythm  QRS Interval: 140 ms  QTc  Interval: 533 ms  Comparison: Comparison prior EKG from April 3, 2025 with similar paced rhythm.  I have independently reviewed and interpreted the EKG(s) documented above.     PROCEDURES:   Procedures       I, Lucas Claros, am serving as a scribe to document services personally performed by Dr. Froylan Macias  based on my observation and the provider's statements to me. I, Froylan Macias MD attest that Lucas Claros is acting in a scribe capacity, has observed my performance of the services and has documented them in accordance with my direction.      Froylan Macias M.D.  Emergency Medicine  [facility] Emergency Department       Froylan Macias MD  04/10/25 0746

## 2025-04-10 NOTE — MEDICATION SCRIBE - ADMISSION MEDICATION HISTORY
Admission medication history completed at [facility]. Please see Pharmacist Admission Medication History note from 04/03/2025.  Unable to reach facility at this time.

## 2025-04-10 NOTE — ED NOTES
Bed: JNED-33  Expected date: 4/10/25  Expected time:   Means of arrival:   Comments:  Hold for room 9

## 2025-04-10 NOTE — CONSULTS
NEPHROLOGY CONSULTATION    REASON FOR CONSULTATION:    Acute kidney injury on chronic kidney disease.    HISTORY OF PRESENT ILLNESS:  Patient is an 84-year-old female with known history of chronic kidney disease stage IIIb.  Her baseline creatinine 1.3-1.66 in early 2025.    She has history of dementia, HFrEF, aortic stenosis, paroxysmal atrial fibrillation, chronic kidney disease stage 3b, Longstanding hypertension, pacemaker in place, dyslipidemia, junctional bradycardia, status post pacemaker placement, nonobstructive coronary artery disease, generalized weakness, frequent falls, vascular dementia without behavioral disturbance.    This time patient was admitted from assisted living facility due to diarrhea and associated mental status changes. Patient was afebrile and hemodynamically stable at the time of admission.     Laboratories on 4/10/2025 at the time of admission demonstrated: sodium 142, potassium 4.6, venous CO2 20, creatinine 2.08. Procalcitonin 8.91.    Urinalysis (4/10) negative for infection.    At the time of visit she is resting in bed, seated at the bedside, she has been able to swallow so on fluids, she is not choking when she drinks fluids.  She is unable to participate in a review of systems both she opens her eyes when I call her name.  She is in no distress, no respiratory distress she looks comfortable.    REVIEW OF SYSTEMS:      Past Medical History:   Diagnosis Date    COVID-19 09/14/2020    positive test at [facility]    DNAR (do not attempt resuscitation)     Dyslipidemia, goal LDL below 70 09/15/2020    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 Nini Rasmussen    Metabolic encephalopathy     Moderate aortic valve stenosis 08/22/2017    stable on 2020 echo    Nonobstructive atherosclerosis of coronary artery 09/15/2020    with normal LVEDP    Paroxysmal SVT (supraventricular  tachycardia) 09/07/2017    said to have short episodes while hospitalized for UTI per Dr. Rhys Mensah    Syncope 08/22/2017    UTI (urinary tract infection) 08/21/2017    hospitalized with weakness       Social History     Socioeconomic History    Marital status: Single     Spouse name: Not on file    Number of children: 0    Years of education: Not on file    Highest education level: Not on file   Occupational History    Not on file   Tobacco Use    Smoking status: Never    Smokeless tobacco: Never   Vaping Use    Vaping status: Never Used   Substance and Sexual Activity    Alcohol use: No    Drug use: No    Sexual activity: Not on file   Other Topics Concern    Not on file   Social History Narrative    Her adopted brother, Jeff, lives with her. He is five years younger than her.     Social Drivers of Health     Financial Resource Strain: Unknown (12/23/2024)    Financial Resource Strain     Within the past 12 months, have you or your family members you live with been unable to get utilities (heat, electricity) when it was really needed?: Patient unable to answer   Food Insecurity: Unknown (12/23/2024)    Food Insecurity     Within the past 12 months, did you worry that your food would run out before you got money to buy more?: Patient unable to answer     Within the past 12 months, did the food you bought just not last and you didn t have money to get more?: Patient unable to answer   Transportation Needs: Unknown (12/23/2024)    Transportation Needs     Within the past 12 months, has lack of transportation kept you from medical appointments, getting your medicines, non-medical meetings or appointments, work, or from getting things that you need?: Patient unable to answer   Physical Activity: Not on file   Stress: Not on file   Social Connections: Not on file   Interpersonal Safety: Low Risk  (4/3/2025)    Interpersonal Safety     Do you feel physically and emotionally safe where you currently live?: Yes      "Within the past 12 months, have you been hit, slapped, kicked or otherwise physically hurt by someone?: No     Within the past 12 months, have you been humiliated or emotionally abused in other ways by your partner or ex-partner?: No   Housing Stability: Unknown (12/23/2024)    Housing Stability     Do you have housing? : Patient unable to answer     Are you worried about losing your housing?: Patient unable to answer       Family History   Adopted: Yes       No Known Allergies    MEDICATIONS:  Current Facility-Administered Medications   Medication Dose Route Frequency Provider Last Rate Last Admin    sodium chloride (PF) 0.9% PF flush 3 mL  3 mL Intracatheter Q8H Davina Lewis MD   3 mL at 04/10/25 0625         PHYSICAL EXAM    /56 (BP Location: Left arm)   Pulse 83   Temp 97.8  F (36.6  C) (Oral)   Resp 15   Wt 54 kg (119 lb)   LMP  (LMP Unknown)   SpO2 97%   BMI 20.43 kg/m        Intake/Output Summary (Last 24 hours) at 4/10/2025 1449  Last data filed at 4/10/2025 1404  Gross per 24 hour   Intake 120 ml   Output 250 ml   Net -130 ml     Resp: 15    GENERAL: Chronically ill and debilitated  HEAD: Normal  HEENT: No acute edema  CARDIOVASCULAR: No visible jugular vein distention, no peripheral edema present.  PULMONARY: No respiratory distress. No cyanosis  GASTROINTESTINAL: No ascites present  MSK: Diffuse muscle wasting.   NEURO: Somnolent but opens her eyes when calling her name.  SKIN: Pale, no jaundice, no rash.    LABORATORIES    Recent Labs   Lab 04/10/25  0734 04/10/25  0140   WBC 13.4* 8.3   HGB 12.5 13.7   HCT 37.3 39.5    189     Recent Labs   Lab 04/10/25  0734 04/10/25  0140 04/09/25  0427    141 141   CO2 20* 23 31*   BUN 66.2* 65.4* 50.8*   ALKPHOS 96 127  --    ALT 45 38  --    AST 69* 73*  --      Recent Labs   Lab 04/10/25  0140   INR 1.31*     Invalid input(s): \"FERRITIN\"  No results for input(s): \"IRON\" in the last 168 hours.    Invalid input(s): " "\"TIBC\"    RADIOLOGY    ECHOCARDIOGRAM    ASSESSMENT/PLAN:    Acute kidney injury on chronic kidney disease stage IIIb.  Acute kidney injury (probable acute tubular necrosis) likely secondary to intravascular depletion in the setting of persistent diarrhea and poor oral intake over the past few days prior to admission and and therapy with diuretics PTA.  Patient has known baseline creatinine 1.3-1.6 range in early 2025.  At the time of admission creatinine 2.08.  She has mild metabolic acidosis due to diarrhea and acute kidney injury.   Check urinalysis and urine studies (random urine sodium and random urine osmolaity).  Gentle hydration.  Monitor renal function closely.  Mental status changes. Due to acute illness including dehydration and sepsis.. Hydration and supportive care.  Sepsis. On IVF and broad spectrum antibiotics.   Urinalysis negative for infection.  Continue as per primary service.  Diarrhea. Etiology unclear, continue enteric isolation and evaluation for C Difficile colitis.  As per primary service.  Heart Failure with reduced ejection fraction. LVEF 30-35%. Now requiring IVFs. Known aortic stenosis.   Watch volume status  OK to resume diuretics when hydrated. Due to JESUS on CKD and HFrEF she will need diuretic resumption on 4/11.     Discussed with sitter at the bedside during the visit    Abhijit Gonzalez MD  Nephrology    "

## 2025-04-10 NOTE — ED NOTES
Report called to [facility] on P[facility]. Patient to transfer to [facility]. Patient has dentures and 1 bag of belongings.

## 2025-04-10 NOTE — PLAN OF CARE
Goal Outcome Evaluation:      Plan of Care Reviewed With: caregiver          Outcome Evaluation: Plan return to assisted living facility when medically ready for discharge.

## 2025-04-10 NOTE — CONSULTS
General Surgery Consultation  Laney Hahn MRN# [id number]   Age/Sex: 84 year old female YOB: 1940     Reason for consult: 1. Disorientation    2. Diarrhea of presumed infectious origin    3. Dehydration            Requesting physician: Dr. Davina Lewis                   Assessment and Plan:   Assessment:  Diarrhea  Altered mental status  Ms. Hahn is a 85 yo F with PMH of dementia, HFrEF, aortic stenosis in process of work-up pre-TAVR, paroxysmal a-fib (on Eliquis), CKD III who was recently admitted with heart failure exacerbation following an elective coronary angiogram and right heart catheterization for pre-TAVR work-up (4/3-4/9) who was admitted from assisted living facility with diarrhea and altered mental status. Afebrile and hemodynamically stable. Labs with leukocytosis, normal lactic acid, Cr 2.08, trop 39, 39. Imaging included CT abdomen/pelvis which demonstrated distended gallbladder and RUQ US which showed no gallstones, wall thickening or pericholecystic fluid and CBD measures 12 mm (unchanged from prior). Patient recently seen on 3/25 and HIDA scan completed and negative for cholecystitis. Low suspicion for cholecystitis or gallbladder pathology as source for possible sepsis and altered mental status.      Plan:  - Okay for diet from surgical standpoint  - No indication for acute surgical intervention  - Medical management per primary team  - Case discussed with Dr. Manuel. General surgery will sign off at this time. Please call if further questions.          Chief Complaint:     Chief Complaint   Patient presents with    Altered Mental Status        History is obtained from the patient and electronic health record. History limited secondary to patient's dementia and is a poor historian.    HPI:   Laney Hahn is a 84 year old female  with PMH of dementia, HFrEF, aortic stenosis in process of work-up pre-TAVR, paroxysmal a-fib (on Eliquis), CKD III who presented to  the [facility] Emergency Department with diarrhea and altered mental status. Patient was recently admitted 4/3-4/9 with heart failure exacerbation following an elective coronary angiogram and right heart catheterization for pre-TAVR work-up. Currently, patient denies any abdominal pain, fever, chills, nausea, vomiting. She has not had any further bowel movements since arrival to the ER. She is unable to give further history and indicates that she does not want surgery. Per chart review, past abdominal surgical history includes total hysterectomy and partial gastrectomy. Patient is on Eliquis for history of atrial fibrillation, uncertain when last dose was administered.          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 Nini Rasmussen    Metabolic encephalopathy     Moderate aortic valve stenosis 08/22/2017    stable on 2020 echo    Nonobstructive atherosclerosis of coronary artery 09/15/2020    with normal LVEDP    Paroxysmal SVT (supraventricular tachycardia) 09/07/2017    said to have short episodes while hospitalized for UTI per Dr. Rhys Mensah    Syncope 08/22/2017    UTI (urinary tract infection) 08/21/2017    hospitalized with weakness              Past Surgical History:     Past Surgical History:   Procedure Laterality Date    CATARACT EXTRACTION, BILATERAL      CV CORONARY ANGIOGRAM N/A 9/15/2020    Procedure: Coronary Angiogram;  Surgeon: Radhika Santa MD;  Location: [facility] Cath Lab;  Service: Cardiology    CV CORONARY ANGIOGRAM N/A 4/3/2025    Procedure: Coronary Angiogram;  Surgeon: Tyrese Dillon MD;  Location: [facility] CATH LAB CV    CV LEFT HEART CATH N/A 4/3/2025     Procedure: Left Heart Catheterization;  Surgeon: Tyrese Dillon MD;  Location: [facility] CATH LAB CV    CV LEFT HEART CATHETERIZATION WITHOUT LEFT VENTRICULOGRAM Left 9/15/2020    Procedure: Left Heart Catheterization Without Left Ventriculogram;  Surgeon: Radhika Santa MD;  Location: [facility] Cath Lab;  Service: Cardiology    CV RIGHT HEART CATH MEASUREMENTS RECORDED N/A 4/3/2025    Procedure: Right Heart Catheterization with invasive aortic vave gradient study;  Surgeon: Tyrese Dillon MD;  Location: [facility] CV    EP PACEMAKER INSERT N/A 4/13/2021    Procedure: EP Pacemaker Insertion;  Surgeon: Frederic Burgos MD;  Location: [facility] Cardiac Cath Lab;  Service: Cardiology    HYSTERECTOMY      PARTIAL GASTRECTOMY  1969    for ulcers    TONSILLECTOMY               Social History:    reports that she has never smoked. She has never used smokeless tobacco. She reports that she does not drink alcohol and does not use drugs.           Family History:     Family History   Adopted: Yes              Allergies:   No Known Allergies           Medications:     Prior to Admission medications    Medication Sig Start Date End Date Taking? Authorizing Provider   acetaminophen (TYLENOL) 500 MG tablet Take 1,000 mg by mouth every 8 hours as needed for pain.    Unknown, Entered By History   apixaban ANTICOAGULANT (ELIQUIS ANTICOAGULANT) 2.5 MG tablet Take 1 tablet (2.5 mg) by mouth 2 times daily. Due for appointment with Rose Mcelroy 12/28/24   Erinn Celaya PA-C   atorvastatin (LIPITOR) 10 MG tablet Take 1 tablet (10 mg) by mouth every morning. 12/28/24   Erinn Celaya PA-C   metoprolol succinate ER (TOPROL XL) 50 MG 24 hr tablet Take 1 tablet (50 mg) by mouth daily 4/10/24   Rose Mcelroy, TANYA   naloxone (NARCAN) 4 MG/0.1ML nasal spray Spray 4 mg into one nostril alternating nostrils as needed for opioid reversal. every 2-3 minutes until assistance arrives    Reported, Patient   torsemide (DEMADEX) 20 MG tablet  Take 1 tablet (20 mg) by mouth daily. 4/10/25   Lucian Saleh MD              Review of Systems:   The Review of Systems is negative other than noted in the HPI            Physical Exam:   Patient Vitals for the past 24 hrs:   BP Temp Temp src Pulse Resp SpO2 Weight   04/10/25 0735 114/55 97.5  F (36.4  C) Oral 77 18 98 % --   04/10/25 0445 94/61 -- -- -- -- -- --   04/10/25 0248 -- -- -- 92 -- 96 % --   04/10/25 0247 -- -- -- 92 -- 97 % --   04/10/25 0130 110/62 -- -- 91 -- 95 % --   04/10/25 0124 -- -- -- -- 20 -- --   04/10/25 0123 -- 97.3  F (36.3  C) Oral 91 -- 94 % 54 kg (119 lb)   04/10/25 0122 131/66 -- -- -- -- -- --          Intake/Output Summary (Last 24 hours) at 4/10/2025 1013  Last data filed at 4/10/2025 0206  Gross per 24 hour   Intake --   Output 100 ml   Net -100 ml      Constitutional:   awake, alert, cooperative, no apparent distress, and appears stated age       Eyes:   PERRL, conjunctiva/corneas clear, EOM's intact; no scleral edema or icterus noted        ENT:   Normocephalic, without obvious abnormality, atraumatic, Lips, mucosa, and tongue normal        Lungs:   Normal respiratory effort, no accessory muscle use       Cardiovascular:   Regular rate and rhythm       Abdomen:   Softly distended, diffusely mild tenderness to palpation. No rebound or guarding. Negative Wu's sign. Midline surgical scar noted.       Musculoskeletal:   No obvious swelling, bruising or deformity       Skin:   Skin color and texture normal for patient, no rashes or lesions              Data:         All imaging studies reviewed by me.    Results for orders placed or performed during the hospital encounter of 04/10/25 (from the past 24 hours)   CBC with platelets differential    Narrative    The following orders were created for panel order CBC with platelets differential.  Procedure                               Abnormality         Status                     ---------                               -----------          ------                     CBC with platelets and ...[[id number]]  Abnormal            Final result                 Please view results for these tests on the individual orders.   INR   Result Value Ref Range    INR 1.31 (H) 0.85 - 1.15   Comprehensive metabolic panel   Result Value Ref Range    Sodium 141 135 - 145 mmol/L    Potassium 4.3 3.4 - 5.3 mmol/L    Carbon Dioxide (CO2) 23 22 - 29 mmol/L    Anion Gap 18 (H) 7 - 15 mmol/L    Urea Nitrogen 65.4 (H) 8.0 - 23.0 mg/dL    Creatinine 1.94 (H) 0.51 - 0.95 mg/dL    GFR Estimate 25 (L) >60 mL/min/1.73m2    Calcium 9.8 8.8 - 10.4 mg/dL    Chloride 100 98 - 107 mmol/L    Glucose 189 (H) 70 - 99 mg/dL    Alkaline Phosphatase 127 40 - 150 U/L    AST 73 (H) 0 - 45 U/L    ALT 38 0 - 50 U/L    Protein Total 8.0 6.4 - 8.3 g/dL    Albumin 5.4 (H) 3.5 - 5.2 g/dL    Bilirubin Total 1.2 <=1.2 mg/dL   Lactic acid whole blood with 1x repeat in 2 hr when >2   Result Value Ref Range    Lactic Acid, Initial 2.3 (H) 0.7 - 2.0 mmol/L   Procalcitonin   Result Value Ref Range    Procalcitonin 8.91 (HH) <0.50 ng/mL   Troponin T, High Sensitivity   Result Value Ref Range    Troponin T, High Sensitivity 39 (H) <=14 ng/L   Magnesium   Result Value Ref Range    Magnesium 2.0 1.7 - 2.3 mg/dL   Blood gas venous   Result Value Ref Range    pH Venous 7.36 7.32 - 7.43    pCO2 Venous 42 40 - 50 mm Hg    pO2 Venous 44 25 - 47 mm Hg    Bicarbonate Venous 24 21 - 28 mmol/L    Base Excess/Deficit Venous -1.8 -3.0 - 3.0 mmol/L    FIO2 21     Oxyhemoglobin Venous 77 (H) 70 - 75 %    O2 Sat, Venous 78.1 (H) 70.0 - 75.0 %    Narrative    In healthy individuals, oxyhemoglobin (O2Hb) and oxygen saturation (SO2) are approximately equal. In the presence of dyshemoglobins, oxyhemoglobin can be considerably lower than oxygen saturation.   Ethyl Alcohol Level   Result Value Ref Range    Alcohol ethyl <0.01 <=0.01 g/dL   CBC with platelets and differential   Result Value Ref Range    WBC Count 8.3 4.0 -  11.0 10e3/uL    RBC Count 4.10 3.80 - 5.20 10e6/uL    Hemoglobin 13.7 11.7 - 15.7 g/dL    Hematocrit 39.5 35.0 - 47.0 %    MCV 96 78 - 100 fL    MCH 33.4 (H) 26.5 - 33.0 pg    MCHC 34.7 31.5 - 36.5 g/dL    RDW 12.6 10.0 - 15.0 %    Platelet Count 189 150 - 450 10e3/uL    % Neutrophils 89 %    % Lymphocytes 8 %    % Monocytes 2 %    % Eosinophils 1 %    % Basophils 0 %    % Immature Granulocytes 0 %    NRBCs per 100 WBC 0 <1 /100    Absolute Neutrophils 7.3 1.6 - 8.3 10e3/uL    Absolute Lymphocytes 0.7 (L) 0.8 - 5.3 10e3/uL    Absolute Monocytes 0.2 0.0 - 1.3 10e3/uL    Absolute Eosinophils 0.1 0.0 - 0.7 10e3/uL    Absolute Basophils 0.0 0.0 - 0.2 10e3/uL    Absolute Immature Granulocytes 0.0 <=0.4 10e3/uL    Absolute NRBCs 0.0 10e3/uL   Creatinine POCT   Result Value Ref Range    Creatinine POCT 2.4 (H) 0.5 - 1.0 mg/dL    GFR, ESTIMATED POCT 19 (L) >60 mL/min/1.73m2    Narrative    Reference intervals for this test were updated on 03/10/2025 to standardize reference intervals for creatinine measured by different instruments. There may be differences in the flagging of prior results with similar values performed with this method. Those prior results can be interpreted in the context of the updated reference intervals.   CT Head w/o Contrast    Narrative    EXAM: CT HEAD W/O CONTRAST  LOCATION: [facility]  DATE: 4/10/2025    INDICATION: Altered mental status, history of blood thinners, unknown if fall.  COMPARISON: 03/24/2025.  TECHNIQUE: Routine CT Head without IV contrast. Multiplanar reformats. Dose reduction techniques were used.    FINDINGS:  INTRACRANIAL CONTENTS: No intracranial hemorrhage, extraaxial collection, or mass effect. Chronic lacunar infarctions bilateral basal ganglia and cerebellar hemispheres. Moderate presumed chronic small vessel ischemic changes. Moderate generalized volume   loss. No hydrocephalus.     VISUALIZED ORBITS/SINUSES/MASTOIDS: No intraorbital abnormality. No  paranasal sinus mucosal disease. No middle ear or mastoid effusion.    BONES/SOFT TISSUES: No acute abnormality.      Impression    IMPRESSION:  1.  No CT evidence for acute intracranial process.   2.  Brain atrophy and chronic ischemic changes, as above.     CT Cervical Spine w/o Contrast    Narrative    EXAM: CT CERVICAL SPINE W/O CONTRAST  LOCATION: [facility]  DATE: 4/10/2025    INDICATION: Altered mental status, possible fall.  COMPARISON: 12/23/2024.  TECHNIQUE: Routine CT Cervical Spine without IV contrast. Multiplanar reformats. Dose reduction techniques were used.    FINDINGS:  VERTEBRA: Straightening of cervical curvature with mild anterior spondylolisthesis at C3-C4 and C7-T1. Otherwise, normal alignment. Normal vertebral body heights. Severe disc degeneration C4-T1. Moderate to severe facet arthropathy C2-C3 and C3-C4. No   fracture or posttraumatic subluxation.     CANAL/FORAMINA: Mild spinal canal stenosis at C3-C4 with mild right foraminal stenosis. Moderate spinal canal stenosis at C4-C5 with severe right foraminal stenosis. Mild to moderate spinal canal stenosis at C5-C6. Moderate spinal canal stenosis at C6-C7   with moderate bilateral foraminal stenoses. Moderate to severe left foraminal stenosis at C7-T1.    PARASPINAL: Partially visualized internal cardiac leads on the left. No pneumothorax.      Impression    IMPRESSION:  1.  No fracture or posttraumatic subluxation.  2.  Advanced spondylosis with spinal canal and foraminal stenoses as detailed.     CT Abdomen Pelvis w/o Contrast    Narrative    EXAM: CT ABDOMEN PELVIS W/O CONTRAST  LOCATION: [facility]  DATE: 4/10/2025    INDICATION: Abdominal distention, bloating, diarrhea, altered mental status  COMPARISON: 3/24/2025  TECHNIQUE: CT scan of the abdomen and pelvis was performed without IV contrast. Multiplanar reformats were obtained. Dose reduction techniques were used.  CONTRAST:  None.    FINDINGS:   LOWER CHEST: Dependent atelectasis in the posterior lung bases.    HEPATOBILIARY: No significant mass or bile duct dilatation. No calcified gallstones. Gallbladder is distended.    PANCREAS: Diffuse fatty infiltration and atrophy of the pancreas. No significant mass, duct dilatation, or inflammatory change.    SPLEEN: Normal size.    ADRENAL GLANDS: Normal.    KIDNEYS/BLADDER: Moderate left renal atrophy. No significant stones, mass, or hydronephrosis. No hydroureter. No stones are seen along the courses of the ureters. Urinary bladder unremarkable.    BOWEL: Nonspecific nonobstructive bowel gas pattern. Numerous colonic diverticula without evidence for diverticulitis. Appendix within normal limits.    LYMPH NODES: No lymphadenopathy.    VASCULATURE: No abdominal aortic aneurysm. Moderate vascular calcifications abdominal aorta and common iliac arteries    PELVIC ORGANS: No pelvic masses or free fluid. Midline uterus.    MUSCULOSKELETAL: Mild spondylosis. No inguinal hernias. No ventral hernia.      Impression    IMPRESSION:   1.  No acute findings to explain patient's symptoms.  2.  Colonic diverticulosis without diverticulitis.  3.  Moderate left renal atrophy.       Ammonia   Result Value Ref Range    Ammonia 11 11 - 51 umol/L   Lactic acid whole blood   Result Value Ref Range    Lactic Acid 1.7 0.7 - 2.0 mmol/L   Troponin T, High Sensitivity   Result Value Ref Range    Troponin T, High Sensitivity 39 (H) <=14 ng/L   UA with Microscopic reflex to Culture    Specimen: Urine, Catheter   Result Value Ref Range    Color Urine Yellow Colorless, Straw, Light Yellow, Yellow    Appearance Urine Clear Clear    Glucose Urine Negative Negative mg/dL    Bilirubin Urine Negative Negative    Ketones Urine Negative Negative mg/dL    Specific Gravity Urine 1.016 1.001 - 1.030    Blood Urine 0.5 mg/dL (A) Negative    pH Urine 5.0 5.0 - 7.0    Protein Albumin Urine Negative Negative mg/dL    Urobilinogen Urine  3.0 (A) Normal mg/dL    Nitrite Urine Negative Negative    Leukocyte Esterase Urine Negative Negative    Mucus Urine Present (A) None Seen /LPF    RBC Urine 66 (H) <=2 /HPF    WBC Urine 2 <=5 /HPF    Squamous Epithelials Urine 1 <=1 /HPF    Hyaline Casts Urine 5 (H) <=2 /LPF    Narrative    Urine Culture not indicated   US Abdomen Limited (RUQ)    Narrative    EXAM: US ABDOMEN LIMITED  LOCATION: [facility]  DATE: 4/10/2025    INDICATION: Right upper quadrant tenderness, CT unremarkable  COMPARISON: None.  TECHNIQUE: Limited abdominal ultrasound.    FINDINGS:    GALLBLADDER: Normal. No gallstones, wall thickening, or pericholecystic fluid. Negative sonographic Wu's sign.    BILE DUCTS: No intrahepatic biliary dilatation seen. Extrahepatic biliary dilatation with the common duct measures 12 mm.    LIVER: Normal parenchyma with smooth contour. No focal mass. The portal vein is patent with flow in the normal direction.    RIGHT KIDNEY: No hydronephrosis.    PANCREAS: Bowel gas obscures portions of pancreas. The visualized portions are normal.    No ascites.      Impression    IMPRESSION:  1.  Extrahepatic biliary dilatation with the common duct measuring 12 mm. No intrahepatic biliary dilatation seen. Recommend correlation with liver function tests. If there is clinical concern for biliary obstruction, MRCP could be considered.  2.  No gallstones or evidence of cholecystitis.       Comprehensive metabolic panel   Result Value Ref Range    Sodium 142 135 - 145 mmol/L    Potassium 4.6 3.4 - 5.3 mmol/L    Carbon Dioxide (CO2) 20 (L) 22 - 29 mmol/L    Anion Gap 19 (H) 7 - 15 mmol/L    Urea Nitrogen 66.2 (H) 8.0 - 23.0 mg/dL    Creatinine 2.08 (H) 0.51 - 0.95 mg/dL    GFR Estimate 23 (L) >60 mL/min/1.73m2    Calcium 9.5 8.8 - 10.4 mg/dL    Chloride 103 98 - 107 mmol/L    Glucose 227 (H) 70 - 99 mg/dL    Alkaline Phosphatase 96 40 - 150 U/L    AST 69 (H) 0 - 45 U/L    ALT 45 0 - 50 U/L    Protein  Total 6.9 6.4 - 8.3 g/dL    Albumin 4.9 3.5 - 5.2 g/dL    Bilirubin Total 1.0 <=1.2 mg/dL   CBC with platelets differential    Narrative    The following orders were created for panel order CBC with platelets differential.  Procedure                               Abnormality         Status                     ---------                               -----------         ------                     CBC with platelets and ...[[id number]]  Abnormal            Final result                 Please view results for these tests on the individual orders.   Ammonia   Result Value Ref Range    Ammonia 12 11 - 51 umol/L   CBC with platelets and differential   Result Value Ref Range    WBC Count 13.4 (H) 4.0 - 11.0 10e3/uL    RBC Count 3.82 3.80 - 5.20 10e6/uL    Hemoglobin 12.5 11.7 - 15.7 g/dL    Hematocrit 37.3 35.0 - 47.0 %    MCV 98 78 - 100 fL    MCH 32.7 26.5 - 33.0 pg    MCHC 33.5 31.5 - 36.5 g/dL    RDW 12.7 10.0 - 15.0 %    Platelet Count 175 150 - 450 10e3/uL    % Neutrophils 82 %    % Lymphocytes 7 %    % Monocytes 7 %    % Eosinophils 4 %    % Basophils 0 %    % Immature Granulocytes 1 %    NRBCs per 100 WBC 0 <1 /100    Absolute Neutrophils 10.9 (H) 1.6 - 8.3 10e3/uL    Absolute Lymphocytes 0.9 0.8 - 5.3 10e3/uL    Absolute Monocytes 0.9 0.0 - 1.3 10e3/uL    Absolute Eosinophils 0.5 0.0 - 0.7 10e3/uL    Absolute Basophils 0.0 0.0 - 0.2 10e3/uL    Absolute Immature Granulocytes 0.1 <=0.4 10e3/uL    Absolute NRBCs 0.0 10e3/uL           [facility]  Surgery [address] [address]?  Office: [phone]

## 2025-04-10 NOTE — H&P
[facility]    History and Physical - Hospitalist Service       Date of Admission:  4/10/2025    Assessment & Plan   Laney Hahn is a 84 year old female  with a medical history significant for coronary artery disease, moderate aortic valve stenosis, prior history of CVA, prior history of syncope, and other medical comorbidities who is admitted with change in mental status  in the setting of possible sepsis  from acute gall bladder pathology with sepsis, diarrhea, dehydration and acute on chronic renal failure.    Patient Active Problem List   Diagnosis    SVT (supraventricular tachycardia)    Chronic kidney disease, stage III (moderate) (H)    Vascular dementia without behavioral disturbance (H)    Paroxysmal atrial fibrillation (H)    Nonrheumatic aortic valve stenosis    Heart failure with reduced ejection fraction, NYHA class I (H)    Essential hypertension    DNAR (do not attempt resuscitation)    Cardiac pacemaker in situ    Mixed hyperlipidemia    AV node dysfunction    Bifascicular bundle branch block    Junctional bradycardia, s/p PPM placement     Nonobstructive atherosclerosis of coronary artery    Sick sinus syndrome (H)    Acute pain of both knees    Unsteady gait    Generalized weakness    Falls frequently    COVID-19 virus infection    Nausea and vomiting, unspecified vomiting type    Status post coronary angiogram    Other ill-defined heart diseases    Diarrhea of presumed infectious origin    Dehydration    Disorientation        #Change in mental status  - multifactorial  Metabolic and septic encephalopathy   -neuro checks  -Check ammonia, BS  -Seizure precautions  -Fall precautions  -sitter 1:1      ? Gall bladder pathology  -MRCP negative 3/25/25  - U/S showed biliary sludge   -currently has RUQ tenderness and thorpe's positive  -appreciate general surgery input      # Sepsis  -Blood cx done  -Continue zosyn for now  -Saline hydration @100cc/hr  -Close follow upon  urine output    Diarrhea  -CTA not done due to risk of contrast nephropathy  - Enteric panel and C. diff ordered, yet to be collected.   -am team to follow  -CT abd and pelvis with no concerning pathology  ? Mild ischemic bowel, abdominal exam  is not suggestive however.     #Acute on chronic renal failure and acute on chronic renal failure  -hold nephrotoxic drugs  -hold torsemide  - fluid ins and outs monitoring.   -Noted microscopic hematuria   Not new. Ongoing for 3 years but appears worse  - consult nephrology if kidney function not improving    # Coronary artery disease  - no chest pain  - stable at this time  -troponin not increasing  -EKG with sinus and paced rhythm  -No chest pain  - possible T wave changes      # metabolic acidosis  Likely from renal failure  Close follow up      # Hypertension.   -Controlled  -hold torsemide  Gentle hydration    # CHF with reduced EF  1- The left ventricle is normal in size. Left ventricular systolic performance  is moderately reduced.   --The estimated ejection fraction is 30-35%.  -There is moderate global reduction in left ventricular systolic  performance.  -here is abnormal septal motion consistent with ventricular paced rhythm.  - There is suspected severe low-flow, low-gradient (LF-LG).  - The mean gradient is measured at 24 mmHg with peak velocity of 3.1 m/s. The  calculated valve area is0.84-0.85 cmÂ . Dimensionless index is 0.31.  -There is trace aortic insufficiency.  =There is mild, to perhaps mild-moderate, mitral insufficiency.  -There is moderate calcific mitral stenosis.  -At risk of flash pum edema  Will likely need lasix PRN  -pulse oxymetry  -EKG is not normal at bseline, although no chest pain. In view of TTE above, consult cardiology if concerns      # Paroxysmal A-fib  - xarelto    # Rest of patient's medical problems management remains unchanged    Diet:  Clear liquids/n.p.o.  DVT Prophylaxis: Pneumatic Compression Devices  Tenorio Catheter: Not  present  Lines: None     Cardiac Monitoring: None  Code Status:  Full Code    Clinically Significant Risk Factors Present on Admission                # Drug Induced Coagulation Defect: home medication list includes an anticoagulant medication   # Acute Kidney Injury, unspecified: based on a >150% or 0.3 mg/dL increase in last creatinine compared to past 90 day average, will monitor renal function  # Hypertension: Noted on problem list  # Chronic heart failure with reduced ejection fraction: last echo with EF <40%    # Dementia: noted on problem list           # Financial/Environmental Concerns:     # Pacemaker present       Disposition Plan     Medically Ready for Discharge: Anticipated in 2-4 Days           Davina Lewis MD  Hospitalist Service  [facility]  Securely message with [employer] (more info)  Text page via [facility] Paging/Directory     ______________________________________________________________________    Chief Complaint   Change in MS        History of Present Illness   Laney Hahn is a 84 year old female  with a medical history significant for coronary artery disease, moderate aortic valve stenosis, prior history of CVA, prior history of syncope, and other medical comorbidities who is admitted with change in mental status  in the setting of possible sepsis  from acute gall bladder pathology with sepsis, diarrhea, dehydration and acute on chronic renal failure.    Per history, patient was brought in from [facility] assisted living.  She was found on the toilet is actively having diarrhea and altered.  Per history her baseline is very evident and oriented to place person and time.    Preliminary labs showed a BMP with a sodium of 141, potassium 4.3, chloride 100, carbonate 23, BUN of 65.4 and creatinine of 1.94.  Recheck creatinine was 2.4.  GFR decreased to 19.  Patient also had an anion gap of 18 initial lactic acid on presentation was 2.3 which improved to 1.7.   Procalcitonin was 8.91.  Troponin 39.  INR was 1.31 blood cultures were done in the ER.    CT abd and pelvis:  CT neck showed no acute findings with moderate left renal atrophy. CT neck was negative for acute changes CT  Lumbosacral spine was negative   No acute changes but showed advanced spondylosis with spinal canal and foraminal stenosis patient is noted to have moderate spinal stenosis at C4-C5, mild to moderate at C5-6 6 and moderate to severe stenosis at C7-T1. Stool samples was sent to the ER.    U/S of abdomen showed:  Narrative & Impression   EXAM: US ABDOMEN LIMITED  LOCATION: [facility]  DATE: 4/10/2025     INDICATION: Right upper quadrant tenderness, CT unremarkable  COMPARISON: None.  TECHNIQUE: Limited abdominal ultrasound.     FINDINGS:     GALLBLADDER: Normal. No gallstones, wall thickening, or pericholecystic fluid. Negative sonographic Wu's sign.     BILE DUCTS: No intrahepatic biliary dilatation seen. Extrahepatic biliary dilatation with the common duct measures 12 mm.     LIVER: Normal parenchyma with smooth contour. No focal mass. The portal vein is patent with flow in the normal direction.     RIGHT KIDNEY: No hydronephrosis.     PANCREAS: Bowel gas obscures portions of pancreas. The visualized portions are normal.     No ascites.                                                                      IMPRESSION:  1.  Extrahepatic biliary dilatation with the common duct measuring 12 mm. No intrahepatic biliary dilatation seen. Recommend correlation with liver function tests. If there is clinical concern for biliary obstruction, MRCP could be considered.  2.  No gallstones or evidence of cholecystitis.             MRCP 3/25/25    Narrative & Impression   EXAM: NM HEPATOBILIARY SCAN  LOCATION: [facility]  DATE: 3/25/2025     INDICATION: 84F admitted with abdominal pain, nausea and vomiting, RUQ US with sludge in gallbladder and dilated CBD, rule  out cholecystitis  COMPARISON: Right upper quadrant ultrasound 03/25/2025.  TECHNIQUE: 8.7 mCi of Tc-99m mebrofenin, IV. Anterior planar imaging of the abdomen.      FINDINGS: Normal radionuclide activity in liver, gallbladder, bile ducts, and small bowel. No evidence of cystic or common duct obstruction or intrinsic liver disease.                                                                      IMPRESSION:      Negative for acute cholecystitis         Patient was given IV fluids and started on Zosyn for concern for possible GI infection.    She has been admitted for failure patient is.  According to the ER, patient appeared to be relaxed and resting in the ED after IV fluids was able to answer simple questions.  She was however just oriented to person but not to place.  She appeared much, then en route with a EMS however as she reportedly pulled over 3 IV access is out prior to ER presentation.    When I saw her, her main complaint was RUQ pain. Wu's was positive. She denied chest pain, Shortness of breath, cough, nauseam vomiting.  The stool samples is still pending    Patient is a full code.        Past Medical History    Past Medical History:   Diagnosis Date    COVID-19 09/14/2020    positive test at [facility]    DNAR (do not attempt resuscitation)     Dyslipidemia, goal LDL below 70 09/15/2020    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 Nini Rasmussen    Metabolic encephalopathy     Moderate aortic valve stenosis 08/22/2017    stable on 2020 echo    Nonobstructive atherosclerosis of coronary artery 09/15/2020    with normal LVEDP    Paroxysmal SVT (supraventricular tachycardia) 09/07/2017    said to have short episodes while hospitalized for UTI per Dr. Rhys Mensah    Syncope 08/22/2017    UTI (urinary tract infection) 08/21/2017    hospitalized with weakness   Past Medical  History    Diagnosis Date Comments   Lacunar stroke (H) [I63.81] 11/12/2015 seen on CT scan and confirmed at second scan; symptoms included gait disturbance, facial droop and difficulty writing per Dr. Nini Rasmussen   COVID-19 [U07.1] 09/14/2020 positive test at [facility]   Syncope [R55] 08/22/2017    DNAR (do not attempt resuscitation) [Z66]     Metabolic encephalopathy [G93.41]     Nonobstructive atherosclerosis of coronary artery [I25.10] 09/15/2020 with normal LVEDP   UTI (urinary tract infection) [N39.0] 08/21/2017 hospitalized with weakness   Moderate aortic valve stenosis [I35.0] 08/22/2017 stable on 2020 echo   Paroxysmal SVT (supraventricular tachycardia) [I47.10] 09/07/2017 said to have short episodes while hospitalized for UTI per Dr. Rhys Mensah   Dyslipidemia, goal LDL below 70 [E78.5] 09/15/2020          Past Surgical History   Past Surgical History:   Procedure Laterality Date    CATARACT EXTRACTION, BILATERAL      CV CORONARY ANGIOGRAM N/A 9/15/2020    Procedure: Coronary Angiogram;  Surgeon: Radhika Santa MD;  Location: [facility] Cath Lab;  Service: Cardiology    CV CORONARY ANGIOGRAM N/A 4/3/2025    Procedure: Coronary Angiogram;  Surgeon: Tyrese Dillon MD;  Location: [facility] CV    CV LEFT HEART CATH N/A 4/3/2025    Procedure: Left Heart Catheterization;  Surgeon: Tyrese Dillon MD;  Location: [facility] CV    CV LEFT HEART CATHETERIZATION WITHOUT LEFT VENTRICULOGRAM Left 9/15/2020    Procedure: Left Heart Catheterization Without Left Ventriculogram;  Surgeon: Radhika Santa MD;  Location: [facility] Cath Lab;  Service: Cardiology    CV RIGHT HEART CATH MEASUREMENTS RECORDED N/A 4/3/2025    Procedure: Right Heart Catheterization with invasive aortic vave gradient study;  Surgeon: Tyrese Dillon MD;  Location: [facility] CV    EP PACEMAKER INSERT N/A 4/13/2021    Procedure: EP Pacemaker Insertion;  Surgeon: Frederic Burgos MD;  Location: [facility]  Cardiac Cath Lab;  Service: Cardiology    HYSTERECTOMY      PARTIAL GASTRECTOMY  1969    for ulcers    TONSILLECTOMY         Past Surgical History     Laterality Date Comments   Cataract Extraction, Bilateral      Tonsillectomy      Partial Gastrectomy  1969 for ulcers   Hysterectomy      Cv Coronary Angiogram N/A 9/15/2020 Procedure: Coronary Angiogram;  Surgeon: Radhika Santa MD;  Location: [facility] Cath Lab;  Service: Cardiology   Cv Left Heart Catheterization Without Left Ventriculogram Left 9/15/2020 Procedure: Left Heart Catheterization Without Left Ventriculogram;  Surgeon: Radhika Santa MD;  Location: [facility] Cath Lab;  Service: Cardiology   Ep Pacemaker Insert N/A 4/13/2021 Procedure: EP Pacemaker Insertion;  Surgeon: Frederic Burgos MD;  Location: [facility] Cardiac Cath Lab;  Service: Cardiology   Coronary Angiogram N/A 4/3/2025 Procedure: Coronary Angiogram;  Surgeon: Tyrese Dillon MD;  Location: [facility] CV   Right Heart Catheterization N/A 4/3/2025 Procedure: Right Heart Catheterization with invasive aortic vave gradient study;  Surgeon: Tyrese Dillon MD;  Location: [facility] CV   Left Heart Catheterization N/A 4/3/2025 Procedure: Left Heart Catheterization;  Surgeon: Tyrese Dillon MD;  Location: [facility] CV     Prior to Admission Medications   Prior to Admission Medications   Prescriptions Last Dose Informant Patient Reported? Taking?   acetaminophen (TYLENOL) 500 MG tablet   Yes No   Sig: Take 1,000 mg by mouth every 8 hours as needed for pain.   apixaban ANTICOAGULANT (ELIQUIS ANTICOAGULANT) 2.5 MG tablet   No No   Sig: Take 1 tablet (2.5 mg) by mouth 2 times daily. Due for appointment with Rose Mcelroy   atorvastatin (LIPITOR) 10 MG tablet   No No   Sig: Take 1 tablet (10 mg) by mouth every morning.   metoprolol succinate ER (TOPROL XL) 50 MG 24 hr tablet   No No   Sig: Take 1 tablet (50 mg) by mouth daily   naloxone (NARCAN) 4 MG/0.1ML nasal  spray   Yes No   Sig: Spray 4 mg into one nostril alternating nostrils as needed for opioid reversal. every 2-3 minutes until assistance arrives   torsemide (DEMADEX) 20 MG tablet   No No   Sig: Take 1 tablet (20 mg) by mouth daily.      Facility-Administered Medications: None        Review of Systems    The 10 point Review of Systems is negative other than noted in the HPI or here.     Social History   I have reviewed this patient's social history and updated it with pertinent information if needed.  Social History     Tobacco Use    Smoking status: Never    Smokeless tobacco: Never   Vaping Use    Vaping status: Never Used   Substance Use Topics    Alcohol use: No    Drug use: No         Family History   Family History  Medical History Relation Name Comments   No Known Problems Father       No Known Problems Mother         Family History  Relation Name Status Comments   Father         Mother               Allergies   No Known Allergies     Physical Exam   Vital Signs: Temp: 97.3  F (36.3  C) Temp src: Oral BP: 110/62 Pulse: 92   Resp: 20 SpO2: 96 %      Weight: 119 lbs 0 oz      General Aox2m appropriate affect, NAD, on RA  Sleeping but arousable  HEENT  dry, MM, EOMI, PERRL, dentures+  Chest Adeq E b/l, No wheezing  Heart RRR, +M/R/G  Abd- Soft, RUQ tenderness, thorpe's positve  BS+  - Deferred,   Extremity- Moving all extremities, No digital clubbing,  2+ edema, tenderness  Neuro- Aox2, Moving all extremities   CN II- XII intact, No peripheral vision loss  P4-/5, T-N, Skin  Has no tattoo, No skin rash     Medical Decision Making       85 MINUTES SPENT BY ME on the date of service doing chart review, history, exam, documentation & further activities per the note.      ------------------ MEDICAL DECISION MAKING ------------------------------------------------------------------------------------------------------  MANAGEMENT DISCUSSED with the following over the past 24 hours: Patient and care team       Data    ------------------------- PAST 24 HR DATA REVIEWED -----------------------------------------------    I have personally reviewed the following data over the past 24 hrs:    8.3  \   13.7   / 189     141 100 65.4 (H) /  189 (H)   4.3 23 2.4 (H) \     ALT: 38 AST: 73 (H) AP: 127 TBILI: 1.2   ALB: 5.4 (H) TOT PROTEIN: 8.0 LIPASE: N/A     Trop: 39 (H) BNP: N/A     Procal: 8.91 (HH) CRP: N/A Lactic Acid: 1.7       INR:  1.31 (H) PTT:  N/A   D-dimer:  N/A Fibrinogen:  N/A       Imaging results reviewed over the past 24 hrs:   Recent Results (from the past 24 hours)   Echocardiogram Complete    Narrative    [id number]  [id number]  [id number]  [id number]^ROLAN^[place] [address]     Name: NEPTALI PRAKASH  MRN: [id number]  : 1940  Study Date: 2025 08:57 AM  Age: 84 yrs  Gender: Female  Patient Location: [facility]  Reason For Study: CHF  Ordering Physician: MAGGI GONGORA  Referring Physician: CARLA KAY  Performed By: BP     BSA: 1.6 m2  Height: 64 in  Weight: 119 lb  HR: 78  BP: 150/62 mmHg  ______________________________________________________________________________  Procedure  Echocardiogram with two-dimensional, color and spectral Doppler. Myocardial  Strain Imaging performed. Definity (NDC #96379-067) given intravenously.  ______________________________________________________________________________  Interpretation Summary     1. The left ventricle is normal in size. Left ventricular systolic performance  is moderately reduced. The estimated ejection fraction is 30-35%.  2. There is moderate global reduction in left ventricular systolic  performance.  3. There is abnormal septal motion consistent with ventricular paced rhythm.  4. There is suspected severe low-flow, low-gradient (LF-LG).  Â  The mean gradient is measured at 24 mmHg with peak velocity of 3.1 m/s. The  calculated valve area is0.84-0.85 cmÂ . Dimensionless index is 0.31.  5. There is  trace aortic insufficiency.  6. There is mild, to perhaps mild-moderate, mitral insufficiency.  7. There is moderate calcific mitral stenosis.  8. Normal right ventricular size and systolic performance.  9. There is moderate left atrial enlargement.     When compared to the prior real-time echocardiogram dated 24 December 2024,  the mean gradient across the aortic valve measures slightly higher on the  current study (previously 21 mmHg and currently 24 mmHg). The findings are  otherwise felt to be fairly similar on both examinations.  ______________________________________________________________________________  Left ventricle:  The left ventricle is normal in size. Left ventricular systolic performance is  moderately reduced. The estimated ejection fraction is 30-35%. There is  moderate global reduction in left ventricular systolic performance. There is  abnormal septal motion consistent with ventricular paced rhythm. Left  ventricular wall thickness is normal.     Assessment of left atrial pressure (LAP): The cumulative findings suggest  moderately elevated left atrial pressure (the E/A is 0.8 plus 2 or 3 of 3 of  the following present: Average E/e' > 14, TRvel > 2.8 m/s, and/or LA vol.  index > 34 ml/mÂ  ).     Right ventricle:  Normal right ventricular size and systolic performance. There is a pacing  electrode noted in the right-sided chambers.     Left atrium:  There is moderate left atrial enlargement.     Right atrium:  The right atrium is of normal size.     IVC:  The IVC is of normal caliber.     Aortic valve:  The aortic valve is not well visualized, but suspected to be comprised of  three cusps. There is suspected severe low-flow, low-gradient (LF-LG). The  mean gradient is measured at 24 mmHg with peak velocity of 3.1 m/s. Calculated  valve area 0.84-0.85 cmÂ . Dimensionless index is 0.31. There is trace aortic  insufficiency.     Mitral valve:  There is moderate to severe mitral annular calcification.  There is mild  nonspecific mitral valve leaflet thickening. There is mild, to perhaps mild-  moderate, mitral insufficiency. There is moderate calcific mitral stenosis.     Tricuspid valve:  The tricuspid valve is grossly morphologically normal. There is mild tricuspid  insufficiency.     Pulmonic valve:  The pulmonic valve is grossly morphologically normal.     Thoracic aorta:  The aortic root and proximal ascending aorta are of normal dimension.     Pericardium:  There is no significant pericardial effusion.  ______________________________________________________________________________  Left Ventricle  Global peak LV longitudinal strain is averaged at -10.5%. This suggests  abnormal strain (normal <-18%).  ______________________________________________________________________________  MMode/2D Measurements & Calculations  IVSd: 0.96 cm  LVIDd: 5.1 cm  LVIDs: 4.2 cm  LVPWd: 0.85 cm  FS: 17.2 %  LV mass(C)d: 165.9 grams  LV mass(C)dI: 105.7 grams/m2  Ao root diam: 2.6 cm  LA dimension: 4.1 cm  LA/Ao: 1.6  LVOT diam: 1.9 cm  LVOT area: 2.8 cm2  Ao root diam index Ht(cm/m): 1.6  Ao root diam index BSA (cm/m2): 1.7  EF Biplane: 30.8 %  LA Volume (BP): 63.5 ml     LA Volume Index (BP): 40.4 ml/m2  LA Volume Indexed (AL/bp): 42.0 ml/m2  RV Base: 2.4 cm  RWT: 0.33  TAPSE: 1.6 cm     Time Measurements  MM HR: 78.0 BPM     Doppler Measurements & Calculations  MV E max vika: 134.0 cm/sec  MV A max vika: 168.0 cm/sec  MV E/A: 0.80  MV max P.3 mmHg  MV mean P.7 mmHg  MV V2 VTI: 43.2 cm  MVA(VTI): 1.4 cm2  MV P1/2t max vika: 174.5 cm/sec  MV P1/2t: 72.0 msec  MVA(P1/2t): 3.1 cm2  MV dec slope: 710.4 cm/sec2  MV dec time: 0.29 sec  Ao V2 max: 314.9 cm/sec  Ao max P.7 mmHg  Ao V2 mean: 232.3 cm/sec  Ao mean P.9 mmHg  Ao V2 VTI: 73.0 cm  LANNY(I,D): 0.84 cm2  LANNY(V,D): 0.85 cm2  LV V1 max PG: 3.7 mmHg  LV V1 max: 96.3 cm/sec  LV V1 VTI: 21.9 cm  SV(LVOT): 61.0 ml  SI(LVOT): 38.9 ml/m2  PA acc time: 0.10 sec  TR max  bandar: 224.0 cm/sec  TR max P.1 mmHg  AV Bandar Ratio (DI): 0.31  LANNY Index (cm2/m2): 0.53  E/E': 24.8     E/E' av.1  Lateral E/e': 24.7  Medial E/e': 37.6  Peak E' Bandar: 5.4 cm/sec  RV S Bandar: 8.3 cm/sec     Measurements from [facility]  LV GLS Endo Peak A2C (AS): -8.0 %  LV GLS Endo Peak A3C (AS): -12.0 %  LV GLS Endo Peak A4C (AS): -11.5 %  LV GLS Endo Peak Avg (AS): -10.5 %     ______________________________________________________________________________  Report approved by: Alcides Ty MD on 2025 10:02 AM         CT Head w/o Contrast    Narrative    EXAM: CT HEAD W/O CONTRAST  LOCATION: [facility]  DATE: 4/10/2025    INDICATION: Altered mental status, history of blood thinners, unknown if fall.  COMPARISON: 2025.  TECHNIQUE: Routine CT Head without IV contrast. Multiplanar reformats. Dose reduction techniques were used.    FINDINGS:  INTRACRANIAL CONTENTS: No intracranial hemorrhage, extraaxial collection, or mass effect. Chronic lacunar infarctions bilateral basal ganglia and cerebellar hemispheres. Moderate presumed chronic small vessel ischemic changes. Moderate generalized volume   loss. No hydrocephalus.     VISUALIZED ORBITS/SINUSES/MASTOIDS: No intraorbital abnormality. No paranasal sinus mucosal disease. No middle ear or mastoid effusion.    BONES/SOFT TISSUES: No acute abnormality.      Impression    IMPRESSION:  1.  No CT evidence for acute intracranial process.   2.  Brain atrophy and chronic ischemic changes, as above.     CT Cervical Spine w/o Contrast    Narrative    EXAM: CT CERVICAL SPINE W/O CONTRAST  LOCATION: [facility]  DATE: 4/10/2025    INDICATION: Altered mental status, possible fall.  COMPARISON: 2024.  TECHNIQUE: Routine CT Cervical Spine without IV contrast. Multiplanar reformats. Dose reduction techniques were used.    FINDINGS:  VERTEBRA: Straightening of cervical curvature with mild anterior spondylolisthesis at  C3-C4 and C7-T1. Otherwise, normal alignment. Normal vertebral body heights. Severe disc degeneration C4-T1. Moderate to severe facet arthropathy C2-C3 and C3-C4. No   fracture or posttraumatic subluxation.     CANAL/FORAMINA: Mild spinal canal stenosis at C3-C4 with mild right foraminal stenosis. Moderate spinal canal stenosis at C4-C5 with severe right foraminal stenosis. Mild to moderate spinal canal stenosis at C5-C6. Moderate spinal canal stenosis at C6-C7   with moderate bilateral foraminal stenoses. Moderate to severe left foraminal stenosis at C7-T1.    PARASPINAL: Partially visualized internal cardiac leads on the left. No pneumothorax.      Impression    IMPRESSION:  1.  No fracture or posttraumatic subluxation.  2.  Advanced spondylosis with spinal canal and foraminal stenoses as detailed.     CT Abdomen Pelvis w/o Contrast    Narrative    EXAM: CT ABDOMEN PELVIS W/O CONTRAST  LOCATION: [facility]  DATE: 4/10/2025    INDICATION: Abdominal distention, bloating, diarrhea, altered mental status  COMPARISON: 3/24/2025  TECHNIQUE: CT scan of the abdomen and pelvis was performed without IV contrast. Multiplanar reformats were obtained. Dose reduction techniques were used.  CONTRAST: None.    FINDINGS:   LOWER CHEST: Dependent atelectasis in the posterior lung bases.    HEPATOBILIARY: No significant mass or bile duct dilatation. No calcified gallstones. Gallbladder is distended.    PANCREAS: Diffuse fatty infiltration and atrophy of the pancreas. No significant mass, duct dilatation, or inflammatory change.    SPLEEN: Normal size.    ADRENAL GLANDS: Normal.    KIDNEYS/BLADDER: Moderate left renal atrophy. No significant stones, mass, or hydronephrosis. No hydroureter. No stones are seen along the courses of the ureters. Urinary bladder unremarkable.    BOWEL: Nonspecific nonobstructive bowel gas pattern. Numerous colonic diverticula without evidence for diverticulitis. Appendix within  normal limits.    LYMPH NODES: No lymphadenopathy.    VASCULATURE: No abdominal aortic aneurysm. Moderate vascular calcifications abdominal aorta and common iliac arteries    PELVIC ORGANS: No pelvic masses or free fluid. Midline uterus.    MUSCULOSKELETAL: Mild spondylosis. No inguinal hernias. No ventral hernia.      Impression    IMPRESSION:   1.  No acute findings to explain patient's symptoms.  2.  Colonic diverticulosis without diverticulitis.  3.  Moderate left renal atrophy.

## 2025-04-11 LAB
ANION GAP SERPL CALCULATED.3IONS-SCNC: 14 MMOL/L (ref 7–15)
BASOPHILS # BLD AUTO: 0 10E3/UL (ref 0–0.2)
BASOPHILS NFR BLD AUTO: 1 %
BUN SERPL-MCNC: 55.6 MG/DL (ref 8–23)
CALCIUM SERPL-MCNC: 8.9 MG/DL (ref 8.8–10.4)
CHLORIDE SERPL-SCNC: 112 MMOL/L (ref 98–107)
CREAT SERPL-MCNC: 1.79 MG/DL (ref 0.51–0.95)
EGFRCR SERPLBLD CKD-EPI 2021: 28 ML/MIN/1.73M2
EOSINOPHIL # BLD AUTO: 0.2 10E3/UL (ref 0–0.7)
EOSINOPHIL NFR BLD AUTO: 4 %
ERYTHROCYTE [DISTWIDTH] IN BLOOD BY AUTOMATED COUNT: 12.9 % (ref 10–15)
GLUCOSE SERPL-MCNC: 132 MG/DL (ref 70–99)
HCO3 SERPL-SCNC: 19 MMOL/L (ref 22–29)
HCT VFR BLD AUTO: 31.4 % (ref 35–47)
HGB BLD-MCNC: 10.4 G/DL (ref 11.7–15.7)
HOLD SPECIMEN: NORMAL
IMM GRANULOCYTES # BLD: 0 10E3/UL
IMM GRANULOCYTES NFR BLD: 0 %
LYMPHOCYTES # BLD AUTO: 1.6 10E3/UL (ref 0.8–5.3)
LYMPHOCYTES NFR BLD AUTO: 27 %
MCH RBC QN AUTO: 32.5 PG (ref 26.5–33)
MCHC RBC AUTO-ENTMCNC: 33.1 G/DL (ref 31.5–36.5)
MCV RBC AUTO: 98 FL (ref 78–100)
MONOCYTES # BLD AUTO: 0.6 10E3/UL (ref 0–1.3)
MONOCYTES NFR BLD AUTO: 10 %
NEUTROPHILS # BLD AUTO: 3.5 10E3/UL (ref 1.6–8.3)
NEUTROPHILS NFR BLD AUTO: 59 %
NRBC # BLD AUTO: 0 10E3/UL
NRBC BLD AUTO-RTO: 0 /100
PLATELET # BLD AUTO: 136 10E3/UL (ref 150–450)
POTASSIUM SERPL-SCNC: 4.1 MMOL/L (ref 3.4–5.3)
POTASSIUM SERPL-SCNC: 4.1 MMOL/L (ref 3.4–5.3)
PROCALCITONIN SERPL IA-MCNC: 117.16 NG/ML
PROCALCITONIN SERPL IA-MCNC: 84.36 NG/ML
RBC # BLD AUTO: 3.2 10E6/UL (ref 3.8–5.2)
SODIUM SERPL-SCNC: 145 MMOL/L (ref 135–145)
WBC # BLD AUTO: 6 10E3/UL (ref 4–11)

## 2025-04-11 PROCEDURE — 85025 COMPLETE CBC W/AUTO DIFF WBC: CPT | Performed by: INTERNAL MEDICINE

## 2025-04-11 PROCEDURE — 36415 COLL VENOUS BLD VENIPUNCTURE: CPT | Performed by: INTERNAL MEDICINE

## 2025-04-11 PROCEDURE — 99233 SBSQ HOSP IP/OBS HIGH 50: CPT | Performed by: INTERNAL MEDICINE

## 2025-04-11 PROCEDURE — 250N000013 HC RX MED GY IP 250 OP 250 PS 637: Performed by: INTERNAL MEDICINE

## 2025-04-11 PROCEDURE — 99222 1ST HOSP IP/OBS MODERATE 55: CPT | Performed by: INTERNAL MEDICINE

## 2025-04-11 PROCEDURE — 258N000003 HC RX IP 258 OP 636: Performed by: INTERNAL MEDICINE

## 2025-04-11 PROCEDURE — 80048 BASIC METABOLIC PNL TOTAL CA: CPT | Performed by: INTERNAL MEDICINE

## 2025-04-11 PROCEDURE — 84145 PROCALCITONIN (PCT): CPT | Performed by: INTERNAL MEDICINE

## 2025-04-11 PROCEDURE — 84132 ASSAY OF SERUM POTASSIUM: CPT | Performed by: INTERNAL MEDICINE

## 2025-04-11 PROCEDURE — 250N000011 HC RX IP 250 OP 636: Performed by: INTERNAL MEDICINE

## 2025-04-11 PROCEDURE — 120N000001 HC R&B MED SURG/OB

## 2025-04-11 RX ORDER — PIPERACILLIN SODIUM, TAZOBACTAM SODIUM 3; .375 G/15ML; G/15ML
3.38 INJECTION, POWDER, LYOPHILIZED, FOR SOLUTION INTRAVENOUS EVERY 12 HOURS
Status: DISCONTINUED | OUTPATIENT
Start: 2025-04-11 | End: 2025-04-12

## 2025-04-11 RX ADMIN — ATORVASTATIN CALCIUM 10 MG: 10 TABLET, FILM COATED ORAL at 08:55

## 2025-04-11 RX ADMIN — SODIUM CHLORIDE: 9 INJECTION, SOLUTION INTRAVENOUS at 02:48

## 2025-04-11 RX ADMIN — PIPERACILLIN AND TAZOBACTAM 3.38 G: 3; .375 INJECTION, POWDER, FOR SOLUTION INTRAVENOUS at 06:17

## 2025-04-11 RX ADMIN — PIPERACILLIN AND TAZOBACTAM 3.38 G: 3; .375 INJECTION, POWDER, FOR SOLUTION INTRAVENOUS at 17:01

## 2025-04-11 RX ADMIN — APIXABAN 2.5 MG: 2.5 TABLET, FILM COATED ORAL at 08:55

## 2025-04-11 ASSESSMENT — ACTIVITIES OF DAILY LIVING (ADL)
ADLS_ACUITY_SCORE: 66
ADLS_ACUITY_SCORE: 70
ADLS_ACUITY_SCORE: 66
ADLS_ACUITY_SCORE: 70
ADLS_ACUITY_SCORE: 66
ADLS_ACUITY_SCORE: 66
ADLS_ACUITY_SCORE: 70
ADLS_ACUITY_SCORE: 66
ADLS_ACUITY_SCORE: 70
ADLS_ACUITY_SCORE: 70
ADLS_ACUITY_SCORE: 66
ADLS_ACUITY_SCORE: 70
ADLS_ACUITY_SCORE: 66
ADLS_ACUITY_SCORE: 71
ADLS_ACUITY_SCORE: 70
ADLS_ACUITY_SCORE: 70
ADLS_ACUITY_SCORE: 71
ADLS_ACUITY_SCORE: 66
ADLS_ACUITY_SCORE: 66

## 2025-04-11 NOTE — PROGRESS NOTES
RENAL PROGRESS NOTE     CC:     ROS: Patient resting in bed, significant improvement from visit yesterday, she is fully alert today, not requiring a sitter, able to answer all questions at the time of visit.  She is looking at the menu to order some food because she is very hungry.  Denies chest pain, no shortness of breath, no nausea, no vomiting.    Assessment and Plan:     Acute kidney injury (acute tubular necrosis) on chronic kidney disease stage IIIb.  Acute kidney injury (acute tubular necrosis) likely secondary to intravascular depletion in the setting of persistent diarrhea and poor oral intake over the past few days prior to admission and and therapy with diuretics PTA.  Patient has known baseline creatinine 1.3-1.6 range in early 2025.  At the time of admission creatinine 2.08.  She has mild metabolic acidosis due to diarrhea and acute kidney injury.   Studies confirm acute tubular necrosis.  Creatinine 2.08 (4/10)--> 1.79 (4/11)  Continue supportive care.  Okay to discontinue IV fluids as long as oral intake remains adequate.  Monitor renal function daily.  Did not require dialysis this admission  Metabolic encephalopathy - Resolved. Due to acute illness including dehydration and sepsis..   Hydration and supportive care.  Doing well today  Probable sepsis. On IVF and broad spectrum antibiotics.   As per primary service.  Diarrhea.  Patient tells me that diarrhea is resolved.  She looks well-hydrated.  As per primary service  Heart Failure with reduced ejection fraction. LVEF 30-35%. Now requiring IVFs. Known aortic stenosis.   Adequate volume status.  Discontinue IV fluids.  No diuretics needed at present, review of PTA medications shows that she was not requiring diuretics.  Monitor volume status      Renal perspective patient could be discharged with an order by Dr. Delfino Gonzalez MD  Nephrology    PHYSICAL EXAM  /59 (BP Location: Left arm)   Pulse 75   Temp 98  F (36.7  C) (Oral)   " Resp 18   Wt 55.6 kg (122 lb 9.2 oz)   LMP  (LMP Unknown)   SpO2 94%   BMI 21.04 kg/m          Intake/Output Summary (Last 24 hours) at 4/11/2025 1334  Last data filed at 4/11/2025 1030  Gross per 24 hour   Intake 1395 ml   Output 900 ml   Net 495 ml     Resp: 18  Wt Readings from Last 3 Encounters:   04/11/25 55.6 kg (122 lb 9.2 oz)   04/09/25 54 kg (119 lb)   03/24/25 58 kg (127 lb 13.9 oz)       GENERAL: Chronically ill and debilitated.  HEAD: Normal  HEENT: Equal pupils. Normal hearing. No eyelid edema.  CARDIOVASCULAR: No JVD present.  No peripheral edema  PULMONARY: No respiratory distress. No cyanosis  GASTROINTESTINAL: No ascites present  MSK: Diffuse muscle wasting, no joint deformities  NEURO: Alert, no gross focal findings  PSYCHIATRIC: Adequate mood and interaction  SKIN: Pale, no jaundice, no rash.    LABORATORIES  Recent Labs   Lab 04/11/25  0655 04/10/25  2131 04/10/25  0734 04/10/25  0140   WBC 6.0  --  13.4* 8.3   HGB 10.4* 11.7 12.5 13.7   HCT 31.4*  --  37.3 39.5   *  --  175 189     Recent Labs   Lab 04/11/25  0655 04/10/25  0734 04/10/25  0140    142 141   CO2 19* 20* 23   BUN 55.6* 66.2* 65.4*   ALKPHOS  --  96 127   ALT  --  45 38   AST  --  69* 73*     Recent Labs   Lab 04/10/25  0140   INR 1.31*     No results for input(s): \"ABORH\" in the last 168 hours.  Invalid input(s): \"MG\"    RADIOLOGY REPORTS    ECHOCARDIOGRAM    MEDICATIONS  Current Facility-Administered Medications   Medication Dose Route Frequency Provider Last Rate Last Admin    apixaban ANTICOAGULANT (ELIQUIS) tablet 2.5 mg  2.5 mg Oral BID Lucian Saleh MD   2.5 mg at 04/11/25 0855    atorvastatin (LIPITOR) tablet 10 mg  10 mg Oral QAM Lucian Saleh MD   10 mg at 04/11/25 0855    metoprolol succinate ER (TOPROL XL) 24 hr tablet 25 mg  25 mg Oral Daily Lucian Saleh MD        piperacillin-tazobactam (ZOSYN) 3.375 g vial to attach to  mL bag  3.375 g Intravenous Q12H Lucian Saleh MD        sodium " chloride (PF) 0.9% PF flush 3 mL  3 mL Intracatheter Q8H Davina Lewis MD   3 mL at 04/11/25 0617     Current Facility-Administered Medications   Medication Dose Route Frequency Provider Last Rate Last Admin    calcium carbonate (TUMS) chewable tablet 1,000 mg  1,000 mg Oral 4x Daily PRN Davina Lewis MD        glucose gel 15-30 g  15-30 g Oral Q15 Min PRN Davina Lewis MD        Or    dextrose 50 % injection 25-50 mL  25-50 mL Intravenous Q15 Min PRN Davina Lewis MD        Or    glucagon injection 1 mg  1 mg Subcutaneous Q15 Min PRN Davina Lewis MD        hydrALAZINE (APRESOLINE) tablet 10 mg  10 mg Oral Q4H PRN Davina Lewis MD        Or    hydrALAZINE (APRESOLINE) injection 10 mg  10 mg Intravenous Q4H PRN Davina Lewis MD        lidocaine (LMX4) cream   Topical Q1H PRN Davina Lewis MD        lidocaine 1 % 0.1-1 mL  0.1-1 mL Other Q1H PRN Davina Lewis MD        ondansetron (ZOFRAN) injection 4 mg  4 mg Intravenous Q30 Min PRN Froylan Macias MD        sodium chloride (PF) 0.9% PF flush 3 mL  3 mL Intracatheter q1 min prn Davina Lewis MD             Above laboratories and medications were personally reviewed during evaluation today.

## 2025-04-11 NOTE — PLAN OF CARE
Goal Outcome Evaluation:      Plan of Care Reviewed With: patient    Overall Patient Progress: improvingOverall Patient Progress: improving     Patient denies pain. Alert and oriented x3, disoriented to situation only. Left sticky note for day shift to complete admission questions with family.

## 2025-04-11 NOTE — PROVIDER NOTIFICATION
Paged Dr. Rizo at 2112 regarding hypotension.     BP 92/52 then 86/48. Pt is asymptomatic but has been somnolent.    Held metoprolol. Orders for 500 mL bolus, Hgb, and lactic.     BP improved to 96/53. Labs WDL. No further orders.

## 2025-04-11 NOTE — PLAN OF CARE
Problem: Delirium  Goal: Improved Behavioral Control  Outcome: Progressing  Intervention: Minimize Safety Risk  Recent Flowsheet Documentation  Taken 4/10/2025 1916 by Romero Goodwin, RN  Enhanced Safety Measures:   room near unit station    at bedside  Goal: Improved Sleep  Outcome: Progressing     Problem: Infection  Goal: Absence of Infection Signs and Symptoms  Outcome: Progressing  Intervention: Prevent or Manage Infection  Recent Flowsheet Documentation  Taken 4/10/2025 1916 by Romero Goodwin, RN  Isolation Precautions: enteric precautions maintained     Goal Outcome Evaluation:  Pt drowsy this shift. Soft BPs, see provider notification note. Still need to collect stool sample. 1:1 present for confusion and pulling.    Romero Goodwin, RAHEEL  [phone]

## 2025-04-11 NOTE — SIGNIFICANT EVENT
Unable to complete admission as patient is confused and no family here at this time. There is a sticky note to remind staff to complete once family here.

## 2025-04-11 NOTE — PLAN OF CARE
Problem: Adult Inpatient Plan of Care  Goal: Absence of Hospital-Acquired Illness or Injury  Outcome: Progressing  Intervention: Identify and Manage Fall Risk  Recent Flowsheet Documentation  Taken 4/11/2025 1248 by Valarie Abdalla RN  Safety Promotion/Fall Prevention:   activity supervised   nonskid shoes/slippers when out of bed   room near nurse's station   room organization consistent   safety round/check completed  Goal: Optimal Comfort and Wellbeing  Outcome: Progressing     Problem: Delirium  Goal: Improved Behavioral Control  Intervention: Minimize Safety Risk  Recent Flowsheet Documentation  Taken 4/11/2025 1248 by Valarie Abdalla RN  Enhanced Safety Measures:   pain management   review medications for side effects with activity     Problem: Infection  Goal: Absence of Infection Signs and Symptoms  Intervention: Prevent or Manage Infection  Recent Flowsheet Documentation  Taken 4/11/2025 1248 by Valarie Abdalla RN  Isolation Precautions: enteric precautions maintained     Problem: Diarrhea  Goal: Effective Diarrhea Management  Outcome: Adequate for Care Transition  Intervention: Manage Diarrhea  Recent Flowsheet Documentation  Taken 4/11/2025 1248 by Valarie Abdalla RN  Isolation Precautions: enteric precautions maintained  Medication Review/Management: medications reviewed   Goal Outcome Evaluation: Patient alert, disoriented to time and situation. No stools this shift. Patient is anxious to eat, but clear liquid diet still ordered. Sticky note and text given to MD.

## 2025-04-11 NOTE — PROGRESS NOTES
Care Management Follow Up    Length of Stay (days): 1    Expected Discharge Date: 04/12/2025     Concerns to be Addressed: discharge planning     Patient plan of care discussed at interdisciplinary rounds: Yes    Anticipated Discharge Disposition:                Anticipated Discharge Services:    Anticipated Discharge DME:      Patient/family educated on Medicare website which has current facility and service quality ratings:    Education Provided on the Discharge Plan:    Patient/Family in Agreement with the Plan:      Referrals Placed by CM/SW:    Private pay costs discussed: Not applicable    Discussed  Partnership in Safe Discharge Planning  document with patient/family: No     Handoff Completed: No, handoff not indicated or clinically appropriate    Additional Information:  CM received a phone call from patients [facility] for an update. Shared with them that patient is not ready for discharge today. Asked if she can return over the weekend. Staff said they dont have a nurse on site on the weekends so if there are many medication changes this would be hard. CM informed her we will continue to update them regarding discharge    Next Steps: continue to update facility on discharge timing    [facility] [phone]  Fax [phone]    Devora Reese RN

## 2025-04-11 NOTE — PLAN OF CARE
Problem: Adult Inpatient Plan of Care  Goal: Optimal Comfort and Wellbeing  Outcome: Progressing     Problem: Delirium  Goal: Improved Behavioral Control  Outcome: Progressing  Intervention: Minimize Safety Risk  Recent Flowsheet Documentation  Taken 4/11/2025 0030 by Josue Edwards RN  Enhanced Safety Measures: room near unit station  Goal: Improved Sleep  Outcome: Progressing   Goal Outcome Evaluation:       VSS. Denied pain. Pt was on soft 1:1 overnight. On tele - SR paced. Disoriented to situation. Enteric precautions maintained. On seizure precautions. NS running at 50ml/hr. Stool sample still needed. No BM during shift.

## 2025-04-11 NOTE — PROGRESS NOTES
[facility]    Medicine Progress Note - Hospitalist Service    Date of Admission:  4/10/2025    Assessment & Plan       Laney Hahn is a 84 year old female  with a medical history significant for coronary artery disease, moderate aortic valve stenosis, prior history of CVA, prior history of syncope, and other medical comorbidities who is admitted with change in mental status  in the setting of possible sepsis  with unclear source.                4/11 :      Seems conscious, oriented, responds to commands, some mild confusion per RN, no obvious neuro deficits  Discussed with family, seems baseline mental status    No fevers  Creatinine elevated at 2.08---1.79, baseline around 1.5  Nephrology consulted, on gentle iv fluids, Holding torsemide    Blood cultures : NGTD but very high procalcitonin - trending up  No clear source of infection  On empiric iv zosyn  Consulted ID    Pt/ot evaluation : pending     Not medically ready for discharge at this time.  High procal issue              A/p :      # acute metabolic encephalopathy    No obvious neuro deficit, CT head : no acute abnormality  Unclear cause  No clear evidence of infection except for high procalcitonin  Seems conscious, oriented, responds to commands, some mild confusion per RN  Discussed with family, seems baseline mental status now  monitor       # ? Gall bladder pathology on abdominal imaging    -MRCP negative 3/25/25    -- CT abdomen : No significant mass or bile duct dilatation. No calcified gallstones. Gallbladder is distended.     -- Ultrasound abdomen : 1.  Extrahepatic biliary dilatation with the common duct measuring 12 mm. No intrahepatic biliary dilatation seen. Recommend correlation with liver function tests. If there is clinical concern for biliary obstruction, MRCP could be considered.  2.  No gallstones or evidence of cholecystitis.    Per admitting physician- had RUQ tenderness and thorpe's positive  LFT's  normal  Surgery team consulted : no indication for acute surgical intervention          # Concern for Sepsis    No fevers  Blood cultures : NGTD  very high procalcitonin - trending up - 8.91--84.36  CT chest : no acute abnormality  Abdominal imaging negative for infection   Urinalysis negative  LFT's normal  On empiric iv zosyn  Consulted ID for high procal      #Acute on chronic renal failure and acute on chronic renal failure stage 4    - baseline creatinine 1.4-1.5, currently at 1.79  -hold nephrotoxic drugs, hold torsemide  - fluid ins and outs monitoring.   - consulted nephrology, on gentle iv fluids  - monitor.         # h/o severe aortic stenosis :      TAVR eval underway, plan for outpatient follow up  S/p coronary angio 4/3/25 : no blockages    # Elevated troponin, mild : no cp, flat trend, S/p coronary angio 4/3/25 : no blockages        # Hypertension.   -Controlled  -hold torsemide  Gentle hydration     # CHF with reduced EF of 30-35%, mild to moderate mitral regurg, moderate mitral stenosis, holding torsemide, stable    # Paroxysmal A-fib  - on eliquis, xarelto              Diet: Low Saturated Fat Na <2400 mg    DVT Prophylaxis: DOAC  Tenorio Catheter: Not present  Lines: None     Cardiac Monitoring: ACTIVE order. Indication: Tachyarrhythmias, acute (48 hours)  Code Status: Full Code      Clinically Significant Risk Factors          # Hyperchloremia: Highest Cl = 112 mmol/L in last 2 days, will monitor as appropriate         # Anion Gap Metabolic Acidosis: Highest Anion Gap = 19 mmol/L in last 2 days, will monitor and treat as appropriate   # Coagulation Defect: INR = 1.31 (Ref range: 0.85 - 1.15) and/or PTT = N/A, will monitor for bleeding    # Hypertension: Noted on problem list  # Chronic heart failure with reduced ejection fraction: last echo with EF <40%    # Dementia: noted on problem list            # Financial/Environmental Concerns: none   # Pacemaker present       Social Drivers of [place]  Insecurity: Unknown (12/23/2024)    Food Insecurity     Within the past 12 months, did you worry that your food would run out before you got money to buy more?: Patient unable to answer     Within the past 12 months, did the food you bought just not last and you didn t have money to get more?: Patient unable to answer   Housing Stability: Unknown (12/23/2024)    Housing Stability     Do you have housing? : Patient unable to answer     Are you worried about losing your housing?: Patient unable to answer   Financial Resource Strain: Unknown (12/23/2024)    Financial Resource Strain     Within the past 12 months, have you or your family members you live with been unable to get utilities (heat, electricity) when it was really needed?: Patient unable to answer   Transportation Needs: Unknown (12/23/2024)    Transportation Needs     Within the past 12 months, has lack of transportation kept you from medical appointments, getting your medicines, non-medical meetings or appointments, work, or from getting things that you need?: Patient unable to answer          Disposition Plan     Medically Ready for Discharge: Anticipated in 2-4 Days             Lucian Saleh MD  Hospitalist Service  [facility]  Securely message with [employer] (more info)  Text page via [employer] Paging/Directory   ______________________________________________________________________      Physical Exam   Vital Signs: Temp: 98.2  F (36.8  C) Temp src: Oral BP: 129/60 Pulse: 79   Resp: 20 SpO2: 94 % O2 Device: None (Room air)    Weight: 122 lbs 9.21 oz       GENERAL: The patient is not in any acute distressed. Awake and alert.  HEENT: Nonicteric sclerae, PERRLA, EOMI. Oropharynx clear. Moist mucous membranes. Conjunctivae appear well perfused.  HEART: Regular rate and rhythm without murmurs.  LUNGS: Clear to auscultation bilaterally. No wheezing or crackles.  ABDOMEN: Soft, positive bowel sounds, nontender.  SKIN: No rash, no excessive  bruising, petechiae, or purpura.  EXTREMITIES : no rashes, no swelling in legs.  NEUROLOGIC: conscious and oriented, follows commands, no obvious focal deficits.  ROS: All other systems negative       Medical Decision Making       60 MINUTES SPENT BY ME on the date of service doing chart review, history, exam, documentation & further activities per the note.  MANAGEMENT DISCUSSED with the following over the past 24 hours: rn, patient       Data     I have personally reviewed the following data over the past 24 hrs:    6.0  \   10.4 (L)   / 136 (L)     145 112 (H) 55.6 (H) /  132 (H)   4.1; 4.1 19 (L) 1.79 (H) \     Procal: 84.36 (HH) CRP: N/A Lactic Acid: 0.8

## 2025-04-11 NOTE — CONSULTS
INFECTIOUS DISEASE CONSULT NOTE  Date: 04/11/2025     ================================================================  SUBJECTIVE    HPI  84f with elevated procalcitonin.  PMH dementia, HFrEF, CAD, aortic stenosis, CVA, CKD III.    She was recently in the hospital 4/3 - 4/9 for CHF exacerbation, and was also undergoing evaluation for TAVR. She was discharged on 4/8, with plan for additional TAVR evaluation once she diuresed successfully.  On 4/10 was readmitted from her care facility where she was found with AMS on the toilet while having diarrhea.  On admission had confusion, mild leukocytosis.    In the 36 hours since admission, per nursing report has only had one loose BM.  No fevers.  On interview, patient says she feels completely normal and does not know why she is in the hospital.  Denies fever, chills, cough, shortness of breath, abdominal pain, dysuria, diarrhea.      Past Medical History  Active Ambulatory Problems     Diagnosis Date Noted    SVT (supraventricular tachycardia) 08/22/2017    Chronic kidney disease, stage III (moderate) (H) 03/02/2020    Vascular dementia without behavioral disturbance (H) 06/17/2021    Paroxysmal atrial fibrillation (H) 06/10/2021    Nonrheumatic aortic valve stenosis 08/22/2017    Heart failure with reduced ejection fraction, NYHA class I (H) 04/26/2021    Essential hypertension 06/17/2021    DNAR (do not attempt resuscitation) 06/17/2021    Cardiac pacemaker in situ 04/21/2021    Mixed hyperlipidemia 03/25/2022    AV node dysfunction 06/17/2021    Bifascicular bundle branch block 08/22/2017    Junctional bradycardia, s/p PPM placement  04/10/2021    Nonobstructive atherosclerosis of coronary artery 09/15/2020    Sick sinus syndrome (H) 01/20/2023    Acute pain of both knees 06/20/2023    Unsteady gait 06/20/2023    Generalized weakness 12/23/2024    Falls frequently 12/23/2024    COVID-19 virus infection 12/23/2024    Nausea and vomiting, unspecified vomiting type  03/24/2025    Status post coronary angiogram 04/03/2025    Other ill-defined heart diseases 04/03/2025     Resolved Ambulatory Problems     Diagnosis Date Noted    History of TIA (transient ischemic attack) 05/03/2017    Acute diastolic congestive heart failure (H)     Sepsis (H)     Anemia 04/11/2021    Frequent falls 10/17/2016    Hyperkalemia 07/22/2022    Pericardial effusion 06/17/2021    Diarrhea 07/22/2022    Colitis 07/23/2022    JESUS (acute kidney injury) 07/23/2022    Syncope, unspecified syncope type 09/11/2022    Anemia due to blood loss, acute 09/16/2022    Fall 09/16/2022     Past Medical History:   Diagnosis Date    COVID-19 09/14/2020    Dyslipidemia, goal LDL below 70 09/15/2020    Lacunar stroke (H) 11/12/2015    Metabolic encephalopathy     Moderate aortic valve stenosis 08/22/2017    Paroxysmal SVT (supraventricular tachycardia) 09/07/2017    Syncope 08/22/2017    UTI (urinary tract infection) 08/21/2017       Past Surgical History  She   has a past surgical history that includes Cataract Extraction, Bilateral; Tonsillectomy; Partial Gastrectomy (1969); Hysterectomy; Cv Coronary Angiogram (N/A, 9/15/2020); Cv Left Heart Catheterization Without Left Ventriculogram (Left, 9/15/2020); Ep Pacemaker Insert (N/A, 4/13/2021); Coronary Angiogram (N/A, 4/3/2025); Right Heart Catheterization (N/A, 4/3/2025); and Left Heart Catheterization (N/A, 4/3/2025).    Social History  she   reports that she has never smoked. She has never used smokeless tobacco. She reports that she does not drink alcohol and does not use drugs.    Family History  Reviewed and non-contributory  family history is not on file. She was adopted.    Social Needs  Social History     Social History Narrative    Her adopted brother, Jeff, lives with her. He is five years younger than her.       ================================================================  OBJECTIVE  /59 (BP Location: Left arm)   Pulse 75   Temp 98  F (36.7  C)  (Oral)   Resp 18   Wt 55.6 kg (122 lb 9.2 oz)   LMP  (LMP Unknown)   SpO2 94%   BMI 21.04 kg/m      Pertinent labs  CBC RESULTS:   Recent Labs   Lab Test 04/11/25  0655   WBC 6.0   RBC 3.20*   HGB 10.4*   HCT 31.4*   MCV 98   MCH 32.5   MCHC 33.1   RDW 12.9   *        Physical Exam  General: alert, cooperative, no apparent distress  HEENT: atraumatic, normocephalic, no scleral icterus, moist mucus membranes, no cervical lymphadenopathy  Heart: Normal rate, regular rhythm  Lungs: good inspiratory effort  Abdomen: soft, non-tender, non-distended  Extremities: no peripheral edema, no new rashes or lesions    Imaging  4/10 CT head  1.  No CT evidence for acute intracranial process.   2.  Brain atrophy and chronic ischemic changes, as above.    4/10 CT C spine  1.  No fracture or posttraumatic subluxation.  2.  Advanced spondylosis with spinal canal and foraminal stenoses as detailed.    4/10 CT A/P  1.  No acute findings to explain patient's symptoms.  2.  Colonic diverticulosis without diverticulitis.  3.  Moderate left renal atrophy.    4/10 US abdomen  1.  Extrahepatic biliary dilatation with the common duct measuring 12 mm. No intrahepatic biliary dilatation seen. Recommend correlation with liver function tests. If there is clinical concern for biliary obstruction, MRCP could be considered.  2.  No gallstones or evidence of cholecystitis.    4/10 CT chest  1.  No acute CT abnormality of the chest, within limitation of the noncontrast technique.  2.  Mild distention of the gallbladder and mild extrahepatic biliary ductal dilatation; correlation with LFTs is recommended to exclude a cholestatic process.      Microbiology data  4/10 blood culture: NGTD      I have personally reviewed the relevant laboratory, imaging, and microbiology data  ================================================================  Assessment  Laney Hahn is a 84 year old with elevated procalcitonin.  PMH dementia, HFrEF,  CAD, aortic stenosis, CVA, CKD III.    She was recently in the hospital 4/3 - 4/9 for CHF exacerbation and evaluation for TAVR. She was discharged on 4/8.  On 4/10 her care facility found her confused on the toilet with a loose stool, so sent her back to the ED.  On admission had confusion, mild leukocytosis.  In the 36 hours since admission, per nursing report has only had one loose BM.  On interview, patient says she feels completely normal and does not know why she is in the hospital.     The story that makes the most sense is that she had a mild infectious gastroenteritis which would explain her elevated WBC count and elevated procalcitonin.  Pan-scan did not show any areas of infectious concern.  Given her normal clinical appearance, it seems unlikely that she has systemic infection.  Would trend blood cultures another 24 hours, but if she continues to feel the same tomorrow with negative blood cultures would stop antibiotics.      Impression  # Elevated procalcitonin  # Leukocytosis (resolved)  # Diarrhea (resolved)  # JESUS on CKD  # Dementia  # HFrEF w/ recent exacerbation    ================================================================  Plan  - Repeat procalcitonin  - Continue pip-tazo for now  - Trend blood culutres from 4/10 for one more day  - ID will follow    Duane Masters MD, MPH  [facility]   Office Telephone [phone].  Fax [phone]  [facility] paging

## 2025-04-11 NOTE — PHARMACY-ADMISSION MEDICATION HISTORY
Pharmacist Admission Medication History    Admission medication history is complete. The information provided in this note is only as accurate as the sources available at the time of the update.    Information Source(s): Facility (U/NH/) medication list/MAR via phone    Pertinent Information: Patient was discharged and returned to hospital in less than 12 hours.     Verified with RAMON RN that no medication changes were made while pt was there.     Changes made to PTA medication list:  Added: None  Deleted: None  Changed: None    Allergies reviewed with patient and updates made in EHR: unable to assess    Medication History Completed By: Melania Menard [facility] 4/11/2025 11:45 AM    PTA Med List   Medication Sig Last Dose/Taking    acetaminophen (TYLENOL) 500 MG tablet Take 1,000 mg by mouth every 8 hours as needed for pain. Taking As Needed    apixaban ANTICOAGULANT (ELIQUIS ANTICOAGULANT) 2.5 MG tablet Take 1 tablet (2.5 mg) by mouth 2 times daily. Due for appointment with Rose Mcelroy Taking    atorvastatin (LIPITOR) 10 MG tablet Take 1 tablet (10 mg) by mouth every morning. Taking    metoprolol succinate ER (TOPROL XL) 50 MG 24 hr tablet Take 1 tablet (50 mg) by mouth daily Taking    naloxone (NARCAN) 4 MG/0.1ML nasal spray Spray 4 mg into one nostril alternating nostrils as needed for opioid reversal. every 2-3 minutes until assistance arrives Taking As Needed    torsemide (DEMADEX) 20 MG tablet Take 1 tablet (20 mg) by mouth daily. Taking

## 2025-04-11 NOTE — PLAN OF CARE
Problem: Adult Inpatient Plan of Care  Goal: Plan of Care Review  Description: The Plan of Care Review/Shift note should be completed every shift.  The Outcome Evaluation is a brief statement about your assessment that the patient is improving, declining, or no change.  This information will be displayed automatically on your shiftnote.  Outcome: Progressing     Problem: Adult Inpatient Plan of Care  Goal: Absence of Hospital-Acquired Illness or Injury  Intervention: Prevent Skin Injury  Recent Flowsheet Documentation  Taken 4/11/2025 0815 by Froylan Yusuf, RN  Body Position: position changed independently  Taken 4/11/2025 0814 by Froylan Yusuf, RN  Body Position: position changed independently   Goal Outcome Evaluation:

## 2025-04-11 NOTE — PROGRESS NOTES
Laney Hahn is a 84 year old female  with a medical history significant for coronary artery disease, moderate aortic valve stenosis, prior history of CVA, prior history of syncope, and other medical comorbidities who is admitted with change in mental status  in the setting of possible sepsis  from acute gall bladder pathology with sepsis, diarrhea, dehydration and acute on chronic renal failure.           4/10 :     Seems conscious, oriented, responds to commands, some mild confusion per RN, no obvious neuro deficits  No fevers  Creatinine elevated at 2.08, baseline around 1.5  Nephrology consulted, on gentle iv fluids  Blood cultures : NGTD, high procalcitonin  CT chest : no acute abnormality  Ultrasound RUQ :  Extrahepatic biliary dilatation with the common duct measuring 12 mm. No intrahepatic biliary dilatation seen. Recommend correlation with liver function tests. If there is clinical concern for biliary obstruction, MRCP could be considered.  2.  No gallstones or evidence of cholecystitis.   LFT's normal  Surgery team consulted : signed off  On empiric iv zosyn  Resume eliquis, statin, metoprolol at lower dose  Holding torsemide    Not medically ready for discharge at this time.

## 2025-04-12 ENCOUNTER — APPOINTMENT (OUTPATIENT)
Dept: PHYSICAL THERAPY | Facility: HOSPITAL | Age: 85
DRG: 682 | End: 2025-04-12
Attending: INTERNAL MEDICINE
Payer: COMMERCIAL

## 2025-04-12 LAB
ANION GAP SERPL CALCULATED.3IONS-SCNC: 12 MMOL/L (ref 7–15)
BUN SERPL-MCNC: 33.2 MG/DL (ref 8–23)
CALCIUM SERPL-MCNC: 9.1 MG/DL (ref 8.8–10.4)
CHLORIDE SERPL-SCNC: 114 MMOL/L (ref 98–107)
CREAT SERPL-MCNC: 1.41 MG/DL (ref 0.51–0.95)
EGFRCR SERPLBLD CKD-EPI 2021: 37 ML/MIN/1.73M2
GLUCOSE SERPL-MCNC: 119 MG/DL (ref 70–99)
HCO3 SERPL-SCNC: 23 MMOL/L (ref 22–29)
HOLD SPECIMEN: NORMAL
POTASSIUM SERPL-SCNC: 4.3 MMOL/L (ref 3.4–5.3)
SODIUM SERPL-SCNC: 149 MMOL/L (ref 135–145)

## 2025-04-12 PROCEDURE — 99232 SBSQ HOSP IP/OBS MODERATE 35: CPT

## 2025-04-12 PROCEDURE — 36415 COLL VENOUS BLD VENIPUNCTURE: CPT | Performed by: INTERNAL MEDICINE

## 2025-04-12 PROCEDURE — 250N000013 HC RX MED GY IP 250 OP 250 PS 637: Performed by: INTERNAL MEDICINE

## 2025-04-12 PROCEDURE — 97161 PT EVAL LOW COMPLEX 20 MIN: CPT | Mod: GP

## 2025-04-12 PROCEDURE — 82374 ASSAY BLOOD CARBON DIOXIDE: CPT | Performed by: INTERNAL MEDICINE

## 2025-04-12 PROCEDURE — 120N000001 HC R&B MED SURG/OB

## 2025-04-12 PROCEDURE — 250N000011 HC RX IP 250 OP 636: Performed by: INTERNAL MEDICINE

## 2025-04-12 PROCEDURE — 99233 SBSQ HOSP IP/OBS HIGH 50: CPT | Performed by: INTERNAL MEDICINE

## 2025-04-12 PROCEDURE — 97110 THERAPEUTIC EXERCISES: CPT | Mod: GP

## 2025-04-12 PROCEDURE — 258N000003 HC RX IP 258 OP 636: Performed by: INTERNAL MEDICINE

## 2025-04-12 PROCEDURE — 80048 BASIC METABOLIC PNL TOTAL CA: CPT | Performed by: INTERNAL MEDICINE

## 2025-04-12 RX ORDER — DEXTROSE MONOHYDRATE 50 MG/ML
INJECTION, SOLUTION INTRAVENOUS CONTINUOUS
Status: ACTIVE | OUTPATIENT
Start: 2025-04-12 | End: 2025-04-13

## 2025-04-12 RX ADMIN — APIXABAN 2.5 MG: 2.5 TABLET, FILM COATED ORAL at 09:31

## 2025-04-12 RX ADMIN — DEXTROSE: 5 SOLUTION INTRAVENOUS at 14:07

## 2025-04-12 RX ADMIN — ATORVASTATIN CALCIUM 10 MG: 10 TABLET, FILM COATED ORAL at 09:31

## 2025-04-12 RX ADMIN — APIXABAN 2.5 MG: 2.5 TABLET, FILM COATED ORAL at 20:05

## 2025-04-12 RX ADMIN — PIPERACILLIN AND TAZOBACTAM 3.38 G: 3; .375 INJECTION, POWDER, FOR SOLUTION INTRAVENOUS at 05:11

## 2025-04-12 RX ADMIN — METOPROLOL SUCCINATE 25 MG: 25 TABLET, EXTENDED RELEASE ORAL at 09:31

## 2025-04-12 ASSESSMENT — ACTIVITIES OF DAILY LIVING (ADL)
ADLS_ACUITY_SCORE: 66

## 2025-04-12 NOTE — PROGRESS NOTES
Unable to complete admission documentation, patient is confused, called family no answer.       Sangeeta Blanton RN

## 2025-04-12 NOTE — PROGRESS NOTES
[facility]    Medicine Progress Note - Hospitalist Service    Date of Admission:  4/10/2025    Assessment & Plan   84-year-old female with PMH of CKD stage IIIb, HFrEF, paroxysmal A-fib, aortic stenosis (undergoing evaluation for TAVR), hypertension, junctional bradycardia s/p PPM, dementia, recent admission for CHF exacerbation who was brought in from assisted living facility for evaluation of diarrhea and mental status change.  Found to have mild leukocytosis and elevated procalcitonin.  Has been on empiric antibiotics however no clear source of bacterial infection was found.  Anticipate discharge back to [facility] on Monday    Diarrhea prior to admission.  Suspect she had mild infectious gastroenteritis which is now resolved.  Discontinue stool culture as she no longer has diarrhea  Leukocytosis and elevated procalcitonin.   Appreciate ID input.  Antibiotics discontinued as no clear source of infection found  JESUS on CKD.  JESUS is likely prerenal.  Improved and now back to baseline.  Continue to monitor intermittently  Hypernatremia.  Due to poor oral intake.  On D5 at 50 mL/h.  Encourage water intake  Acute metabolic encephalopathy in the setting of known dementia.  Seems to be back to baseline.  Moderate to severe low-flow low gradient aortic stenosis.  Patient is being evaluated for TAVR  Chronic HFrEF.  Severe cardiomyopathy LVEF 30 to 35%.  Appears euvolemic.  Cardiology was planning dobutamine stress test as outpatient        Diet: Low Saturated Fat Na <2400 mg    DVT Prophylaxis: DOAC  Tenorio Catheter: Not present  Lines: None     Cardiac Monitoring: None  Code Status: Full Code      Clinically Significant Risk Factors         # Hypernatremia: Highest Na = 149 mmol/L in last 2 days, will monitor as appropriate  # Hyperchloremia: Highest Cl = 114 mmol/L in last 2 days, will monitor as appropriate           # Coagulation Defect: INR = 1.31 (Ref range: 0.85 - 1.15) and/or PTT = N/A, will  monitor for bleeding    # Hypertension: Noted on problem list  # Chronic heart failure with reduced ejection fraction: last echo with EF <40%    # Dementia: noted on problem list            # Financial/Environmental Concerns: none   # Pacemaker present       Social Drivers of Health    Food Insecurity: Unknown (12/23/2024)    Food Insecurity     Within the past 12 months, did you worry that your food would run out before you got money to buy more?: Patient unable to answer     Within the past 12 months, did the food you bought just not last and you didn t have money to get more?: Patient unable to answer   Housing Stability: Unknown (12/23/2024)    Housing Stability     Do you have housing? : Patient unable to answer     Are you worried about losing your housing?: Patient unable to answer   Financial Resource Strain: Unknown (12/23/2024)    Financial Resource Strain     Within the past 12 months, have you or your family members you live with been unable to get utilities (heat, electricity) when it was really needed?: Patient unable to answer   Transportation Needs: Unknown (12/23/2024)    Transportation Needs     Within the past 12 months, has lack of transportation kept you from medical appointments, getting your medicines, non-medical meetings or appointments, work, or from getting things that you need?: Patient unable to answer          Disposition Plan     Medically Ready for Discharge: Anticipated Tomorrow             Krystin Hurst MD  Hospitalist Service  [facility]  Securely message with [employer] (more info)  Text page via [facility] Paging/Directory   ______________________________________________________________________    Interval History   Patient reports feeling well.  Wondering about when she can be discharged home.  Denies having any pain or any other complaints    Physical Exam   Vital Signs: Temp: 97.9  F (36.6  C) Temp src: Oral BP: 126/58 Pulse: 75   Resp: 16 SpO2: 96 % O2  Device: None (Room air)    Weight: 122 lbs 9.21 oz    General Appearance: No acute distress  Respiratory: Respirations unlabored, lungs are clear anteriorly  Cardiovascular: Regular rate and rhythm.  Normal S1-S2.  4/6 systolic murmur.  No peripheral edema  GI: Abdomen soft and nontender  Other: Alert, oriented to person and place    Medical Decision Making       50 MINUTES SPENT BY ME on the date of service doing chart review, history, exam, documentation & further activities per the note.  MANAGEMENT DISCUSSED with the following over the past 24 hours: Patient, nursing staff       Data     I have personally reviewed the following data over the past 24 hrs:    N/A  \   N/A   / N/A     149 (H) 114 (H) 33.2 (H) /  119 (H)   4.3 23 1.41 (H) \       Imaging results reviewed over the past 24 hrs:   No results found for this or any previous visit (from the past 24 hours).

## 2025-04-12 NOTE — PLAN OF CARE
Problem: Adult Inpatient Plan of Care  Goal: Optimal Comfort and Wellbeing  Outcome: Progressing  Intervention: Provide Person-Centered Care  Recent Flowsheet Documentation  Taken 4/12/2025 0414 by Gen Masters, RN  Trust Relationship/Rapport:   care explained   choices provided  Taken 4/12/2025 0016 by Gen Masters, RN  Trust Relationship/Rapport:   care explained   choices provided     Problem: Delirium  Goal: Improved Sleep  Outcome: Progressing   Goal Outcome Evaluation    Pt is A/ DO to time and situation. Ambulates w/ A1. Steady gait. Pt has frequent voids. Lowfat diet. Denies pain. Slept between care. Independent in bed. Can make her needs known. VSS. No incident noted.

## 2025-04-12 NOTE — PROGRESS NOTES
Infectious Disease Progress Note    Assessment/Plan    Impression  # Elevated procalcitonin--uncertain significance.  Elevated procalcitonin means sepsis cannot be excluded, but does not mean sepsis is present.   # Leukocytosis (resolved)  # Diarrhea (resolved)  # JESUS on CKD  # Dementia  # HFrEF w/ recent exacerbation     ================================================================  Plan    Discontinue antibiotics.    No further infectious disease dx or tx indicated at this time.    We'll sign off.  Please call if questions or problems.     OK to discharge to home anytime from Infectious Disease standpoint.       Active Problems:    Diarrhea of presumed infectious origin    Dehydration    Disorientation      KEIKO ROMERO MD  [phone]      Subjective  Feels better. Denies pain.     Objective    Vital signs in last 24 hours  Temp:  [98  F (36.7  C)-98.4  F (36.9  C)] 98.2  F (36.8  C)  Pulse:  [77-91] 77  Resp:  [16-20] 16  BP: (118-140)/(59-76) 132/62  SpO2:  [94 %-99 %] 95 %  Wt Readings from Last 3 Encounters:   04/11/25 55.6 kg (122 lb 9.2 oz)   04/09/25 54 kg (119 lb)   03/24/25 58 kg (127 lb 13.9 oz)           Intake/Output last 3 shifts  I/O last 3 completed shifts:  In: 1065 [P.O.:600; I.V.:465]  Out: 300 [Urine:300]  Intake/Output this shift:  No intake/output data recorded.    Review of Systems   Pertinent items are noted in HPI., otherwise negative.    Physical Exam  General: alert, cooperative, no apparent distress  HEENT: atraumatic, normocephalic, no scleral icterus, moist mucus membranes, no cervical lymphadenopathy  Heart: Normal rate, regular rhythm  Lungs: good inspiratory effort  Abdomen: soft, non-tender, non-distended  Extremities: no peripheral edema, no new rashes or lesions       Pertinent Labs   Lab Results: personally reviewed.     Recent Labs   Lab 04/11/25  0655 04/10/25  2131 04/10/25  0734 04/10/25  0140   WBC 6.0  --  13.4* 8.3   HGB 10.4* 11.7 12.5 13.7   HCT 31.4*  --   "37.3 39.5   *  --  175 189        Recent Labs   Lab 04/12/25  0656 04/11/25  0655 04/10/25  0734   * 145 142   CO2 23 19* 20*   BUN 33.2* 55.6* 66.2*     Creatinine   Date Value Ref Range Status   04/12/2025 1.41 (H) 0.51 - 0.95 mg/dL Final   03/15/2021 1.25 (H) 0.52 - 1.04 mg/dL Final       No results found for: \"CULT\"  7-Day Micro Results       Collected Updated Procedure Result Status      04/10/2025 0332 04/12/2025 0916 Blood Culture Peripheral Blood [[id number]]   Peripheral Blood    Preliminary result Component Value   Culture No growth after 2 days  [P]                04/10/2025 0309 04/12/2025 0916 Blood Culture Peripheral Blood [[id number]]   Peripheral Blood    Preliminary result Component Value   Culture No growth after 2 days  [P]                        Pertinent Radiology   Radiology Results:   CT Chest w/o Contrast    Result Date: 4/10/2025  EXAM: CT CHEST W/O CONTRAST LOCATION: [facility] DATE: 4/10/2025 INDICATION: Sepsis. COMPARISON: Same day CT abdomen/pelvis; CT thoracic spine 7/22/2022. TECHNIQUE: CT chest without IV contrast. Multiplanar reformats were obtained. Dose reduction techniques were used. CONTRAST: None. FINDINGS: Absence of intravenous contrast limits the sensitivity of this examination for detection of infectious/inflammatory change, post traumatic abnormalities, vascular abnormalities, and visceral lesions. Study is additionally degraded by motion. LUNGS AND PLEURA: Mild diffuse bronchial wall thickening. ML dependent atelectatic change. Mosaic attenuation of the pulmonary parenchyma, consistent with small vessels or small airways disease. No discrete airspace consolidation. Suspicious pulmonary nodule. Tiny left lower lobe calcified granuloma. No pneumothorax. No pleural effusion.  MEDIASTINUM/AXILLAE: Mildly enlarged right paratracheal lymph nodes, stable from 7/22/2022.  Thoracic esophagus is unremarkable.  No axillary lymphadenopathy. " Left chest wall cardiac implantable electronic device. No thoracic aortic aneurysm. Mild to moderate atherosclerotic calcifications. Mild cardiomegaly. Dense calcifications of the mitral annulus. Additional calcifications of the aortic valve. No pericardial effusion. CORONARY ARTERY CALCIFICATION: Moderate. UPPER ABDOMEN: Mild distention of the gallbladder and mild extrahepatic biliary ductal dilatation; correlation with LFTs is recommended to exclude a cholestatic process. Diffuse fatty infiltration of the pancreas. Gastric postsurgical change MUSCULOSKELETAL: No suspicious abnormality. OTHER: No additionally suspicious abnormality.     IMPRESSION: 1.  No acute CT abnormality of the chest, within limitation of the noncontrast technique. 2.  Mild distention of the gallbladder and mild extrahepatic biliary ductal dilatation; correlation with LFTs is recommended to exclude a cholestatic process.    US Abdomen Limited (RUQ)    Result Date: 4/10/2025  EXAM: US ABDOMEN LIMITED LOCATION: [facility] DATE: 4/10/2025 INDICATION: Right upper quadrant tenderness, CT unremarkable COMPARISON: None. TECHNIQUE: Limited abdominal ultrasound. FINDINGS: GALLBLADDER: Normal. No gallstones, wall thickening, or pericholecystic fluid. Negative sonographic Wu's sign. BILE DUCTS: No intrahepatic biliary dilatation seen. Extrahepatic biliary dilatation with the common duct measures 12 mm. LIVER: Normal parenchyma with smooth contour. No focal mass. The portal vein is patent with flow in the normal direction. RIGHT KIDNEY: No hydronephrosis. PANCREAS: Bowel gas obscures portions of pancreas. The visualized portions are normal. No ascites.     IMPRESSION: 1.  Extrahepatic biliary dilatation with the common duct measuring 12 mm. No intrahepatic biliary dilatation seen. Recommend correlation with liver function tests. If there is clinical concern for biliary obstruction, MRCP could be considered. 2.  No gallstones  or evidence of cholecystitis.     CT Head w/o Contrast    Result Date: 4/10/2025  EXAM: CT HEAD W/O CONTRAST LOCATION: [facility] DATE: 4/10/2025 INDICATION: Altered mental status, history of blood thinners, unknown if fall. COMPARISON: 03/24/2025. TECHNIQUE: Routine CT Head without IV contrast. Multiplanar reformats. Dose reduction techniques were used. FINDINGS: INTRACRANIAL CONTENTS: No intracranial hemorrhage, extraaxial collection, or mass effect. Chronic lacunar infarctions bilateral basal ganglia and cerebellar hemispheres. Moderate presumed chronic small vessel ischemic changes. Moderate generalized volume  loss. No hydrocephalus. VISUALIZED ORBITS/SINUSES/MASTOIDS: No intraorbital abnormality. No paranasal sinus mucosal disease. No middle ear or mastoid effusion. BONES/SOFT TISSUES: No acute abnormality.     IMPRESSION: 1.  No CT evidence for acute intracranial process. 2.  Brain atrophy and chronic ischemic changes, as above.     CT Cervical Spine w/o Contrast    Result Date: 4/10/2025  EXAM: CT CERVICAL SPINE W/O CONTRAST LOCATION: [facility] DATE: 4/10/2025 INDICATION: Altered mental status, possible fall. COMPARISON: 12/23/2024. TECHNIQUE: Routine CT Cervical Spine without IV contrast. Multiplanar reformats. Dose reduction techniques were used. FINDINGS: VERTEBRA: Straightening of cervical curvature with mild anterior spondylolisthesis at C3-C4 and C7-T1. Otherwise, normal alignment. Normal vertebral body heights. Severe disc degeneration C4-T1. Moderate to severe facet arthropathy C2-C3 and C3-C4. No fracture or posttraumatic subluxation. CANAL/FORAMINA: Mild spinal canal stenosis at C3-C4 with mild right foraminal stenosis. Moderate spinal canal stenosis at C4-C5 with severe right foraminal stenosis. Mild to moderate spinal canal stenosis at C5-C6. Moderate spinal canal stenosis at C6-C7  with moderate bilateral foraminal stenoses. Moderate to severe  left foraminal stenosis at C7-T1. PARASPINAL: Partially visualized internal cardiac leads on the left. No pneumothorax.     IMPRESSION: 1.  No fracture or posttraumatic subluxation. 2.  Advanced spondylosis with spinal canal and foraminal stenoses as detailed.     CT Abdomen Pelvis w/o Contrast    Result Date: 4/10/2025  EXAM: CT ABDOMEN PELVIS W/O CONTRAST LOCATION: [facility] DATE: 4/10/2025 INDICATION: Abdominal distention, bloating, diarrhea, altered mental status COMPARISON: 3/24/2025 TECHNIQUE: CT scan of the abdomen and pelvis was performed without IV contrast. Multiplanar reformats were obtained. Dose reduction techniques were used. CONTRAST: None. FINDINGS: LOWER CHEST: Dependent atelectasis in the posterior lung bases. HEPATOBILIARY: No significant mass or bile duct dilatation. No calcified gallstones. Gallbladder is distended. PANCREAS: Diffuse fatty infiltration and atrophy of the pancreas. No significant mass, duct dilatation, or inflammatory change. SPLEEN: Normal size. ADRENAL GLANDS: Normal. KIDNEYS/BLADDER: Moderate left renal atrophy. No significant stones, mass, or hydronephrosis. No hydroureter. No stones are seen along the courses of the ureters. Urinary bladder unremarkable. BOWEL: Nonspecific nonobstructive bowel gas pattern. Numerous colonic diverticula without evidence for diverticulitis. Appendix within normal limits. LYMPH NODES: No lymphadenopathy. VASCULATURE: No abdominal aortic aneurysm. Moderate vascular calcifications abdominal aorta and common iliac arteries PELVIC ORGANS: No pelvic masses or free fluid. Midline uterus. MUSCULOSKELETAL: Mild spondylosis. No inguinal hernias. No ventral hernia.     IMPRESSION: 1.  No acute findings to explain patient's symptoms. 2.  Colonic diverticulosis without diverticulitis. 3.  Moderate left renal atrophy.

## 2025-04-12 NOTE — PROGRESS NOTES
RENAL PROGRESS NOTE          CC: follow up JESUS    ROS: my fist eval, sleeping soundly, confused overnight per nursing but no longer needing sitter.    Assessment and Plan:     Acute kidney injury (acute tubular necrosis) on chronic kidney disease stage IIIb.  Acute kidney injury (acute tubular necrosis) likely secondary to intravascular depletion in the setting of persistent diarrhea and poor oral intake over the past few days prior to admission and and therapy with diuretics PTA.  Patient has known baseline creatinine 1.3-1.6 range in early 2025.  At the time of admission creatinine 2.08.    Studies confirm acute tubular necrosis.  Improving and back to baseline   Continue supportive care.  Monitor renal function daily.    Metabolic encephalopathy - Resolved. Due to acute illness including dehydration and sepsis, JESUS..   cont supportive care.  Overall improved  Diarrhea.  Improved.   She looks well-hydrated.  As per primary service  Hypernatremia due to poor po intake, recent diarrhea and NS.  Hypernatremia will contribute to encephalopathy/lethargy  -start d5w at 50 ml/hr x 24 hrs to correct  -encourage po free water intake    Dahiana Wiseman MD  [facility]  [phone]     PHYSICAL EXAM  /62 (BP Location: Left arm, Patient Position: Semi-Blue's, Cuff Size: Adult Regular)   Pulse 77   Temp 98.2  F (36.8  C) (Oral)   Resp 16   Wt 55.6 kg (122 lb 9.2 oz)   LMP  (LMP Unknown)   SpO2 95%   BMI 21.04 kg/m          Intake/Output Summary (Last 24 hours) at 4/11/2025 1334  Last data filed at 4/11/2025 1030  Gross per 24 hour   Intake 1395 ml   Output 900 ml   Net 495 ml     Resp: 16  Wt Readings from Last 3 Encounters:   04/11/25 55.6 kg (122 lb 9.2 oz)   04/09/25 54 kg (119 lb)   03/24/25 58 kg (127 lb 13.9 oz)       GENERAL: Chronically ill and debilitated.  HEAD: Normal  HEENT: Normal hearing. No eyelid edema.  PULMONARY: No respiratory distress. No cyanosis  MSK: Diffuse muscle  "wasting, no joint deformities  NEURO: limited by asleep  PSYCHIATRIC: limited by asleep  SKIN: Pale, no jaundice, no rash.    LABORATORIES  Recent Labs   Lab 04/11/25  0655 04/10/25  2131 04/10/25  0734 04/10/25  0140   WBC 6.0  --  13.4* 8.3   HGB 10.4* 11.7 12.5 13.7   HCT 31.4*  --  37.3 39.5   *  --  175 189     Recent Labs   Lab 04/12/25  0656 04/11/25  0655 04/10/25  0734 04/10/25  0140   * 145 142 141   CO2 23 19* 20* 23   BUN 33.2* 55.6* 66.2* 65.4*   ALKPHOS  --   --  96 127   ALT  --   --  45 38   AST  --   --  69* 73*     Recent Labs   Lab 04/10/25  0140   INR 1.31*     No results for input(s): \"ABORH\" in the last 168 hours.  Invalid input(s): \"MG\"    RADIOLOGY REPORTS    ECHOCARDIOGRAM    MEDICATIONS  Current Facility-Administered Medications   Medication Dose Route Frequency Provider Last Rate Last Admin    apixaban ANTICOAGULANT (ELIQUIS) tablet 2.5 mg  2.5 mg Oral BID Lucian Saleh MD   2.5 mg at 04/12/25 0931    atorvastatin (LIPITOR) tablet 10 mg  10 mg Oral QAM Lucian Saleh MD   10 mg at 04/12/25 0931    metoprolol succinate ER (TOPROL XL) 24 hr tablet 25 mg  25 mg Oral Daily Lucian Saleh MD   25 mg at 04/12/25 0931    sodium chloride (PF) 0.9% PF flush 3 mL  3 mL Intracatheter Q8H Davina Lewis MD   3 mL at 04/12/25 1308     Current Facility-Administered Medications   Medication Dose Route Frequency Provider Last Rate Last Admin    calcium carbonate (TUMS) chewable tablet 1,000 mg  1,000 mg Oral 4x Daily PRN Davina Lewis MD        glucose gel 15-30 g  15-30 g Oral Q15 Min PRN Davina Lewis MD        Or    dextrose 50 % injection 25-50 mL  25-50 mL Intravenous Q15 Min PRN Davina Lewis MD        Or    glucagon injection 1 mg  1 mg Subcutaneous Q15 Min PRN Davina Lewis MD        hydrALAZINE (APRESOLINE) tablet 10 mg  10 mg Oral Q4H PRN Davina Lewis MD        Or    hydrALAZINE (APRESOLINE) injection 10 mg  10 mg Intravenous Q4H PRN Davina Lewis MD        " lidocaine (LMX4) cream   Topical Q1H PRN Davina Lewis MD        lidocaine 1 % 0.1-1 mL  0.1-1 mL Other Q1H PRN Davina Lewis MD        ondansetron (ZOFRAN) injection 4 mg  4 mg Intravenous Q30 Min PRN Froylan Macias MD        sodium chloride (PF) 0.9% PF flush 3 mL  3 mL Intracatheter q1 min prn Davina Lewis MD             Above laboratories and medications were personally reviewed during evaluation today.

## 2025-04-12 NOTE — PROGRESS NOTES
"   04/12/25 5130   Appointment Info   Signing Clinician's Name / Credentials (PT) Karo Zhoue PT   Living Environment   People in Home sibling(s)  (brother)   Current Living Arrangements assisted living   Home Accessibility no concerns   Self-Care   Equipment Currently Used at Home walker, rolling   Fall history within last six months no   Activity/Exercise/Self-Care Comment independent ADL's; brother does some IADL's but doesn't drive   General Information   Onset of Illness/Injury or Date of Surgery 04/10/25   Referring Physician Dr. Hurst   Patient/Family Therapy Goals Statement (PT) go home   Pertinent History of Current Problem (include personal factors and/or comorbidities that impact the POC) sepsis, encephalopathy, diarrhea; PMH of dementia, CHF, coronary artery disease, moderate aortic valve stenosis, prior history of CVA, prior history of syncope   Existing Precautions/Restrictions fall   Cognition   Affect/Mental Status (Cognition) confused   Orientation Status (Cognition) oriented to;person;place;disoriented to;time;situation  (states \"hospital\")   Follows Commands (Cognition) WFL   Range of Motion (ROM)   Range of Motion ROM is WFL   Strength (Manual Muscle Testing)   Strength (Manual Muscle Testing) Deficits observed during functional mobility   Bed Mobility   Bed Mobility supine-sit   Supine-Sit [place] (Bed Mobility) supervision   Transfers   Transfers sit-stand transfer   Sit-Stand Transfer   Sit-Stand [place] (Transfers) contact guard;verbal cues;nonverbal cues (demo/gesture)   Assistive Device (Sit-Stand Transfers) walker, front-wheeled   Gait/Stairs (Locomotion)   [place] Level (Gait) contact guard;verbal cues   Assistive Device (Gait) walker, front-wheeled   Distance in Feet (Gait) 150   Pattern (Gait) step-through   Deviations/Abnormal Patterns (Gait) gait speed decreased   Balance   Balance Comments needs UE support in static standing   Clinical Impression   Criteria for " Skilled Therapeutic Intervention Yes, treatment indicated   PT Diagnosis (PT) impaired functional mobility   Influenced by the following impairments decreased cognition, strength, balance, activity tolerance   Functional limitations due to impairments transfers, gait   Clinical Presentation (PT Evaluation Complexity) stable   Clinical Presentation Rationale pt presents as medically diagnosed   Clinical Decision Making (Complexity) low complexity   Planned Therapy Interventions (PT) balance training;bed mobility training;gait training;home exercise program;neuromuscular re-education;patient/family education;strengthening;transfer training   Risk & Benefits of therapy have been explained evaluation/treatment results reviewed;patient   PT Total Evaluation Time   PT Eval, Low Complexity Minutes (47446) 10   Physical Therapy Goals   PT Frequency 6x/week   PT Predicted Duration/Target Date for Goal Attainment 04/19/25   PT Goals Bed Mobility;Transfers;Gait   PT: Bed Mobility Independent;Supine to/from sit   PT: Transfers Supervision/stand-by assist;Sit to/from stand;Assistive device   PT: Gait Supervision/stand-by assist;Greater than 200 feet;Rolling walker   Interventions   Interventions Quick Adds Therapeutic Procedure   Therapeutic Procedure/Exercise   Ther. Procedure: strength, endurance, ROM, flexibillity Minutes (76073) 8   Symptoms Noted During/After Treatment fatigue   Treatment Detail/Skilled Intervention instruction in standing and seated BLE ex x5-8 reps with cuing and demonstration for technique   PT Discharge Planning   PT Plan gait with FWW; standing LE ex/balance ex   PT Discharge Recommendation (DC Rec) home with assist;home with home care physical therapy   PT Rationale for DC Rec continue with home PT for strengthening, balance, and mobility   PT Brief overview of current status amb. wth walker 150 feet cga   PT Total Distance Amb During Session (feet) 150   PT Equipment Needed at Discharge walker,  rolling   Physical Therapy Time and Intention   Timed Code Treatment Minutes 8   Total Session Time (sum of timed and untimed services) 18

## 2025-04-12 NOTE — PLAN OF CARE
Goal Outcome Evaluation:  Alert and oriented x to self and place. Disoriented to situation and time.  Confused and forgetful. Keep asking togo  home.  Ambulated to the BR with assist of 1 and walker. Voided and had BM x 1. Ambulated on hallways with PT. Denied pain.    Lizzie Cotter RN

## 2025-04-12 NOTE — PLAN OF CARE
Problem: Adult Inpatient Plan of Care  Goal: Optimal Comfort and Wellbeing  4/12/2025 0704 by Gen Masters RN  Outcome: Progressing  4/12/2025 0517 by Gen Masters RN  Outcome: Progressing  Intervention: Provide Person-Centered Care  Recent Flowsheet Documentation  Taken 4/12/2025 0414 by Gen Masters RN  Trust Relationship/Rapport:   care explained   choices provided  Taken 4/12/2025 0016 by Gen Masters RN  Trust Relationship/Rapport:   care explained   choices provided     Problem: Delirium  Goal: Improved Behavioral Control  Outcome: Progressing  Intervention: Prevent and Manage Agitation  Recent Flowsheet Documentation  Taken 4/12/2025 0414 by Gen Masters RN  Environment Familiarity/Consistency: daily routine followed  Taken 4/12/2025 0016 by Gen Masters RN  Environment Familiarity/Consistency: daily routine followed  Intervention: Minimize Safety Risk  Recent Flowsheet Documentation  Taken 4/12/2025 0414 by Gen Masters RN  Enhanced Safety Measures:   pain management   review medications for side effects with activity  Trust Relationship/Rapport:   care explained   choices provided  Taken 4/12/2025 0016 by Gen Masters RN  Enhanced Safety Measures:   pain management   review medications for side effects with activity  Trust Relationship/Rapport:   care explained   choices provided  Goal: Improved Sleep  4/12/2025 0704 by Gen Masters RN  Outcome: Progressing  4/12/2025 0517 by Gen Masters RN  Outcome: Progressing   Goal Outcome Evaluation:        Pt is A/ DO to time and situation. Ambulates w/ A1. Steady gait. Pt has frequent voids. Lowfat diet. Denies pain. Slept between care. Independent in bed. Can make her needs known. VSS. New PIV  in Left forearm. No incident noted.

## 2025-04-12 NOTE — PLAN OF CARE
Patient is alert and disoriented to time and situation. Patient had smear of BM. Patient's 1:1 discontinued, and enteric isolation removed. Patient voided twice.     Sangeeta Blanton RN        Problem: Adult Inpatient Plan of Care  Goal: Plan of Care Review  Description: The Plan of Care Review/Shift note should be completed every shift.  The Outcome Evaluation is a brief statement about your assessment that the patient is improving, declining, or no change.  This information will be displayed automatically on your shiftnote.  Outcome: Progressing   Goal Outcome Evaluation:

## 2025-04-13 ENCOUNTER — APPOINTMENT (OUTPATIENT)
Dept: OCCUPATIONAL THERAPY | Facility: HOSPITAL | Age: 85
DRG: 682 | End: 2025-04-13
Attending: INTERNAL MEDICINE
Payer: COMMERCIAL

## 2025-04-13 LAB
ANION GAP SERPL CALCULATED.3IONS-SCNC: 10 MMOL/L (ref 7–15)
BUN SERPL-MCNC: 19.3 MG/DL (ref 8–23)
CALCIUM SERPL-MCNC: 8.7 MG/DL (ref 8.8–10.4)
CHLORIDE SERPL-SCNC: 109 MMOL/L (ref 98–107)
CREAT SERPL-MCNC: 1.1 MG/DL (ref 0.51–0.95)
EGFRCR SERPLBLD CKD-EPI 2021: 50 ML/MIN/1.73M2
GLUCOSE SERPL-MCNC: 132 MG/DL (ref 70–99)
HCO3 SERPL-SCNC: 23 MMOL/L (ref 22–29)
HOLD SPECIMEN: NORMAL
POTASSIUM SERPL-SCNC: 3.8 MMOL/L (ref 3.4–5.3)
SODIUM SERPL-SCNC: 142 MMOL/L (ref 135–145)

## 2025-04-13 PROCEDURE — 97166 OT EVAL MOD COMPLEX 45 MIN: CPT | Mod: GO

## 2025-04-13 PROCEDURE — 80048 BASIC METABOLIC PNL TOTAL CA: CPT | Performed by: INTERNAL MEDICINE

## 2025-04-13 PROCEDURE — 120N000001 HC R&B MED SURG/OB

## 2025-04-13 PROCEDURE — 250N000013 HC RX MED GY IP 250 OP 250 PS 637: Performed by: INTERNAL MEDICINE

## 2025-04-13 PROCEDURE — 99232 SBSQ HOSP IP/OBS MODERATE 35: CPT | Performed by: INTERNAL MEDICINE

## 2025-04-13 PROCEDURE — 97535 SELF CARE MNGMENT TRAINING: CPT | Mod: GO

## 2025-04-13 PROCEDURE — 36415 COLL VENOUS BLD VENIPUNCTURE: CPT | Performed by: INTERNAL MEDICINE

## 2025-04-13 RX ADMIN — METOPROLOL SUCCINATE 25 MG: 25 TABLET, EXTENDED RELEASE ORAL at 09:28

## 2025-04-13 RX ADMIN — APIXABAN 2.5 MG: 2.5 TABLET, FILM COATED ORAL at 21:18

## 2025-04-13 RX ADMIN — APIXABAN 2.5 MG: 2.5 TABLET, FILM COATED ORAL at 09:28

## 2025-04-13 RX ADMIN — ATORVASTATIN CALCIUM 10 MG: 10 TABLET, FILM COATED ORAL at 09:28

## 2025-04-13 ASSESSMENT — ACTIVITIES OF DAILY LIVING (ADL)
ADLS_ACUITY_SCORE: 66

## 2025-04-13 NOTE — PROGRESS NOTES
Care Management Follow Up    Length of Stay (days): 3    Expected Discharge Date: 04/14     Concerns to be Addressed: discharge planning     Patient plan of care discussed at interdisciplinary rounds: Yes    Anticipated Discharge Disposition:  back to assisted living      Anticipated Discharge Services:  N/A  Anticipated Discharge DME:  N/A    Private pay costs discussed: Not applicable    Discussed  Partnership in Safe Discharge Planning  document with patient/family: No     Handoff Completed: Yes, [facility] PCP: Internal handoff referral completed    Additional Information:  Per provider patient ready for discharge tomorrow, Monday, 4/14/25. MERYL called patient's friend Gabriella to see if she would be able to transport patient back to her AL. Gabriella states she is working and not able to take time from work, but asked SW to call patient's cousin Danish who is involved and see if she might be able to provide the ride. Gabriella also informed MERYL that patient has scheduled home care visit for Tuesday. This is an initial home care visit after her earlier this month hospitalization.  MERYL called cousin Danish who agreed to  patient and take her back to the assisted living. Danish will arrive around 11AM. Danish asks to be called if there is any changes to the discharge time or plans. Her number is: [phone].  MERYL called [facility] and left voice mail message on DON phone with an update that patient will discharge tomorrow back to their facility. Patient and provider updated.  MERYL also left VM message for patient's [facility] Hubert Fontanag [phone].    Next Steps:  CM will continue to monitor the progression of care, review team recommendations and provide discharge planning assist as needed.       Esmer Chand, LIDIAW

## 2025-04-13 NOTE — PLAN OF CARE
Goal Outcome Evaluation:               Problem: Adult Inpatient Plan of Care  Goal: Optimal Comfort and Wellbeing  Outcome: Progressing  Intervention: Provide Person-Centered Care  Recent Flowsheet Documentation  Taken 4/13/2025 0453 by Gen Masters RN  Trust Relationship/Rapport:   care explained   choices provided  Taken 4/13/2025 0049 by Gen Masters RN  Trust Relationship/Rapport:   care explained   choices provided  Taken 4/12/2025 2000 by Gen Masters RN  Trust Relationship/Rapport:   care explained   choices provided     Problem: Delirium  Goal: Improved Behavioral Control  Outcome: Progressing  Intervention: Minimize Safety Risk  Recent Flowsheet Documentation  Taken 4/13/2025 0453 by Gen Masters RN  Enhanced Safety Measures:   pain management   review medications for side effects with activity  Trust Relationship/Rapport:   care explained   choices provided  Taken 4/13/2025 0049 by Gen Masters RN  Enhanced Safety Measures:   pain management   review medications for side effects with activity  Trust Relationship/Rapport:   care explained   choices provided  Taken 4/12/2025 2000 by Gen Masters RN  Enhanced Safety Measures:   pain management   review medications for side effects with activity  Trust Relationship/Rapport:   care explained   choices provided  Goal: Improved Sleep  Outcome: Progressing     Pt is A/O with some confusion. She can become very snappy and agressive due to the progression of her dementia. Takes Her pills one at a time w/ water. Bed alarm on but she will get up when ever she feels life. On RA.  Tele: is currently on hold. Saline lock x2. She can make her needs known. Has HTN and behavioral issues . Left in stable condition to the oncoming nurses.

## 2025-04-13 NOTE — PROGRESS NOTES
Renal  JESUS resolved, not much to add, will sign off.  Dahiana Wiseman MD  [facility]  [phone]

## 2025-04-13 NOTE — PLAN OF CARE
Goal Outcome Evaluation:  Alert and oriented to self. Forgetful. Keep asking when she is going home. Sets bed alarm off to go to the bathroom.  Redirectable and cooperative. Voided x 3.    Lizzie Cotter RN

## 2025-04-13 NOTE — PROGRESS NOTES
04/13/25 1100   Appointment Info   Signing Clinician's Name / Credentials (OT) Celena GOLDMAN damir Rodriguezhodan GALEAS/L   Living Environment   People in Home sibling(s)   Current Living Arrangements assisted living   Home Accessibility no concerns   Transportation Anticipated family or friend will provide   Self-Care   Current Activity Tolerance moderate   Equipment Currently Used at Home walker, rolling   Fall history within last six months no   Activity/Exercise/Self-Care Comment Pt uses FWW at home.  Pt is independent with basic ADLs.  Pt's brother assists with some things as needed.   General Information   Onset of Illness/Injury or Date of Surgery 04/10/25   Referring Physician Katy   Patient/Family Therapy Goal Statement (OT) home by Tawanna   Additional Occupational Profile Info/Pertinent History of Current Problem Admitted with diarrhea, recurrent CHF   Existing Precautions/Restrictions fall   Limitations/Impairments safety/cognitive   Cognitive Status Examination   Cognitive Status Comments Impaired at baseline.  Pt is able to give some history.  Pt knows she is in hospital but is not oriented to date.   Visual Perception   Visual Impairment/Limitations WFL   Sensory   Sensory Comments UE's intact   Range of Motion Comprehensive   Comment, General Range of Motion WFLs for ADLs   Strength Comprehensive (MMT)   Comment, General Manual Muscle Testing (MMT) Assessment generalized weakness   Coordination   Upper Extremity Coordination No deficits were identified   Bed Mobility   Comment (Bed Mobility) SBA with use of rail   Transfers   Transfer Comments CGA   Balance   Balance Comments CGA   Activities of Daily Living   BADL Assessment/Intervention toileting   Toileting   Comment, (Toileting) SBA with oliver care, CGA for balance with clothing management   Clinical Impression   Criteria for Skilled Therapeutic Interventions Met (OT) Yes, treatment indicated   OT Diagnosis Iaired ADL independence   OT Problem  List-Impairments impacting ADL activity tolerance impaired;balance;cognition;mobility   Assessment of Occupational Performance 3-5 Performance Deficits   Planned Therapy Interventions (OT) ADL retraining;balance training;stretching;transfer training   Clinical Decision Making Complexity (OT) detailed assessment/moderate complexity   Risk & Benefits of therapy have been explained evaluation/treatment results reviewed;participants included;patient   Clinical Impression Comments Pt seen bedside for OT eval and treatment.  Pt demonstrates decreased act tolerance and independence with ADLs and mobility.  OT to continue to address.  Pt will ikelly be able to return to [facility] with assist for ADLs as needed.   OT Total Evaluation Time   OT Eval, Moderate Complexity Minutes (11915) 10   OT Goals   Therapy Frequency (OT) 5 times/week   OT Predicted Duration/Target Date for Goal Attainment 04/20/25   OT Goals Hygiene/Grooming;Lower Body Dressing;Transfers;Toilet Transfer/Toileting   OT: Hygiene/Grooming supervision/stand-by assist;while standing   OT: Lower Body Dressing Supervision/stand-by assist   OT: Transfer Supervision/stand-by assist;with assistive device   OT: Toilet Transfer/Toileting Supervision/stand-by assist   OT Discharge Planning   OT Plan basic ADLs, standing act, transfers   OT Discharge Recommendation (DC Rec) home with assist   OT Rationale for DC Rec Recommend return to skilled nursing with assist for ADLs as needed.  Pt is mostly limited by act tolerance   OT Brief overview of current status CGA

## 2025-04-13 NOTE — PROGRESS NOTES
[facility]    Medicine Progress Note - Hospitalist Service    Date of Admission:  4/10/2025    Assessment & Plan   84-year-old female with PMH of CKD stage IIIb, HFrEF, paroxysmal A-fib, aortic stenosis (undergoing evaluation for TAVR), hypertension, junctional bradycardia s/p PPM, dementia, recent admission for CHF exacerbation who was brought in from assisted living facility for evaluation of diarrhea and mental status change.  Found to have mild leukocytosis and elevated procalcitonin.  Has been on empiric antibiotics however no clear source of bacterial infection was found.  Ready for discharge, anticipate discharge back to [facility] on Monday    Diarrhea prior to admission.  Suspect she had mild infectious gastroenteritis which is now resolved.  No recurrence   Leukocytosis and elevated procalcitonin.   Appreciate ID input.  Antibiotics discontinued as no clear source of infection found  JESUS on CKD.  JESUS is likely prerenal.  Improved and now back to baseline.  Continue to monitor intermittently  Hypernatremia.  Due to poor oral intake.  Improved on D5 at 50 mL/h.  Encourage water intake  Acute metabolic encephalopathy in the setting of known dementia.  Seems to be back to baseline.  Moderate to severe low-flow low gradient aortic stenosis.  Patient is being evaluated for TAVR  Chronic HFrEF.  Severe cardiomyopathy LVEF 30 to 35%.  Appears euvolemic.  Cardiology was planning dobutamine stress test as outpatient          Diet: Low Saturated Fat Na <2400 mg    DVT Prophylaxis: DOAC  Tenorio Catheter: Not present  Lines: None     Cardiac Monitoring: None  Code Status: Full Code      Clinically Significant Risk Factors         # Hypernatremia: Highest Na = 149 mmol/L in last 2 days, will monitor as appropriate  # Hyperchloremia: Highest Cl = 114 mmol/L in last 2 days, will monitor as appropriate              # Hypertension: Noted on problem list  # Chronic heart failure with reduced ejection  fraction: last echo with EF <40%    # Dementia: noted on problem list            # Financial/Environmental Concerns: none   # Pacemaker present       Social Drivers of Health    Food Insecurity: Unknown (12/23/2024)    Food Insecurity     Within the past 12 months, did you worry that your food would run out before you got money to buy more?: Patient unable to answer     Within the past 12 months, did the food you bought just not last and you didn t have money to get more?: Patient unable to answer   Housing Stability: Unknown (12/23/2024)    Housing Stability     Do you have housing? : Patient unable to answer     Are you worried about losing your housing?: Patient unable to answer   Financial Resource Strain: Unknown (12/23/2024)    Financial Resource Strain     Within the past 12 months, have you or your family members you live with been unable to get utilities (heat, electricity) when it was really needed?: Patient unable to answer   Transportation Needs: Unknown (12/23/2024)    Transportation Needs     Within the past 12 months, has lack of transportation kept you from medical appointments, getting your medicines, non-medical meetings or appointments, work, or from getting things that you need?: Patient unable to answer          Disposition Plan     Medically Ready for Discharge: Ready Now             Krystin Hurst MD  Hospitalist Service  [facility]  Securely message with [employer] (more info)  Text page via [employer] Paging/Directory   ______________________________________________________________________    Interval History   No acute issues   Denies any pain   Excited about discharge tomorrow     Physical Exam   Vital Signs: Temp: 98.1  F (36.7  C) Temp src: Oral BP: 128/60 Pulse: 73   Resp: 16 SpO2: 97 % O2 Device: None (Room air)    Weight: 122 lbs 9.21 oz    General Appearance: NAD  Respiratory: Easy respirations, lungs clear   Cardiovascular: RRR. Normal S1S2. 4/6 systolic murmur.  No edema  Other: Alert, grossly nonfocal      Medical Decision Making       36 MINUTES SPENT BY ME on the date of service doing chart review, history, exam, documentation & further activities per the note.  MANAGEMENT DISCUSSED with the following over the past 24 hours: patient and SW       Data     I have personally reviewed the following data over the past 24 hrs:    N/A  \   N/A   / N/A     142 109 (H) 19.3 /  132 (H)   3.8 23 1.10 (H) \       Imaging results reviewed over the past 24 hrs:   No results found for this or any previous visit (from the past 24 hours).

## 2025-04-14 VITALS
BODY MASS INDEX: 20.96 KG/M2 | DIASTOLIC BLOOD PRESSURE: 62 MMHG | HEART RATE: 70 BPM | RESPIRATION RATE: 16 BRPM | SYSTOLIC BLOOD PRESSURE: 120 MMHG | TEMPERATURE: 97.5 F | WEIGHT: 122.14 LBS | OXYGEN SATURATION: 97 %

## 2025-04-14 LAB
HOLD SPECIMEN: NORMAL
POTASSIUM SERPL-SCNC: 4.2 MMOL/L (ref 3.4–5.3)

## 2025-04-14 PROCEDURE — 250N000013 HC RX MED GY IP 250 OP 250 PS 637: Performed by: INTERNAL MEDICINE

## 2025-04-14 PROCEDURE — 99239 HOSP IP/OBS DSCHRG MGMT >30: CPT | Performed by: INTERNAL MEDICINE

## 2025-04-14 PROCEDURE — 36415 COLL VENOUS BLD VENIPUNCTURE: CPT | Performed by: INTERNAL MEDICINE

## 2025-04-14 PROCEDURE — 84132 ASSAY OF SERUM POTASSIUM: CPT | Performed by: INTERNAL MEDICINE

## 2025-04-14 RX ADMIN — APIXABAN 2.5 MG: 2.5 TABLET, FILM COATED ORAL at 09:14

## 2025-04-14 RX ADMIN — ATORVASTATIN CALCIUM 10 MG: 10 TABLET, FILM COATED ORAL at 09:14

## 2025-04-14 RX ADMIN — METOPROLOL SUCCINATE 25 MG: 25 TABLET, EXTENDED RELEASE ORAL at 09:14

## 2025-04-14 ASSESSMENT — ACTIVITIES OF DAILY LIVING (ADL)
ADLS_ACUITY_SCORE: 51
ADLS_ACUITY_SCORE: 51
ADLS_ACUITY_SCORE: 69
ADLS_ACUITY_SCORE: 66
ADLS_ACUITY_SCORE: 69
ADLS_ACUITY_SCORE: 66
ADLS_ACUITY_SCORE: 69
ADLS_ACUITY_SCORE: 66
ADLS_ACUITY_SCORE: 66
ADLS_ACUITY_SCORE: 69
ADLS_ACUITY_SCORE: 69

## 2025-04-14 NOTE — PLAN OF CARE
Goal Outcome Evaluation:             Patients cousin here to take her back to her assisted living. AVS given to cousin and patient

## 2025-04-14 NOTE — PROGRESS NOTES
Care Management Discharge Note    Discharge Date: 04/14/2025       Discharge Disposition:  Back to her [facility] ([facility])    Discharge Services:  none    Discharge DME:  none    Discharge Transportation: Cousin Danish will be here at 1100    Private pay costs discussed: Not applicable    Does the patient's insurance plan have a 3 day qualifying hospital stay waiver?  No    PAS Confirmation Code:    Patient/family educated on Medicare website which has current facility and service quality ratings:      Education Provided on the Discharge Plan:    Persons Notified of Discharge Plans: yes  Patient/Family in Agreement with the Plan:      Handoff Referral Completed: No, handoff not indicated or clinically appropriate    Additional Information:  Pt is adequate for discharge. Discharge orders places and plan is for pt to return to her [facility]. Writer called and spoke to Saumya at the [facility] and they are accepting that pt returns today and asked writer to send orders. Writer then noticed in previous CM note that pt's cousin, Rosana scott will pick her up today at 1100 and bring pt back to her [facility]. Writer then called Rosana Scott to make sure she will still be available to transport pt and she is and will be here at 1100. Nursing notified of transport time. Orders faxed to [facility]. No further CM needs at this time CM will sign off.    Contacts:  [facility]  [phone]  Transport Rosana scott -[phone]  Kaci Oswald RN

## 2025-04-14 NOTE — DISCHARGE SUMMARY
[facility]  Hospitalist Discharge Summary      Date of Admission:  4/10/2025  Date of Discharge:  4/14/2025 11:52 AM  Discharging Provider: Krystin Hurst MD  Discharge Service: Hospitalist Service    Discharge Diagnoses   Acute gastroenteritis  Acute metabolic encephalopathy  JESUS on CKD  Hypernatremia  Dementia  Chronic HFrEF  Moderate to severe low-flow low gradient aortic stenosis    Clinically Significant Risk Factors          Follow-ups Needed After Discharge   Follow-up Appointments       Follow Up and recommended labs and tests      Follow up with primary care provider in 2 weeks  The following labs/tests are recommended: BMP.                Unresulted Labs Ordered in the Past 30 Days of this Admission       Date and Time Order Name Status Description    4/10/2025  2:43 AM Blood Culture Peripheral Blood Preliminary     4/10/2025  2:43 AM Blood Culture Peripheral Blood Preliminary         These results will be followed up by [facility]    Discharge Disposition   Discharged to assisted living  Condition at discharge: Good    Hospital Course   Laney Hahn is an 84-year-old female with PMH of CKD stage IIIb, HFrEF, paroxysmal A-fib, aortic stenosis (undergoing evaluation for TAVR), hypertension, junctional bradycardia s/p PPM, dementia, recent admission for CHF exacerbation who was brought to [facility]  from assisted living facility for evaluation of diarrhea and mental status change.  Patient was found to have mild leukocytosis and elevated procalcitonin.  She was initially on empiric antibiotics however no clear source of bacterial infection was found.  Suspect she probably had mild infectious gastroenteritis prior to admission which is now resolved.    Acute kidney injury was thought to be prerenal in the setting of intravascular volume depletion.  Renal function returned to baseline.  Mild hypernatremia corrected with D5 infusion.  Furosemide was discontinued due to  poor oral intake however volume status needs to be monitored closely.    Patient was discharged back to [facility].    Consultations This Hospital Stay   SURGERY GENERAL IP CONSULT  CARE MANAGEMENT / SOCIAL WORK IP CONSULT  NEPHROLOGY IP CONSULT  INFECTIOUS DISEASES IP CONSULT  PHYSICAL THERAPY ADULT IP CONSULT  OCCUPATIONAL THERAPY ADULT IP CONSULT  CARE MANAGEMENT / SOCIAL WORK IP CONSULT    Code Status   Full Code    Time Spent on this Encounter   I, Krystin Hurst MD, personally saw the patient today and spent greater than 30 minutes discharging this patient.       Krystin Hurst MD  [address] [address]  Phone: [phone]  Fax: [phone]  ______________________________________________________________________    Physical Exam   Vital Signs: Temp: 97.5  F (36.4  C) Temp src: Oral BP: 120/62 Pulse: 70   Resp: 16 SpO2: 97 % O2 Device: None (Room air)    Weight: 122 lbs 2.16 oz       Primary Care Physician   Rose Mcelroy    Discharge Orders      Home Care Referral      Follow Up and recommended labs and tests    Follow up with primary care provider in 2 weeks  The following labs/tests are recommended: BMP.     Reason for your hospital stay    Acute gastroenteritis, acute kidney injury, hypernatremia, acute encephalopathy     Activity - Up with assistive device     Full Code     Diet    Follow this diet upon discharge: Current Diet:Orders Placed This Encounter      Low Saturated Fat Na <2400 mg       Significant Results and Procedures   Most Recent 3 CBC's:  Recent Labs   Lab Test 04/11/25  0655 04/10/25  2131 04/10/25  0734 04/10/25  0140   WBC 6.0  --  13.4* 8.3   HGB 10.4* 11.7 12.5 13.7   MCV 98  --  98 96   *  --  175 189     Most Recent 3 BMP's:  Recent Labs   Lab Test 04/14/25  0749 04/13/25  0728 04/12/25  0656 04/11/25  0655   NA  --  142 149* 145   POTASSIUM 4.2 3.8 4.3 4.1  4.1   CHLORIDE  --  109* 114* 112*   CO2  --  23 23 19*    BUN  --  19.3 33.2* 55.6*   CR  --  1.10* 1.41* 1.79*   ANIONGAP  --  10 12 14   CURT  --  8.7* 9.1 8.9   GLC  --  132* 119* 132*     7-Day Micro Results       Collected Updated Procedure Result Status      04/10/2025 0332 04/14/2025 0916 Blood Culture Peripheral Blood [[id number]]   Peripheral Blood    Preliminary result Component Value   Culture No growth after 4 days  [P]                04/10/2025 0309 04/14/2025 0916 Blood Culture Peripheral Blood [[id number]]   Peripheral Blood    Preliminary result Component Value   Culture No growth after 4 days  [P]                    ,   Results for orders placed or performed during the hospital encounter of 04/10/25   CT Head w/o Contrast    Narrative    EXAM: CT HEAD W/O CONTRAST  LOCATION: [facility]  DATE: 4/10/2025    INDICATION: Altered mental status, history of blood thinners, unknown if fall.  COMPARISON: 03/24/2025.  TECHNIQUE: Routine CT Head without IV contrast. Multiplanar reformats. Dose reduction techniques were used.    FINDINGS:  INTRACRANIAL CONTENTS: No intracranial hemorrhage, extraaxial collection, or mass effect. Chronic lacunar infarctions bilateral basal ganglia and cerebellar hemispheres. Moderate presumed chronic small vessel ischemic changes. Moderate generalized volume   loss. No hydrocephalus.     VISUALIZED ORBITS/SINUSES/MASTOIDS: No intraorbital abnormality. No paranasal sinus mucosal disease. No middle ear or mastoid effusion.    BONES/SOFT TISSUES: No acute abnormality.      Impression    IMPRESSION:  1.  No CT evidence for acute intracranial process.   2.  Brain atrophy and chronic ischemic changes, as above.     CT Cervical Spine w/o Contrast    Narrative    EXAM: CT CERVICAL SPINE W/O CONTRAST  LOCATION: [facility]  DATE: 4/10/2025    INDICATION: Altered mental status, possible fall.  COMPARISON: 12/23/2024.  TECHNIQUE: Routine CT Cervical Spine without IV contrast. Multiplanar  reformats. Dose reduction techniques were used.    FINDINGS:  VERTEBRA: Straightening of cervical curvature with mild anterior spondylolisthesis at C3-C4 and C7-T1. Otherwise, normal alignment. Normal vertebral body heights. Severe disc degeneration C4-T1. Moderate to severe facet arthropathy C2-C3 and C3-C4. No   fracture or posttraumatic subluxation.     CANAL/FORAMINA: Mild spinal canal stenosis at C3-C4 with mild right foraminal stenosis. Moderate spinal canal stenosis at C4-C5 with severe right foraminal stenosis. Mild to moderate spinal canal stenosis at C5-C6. Moderate spinal canal stenosis at C6-C7   with moderate bilateral foraminal stenoses. Moderate to severe left foraminal stenosis at C7-T1.    PARASPINAL: Partially visualized internal cardiac leads on the left. No pneumothorax.      Impression    IMPRESSION:  1.  No fracture or posttraumatic subluxation.  2.  Advanced spondylosis with spinal canal and foraminal stenoses as detailed.     CT Abdomen Pelvis w/o Contrast    Narrative    EXAM: CT ABDOMEN PELVIS W/O CONTRAST  LOCATION: [facility]  DATE: 4/10/2025    INDICATION: Abdominal distention, bloating, diarrhea, altered mental status  COMPARISON: 3/24/2025  TECHNIQUE: CT scan of the abdomen and pelvis was performed without IV contrast. Multiplanar reformats were obtained. Dose reduction techniques were used.  CONTRAST: None.    FINDINGS:   LOWER CHEST: Dependent atelectasis in the posterior lung bases.    HEPATOBILIARY: No significant mass or bile duct dilatation. No calcified gallstones. Gallbladder is distended.    PANCREAS: Diffuse fatty infiltration and atrophy of the pancreas. No significant mass, duct dilatation, or inflammatory change.    SPLEEN: Normal size.    ADRENAL GLANDS: Normal.    KIDNEYS/BLADDER: Moderate left renal atrophy. No significant stones, mass, or hydronephrosis. No hydroureter. No stones are seen along the courses of the ureters. Urinary bladder  unremarkable.    BOWEL: Nonspecific nonobstructive bowel gas pattern. Numerous colonic diverticula without evidence for diverticulitis. Appendix within normal limits.    LYMPH NODES: No lymphadenopathy.    VASCULATURE: No abdominal aortic aneurysm. Moderate vascular calcifications abdominal aorta and common iliac arteries    PELVIC ORGANS: No pelvic masses or free fluid. Midline uterus.    MUSCULOSKELETAL: Mild spondylosis. No inguinal hernias. No ventral hernia.      Impression    IMPRESSION:   1.  No acute findings to explain patient's symptoms.  2.  Colonic diverticulosis without diverticulitis.  3.  Moderate left renal atrophy.       US Abdomen Limited (RUQ)    Narrative    EXAM: US ABDOMEN LIMITED  LOCATION: [facility]  DATE: 4/10/2025    INDICATION: Right upper quadrant tenderness, CT unremarkable  COMPARISON: None.  TECHNIQUE: Limited abdominal ultrasound.    FINDINGS:    GALLBLADDER: Normal. No gallstones, wall thickening, or pericholecystic fluid. Negative sonographic Wu's sign.    BILE DUCTS: No intrahepatic biliary dilatation seen. Extrahepatic biliary dilatation with the common duct measures 12 mm.    LIVER: Normal parenchyma with smooth contour. No focal mass. The portal vein is patent with flow in the normal direction.    RIGHT KIDNEY: No hydronephrosis.    PANCREAS: Bowel gas obscures portions of pancreas. The visualized portions are normal.    No ascites.      Impression    IMPRESSION:  1.  Extrahepatic biliary dilatation with the common duct measuring 12 mm. No intrahepatic biliary dilatation seen. Recommend correlation with liver function tests. If there is clinical concern for biliary obstruction, MRCP could be considered.  2.  No gallstones or evidence of cholecystitis.       CT Chest w/o Contrast    Narrative    EXAM: CT CHEST W/O CONTRAST  LOCATION: [facility]  DATE: 4/10/2025    INDICATION: Sepsis.  COMPARISON: Same day CT abdomen/pelvis;  CT thoracic spine 7/22/2022.  TECHNIQUE: CT chest without IV contrast. Multiplanar reformats were obtained. Dose reduction techniques were used.  CONTRAST: None.    FINDINGS: Absence of intravenous contrast limits the sensitivity of this examination for detection of infectious/inflammatory change, post traumatic abnormalities, vascular abnormalities, and visceral lesions. Study is additionally degraded by motion.    LUNGS AND PLEURA: Mild diffuse bronchial wall thickening. ML dependent atelectatic change. Mosaic attenuation of the pulmonary parenchyma, consistent with small vessels or small airways disease. No discrete airspace consolidation.    Suspicious pulmonary nodule. Tiny left lower lobe calcified granuloma.    No pneumothorax.    No pleural effusion.     MEDIASTINUM/AXILLAE: Mildly enlarged right paratracheal lymph nodes, stable from 7/22/2022.      Thoracic esophagus is unremarkable.      No axillary lymphadenopathy.    Left chest wall cardiac implantable electronic device.    No thoracic aortic aneurysm. Mild to moderate atherosclerotic calcifications.    Mild cardiomegaly. Dense calcifications of the mitral annulus. Additional calcifications of the aortic valve. No pericardial effusion.    CORONARY ARTERY CALCIFICATION: Moderate.    UPPER ABDOMEN: Mild distention of the gallbladder and mild extrahepatic biliary ductal dilatation; correlation with LFTs is recommended to exclude a cholestatic process. Diffuse fatty infiltration of the pancreas. Gastric postsurgical change    MUSCULOSKELETAL: No suspicious abnormality.    OTHER: No additionally suspicious abnormality.      Impression    IMPRESSION:  1.  No acute CT abnormality of the chest, within limitation of the noncontrast technique.  2.  Mild distention of the gallbladder and mild extrahepatic biliary ductal dilatation; correlation with LFTs is recommended to exclude a cholestatic process.       Discharge Medications   Discharge Medication List as of  4/14/2025 11:06 AM        CONTINUE these medications which have NOT CHANGED    Details   acetaminophen (TYLENOL) 500 MG tablet Take 1,000 mg by mouth every 8 hours as needed for pain., Historical      apixaban ANTICOAGULANT (ELIQUIS ANTICOAGULANT) 2.5 MG tablet Take 1 tablet (2.5 mg) by mouth 2 times daily. Due for appointment with Rose Mcelroy, Disp-60 tablet, R-1, E-Prescribe      atorvastatin (LIPITOR) 10 MG tablet Take 1 tablet (10 mg) by mouth every morning., Disp-30 tablet, R-2, E-Prescribe      metoprolol succinate ER (TOPROL XL) 50 MG 24 hr tablet Take 1 tablet (50 mg) by mouth daily, Disp-30 tablet, R-2, E-Prescribe      naloxone (NARCAN) 4 MG/0.1ML nasal spray Spray 4 mg into one nostril alternating nostrils as needed for opioid reversal. every 2-3 minutes until assistance arrives, Historical           STOP taking these medications       torsemide (DEMADEX) 20 MG tablet Comments:   Reason for Stopping:             Allergies   No Known Allergies

## 2025-04-14 NOTE — PLAN OF CARE
Problem: Adult Inpatient Plan of Care  Goal: Optimal Comfort and Wellbeing  Outcome: Progressing  Intervention: Provide Person-Centered Care  Recent Flowsheet Documentation  Taken 4/13/2025 2045 by Gen Masters RN  Trust Relationship/Rapport:   care explained   choices provided     Problem: Delirium  Goal: Improved Behavioral Control  Outcome: Progressing  Intervention: Prevent and Manage Agitation  Recent Flowsheet Documentation  Taken 4/13/2025 2045 by Gen Masters RN  Environment Familiarity/Consistency: daily routine followed  Intervention: Minimize Safety Risk  Recent Flowsheet Documentation  Taken 4/13/2025 2045 by Gen Masters, RN  Enhanced Safety Measures:   pain management   review medications for side effects with activity  Trust Relationship/Rapport:   care explained   choices provided  Goal: Improved Sleep  Outcome: Progressing   Goal Outcome Evaluation:    Pt is pleasant and A/O. SBA. On RA. Can make her needs know. Doesn't use the call light; frequent rounds done. Denies pain. L PIV was placed yesterday. Sleeps between cares. VSS. Call light w/in reach. Bed alarm on ( she get up to use the restroom without calling)                     (V5) oriented

## 2025-04-14 NOTE — PLAN OF CARE
Physical Therapy Discharge Summary    Reason for therapy discharge:    Discharged to home.    Progress towards therapy goal(s). See goals on Care Plan in [facility] electronic health record for goal details.  Goals partially met.  Barriers to achieving goals:   discharge from facility.    Therapy recommendation(s):    Recommend continued therapies to improve overall strength, balance and mobility.       Peyton Romero, PT, DPT  4/14/2025

## 2025-04-14 NOTE — PLAN OF CARE
Occupational Therapy Discharge Summary    Reason for therapy discharge:    Discharged to home.    Progress towards therapy goal(s). See goals on Care Plan in [facility] electronic health record for goal details.  Goals partially met.  Barriers to achieving goals:   discharge from facility.    Therapy recommendation(s):    No further therapy is recommended.    Peyton Maki, OTR/L 4/14/25

## 2025-04-14 NOTE — DISCHARGE INSTRUCTIONS
A referral has been made for you to [facility] for Physical Therapy.  They should call you to arrange the first visit but, if you have questions or concerns, you can call them at [phone]. Thank you.

## 2025-04-15 ENCOUNTER — TELEPHONE (OUTPATIENT)
Dept: INTERNAL MEDICINE | Facility: CLINIC | Age: 85
End: 2025-04-15
Payer: COMMERCIAL

## 2025-04-15 ENCOUNTER — MEDICAL CORRESPONDENCE (OUTPATIENT)
Dept: HEALTH INFORMATION MANAGEMENT | Facility: CLINIC | Age: 85
End: 2025-04-15
Payer: COMMERCIAL

## 2025-04-15 LAB
BACTERIA BLD CULT: NO GROWTH
BACTERIA BLD CULT: NO GROWTH

## 2025-04-15 NOTE — TELEPHONE ENCOUNTER
Home Care is calling regarding an established patient with [facility].  Requesting orders from: Rose Mcelroy  PROVIDER AUTHORIZATION REQUIRED: RN unable to provide verbal approval for all orders.  See below for additional information.  RN will contact Home care with information after provider review.  Is this a request for a temporary pause in the home care episode?  No    Orders Requested    Physical Therapy  Request for initial certification (first set of orders)   Frequency: 1x/week for 5 weeks diagnosis of CHF  RN gave verbal order: No: HC needs provider confirmation they will follow patient      Occupational Therapy  Request for initial certification (first set of orders)   Frequency: 1x/week for 4 weeks diagnosis of CHF  RN gave verbal order: No: HC states needs provider confirmation they will follow      Phone number Home Care can be reached at: [phone]  Okay to leave a detailed message?: Yes    Contacts       Contact Date/Time Type Contact Phone/Fax    04/15/2025 04:32 PM CDT Phone (Incoming) Ashley (Home Care) [phone]     [facility]          Bindu Workman RN    Primamry Diuagnosis of CHF

## 2025-04-15 NOTE — DISCHARGE SUMMARY
[facility]  Hospitalist Discharge Summary      Date of Admission:  4/3/2025  Date of Discharge:  4/9/2025  2:18 PM  Discharging Provider: Lucian Saleh MD  Discharge Service: Hospitalist Service    Discharge Diagnoses       Laney Hahn is a 84 year old female with PMH CKD III, PAF, hypertension, pacemaker, hyperlipidemia, dementia, aortic stenosis, and HFrEF is admitted on 4/3/2025 for planned elective coronary angiogram and right heart catheterization for pre-TAVR workup.  Patient admitted for IV diuresis.Workup for pre TAVR will resume once heart failure improves. [facility] [place] to assist with medical management.              4/9 :     Medically ready  Discharge today            A/p :      S/p coronary angiogram  Severe aortic stenosis-pre TAVR workup  -Findings of angiogram-No angiographic evidence of obstructive coronary artery disease.  Severely elevated right and left-sided filling pressures.  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  Low flow, low gradient moderate to severe aortic valve stenosis  -Patient wrist and groin sites are CDI without hematomas. Patient denies numbness or tingling to extremities. CMS intact.   -PTA apixaban  -Continuous cardiac monitoring  -cardiologist and Structural Interventional Cardiology following   Plan for outpatient follow up     HFrEF  -Echocardiogram 12/23/2024-EF 30-35%.  Severe low-flow, low gradient aortic stenosis  -IV diuresis with Lasix per cardiology  -PTA metoprolol  Lasix changed to torsemide, on metoprolol, lipitor, eliquis      CKD stage 3a  :Creatinine  1.54--1.66--1.35, resumed oral lasix       Hyperlipidemia  -PTA atorvastatin     Dementia  -1:1 monitoring as patient is impulsive, but redirectable   Stable now               Clinically Significant Risk Factors          Follow-ups Needed After Discharge   Follow-up Appointments       Hospital Follow-up  with Existing Primary Care Provider (PCP)          Schedule Primary Care visit within: 7 Days   Recommended labs and Imaging (to be ordered by Primary Care Provider): bmp       Follow Up      Follow up with  cardiology clinic and valve clinic - cardiology       As advised            {Additional follow-up instructions/to-do's for PCP    : none     Unresulted Labs Ordered in the Past 30 Days of this Admission       No orders found from 3/4/2025 to 4/4/2025.        These results will be followed up by pcp    Discharge Disposition   Discharged to home  Condition at discharge: Stable      Consultations This Hospital Stay   HOSPITALIST IP CONSULT  CARDIOLOGY IP CONSULT  NUTRITION SERVICES ADULT IP CONSULT  CARE MANAGEMENT / SOCIAL WORK IP CONSULT  PHYSICAL THERAPY ADULT IP CONSULT  OCCUPATIONAL THERAPY ADULT IP CONSULT    Code Status   Full Code    Time Spent on this Encounter   I, Lucian Saleh MD, personally saw the patient today and spent greater than 30 minutes discharging this patient.       Lucian Saleh MD  [facility]  [address] [place] [address]  Phone: [phone]  Fax: [phone]  ______________________________________________________________________    Physical Exam   Vital Signs:                    Weight: 119 lbs 0 oz       GENERAL: The patient is not in any acute distressed. Awake and alert.  HEENT: Nonicteric sclerae, PERRLA, EOMI. Oropharynx clear. Moist mucous membranes. Conjunctivae appear well perfused.  HEART: Regular rate and rhythm without murmurs.  LUNGS: Clear to auscultation bilaterally. No wheezing or crackles.  ABDOMEN: Soft, positive bowel sounds, nontender.  SKIN: No rash, no excessive bruising, petechiae, or purpura.  EXTREMITIES : no rashes, no swelling in legs.  NEUROLOGIC: conscious and oriented, follows commands, no obvious focal deficits.  ROS: All other systems negative          Primary Care Physician   Rose Mcelroy    Discharge  Orders      Basic metabolic panel     Primary Care - Care Coordination Referral      Follow-Up with Cardiology JOSE Heart Failure Discharge      Reason for your hospital stay    sob     Activity    Your activity upon discharge: activity as tolerated     Follow Up    Follow up with  cardiology clinic and valve clinic - cardiology       As advised     Diet    Follow this diet upon discharge: Current Diet:Orders Placed This Encounter      Regular Diet Adult     Hospital Follow-up with Existing Primary Care Provider (PCP)            Significant Results and Procedures   Most Recent 3 CBC's:  Recent Labs   Lab Test 04/11/25  0655 04/10/25  2131 04/10/25  0734 04/10/25  0140   WBC 6.0  --  13.4* 8.3   HGB 10.4* 11.7 12.5 13.7   MCV 98  --  98 96   *  --  175 189     Most Recent 3 BMP's:  Recent Labs   Lab Test 04/14/25  0749 04/13/25  0728 04/12/25  0656 04/11/25  0655   NA  --  142 149* 145   POTASSIUM 4.2 3.8 4.3 4.1  4.1   CHLORIDE  --  109* 114* 112*   CO2  --  23 23 19*   BUN  --  19.3 33.2* 55.6*   CR  --  1.10* 1.41* 1.79*   ANIONGAP  --  10 12 14   CURT  --  8.7* 9.1 8.9   GLC  --  132* 119* 132*     Most Recent 2 LFT's:  Recent Labs   Lab Test 04/10/25  0734 04/10/25  0140   AST 69* 73*   ALT 45 38   ALKPHOS 96 127   BILITOTAL 1.0 1.2     Most Recent 3 INR's:  Recent Labs   Lab Test 04/10/25  0140 03/24/25  1433 07/22/22  1917   INR 1.31* 1.15 1.23*       Discharge Medications   Discharge Medication List as of 4/9/2025  1:23 PM        CONTINUE these medications which have CHANGED    Details   torsemide (DEMADEX) 20 MG tablet Take 1 tablet (20 mg) by mouth daily., Disp-60 tablet, R-1, Local Print           CONTINUE these medications which have NOT CHANGED    Details   acetaminophen (TYLENOL) 500 MG tablet Take 1,000 mg by mouth every 8 hours as needed for pain., Historical      apixaban ANTICOAGULANT (ELIQUIS ANTICOAGULANT) 2.5 MG tablet Take 1 tablet (2.5 mg) by mouth 2 times daily. Due for appointment with  Rose Mcelroy, Disp-60 tablet, R-1, E-Prescribe      atorvastatin (LIPITOR) 10 MG tablet Take 1 tablet (10 mg) by mouth every morning., Disp-30 tablet, R-2, E-Prescribe      metoprolol succinate ER (TOPROL XL) 50 MG 24 hr tablet Take 1 tablet (50 mg) by mouth daily, Disp-30 tablet, R-2, E-Prescribe      naloxone (NARCAN) 4 MG/0.1ML nasal spray Spray 4 mg into one nostril alternating nostrils as needed for opioid reversal. every 2-3 minutes until assistance arrives, Historical           STOP taking these medications       furosemide (LASIX) 20 MG tablet Comments:   Reason for Stopping:             Allergies   No Known Allergies

## 2025-04-16 LAB
ATRIAL RATE - MUSE: 90 BPM
DIASTOLIC BLOOD PRESSURE - MUSE: 65 MMHG
INTERPRETATION ECG - MUSE: NORMAL
P AXIS - MUSE: 68 DEGREES
PR INTERVAL - MUSE: 198 MS
QRS DURATION - MUSE: 140 MS
QT - MUSE: 436 MS
QTC - MUSE: 533 MS
R AXIS - MUSE: 97 DEGREES
SYSTOLIC BLOOD PRESSURE - MUSE: 123 MMHG
T AXIS - MUSE: -63 DEGREES
VENTRICULAR RATE- MUSE: 90 BPM

## 2025-04-20 NOTE — PROGRESS NOTES
[facility]  [address], [place] [address]  Office: [phone]     Assessment/Recommendations   Assessment: Ms. Hahn presents to [facility] today for post hospitalization heart failure focused visit.    # Acute on chronic systolic heart failure/HFrEF (EF 30-35% per TTE 4/9/2025)  # NICM  Stage C. NYHA Class II.  Her weight on the clinic scale today is 126 lbs. Upon exam, patient is well-compensated.    -Fluid status: near euvolemic; Loop diuretic: none, no KCL supp  -ACEi/ARB/ARNi/afterload reduction: Lisinopril stopped 2018 for symptomatic soft blood pressure/syncopal episode  -BB: metoprolol succinate 50 mg   -Aldosterone antagonist: deferred while other medical therapy is prioritized  -SGLT2i: deferred while other medical therapy is prioritized  -Ischemia evaluation: No angiographic evidence of obstructive coronary artery disease (4/2025)  -SCD prophylaxis: decision deferred during medication uptitration  -NSAID use: contraindicated  -Sleep apnea evaluation: not discussed today   Remote PA Pressure Monitoring (CardioMems) Deferred while medical therapy is optimized  Cardiac rehab: not discussed today    Heart failure education including medication compliance and lifestyle management reviewed today: low sodium diet <2g Na/day, adequate fluid intake ~goal 50-60 oz/<2L/day, daily weight monitoring, and physical activity as tolerated. Additionally, patient met with the HF C.O.R.E nurse clinician, RAHEEL Luna for in depth heart failure teaching.    # Hypertension  -BP today 128/68  -GDMT as outlined above, uptitrate as tolerated    # Valvular heart disease  # Aortic stenosis  -TAVR work-up in process    # Coronary artery disease  # Dyslipidemia  -No angiographic evidence of obstructive coronary artery disease (4/2025)  -Denies chest pain and anginal equivalents  -Not on antiplatelet therapy while on anticoag  -LDL goal <70; moderate  intensity statin therapy with atorvastatin for secondary ASCVD prevention    # Sinus node dysfunction   -S/p PPM(4/2021)    # CKD IIIa-b (eGFR ~35-59)  -Baseline Cr ~1.1-1.3. Most recently Cr 1.10 (4/13/2025 discharge from hospital)  -Recheck BMP today    # Dementia  -Assisted-living      Plan:  Labs today- BMP, Mg, NtproBNP  Encouraged daily weights, fax orders to [facility]    -[facility] hospital follow-up- scheduled 4/24/2025  -Patient is still deciding about proceeding  with TAVR- follow-up phone call with valve clinic- anticipated this Friday or early next week  -Follow-up with general cardiology, Dr. Diallo ~July 2025  -Follow-up in the heart failure clinic with JOSE as needed    The longitudinal plan of care for the condition(s) below were addressed during this visit. Due to the added complexity in care, I will continue to support Ms. Hahn in the subsequent management of this condition(s) and with the ongoing continuity of care of this condition(s): HFrEF.      History of Present Illness/Subjective    Ms. Hahn is a 84 year old female with a past medical history significant for HFrEF, CKD IIIb, pAfib, aortic stenosis, HTN, junctional bradycardia s/p PPM, and dementia. Today patient presents to [facility] for post hospitalization heart failure focused visit.    Primary Cardiologist: Dr. Diallo; Last office visit 1/20/2025.     Hospitalization, [facility] 4/3-4/9/2025. Admitted for planned elective coronary angiogram and RHC for pre-TAVR work-up. Admitted for IV diuresis. Work-up for TAVR to resume once heart failure improves. She was discharge on torsemide 20 mg. Discharge from hospital weight was 119 lbs.     Hospitalization, [facility] 4/10-4/14/2025. Presented from [facility] for further evaulation of diarrhea and mental status change. Patient was found to have mild leukocytosis and elevated procalcitonin. She was initially on empiric antibiotics however no clear source of  bacterial infection was found. It was suspected that she probably had mild infectious gastroenteritis prior to admission which is now resolved. JESUS thought to be prerenal in the setting of intravascular volume depletion. Renal function returned to baseline. Mild hypernatremia corrected with D5 infusion. Torsemide was discontinued due to poor oral intake however volume status needs to be monitored closely. Patient was discharged back to [facility]. Discharge from hospital weight was 122 lbs.      Today, patient is accompanied by her close friend, Gabriella. Patient is poor historian. She endorses fatigue/activity intolerance that is unchanged. She denies chest pain, palpitations, lightheadedness/dizziness, presyncope, syncope, shortness of breath, MOSELEY, orthopnea, PND, abdominal fullness/bloating, LE edema, and bleeding. Mostly her questions today are about TAVR today and if she should proceed with this procedure.     She lives with brother in [facility]. Patient not able to report whether she follows low sodium diet or quantify fluids per day. She does not like water. She drinks mostly soda.       Recent test results & labs below (personally reviewed):    TTE 4/9/2025  Interpretation Summary  1. The left ventricle is normal in size. Left ventricular systolic performance  is moderately reduced. The estimated ejection fraction is 30-35%.  2. There is moderate global reduction in left ventricular systolic  performance.  3. There is abnormal septal motion consistent with ventricular paced rhythm.  4. There is suspected severe low-flow, low-gradient (LF-LG).  Â  The mean gradient is measured at 24 mmHg with peak velocity of 3.1 m/s. The  calculated valve area is0.84-0.85 cmÂ . Dimensionless index is 0.31.  5. There is trace aortic insufficiency.  6. There is mild, to perhaps mild-moderate, mitral insufficiency.  7. There is moderate calcific mitral stenosis.  8. Normal right ventricular size and systolic performance.  9. There is  "moderate left atrial enlargement.  When compared to the prior real-time echocardiogram dated 24 December 2024,  the mean gradient across the aortic valve measures slightly higher on the  current study (previously 21 mmHg and currently 24 mmHg). The findings are  otherwise felt to be fairly similar on both examinations.    Coronary angiogram 4/3/2025  CONCLUSIONS:   No angiographic evidence of obstructive coronary artery disease.  Severely elevated right and left-sided filling pressures.  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  Low flow, low gradient moderate to severe aortic valve stenosis (mean gradient 9 mmHg, calculated valve area of 0.7 cm ).  RECOMMENDATIONS:   Postprocedure cares, please see orders.  Will admit for IV diuresis and heart failure optimization.  Continue workup for aortic valve stenosis as planned including dobutamine stress echocardiogram once heart failure improved.    Cardiac device check 1/20/2025  Encounter Type: routine remote pacemaker transmission.   Device: Medtronic Gilboa.   Pacing % /Programmed: AP 71%,  90% at DDDR 70/120 ppm.   Lead(s): stable.   Battery longevity: 7yrs, 6mo estimated.   Presenting: A-V paced 70 bpm.   Atrial high rates: since 12/24/24; total of 6 seconds of AT/AF noted.   Anticoagulant: Eliquis.   Ventricular High rates: since 12/24/24; none detected.   Comments: Normal device function.   Plan: Patient due for annual device check in April 2025. Reminder letter sent. KYREE Rodriguez, Device Specialist     Physical Examination Review of Systems   /68 (BP Location: Left arm, Patient Position: Sitting, Cuff Size: Adult Small)   Pulse 90   Resp 18   Ht 1.626 m (5' 4\")   Wt 57.2 kg (126 lb)   LMP  (LMP Unknown)   BMI 21.63 kg/m    Body mass index is 21.63 kg/m .  Wt Readings from Last 3 Encounters:   04/22/25 57.2 kg (126 lb)   04/14/25 55.4 kg (122 lb 2.2 oz)   04/09/25 54 kg (119 lb) "     General Appearance:   Appears comfortable, in no acute distress   HEENT: Eyes symmetrical, no discharge or icterus bilaterally. Mucous membranes moist and without lesions   Cardiovascular: RRR, +S1S2, +murmur, rub, or gallop. JVP not visible at 90 degrees      Respiratory:   Respirations regular, even, and unlabored. Lungs CTA throughout   GI:  Soft and non distended   Musculoskeletal: No joint swelling or tenderness   Extremities   No cyanosis. no peripheral edema.   Skin: Warm, no xanthelasma, no jaundice, no rashes or lesions    Neurologic: Alert to self, time, place. Disoriented to  situation. Forgetful. no focal deficits   Psychiatric: calm and cooperative                                             Negative unless noted in HPI     Medical History  Surgical History Family History Social History   Past Medical History:   Diagnosis Date    COVID[facility]14/2020    [facility]     Dyslipidemia, goal LDL below 70 09/15/2020    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 Nini Rasmussen    Metabolic encephalopathy     Moderate aortic valve stenosis 08/22/2017    stable on 2020 echo    Nonobstructive atherosclerosis of coronary artery 09/15/2020    with normal LVEDP    Paroxysmal SVT (supraventricular tachycardia) 09/07/2017    said to have short episodes while hospitalized for UTI per Dr. Rhys Mensah    Syncope 08/22/2017    UTI (urinary tract infection) 08/21/2017    hospitalized with weakness    Past Surgical History:   Procedure Laterality Date    CATARACT EXTRACTION, BILATERAL      CV CORONARY ANGIOGRAM N/A 9/15/2020    Procedure: Coronary Angiogram;  Surgeon: Radhika Santa MD;  Location: [facility] Cath Lab;  Service: Cardiology    CV CORONARY ANGIOGRAM N/A 4/3/2025    Procedure: Coronary Angiogram;  Surgeon: Tyrese Dillon MD;   Location: [facility] CATH LAB CV    CV LEFT HEART CATH N/A 4/3/2025    Procedure: Left Heart Catheterization;  Surgeon: Tyrese Dillon MD;  Location: [facility] LAB CV    CV LEFT HEART CATHETERIZATION WITHOUT LEFT VENTRICULOGRAM Left 9/15/2020    Procedure: Left Heart Catheterization Without Left Ventriculogram;  Surgeon: Radhika Santa MD;  Location: [facility] Cath Lab;  Service: Cardiology    CV RIGHT HEART CATH MEASUREMENTS RECORDED N/A 4/3/2025    Procedure: Right Heart Catheterization with invasive aortic vave gradient study;  Surgeon: Tyrese Dillon MD;  Location: [facility] LAB CV    EP PACEMAKER INSERT N/A 4/13/2021    Procedure: EP Pacemaker Insertion;  Surgeon: Frederic Burgos MD;  Location: [facility] Cardiac Cath Lab;  Service: Cardiology    HYSTERECTOMY      PARTIAL GASTRECTOMY  1969    for ulcers    TONSILLECTOMY      Family History   Adopted: Yes    Social History     Socioeconomic History    Marital status: Single     Spouse name: Not on file    Number of children: 0    Years of education: Not on file    Highest education level: Not on file   Occupational History    Not on file   Tobacco Use    Smoking status: Never    Smokeless tobacco: Never   Vaping Use    Vaping status: Never Used   Substance and Sexual Activity    Alcohol use: No    Drug use: No    Sexual activity: Not on file   Other Topics Concern    Not on file   Social History Narrative    Her adopted brother, Jeff, lives with her. He is five years younger than her.     Social Drivers of Health     Financial Resource Strain: Low Risk  (4/14/2025)    Financial Resource Strain     Within the past 12 months, have you or your family members you live with been unable to get utilities (heat, electricity) when it was really needed?: No   Food Insecurity: Low Risk  (4/14/2025)    Food Insecurity     Within the past 12 months, did you worry that your food would run out before you got money to buy more?: No     Within the past 12  months, did the food you bought just not last and you didn t have money to get more?: No   Transportation Needs: Low Risk  (4/14/2025)    Transportation Needs     Within the past 12 months, has lack of transportation kept you from medical appointments, getting your medicines, non-medical meetings or appointments, work, or from getting things that you need?: No   Physical Activity: Not on file   Stress: Not on file   Social Connections: Not on file   Interpersonal Safety: Low Risk  (4/11/2025)    Interpersonal Safety     Do you feel physically and emotionally safe where you currently live?: Yes     Within the past 12 months, have you been hit, slapped, kicked or otherwise physically hurt by someone?: No     Within the past 12 months, have you been humiliated or emotionally abused in other ways by your partner or ex-partner?: No   Housing Stability: Low Risk  (4/14/2025)    Housing Stability     Do you have housing? : Yes     Are you worried about losing your housing?: No        Medications  Allergies   Current Outpatient Medications   Medication Sig Dispense Refill    acetaminophen (TYLENOL) 500 MG tablet Take 1,000 mg by mouth every 8 hours as needed for pain.      apixaban ANTICOAGULANT (ELIQUIS ANTICOAGULANT) 2.5 MG tablet Take 1 tablet (2.5 mg) by mouth 2 times daily. Due for appointment with Rose Mcelroy 60 tablet 1    atorvastatin (LIPITOR) 10 MG tablet Take 1 tablet (10 mg) by mouth every morning. 30 tablet 2    metoprolol succinate ER (TOPROL XL) 50 MG 24 hr tablet Take 1 tablet (50 mg) by mouth daily 30 tablet 2    torsemide (DEMADEX) 20 MG tablet Take 20 mg by mouth daily.      naloxone (NARCAN) 4 MG/0.1ML nasal spray Spray 4 mg into one nostril alternating nostrils as needed for opioid reversal. every 2-3 minutes until assistance arrives (Patient not taking: Reported on 4/22/2025)      No Known Allergies      Lab Results    Chemistry/lipid CBC Cardiac Enzymes/BNP/TSH/INR   Lab Results   Component Value Date     CHOL 137 2023    HDL 42 (L) 2023    TRIG 164 (H) 2023    BUN 18.2 2025     2025    CO2 21 (L) 2025    Lab Results   Component Value Date    WBC 6.0 2025    HGB 10.4 (L) 2025    HCT 31.4 (L) 2025    MCV 98 2025     (L) 2025    Lab Results   Component Value Date    TROPONINI 0.02 2022    BNP 1,170 (H) 06/10/2021    TSH 1.53 2023    INR 1.31 (H) 04/10/2025            Martha Fowler, DNP, APRN, CNP  [facility]  Clinic and [phone]  Fax: [phone]  Heart Failure Nurses: [phone]

## 2025-04-22 ENCOUNTER — TELEPHONE (OUTPATIENT)
Dept: CARDIOLOGY | Facility: CLINIC | Age: 85
End: 2025-04-22

## 2025-04-22 ENCOUNTER — APPOINTMENT (OUTPATIENT)
Dept: CARDIOLOGY | Facility: CLINIC | Age: 85
End: 2025-04-22
Payer: COMMERCIAL

## 2025-04-22 ENCOUNTER — OFFICE VISIT (OUTPATIENT)
Dept: CARDIOLOGY | Facility: CLINIC | Age: 85
End: 2025-04-22
Payer: COMMERCIAL

## 2025-04-22 VITALS
DIASTOLIC BLOOD PRESSURE: 68 MMHG | BODY MASS INDEX: 21.51 KG/M2 | HEIGHT: 64 IN | SYSTOLIC BLOOD PRESSURE: 128 MMHG | RESPIRATION RATE: 18 BRPM | HEART RATE: 90 BPM | WEIGHT: 126 LBS

## 2025-04-22 DIAGNOSIS — Z95.0 CARDIAC PACEMAKER IN SITU: ICD-10-CM

## 2025-04-22 DIAGNOSIS — I50.33 ACUTE ON CHRONIC HEART FAILURE WITH PRESERVED EJECTION FRACTION (H): Primary | ICD-10-CM

## 2025-04-22 DIAGNOSIS — F03.90 DEMENTIA, UNSPECIFIED DEMENTIA SEVERITY, UNSPECIFIED DEMENTIA TYPE, UNSPECIFIED WHETHER BEHAVIORAL, PSYCHOTIC, OR MOOD DISTURBANCE OR ANXIETY (H): ICD-10-CM

## 2025-04-22 DIAGNOSIS — I42.8 NICM (NONISCHEMIC CARDIOMYOPATHY) (H): ICD-10-CM

## 2025-04-22 DIAGNOSIS — I49.5 SINUS NODE DYSFUNCTION (H): ICD-10-CM

## 2025-04-22 DIAGNOSIS — I10 ESSENTIAL HYPERTENSION: ICD-10-CM

## 2025-04-22 DIAGNOSIS — N18.31 STAGE 3A CHRONIC KIDNEY DISEASE (H): ICD-10-CM

## 2025-04-22 DIAGNOSIS — I50.9 ACUTE DECOMPENSATED HEART FAILURE (H): ICD-10-CM

## 2025-04-22 DIAGNOSIS — I38 VALVULAR HEART DISEASE: ICD-10-CM

## 2025-04-22 DIAGNOSIS — I35.0 AORTIC VALVE STENOSIS, ETIOLOGY OF CARDIAC VALVE DISEASE UNSPECIFIED: ICD-10-CM

## 2025-04-22 LAB
ANION GAP SERPL CALCULATED.3IONS-SCNC: 13 MMOL/L (ref 7–15)
BUN SERPL-MCNC: 18.2 MG/DL (ref 8–23)
CALCIUM SERPL-MCNC: 9 MG/DL (ref 8.8–10.4)
CHLORIDE SERPL-SCNC: 109 MMOL/L (ref 98–107)
CREAT SERPL-MCNC: 1.05 MG/DL (ref 0.51–0.95)
EGFRCR SERPLBLD CKD-EPI 2021: 52 ML/MIN/1.73M2
GLUCOSE SERPL-MCNC: 98 MG/DL (ref 70–99)
HCO3 SERPL-SCNC: 21 MMOL/L (ref 22–29)
MAGNESIUM SERPL-MCNC: 2 MG/DL (ref 1.7–2.3)
NT-PROBNP SERPL-MCNC: 4026 PG/ML (ref 0–1800)
POTASSIUM SERPL-SCNC: 4.7 MMOL/L (ref 3.4–5.3)
SODIUM SERPL-SCNC: 143 MMOL/L (ref 135–145)

## 2025-04-22 PROCEDURE — 80048 BASIC METABOLIC PNL TOTAL CA: CPT | Performed by: NURSE PRACTITIONER

## 2025-04-22 PROCEDURE — G2211 COMPLEX E/M VISIT ADD ON: HCPCS | Performed by: NURSE PRACTITIONER

## 2025-04-22 PROCEDURE — 3078F DIAST BP <80 MM HG: CPT | Performed by: NURSE PRACTITIONER

## 2025-04-22 PROCEDURE — 36415 COLL VENOUS BLD VENIPUNCTURE: CPT | Performed by: NURSE PRACTITIONER

## 2025-04-22 PROCEDURE — 83880 ASSAY OF NATRIURETIC PEPTIDE: CPT | Performed by: NURSE PRACTITIONER

## 2025-04-22 PROCEDURE — 3074F SYST BP LT 130 MM HG: CPT | Performed by: NURSE PRACTITIONER

## 2025-04-22 PROCEDURE — 99214 OFFICE O/P EST MOD 30 MIN: CPT | Performed by: NURSE PRACTITIONER

## 2025-04-22 PROCEDURE — 83735 ASSAY OF MAGNESIUM: CPT | Performed by: NURSE PRACTITIONER

## 2025-04-22 RX ORDER — TORSEMIDE 20 MG/1
20 TABLET ORAL DAILY
COMMUNITY
Start: 2025-04-14

## 2025-04-22 NOTE — PATIENT INSTRUCTIONS
It was a pleasure to see you today at the [facility].     Recommendations:  The nurse will call you to review your lab results and recommendations.   Daily weights, we will fax orders to assisted-living facility     -Follow-up with general cardiology, Dr. Diallo ~2025  -Follow-up in the heart failure clinic with JOSE as needed.    Please call with questions/concerns and/or if you experience new or worsening heart failure symptoms (shortness of breath, weight gain, fluid retention/lower extremity swelling, and/or reduced activity tolerance).    Heart Failure Nurse Line: [phone] (M-F 8a-430p)  24-hour HF Nurse Line: [phone] (weekends, evenings, holidays)    Martha Fowler CNP  [facility] - [facility] Failure [facility] [place]   [facility]  Clinic and [phone]  Fax: [phone]  Heart Failure Nurses: [phone]       What is the [facility]?     The [facility] is a heart failure specialty clinic within [facility]. You will work with your cardiologist, nurse practitioner, and nurses to carefully adjust medications and learn how to live with heart failure.The Heart FailureProgram will help you:    Better understand your chronic heart condition  Feel better and avoid hospital stays    Monitoring for Symptoms     Call the Heart Failure Phone Line ([phone]) if you have any of these symptoms:   Increased shortness of breath/shortness of breath at rest or worsening shortness of breath with activity  Unable to lie down due to difficulty breathing or needing to sit upright for sleep  Waking up at night with difficulty breathing  Weight gain of 2 pounds in a day for 2 days in a row OR weight gain of 5 pounds in 1 week  Increased swelling in your ankles or legs  Dizziness or lightheadedness    Medications     Take your medications as prescribed  Bring all your medications in their original bottles to  every appointment  Avoid non-steroidal anti-inflammatory medications (Advil, Aleve, Ibuprofen, Naprosyn, Naproxen, Celebrex)  Do not stop taking your medications or begin taking over-the-counter or herbal medications without first talking to your doctor or nurse practitioner    Diet & Lifestyle     Limit sodium/salt to 2000 mg daily   Read food labels for sodium content  Do not add salt when cooking or add salt to your food at the table  Limit fluid ~50-60 oz fluid/day  Weigh yourself every day and record in your daily weight log   Call if you gain 2 pounds or more in one day OR 5 pounds in 1 week  Bring daily weight log to every appointment  Stay active, pace yourself, listen to your body, and rest when tired  Elevate your legs if they are swollen. Ask about using compression/support stockings  Stop smoking  Lose weight if you are overweight  Avoid drinking alcohol or limit amount  Follow with your primary care provider, stay up to date on your immunizations including flu and pneumonia vaccines

## 2025-04-22 NOTE — TELEPHONE ENCOUNTER
"Valve Clinic RN Phone Call:  Call made on 4/22/2025 at 8:25 AM by Peyton Horan RN    Reason for call: TAVR workup check in     Summary of conversation: Called patient's friend Gabriella to check in with how Pat is doing after her recent hospitalization. She says she had been doing \"okay\" last week and had been able to walk down to the dining room for her meals at her assisted living. Gabriella was on vacation for a few days this week so she has not seen Pat in about a week. I let her know that she is still on our list for workup but that we just wanted to give her time to rest after her two hospitalizations before ordering any more tests. Our next step would be to order a dobutamine stress echo but will wait until after Pat meets with Dr Mcelroy her PCP this week for a post-hospitalization check up. I let Gabriella know I will call her Friday or early next week to check in and likely order the DSE if appropriate.     Additional comments/Actions: none    Instructions given to patient: Will call Friday or early next week to check in and likely get DSE ordered.     Peyton Horan RN on 4/22/2025 at 8:30 AM         "

## 2025-04-22 NOTE — LETTER
4/22/2025    Rose Mcelroy NP  [address]  [facility] [address]    RE: Laney HERNANDEZ Jovani       Dear Colleague,     I had the pleasure of seeing Laney Hahn in the [facility].      [facility]  [address], [place] [address]  Office: [phone]     Assessment/Recommendations   Assessment: Ms. Hahn presents to [facility] today for post hospitalization heart failure focused visit.    # Acute on chronic systolic heart failure/HFrEF (EF 30-35% per TTE 4/9/2025)  # NICM  Stage C. NYHA Class II.  Her weight on the clinic scale today is 126 lbs. Upon exam, patient is well-compensated.    -Fluid status: near euvolemic; Loop diuretic: none, no KCL supp  -ACEi/ARB/ARNi/afterload reduction: Lisinopril stopped 2018 for symptomatic soft blood pressure/syncopal episode  -BB: metoprolol succinate 50 mg   -Aldosterone antagonist: deferred while other medical therapy is prioritized  -SGLT2i: deferred while other medical therapy is prioritized  -Ischemia evaluation: No angiographic evidence of obstructive coronary artery disease (4/2025)  -SCD prophylaxis: decision deferred during medication uptitration  -NSAID use: contraindicated  -Sleep apnea evaluation: not discussed today   Remote PA Pressure Monitoring (CardioMems) Deferred while medical therapy is optimized  Cardiac rehab: not discussed today    Heart failure education including medication compliance and lifestyle management reviewed today: low sodium diet <2g Na/day, adequate fluid intake ~goal 50-60 oz/<2L/day, daily weight monitoring, and physical activity as tolerated. Additionally, patient met with the HF C.O.R.E nurse clinician, RAHEEL Luna for in depth heart failure teaching.    # Hypertension  -BP today 128/68  -GDMT as outlined above, uptitrate as tolerated    # Valvular heart disease  # Aortic stenosis  -TAVR work-up in process    # Coronary artery  disease  # Dyslipidemia  -No angiographic evidence of obstructive coronary artery disease (4/2025)  -Denies chest pain and anginal equivalents  -Not on antiplatelet therapy while on anticoag  -LDL goal <70; moderate intensity statin therapy with atorvastatin for secondary ASCVD prevention    # Sinus node dysfunction   -S/p PPM(4/2021)    # CKD IIIa-b (eGFR ~35-59)  -Baseline Cr ~1.1-1.3. Most recently Cr 1.10 (4/13/2025 discharge from hospital)  -Recheck BMP today    # Dementia  -Assisted-living      Plan:  Labs today- BMP, Mg, NtproBNP  Encouraged daily weights, fax orders to [facility] follow-up- scheduled 4/24/2025  -Patient is still deciding about proceeding  with TAVR- follow-up phone call with valve clinic- anticipated this Friday or early next week  -Follow-up with general cardiology, Dr. Diallo ~July 2025  -Follow-up in the heart failure clinic with JOSE as needed    The longitudinal plan of care for the condition(s) below were addressed during this visit. Due to the added complexity in care, I will continue to support Ms. Hahn in the subsequent management of this condition(s) and with the ongoing continuity of care of this condition(s): HFrEF.      History of Present Illness/Subjective    Ms. Hahn is a 84 year old female with a past medical history significant for HFrEF, CKD IIIb, pAfib, aortic stenosis, HTN, junctional bradycardia s/p PPM, and dementia. Today patient presents to [facility] for post hospitalization heart failure focused visit.    Primary Cardiologist: Dr. Diallo; Last office visit 1/20/2025.     Hospitalization, [facility] 4/3-4/9/2025. Admitted for planned elective coronary angiogram and RHC for pre-TAVR work-up. Admitted for IV diuresis. Work-up for TAVR to resume once heart failure improves. She was discharge on torsemide 20 mg. Discharge from hospital weight was 119 lbs.     Hospitalization, [facility] 4/10-4/14/2025. Presented  from [facility] for further evaulation of diarrhea and mental status change. Patient was found to have mild leukocytosis and elevated procalcitonin. She was initially on empiric antibiotics however no clear source of bacterial infection was found. It was suspected that she probably had mild infectious gastroenteritis prior to admission which is now resolved. JESUS thought to be prerenal in the setting of intravascular volume depletion. Renal function returned to baseline. Mild hypernatremia corrected with D5 infusion. Torsemide was discontinued due to poor oral intake however volume status needs to be monitored closely. Patient was discharged back to [facility]. Discharge from hospital weight was 122 lbs.      Today, patient is accompanied by her close friend, Gabriella. Patient is poor historian. She endorses fatigue/activity intolerance that is unchanged. She denies chest pain, palpitations, lightheadedness/dizziness, presyncope, syncope, shortness of breath, MOSELEY, orthopnea, PND, abdominal fullness/bloating, LE edema, and bleeding. Mostly her questions today are about TAVR today and if she should proceed with this procedure.     She lives with brother in [facility]. Patient not able to report whether she follows low sodium diet or quantify fluids per day. She does not like water. She drinks mostly soda.       Recent test results & labs below (personally reviewed):    TTE 4/9/2025  Interpretation Summary  1. The left ventricle is normal in size. Left ventricular systolic performance  is moderately reduced. The estimated ejection fraction is 30-35%.  2. There is moderate global reduction in left ventricular systolic  performance.  3. There is abnormal septal motion consistent with ventricular paced rhythm.  4. There is suspected severe low-flow, low-gradient (LF-LG).  Â  The mean gradient is measured at 24 mmHg with peak velocity of 3.1 m/s. The  calculated valve area is0.84-0.85 cmÂ . Dimensionless index is 0.31.  5. There is trace aortic  "insufficiency.  6. There is mild, to perhaps mild-moderate, mitral insufficiency.  7. There is moderate calcific mitral stenosis.  8. Normal right ventricular size and systolic performance.  9. There is moderate left atrial enlargement.  When compared to the prior real-time echocardiogram dated 24 December 2024,  the mean gradient across the aortic valve measures slightly higher on the  current study (previously 21 mmHg and currently 24 mmHg). The findings are  otherwise felt to be fairly similar on both examinations.    Coronary angiogram 4/3/2025  CONCLUSIONS:   No angiographic evidence of obstructive coronary artery disease.  Severely elevated right and left-sided filling pressures.  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  Low flow, low gradient moderate to severe aortic valve stenosis (mean gradient 9 mmHg, calculated valve area of 0.7 cm ).  RECOMMENDATIONS:   Postprocedure cares, please see orders.  Will admit for IV diuresis and heart failure optimization.  Continue workup for aortic valve stenosis as planned including dobutamine stress echocardiogram once heart failure improved.    Cardiac device check 1/20/2025  Encounter Type: routine remote pacemaker transmission.   Device: Medtronic Timken.   Pacing % /Programmed: AP 71%,  90% at DDDR 70/120 ppm.   Lead(s): stable.   Battery longevity: 7yrs, 6mo estimated.   Presenting: A-V paced 70 bpm.   Atrial high rates: since 12/24/24; total of 6 seconds of AT/AF noted.   Anticoagulant: Eliquis.   Ventricular High rates: since 12/24/24; none detected.   Comments: Normal device function.   Plan: Patient due for annual device check in April 2025. Reminder letter sent. KYREE Rodriguez, Device Specialist     Physical Examination Review of Systems   /68 (BP Location: Left arm, Patient Position: Sitting, Cuff Size: Adult Small)   Pulse 90   Resp 18   Ht 1.626 m (5' 4\")   Wt 57.2 kg (126 lb)  "  LMP  (LMP Unknown)   BMI 21.63 kg/m    Body mass index is 21.63 kg/m .  Wt Readings from Last 3 Encounters:   04/22/25 57.2 kg (126 lb)   04/14/25 55.4 kg (122 lb 2.2 oz)   04/09/25 54 kg (119 lb)     General Appearance:   Appears comfortable, in no acute distress   HEENT: Eyes symmetrical, no discharge or icterus bilaterally. Mucous membranes moist and without lesions   Cardiovascular: RRR, +S1S2, +murmur, rub, or gallop. JVP not visible at 90 degrees      Respiratory:   Respirations regular, even, and unlabored. Lungs CTA throughout   GI:  Soft and non distended   Musculoskeletal: No joint swelling or tenderness   Extremities   No cyanosis. no peripheral edema.   Skin: Warm, no xanthelasma, no jaundice, no rashes or lesions    Neurologic: Alert to self, time, place. Disoriented to  situation. Forgetful. no focal deficits   Psychiatric: calm and cooperative                                             Negative unless noted in HPI     Medical History  Surgical History Family History Social History   Past Medical History:   Diagnosis Date     COVID-19 09/14/2020    positive test at [facility]     DNAR (do not attempt resuscitation)      Dyslipidemia, goal LDL below 70 09/15/2020     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 Nini Rasmussen     Metabolic encephalopathy      Moderate aortic valve stenosis 08/22/2017    stable on 2020 echo     Nonobstructive atherosclerosis of coronary artery 09/15/2020    with normal LVEDP     Paroxysmal SVT (supraventricular tachycardia) 09/07/2017    said to have short episodes while hospitalized for UTI per Dr. Rhys Mensah     Syncope 08/22/2017     UTI (urinary tract infection) 08/21/2017    hospitalized with weakness    Past Surgical History:   Procedure Laterality Date     CATARACT EXTRACTION, BILATERAL       CV CORONARY ANGIOGRAM N/A 9/15/2020    Procedure:  Coronary Angiogram;  Surgeon: Radhika Santa MD;  Location: [facility] Cath Lab;  Service: Cardiology     CV CORONARY ANGIOGRAM N/A 4/3/2025    Procedure: Coronary Angiogram;  Surgeon: Tyrese Dillon MD;  Location: [facility] CV     CV LEFT HEART CATH N/A 4/3/2025    Procedure: Left Heart Catheterization;  Surgeon: Tyrese Dillon MD;  Location: [facility] CV     CV LEFT HEART CATHETERIZATION WITHOUT LEFT VENTRICULOGRAM Left 9/15/2020    Procedure: Left Heart Catheterization Without Left Ventriculogram;  Surgeon: Radhika Santa MD;  Location: [facility] Cath Lab;  Service: Cardiology     CV RIGHT HEART CATH MEASUREMENTS RECORDED N/A 4/3/2025    Procedure: Right Heart Catheterization with invasive aortic vave gradient study;  Surgeon: Tyrese Dillon MD;  Location: [facility] CV     EP PACEMAKER INSERT N/A 4/13/2021    Procedure: EP Pacemaker Insertion;  Surgeon: Frederic Burgos MD;  Location: [facility] Cardiac Cath Lab;  Service: Cardiology     HYSTERECTOMY       PARTIAL GASTRECTOMY  1969    for ulcers     TONSILLECTOMY      Family History   Adopted: Yes    Social History     Socioeconomic History     Marital status: Single     Spouse name: Not on file     Number of children: 0     Years of education: Not on file     Highest education level: Not on file   Occupational History     Not on file   Tobacco Use     Smoking status: Never     Smokeless tobacco: Never   Vaping Use     Vaping status: Never Used   Substance and Sexual Activity     Alcohol use: No     Drug use: No     Sexual activity: Not on file   Other Topics Concern     Not on file   Social History Narrative    Her adopted brother, Jeff, lives with her. He is five years younger than her.     Social Drivers of Health     Financial Resource Strain: Low Risk  (4/14/2025)    Financial Resource Strain      Within the past 12 months, have you or your family members you live with been unable to get utilities (heat,  electricity) when it was really needed?: No   Food Insecurity: Low Risk  (4/14/2025)    Food Insecurity      Within the past 12 months, did you worry that your food would run out before you got money to buy more?: No      Within the past 12 months, did the food you bought just not last and you didn t have money to get more?: No   Transportation Needs: Low Risk  (4/14/2025)    Transportation Needs      Within the past 12 months, has lack of transportation kept you from medical appointments, getting your medicines, non-medical meetings or appointments, work, or from getting things that you need?: No   Physical Activity: Not on file   Stress: Not on file   Social Connections: Not on file   Interpersonal Safety: Low Risk  (4/11/2025)    Interpersonal Safety      Do you feel physically and emotionally safe where you currently live?: Yes      Within the past 12 months, have you been hit, slapped, kicked or otherwise physically hurt by someone?: No      Within the past 12 months, have you been humiliated or emotionally abused in other ways by your partner or ex-partner?: No   Housing Stability: Low Risk  (4/14/2025)    Housing Stability      Do you have housing? : Yes      Are you worried about losing your housing?: No        Medications  Allergies   Current Outpatient Medications   Medication Sig Dispense Refill     acetaminophen (TYLENOL) 500 MG tablet Take 1,000 mg by mouth every 8 hours as needed for pain.       apixaban ANTICOAGULANT (ELIQUIS ANTICOAGULANT) 2.5 MG tablet Take 1 tablet (2.5 mg) by mouth 2 times daily. Due for appointment with Rose Mcelroy 60 tablet 1     atorvastatin (LIPITOR) 10 MG tablet Take 1 tablet (10 mg) by mouth every morning. 30 tablet 2     metoprolol succinate ER (TOPROL XL) 50 MG 24 hr tablet Take 1 tablet (50 mg) by mouth daily 30 tablet 2     torsemide (DEMADEX) 20 MG tablet Take 20 mg by mouth daily.       naloxone (NARCAN) 4 MG/0.1ML nasal spray Spray 4 mg into one nostril alternating  nostrils as needed for opioid reversal. every 2-3 minutes until assistance arrives (Patient not taking: Reported on 2025)      No Known Allergies      Lab Results    Chemistry/lipid CBC Cardiac Enzymes/BNP/TSH/INR   Lab Results   Component Value Date    CHOL 137 2023    HDL 42 (L) 2023    TRIG 164 (H) 2023    BUN 18.2 2025     2025    CO2 21 (L) 2025    Lab Results   Component Value Date    WBC 6.0 2025    HGB 10.4 (L) 2025    HCT 31.4 (L) 2025    MCV 98 2025     (L) 2025    Lab Results   Component Value Date    TROPONINI 0.02 2022    BNP 1,170 (H) 06/10/2021    TSH 1.53 2023    INR 1.31 (H) 04/10/2025            Martha Fowler, DNP, APRN, CNP  [facility] - Heart Failure Clinic [place]   C.O.R.E. Clinic  Clinic and [phone]  Fax: [phone]  Heart Failure Nurses: [phone]    [facility]: Heart Failure Care Coordination   Heart Failure Education    Situation/Background:      RN ROHINI provided heart failure education to patient and friend Gabriella during clinic visit.    Assessment:      Living situation: assisted living [facility] [phone]    Barriers to Heart Failure follow-up: Cognitive impairment and Transportation concerns    Medication management: facility-dispensed    CHF assessment:  No assessment indicated.     Goals Addressed    None         Intervention/Plan:      CM/HF education topics reviewed:  Low sodium: 2000 mg or less daily, meal choices and label reading   Fluid Restriction: 50-60 oz of fluids daily   Daily weight monitoring and logging   Medication review and importance of compliance   Home blood pressure monitoring and logging   Overview of C.O.R.E. clinic   Overview of heart failure appointments and testing   Importance of exercise   Symptoms of HF to be reported to Core Team      Education materials provided:  Low sodium food  and drink handout  Low sodium food product examples  HF stoplight tool  Guide to HF booklet  Cardiac medication handout  C.O.R.E. information brochure     HF resources reviewed:  Heart Failure support group      RN CC reviewed and reinforced fluid/dietary sodium restrictions; patient stated understanding. Pt has dementia, friend Chelsie will talk to facility review education material with nursing, has visits every day in the AM, she will advised daily weights and record them.     Patient expressed understanding of above education/instructions and denied further questions at this time.     Cheryl Hoffman RN      Thank you for allowing me to participate in the care of your patient.      Sincerely,     Martha Fowler, [facility]  cc:   Pierre Cronin MD  [address] [facility] [address]  [place] [address]

## 2025-04-22 NOTE — PROGRESS NOTES
[facility]: Heart Failure Care Coordination   Heart Failure Education    Situation/Background:      RN CC provided heart failure education to patient and friend Gabriella during clinic visit.    Assessment:      [phone]    Barriers to Heart Failure follow-up: Cognitive impairment and Transportation concerns    Medication management: facility-dispensed    CHF assessment:  No assessment indicated.     Goals Addressed    None         Intervention/Plan:      CM/HF education topics reviewed:  Low sodium: 2000 mg or less daily, meal choices and label reading   Fluid Restriction: 50-60 oz of fluids daily   Daily weight monitoring and logging   Medication review and importance of compliance   Home blood pressure monitoring and logging   Overview of C.O.R.E. clinic   Overview of heart failure appointments and testing   Importance of exercise   Symptoms of HF to be reported to Core Team      Education materials provided:  Low sodium food and drink handout  Low sodium food product examples  HF stoplight tool  Guide to HF booklet  Cardiac medication handout  C.O.R.E. information brochure     HF resources reviewed:  Heart Failure support group      RN CC reviewed and reinforced fluid/dietary sodium restrictions; patient stated understanding. Pt has dementia, friend Gabriella will talk to facility review education material with nursing, has visits every day in the AM, she will advised daily weights and record them.     Patient expressed understanding of above education/instructions and denied further questions at this time.     Cheryl Hoffman RN

## 2025-04-24 ENCOUNTER — OFFICE VISIT (OUTPATIENT)
Dept: INTERNAL MEDICINE | Facility: CLINIC | Age: 85
End: 2025-04-24
Payer: COMMERCIAL

## 2025-04-24 VITALS
DIASTOLIC BLOOD PRESSURE: 64 MMHG | SYSTOLIC BLOOD PRESSURE: 114 MMHG | OXYGEN SATURATION: 96 % | HEART RATE: 96 BPM | BODY MASS INDEX: 21.27 KG/M2 | HEIGHT: 64 IN | RESPIRATION RATE: 16 BRPM | TEMPERATURE: 97.9 F | WEIGHT: 124.6 LBS

## 2025-04-24 DIAGNOSIS — I35.0 NONRHEUMATIC AORTIC VALVE STENOSIS: ICD-10-CM

## 2025-04-24 DIAGNOSIS — E78.5 DYSLIPIDEMIA, GOAL LDL BELOW 70: ICD-10-CM

## 2025-04-24 DIAGNOSIS — I50.20 HEART FAILURE WITH REDUCED EJECTION FRACTION, NYHA CLASS I (H): ICD-10-CM

## 2025-04-24 DIAGNOSIS — I47.29 NSVT (NONSUSTAINED VENTRICULAR TACHYCARDIA) (H): ICD-10-CM

## 2025-04-24 DIAGNOSIS — Z09 HOSPITAL DISCHARGE FOLLOW-UP: Primary | ICD-10-CM

## 2025-04-24 DIAGNOSIS — N18.31 STAGE 3A CHRONIC KIDNEY DISEASE (H): ICD-10-CM

## 2025-04-24 DIAGNOSIS — Z78.0 ASYMPTOMATIC POSTMENOPAUSAL STATUS: ICD-10-CM

## 2025-04-24 DIAGNOSIS — I10 ESSENTIAL HYPERTENSION: ICD-10-CM

## 2025-04-24 DIAGNOSIS — I48.0 PAROXYSMAL ATRIAL FIBRILLATION (H): ICD-10-CM

## 2025-04-24 DIAGNOSIS — I49.5 SINUS NODE DYSFUNCTION (H): ICD-10-CM

## 2025-04-24 RX ORDER — METOPROLOL SUCCINATE 50 MG/1
50 TABLET, EXTENDED RELEASE ORAL DAILY
Qty: 90 TABLET | Refills: 2 | Status: SHIPPED | OUTPATIENT
Start: 2025-04-24

## 2025-04-24 RX ORDER — ATORVASTATIN CALCIUM 10 MG/1
10 TABLET, FILM COATED ORAL EVERY MORNING
Qty: 90 TABLET | Refills: 3 | Status: SHIPPED | OUTPATIENT
Start: 2025-04-24

## 2025-04-24 ASSESSMENT — PAIN SCALES - GENERAL: PAINLEVEL_OUTOF10: NO PAIN (0)

## 2025-04-24 NOTE — PROGRESS NOTES
"  {PROVIDER CHARTING PREFERENCE:322263}    Ian Davison is a 84 year old, presenting for the following health issues:  Hospital F/U (Dementia.)        4/24/2025    11:15 AM   Additional Questions   Roomed by Jerry CONNER MA   Accompanied by Brother and Friend     HPI      {MA/LPN/RN Pre-Provider Visit Orders- hCG/UA/Strep (Optional):422208}    Hospital Follow-up Visit:    Hospital/Nursing Home/IP Rehab Facility: [facility]  Most Recent Admission Date: 4/10/2025   Most Recent Admission Diagnosis: Diarrhea of presumed infectious origin - R19.7  Dehydration - E86.0  Disorientation - R41.0     Most Recent Discharge Date: 4/14/2025   Most Recent Discharge Diagnosis: Disorientation - R41.0  Diarrhea of presumed infectious origin - R19.7  Dehydration - E86.0  Vascular dementia without behavioral disturbance (H) - F01.50  Falls frequently - R29.6  Generalized weakness - R53.1   Was the patient in the ICU or did the patient experience delirium during hospitalization?  Yes   {Provider  Cognitive screening recommended Use Mini-Cog if visit is in person or via video, otherwise defer to next visit :245860}  {Results- Mini-Cog Completed Today:126637}  Do you have any other stressors you would like to discuss with your provider? No    Problems taking medications regularly:  None  Medication changes since discharge: None  Problems adhering to non-medication therapy:  None    Summary of hospitalization:  {:901352}  Diagnostic Tests/Treatments reviewed.  Follow up needed: {:369196:}  Other Healthcare Providers Involved in Patient s Care:         {those currently involved after discharge:803925::\"None\"}  Update since discharge: {:671408} {TIP  Include information from family/caregivers, SNF, Care Coordination :848022}        Plan of care communicated with {:242423}     {Reference  Coding guidelines- Moderate Complexity F2F/Video within 7 - 14 days of discharge [id number], High Complexity F2F/Video within " "7 days [id number] or [id number] days [id number] :806625}      {additonal problems for provider to add (Optional):046187}  {additonal problems for provider to add (Optional):255814}    {ROS Picklists (Optional):820774}      Objective    /64   Pulse 96   Temp 97.9  F (36.6  C) (Oral)   Resp 16   Ht 1.626 m (5' 4\")   Wt 56.5 kg (124 lb 9.6 oz)   LMP  (LMP Unknown)   SpO2 96%   BMI 21.39 kg/m    Body mass index is 21.39 kg/m .  Physical Exam   {Exam List (Optional):696296}    {Diagnostic Test Results (Optional):246521}        Signed Electronically by: Rose Mcelroy NP  {Email feedback regarding this note to [email or web address]   :482070}  "

## 2025-04-24 NOTE — PROGRESS NOTES
Internal Medicine Office Visit  [address] [place] [address]  Phone: [phone]  Fax: [phone]        Patient Name: Laney Hahn  Patient Age: 84 year old  YOB: 1940  MRN: [id number]    Date of Visit: 4/24/2025  Primary Provider: Rose Mcelroy    Subjective   Patient presents with:  Hospital F/U: Dementia.         4/24/2025    11:15 AM   Additional Questions   Roomed by Jerry CONNER MA   Accompanied by Brother and Friend     HPI:  - Hospitalized 4/3-4/9/25 for coronary angiogram and RHC for pre-TAVR work-up  - Hospitalized 4/10-4/14 for diarrhea and mental status changes.     Laney Hahn, 84-year-old female.  - Hospitalized twice this month for heart-related issues.  - Known heart valve dysfunction leading to fluid buildup and increased cardiac workload.  - Declined heart valve surgery after discussing with a cardiologist 2 days ago.  - No shortness of breath or leg swelling reported.  - No dizziness or lightheadedness.  - Rash present, itches daily.    Patient Active Problem List   Diagnosis    SVT (supraventricular tachycardia)    Chronic kidney disease, stage III (moderate) (H)    Vascular dementia without behavioral disturbance (H)    Paroxysmal atrial fibrillation (H)    Nonrheumatic aortic valve stenosis    Heart failure with reduced ejection fraction, NYHA class I (H)    Essential hypertension    DNAR (do not attempt resuscitation)    Cardiac pacemaker in situ    Mixed hyperlipidemia    AV node dysfunction    Bifascicular bundle branch block    Junctional bradycardia, s/p PPM placement     Nonobstructive atherosclerosis of coronary artery    Sick sinus syndrome (H)    Acute pain of both knees    Unsteady gait    Generalized weakness    Falls frequently    Status post coronary angiogram    Other ill-defined heart diseases     Current Scheduled Meds:  Current Outpatient Medications   Medication Sig Dispense Refill     "acetaminophen (TYLENOL) 500 MG tablet Take 1,000 mg by mouth every 8 hours as needed for pain.      apixaban ANTICOAGULANT (ELIQUIS ANTICOAGULANT) 2.5 MG tablet Take 1 tablet (2.5 mg) by mouth 2 times daily. Due for appointment with Rose Mcelroy 180 tablet 1    atorvastatin (LIPITOR) 10 MG tablet Take 1 tablet (10 mg) by mouth every morning. 90 tablet 3    metoprolol succinate ER (TOPROL XL) 50 MG 24 hr tablet Take 1 tablet (50 mg) by mouth daily. 90 tablet 2    naloxone (NARCAN) 4 MG/0.1ML nasal spray Spray 4 mg into one nostril alternating nostrils as needed for opioid reversal. every 2-3 minutes until assistance arrives (Patient not taking: Reported on 4/24/2025)      torsemide (DEMADEX) 20 MG tablet Take 20 mg by mouth daily. (Patient not taking: Reported on 4/24/2025)           Objective   Physical Examination:  Vitals:    04/24/25 1117   BP: 114/64   Pulse: 96   Resp: 16   Temp: 97.9  F (36.6  C)   TempSrc: Oral   SpO2: 96%   Weight: 56.5 kg (124 lb 9.6 oz)   Height: 1.626 m (5' 4\")     Wt Readings from Last 5 Encounters:   04/24/25 56.5 kg (124 lb 9.6 oz)   04/22/25 57.2 kg (126 lb)   04/14/25 55.4 kg (122 lb 2.2 oz)   04/09/25 54 kg (119 lb)   03/24/25 58 kg (127 lb 13.9 oz)     Body mass index is 21.39 kg/m .     Constitutional: In no apparent distress  Eyes: Conjunctivae clear. Non-icteric.   Respiratory: Clear to auscultation bilaterally. Normal inspiratory and expiratory effort  Cardiovascular: Regular rate and rhythm. No murmurs, rubs, or gallops. No edema.  Psych: Alert and oriented x3.     Diagnoses managed today:  1. Hospital discharge follow-up    2. Stage 3a chronic kidney disease (H)    3. Asymptomatic postmenopausal status    4. Paroxysmal atrial fibrillation (H)    5. Dyslipidemia, goal LDL below 70    6. NSVT (nonsustained ventricular tachycardia) (H)    7. Sinus node dysfunction (H)    8. Nonrheumatic aortic valve stenosis    9. Essential hypertension    10. Heart failure with reduced ejection " fraction, NYHA class I (H)         Assessment & Plan     Dyslipidemia, goal LDL below 70  - Refill of cholesterol medication.    Nonrheumatic aortic valve stenosis  - Heart valve not working properly, leading to fluid buildup and increased heart workload.  - Declined heart valve surgery. Plan to manage condition with medication at this time  - Reviewed goals of care, she does not fully understand her heart conditions and implications despite several discussions. Continue to review with patient and determine goals     Heart failure with reduced ejection fraction, NYHA class I (H)  - Monitor for symptoms such as weight gain, dyspnea, or leg swelling. Notify healthcare provider if symptoms occur to prevent hospitalization. Scheduled follow-up with cardiologist on July 15, 2025. Follow-up appointment with primary care in October 2025.    Essential hypertension  - Blood pressure noted as very good. Continue monitoring and managing with current medication regimen.        I am having Laney BETY Hahn maintain her acetaminophen, naloxone, torsemide, apixaban ANTICOAGULANT, atorvastatin, and metoprolol succinate ER.        No orders of the defined types were placed in this encounter.      Follow up: Return in about 6 months (around 10/24/2025). Sooner if needed.    The longitudinal plan of care for the diagnosis(es)/condition(s) as documented were addressed during this visit. Due to the added complexity in care, I will continue to support Laney in the subsequent management and with ongoing continuity of care.    Rose Mcelroy, DNP, APRN, CNP   Electronically signed

## 2025-04-28 ENCOUNTER — TELEPHONE (OUTPATIENT)
Dept: INTERNAL MEDICINE | Facility: CLINIC | Age: 85
End: 2025-04-28
Payer: COMMERCIAL

## 2025-04-28 NOTE — TELEPHONE ENCOUNTER
[facility] - tracking # [id number]    Fax received and placed in green folder at PCP's desk.

## 2025-04-30 ENCOUNTER — MEDICAL CORRESPONDENCE (OUTPATIENT)
Dept: HEALTH INFORMATION MANAGEMENT | Facility: CLINIC | Age: 85
End: 2025-04-30
Payer: COMMERCIAL

## 2025-04-30 PROBLEM — R41.0 DISORIENTATION: Status: RESOLVED | Noted: 2025-04-10 | Resolved: 2025-04-30

## 2025-04-30 PROBLEM — R19.7 DIARRHEA OF PRESUMED INFECTIOUS ORIGIN: Status: RESOLVED | Noted: 2025-04-10 | Resolved: 2025-04-30

## 2025-04-30 PROBLEM — U07.1 COVID-19 VIRUS INFECTION: Status: RESOLVED | Noted: 2024-12-23 | Resolved: 2025-04-30

## 2025-04-30 PROBLEM — R11.2 NAUSEA AND VOMITING, UNSPECIFIED VOMITING TYPE: Status: RESOLVED | Noted: 2025-03-24 | Resolved: 2025-04-30

## 2025-04-30 PROBLEM — E86.0 DEHYDRATION: Status: RESOLVED | Noted: 2025-04-10 | Resolved: 2025-04-30

## 2025-05-02 ENCOUNTER — HOSPITAL ENCOUNTER (EMERGENCY)
Facility: HOSPITAL | Age: 85
Discharge: HOME | End: 2025-05-02
Attending: EMERGENCY MEDICINE | Admitting: EMERGENCY MEDICINE
Payer: COMMERCIAL

## 2025-05-02 ENCOUNTER — MEDICAL CORRESPONDENCE (OUTPATIENT)
Dept: HEALTH INFORMATION MANAGEMENT | Facility: CLINIC | Age: 85
End: 2025-05-02

## 2025-05-02 ENCOUNTER — APPOINTMENT (OUTPATIENT)
Dept: CT IMAGING | Facility: HOSPITAL | Age: 85
End: 2025-05-02
Attending: EMERGENCY MEDICINE
Payer: COMMERCIAL

## 2025-05-02 VITALS
WEIGHT: 125 LBS | BODY MASS INDEX: 22.15 KG/M2 | HEART RATE: 77 BPM | RESPIRATION RATE: 20 BRPM | SYSTOLIC BLOOD PRESSURE: 128 MMHG | HEIGHT: 63 IN | DIASTOLIC BLOOD PRESSURE: 61 MMHG | TEMPERATURE: 97.9 F | OXYGEN SATURATION: 97 %

## 2025-05-02 DIAGNOSIS — M54.2 NECK PAIN: ICD-10-CM

## 2025-05-02 LAB
ANION GAP SERPL CALCULATED.3IONS-SCNC: 12 MMOL/L (ref 7–15)
BUN SERPL-MCNC: 20.9 MG/DL (ref 8–23)
CALCIUM SERPL-MCNC: 8.9 MG/DL (ref 8.8–10.4)
CHLORIDE SERPL-SCNC: 106 MMOL/L (ref 98–107)
CREAT SERPL-MCNC: 1.21 MG/DL (ref 0.51–0.95)
EGFRCR SERPLBLD CKD-EPI 2021: 44 ML/MIN/1.73M2
ERYTHROCYTE [DISTWIDTH] IN BLOOD BY AUTOMATED COUNT: 13.1 % (ref 10–15)
GLUCOSE SERPL-MCNC: 120 MG/DL (ref 70–99)
HCO3 SERPL-SCNC: 23 MMOL/L (ref 22–29)
HCT VFR BLD AUTO: 36.3 % (ref 35–47)
HGB BLD-MCNC: 12.5 G/DL (ref 11.7–15.7)
HOLD SPECIMEN: NORMAL
HOLD SPECIMEN: NORMAL
MCH RBC QN AUTO: 33.9 PG (ref 26.5–33)
MCHC RBC AUTO-ENTMCNC: 34.4 G/DL (ref 31.5–36.5)
MCV RBC AUTO: 98 FL (ref 78–100)
PLATELET # BLD AUTO: 188 10E3/UL (ref 150–450)
POTASSIUM SERPL-SCNC: 4.9 MMOL/L (ref 3.4–5.3)
RBC # BLD AUTO: 3.69 10E6/UL (ref 3.8–5.2)
SODIUM SERPL-SCNC: 141 MMOL/L (ref 135–145)
WBC # BLD AUTO: 5.7 10E3/UL (ref 4–11)

## 2025-05-02 PROCEDURE — 85018 HEMOGLOBIN: CPT | Performed by: EMERGENCY MEDICINE

## 2025-05-02 PROCEDURE — 250N000011 HC RX IP 250 OP 636: Performed by: EMERGENCY MEDICINE

## 2025-05-02 PROCEDURE — 36415 COLL VENOUS BLD VENIPUNCTURE: CPT | Performed by: EMERGENCY MEDICINE

## 2025-05-02 PROCEDURE — 80048 BASIC METABOLIC PNL TOTAL CA: CPT | Performed by: EMERGENCY MEDICINE

## 2025-05-02 PROCEDURE — 70496 CT ANGIOGRAPHY HEAD: CPT

## 2025-05-02 PROCEDURE — 99285 EMERGENCY DEPT VISIT HI MDM: CPT | Mod: 25

## 2025-05-02 RX ORDER — IOPAMIDOL 755 MG/ML
67 INJECTION, SOLUTION INTRAVASCULAR ONCE
Status: COMPLETED | OUTPATIENT
Start: 2025-05-02 | End: 2025-05-02

## 2025-05-02 RX ADMIN — IOPAMIDOL 67 ML: 755 INJECTION, SOLUTION INTRAVENOUS at 19:11

## 2025-05-02 ASSESSMENT — ACTIVITIES OF DAILY LIVING (ADL)
ADLS_ACUITY_SCORE: 57

## 2025-05-02 ASSESSMENT — COLUMBIA-SUICIDE SEVERITY RATING SCALE - C-SSRS
6. HAVE YOU EVER DONE ANYTHING, STARTED TO DO ANYTHING, OR PREPARED TO DO ANYTHING TO END YOUR LIFE?: NO
1. IN THE PAST MONTH, HAVE YOU WISHED YOU WERE DEAD OR WISHED YOU COULD GO TO SLEEP AND NOT WAKE UP?: NO
2. HAVE YOU ACTUALLY HAD ANY THOUGHTS OF KILLING YOURSELF IN THE PAST MONTH?: NO

## 2025-05-02 NOTE — ED TRIAGE NOTES
Patient presents via EMS from assisted living. Patient states she has had right neck pain for the last two days. Patient states no trauma or falls. Patient denies chest pain, SOB or headaches. Patient denies taking any pain medication. Patient alert and orientated to self and place. Patient is a poor historian.     Triage Assessment (Adult)       Row Name 05/02/25 9558          Triage Assessment    Airway WDL WDL        Respiratory WDL    Respiratory WDL WDL        Skin Circulation/Temperature WDL    Skin Circulation/Temperature WDL WDL        Cardiac WDL    Cardiac WDL WDL        Peripheral/Neurovascular WDL    Peripheral Neurovascular WDL WDL        Cognitive/Neuro/Behavioral WDL    Cognitive/Neuro/Behavioral WDL X;orientation     Level of Consciousness confused     Orientation person;place     Speech clear     Mood/Behavior cooperative

## 2025-05-02 NOTE — ED PROVIDER NOTES
EMERGENCY DEPARTMENT ENCOUnter      NAME: Laney Hahn  AGE: 84 year old female  YOB: 1940  MRN: [id number]  EVALUATION DATE & TIME: 5/2/2025  5:12 PM    PCP: Rose Mcelroy    ED PROVIDER: Abraham Rios DO      Chief Complaint   Patient presents with    Neck Pain     Right sided.         FINAL IMPRESSION:  1. Neck pain          ED COURSE & MEDICAL DECISION MAKING:    The patient presented to the emergency department today with complaints of right sided neck pain.  She denies any injury and states that this has been going on for several days.  No concerning physical exam findings.  She is a poor historian so she is unable to describe her symptoms well.  Laboratory testing and CT imaging were obtained today all of which were normal.  Given her well appearance and normal workup, I feel that she can be safely discharged home.  I have recommended close follow-up.  She is comfortable with the plan for discharge.    Medical Decision Making  I obtained history from EMS  Discharge. No recommendations on prescription strength medication(s). See documentation for any additional details.    MIPS (CTPE, Dental pain, Tenorio, Sinusitis, Asthma/COPD, Head Trauma): Not Applicable    SEPSIS: None        At the conclusion of the encounter I discussed the results of all of the tests and the disposition. The questions were answered. The patient or family acknowledged understanding and was agreeable with the care plan.         MEDICATIONS GIVEN IN THE EMERGENCY:  Medications   iopamidol (ISOVUE-370) solution 67 mL (67 mLs Intravenous $Given 5/2/25 1911)     =================================================================    HPI        Laney Hahn is a 84 year old female with a pertinent history of stroke who presents to the emergency department today with complaints of right-sided neck pain.  She is a poor historian and not able to provide any specific details.  She localizes the pain to the right lateral  aspect of the neck.  She denies any headaches or fevers.  She states symptoms are not worsened with movement of the head.  No other current complaints.  She denies any injuries.      PAST MEDICAL HISTORY:  Past Medical History:   Diagnosis Date    COVID-19 09/14/2020    positive test at [facility]    DNAR (do not attempt resuscitation)     Dyslipidemia, goal LDL below 70 09/15/2020    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 Nini Rasmussen    Metabolic encephalopathy     Moderate aortic valve stenosis 08/22/2017    stable on 2020 echo    Nonobstructive atherosclerosis of coronary artery 09/15/2020    with normal LVEDP    Paroxysmal SVT (supraventricular tachycardia) 09/07/2017    said to have short episodes while hospitalized for UTI per Dr. Rhys Mensah    Syncope 08/22/2017    UTI (urinary tract infection) 08/21/2017    hospitalized with weakness       PAST SURGICAL HISTORY:  Past Surgical History:   Procedure Laterality Date    CATARACT EXTRACTION, BILATERAL      CV CORONARY ANGIOGRAM N/A 9/15/2020    Procedure: Coronary Angiogram;  Surgeon: Radhika Santa MD;  Location: [facility] Cath Lab;  Service: Cardiology    CV CORONARY ANGIOGRAM N/A 4/3/2025    Procedure: Coronary Angiogram;  Surgeon: Tyrese Dillon MD;  Location: [facility] CATH LAB CV    CV LEFT HEART CATH N/A 4/3/2025    Procedure: Left Heart Catheterization;  Surgeon: Tyrese Dlilon MD;  Location: [facility] CATH LAB CV    CV LEFT HEART CATHETERIZATION WITHOUT LEFT VENTRICULOGRAM Left 9/15/2020    Procedure: Left Heart Catheterization Without Left Ventriculogram;  Surgeon: Radhika Santa MD;  Location: [facility] Cath Lab;  Service: Cardiology    CV RIGHT HEART CATH MEASUREMENTS RECORDED N/A 4/3/2025    Procedure: Right Heart Catheterization with invasive aortic vave gradient study;  Surgeon: Tyrese Dillon MD;  Location:   [facility] CATH LAB CV    EP PACEMAKER INSERT N/A 4/13/2021    Procedure: EP Pacemaker Insertion;  Surgeon: Frederic Burgos MD;  Location: [facility] Cardiac Cath Lab;  Service: Cardiology    HYSTERECTOMY      PARTIAL GASTRECTOMY  1969    for ulcers    TONSILLECTOMY             CURRENT MEDICATIONS:    acetaminophen (TYLENOL) 500 MG tablet  apixaban ANTICOAGULANT (ELIQUIS ANTICOAGULANT) 2.5 MG tablet  atorvastatin (LIPITOR) 10 MG tablet  metoprolol succinate ER (TOPROL XL) 50 MG 24 hr tablet  naloxone (NARCAN) 4 MG/0.1ML nasal spray  torsemide (DEMADEX) 20 MG tablet        ALLERGIES:  No Known Allergies    FAMILY HISTORY:  Family History   Adopted: Yes       SOCIAL HISTORY:   Social History     Socioeconomic History    Marital status: Single    Number of children: 0   Tobacco Use    Smoking status: Never    Smokeless tobacco: Never   Vaping Use    Vaping status: Never Used   Substance and Sexual Activity    Alcohol use: No    Drug use: No   Social History Narrative    Her adopted brother, Jeff, lives with her. He is five years younger than her.     Social Drivers of Health     Financial Resource Strain: Low Risk  (4/14/2025)    Financial Resource Strain     Within the past 12 months, have you or your family members you live with been unable to get utilities (heat, electricity) when it was really needed?: No   Food Insecurity: Low Risk  (4/14/2025)    Food Insecurity     Within the past 12 months, did you worry that your food would run out before you got money to buy more?: No     Within the past 12 months, did the food you bought just not last and you didn t have money to get more?: No   Transportation Needs: Low Risk  (4/14/2025)    Transportation Needs     Within the past 12 months, has lack of transportation kept you from medical appointments, getting your medicines, non-medical meetings or appointments, work, or from getting things that you need?: No   Interpersonal Safety: Low Risk  (4/11/2025)    Interpersonal  "Safety     Do you feel physically and emotionally safe where you currently live?: Yes     Within the past 12 months, have you been hit, slapped, kicked or otherwise physically hurt by someone?: No     Within the past 12 months, have you been humiliated or emotionally abused in other ways by your partner or ex-partner?: No   Housing Stability: Low Risk  (4/14/2025)    Housing Stability     Do you have housing? : Yes     Are you worried about losing your housing?: No       VITALS:  Patient Vitals for the past 24 hrs:   BP Temp Temp src Pulse Resp SpO2 Height Weight   05/02/25 2000 128/61 -- -- 73 -- 97 % -- --   05/02/25 1945 137/62 -- -- 70 -- 97 % -- --   05/02/25 1930 137/60 -- -- 73 -- 96 % -- --   05/02/25 1915 138/63 -- -- 75 -- 98 % -- --   05/02/25 1845 -- -- -- 74 -- 97 % -- --   05/02/25 1830 -- -- -- 72 -- 96 % -- --   05/02/25 1730 139/66 -- -- 71 -- 97 % -- --   05/02/25 1701 (!) 161/103 97.9  F (36.6  C) Oral 77 20 98 % 1.6 m (5' 3\") 56.7 kg (125 lb)       PHYSICAL EXAM    Constitutional:  Well developed, Well nourished,  HENT:  Normocephalic, Atraumatic, Oropharynx moist, Nose normal.  Minimal tenderness along the musculature of the right neck.  No swelling or erythema.  Eyes:  EOMI, Conjunctiva normal, No discharge.   Respiratory:  Normal breath sounds, No respiratory distress, No wheezing  Cardiovascular:  Normal heart rate, Normal rhythm, No murmurs  GI:  Soft, No tenderness, No guarding  Musculoskeletal:  No tenderness to palpation or major deformities noted.   Neurologic:  Alert & oriented x 3, No focal deficits noted.          LAB:  All pertinent labs reviewed and interpreted.  Results for orders placed or performed during the hospital encounter of 05/02/25              CBC with platelets   Result Value Ref Range    WBC Count 5.7 4.0 - 11.0 10e3/uL    RBC Count 3.69 (L) 3.80 - 5.20 10e6/uL    Hemoglobin 12.5 11.7 - 15.7 g/dL    Hematocrit 36.3 35.0 - 47.0 %    MCV 98 78 - 100 fL    MCH 33.9 (H) " 26.5 - 33.0 pg    MCHC 34.4 31.5 - 36.5 g/dL    RDW 13.1 10.0 - 15.0 %    Platelet Count 188 150 - 450 10e3/uL   Basic metabolic panel   Result Value Ref Range    Sodium 141 135 - 145 mmol/L    Potassium 4.9 3.4 - 5.3 mmol/L    Chloride 106 98 - 107 mmol/L    Carbon Dioxide (CO2) 23 22 - 29 mmol/L    Anion Gap 12 7 - 15 mmol/L    Urea Nitrogen 20.9 8.0 - 23.0 mg/dL    Creatinine 1.21 (H) 0.51 - 0.95 mg/dL    GFR Estimate 44 (L) >60 mL/min/1.73m2    Calcium 8.9 8.8 - 10.4 mg/dL    Glucose 120 (H) 70 - 99 mg/dL   Extra Green Top (Lithium Heparin) Tube   Result Value Ref Range    Hold Specimen JIC    Extra Purple Top Tube   Result Value Ref Range    Hold Specimen JIC        RADIOLOGY:  I have independently reviewed and interpreted the above imaging, pending the final radiology read.  CTA Head Neck with Contrast   Final Result   IMPRESSION:    HEAD CT:   1.  No acute intracranial abnormality.      NECK CTA:   1.  Patent neck vasculature.   2.  Incompletely imaged upper mediastinal lymphadenopathy, similar to 2022 imaging.      HEAD CTA:    1.  No large vessel occlusion.   2.  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            Abraham Rios DO  Emergency Medicine  [facility] EMERGENCY DEPARTMENT  [facility] [address] [phone]  Dept: [phone]     Abraham Rios DO  05/02/25 2007

## 2025-05-03 NOTE — ED NOTES
Patient's friend Gabriella, [phone], is on her way to  patient and return her back to her facility.

## 2025-05-03 NOTE — DISCHARGE INSTRUCTIONS
Fortunately your testing today was normal.  Your neck pain is likely due to a muscular strain.  Follow-up with your primary care doctor and return to the ER for any worsening symptoms or other concerns.

## 2025-05-05 ENCOUNTER — OFFICE VISIT (OUTPATIENT)
Dept: INTERNAL MEDICINE | Facility: CLINIC | Age: 85
End: 2025-05-05
Payer: COMMERCIAL

## 2025-05-05 ENCOUNTER — PATIENT OUTREACH (OUTPATIENT)
Dept: CARE COORDINATION | Facility: CLINIC | Age: 85
End: 2025-05-05

## 2025-05-05 VITALS
BODY MASS INDEX: 21.79 KG/M2 | DIASTOLIC BLOOD PRESSURE: 74 MMHG | OXYGEN SATURATION: 96 % | RESPIRATION RATE: 18 BRPM | HEART RATE: 83 BPM | WEIGHT: 123 LBS | HEIGHT: 63 IN | SYSTOLIC BLOOD PRESSURE: 124 MMHG

## 2025-05-05 DIAGNOSIS — N18.31 STAGE 3A CHRONIC KIDNEY DISEASE (H): ICD-10-CM

## 2025-05-05 DIAGNOSIS — M54.2 CERVICALGIA: Primary | ICD-10-CM

## 2025-05-05 PROCEDURE — 1125F AMNT PAIN NOTED PAIN PRSNT: CPT | Performed by: NURSE PRACTITIONER

## 2025-05-05 PROCEDURE — 3078F DIAST BP <80 MM HG: CPT | Performed by: NURSE PRACTITIONER

## 2025-05-05 PROCEDURE — G2211 COMPLEX E/M VISIT ADD ON: HCPCS | Performed by: NURSE PRACTITIONER

## 2025-05-05 PROCEDURE — 99213 OFFICE O/P EST LOW 20 MIN: CPT | Performed by: NURSE PRACTITIONER

## 2025-05-05 PROCEDURE — 3074F SYST BP LT 130 MM HG: CPT | Performed by: NURSE PRACTITIONER

## 2025-05-05 RX ORDER — ACETAMINOPHEN 500 MG
500 TABLET ORAL 2 TIMES DAILY
Qty: 28 TABLET | Refills: 0 | Status: SHIPPED | OUTPATIENT
Start: 2025-05-05 | End: 2025-05-19

## 2025-05-05 ASSESSMENT — PAIN SCALES - GENERAL: PAINLEVEL_OUTOF10: MILD PAIN (3)

## 2025-05-05 NOTE — PROGRESS NOTES
[facility]  Community Health Worker Initial Outreach    Background: Primary Care - Care Coordination program identified per system criteria based on ED discharge report and reviewed for possible outreach.    CHW will not proceed with patient outreach due to the following reason:    Patient has discharged to an Assisted Living where patient is receiving on-site support, including support for ED follow up plan.    Plan: Primary Care - Care Coordination episode addressed appropriately per reason noted above.        Peyton Givens  Community Health Worker  [facility]    *Connected Care Resource Team does NOT follow patient ongoing. Referrals are identified based on internal discharge reports and the outreach is to ensure patient has an understanding of their discharge instructions.

## 2025-05-05 NOTE — PATIENT INSTRUCTIONS
Please assure that patient is observed taking her administered medications to verify that she is taking them

## 2025-05-05 NOTE — PROGRESS NOTES
"  {PROVIDER CHARTING PREFERENCE:972961}    Ian Davison is a 84 year old, presenting for the following health issues:  Hospital F/U  {(!) Visit Details have not yet been documented.  Please enter Visit Details and then use this list to pull in documentation. (Optional):592787}  HPI      {MA/LPN/RN Pre-Provider Visit Orders- hCG/UA/Strep (Optional):267056}  ED/UC Followup:    Facility:  [facility]   Date of visit: 05/02/2025  Reason for visit: Neck Pain   Current Status: Doing somewhat better  {additonal problems for provider to add (Optional):526963}    {ROS Picklists (Optional):759045}      Objective    /74 (BP Location: Right arm, Patient Position: Sitting, Cuff Size: Adult Regular)   Pulse 83   Resp 18   Ht 1.6 m (5' 3\")   Wt 55.8 kg (123 lb)   LMP  (LMP Unknown)   SpO2 96%   Breastfeeding No   BMI 21.79 kg/m    Body mass index is 21.79 kg/m .  Physical Exam   {Exam List (Optional):386193}    {Diagnostic Test Results (Optional):573387}        Signed Electronically by: Rose Mcelroy NP  {Email feedback regarding this note to [email or web address]   :303850}  "

## 2025-05-05 NOTE — PROGRESS NOTES
Internal Medicine Office Visit  [address]  [place] [address]  Phone: [phone]  Fax: [phone]          Patient Name: Laney Hahn  Patient Age: 84 year old  YOB: 1940  MRN: [id number]    Date of Visit: 5/5/2025  Primary Provider: Rose Mcelroy    Subjective   Patient presents with:  Hospital F/U         5/5/2025     2:23 PM   Additional Questions   Roomed by Mandeep PRIDE CMA     HPI:  Laney Hahn, 84-year-old female.    - Experienced neck pain, initially severe with a pain level of 10 during an emergency room visit.  - Pain has persisted for a couple of months, with a current pain level of 3.  - Pain worsens with neck movement, including turning the head and looking up or down.  - No throat or head pain reported.  - Imaging performed in the emergency room showed no blockages in the neck arteries.  - Heat pad and Tylenol have been used for pain relief, with some effectiveness.  - Difficulty sleeping due to neck pain.      Patient Active Problem List   Diagnosis    SVT (supraventricular tachycardia)    Chronic kidney disease, stage III (moderate) (H)    Vascular dementia without behavioral disturbance (H)    Paroxysmal atrial fibrillation (H)    Nonrheumatic aortic valve stenosis    Heart failure with reduced ejection fraction, NYHA class I (H)    Essential hypertension    DNAR (do not attempt resuscitation)    Cardiac pacemaker in situ    Mixed hyperlipidemia    AV node dysfunction    Bifascicular bundle branch block    Junctional bradycardia, s/p PPM placement     Nonobstructive atherosclerosis of coronary artery    Sick sinus syndrome (H)    Acute pain of both knees    Unsteady gait    Generalized weakness    Falls frequently    Status post coronary angiogram    Other ill-defined heart diseases     Current Scheduled Meds:  Current Outpatient Medications   Medication Sig Dispense Refill    acetaminophen (TYLENOL) 500  "MG tablet Take 1 tablet (500 mg) by mouth 2 times daily for 14 days. 28 tablet 0    acetaminophen (TYLENOL) 500 MG tablet Take 1,000 mg by mouth every 8 hours as needed for pain.      apixaban ANTICOAGULANT (ELIQUIS ANTICOAGULANT) 2.5 MG tablet Take 1 tablet (2.5 mg) by mouth 2 times daily. Due for appointment with Rose Mcelroy 180 tablet 1    atorvastatin (LIPITOR) 10 MG tablet Take 1 tablet (10 mg) by mouth every morning. 90 tablet 3    metoprolol succinate ER (TOPROL XL) 50 MG 24 hr tablet Take 1 tablet (50 mg) by mouth daily. 90 tablet 2    naloxone (NARCAN) 4 MG/0.1ML nasal spray Spray 4 mg into one nostril alternating nostrils as needed for opioid reversal. every 2-3 minutes until assistance arrives (Patient not taking: Reported on 5/5/2025)      torsemide (DEMADEX) 20 MG tablet Take 20 mg by mouth daily. (Patient not taking: Reported on 5/5/2025)           Objective   Physical Examination:  Vitals:    05/05/25 1424   BP: 124/74   BP Location: Right arm   Patient Position: Sitting   Cuff Size: Adult Regular   Pulse: 83   Resp: 18   SpO2: 96%   Weight: 55.8 kg (123 lb)   Height: 1.6 m (5' 3\")     Wt Readings from Last 5 Encounters:   05/05/25 55.8 kg (123 lb)   05/02/25 56.7 kg (125 lb)   04/24/25 56.5 kg (124 lb 9.6 oz)   04/22/25 57.2 kg (126 lb)   04/14/25 55.4 kg (122 lb 2.2 oz)     Body mass index is 21.79 kg/m .     Constitutional: In no apparent distress  MSK: right cervical tenderness to palpation. No limitations in neck ROM     Diagnoses managed today:  1. Cervicalgia    2. Stage 3a chronic kidney disease (H)         Assessment & Plan     Cervicalgia  - Likely muscular in origin, possibly due to cervical strain.  - Administer Tylenol twice daily for 2 weeks, aligned with other medications. Consider physical therapy if pain persists- she declines to start this now. Encourage neck exercises if open to it.    CKD stage 3b:  - Avoid nephrotoxic medications including ibuprofen for pain  - Hydrate well       I " am having Laney BETY Hahn start on acetaminophen. I am also having her maintain her acetaminophen, naloxone, torsemide, apixaban ANTICOAGULANT, atorvastatin, and metoprolol succinate ER.        No orders of the defined types were placed in this encounter.      Follow up: No follow-ups on file. Sooner if needed.    The longitudinal plan of care for the diagnosis(es)/condition(s) as documented were addressed during this visit. Due to the added complexity in care, I will continue to support Laney in the subsequent management and with ongoing continuity of care.    Rose Mcelroy, DNP, APRN, CNP   Electronically signed

## 2025-05-06 ENCOUNTER — TELEPHONE (OUTPATIENT)
Dept: INTERNAL MEDICINE | Facility: CLINIC | Age: 85
End: 2025-05-06
Payer: COMMERCIAL

## 2025-05-06 NOTE — TELEPHONE ENCOUNTER
[facility] Tracking # [id number]    Fax received and placed in green folder at PCP's desk.

## 2025-05-06 NOTE — TELEPHONE ENCOUNTER
FYI - Status Update    Who is Calling: [facility] staff    Update:     Staff will:    -Administer her meications there     -Her pharmacy will change from [facility] to [facility] after the 4/24/2025 meds run out     [facility]   Address: [address] [place] [address], [place] [address]  Phone: [phone]    [facility] staff  [phone]  ext [phone]

## 2025-05-06 NOTE — TELEPHONE ENCOUNTER
Called [facility] staff. Verified with staff that patients pharmacy will change from [facility] to [facility]. Patient does not need any refills as of now.

## 2025-05-06 NOTE — TELEPHONE ENCOUNTER
[facility] - Incident/Provider Notification - Neck Pain    Fax received and placed in green folder at PCP's desk.

## 2025-05-13 ENCOUNTER — TELEPHONE (OUTPATIENT)
Dept: CARDIOLOGY | Facility: CLINIC | Age: 85
End: 2025-05-13
Payer: COMMERCIAL

## 2025-05-13 DIAGNOSIS — I35.0 AORTIC VALVE STENOSIS, ETIOLOGY OF CARDIAC VALVE DISEASE UNSPECIFIED: Primary | ICD-10-CM

## 2025-05-13 NOTE — CONFIDENTIAL NOTE
"Called Patient's contact Gabriella to discuss goals of care.     Last night Gabriella sat down with Pat to discuss how she feels- if she's ready for comfort measures or if she is okay with proceeding with additional procedures and testing. Gabriella explained the benefit and risk of both options and patient said \"I'm not ready to die\" and agreed to undergo additional testing/procedures in regards  to her aortic stenosis. Gabriella agreed after this discussion that I will put in orders for DSE and hopefully we can get this scheduled after Gabriella's school is complete in 2 weeks.        Gabriella did reiterate that she is moving on June 21st but that their [employer] [place] has offered to help Pat with additional medical discussions, appointments, etc. Next time Pat comes in they will fill out a new consent to communicate form to include their [place] Nurse Nancy.  Gabriella also noted that patient's cousin Rosana is willing to be secondary contact in the future once Gabriella is not in the area.     Will order DSE and send message to NI  to help get this set up.     Will check back after DSE if needs to be seen in valve clinic or if needs further imaging for TAVR workup.       Peyton Horan, RN BSN  Valve Clinic Coordinator  [facility]  [phone]    "

## 2025-05-14 NOTE — CONFIDENTIAL NOTE
DSE scheduled June 9th. Will check back after DSE with results and if need to see in valve clinic.       Peyton Horan RN on 5/14/2025 at 11:37 AM

## 2025-05-19 ENCOUNTER — TELEPHONE (OUTPATIENT)
Dept: INTERNAL MEDICINE | Facility: CLINIC | Age: 85
End: 2025-05-19
Payer: COMMERCIAL

## 2025-05-19 ENCOUNTER — MEDICAL CORRESPONDENCE (OUTPATIENT)
Dept: HEALTH INFORMATION MANAGEMENT | Facility: CLINIC | Age: 85
End: 2025-05-19
Payer: COMMERCIAL

## 2025-05-19 NOTE — TELEPHONE ENCOUNTER
[facility]     Order number(s):  [id number]    Fax received and placed in green folder at PCP's desk.

## 2025-05-21 ENCOUNTER — TELEPHONE (OUTPATIENT)
Dept: INTERNAL MEDICINE | Facility: CLINIC | Age: 85
End: 2025-05-21
Payer: COMMERCIAL

## 2025-05-21 DIAGNOSIS — L30.9 DERMATITIS: Primary | ICD-10-CM

## 2025-05-21 NOTE — TELEPHONE ENCOUNTER
Patients nurse at [facility] calling to request medication for contact dermatitis of right forearm. States that rash is red and itching. Nurse requesting possible hydrocortisone cream?    [facility]  Phone: [phone]  Fax: [phone]

## 2025-05-22 RX ORDER — BENZOCAINE/MENTHOL 6 MG-10 MG
LOZENGE MUCOUS MEMBRANE 2 TIMES DAILY
Qty: 20 G | Refills: 0 | Status: SHIPPED | OUTPATIENT
Start: 2025-05-22

## 2025-06-10 ENCOUNTER — HOSPITAL ENCOUNTER (OUTPATIENT)
Dept: CARDIOLOGY | Facility: HOSPITAL | Age: 85
Discharge: HOME | End: 2025-06-10
Attending: INTERNAL MEDICINE
Payer: COMMERCIAL

## 2025-06-10 ENCOUNTER — RESULTS FOLLOW-UP (OUTPATIENT)
Dept: CARDIOLOGY | Facility: CLINIC | Age: 85
End: 2025-06-10

## 2025-06-10 VITALS — WEIGHT: 128 LBS | BODY MASS INDEX: 22.68 KG/M2 | HEIGHT: 63 IN

## 2025-06-10 DIAGNOSIS — I35.0 AORTIC VALVE STENOSIS, ETIOLOGY OF CARDIAC VALVE DISEASE UNSPECIFIED: ICD-10-CM

## 2025-06-10 LAB
CV STRESS CURRENT BP HE: NORMAL
CV STRESS CURRENT HR HE: 101
CV STRESS CURRENT HR HE: 102
CV STRESS CURRENT HR HE: 105
CV STRESS CURRENT HR HE: 111
CV STRESS CURRENT HR HE: 112
CV STRESS CURRENT HR HE: 70
CV STRESS CURRENT HR HE: 71
CV STRESS CURRENT HR HE: 72
CV STRESS CURRENT HR HE: 72
CV STRESS CURRENT HR HE: 73
CV STRESS CURRENT HR HE: 75
CV STRESS CURRENT HR HE: 76
CV STRESS CURRENT HR HE: 79
CV STRESS CURRENT HR HE: 79
CV STRESS CURRENT HR HE: 81
CV STRESS CURRENT HR HE: 82
CV STRESS CURRENT HR HE: 84
CV STRESS CURRENT HR HE: 85
CV STRESS CURRENT HR HE: 86
CV STRESS CURRENT HR HE: 87
CV STRESS CURRENT HR HE: 88
CV STRESS CURRENT HR HE: 91
CV STRESS CURRENT HR HE: 93
CV STRESS CURRENT HR HE: 94
CV STRESS CURRENT HR HE: 95
CV STRESS CURRENT HR HE: 96
CV STRESS CURRENT HR HE: 96
CV STRESS DEVIATION TIME HE: NORMAL
CV STRESS ECHO PERCENT HR HE: NORMAL
CV STRESS EXERCISE STAGE HE: NORMAL
CV STRESS EXERCISE STAGE REACHED HE: NORMAL
CV STRESS FINAL RESTING BP HE: NORMAL
CV STRESS FINAL RESTING HR HE: 79
CV STRESS MAX HR HE: 113
CV STRESS MAX TREADMILL GRADE HE: 0
CV STRESS MAX TREADMILL SPEED HE: 0
CV STRESS PEAK DIA BP HE: NORMAL
CV STRESS PEAK SYS BP HE: NORMAL
CV STRESS PHASE HE: NORMAL
CV STRESS PROTOCOL HE: NORMAL
CV STRESS REASON STOPPED HE: NORMAL
CV STRESS RESTING PT POSITION HE: NORMAL
CV STRESS RESTING PT POSITION HE: NORMAL
CV STRESS ST DEVIATION AMOUNT HE: NORMAL
CV STRESS ST DEVIATION ELEVATION HE: NORMAL
CV STRESS ST EVELATION AMOUNT HE: NORMAL
CV STRESS SYMPTOMS HE: NORMAL
CV STRESS TEST TYPE HE: NORMAL
CV STRESS TOTAL STAGE TIME MIN 1 HE: NORMAL
STRESS ECHO BASELINE DIASTOLIC HE: 69
STRESS ECHO BASELINE HR: 75
STRESS ECHO BASELINE SYSTOLIC BP: 124
STRESS ECHO LAST STRESS DIASTOLIC BP: 63
STRESS ECHO LAST STRESS HR: 105
STRESS ECHO LAST STRESS SYSTOLIC BP: 136
STRESS ECHO POST ESTIMATED WORKLOAD: 1
STRESS ECHO POST EXERCISE DUR MIN: 10
STRESS ECHO POST EXERCISE DUR SEC: 37
STRESS ECHO TARGET HR: 116

## 2025-06-10 PROCEDURE — 93018 CV STRESS TEST I&R ONLY: CPT | Performed by: INTERNAL MEDICINE

## 2025-06-10 PROCEDURE — 93016 CV STRESS TEST SUPVJ ONLY: CPT | Performed by: INTERNAL MEDICINE

## 2025-06-10 PROCEDURE — 93325 DOPPLER ECHO COLOR FLOW MAPG: CPT | Mod: 26 | Performed by: INTERNAL MEDICINE

## 2025-06-10 PROCEDURE — 93350 STRESS TTE ONLY: CPT | Mod: 26 | Performed by: INTERNAL MEDICINE

## 2025-06-10 PROCEDURE — 250N000011 HC RX IP 250 OP 636: Performed by: INTERNAL MEDICINE

## 2025-06-10 PROCEDURE — 93321 DOPPLER ECHO F-UP/LMTD STD: CPT | Mod: 26 | Performed by: INTERNAL MEDICINE

## 2025-06-10 PROCEDURE — 93017 CV STRESS TEST TRACING ONLY: CPT

## 2025-06-10 RX ORDER — METOPROLOL TARTRATE 1 MG/ML
1-5 INJECTION, SOLUTION INTRAVENOUS
Status: ACTIVE | OUTPATIENT
Start: 2025-06-10 | End: 2025-06-10

## 2025-06-10 RX ORDER — DOBUTAMINE HYDROCHLORIDE 200 MG/100ML
5-20 INJECTION INTRAVENOUS CONTINUOUS
Status: ACTIVE | OUTPATIENT
Start: 2025-06-10 | End: 2025-06-10

## 2025-06-10 RX ORDER — ATROPINE SULFATE 0.4 MG/ML
.2-.4 AMPUL (ML) INJECTION
Status: DISCONTINUED | OUTPATIENT
Start: 2025-06-10 | End: 2025-06-11 | Stop reason: HOSPADM

## 2025-06-10 RX ADMIN — DOBUTAMINE HYDROCHLORIDE 20 MCG/KG/MIN: 200 INJECTION INTRAVENOUS at 14:27

## 2025-06-12 ENCOUNTER — TELEPHONE (OUTPATIENT)
Dept: CARDIOLOGY | Facility: CLINIC | Age: 85
End: 2025-06-12
Payer: COMMERCIAL

## 2025-06-12 DIAGNOSIS — N18.31 STAGE 3A CHRONIC KIDNEY DISEASE (H): Primary | ICD-10-CM

## 2025-06-12 NOTE — CONFIDENTIAL NOTE
Valve Clinic RN Phone Call:  Call re on 6/12/2025 at 1:44 PM by Peyton Horan RN    Reason for call: Discuss DSE results    Summary of conversation: Spoke with patient's cousin Rosana. She is helping with Pats care. I notified her that the DSE results show that Janette does have severe aortic stenosis. Dr. Dillon would recommend following up with CT scan and another clinic visit. I questioned Rosana as to who should be in charge of scheduling follow up appointments and procedures/tests for Pat and she states she thinks Nancy the [place] nurse is going to take on that responsibility now that Gabriella is moving out of state. I noted that it would be very beneficial and important that they designate a legal guardian or POA for Pat in order to safely help with medical decisions. Rosana states they are working with a  possibly through the [employer] who is trying to help them decide a legal decision maker. Janette has a brother that she lives with but who has a cognitive disability, and a nephew who lives in [place] who has not replied to messages. Overall, not great candidates for family/friends to be legal decision makers for Pat. Rosana is hopeful that Nancy can be the main contact for Pat to help schedule appointments and other [employer] members would help to provide transportation to appointments. I did voice my concern that if Janette is not cognitively able to make her own decisions that she will need someone to attend these appointments to help understand the information and make decisions for Pat. Rosana is going to contact Gabriella and Nancy to help figure out who should be our main contact for Pat from here.     Additional comments/Actions: Rosana will call back with more information      Peyton Horan RN on 6/12/2025 at 1:44 PM

## 2025-06-13 NOTE — TELEPHONE ENCOUNTER
Valve Clinic RN Phone Call:  Call received on 6/12/2025 at 2:05 PM by Peyton Horan RN    Summary of conversation: Rosana Called back to let me know that we should in fact reach out to Nancy for appointment scheduling in the future. I did request information on the care coordinator they had been working with and Rosana provided that her name is Angus Guzmán from [employer] but does not have a contact number for her. I instructed that I will follow up with Nancy from here and hope to receive more information. I noted to Rosana that my concern is that Pat is not appropriate to be signing her own consent forms if she does not understand the procedure. She states she thinks Gabriella may have helped sign consent forms in the past but is not sure- she will look into this- however Gabriella is not a legal POA or decision maker so unsure if this is appropriate. In the meantime I will call Nancy to follow up on upcoming plan and assistance.       Peyton Horan RN on 6/12/2025 at 2:12 PM

## 2025-06-13 NOTE — TELEPHONE ENCOUNTER
Left voicemail with [facility] director of nursing inquiring about lab draw to be done at the [facility].     Peyton Horan RN on 6/13/2025 at 9:49 AM

## 2025-06-13 NOTE — TELEPHONE ENCOUNTER
"Called patient's number attempting to get a hold of facility/nurse's station. Pat answered and had questions if she \"still has this thing in me.\" I explained that she does have severe aortic stenosis and what that means. I explained she will get a CT scan and then meet back with us in the clinic. She says \"ok as long as it wont hurt.\"    Peyton Horan RN on 6/13/2025 at 9:45 AM    "

## 2025-06-13 NOTE — TELEPHONE ENCOUNTER
Valve Clinic RN Phone Call:  Call re on 6/12/2025 at 1:44 PM by Peyton Horan RN    Reason for call: Discuss DSE results    Summary of conversation: Spoke with patient's cousin Rosana. She is helping with Pats care. I notified her that the DSE results show that Janette does have severe aortic stenosis. Dr. Dillon would recommend following up with CT scan and another clinic visit. I questioned Rosana as to who should be in charge of scheduling follow up appointments and procedures/tests for Pat and she states she thinks Nancy the [place] nurse is going to take on that responsibility now that Gabriella is moving out of state. I noted that it would be very beneficial and important that they designate a legal guardian or POA for Pat in order to safely help with medical decisions. Rosana states they are working with a  possibly through the [employer] who is trying to help them decide a legal decision maker. Janette has a brother that she lives with but who has a cognitive disability, and a nephew who lives in [place] who has not replied to messages. Overall, not great candidates for family/friends to be legal decision makers for Pat. Rosana is hopeful that Nancy can be the main contact for Pat to help schedule appointments and other [employer] members would help to provide transportation to appointments. I did voice my concern that if Janette is not cognitively able to make her own decisions that she will need someone to attend these appointments to help understand the information and make decisions for Pat. Rosana is going to contact Gabriella and Nancy to help figure out who should be our main contact for Pat from here.     Additional comments/Actions: Rosana will call back with more information      Peyton Horan RN on 6/12/2025 at 1:44 PM

## 2025-06-13 NOTE — TELEPHONE ENCOUNTER
Valve Clinic RN Phone Call:  Call Made on 6/13/2025 at 08:55 AM by Peyton Horan RN TO Carrie Leopold- [facility]     Reason for call: Discuss involvement in Pat's care    Summary of conversation: Called Pat's new primary contact Carrie Leopold, RN. She is the [place] nurse from their [employer] in [place]. She has been designated as Pat's new contact as Gabriella is moving away. I am trying to understand how Nancy will be involved and what she is planning to help with. She states she can be Pat's primary contact from here for medical concerns she will help schedule and accompany to appointments. She states herself/Gabriella and cousin Rosana are all meeting tomorrow to discuss Pat's care and if they feel need to move forward with her [facility]  to decide a legal decision maker. They have also considered setting up a conservator through the [facility] and will discuss this tomorrow. I notified Nancy that the DSE showed severe aortic stenosis and we should plan to move forward with CT and return clinic visit. Janette will need repeat labs to be done prior to this CT and I will inquire as to if this is able to be done at her assisted living. If not, she will require a separate lab visit to complete these labs. I notified her that Melina is our  and I will send her a message to help schedule CT, clinic visit, and possible labs to be done- she is not in office today so she will call Nancy on Monday. Nancy will help set up these appointments and provide/arrange transportation. She does plan for herself, Pat, and Rosana to all be present at the return clinic visit.     Additional comments/Actions: I will call [facility] to inquire about labs done there.     Instructions given to patient: Notify me if any important updates come out of meeting tomorrow otherwise wait for Melina's call next week to schedule appointments.     Peyton Horan RN on 6/13/2025 at 9:14 AM

## 2025-06-17 ENCOUNTER — PATIENT OUTREACH (OUTPATIENT)
Dept: CARE COORDINATION | Facility: CLINIC | Age: 85
End: 2025-06-17
Payer: COMMERCIAL

## 2025-06-17 NOTE — TELEPHONE ENCOUNTER
Spoke with [facility] yang and they are able to draw labs on Mondays. I will place order for BMP to be drawn on 6/23 and fax to the facility. We will schedule CT scan and valve clinic return visit.     For scheduling purposes we should call new primary contact Carrie Leopold, RN @ [phone].       Peyton Horan RN on 6/17/2025 at 1:54 PM

## 2025-06-17 NOTE — TELEPHONE ENCOUNTER
Attempted to reach nursing staff at her facility again today but unable to reach. Another voicemail left . Never received reply.     Peyton Horan RN on 6/17/2025 at 10:25 AM

## 2025-06-18 ENCOUNTER — LAB REQUISITION (OUTPATIENT)
Dept: LAB | Facility: CLINIC | Age: 85
End: 2025-06-18
Payer: COMMERCIAL

## 2025-06-18 DIAGNOSIS — N18.31 CHRONIC KIDNEY DISEASE, STAGE 3A (H): ICD-10-CM

## 2025-06-23 LAB
ANION GAP SERPL CALCULATED.3IONS-SCNC: 13 MMOL/L (ref 7–15)
BUN SERPL-MCNC: 23.6 MG/DL (ref 8–23)
CALCIUM SERPL-MCNC: 9.2 MG/DL (ref 8.8–10.4)
CHLORIDE SERPL-SCNC: 108 MMOL/L (ref 98–107)
CREAT SERPL-MCNC: 1.29 MG/DL (ref 0.51–0.95)
EGFRCR SERPLBLD CKD-EPI 2021: 41 ML/MIN/1.73M2
GLUCOSE SERPL-MCNC: 81 MG/DL (ref 70–99)
HCO3 SERPL-SCNC: 21 MMOL/L (ref 22–29)
POTASSIUM SERPL-SCNC: 4.8 MMOL/L (ref 3.4–5.3)
SODIUM SERPL-SCNC: 142 MMOL/L (ref 135–145)

## 2025-06-23 PROCEDURE — 36415 COLL VENOUS BLD VENIPUNCTURE: CPT | Mod: ORL | Performed by: INTERNAL MEDICINE

## 2025-06-23 PROCEDURE — 80048 BASIC METABOLIC PNL TOTAL CA: CPT | Mod: ORL | Performed by: INTERNAL MEDICINE

## 2025-06-23 PROCEDURE — P9603 ONE-WAY ALLOW PRORATED MILES: HCPCS | Mod: ORL | Performed by: INTERNAL MEDICINE

## 2025-06-24 NOTE — TELEPHONE ENCOUNTER
===View-only below this line===  ----- Message -----  From: Melina Pichardo  Sent: 6/24/2025  12:28 PM CDT  To: Peyton Horan RN    7/11 CT  7/17

## 2025-06-24 NOTE — TELEPHONE ENCOUNTER
Reviewed last Crt/GFR with shola [place] to proceed without IV hydration.     Dahiana Villatoro RN on 6/24/2025 at 2:05 PM

## 2025-07-11 ENCOUNTER — HOSPITAL ENCOUNTER (OUTPATIENT)
Dept: CT IMAGING | Facility: CLINIC | Age: 85
Discharge: HOME | End: 2025-07-11
Attending: INTERNAL MEDICINE | Admitting: INTERNAL MEDICINE
Payer: COMMERCIAL

## 2025-07-11 DIAGNOSIS — I35.0 NONRHEUMATIC AORTIC VALVE STENOSIS: ICD-10-CM

## 2025-07-11 PROCEDURE — 250N000011 HC RX IP 250 OP 636: Performed by: INTERNAL MEDICINE

## 2025-07-11 PROCEDURE — 71275 CT ANGIOGRAPHY CHEST: CPT | Mod: 26 | Performed by: GENERAL ACUTE CARE HOSPITAL

## 2025-07-11 PROCEDURE — 74174 CTA ABD&PLVS W/CONTRAST: CPT

## 2025-07-11 PROCEDURE — 74174 CTA ABD&PLVS W/CONTRAST: CPT | Mod: 26 | Performed by: GENERAL ACUTE CARE HOSPITAL

## 2025-07-11 PROCEDURE — 75572 CT HRT W/3D IMAGE: CPT | Mod: 26 | Performed by: GENERAL ACUTE CARE HOSPITAL

## 2025-07-11 PROCEDURE — 999N000248 HC STATISTIC IV INSERT WITH US BY RN

## 2025-07-11 RX ORDER — IOPAMIDOL 755 MG/ML
100 INJECTION, SOLUTION INTRAVASCULAR ONCE
Status: COMPLETED | OUTPATIENT
Start: 2025-07-11 | End: 2025-07-11

## 2025-07-11 RX ADMIN — IOPAMIDOL 100 ML: 755 INJECTION, SOLUTION INTRAVENOUS at 14:46

## 2025-07-15 ENCOUNTER — TELEPHONE (OUTPATIENT)
Dept: INTERNAL MEDICINE | Facility: CLINIC | Age: 85
End: 2025-07-15

## 2025-07-15 ENCOUNTER — ANCILLARY PROCEDURE (OUTPATIENT)
Dept: CARDIOLOGY | Facility: CLINIC | Age: 85
End: 2025-07-15
Attending: INTERNAL MEDICINE
Payer: COMMERCIAL

## 2025-07-15 DIAGNOSIS — I49.5 SICK SINUS SYNDROME (H): ICD-10-CM

## 2025-07-15 DIAGNOSIS — Z95.0 PACEMAKER: ICD-10-CM

## 2025-07-15 LAB
MDC_IDC_EPISODE_DTM: NORMAL
MDC_IDC_EPISODE_DURATION: 1 S
MDC_IDC_EPISODE_DURATION: 2 S
MDC_IDC_EPISODE_DURATION: 3 S
MDC_IDC_EPISODE_ID: 721
MDC_IDC_EPISODE_ID: 722
MDC_IDC_EPISODE_ID: 723
MDC_IDC_EPISODE_ID: 724
MDC_IDC_EPISODE_ID: 725
MDC_IDC_EPISODE_ID: 726
MDC_IDC_EPISODE_TYPE: NORMAL
MDC_IDC_LEAD_CONNECTION_STATUS: NORMAL
MDC_IDC_LEAD_CONNECTION_STATUS: NORMAL
MDC_IDC_LEAD_IMPLANT_DT: NORMAL
MDC_IDC_LEAD_IMPLANT_DT: NORMAL
MDC_IDC_LEAD_LOCATION: NORMAL
MDC_IDC_LEAD_LOCATION: NORMAL
MDC_IDC_LEAD_LOCATION_DETAIL_1: NORMAL
MDC_IDC_LEAD_LOCATION_DETAIL_1: NORMAL
MDC_IDC_LEAD_MFG: NORMAL
MDC_IDC_LEAD_MFG: NORMAL
MDC_IDC_LEAD_MODEL: NORMAL
MDC_IDC_LEAD_MODEL: NORMAL
MDC_IDC_LEAD_POLARITY_TYPE: NORMAL
MDC_IDC_LEAD_POLARITY_TYPE: NORMAL
MDC_IDC_LEAD_SERIAL: NORMAL
MDC_IDC_LEAD_SERIAL: NORMAL
MDC_IDC_LEAD_SPECIAL_FUNCTION: NORMAL
MDC_IDC_LEAD_SPECIAL_FUNCTION: NORMAL
MDC_IDC_MSMT_BATTERY_DTM: NORMAL
MDC_IDC_MSMT_BATTERY_REMAINING_LONGEVITY: 94 MO
MDC_IDC_MSMT_BATTERY_RRT_TRIGGER: 2.62
MDC_IDC_MSMT_BATTERY_STATUS: NORMAL
MDC_IDC_MSMT_BATTERY_VOLTAGE: 3 V
MDC_IDC_MSMT_LEADCHNL_RA_IMPEDANCE_VALUE: 304 OHM
MDC_IDC_MSMT_LEADCHNL_RA_IMPEDANCE_VALUE: 399 OHM
MDC_IDC_MSMT_LEADCHNL_RA_PACING_THRESHOLD_AMPLITUDE: 0.75 V
MDC_IDC_MSMT_LEADCHNL_RA_PACING_THRESHOLD_PULSEWIDTH: 0.4 MS
MDC_IDC_MSMT_LEADCHNL_RA_SENSING_INTR_AMPL: 1.5 MV
MDC_IDC_MSMT_LEADCHNL_RA_SENSING_INTR_AMPL: 1.5 MV
MDC_IDC_MSMT_LEADCHNL_RV_IMPEDANCE_VALUE: 399 OHM
MDC_IDC_MSMT_LEADCHNL_RV_IMPEDANCE_VALUE: 532 OHM
MDC_IDC_MSMT_LEADCHNL_RV_PACING_THRESHOLD_AMPLITUDE: 0.75 V
MDC_IDC_MSMT_LEADCHNL_RV_PACING_THRESHOLD_PULSEWIDTH: 0.4 MS
MDC_IDC_MSMT_LEADCHNL_RV_SENSING_INTR_AMPL: 21.75 MV
MDC_IDC_MSMT_LEADCHNL_RV_SENSING_INTR_AMPL: 21.75 MV
MDC_IDC_PG_IMPLANT_DTM: NORMAL
MDC_IDC_PG_MFG: NORMAL
MDC_IDC_PG_MODEL: NORMAL
MDC_IDC_PG_SERIAL: NORMAL
MDC_IDC_PG_TYPE: NORMAL
MDC_IDC_SESS_CLINIC_NAME: NORMAL
MDC_IDC_SESS_DTM: NORMAL
MDC_IDC_SESS_TYPE: NORMAL
MDC_IDC_SET_BRADY_AT_MODE_SWITCH_RATE: 171 {BEATS}/MIN
MDC_IDC_SET_BRADY_HYSTRATE: NORMAL
MDC_IDC_SET_BRADY_LOWRATE: 70 {BEATS}/MIN
MDC_IDC_SET_BRADY_MAX_SENSOR_RATE: 120 {BEATS}/MIN
MDC_IDC_SET_BRADY_MAX_TRACKING_RATE: 120 {BEATS}/MIN
MDC_IDC_SET_BRADY_MODE: NORMAL
MDC_IDC_SET_BRADY_PAV_DELAY_LOW: 180 MS
MDC_IDC_SET_BRADY_SAV_DELAY_LOW: 150 MS
MDC_IDC_SET_LEADCHNL_RA_PACING_AMPLITUDE: 1.5 V
MDC_IDC_SET_LEADCHNL_RA_PACING_ANODE_ELECTRODE_1: NORMAL
MDC_IDC_SET_LEADCHNL_RA_PACING_ANODE_LOCATION_1: NORMAL
MDC_IDC_SET_LEADCHNL_RA_PACING_CAPTURE_MODE: NORMAL
MDC_IDC_SET_LEADCHNL_RA_PACING_CATHODE_ELECTRODE_1: NORMAL
MDC_IDC_SET_LEADCHNL_RA_PACING_CATHODE_LOCATION_1: NORMAL
MDC_IDC_SET_LEADCHNL_RA_PACING_POLARITY: NORMAL
MDC_IDC_SET_LEADCHNL_RA_PACING_PULSEWIDTH: 0.4 MS
MDC_IDC_SET_LEADCHNL_RA_SENSING_ANODE_ELECTRODE_1: NORMAL
MDC_IDC_SET_LEADCHNL_RA_SENSING_ANODE_LOCATION_1: NORMAL
MDC_IDC_SET_LEADCHNL_RA_SENSING_CATHODE_ELECTRODE_1: NORMAL
MDC_IDC_SET_LEADCHNL_RA_SENSING_CATHODE_LOCATION_1: NORMAL
MDC_IDC_SET_LEADCHNL_RA_SENSING_POLARITY: NORMAL
MDC_IDC_SET_LEADCHNL_RA_SENSING_SENSITIVITY: 0.3 MV
MDC_IDC_SET_LEADCHNL_RV_PACING_AMPLITUDE: 1.5 V
MDC_IDC_SET_LEADCHNL_RV_PACING_ANODE_ELECTRODE_1: NORMAL
MDC_IDC_SET_LEADCHNL_RV_PACING_ANODE_LOCATION_1: NORMAL
MDC_IDC_SET_LEADCHNL_RV_PACING_CAPTURE_MODE: NORMAL
MDC_IDC_SET_LEADCHNL_RV_PACING_CATHODE_ELECTRODE_1: NORMAL
MDC_IDC_SET_LEADCHNL_RV_PACING_CATHODE_LOCATION_1: NORMAL
MDC_IDC_SET_LEADCHNL_RV_PACING_POLARITY: NORMAL
MDC_IDC_SET_LEADCHNL_RV_PACING_PULSEWIDTH: 0.4 MS
MDC_IDC_SET_LEADCHNL_RV_SENSING_ANODE_ELECTRODE_1: NORMAL
MDC_IDC_SET_LEADCHNL_RV_SENSING_ANODE_LOCATION_1: NORMAL
MDC_IDC_SET_LEADCHNL_RV_SENSING_CATHODE_ELECTRODE_1: NORMAL
MDC_IDC_SET_LEADCHNL_RV_SENSING_CATHODE_LOCATION_1: NORMAL
MDC_IDC_SET_LEADCHNL_RV_SENSING_POLARITY: NORMAL
MDC_IDC_SET_LEADCHNL_RV_SENSING_SENSITIVITY: 0.9 MV
MDC_IDC_SET_ZONE_DETECTION_INTERVAL: 200 MS
MDC_IDC_SET_ZONE_DETECTION_INTERVAL: 350 MS
MDC_IDC_SET_ZONE_DETECTION_INTERVAL: 430 MS
MDC_IDC_SET_ZONE_STATUS: NORMAL
MDC_IDC_SET_ZONE_TYPE: NORMAL
MDC_IDC_SET_ZONE_VENDOR_TYPE: NORMAL
MDC_IDC_STAT_AT_BURDEN_PERCENT: 0 %
MDC_IDC_STAT_AT_DTM_END: NORMAL
MDC_IDC_STAT_AT_DTM_START: NORMAL
MDC_IDC_STAT_BRADY_AP_VP_PERCENT: 89.07 %
MDC_IDC_STAT_BRADY_AP_VS_PERCENT: 0.02 %
MDC_IDC_STAT_BRADY_AS_VP_PERCENT: 10.55 %
MDC_IDC_STAT_BRADY_AS_VS_PERCENT: 0.35 %
MDC_IDC_STAT_BRADY_DTM_END: NORMAL
MDC_IDC_STAT_BRADY_DTM_START: NORMAL
MDC_IDC_STAT_BRADY_RA_PERCENT_PACED: 89.28 %
MDC_IDC_STAT_BRADY_RV_PERCENT_PACED: 99.62 %
MDC_IDC_STAT_EPISODE_RECENT_COUNT: 0
MDC_IDC_STAT_EPISODE_RECENT_COUNT: 6
MDC_IDC_STAT_EPISODE_RECENT_COUNT_DTM_END: NORMAL
MDC_IDC_STAT_EPISODE_RECENT_COUNT_DTM_START: NORMAL
MDC_IDC_STAT_EPISODE_TOTAL_COUNT: 0
MDC_IDC_STAT_EPISODE_TOTAL_COUNT: 100
MDC_IDC_STAT_EPISODE_TOTAL_COUNT: 3
MDC_IDC_STAT_EPISODE_TOTAL_COUNT: 467
MDC_IDC_STAT_EPISODE_TOTAL_COUNT: 76
MDC_IDC_STAT_EPISODE_TOTAL_COUNT_DTM_END: NORMAL
MDC_IDC_STAT_EPISODE_TOTAL_COUNT_DTM_START: NORMAL
MDC_IDC_STAT_EPISODE_TYPE: NORMAL
MDC_IDC_STAT_TACHYTHERAPY_RECENT_DTM_END: NORMAL
MDC_IDC_STAT_TACHYTHERAPY_RECENT_DTM_START: NORMAL
MDC_IDC_STAT_TACHYTHERAPY_TOTAL_DTM_END: NORMAL
MDC_IDC_STAT_TACHYTHERAPY_TOTAL_DTM_START: NORMAL

## 2025-07-15 PROCEDURE — 93296 REM INTERROG EVL PM/IDS: CPT | Performed by: INTERNAL MEDICINE

## 2025-07-15 PROCEDURE — 93294 REM INTERROG EVL PM/LDLS PM: CPT | Performed by: INTERNAL MEDICINE

## 2025-07-15 NOTE — TELEPHONE ENCOUNTER
The [facility] - fall - incident  07/13/25    Fax received and placed on PCP's desk.

## 2025-07-17 ENCOUNTER — OFFICE VISIT (OUTPATIENT)
Dept: CARDIOLOGY | Facility: CLINIC | Age: 85
End: 2025-07-17
Payer: COMMERCIAL

## 2025-07-17 VITALS
WEIGHT: 121 LBS | DIASTOLIC BLOOD PRESSURE: 69 MMHG | HEART RATE: 82 BPM | SYSTOLIC BLOOD PRESSURE: 110 MMHG | RESPIRATION RATE: 16 BRPM | BODY MASS INDEX: 21.44 KG/M2

## 2025-07-17 DIAGNOSIS — I35.0 NONRHEUMATIC AORTIC VALVE STENOSIS: Primary | ICD-10-CM

## 2025-07-17 NOTE — PROGRESS NOTES
[facility]       [facility]   [address], [place] [address]   [email or web address]   OFFICE: [phone]     IMPRESSION:  Severe, low-flow, low gradient symptomatic aortic valve stenosis - DSE with peak dobutamine - EF improved to 50-55%, PV 4.6 m/s, MG 47 mmHg, and calculated valve area of 0.7 cm .  DI 0.22.   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  Unfortunately, I do not think Laney is able to comprehend the severity of her medical condition or available treatment options.  Her brother who is the closest next of kin is cognitively impaired and her cousin who helps with her medical appointments cannot help make decisions for her health.  She does not have a POA.  We will place a consult to formally evaluate her decision capacity and an ethics consult to help identify correct individual to help Laney with treatment decisions.  Her overall prognosis without valve replacement is poor given cardiomyopathy with recent hospitalization for decompensated heart failure.          Dear Rose Mcelroy     I had the pleasure of seeing Laney Hahn today at the [facility] for evaluation for Transcatheter Aortic Valve Replacement for symptomatic severe aortic valve stenosis.  Briefly, patient presented for follow-up for her known aortic valve disease.  Unfortunately,  she has advanced dementia with poor understanding of her medical condition.  Her brother who is the closest next of kin is cognitively impaired and her cousin who helps with her medical appointments cannot help make decisions for her health.  She does not have a POA.  Today,  Laney reports overall she has been feeling well.  She does report exertional fatigue.  The patient denies symptoms of chest pain, shortness of breath, palpitations, dizziness, lightheadedness, presyncope, syncope, orthopnea, PND, early satiety/abdominal bloating, lower extremity edema, stroke like symptoms, or claudication.  Accuracy of her reported symptoms is unclear given her advanced dementia.    ROS:  A 14 point review of symptoms was reviewed.  No prior hx of rheumatic fever or chest irradiation. There were no additional findings pertinent to this encounter.     Past Medical History: as above     Social History: Denies tobacco or alcohol use    Family History: No family history of cardiovascular disease or sudden cardiac death.    Allergies: reviewed   No Known Allergies    Pertinent Medications:    Current Outpatient Medications   Medication Sig Dispense Refill    acetaminophen (TYLENOL) 500 MG tablet Take 1,000 mg by mouth every 8 hours as needed for pain.      apixaban ANTICOAGULANT (ELIQUIS ANTICOAGULANT) 2.5 MG tablet Take 1 tablet (2.5 mg) by mouth 2 times daily. Due for appointment with Rose Mcelroy 180 tablet 1    atorvastatin (LIPITOR) 10 MG tablet Take 1 tablet (10 mg) by mouth every morning. 90 tablet 3    hydrocortisone (CORTAID) 1 % external cream Apply topically 2 times daily. Apply to affected area on forearm x 10 days 20 g 0    metoprolol succinate ER (TOPROL XL) 50 MG 24 hr tablet Take 1 tablet (50 mg) by mouth daily. 90 tablet 2    naloxone (NARCAN) 4 MG/0.1ML nasal spray Spray 4 mg into one nostril alternating nostrils as needed for opioid reversal. every 2-3 minutes until assistance arrives (Patient not taking:  Reported on 5/5/2025)      torsemide (DEMADEX) 20 MG tablet Take 20 mg by mouth daily. (Patient not taking: Reported on 5/5/2025)       No current facility-administered medications for this visit.       OBJECTIVE:  Physical Examination   /69 (BP Location: Right arm, Patient Position: Sitting, Cuff Size: Adult Regular)   Pulse 82   Resp 16   Wt 54.9 kg (121 lb)   LMP  (LMP Unknown)   BMI 21.44 kg/m     Constitutional:  Appears somewhat frail, no distress, no pallor.   Cognitive: Alert, oriented x 1, Appropriate speech  Chest:  Clear bilaterally   Cardiac: regular rhythm, normal S1/S2, late peaking systolic murmur consistent with severe aortic stenosis. JVP not elevated    Abdoment:  Soft, non-tender  Pulses: 2+ distal pulses.  No carotid bruits  Skin and Integument:  No peripheral edema, extremities warm  Neuro:  Grossly normal for motor, sensory.      Studies:     Laboratory performed June 2025 and reviewed by me personally showed: Creatinine of 1.29 (near baseline).  Hemoglobin 12.5 with a platelet count of 188 in May 2025.      Electrocardiogram performed April 2025 and reviewed by me personally showed: Sinus, V-paced    TTE performed April 2025 and reviewed by me personally showed: Normal LV size with moderate LV dysfunction, estimate EF 30 to 35%, normal RV size and function, mild to moderate MR, trace AI, and low-flow low gradient moderate to severe aortic valve stenosis (PV 3.1 m/s, MG 24 mmHg, and calculated valve area of 0.84 to 0.85 cm .  DI 0.31.)     DSE performed June 2025 and reviewed by me personally showed: LV function improved to 50 to 55% with dobutamine infusion, at peak infusion aortic valve PV 4.6 m/s, MG 47 mmHg, and calculated valve area of 0.7 cm .  DI 0.22.    Cath performed April 2025 and reviewed by me personally showed:   No angiographic evidence of obstructive coronary artery disease.  Severely elevated right and left-sided filling pressures.  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  Low flow, low gradient moderate to severe aortic valve stenosis (mean gradient 9 mmHg, calculated valve area of 0.7 cm ).    TAVR CT performed July 2025 and reviewed by me personally showed: Aortic annulus will accommodate at 20 versus 23 mm Mendoza MONET versus 26 Evolut (some calcium extension into the LVOT) via RTF.  Valve calcium score 1400AU.       I reviewed the TAVR procedure, risk and expected outcomes with the patient and family members in detail.  I explained that the aortic valve is very significantly impaired, and strenuous exercise should be avoided until after the valve is repaired. Risks associated with transcatheter valve therapy were reviewed. Risks including death, MI, stroke, permanent disability, neurologic injury, renal failure, major bleeding requiring blood transfusion, emergency thoracotomy for catastrophic complications, vascular injury requiring endovascular or surgical repair, need for permanent pacemaker and anesthesia related complications were fully explained.  Alternative approaches including surgical aortic valve replacement and palliative care were discussed.  My own experience with these procedures were reviewed, and the patient and family appeared to understand the information I presented.  Questions were asked and answered to their apparent satisfaction.      I sincerely appreciate the opportunity to participate in this patient's care.  Please do not hesitate to contact me if any questions or concerns arise. The longitudinal plan of care for the diagnosis(es)/condition(s) as documented were addressed during this visit. Due to the added complexity in care, I will continue to support Laney in the subsequent management and with ongoing continuity of care.      Tyrese Dillon MD  Interventional Cardiology

## 2025-07-17 NOTE — LETTER
7/17/2025    Rose Mcelroy NP  [address]  [facility] [address]    RE: Laney Hahn       Dear Colleague,     I had the pleasure of seeing Laney Hammernash in the [facility].  [facility]       [facility]   [address], [place] [address]   [email or web address]   OFFICE: [phone]     IMPRESSION:  Severe, low-flow, low gradient symptomatic aortic valve stenosis - DSE with peak dobutamine - EF improved to 50-55%, PV 4.6 m/s, MG 47 mmHg, and calculated valve area of 0.7 cm .  DI 0.22.   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  Unfortunately, I do not think Laney is able to comprehend the severity of her medical condition or available treatment options.  Her brother who is the closest next of kin is cognitively impaired and her cousin who helps with her medical appointments cannot help make decisions for her health.  She does not have a POA.  We will place a consult to formally evaluate her decision capacity and an ethics consult to help identify correct individual to help Laney with treatment decisions.  Her overall prognosis without valve replacement is poor given cardiomyopathy with recent hospitalization for decompensated heart failure.          Dear Rose Mcelroy     I had the pleasure of seeing Laney Jovani today at the [facility]  Heart Clinic [facility] for evaluation for Transcatheter Aortic Valve Replacement for symptomatic severe aortic valve stenosis.  Briefly, patient presented for follow-up for her known aortic valve disease.  Unfortunately, she has advanced dementia with poor understanding of her medical condition.  Her brother who is the closest next of kin is cognitively impaired and her cousin who helps with her medical appointments cannot help make decisions for her health.  She does not have a POA.  Today,  Laney reports overall she has been feeling well.  She does report exertional fatigue.  The patient denies symptoms of chest pain, shortness of breath, palpitations, dizziness, lightheadedness, presyncope, syncope, orthopnea, PND, early satiety/abdominal bloating, lower extremity edema, stroke like symptoms, or claudication.  Accuracy of her reported symptoms is unclear given her advanced dementia.    ROS:  A 14 point review of symptoms was reviewed.  No prior hx of rheumatic fever or chest irradiation. There were no additional findings pertinent to this encounter.     Past Medical History: as above     Social History: Denies tobacco or alcohol use    Family History: No family history of cardiovascular disease or sudden cardiac death.    Allergies: reviewed   No Known Allergies    Pertinent Medications:    Current Outpatient Medications   Medication Sig Dispense Refill     acetaminophen (TYLENOL) 500 MG tablet Take 1,000 mg by mouth every 8 hours as needed for pain.       apixaban ANTICOAGULANT (ELIQUIS ANTICOAGULANT) 2.5 MG tablet Take 1 tablet (2.5 mg) by mouth 2 times daily. Due for appointment with Rose Mcelroy 180 tablet 1     atorvastatin (LIPITOR) 10 MG tablet Take 1 tablet (10 mg) by mouth every morning. 90 tablet 3     hydrocortisone (CORTAID) 1 % external cream Apply topically 2 times daily. Apply to affected area on forearm x 10 days 20 g 0     metoprolol succinate ER (TOPROL XL) 50 MG 24 hr tablet Take 1 tablet  (50 mg) by mouth daily. 90 tablet 2     naloxone (NARCAN) 4 MG/0.1ML nasal spray Spray 4 mg into one nostril alternating nostrils as needed for opioid reversal. every 2-3 minutes until assistance arrives (Patient not taking: Reported on 5/5/2025)       torsemide (DEMADEX) 20 MG tablet Take 20 mg by mouth daily. (Patient not taking: Reported on 5/5/2025)       No current facility-administered medications for this visit.       OBJECTIVE:  Physical Examination   /69 (BP Location: Right arm, Patient Position: Sitting, Cuff Size: Adult Regular)   Pulse 82   Resp 16   Wt 54.9 kg (121 lb)   LMP  (LMP Unknown)   BMI 21.44 kg/m     Constitutional:  Appears somewhat frail, no distress, no pallor.   Cognitive: Alert, oriented x 1, Appropriate speech  Chest:  Clear bilaterally   Cardiac: regular rhythm, normal S1/S2, late peaking systolic murmur consistent with severe aortic stenosis. JVP not elevated    Abdoment:  Soft, non-tender  Pulses: 2+ distal pulses.  No carotid bruits  Skin and Integument:  No peripheral edema, extremities warm  Neuro:  Grossly normal for motor, sensory.      Studies:     Laboratory performed June 2025 and reviewed by me personally showed: Creatinine of 1.29 (near baseline).  Hemoglobin 12.5 with a platelet count of 188 in May 2025.      Electrocardiogram performed April 2025 and reviewed by me personally showed: Sinus, V-paced    TTE performed April 2025 and reviewed by me personally showed: Normal LV size with moderate LV dysfunction, estimate EF 30 to 35%, normal RV size and function, mild to moderate MR, trace AI, and low-flow low gradient moderate to severe aortic valve stenosis (PV 3.1 m/s, MG 24 mmHg, and calculated valve area of 0.84 to 0.85 cm .  DI 0.31.)     DSE performed June 2025 and reviewed by me personally showed: LV function improved to 50 to 55% with dobutamine infusion, at peak infusion aortic valve PV 4.6 m/s, MG 47 mmHg, and calculated valve area of 0.7 cm .  DI  0.22.    Cath performed April 2025 and reviewed by me personally showed:   No angiographic evidence of obstructive coronary artery disease.  Severely elevated right and left-sided filling pressures.  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  Low flow, low gradient moderate to severe aortic valve stenosis (mean gradient 9 mmHg, calculated valve area of 0.7 cm ).    TAVR CT performed July 2025 and reviewed by me personally showed: Aortic annulus will accommodate at 20 versus 23 mm Mendoza MONET versus 26 Evolut (some calcium extension into the LVOT) via RTF.  Valve calcium score 1400AU.       I reviewed the TAVR procedure, risk and expected outcomes with the patient and family members in detail.  I explained that the aortic valve is very significantly impaired, and strenuous exercise should be avoided until after the valve is repaired. Risks associated with transcatheter valve therapy were reviewed. Risks including death, MI, stroke, permanent disability, neurologic injury, renal failure, major bleeding requiring blood transfusion, emergency thoracotomy for catastrophic complications, vascular injury requiring endovascular or surgical repair, need for permanent pacemaker and anesthesia related complications were fully explained.  Alternative approaches including surgical aortic valve replacement and palliative care were discussed.  My own experience with these procedures were reviewed, and the patient and family appeared to understand the information I presented.  Questions were asked and answered to their apparent satisfaction.      I sincerely appreciate the opportunity to participate in this patient's care.  Please do not hesitate to contact me if any questions or concerns arise. The longitudinal plan of care for the diagnosis(es)/condition(s) as documented were addressed during this visit. Due to the added complexity in care, I will  continue to support Laney in the subsequent management and with ongoing continuity of care.      Tyrese Dillon MD  Interventional Cardiology               Thank you for allowing me to participate in the care of your patient.      Sincerely,     Tyrese Dillon MD     [facility]  cc:   No referring provider defined for this encounter.

## 2025-07-21 DIAGNOSIS — F01.50 VASCULAR DEMENTIA WITHOUT BEHAVIORAL DISTURBANCE (H): ICD-10-CM

## 2025-07-21 DIAGNOSIS — I35.0 NONRHEUMATIC AORTIC VALVE STENOSIS: ICD-10-CM

## 2025-07-21 DIAGNOSIS — F81.9 COGNITIVE DEVELOPMENTAL DELAY: Primary | ICD-10-CM

## 2025-07-23 ENCOUNTER — PATIENT OUTREACH (OUTPATIENT)
Dept: CARE COORDINATION | Facility: CLINIC | Age: 85
End: 2025-07-23
Payer: COMMERCIAL

## 2025-07-23 NOTE — LETTER
[facility] CARE COORDINATION  [address]  [place] [address]    July 24, 2025    Laney Hahn  [address]   [place] [address]      Dear Laney,    I am a clinic community health worker who works with Rose Mcelroy NP with the [facility]. I wanted to thank you for spending the time to talk with me.  Below is a description of clinic care coordination and how I can further assist you.       The clinic care coordination team is made up of a registered nurse, , financial resource worker and community health worker who understand the health care system. The goal of clinic care coordination is to help you manage your health and improve access to the health care system. Our team works alongside your provider to assist you in determining your health and social needs. We can help you obtain health care and community resources, providing you with necessary information and education. We can work with you through any barriers and develop a care plan that helps coordinate and strengthen the communication between you and your care team.  Our services are voluntary and are offered without charge to you personally.    Please feel free to contact Dayanara @ [phone] with any questions or concerns regarding care coordination and what we can offer.      We are focused on providing you with the highest-quality healthcare experience possible.    Sincerely,     PLACIDO Norwood   Clinic Care Coordination  [facility]

## 2025-07-23 NOTE — PROGRESS NOTES
Clinic Care Coordination Contact  [facility]/Voicemail    Clinical Data: Care Coordinator Outreach    Outreach Documentation Number of Outreach Attempt   7/23/2025   1:29 PM 1       Left message on Nancy's voicemail with call back information and requested return call.      Plan: Care Coordinator CHW to discuss recent CC referral with patient and offer CC services & resources.  Care Coordinator will try to reach patient again in 1-2 business days.    CC referral:  Janette has vascular dementia on top of a pre-existing cognitive/developmental disability. She lives with her brother who also has a developmental disability. Currently she does not have a legal guardian and has severe aortic stenosis. She does not grasp the severity of this condition and we are concerned about her ability to make decisions given her cognitive issues. I am looking for assistance in guardianship. She has assistance from a friend who is also an RN at her [employer].     Consent to Communicate is on file for Nancy    Dayanara BLOOD  Community Health Worker  [facility] Care Coordination  [email or web address]  [email or web address]  Office: [phone]

## 2025-07-24 NOTE — PROGRESS NOTES
Clinic Care Coordination Contact    CHW spoke with Nancy  patient's CTC and explained Clinic Care Coordination thoroughly.. CHW brought up reasons for referral however, the patient declined to enroll in Clinic Care Coordination.    Plan: Care Coordinator sent care coordination introduction letter on 7/24 via mail. Care Coordinator will do no further outreaches at this time.    PLACIDO Norwood   [phone]  Clinic Care Coordination  [facility]

## 2025-07-25 ENCOUNTER — TELEPHONE (OUTPATIENT)
Dept: CARDIOLOGY | Facility: CLINIC | Age: 85
End: 2025-07-25
Payer: COMMERCIAL

## 2025-08-14 ENCOUNTER — TELEPHONE (OUTPATIENT)
Dept: CARDIOLOGY | Facility: CLINIC | Age: 85
End: 2025-08-14
Payer: COMMERCIAL

## 2025-08-18 ENCOUNTER — DOCUMENTATION ONLY (OUTPATIENT)
Dept: OTHER | Facility: CLINIC | Age: 85
End: 2025-08-18
Payer: COMMERCIAL

## 2025-08-19 ENCOUNTER — THERAPY VISIT (OUTPATIENT)
Dept: OCCUPATIONAL THERAPY | Facility: REHABILITATION | Age: 85
End: 2025-08-19
Attending: INTERNAL MEDICINE
Payer: COMMERCIAL

## 2025-08-19 DIAGNOSIS — F01.50 VASCULAR DEMENTIA WITHOUT BEHAVIORAL DISTURBANCE (H): ICD-10-CM

## 2025-08-19 DIAGNOSIS — I35.0 AORTIC VALVE STENOSIS, ETIOLOGY OF CARDIAC VALVE DISEASE UNSPECIFIED: ICD-10-CM

## 2025-08-19 DIAGNOSIS — I50.33 ACUTE ON CHRONIC HEART FAILURE WITH PRESERVED EJECTION FRACTION (H): ICD-10-CM

## 2025-08-19 PROCEDURE — 97165 OT EVAL LOW COMPLEX 30 MIN: CPT | Mod: GO | Performed by: OCCUPATIONAL THERAPY ASSISTANT

## 2025-08-19 ASSESSMENT — MONTREAL COGNITIVE ASSESSMENT (MOCA)
6. READ LIST OF DIGITS [FORWARD/BACKWARD]: 2
4. NAME EACH OF THE THREE ANIMALS SHOWN: 2
11. FOR EACH PAIR OF WORDS, WHAT CATEGORY DO THEY BELONG TO (OUT OF 2): 2
8. SERIAL SUBTRACTION OF 7S: 0
13. ORIENTATION SUBSCORE: 0
WHAT IS THE TOTAL SCORE (OUT OF 30): 8
10. [FLUENCY] NAME WORDS STARTING WITH DESIGNATED LETTER: 0
VISUOSPATIAL/EXECUTIVE SUBSCORE: 0
9. REPEAT EACH SENTENCE: 1
12. MEMORY INDEX SCORE: 0
7. [VIGILENCE] TAP WHEN HEARING DESIGNATED LETTER: 1
WHAT LEVEL OF EDUCATION WAS ATTAINED: 0

## 2025-08-21 ENCOUNTER — TELEPHONE (OUTPATIENT)
Dept: INTERNAL MEDICINE | Facility: CLINIC | Age: 85
End: 2025-08-21
Payer: COMMERCIAL

## 2025-08-21 ENCOUNTER — MEDICAL CORRESPONDENCE (OUTPATIENT)
Dept: HEALTH INFORMATION MANAGEMENT | Facility: CLINIC | Age: 85
End: 2025-08-21
Payer: COMMERCIAL

## 2025-08-25 ENCOUNTER — TELEPHONE (OUTPATIENT)
Dept: PHARMACY | Facility: OTHER | Age: 85
End: 2025-08-25
Payer: COMMERCIAL

## 2025-08-27 ENCOUNTER — TELEPHONE (OUTPATIENT)
Dept: CARDIOLOGY | Facility: CLINIC | Age: 85
End: 2025-08-27
Payer: COMMERCIAL

## 2025-09-03 ENCOUNTER — TELEPHONE (OUTPATIENT)
Dept: CARDIOLOGY | Facility: CLINIC | Age: 85
End: 2025-09-03
Payer: COMMERCIAL

## (undated) DEVICE — CATH ANGIO 100CM 5FR INFNT JR4 CRV COR FEM 534521T

## (undated) DEVICE — SHEATH GLIDE RADIAL 5FR 10CM .021

## (undated) DEVICE — CUSTOM PACK CORONARY SAN5BCRHEA

## (undated) DEVICE — ELECTRODE DEFIB CADENCE 22550R

## (undated) DEVICE — GUIDEWIRE STRT .025 SCN M001491001

## (undated) DEVICE — EXCHANGE WIRE .035 260 STAR/JFC/035/260/ M001491681

## (undated) DEVICE — CATH ANGIO LANGSTRON 6FRX110CM 145DEG 4H 5540

## (undated) DEVICE — GUIDEWIRE VASC 0.014INX180CM RUNTHROUGH 25-1011

## (undated) DEVICE — CATH PULM ART 7FR X 110CM, SWA

## (undated) DEVICE — MANIFOLD KIT ANGIO AUTOMATED 014613

## (undated) DEVICE — INTRO SHEATH 6FRX10CM PINNACLE RSS602

## (undated) DEVICE — SHTH INTRO 0.021IN ID 6FR DIA

## (undated) DEVICE — CATH ANGIO JUDKINS R4 6FRX100CM INFINITI 534621T

## (undated) DEVICE — INTRO SHEATH 7FRX10CM PINNACLE RSS702

## (undated) DEVICE — INTRO MICRO MINI STICK 4FR STIFF NITINOL 45-753

## (undated) DEVICE — KIT HAND CONTROL ACIST 014644 AR-P54

## (undated) DEVICE — CATH ANGIO 100CM 5FR INFNT JL3.5 CRV COR FEM 534518T

## (undated) DEVICE — SLEEVE TR BAND RADIAL COMPRESSION DEVICE 24CM TRB24-REG

## (undated) DEVICE — CATH ANGIO JUDKINS JL3.5 6FRX100CM INFINITI 534618T

## (undated) DEVICE — SHEATH GUIDE SLENDER 7FRX10CM SS 80-1070

## (undated) DEVICE — SYR ANGIOGRAPHY MULTIUSE KIT ACIST 014612

## (undated) RX ORDER — ASPIRIN 325 MG
TABLET ORAL
Status: DISPENSED
Start: 2025-04-03

## (undated) RX ORDER — ONDANSETRON 2 MG/ML
INJECTION INTRAMUSCULAR; INTRAVENOUS
Status: DISPENSED
Start: 2025-04-03

## (undated) RX ORDER — FENTANYL CITRATE 50 UG/ML
INJECTION, SOLUTION INTRAMUSCULAR; INTRAVENOUS
Status: DISPENSED
Start: 2025-04-03

## (undated) RX ORDER — FUROSEMIDE 10 MG/ML
INJECTION INTRAMUSCULAR; INTRAVENOUS
Status: DISPENSED
Start: 2025-04-03